# Patient Record
Sex: MALE | Race: WHITE | NOT HISPANIC OR LATINO | Employment: OTHER | URBAN - METROPOLITAN AREA
[De-identification: names, ages, dates, MRNs, and addresses within clinical notes are randomized per-mention and may not be internally consistent; named-entity substitution may affect disease eponyms.]

---

## 2017-02-16 ENCOUNTER — TELEPHONE (OUTPATIENT)
Dept: RHEUMATOLOGY | Facility: CLINIC | Age: 67
End: 2017-02-16

## 2017-02-16 NOTE — TELEPHONE ENCOUNTER
Prior Authorization Approval    Authorization Effective Date: 2/16/2017  Authorization Expiration Date: 2/16/2020  Medication: Humira - Approved  Approved Dose/Quantity: 2 per 28 days  Reference #: [id number]   Insurance Company: [employer] - Phone [phone] Fax [phone]  Expected CoPay: $5.00     CoPay Card Available: Yes   Foundation Assistance Needed:    Which Pharmacy is filling the prescription (Not needed for infusion/clinic administered): [facility] - [place], [place] - [facility] [address]  Pharmacy Notified: Yes  Patient Notified: Yes

## 2017-02-20 DIAGNOSIS — M10.9 GOUT, UNSPECIFIED: ICD-10-CM

## 2017-02-20 DIAGNOSIS — E03.4 HYPOTHYROIDISM DUE TO ACQUIRED ATROPHY OF THYROID: ICD-10-CM

## 2017-02-21 RX ORDER — LEVOTHYROXINE SODIUM 25 UG/1
TABLET ORAL
Qty: 90 TABLET | Refills: 1 | Status: SHIPPED | OUTPATIENT
Start: 2017-02-21 | End: 2017-08-24

## 2017-02-21 RX ORDER — ALLOPURINOL 300 MG/1
TABLET ORAL
Qty: 90 TABLET | Refills: 1 | Status: SHIPPED | OUTPATIENT
Start: 2017-02-21 | End: 2017-08-24

## 2017-02-21 NOTE — TELEPHONE ENCOUNTER
levothyroxine (SYNTHROID, LEVOTHROID) 25 MCG tablet     Last Written Prescription Date: 8/23/16  Last Quantity: 90, # refills: 1  Last Office Visit with [facility] or  Health prescribing provider: 11/15/16        TSH   Date Value Ref Range Status   12/01/2016 3.93 0.40 - 4.00 mU/L Final     allopurinol (ZYLOPRIM) 300 MG tablet       Last Written Prescription Date: 5/24/16  Last Fill Quantity: 90, # refills: 0  Last Office Visit with [facility], [facility] or  Health prescribing provider:  11/15/16        Uric Acid   Date Value Ref Range Status   06/02/2016 3.0 (L) 3.5 - 7.2 mg/dL Final   ]  Creatinine   Date Value Ref Range Status   12/01/2016 0.84 0.66 - 1.25 mg/dL Final   ]  Lab Results   Component Value Date    WBC 4.0 06/02/2016     Lab Results   Component Value Date    RBC 4.38 06/02/2016     Lab Results   Component Value Date    HGB 14.7 06/02/2016     Lab Results   Component Value Date    HCT 43.0 06/02/2016     No components found for: MCT  Lab Results   Component Value Date    MCV 98 06/02/2016     Lab Results   Component Value Date    MCH 33.6 06/02/2016     Lab Results   Component Value Date    MCHC 34.2 06/02/2016     Lab Results   Component Value Date    RDW 12.7 06/02/2016     Lab Results   Component Value Date     06/02/2016     Lab Results   Component Value Date    AST 35 12/01/2016     Lab Results   Component Value Date    ALT 58 12/01/2016

## 2017-02-21 NOTE — TELEPHONE ENCOUNTER
Synthroid  Routing refill request to provider for review/approval because:  Drug interaction warning    Allopurinol  Routing refill request to provider for review/approval because:  Labs out of range:  CBC and ALT    Bettye Hernandez RN  [facility]

## 2017-03-04 DIAGNOSIS — K21.9 GASTROESOPHAGEAL REFLUX DISEASE WITHOUT ESOPHAGITIS: Primary | ICD-10-CM

## 2017-03-04 DIAGNOSIS — E78.5 HYPERLIPIDEMIA LDL GOAL <130: ICD-10-CM

## 2017-03-06 RX ORDER — CHOLESTYRAMINE LIGHT 4 G/5.7G
POWDER, FOR SUSPENSION ORAL
Qty: 60 PACKET | Refills: 5 | Status: SHIPPED | OUTPATIENT
Start: 2017-03-06 | End: 2018-10-23

## 2017-03-06 NOTE — TELEPHONE ENCOUNTER
Questran  Routing refill request to provider for review/approval because:  Labs not current:  FLP    Omeprazole  Prescription approved per [facility] Refill Protocol.    Bettye Hernandez RN  [facility]

## 2017-03-06 NOTE — TELEPHONE ENCOUNTER
Matt     Last Written Prescription Date: 6/6/16  Last Fill Quantity: 60, # refills: 8  Last Office Visit with [facility], [facility] or  Health prescribing provider: 11/15/16       Lab Results   Component Value Date    CHOL 173 12/29/2015     Lab Results   Component Value Date    HDL 56 12/29/2015     Lab Results   Component Value Date    LDL 98 12/01/2016    LDL 75 12/29/2015     Lab Results   Component Value Date    TRIG 211 12/29/2015     Lab Results   Component Value Date    CHOLHDLRATIO 3.0 10/04/2013     Omeprazole      Last Written Prescription Date: 3/11/16  Last Fill Quantity: 90,  # refills: 3   Last Office Visit with [facility], [facility] or [facility] prescribing provider: 11/15/16

## 2017-04-01 DIAGNOSIS — I10 HYPERTENSION GOAL BP (BLOOD PRESSURE) < 140/90: ICD-10-CM

## 2017-04-03 RX ORDER — LISINOPRIL 20 MG/1
TABLET ORAL
Qty: 90 TABLET | Refills: 1 | Status: SHIPPED | OUTPATIENT
Start: 2017-04-03 | End: 2017-09-08 | Stop reason: SINTOL

## 2017-04-03 NOTE — TELEPHONE ENCOUNTER
Lisinopril      Last Written Prescription Date: 1/6/16  Last Fill Quantity: 90, # refills: 0  Last Office Visit with [facility], [facility] or [facility] prescribing provider: 11/15/16  Next 5 appointments (look out 90 days)     Jun 12, 2017 10:15 AM CDT   Return Visit with Sb Hurtado MD   [facility]    [address] [address] [facility] [address] [facility] [address]   [phone]                   Potassium   Date Value Ref Range Status   12/01/2016 4.1 3.4 - 5.3 mmol/L Final     Creatinine   Date Value Ref Range Status   12/01/2016 0.84 0.66 - 1.25 mg/dL Final     BP Readings from Last 3 Encounters:   12/07/16 (!) 155/93   11/15/16 136/82   04/19/16 130/88

## 2017-04-03 NOTE — TELEPHONE ENCOUNTER
Routing refill request to provider for review/approval because:  Labs out of range:  BP  A break in medication, patient should have been out of medication 1 year ago    Bettye Hernandez RN  [facility]

## 2017-04-07 ENCOUNTER — OFFICE VISIT (OUTPATIENT)
Dept: FAMILY MEDICINE | Facility: OTHER | Age: 67
End: 2017-04-07
Payer: COMMERCIAL

## 2017-04-07 VITALS
OXYGEN SATURATION: 95 % | SYSTOLIC BLOOD PRESSURE: 122 MMHG | RESPIRATION RATE: 20 BRPM | DIASTOLIC BLOOD PRESSURE: 70 MMHG | TEMPERATURE: 97.4 F | WEIGHT: 193 LBS | BODY MASS INDEX: 26.23 KG/M2 | HEART RATE: 84 BPM

## 2017-04-07 DIAGNOSIS — R22.0 FACIAL SWELLING: Primary | ICD-10-CM

## 2017-04-07 DIAGNOSIS — I71.40 ABDOMINAL AORTIC ANEURYSM (AAA) WITHOUT RUPTURE (H): ICD-10-CM

## 2017-04-07 DIAGNOSIS — I15.9 SECONDARY HYPERTENSION WITH GOAL BLOOD PRESSURE LESS THAN 140/90: ICD-10-CM

## 2017-04-07 LAB
CREAT UR-MCNC: 87 MG/DL
MICROALBUMIN UR-MCNC: 21 MG/L
MICROALBUMIN/CREAT UR: 23.6 MG/G CR (ref 0–17)

## 2017-04-07 PROCEDURE — 99213 OFFICE O/P EST LOW 20 MIN: CPT | Performed by: NURSE PRACTITIONER

## 2017-04-07 PROCEDURE — 82043 UR ALBUMIN QUANTITATIVE: CPT | Performed by: NURSE PRACTITIONER

## 2017-04-07 RX ORDER — CLINDAMYCIN HCL 300 MG
300 CAPSULE ORAL 3 TIMES DAILY
Qty: 30 CAPSULE | Refills: 0 | Status: SHIPPED | OUTPATIENT
Start: 2017-04-07 | End: 2017-04-17

## 2017-04-07 ASSESSMENT — PAIN SCALES - GENERAL: PAINLEVEL: MILD PAIN (2)

## 2017-04-07 NOTE — PROGRESS NOTES
SUBJECTIVE:                                                    Camilo Baca is a 66 year old male who presents to clinic today for the following health issues:      JAW PAIN      Duration: 1 day    Description (location/character/radiation): left side jaw/neck pain    Intensity:  mild    Accompanying signs and symptoms: considerable swelling    History (similar episodes/previous evaluation): None    Precipitating or alleviating factors: None    Therapies tried and outcome: None     Sudden onset of left side jaw swelling 1 day ago.  It developed within a few hours.  Had some mild sore throat prior to that.     No fevers/chills.  Has been feeling well except for mild cough.  No sinus congestion or ear pain.  No known dental issues.     History of RA and is on Humira for that.    Symptoms are much improved now, at least 50% better in 24 hours.  He has not done anything to try and improve this.       Problem list and histories reviewed & adjusted, as indicated.  Additional history: none    Patient Active Problem List   Diagnosis     Lumbago     NONSPECIFIC MEDICAL HISTORY     HYPERLIPIDEMIA LDL GOAL <130     History of adenomatous polyp of colon     Idiopathic chronic gout without tophus, unspecified site     Advanced directives, counseling/discussion     High risk medication use     Thrombocytopenia (H)     Gastroesophageal reflux disease without esophagitis     Factor 5 Leiden mutation, heterozygous (H)     Personal history of venous thrombosis and embolism     Personal history of DVT (deep vein thrombosis)     Hypothyroidism due to acquired atrophy of thyroid     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)     Secondary hypertension with goal blood pressure less than 140/90     Past Surgical History:   Procedure Laterality Date     C APPENDECTOMY  1996     COLONOSCOPY  11/15/2010    COLONOSCOPY performed by DARIUS MESA at  GI     COLONOSCOPY N/A 11/2/2015    Procedure:  COLONOSCOPY;  Surgeon: Bubba Odom MD;  Location: [facility]     HC COLONOSCOPY W BIOPSY  08/10/05     HC REPAIR ING HERNIA,5+Y/O,REDUCIBL  1960     HERNIORRHAPHY UMBILICAL N/A 4/17/2015    Procedure: HERNIORRHAPHY UMBILICAL;  Surgeon: Rayray Avila MD;  Location: [facility] OR     LAPAROSCOPIC HERNIORRHAPHY INGUINAL BILATERAL Bilateral 4/17/2015    Procedure: LAPAROSCOPIC HERNIORRHAPHY INGUINAL BILATERAL;  Surgeon: Rayray Avila MD;  Location: [facility] OR       Social History   Substance Use Topics     Smoking status: Former Smoker     Packs/day: 0.25     Years: 2.00     Types: Cigarettes, Pipe     Quit date: 1/1/1979     Smokeless tobacco: Never Used     Alcohol use Yes      Comment: 5 drinks per week / wine     Family History   Problem Relation Age of Onset     Allergies Father      Cortisone     Alcohol/Drug Sister      Penicillin     Alzheimer Disease Paternal Grandfather      Arthritis Mother      Blood Disease Paternal Grandmother      Clotting problems     Blood Disease Son      Factor 5     Cardiovascular Father      Cardiovascular Mother      Circulatory Father      Depression Mother      DIABETES Father      GASTROINTESTINAL DISEASE Mother      IBS     Hypertension Mother      Hypertension Maternal Aunt      CEREBROVASCULAR DISEASE Maternal Grandmother      Circulatory Mother      Aortal aneurysm     Hypertension Father      Lipids Father      OSTEOPOROSIS Mother          Current Outpatient Prescriptions   Medication Sig Dispense Refill     lisinopril (PRINIVIL/ZESTRIL) 20 MG tablet TAKE ONE TABLET BY MOUTH EVERY DAY 90 tablet 1     adalimumab (HUMIRA PEN) 40 MG/0.8ML pen kit Inject 0.8 mLs (40 mg) Subcutaneous every 14 days 2 each 3     cholestyramine light (QUESTRAN) 4 GM Packet DISSOLVE ONE PACKET IN LIQUID AS DIRECTED TWO TIMES A DAY AND DRINK 60 packet 5     omeprazole (PRILOSEC) 20 MG CR capsule TAKE ONE CAPSULE BY MOUTH ONCE DAILY -TAKE 30-60 MINUTES BEFORE A MEAL 90 capsule 1      allopurinol (ZYLOPRIM) 300 MG tablet TAKE ONE TABLET BY MOUTH EVERY DAY 90 tablet 1     levothyroxine (SYNTHROID/LEVOTHROID) 25 MCG tablet TAKE ONE TABLET BY MOUTH EVERY DAY 90 tablet 1     cetirizine (ZYRTEC) 10 MG tablet Take 1 tablet (10 mg) by mouth every evening 90 tablet 1     atorvastatin (LIPITOR) 80 MG tablet Take 1 tablet (80 mg) by mouth daily 90 tablet 1     diclofenac (VOLTAREN) 1 % GEL Apply  2 grams to shoulders three times daily using enclosed dosing card. 100 g 1     triamcinolone (KENALOG) 0.1 % cream Locally bid 60 g 0     capsaicin (ZOSTRIX) 0.075 % topical cream Apply  topically 3 times daily. 50 g 3     BABY ASPIRIN OR daily       [DISCONTINUED] levothyroxine (SYNTHROID, LEVOTHROID) 25 MCG tablet TAKE ONE TABLET BY MOUTH EVERY DAY 90 tablet 1     [DISCONTINUED] lisinopril (PRINIVIL,ZESTRIL) 20 MG tablet TAKE ONE TABLET BY MOUTH EVERY DAY 90 tablet 0     Allergies   Allergen Reactions     No Known Drug Allergies      BP Readings from Last 3 Encounters:   04/07/17 122/70   12/07/16 (!) 155/93   11/15/16 136/82    Wt Readings from Last 3 Encounters:   04/07/17 193 lb (87.5 kg)   12/07/16 180 lb (81.6 kg)   11/15/16 190 lb 9.6 oz (86.5 kg)                    Reviewed and updated as needed this visit by clinical staff  Tobacco  Allergies  Meds  Med Hx  Surg Hx  Fam Hx  Soc Hx      Reviewed and updated as needed this visit by Provider         ROS:  C: NEGATIVE for fever, chills, change in weight  ENT/MOUTH: as above   R: NEGATIVE for significant cough or SOB  CV: NEGATIVE for chest pain, palpitations or peripheral edema    OBJECTIVE:                                                    /70  Pulse 84  Temp 97.4  F (36.3  C) (Tympanic)  Resp 20  Wt 193 lb (87.5 kg)  SpO2 95%  BMI 26.23 kg/m2  Body mass index is 26.23 kg/(m^2).  GENERAL: healthy, alert and no distress  HENT: normal cephalic/atraumatic, ear canals and TM's normal, nose and mouth without ulcers or lesions, oropharynx  clear, oral mucous membranes moist, sinuses: not tender and left sided lower jaw swelling and mild tenderness to palpation.  No erythema or skin changes.  No warmth.  No abscess seen in his mouth or palpated.  No gum swelling, erythema or cracked teeth. No temporal area pain or swelling   NECK: no adenopathy, no asymmetry, masses, or scars and thyroid normal to palpation  RESP: lungs clear to auscultation - no rales, rhonchi or wheezes  CV: regular rate and rhythm, normal S1 S2, no S3 or S4, no murmur, click or rub, no peripheral edema and peripheral pulses strong  MS: no gross musculoskeletal defects noted, no edema    Diagnostic Test Results:  none      ASSESSMENT/PLAN:                                                        1. Facial swelling  I suspect this is a blocked salivary gland given how rapidly the onset was and how rapidly it has improved in less than 24 hours.  No definite infection indentified, but he is on chronic immunosuppression so I gave him a paper prescription for an antibiotic to use only if symptoms fail to resolve.  I suspect this will be gone within a few days, however.   - clindamycin (CLEOCIN) 300 MG capsule; Take 1 capsule (300 mg) by mouth 3 times daily for 10 days  Dispense: 30 capsule; Refill: 0    FUTURE APPOINTMENTS:       - Follow up if symptoms fail to resolve as expected.   See Patient Instructions    DIANN Presley [facility]

## 2017-04-07 NOTE — PATIENT INSTRUCTIONS
If this gets worse start the antibiotics.  If this continues to improve you don't even need to take them.       Treatment for Parotid Duct Obstruction  Parotid duct obstruction is when part of your parotid duct becomes blocked. The parotid duct is a small tube that leads from a gland that makes saliva. The duct sends the saliva into your mouth. When it s blocked, saliva can t flow normally.  Types of treatment  You may start with treatments such as:    Drinking more water    Applying moist heat to the area    Massaging the gland and duct    Sucking on candies to create saliva secretion    Using pain medicines    Stopping use of any medicines that lower the amount of saliva you make, if possible  Many symptoms go away quickly with these types of treatments. If your symptoms don t get better, you may need treatments such as:    Lithotripsy, which uses shock waves to break up the stone    Wire basket retrieval, which removes the stone through the duct    Sialoendoscopy, which also removes the stone through the duct    Open surgery, which may include removal of the parotid duct, if these other methods don t work  Your parotid gland should work as normal after the blockage is removed.  Possible complications of parotid duct obstruction  Sometimes obstruction of the duct also leads to infection of the gland and duct. This is more common in older adults. If you have an infection, you may have a fever and pain that gets worse. You may need treatment with antibiotics.  Most of the time, this kind of infection soon goes away with antibiotics. In other cases a more severe infection may happen. You may have an infection of the deep layers of the skin. This can lead to an abscess in your gland or neck. If your symptoms don t get better, you may need to see an ear, nose, and throat doctor.  When to call your healthcare provider  Call your healthcare provider right away if you have any of these:    Fever of 100.4 F (38.0 C) or  higher    Pain that gets worse    Pain in new areas of your head or neck     4594-6335 [employer]. [address] [place] [address], [place] [address]. All rights reserved. This information is not intended as a substitute for professional medical care. Always follow your healthcare professional's instructions.

## 2017-04-07 NOTE — MR AVS SNAPSHOT
After Visit Summary   4/7/2017    Camilo Baca    MRN: [id number]           Patient Information     Date Of Birth          1950        Visit Information        Provider Department      4/7/2017 1:20 PM Lyndsey Aponte APRN [facility]        Today's Diagnoses     Facial swelling    -  1    Abdominal aortic aneurysm (AAA) without rupture (H)          Care Instructions    If this gets worse start the antibiotics.  If this continues to improve you don't even need to take them.       Treatment for Parotid Duct Obstruction  Parotid duct obstruction is when part of your parotid duct becomes blocked. The parotid duct is a small tube that leads from a gland that makes saliva. The duct sends the saliva into your mouth. When it s blocked, saliva can t flow normally.  Types of treatment  You may start with treatments such as:    Drinking more water    Applying moist heat to the area    Massaging the gland and duct    Sucking on candies to create saliva secretion    Using pain medicines    Stopping use of any medicines that lower the amount of saliva you make, if possible  Many symptoms go away quickly with these types of treatments. If your symptoms don t get better, you may need treatments such as:    Lithotripsy, which uses shock waves to break up the stone    Wire basket retrieval, which removes the stone through the duct    Sialoendoscopy, which also removes the stone through the duct    Open surgery, which may include removal of the parotid duct, if these other methods don t work  Your parotid gland should work as normal after the blockage is removed.  Possible complications of parotid duct obstruction  Sometimes obstruction of the duct also leads to infection of the gland and duct. This is more common in older adults. If you have an infection, you may have a fever and pain that gets worse. You may need treatment with antibiotics.  Most of the time, this kind of infection soon goes  away with antibiotics. In other cases a more severe infection may happen. You may have an infection of the deep layers of the skin. This can lead to an abscess in your gland or neck. If your symptoms don t get better, you may need to see an ear, nose, and throat doctor.  When to call your healthcare provider  Call your healthcare provider right away if you have any of these:    Fever of 100.4 F (38.0 C) or higher    Pain that gets worse    Pain in new areas of your head or neck     0250-4488 [employer]. [address] [place] [address]. All rights reserved. This information is not intended as a substitute for professional medical care. Always follow your healthcare professional's instructions.              Follow-ups after your visit        Your next 10 appointments already scheduled     Jun 12, 2017 10:15 AM CDT   [facility]ado [facility]   [facility]    [address] [facility] [address]   [phone]              Future tests that were ordered for you today     Open Future Orders        Priority Expected Expires Ordered    US abdominal aorta limited Routine  4/7/2018 4/7/2017            Who to contact     If you have questions or need follow up information about today's clinic visit or your schedule please contact [facility] directly at [phone].  Normal or non-critical lab and imaging results will be communicated to you by MyChart, letter or phone within 4 business days after the clinic has received the results. If you do not hear from us within 7 days, please contact the clinic through MyChart or phone. If you have a critical or abnormal lab result, we will notify you by phone as soon as possible.  Submit refill requests through [employer] or call your pharmacy and they will forward the refill request to us. Please allow 3 business days for your refill to be completed.          Additional Information  "About Your Visit        [employer] Information     [employer] lets you send messages to your doctor, view your test results, renew your prescriptions, schedule appointments and more. To sign up, go to [email or web address] . Click on \"Log in\" on the left side of the screen, which will take you to the Welcome page. Then click on \"Sign up Now\" on the right side of the page.     You will be asked to enter the access code listed below, as well as some personal information. Please follow the directions to create your username and password.     Your access code is: [email or web address]  Expires: 2017  2:10 PM     Your access code will  in 90 days. If you need help or a new code, please call your [place] clinic or [phone].        Care EveryWhere ID     This is your Care EveryWhere ID. This could be used by other organizations to access your [facility] medical records  [id number]        Your Vitals Were     Pulse Temperature Respirations Pulse Oximetry BMI (Body Mass Index)       84 97.4  F (36.3  C) (Tympanic) 20 95% 26.23 kg/m2        Blood Pressure from Last 3 Encounters:   17 122/70   16 (!) 155/93   11/15/16 136/82    Weight from Last 3 Encounters:   17 193 lb (87.5 kg)   16 180 lb (81.6 kg)   11/15/16 190 lb 9.6 oz (86.5 kg)                 Today's Medication Changes          These changes are accurate as of: 17  2:10 PM.  If you have any questions, ask your nurse or doctor.               Start taking these medicines.        Dose/Directions    clindamycin 300 MG capsule   Commonly known as:  CLEOCIN   Used for:  Facial swelling   Started by:  Lyndsey Aponte APRN CNP        Dose:  300 mg   Take 1 capsule (300 mg) by mouth 3 times daily for 10 days   Quantity:  30 capsule   Refills:  0            Where to get your medicines      Some of these will need a paper prescription and others can be bought over the counter.  Ask your nurse if you have questions.     Bring a paper prescription " for each of these medications     clindamycin 300 MG capsule                Primary Care Provider Office Phone # Fax #    Ru Gamez PA-C [phone]       [facility] [address] [facility] [address]        Thank you!     Thank you for choosing [facility]  for your care. Our goal is always to provide you with excellent care. Hearing back from our patients is one way we can continue to improve our services. Please take a few minutes to complete the written survey that you may receive in the mail after your visit with us. Thank you!             Your Updated Medication List - Protect others around you: Learn how to safely use, store and throw away your medicines at www.disposemymeds.org.          This list is accurate as of: 4/7/17  2:10 PM.  Always use your most recent med list.                   Brand Name Dispense Instructions for use    adalimumab 40 MG/0.8ML pen kit    HUMIRA PEN    2 each    Inject 0.8 mLs (40 mg) Subcutaneous every 14 days       allopurinol 300 MG tablet    ZYLOPRIM    90 tablet    TAKE ONE TABLET BY MOUTH EVERY DAY       atorvastatin 80 MG tablet    LIPITOR    90 tablet    Take 1 tablet (80 mg) by mouth daily       BABY ASPIRIN PO      daily       capsaicin 0.075 % cream    ZOSTRIX    50 g    Apply  topically 3 times daily.       cetirizine 10 MG tablet    zyrTEC    90 tablet    Take 1 tablet (10 mg) by mouth every evening       cholestyramine light 4 GM Packet    QUESTRAN    60 packet    DISSOLVE ONE PACKET IN LIQUID AS DIRECTED TWO TIMES A DAY AND DRINK       clindamycin 300 MG capsule    CLEOCIN    30 capsule    Take 1 capsule (300 mg) by mouth 3 times daily for 10 days       diclofenac 1 % Gel topical gel    VOLTAREN    100 g    Apply  2 grams to shoulders three times daily using enclosed dosing card.       levothyroxine 25 MCG tablet    SYNTHROID/LEVOTHROID    90 tablet    TAKE ONE TABLET BY MOUTH EVERY DAY       lisinopril 20 MG tablet     PRINIVIL/ZESTRIL    90 tablet    TAKE ONE TABLET BY MOUTH EVERY DAY       omeprazole 20 MG CR capsule    priLOSEC    90 capsule    TAKE ONE CAPSULE BY MOUTH ONCE DAILY -TAKE 30-60 MINUTES BEFORE A MEAL       triamcinolone 0.1 % cream    KENALOG    60 g    Locally bid

## 2017-04-14 ENCOUNTER — HOSPITAL ENCOUNTER (OUTPATIENT)
Dept: ULTRASOUND IMAGING | Facility: CLINIC | Age: 67
Discharge: HOME | End: 2017-04-14
Attending: NURSE PRACTITIONER | Admitting: NURSE PRACTITIONER
Payer: COMMERCIAL

## 2017-04-14 DIAGNOSIS — I71.40 ABDOMINAL AORTIC ANEURYSM (AAA) WITHOUT RUPTURE (H): ICD-10-CM

## 2017-04-14 PROCEDURE — 76775 US EXAM ABDO BACK WALL LIM: CPT

## 2017-04-17 ENCOUNTER — TELEPHONE (OUTPATIENT)
Dept: FAMILY MEDICINE | Facility: OTHER | Age: 67
End: 2017-04-17

## 2017-04-17 NOTE — TELEPHONE ENCOUNTER
----- Message from DIANN Presley CNP sent at 4/16/2017 10:27 PM CDT -----  Please notify patient, call or letter of the following:     Your ultrasound showed not significant change compared to the previous exam.  This is good.     Electronically signed by Lyndsey Aponte CNP.

## 2017-04-17 NOTE — TELEPHONE ENCOUNTER
I have attempted to contact this patient by phone with the following results: message left to return my call with wife.  Tara Valentine MA     4/17/2017

## 2017-06-10 DIAGNOSIS — E78.5 HYPERLIPIDEMIA LDL GOAL <130: ICD-10-CM

## 2017-06-11 NOTE — TELEPHONE ENCOUNTER
lipitor     Last Written Prescription Date: 11/15/2016  Last Fill Quantity: 90, # refills: 1  Last Office Visit with [facility] or [facility] prescribing provider: 4/7/2017  Next 5 appointments (look out 90 days)     Jun 12, 2017 10:15 AM CDT   Return Visit with Sb [facility]ado MD   [facility]    [address] [facility] [address]   [phone]                   Lab Results   Component Value Date    CHOL 173 12/29/2015     Lab Results   Component Value Date    HDL 56 12/29/2015     Lab Results   Component Value Date    LDL 98 12/01/2016    LDL 75 12/29/2015     Lab Results   Component Value Date    TRIG 211 12/29/2015     Lab Results   Component Value Date    CHOLHDLRATIO 3.0 10/04/2013

## 2017-06-12 ENCOUNTER — RADIANT APPOINTMENT (OUTPATIENT)
Dept: GENERAL RADIOLOGY | Facility: CLINIC | Age: 67
End: 2017-06-12
Attending: INTERNAL MEDICINE
Payer: COMMERCIAL

## 2017-06-12 ENCOUNTER — OFFICE VISIT (OUTPATIENT)
Dept: RHEUMATOLOGY | Facility: CLINIC | Age: 67
End: 2017-06-12
Payer: COMMERCIAL

## 2017-06-12 VITALS
RESPIRATION RATE: 16 BRPM | SYSTOLIC BLOOD PRESSURE: 149 MMHG | DIASTOLIC BLOOD PRESSURE: 67 MMHG | TEMPERATURE: 97.9 F | HEART RATE: 62 BPM | BODY MASS INDEX: 25.95 KG/M2 | WEIGHT: 191 LBS

## 2017-06-12 DIAGNOSIS — M05.712 RHEUMATOID ARTHRITIS INVOLVING BOTH SHOULDERS WITH POSITIVE RHEUMATOID FACTOR (H): ICD-10-CM

## 2017-06-12 DIAGNOSIS — M05.712 RHEUMATOID ARTHRITIS INVOLVING BOTH SHOULDERS WITH POSITIVE RHEUMATOID FACTOR (H): Primary | ICD-10-CM

## 2017-06-12 DIAGNOSIS — M05.711 RHEUMATOID ARTHRITIS INVOLVING BOTH SHOULDERS WITH POSITIVE RHEUMATOID FACTOR (H): ICD-10-CM

## 2017-06-12 DIAGNOSIS — M05.711 RHEUMATOID ARTHRITIS INVOLVING BOTH SHOULDERS WITH POSITIVE RHEUMATOID FACTOR (H): Primary | ICD-10-CM

## 2017-06-12 PROCEDURE — 99214 OFFICE O/P EST MOD 30 MIN: CPT | Performed by: INTERNAL MEDICINE

## 2017-06-12 PROCEDURE — 71020 XR CHEST 2 VW: CPT

## 2017-06-12 NOTE — NURSING NOTE
"Chief Complaint   Patient presents with     Recheck Medication     RA       Initial /67 (BP Location: Left arm, Patient Position: Chair)  Pulse 62  Temp 97.9  F (36.6  C) (Oral)  Resp 16  Wt 191 lb (86.6 kg)  BMI 25.95 kg/m2 Estimated body mass index is 25.95 kg/(m^2) as calculated from the following:    Height as of 4/19/16: 5' 11.93\" (1.827 m).    Weight as of this encounter: 191 lb (86.6 kg).  Medication Reconciliation: complete   Samantha Garcia LPN    "

## 2017-06-12 NOTE — MR AVS SNAPSHOT
"              After Visit Summary   2017    Camilo Baca    MRN: [id number]           Patient Information     Date Of Birth          1950        Visit Information        Provider Department      2017 10:15 AM Sb Hurtado MD [facility]        Today's Diagnoses     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)    -  1       Follow-ups after your visit        Who to contact     If you have questions or need follow up information about today's clinic visit or your schedule please contact [facility] directly at [phone].  Normal or non-critical lab and imaging results will be communicated to you by Roundscapeshart, letter or phone within 4 business days after the clinic has received the results. If you do not hear from us within 7 days, please contact the clinic through Roundscapeshart or phone. If you have a critical or abnormal lab result, we will notify you by phone as soon as possible.  Submit refill requests through BEKIZ or call your pharmacy and they will forward the refill request to us. Please allow 3 business days for your refill to be completed.          Additional Information About Your Visit        MyChart Information     BEKIZ lets you send messages to your doctor, view your test results, renew your prescriptions, schedule appointments and more. To sign up, go to [email or web address] . Click on \"Log in\" on the left side of the screen, which will take you to the Welcome page. Then click on \"Sign up Now\" on the right side of the page.     You will be asked to enter the access code listed below, as well as some personal information. Please follow the directions to create your username and password.     Your access code is: [email or web address]  Expires: 2017  2:10 PM     Your access code will  in 90 days. If you need help or a new code, please call your [facility] or [phone].        Care EveryWhere ID     This " is your Care EveryWhere ID. This could be used by other organizations to access your [place] medical records  [id number]        Your Vitals Were     Pulse Temperature Respirations BMI (Body Mass Index)          62 97.9  F (36.6  C) (Oral) 16 25.95 kg/m2         Blood Pressure from Last 3 Encounters:   06/12/17 149/67   04/07/17 122/70   12/07/16 (!) 155/93    Weight from Last 3 Encounters:   06/12/17 191 lb (86.6 kg)   04/07/17 193 lb (87.5 kg)   12/07/16 180 lb (81.6 kg)               Primary Care Provider Office Phone # Fax #    Ru Gamez PA-C [phone]       [facility] [address] [facility] [address]        Thank you!     Thank you for choosing [facility]  for your care. Our goal is always to provide you with excellent care. Hearing back from our patients is one way we can continue to improve our services. Please take a few minutes to complete the written survey that you may receive in the mail after your visit with us. Thank you!             Your Updated Medication List - Protect others around you: Learn how to safely use, store and throw away your medicines at www.disposemymeds.org.          This list is accurate as of: 6/12/17  4:37 PM.  Always use your most recent med list.                   Brand Name Dispense Instructions for use    adalimumab 40 MG/0.8ML pen kit    HUMIRA PEN    2 each    Inject 0.8 mLs (40 mg) Subcutaneous every 14 days       allopurinol 300 MG tablet    ZYLOPRIM    90 tablet    TAKE ONE TABLET BY MOUTH EVERY DAY       atorvastatin 80 MG tablet    LIPITOR    90 tablet    Take 1 tablet (80 mg) by mouth daily       BABY ASPIRIN PO      daily       capsaicin 0.075 % cream    ZOSTRIX    50 g    Apply  topically 3 times daily.       cetirizine 10 MG tablet    zyrTEC    90 tablet    Take 1 tablet (10 mg) by mouth every evening       cholestyramine light 4 GM Packet    QUESTRAN    60 packet    DISSOLVE ONE PACKET IN LIQUID AS DIRECTED TWO TIMES  A DAY AND DRINK       diclofenac 1 % Gel topical gel    VOLTAREN    100 g    Apply  2 grams to shoulders three times daily using enclosed dosing card.       levothyroxine 25 MCG tablet    SYNTHROID/LEVOTHROID    90 tablet    TAKE ONE TABLET BY MOUTH EVERY DAY       lisinopril 20 MG tablet    PRINIVIL/ZESTRIL    90 tablet    TAKE ONE TABLET BY MOUTH EVERY DAY       omeprazole 20 MG CR capsule    priLOSEC    90 capsule    TAKE ONE CAPSULE BY MOUTH ONCE DAILY -TAKE 30-60 MINUTES BEFORE A MEAL       triamcinolone 0.1 % cream    KENALOG    60 g    Locally bid       VITAMIN C PO      Take by mouth daily

## 2017-06-12 NOTE — PROGRESS NOTES
SUBJECTIVE:  Mr. Nena Baca was seen back in followup for his rheumatoid arthritis.  He is doing well on his Humira injections every 2 weeks.  Really has no joint pain, swelling, or stiffness and no side effects such as site reaction or infections.  His only concern is he for 8 months, had a bit of a chronic cough that produces some phlegm.  He mentioned this to his primary care physician and nothing specifically was done about it.      He also will be changing over his insurance to Medicare base plan next week, which is why I wanted him to come in.      PHYSICAL EXAMINATION:   VITAL SIGNS:  Blood pressure 149/67, pulse 62, weight 191.   LUNGS:  Clear.   HEART:  Regular.   MUSCULOSKELETAL:  Joint exam there is no synovitis of the wrists, MCPs, or PIPs nor of the elbows, shoulders, knees or ankles.   SKIN:  Without lesions.      IMPRESSION:   1.  Rheumatoid arthritis, doing well.      RECOMMENDATIONS:   1.  Discussed that he may have to switch to Remicade once he goes on to Medicare based plan.  He will let me know next week.   2.  Will do a chest x-ray.     3.  If the cough continues, we may need to have him evaluated by Pulmonology.   4.  Everything us going g well he will continue the Humira every 2 weeks with labs today and a routine follow up in 1 year or sooner if there are problems.         ORION MOODY MD             D: 2017 12:29   T: 2017 12:57   MT: AXEL#150      Name:     NENA BACA   MRN:      [id number]        Account:      [id number]   :      1950           Visit Date:   2017      Document: [id number]

## 2017-06-13 RX ORDER — ATORVASTATIN CALCIUM 80 MG/1
TABLET, FILM COATED ORAL
Qty: 90 TABLET | Refills: 1 | Status: SHIPPED | OUTPATIENT
Start: 2017-06-13 | End: 2017-12-09

## 2017-06-13 NOTE — TELEPHONE ENCOUNTER
Routing refill request to provider for review/approval because:  Labs out of range:  FLP  Labs not current:  ROSAURA Hernandez RN  [facility]

## 2017-06-27 DIAGNOSIS — M06.9 RHEUMATOID ARTHRITIS OF HAND, UNSPECIFIED LATERALITY, UNSPECIFIED RHEUMATOID FACTOR PRESENCE: ICD-10-CM

## 2017-06-27 NOTE — TELEPHONE ENCOUNTER
Humira        Last Written Prescription Date:  03-13-17  Last Fill Quantity: 2each,   # refills: 3  Last Office Visit with [facility] prescribing provider: 06-12-17.  Future Office visit:       Routing refill request to provider for review/approval because:  Drug not on the [facility] refill protocol or controlled substance    [facility] RN

## 2017-07-14 ENCOUNTER — TELEPHONE (OUTPATIENT)
Dept: RHEUMATOLOGY | Facility: CLINIC | Age: 67
End: 2017-07-14

## 2017-07-14 DIAGNOSIS — M05.711 RHEUMATOID ARTHRITIS INVOLVING BOTH SHOULDERS WITH POSITIVE RHEUMATOID FACTOR (H): Primary | ICD-10-CM

## 2017-07-14 DIAGNOSIS — M05.712 RHEUMATOID ARTHRITIS INVOLVING BOTH SHOULDERS WITH POSITIVE RHEUMATOID FACTOR (H): Primary | ICD-10-CM

## 2017-07-14 RX ORDER — DIPHENHYDRAMINE HCL 25 MG
25 CAPSULE ORAL ONCE
Status: CANCELLED
Start: 2017-07-19 | End: 2017-07-19

## 2017-07-14 RX ORDER — ACETAMINOPHEN 325 MG/1
650 TABLET ORAL ONCE
Status: CANCELLED
Start: 2017-07-19 | End: 2017-07-19

## 2017-07-14 NOTE — TELEPHONE ENCOUNTER
Reason for Call: patient is calling in at the request of DR Hurtado states that he is now on Medicare \    Medicare will not cover humira so [facility]    He has enough humira to get through July  Detailed comments: please call and update patient on this process    Phone Number Patient can be reached at: Home number on file [phone] (home)    Best Time: any    Can we leave a detailed message on this number? YES    Call taken on 7/14/2017 at 1:07 PM by Liane Campbell

## 2017-08-19 DIAGNOSIS — E03.9 HYPOTHYROIDISM: ICD-10-CM

## 2017-08-19 DIAGNOSIS — E03.4 HYPOTHYROIDISM DUE TO ACQUIRED ATROPHY OF THYROID: ICD-10-CM

## 2017-08-19 DIAGNOSIS — M10.9 GOUT, UNSPECIFIED: ICD-10-CM

## 2017-08-21 RX ORDER — ALLOPURINOL 300 MG/1
TABLET ORAL
Qty: 90 TABLET | Refills: 0 | Status: SHIPPED | OUTPATIENT
Start: 2017-08-21 | End: 2017-11-12

## 2017-08-21 RX ORDER — LEVOTHYROXINE SODIUM 25 UG/1
TABLET ORAL
Qty: 90 TABLET | Refills: 0 | Status: SHIPPED | OUTPATIENT
Start: 2017-08-21 | End: 2017-11-25

## 2017-08-21 NOTE — TELEPHONE ENCOUNTER
Allopurinol  Routing refill request to provider for review/approval because:  Labs out of range:  CBC, ALT  Labs not current:  Uric acid    Synthroid  Prescription approved per [facility] Refill Protocol.    Bettye Hernandez RN  [facility]

## 2017-08-21 NOTE — TELEPHONE ENCOUNTER
Allopurinol       Last Written Prescription Date: 2/21/17  Last Fill Quantity: 90, # refills: 1  Last Office Visit with [facility], [facility] or [facility] prescribing provider:  4/7/17   Next 5 appointments (look out 90 days)     Aug 24, 2017  8:40 AM CDT   [facility]    [address] [facility] [id number] [phone]                   Uric Acid   Date Value Ref Range Status   06/02/2016 3.0 (L) 3.5 - 7.2 mg/dL Final   ]  Creatinine   Date Value Ref Range Status   12/01/2016 0.84 0.66 - 1.25 mg/dL Final   ]  Lab Results   Component Value Date    WBC 4.0 06/02/2016     Lab Results   Component Value Date    RBC 4.38 06/02/2016     Lab Results   Component Value Date    HGB 14.7 06/02/2016     Lab Results   Component Value Date    HCT 43.0 06/02/2016     No components found for: MCT  Lab Results   Component Value Date    MCV 98 06/02/2016     Lab Results   Component Value Date    MCH 33.6 06/02/2016     Lab Results   Component Value Date    MCHC 34.2 06/02/2016     Lab Results   Component Value Date    RDW 12.7 06/02/2016     Lab Results   Component Value Date     06/02/2016     Lab Results   Component Value Date    AST 35 12/01/2016     Lab Results   Component Value Date    ALT 58 12/01/2016     Synthroid     Last Written Prescription Date: 2/21/17  Last Quantity: 90, # refills: 1  Last Office Visit with [facility] or [facility] prescribing provider: 4/7/17   Next 5 appointments (look out 90 days)     Aug 24, 2017  8:40 AM CDT   [facility] DI[facility]    [address] [facility] [id number] [phone]                   TSH   Date Value Ref Range Status   12/01/2016 3.93 0.40 - 4.00 mU/L Final

## 2017-08-24 ENCOUNTER — OFFICE VISIT (OUTPATIENT)
Dept: FAMILY MEDICINE | Facility: OTHER | Age: 67
End: 2017-08-24
Payer: COMMERCIAL

## 2017-08-24 VITALS
SYSTOLIC BLOOD PRESSURE: 132 MMHG | TEMPERATURE: 97.5 F | BODY MASS INDEX: 24.64 KG/M2 | HEIGHT: 71 IN | RESPIRATION RATE: 20 BRPM | WEIGHT: 176 LBS | OXYGEN SATURATION: 96 % | HEART RATE: 68 BPM | DIASTOLIC BLOOD PRESSURE: 80 MMHG

## 2017-08-24 DIAGNOSIS — M05.711 RHEUMATOID ARTHRITIS INVOLVING BOTH SHOULDERS WITH POSITIVE RHEUMATOID FACTOR (H): ICD-10-CM

## 2017-08-24 DIAGNOSIS — D68.51 FACTOR 5 LEIDEN MUTATION, HETEROZYGOUS (H): ICD-10-CM

## 2017-08-24 DIAGNOSIS — D69.6 THROMBOCYTOPENIA (H): ICD-10-CM

## 2017-08-24 DIAGNOSIS — R53.83 FATIGUE, UNSPECIFIED TYPE: ICD-10-CM

## 2017-08-24 DIAGNOSIS — M1A.00X0 IDIOPATHIC CHRONIC GOUT WITHOUT TOPHUS, UNSPECIFIED SITE: ICD-10-CM

## 2017-08-24 DIAGNOSIS — E78.5 HYPERLIPIDEMIA LDL GOAL <130: ICD-10-CM

## 2017-08-24 DIAGNOSIS — Z86.718 PERSONAL HISTORY OF DVT (DEEP VEIN THROMBOSIS): ICD-10-CM

## 2017-08-24 DIAGNOSIS — R61 NIGHT SWEATS: ICD-10-CM

## 2017-08-24 DIAGNOSIS — K21.9 GASTROESOPHAGEAL REFLUX DISEASE WITHOUT ESOPHAGITIS: ICD-10-CM

## 2017-08-24 DIAGNOSIS — Z76.89 ESTABLISHING CARE WITH NEW DOCTOR, ENCOUNTER FOR: Primary | ICD-10-CM

## 2017-08-24 DIAGNOSIS — R61 NIGHT SWEATS: Primary | ICD-10-CM

## 2017-08-24 DIAGNOSIS — R05.9 COUGH: ICD-10-CM

## 2017-08-24 DIAGNOSIS — I15.9 SECONDARY HYPERTENSION WITH GOAL BLOOD PRESSURE LESS THAN 140/90: ICD-10-CM

## 2017-08-24 DIAGNOSIS — M05.712 RHEUMATOID ARTHRITIS INVOLVING BOTH SHOULDERS WITH POSITIVE RHEUMATOID FACTOR (H): ICD-10-CM

## 2017-08-24 LAB
ALBUMIN SERPL-MCNC: 3.5 G/DL (ref 3.4–5)
ALP SERPL-CCNC: 74 U/L (ref 40–150)
ALT SERPL W P-5'-P-CCNC: 40 U/L (ref 0–70)
ANION GAP SERPL CALCULATED.3IONS-SCNC: 7 MMOL/L (ref 3–14)
AST SERPL W P-5'-P-CCNC: 24 U/L (ref 0–45)
BASOPHILS # BLD AUTO: 0 10E9/L (ref 0–0.2)
BASOPHILS NFR BLD AUTO: 0.5 %
BILIRUB DIRECT SERPL-MCNC: 0.3 MG/DL (ref 0–0.2)
BILIRUB SERPL-MCNC: 1.2 MG/DL (ref 0.2–1.3)
BUN SERPL-MCNC: 15 MG/DL (ref 7–30)
CALCIUM SERPL-MCNC: 9.2 MG/DL (ref 8.5–10.1)
CHLORIDE SERPL-SCNC: 109 MMOL/L (ref 94–109)
CO2 SERPL-SCNC: 27 MMOL/L (ref 20–32)
CREAT SERPL-MCNC: 0.85 MG/DL (ref 0.66–1.25)
DIFFERENTIAL METHOD BLD: ABNORMAL
EOSINOPHIL # BLD AUTO: 0.2 10E9/L (ref 0–0.7)
EOSINOPHIL NFR BLD AUTO: 4.7 %
ERYTHROCYTE [DISTWIDTH] IN BLOOD BY AUTOMATED COUNT: 13 % (ref 10–15)
GFR SERPL CREATININE-BSD FRML MDRD: >90 ML/MIN/1.7M2
GLUCOSE SERPL-MCNC: 95 MG/DL (ref 70–99)
HCT VFR BLD AUTO: 41.6 % (ref 40–53)
HGB BLD-MCNC: 14 G/DL (ref 13.3–17.7)
LYMPHOCYTES # BLD AUTO: 1.1 10E9/L (ref 0.8–5.3)
LYMPHOCYTES NFR BLD AUTO: 30.7 %
MCH RBC QN AUTO: 33.5 PG (ref 26.5–33)
MCHC RBC AUTO-ENTMCNC: 33.7 G/DL (ref 31.5–36.5)
MCV RBC AUTO: 100 FL (ref 78–100)
MONOCYTES # BLD AUTO: 0.6 10E9/L (ref 0–1.3)
MONOCYTES NFR BLD AUTO: 16.2 %
NEUTROPHILS # BLD AUTO: 1.8 10E9/L (ref 1.6–8.3)
NEUTROPHILS NFR BLD AUTO: 47.9 %
PLATELET # BLD AUTO: 111 10E9/L (ref 150–450)
POTASSIUM SERPL-SCNC: 4.4 MMOL/L (ref 3.4–5.3)
PROT SERPL-MCNC: 7.5 G/DL (ref 6.8–8.8)
RBC # BLD AUTO: 4.18 10E12/L (ref 4.4–5.9)
SODIUM SERPL-SCNC: 143 MMOL/L (ref 133–144)
TSH SERPL DL<=0.005 MIU/L-ACNC: 1.94 MU/L (ref 0.4–4)
WBC # BLD AUTO: 3.7 10E9/L (ref 4–11)

## 2017-08-24 PROCEDURE — 80076 HEPATIC FUNCTION PANEL: CPT | Performed by: NURSE PRACTITIONER

## 2017-08-24 PROCEDURE — 84443 ASSAY THYROID STIM HORMONE: CPT | Performed by: NURSE PRACTITIONER

## 2017-08-24 PROCEDURE — 80048 BASIC METABOLIC PNL TOTAL CA: CPT | Performed by: NURSE PRACTITIONER

## 2017-08-24 PROCEDURE — 85025 COMPLETE CBC W/AUTO DIFF WBC: CPT | Performed by: NURSE PRACTITIONER

## 2017-08-24 PROCEDURE — 99214 OFFICE O/P EST MOD 30 MIN: CPT | Performed by: NURSE PRACTITIONER

## 2017-08-24 PROCEDURE — 36415 COLL VENOUS BLD VENIPUNCTURE: CPT | Performed by: NURSE PRACTITIONER

## 2017-08-24 ASSESSMENT — PAIN SCALES - GENERAL: PAINLEVEL: NO PAIN (0)

## 2017-08-24 NOTE — NURSING NOTE
"Chief Complaint   Patient presents with     Establish Care     lipids, thyroid       Initial /80  Pulse 68  Temp 97.5  F (36.4  C) (Tympanic)  Resp 20  Ht 5' 10.7\" (1.796 m)  Wt 176 lb (79.8 kg)  SpO2 96%  BMI 24.76 kg/m2 Estimated body mass index is 24.76 kg/(m^2) as calculated from the following:    Height as of this encounter: 5' 10.7\" (1.796 m).    Weight as of this encounter: 176 lb (79.8 kg).  Medication Reconciliation: complete   ................Mitchel Jean LPN,   August 24, 2017,      8:55 AM,   [facility]    "

## 2017-08-24 NOTE — MR AVS SNAPSHOT
"              After Visit Summary   8/24/2017    Camilo Baca    MRN: [id number]           Patient Information     Date Of Birth          1950        Visit Information        Provider Department      8/24/2017 8:40 AM Lyndsey Aponte APRN CNP [facility]        Today's Diagnoses     Hyperlipidemia LDL goal <130    -  1    Fatigue, unspecified type        Night sweats          Care Instructions    Labs will be done today.  I will call or send a letter with results within the next 1-2 weeks.                Follow-ups after your visit        Your next 10 appointments already scheduled     Sep 08, 2017  8:30 AM CDT   Level 4 with NL INFUSION CHAIR 5   [facility] ([facility])    [address] [place] [address] [phone]              Who to contact     If you have questions or need follow up information about today's clinic visit or your schedule please contact [facility] directly at [phone].  Normal or non-critical lab and imaging results will be communicated to you by MyChart, letter or phone within 4 business days after the clinic has received the results. If you do not hear from us within 7 days, please contact the clinic through ACM Capital Partner[employer] or phone. If you have a critical or abnormal lab result, we will notify you by phone as soon as possible.  Submit refill requests through 3TEN8 or call your pharmacy and they will forward the refill request to us. Please allow 3 business days for your refill to be completed.          Additional Information About Your Visit        HC Rods and Customshart Information     [email or web address] lets you send messages to your doctor, view your test results, renew your prescriptions, schedule appointments and more. To sign up, go to [email or web address] . Click on \"Log in\" on the left side of the screen, which will take you to the Welcome page. Then click on \"Sign up Now\" on the right side of the page.     You will be " "asked to enter the access code listed below, as well as some personal information. Please follow the directions to create your username and password.     Your access code is: [email or web address]  Expires: 2017  9:27 AM     Your access code will  in 90 days. If you need help or a new code, please call your [place] clinic or [phone].        Care EveryWhere ID     This is your Care EveryWhere ID. This could be used by other organizations to access your [place] medical records  [id number]        Your Vitals Were     Pulse Temperature Respirations Height Pulse Oximetry BMI (Body Mass Index)    68 97.5  F (36.4  C) (Tympanic) 20 5' 10.7\" (1.796 m) 96% 24.76 kg/m2       Blood Pressure from Last 3 Encounters:   17 132/80   17 149/67   17 122/70    Weight from Last 3 Encounters:   17 176 lb (79.8 kg)   17 191 lb (86.6 kg)   17 193 lb (87.5 kg)              We Performed the Following     Basic metabolic panel  (Ca, Cl, CO2, Creat, Gluc, K, Na, BUN)     CBC with platelets and differential     Hepatic panel (Albumin, ALT, AST, Bili, Alk Phos, TP)     TSH with free T4 reflex        Primary Care Provider Office Phone # Fax #    Ru Gamez PA-C [phone]       [address] [facility] [address]        Equal Access to Services     [facility] : Hadii aad ku hadasho Soomaali, waaxda luqadaha, qaybta kaalmada adeegyada, matt idiin hayaan adeeg kharash la'aan . So [facility] [phone].    ATENCIÓN: Si habla español, tiene a bernal disposición servicios gratuitos de asistencia lingüística. Llame al [phone].    We comply with applicable federal civil rights laws and [place] laws. We do not discriminate on the basis of race, color, national origin, age, disability sex, sexual orientation or gender identity.            Thank you!     Thank you for choosing [facility]  for your care. Our goal is always to provide you with excellent care. Hearing back from our " patients is one way we can continue to improve our services. Please take a few minutes to complete the written survey that you may receive in the mail after your visit with us. Thank you!             Your Updated Medication List - Protect others around you: Learn how to safely use, store and throw away your medicines at www.disposemymeds.org.          This list is accurate as of: 8/24/17  9:27 AM.  Always use your most recent med list.                   Brand Name Dispense Instructions for use Diagnosis    adalimumab 40 MG/0.8ML pen kit    HUMIRA PEN    2 each    Inject 0.8 mLs (40 mg) Subcutaneous every 14 days    Rheumatoid arthritis of hand, unspecified laterality, unspecified rheumatoid factor presence (H)       allopurinol 300 MG tablet    ZYLOPRIM    90 tablet    TAKE ONE TABLET BY MOUTH EVERY DAY    Gout, unspecified, Hypothyroidism       atorvastatin 80 MG tablet    LIPITOR    90 tablet    TAKE ONE TABLET BY MOUTH EVERY DAY    Hyperlipidemia LDL goal <130       BABY ASPIRIN PO      daily        capsaicin 0.075 % cream    ZOSTRIX    50 g    Apply  topically 3 times daily.    Bursitis of shoulder       cetirizine 10 MG tablet    zyrTEC    90 tablet    Take 1 tablet (10 mg) by mouth every evening    Seasonal allergic rhinitis, unspecified allergic rhinitis trigger       cholestyramine light 4 GM Packet    QUESTRAN    60 packet    DISSOLVE ONE PACKET IN LIQUID AS DIRECTED TWO TIMES A DAY AND DRINK    Hyperlipidemia LDL goal <130       diclofenac 1 % Gel topical gel    VOLTAREN    100 g    Apply  2 grams to shoulders three times daily using enclosed dosing card.    Rotator cuff tendinitis       levothyroxine 25 MCG tablet    SYNTHROID/LEVOTHROID    90 tablet    TAKE ONE TABLET BY MOUTH EVERY DAY    Hypothyroidism due to acquired atrophy of thyroid       lisinopril 20 MG tablet    PRINIVIL/ZESTRIL    90 tablet    TAKE ONE TABLET BY MOUTH EVERY DAY    Hypertension goal BP (blood pressure) < 140/90       omeprazole  20 MG CR capsule    priLOSEC    90 capsule    TAKE ONE CAPSULE BY MOUTH ONCE DAILY -TAKE 30-60 MINUTES BEFORE A MEAL    Gastroesophageal reflux disease without esophagitis       triamcinolone 0.1 % cream    KENALOG    60 g    Locally bid    Rash       VITAMIN C PO      Take by mouth daily

## 2017-08-24 NOTE — PROGRESS NOTES
Called pt - informed of results and recommendations. Phone # given. Patient voiced understanding.   ................Mitchel Jean LPN,   August 24, 2017,      4:19 PM,   [facility]

## 2017-08-24 NOTE — PATIENT INSTRUCTIONS
Labs will be done today.  I will call or send a letter with results within the next 1-2 weeks.

## 2017-08-25 ENCOUNTER — OFFICE VISIT (OUTPATIENT)
Dept: PULMONOLOGY | Facility: CLINIC | Age: 67
End: 2017-08-25
Payer: COMMERCIAL

## 2017-08-25 VITALS
BODY MASS INDEX: 25.4 KG/M2 | HEIGHT: 71 IN | OXYGEN SATURATION: 95 % | WEIGHT: 181.4 LBS | HEART RATE: 70 BPM | RESPIRATION RATE: 16 BRPM | TEMPERATURE: 96.4 F

## 2017-08-25 DIAGNOSIS — R05.9 COUGH: Primary | ICD-10-CM

## 2017-08-25 LAB
FEF 25/75: NORMAL
FEV-1: NORMAL
FEV1/FVC: NORMAL
FVC: NORMAL

## 2017-08-25 PROCEDURE — 99204 OFFICE O/P NEW MOD 45 MIN: CPT | Mod: 25 | Performed by: INTERNAL MEDICINE

## 2017-08-25 PROCEDURE — 94010 BREATHING CAPACITY TEST: CPT | Performed by: INTERNAL MEDICINE

## 2017-08-25 ASSESSMENT — PAIN SCALES - GENERAL: PAINLEVEL: NO PAIN (0)

## 2017-08-25 NOTE — NURSING NOTE
"Chief Complaint   Patient presents with     Consult     Cough       Initial Pulse 70  Temp 96.4  F (35.8  C) (Temporal)  Resp 16  Ht 5' 10.5\" (1.791 m)  Wt 181 lb 6.4 oz (82.3 kg)  SpO2 95%  BMI 25.66 kg/m2 Estimated body mass index is 25.66 kg/(m^2) as calculated from the following:    Height as of this encounter: 5' 10.5\" (1.791 m).    Weight as of this encounter: 181 lb 6.4 oz (82.3 kg).  Medication Reconciliation: complete     Luz Alamo, MONICA        "

## 2017-08-25 NOTE — MR AVS SNAPSHOT
"              After Visit Summary   8/25/2017    Camilo Baca    MRN: [id number]           Patient Information     Date Of Birth          1950        Visit Information        Provider Department      8/25/2017 7:30 AM Jayden Anderson MD [facility]        Today's Diagnoses     Cough    -  1       Follow-ups after your visit        Your next 10 appointments already scheduled     Sep 08, 2017  8:30 AM CDT   Level 4 with NL INFUSION CHAIR 5   [facility] ([facility])    [address] [place] [id number] [phone]            Oct 06, 2017  3:00 PM CDT   Return Visit with Jayden Anderson MD   [facility]    [address] [facility] [id number] [phone]              Who to contact     If you have questions or need follow up information about today's clinic visit or your schedule please contact [facility] directly at [phone].  Normal or non-critical lab and imaging results will be communicated to you by CogniTenshart, letter or phone within 4 business days after the clinic has received the results. If you do not hear from us within 7 days, please contact the clinic through Liftop[employer] or phone. If you have a critical or abnormal lab result, we will notify you by phone as soon as possible.  Submit refill requests through [employer] or call your pharmacy and they will forward the refill request to us. Please allow 3 business days for your refill to be completed.          Additional Information About Your Visit        CogniTensharDevotee Information     [employer] Entertainment lets you send messages to your doctor, view your test results, renew your prescriptions, schedule appointments and more. To sign up, go to [email or web address] . Click on \"Log in\" on the left side of the screen, which will take you to the Welcome page. Then click on \"Sign up Now\" on the right side of the page.     You will " "be asked to enter the access code listed below, as well as some personal information. Please follow the directions to create your username and password.     Your access code is: [email or web address]  Expires: 2017  9:27 AM     Your access code will  in 90 days. If you need help or a new code, please call your [place] clinic or [phone].        Care EveryWhere ID     This is your Care EveryWhere ID. This could be used by other organizations to access your [place] medical records  [id number]        Your Vitals Were     Pulse Temperature Respirations Height Pulse Oximetry BMI (Body Mass Index)    70 96.4  F (35.8  C) (Temporal) 16 5' 10.5\" (1.791 m) 95% 25.66 kg/m2       Blood Pressure from Last 3 Encounters:   17 132/80   17 149/67   17 122/70    Weight from Last 3 Encounters:   17 181 lb 6.4 oz (82.3 kg)   17 176 lb (79.8 kg)   17 191 lb (86.6 kg)              We Performed the Following     Spirometry, Breathing Capacity: Normal Order, Clinic Performed        Primary Care Provider Office Phone # Fax #    Ru Gamez PA-C [phone]       [address] [facility] [address]        Equal Access to Services     BECCA BAEZ : Hadii umair byrne hadasho Somonicaali, waaxda luqadaha, qaybta kaalmada jesus, matt high. So [facility] [phone].    ATENCIÓN: Si habla español, tiene a bernal disposición servicios gratuitos de asistencia lingüística. Hina al [phone].    We comply with applicable federal civil rights laws and [place] laws. We do not discriminate on the basis of race, color, national origin, age, disability sex, sexual orientation or gender identity.            Thank you!     Thank you for choosing [facility]  for your care. Our goal is always to provide you with excellent care. Hearing back from our patients is one way we can continue to improve our services. Please take a few minutes to complete the written " survey that you may receive in the mail after your visit with us. Thank you!             Your Updated Medication List - Protect others around you: Learn how to safely use, store and throw away your medicines at www.disposemymeds.org.          This list is accurate as of: 8/25/17  8:50 AM.  Always use your most recent med list.                   Brand Name Dispense Instructions for use Diagnosis    adalimumab 40 MG/0.8ML pen kit    HUMIRA PEN    2 each    Inject 0.8 mLs (40 mg) Subcutaneous every 14 days    Rheumatoid arthritis of hand, unspecified laterality, unspecified rheumatoid factor presence (H)       allopurinol 300 MG tablet    ZYLOPRIM    90 tablet    TAKE ONE TABLET BY MOUTH EVERY DAY    Gout, unspecified, Hypothyroidism       atorvastatin 80 MG tablet    LIPITOR    90 tablet    TAKE ONE TABLET BY MOUTH EVERY DAY    Hyperlipidemia LDL goal <130       BABY ASPIRIN PO      daily        capsaicin 0.075 % cream    ZOSTRIX    50 g    Apply  topically 3 times daily.    Bursitis of shoulder       cetirizine 10 MG tablet    zyrTEC    90 tablet    Take 1 tablet (10 mg) by mouth every evening    Seasonal allergic rhinitis, unspecified allergic rhinitis trigger       cholestyramine light 4 GM Packet    QUESTRAN    60 packet    DISSOLVE ONE PACKET IN LIQUID AS DIRECTED TWO TIMES A DAY AND DRINK    Hyperlipidemia LDL goal <130       diclofenac 1 % Gel topical gel    VOLTAREN    100 g    Apply  2 grams to shoulders three times daily using enclosed dosing card.    Rotator cuff tendinitis       levothyroxine 25 MCG tablet    SYNTHROID/LEVOTHROID    90 tablet    TAKE ONE TABLET BY MOUTH EVERY DAY    Hypothyroidism due to acquired atrophy of thyroid       lisinopril 20 MG tablet    PRINIVIL/ZESTRIL    90 tablet    TAKE ONE TABLET BY MOUTH EVERY DAY    Hypertension goal BP (blood pressure) < 140/90       omeprazole 20 MG CR capsule    priLOSEC    90 capsule    TAKE ONE CAPSULE BY MOUTH ONCE DAILY -TAKE 30-60 MINUTES BEFORE A  MEAL    Gastroesophageal reflux disease without esophagitis       triamcinolone 0.1 % cream    KENALOG    60 g    Locally bid    Rash       VITAMIN C PO      Take by mouth daily

## 2017-08-25 NOTE — PROGRESS NOTES
Past Medical History:   Diagnosis Date     Alopecia, unspecified      Closed fracture of unspecified part of humerus 1974    Arm fracture / left wrist     Esophageal reflux 2/6/2015     Factor 5 Leiden mutation, heterozygous (H)      Gout 3/28/2011     Herpes zoster without mention of complication 1965    Herpes zoster     Measles without mention of complication     Measles     Personal history of DVT (deep vein thrombosis) 4/9/2015     Personal history of venous thrombosis and embolism 4/9/2015     Pulmonary embolism (H) 12/1996    post appendectomy     RA (rheumatoid arthritis) (H) 3/11/2013     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     Rheumatoid arthritis(714.0)     Beginning symptoms     Secondary hypertension with goal blood pressure less than 140/90 11/15/2016     Thrombocytopenia, unspecified (H) 1/29/2014     Varicella without mention of complication     Chickenpox     Current Outpatient Prescriptions   Medication Sig Dispense Refill     allopurinol (ZYLOPRIM) 300 MG tablet TAKE ONE TABLET BY MOUTH EVERY DAY 90 tablet 0     levothyroxine (SYNTHROID/LEVOTHROID) 25 MCG tablet TAKE ONE TABLET BY MOUTH EVERY DAY 90 tablet 0     adalimumab (HUMIRA PEN) 40 MG/0.8ML pen kit Inject 0.8 mLs (40 mg) Subcutaneous every 14 days 2 each 3     atorvastatin (LIPITOR) 80 MG tablet TAKE ONE TABLET BY MOUTH EVERY DAY 90 tablet 1     Ascorbic Acid (VITAMIN C PO) Take by mouth daily       lisinopril (PRINIVIL/ZESTRIL) 20 MG tablet TAKE ONE TABLET BY MOUTH EVERY DAY 90 tablet 1     cholestyramine light (QUESTRAN) 4 GM Packet DISSOLVE ONE PACKET IN LIQUID AS DIRECTED TWO TIMES A DAY AND DRINK 60 packet 5     omeprazole (PRILOSEC) 20 MG CR capsule TAKE ONE CAPSULE BY MOUTH ONCE DAILY -TAKE 30-60 MINUTES BEFORE A MEAL 90 capsule 1     cetirizine (ZYRTEC) 10 MG tablet Take 1 tablet (10 mg) by mouth every evening 90 tablet 1     diclofenac (VOLTAREN) 1 % GEL Apply  2 grams to  shoulders three times daily using enclosed dosing card. 100 g 1     triamcinolone (KENALOG) 0.1 % cream Locally bid 60 g 0     capsaicin (ZOSTRIX) 0.075 % topical cream Apply  topically 3 times daily. 50 g 3     BABY ASPIRIN OR daily       Family History   Problem Relation Age of Onset     Arthritis Mother      Cardiovascular Mother      Depression Mother      GASTROINTESTINAL DISEASE Mother      IBS     Hypertension Mother      Circulatory Mother      Aortal aneurysm     OSTEOPOROSIS Mother      Allergies Father      Cortisone     Cardiovascular Father      Circulatory Father      DIABETES Father      Hypertension Father      Lipids Father      Alcohol/Drug Sister      Penicillin     Alzheimer Disease Paternal Grandfather      Blood Disease Paternal Grandmother      Clotting problems     Blood Disease Son      Factor 5     Hypertension Maternal Aunt      CEREBROVASCULAR DISEASE Maternal Grandmother      Social History     Social History     Marital status:      Spouse name: Jacki     Number of children: 2     Years of education: 16     Occupational History      [employer]     Social History Main Topics     Smoking status: Former Smoker     Packs/day: 0.25     Years: 2.00     Types: Cigarettes, Pipe     Quit date: 1/1/1979     Smokeless tobacco: Never Used     Alcohol use Yes      Comment: 5 drinks per week / wine     Drug use: No     Sexual activity: Yes     Partners: Female     Other Topics Concern      Service Yes     [employer]     Blood Transfusions No     Caffeine Concern No     Occupational Exposure Yes          Hobby Hazards Yes     hunting     Sleep Concern No     Stress Concern No     Weight Concern No     Special Diet No     Back Care No     Exercise Yes     Walks     Bike Helmet No     Seat Belt Yes     Parent/Sibling W/ Cabg, Mi Or Angioplasty Before 65f 55m? No     Social History Narrative     Constitutional: NEGATIVE, fatigue, fever but intentional 10# weight  "loss  Eyes: NEGATIVE for vision changes or irritation and redness.  ENT/Mouth: NEGATIVE for hoarseness and sore throat  CV: NEGATIVE for chest pain, palpitations or chest pressure, lower extremity edema and syncope or near-syncope  Respiratory:   cough w/o SOB, no large  hemoptysis, excessive sleepiness  GI: NEGATIVE for nausea, abdominal pain, heartburn, or change in bowel habits  Musculoskeletal: improved arthralgias and joint swelling  Integumentary/Skin: NEGATIVE for rash  Neurological:  NEGATIVE for numbness or tingling and focal weakness  Hemotologic/Lymphatic: NEGATIVE for bleeding disorder and swollen nodes  Allergic/Immunologic: chronic clear rhinitis w/o hives  RESPIRATORY EXAM:  Pulse 70  Temp 96.4  F (35.8  C) (Temporal)  Resp 16  Ht 5' 10.5\" (1.791 m)  Wt 181 lb 6.4 oz (82.3 kg)  SpO2 95%  BMI 25.66 kg/m2  Patient is well-nourished and well-appearing , rare brief cough during visit   Moves easily to exam table without dyspnea, voice quality normal  Nares have slight obstruction w/o d/c and normal mucosa.  No cristiano-pharyngeal lesions.    No neck masses or thyromegaly or jugular venous distention   Symmetrical chest, normal configuration, without accessory muscle use or tenderness on palpation. Clear breath sounds. No stridor.   Normal S1 and S2 heart sounds without murmur rub or gallop. No limb edema.  No abdominal distension  No neck or supraclavicular adenopathy.   No muscle atrophy. 5/5 lower limb strength bilaterally.  No tremor.  No digit clubbing.  No skin rash.  alopecia on scalp  Affect is normal; cognition is normal.     Remainder of visit dictated. Transcription immediately below.  Jayden Anedrson MD, MPH  Associate Professor of Medicine      "

## 2017-08-28 ENCOUNTER — TELEPHONE (OUTPATIENT)
Dept: FAMILY MEDICINE | Facility: OTHER | Age: 67
End: 2017-08-28

## 2017-08-28 DIAGNOSIS — K21.9 GASTROESOPHAGEAL REFLUX DISEASE WITHOUT ESOPHAGITIS: ICD-10-CM

## 2017-08-28 NOTE — TELEPHONE ENCOUNTER
Patient notified of Lyndsey's advice, nurse only BP appt set up for 2 weeks out.  Mitchel Jean LPN,  August 28, 2017 10:32 AM,  [facility]

## 2017-08-28 NOTE — TELEPHONE ENCOUNTER
Reason for Call:  Med question     Detailed comments: pt was able to see Pulmonology in [facility] on Friday. Pt is to take Zyrtec 2 times a day. Is that OK to take 2 a day? Also is to discontinue taking Lisinorpil. Pt is wondering if he should be taking a different med for bp.     Phone Number Patient can be reached at:     Best Time:     Can we leave a detailed message on this number? YES    Call taken on 8/28/2017 at 8:23 AM by Lisa Allen

## 2017-08-28 NOTE — TELEPHONE ENCOUNTER
Omeprazole      Last Written Prescription Date: 3-6-2017  Last Fill Quantity: 90,  # refills: 1   Last Office Visit with [facility], [facility] or [facility] prescribing provider: 8-                                         Next 5 appointments (look out 90 days)     Sep 08, 2017  1:30 PM CDT   Nurse Only with NL FLOAT NURSE, [facility] ([facility]    [address] [facility] [id number] [phone]            Oct 06, 2017  3:00 PM CDT   [facility]   [facility]    [address] [address] [phone]

## 2017-08-28 NOTE — TELEPHONE ENCOUNTER
2 Zyrtec a day is fine.  The package will say one per day only, but you can take more than that.   He will need a nurse only BP recheck in 2-3 weeks.  If his blood pressure is okay, he may not need another medication to replaced the Lisinopril.  If it is too high, we will have to try another medication for that.  The nurse will contact me after she checks his blood pressure and we will decide what to do.      Electronically signed by Lyndsey Aponte CNP.

## 2017-08-30 NOTE — TELEPHONE ENCOUNTER
Prescription approved per [facility] Refill Protocol.    Bettye Hernandez RN  [facility]

## 2017-09-01 DIAGNOSIS — E78.5 HYPERLIPIDEMIA LDL GOAL <130: ICD-10-CM

## 2017-09-01 RX ORDER — CHOLESTYRAMINE LIGHT 4 G/5.7G
POWDER, FOR SUSPENSION ORAL
Qty: 60 PACKET | Refills: 5 | Status: SHIPPED | OUTPATIENT
Start: 2017-09-01 | End: 2017-09-08

## 2017-09-01 NOTE — TELEPHONE ENCOUNTER
Matt     Last Written Prescription Date: 3/6/17  Last Fill Quantity: 60, # refills: 5  Last Office Visit with [facility], [facility] or  Health prescribing provider: 8/24/17  Next 5 appointments (look out 90 days)     Sep 08, 2017  1:30 PM CDT   Nurse Only with JACQUELYN PAK NURSE, [facility] ([facility])    [address] [facility] [address]   [phone]            Oct 06, 2017  3:00 PM CDT   [facility] Visit with [facility]den [facility] MD   [facility]    [address] [place] [id number] [phone]                   Lab Results   Component Value Date    CHOL 173 12/29/2015     Lab Results   Component Value Date    HDL 56 12/29/2015     Lab Results   Component Value Date    LDL 98 12/01/2016    LDL 75 12/29/2015     Lab Results   Component Value Date    TRIG 211 12/29/2015     Lab Results   Component Value Date    CHOLHDLRATIO 3.0 10/04/2013

## 2017-09-01 NOTE — TELEPHONE ENCOUNTER
Routing refill request to provider for review/approval because:  Labs out of range:  FLP  Labs not current:  ROSAURA Hernandez RN  [facility]

## 2017-09-08 ENCOUNTER — INFUSION THERAPY VISIT (OUTPATIENT)
Dept: INFUSION THERAPY | Facility: CLINIC | Age: 67
End: 2017-09-08
Attending: INTERNAL MEDICINE
Payer: MEDICARE

## 2017-09-08 ENCOUNTER — OFFICE VISIT (OUTPATIENT)
Dept: FAMILY MEDICINE | Facility: OTHER | Age: 67
End: 2017-09-08
Payer: COMMERCIAL

## 2017-09-08 ENCOUNTER — ALLIED HEALTH/NURSE VISIT (OUTPATIENT)
Dept: FAMILY MEDICINE | Facility: OTHER | Age: 67
End: 2017-09-08
Payer: COMMERCIAL

## 2017-09-08 VITALS
RESPIRATION RATE: 20 BRPM | OXYGEN SATURATION: 95 % | DIASTOLIC BLOOD PRESSURE: 84 MMHG | BODY MASS INDEX: 25.6 KG/M2 | HEART RATE: 75 BPM | TEMPERATURE: 96.6 F | WEIGHT: 181 LBS | SYSTOLIC BLOOD PRESSURE: 140 MMHG

## 2017-09-08 VITALS
RESPIRATION RATE: 16 BRPM | SYSTOLIC BLOOD PRESSURE: 167 MMHG | WEIGHT: 183.3 LBS | HEART RATE: 61 BPM | OXYGEN SATURATION: 97 % | TEMPERATURE: 97.8 F | BODY MASS INDEX: 25.93 KG/M2 | DIASTOLIC BLOOD PRESSURE: 101 MMHG

## 2017-09-08 DIAGNOSIS — M05.711 RHEUMATOID ARTHRITIS INVOLVING BOTH SHOULDERS WITH POSITIVE RHEUMATOID FACTOR (H): Primary | ICD-10-CM

## 2017-09-08 DIAGNOSIS — I10 BENIGN ESSENTIAL HYPERTENSION: Primary | ICD-10-CM

## 2017-09-08 DIAGNOSIS — I15.9 SECONDARY HYPERTENSION WITH GOAL BLOOD PRESSURE LESS THAN 140/90: Primary | ICD-10-CM

## 2017-09-08 DIAGNOSIS — M05.712 RHEUMATOID ARTHRITIS INVOLVING BOTH SHOULDERS WITH POSITIVE RHEUMATOID FACTOR (H): Primary | ICD-10-CM

## 2017-09-08 PROCEDURE — 40000809 ZZH STATISTIC NO DOCUMENTATION TO SUPPORT CHARGE

## 2017-09-08 PROCEDURE — 25000132 ZZH RX MED GY IP 250 OP 250 PS 637: Mod: GY | Performed by: INTERNAL MEDICINE

## 2017-09-08 PROCEDURE — 99211 OFF/OP EST MAY X REQ PHY/QHP: CPT

## 2017-09-08 PROCEDURE — 99207 ZZC NO CHARGE NURSE ONLY: CPT | Performed by: NURSE PRACTITIONER

## 2017-09-08 PROCEDURE — 99213 OFFICE O/P EST LOW 20 MIN: CPT | Performed by: NURSE PRACTITIONER

## 2017-09-08 PROCEDURE — A9270 NON-COVERED ITEM OR SERVICE: HCPCS | Mod: GY | Performed by: INTERNAL MEDICINE

## 2017-09-08 RX ORDER — DIPHENHYDRAMINE HCL 25 MG
25 CAPSULE ORAL ONCE
Status: COMPLETED | OUTPATIENT
Start: 2017-09-08 | End: 2017-09-08

## 2017-09-08 RX ORDER — DIPHENHYDRAMINE HCL 25 MG
25 CAPSULE ORAL ONCE
Status: CANCELLED
Start: 2017-09-08 | End: 2017-09-08

## 2017-09-08 RX ORDER — ACETAMINOPHEN 325 MG/1
650 TABLET ORAL ONCE
Status: CANCELLED
Start: 2017-09-08 | End: 2017-09-08

## 2017-09-08 RX ORDER — LOSARTAN POTASSIUM 25 MG/1
25 TABLET ORAL DAILY
Qty: 30 TABLET | Refills: 1 | Status: SHIPPED | OUTPATIENT
Start: 2017-09-08 | End: 2017-10-30

## 2017-09-08 RX ORDER — ACETAMINOPHEN 325 MG/1
650 TABLET ORAL ONCE
Status: COMPLETED | OUTPATIENT
Start: 2017-09-08 | End: 2017-09-08

## 2017-09-08 RX ADMIN — ACETAMINOPHEN 650 MG: 325 TABLET ORAL at 08:51

## 2017-09-08 RX ADMIN — DIPHENHYDRAMINE HYDROCHLORIDE 25 MG: 25 CAPSULE ORAL at 08:51

## 2017-09-08 ASSESSMENT — PAIN SCALES - GENERAL: PAINLEVEL: NO PAIN (0)

## 2017-09-08 NOTE — MR AVS SNAPSHOT
After Visit Summary   9/8/2017    Camilo Baca    MRN: [id number]           Patient Information     Date Of Birth          1950        Visit Information        Provider Department      9/8/2017 10:18 AM Lyndsey Aponte APRN [facility]        Today's Diagnoses     Secondary hypertension with goal blood pressure less than 140/90    -  1       Follow-ups after your visit        Your next 10 appointments already scheduled     Sep 29, 2017  8:00 AM CDT   Level 4 with NL INFUSION CHAIR 2   [facility] ([facility])    [address] [place] [address]   [phone]            Oct 06, 2017  3:00 PM CDT   [facility] MD   [facility]    [address] [address]   [phone]            Oct 27, 2017  8:00 AM CDT   Level 4 with NL INFUSION CHAIR 2   [facility])    [address] [place] [address]   [phone]              Who to contact     If you have questions or need follow up information about today's clinic visit or your schedule please contact [facility] directly at [phone].  Normal or non-critical lab and imaging results will be communicated to you by [employer], letter or phone within 4 business days after the clinic has received the results. If you do not hear from us within 7 days, please contact the clinic through [employer] or phone. If you have a critical or abnormal lab result, we will notify you by phone as soon as possible.  Submit refill requests through [employer] or call your pharmacy and they will forward the refill request to us. Please allow 3 business days for your refill to be completed.          Additional Information About Your Visit        [employer] Information     [employer] lets you send messages to your doctor, view your test results, renew your  "prescriptions, schedule appointments and more. To sign up, go to [email or web address] . Click on \"Log in\" on the left side of the screen, which will take you to the Welcome page. Then click on \"Sign up Now\" on the right side of the page.     You will be asked to enter the access code listed below, as well as some personal information. Please follow the directions to create your username and password.     Your access code is: [email or web address]  Expires: 2017  9:27 AM     Your access code will  in 90 days. If you need help or a new code, please call your [facility] clinic or [phone].        Care EveryWhere ID     This is your Care EveryWhere ID. This could be used by other organizations to access your [facility] medical records  [id number]         Blood Pressure from Last 3 Encounters:   09/15/17 161/82   17 140/84   17 (!) 167/101    Weight from Last 3 Encounters:   09/15/17 187 lb (84.8 kg)   17 181 lb (82.1 kg)   17 183 lb 4.8 oz (83.1 kg)              Today, you had the following     No orders found for display         Today's Medication Changes          These changes are accurate as of: 17 11:59 PM.  If you have any questions, ask your nurse or doctor.               Start taking these medicines.        Dose/Directions    losartan 25 MG tablet   Commonly known as:  COZAAR   Used for:  Benign essential hypertension   Started by:  Lyndsey Aponte APRN CNP        Dose:  25 mg   Take 1 tablet (25 mg) by mouth daily   Quantity:  30 tablet   Refills:  1         Stop taking these medicines if you haven't already. Please contact your care team if you have questions.     adalimumab 40 MG/0.8ML pen kit   Commonly known as:  HUMIRA PEN           lisinopril 20 MG tablet   Commonly known as:  PRINIVIL/ZESTRIL   Stopped by:  Lyndsey Aponte APRN CNP                Where to get your medicines      These medications were sent to [facility] [place], [place] - [address]  " [address] [facility] [address]     Phone:  [phone]     losartan 25 MG tablet                Primary Care Provider Office Phone # Fax #    DIANN Presley [phone]       [address] [facility] [address]        Equal Access to Services     BECCA BAEZ : Hadii aad ku hadasho Soomaali, waaxda luqadaha, qaybta kaalmada adeegyada, waxay idiin hayaan adeeg khreesesh laangélica high. So [facility] [phone].    ATENCIÓN: Si habla español, tiene a bernal disposición servicios gratuitos de asistencia lingüística. Hina al [phone].    We comply with applicable federal civil rights laws and [place] laws. We do not discriminate on the basis of race, color, national origin, age, disability sex, sexual orientation or gender identity.            Thank you!     Thank you for choosing [facility]  for your care. Our goal is always to provide you with excellent care. Hearing back from our patients is one way we can continue to improve our services. Please take a few minutes to complete the written survey that you may receive in the mail after your visit with us. Thank you!             Your Updated Medication List - Protect others around you: Learn how to safely use, store and throw away your medicines at www.disposemymeds.org.          This list is accurate as of: 9/8/17 11:59 PM.  Always use your most recent med list.                   Brand Name Dispense Instructions for use Diagnosis    allopurinol 300 MG tablet    ZYLOPRIM    90 tablet    TAKE ONE TABLET BY MOUTH EVERY DAY    Gout, unspecified, Hypothyroidism       atorvastatin 80 MG tablet    LIPITOR    90 tablet    TAKE ONE TABLET BY MOUTH EVERY DAY    Hyperlipidemia LDL goal <130       BABY ASPIRIN PO      daily        capsaicin 0.075 % cream    ZOSTRIX    50 g    Apply  topically 3 times daily.    Bursitis of shoulder       cetirizine 10 MG tablet    zyrTEC    90 tablet    Take 1 tablet (10 mg) by mouth every evening    Seasonal allergic  rhinitis, unspecified allergic rhinitis trigger       cholestyramine light 4 GM Packet    QUESTRAN    60 packet    DISSOLVE ONE PACKET IN LIQUID AS DIRECTED TWO TIMES A DAY AND DRINK    Hyperlipidemia LDL goal <130       diclofenac 1 % Gel topical gel    VOLTAREN    100 g    Apply  2 grams to shoulders three times daily using enclosed dosing card.    Rotator cuff tendinitis       levothyroxine 25 MCG tablet    SYNTHROID/LEVOTHROID    90 tablet    TAKE ONE TABLET BY MOUTH EVERY DAY    Hypothyroidism due to acquired atrophy of thyroid       losartan 25 MG tablet    COZAAR    30 tablet    Take 1 tablet (25 mg) by mouth daily    Benign essential hypertension       omeprazole 20 MG CR capsule    priLOSEC    90 capsule    TAKE ONE CAPSULE BY MOUTH ONCE DAILY -TAKE 30-60 MINUTES BEFORE A MEAL    Gastroesophageal reflux disease without esophagitis       triamcinolone 0.1 % cream    KENALOG    60 g    Locally bid    Rash       VITAMIN C PO      Take by mouth daily

## 2017-09-08 NOTE — PATIENT INSTRUCTIONS
Pt to return on 9/15 for Remicade. Copies of medication list and upcoming appointments given prior to discharge.

## 2017-09-08 NOTE — PATIENT INSTRUCTIONS
Start the Losartan once a day.     You should be fine to have your infusion next Friday.  If there is an issue, have them call me.     Have your blood pressure rechecked in clinic by a nurse in 2-3 weeks (or have them contact us from outpatient services if it is fine that day).

## 2017-09-08 NOTE — PROGRESS NOTES
SUBJECTIVE:   Camilo Baca is a 67 year old male who presents to clinic today for the following health issues:      Hypertension Follow-up      Outpatient blood pressures are not being checked.    Lisinopril stopped recently    BP elevated earlier today        Problems taking medications regularly: No    Medication side effects: none    Lisinopril was stopped by pulmonology due to possible side effect of cough.  This was 1.5 weeks ago.  He was in outpatient therapy today to get his Remicade infusion for RA and his blood pressure was 167/101.  They contacted me and held the infusion.    His cough is slightly better     Problem list and histories reviewed & adjusted, as indicated.  Additional history: none    Patient Active Problem List   Diagnosis     Lumbago     NONSPECIFIC MEDICAL HISTORY     HYPERLIPIDEMIA LDL GOAL <130     History of adenomatous polyp of colon     Idiopathic chronic gout without tophus, unspecified site     Advanced directives, counseling/discussion     High risk medication use     Thrombocytopenia (H)     Gastroesophageal reflux disease without esophagitis     Factor 5 Leiden mutation, heterozygous (H)     Personal history of venous thrombosis and embolism     Personal history of DVT (deep vein thrombosis)     Hypothyroidism due to acquired atrophy of thyroid     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)     Secondary hypertension with goal blood pressure less than 140/90     Past Surgical History:   Procedure Laterality Date     C APPENDECTOMY  1996     COLONOSCOPY  11/15/2010    COLONOSCOPY performed by DARIUS MESA at  GI     COLONOSCOPY N/A 11/2/2015    Procedure: COLONOSCOPY;  Surgeon: Bubba Odom MD;  Location:  GI     HC COLONOSCOPY W BIOPSY  08/10/05      REPAIR ING HERNIA,5+Y/O,REDUCIBL  1960     HERNIORRHAPHY UMBILICAL N/A 4/17/2015    Procedure: HERNIORRHAPHY UMBILICAL;  Surgeon: Rayray Avila MD;  Location:  OR      LAPAROSCOPIC HERNIORRHAPHY INGUINAL BILATERAL Bilateral 4/17/2015    Procedure: LAPAROSCOPIC HERNIORRHAPHY INGUINAL BILATERAL;  Surgeon: Rayray Avila MD;  Location:  OR       Social History   Substance Use Topics     Smoking status: Former Smoker     Packs/day: 0.25     Years: 2.00     Types: Cigarettes, Pipe     Quit date: 1/1/1979     Smokeless tobacco: Never Used     Alcohol use Yes      Comment: 5 drinks per week / wine     Family History   Problem Relation Age of Onset     Arthritis Mother      Cardiovascular Mother      Depression Mother      GASTROINTESTINAL DISEASE Mother      IBS     Hypertension Mother      Circulatory Mother      Aortal aneurysm     OSTEOPOROSIS Mother      Allergies Father      Cortisone     Cardiovascular Father      Circulatory Father      DIABETES Father      Hypertension Father      Lipids Father      Alcohol/Drug Sister      Penicillin     Alzheimer Disease Paternal Grandfather      Blood Disease Paternal Grandmother      Clotting problems     Blood Disease Son      Factor 5     Hypertension Maternal Aunt      CEREBROVASCULAR DISEASE Maternal Grandmother          Current Outpatient Prescriptions   Medication Sig Dispense Refill     omeprazole (PRILOSEC) 20 MG CR capsule TAKE ONE CAPSULE BY MOUTH ONCE DAILY -TAKE 30-60 MINUTES BEFORE A MEAL 90 capsule 1     allopurinol (ZYLOPRIM) 300 MG tablet TAKE ONE TABLET BY MOUTH EVERY DAY 90 tablet 0     levothyroxine (SYNTHROID/LEVOTHROID) 25 MCG tablet TAKE ONE TABLET BY MOUTH EVERY DAY 90 tablet 0     atorvastatin (LIPITOR) 80 MG tablet TAKE ONE TABLET BY MOUTH EVERY DAY 90 tablet 1     Ascorbic Acid (VITAMIN C PO) Take by mouth daily       cholestyramine light (QUESTRAN) 4 GM Packet DISSOLVE ONE PACKET IN LIQUID AS DIRECTED TWO TIMES A DAY AND DRINK 60 packet 5     cetirizine (ZYRTEC) 10 MG tablet Take 1 tablet (10 mg) by mouth every evening 90 tablet 1     diclofenac (VOLTAREN) 1 % GEL Apply  2 grams to shoulders three  times daily using enclosed dosing card. 100 g 1     triamcinolone (KENALOG) 0.1 % cream Locally bid 60 g 0     capsaicin (ZOSTRIX) 0.075 % topical cream Apply  topically 3 times daily. 50 g 3     BABY ASPIRIN OR daily       [DISCONTINUED] cholestyramine light (QUESTRAN) 4 GM Packet DISSOLVE ONE PACKET IN LIQUID AS DIRECTED TWO TIMES A DAY AND DRINK 60 packet 5     lisinopril (PRINIVIL/ZESTRIL) 20 MG tablet TAKE ONE TABLET BY MOUTH EVERY DAY 90 tablet 1     Allergies   Allergen Reactions     No Known Drug Allergies      BP Readings from Last 3 Encounters:   09/08/17 140/84   09/08/17 (!) 167/101   08/24/17 132/80    Wt Readings from Last 3 Encounters:   09/08/17 181 lb (82.1 kg)   09/08/17 183 lb 4.8 oz (83.1 kg)   08/25/17 181 lb 6.4 oz (82.3 kg)                        Reviewed and updated as needed this visit by clinical staffTobacco  Allergies  Meds  Med Hx  Surg Hx  Fam Hx  Soc Hx      Reviewed and updated as needed this visit by Provider         ROS:  C: NEGATIVE for fever, chills, change in weight  E/M: NEGATIVE for ear, mouth and throat problems  R: NEGATIVE for significant cough or SOB  CV: NEGATIVE for chest pain, palpitations or peripheral edema    OBJECTIVE:     /84  Pulse 75  Temp 96.6  F (35.9  C) (Tympanic)  Resp 20  Wt 181 lb (82.1 kg)  SpO2 95%  BMI 25.6 kg/m2  Body mass index is 25.6 kg/(m^2).  GENERAL: healthy, alert and no distress  RESP: lungs clear to auscultation - no rales, rhonchi or wheezes  CV: regular rate and rhythm, normal S1 S2, no S3 or S4, no murmur, click or rub, no peripheral edema and peripheral pulses strong  MS: no gross musculoskeletal defects noted, no edema    Diagnostic Test Results:  none     ASSESSMENT/PLAN:     Hypertension; slightly worsened   Associated with the following complications:    None   Plan:  The following changes are made - Start Losartan as prescribed.  Follow up for nurse only BP recheck in 2-3 weeks.  He will resume his Remicade infusions  next week.   - losartan (COZAAR) 25 MG tablet; Take 1 tablet (25 mg) by mouth daily  Dispense: 30 tablet; Refill: 1    FUTURE APPOINTMENTS:       - Make appointment with nurse to check blood pressure in 2-3 weeks  See Patient Instructions    DIANN Presley [facility]

## 2017-09-08 NOTE — MR AVS SNAPSHOT
After Visit Summary   9/8/2017    Camilo Baca    MRN: [id number]           Patient Information     Date Of Birth          1950        Visit Information        Provider Department      9/8/2017 8:30 AM NL INFUSION CHAIR 5 [facility]        Today's Diagnoses     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)    -  1      Care Instructions    Pt to return on 9/15 for Remicade. Copies of medication list and upcoming appointments given prior to discharge.           Follow-ups after your visit        Follow-up notes from your care team     Return in 7 days (on 9/15/2017).      Your next 10 appointments already scheduled     Sep 08, 2017  3:20 PM CDT   Office Visit with DIANN Presley CNP   [facility]    [address] [facility] [address] [phone]           Bring a current list of meds and any records pertaining to this visit. For Physicals, please bring immunization records and any forms needing to be filled out. Please arrive 10 minutes early to complete paperwork.            Sep 15, 2017  9:00 AM CDT   Level 4 with NL INFUSION CHAIR 2   [facility] ([facility] [place])    [address] [place] [address] [phone]            Oct 06, 2017  3:00 PM CDT   Return Visit with Jayden Anderson MD   [facility]    [address] [place] [address] [phone]              Who to contact     If you have questions or need follow up information about today's clinic visit or your schedule please contact [facility] directly at [phone].  Normal or non-critical lab and imaging results will be communicated to you by MyChart, letter or phone within 4 business days after the clinic has received the results. If you do not hear from us within 7 days, please  "contact the clinic through [employer] or phone. If you have a critical or abnormal lab result, we will notify you by phone as soon as possible.  Submit refill requests through [employer] or call your pharmacy and they will forward the refill request to us. Please allow 3 business days for your refill to be completed.          Additional Information About Your Visit        [employer] Information     [employer] lets you send messages to your doctor, view your test results, renew your prescriptions, schedule appointments and more. To sign up, go to [email or web address] . Click on \"Log in\" on the left side of the screen, which will take you to the Welcome page. Then click on \"Sign up Now\" on the right side of the page.     You will be asked to enter the access code listed below, as well as some personal information. Please follow the directions to create your username and password.     Your access code is: [email or web address]  Expires: 2017  9:27 AM     Your access code will  in 90 days. If you need help or a new code, please call your [place] clinic or [phone].        Care EveryWhere ID     This is your Care EveryWhere ID. This could be used by other organizations to access your [place] medical records  [id number]        Your Vitals Were     Pulse Temperature Respirations Pulse Oximetry BMI (Body Mass Index)       61 97.8  F (36.6  C) (Temporal) 16 97% 25.93 kg/m2        Blood Pressure from Last 3 Encounters:   17 (!) 167/101   17 132/80   17 149/67    Weight from Last 3 Encounters:   17 83.1 kg (183 lb 4.8 oz)   17 82.3 kg (181 lb 6.4 oz)   17 79.8 kg (176 lb)              Today, you had the following     No orders found for display         Today's Medication Changes          These changes are accurate as of: 17  3:17 PM.  If you have any questions, ask your nurse or doctor.               Start taking these medicines.        Dose/Directions    losartan 25 MG tablet "   Commonly known as:  COZAAR   Used for:  Benign essential hypertension   Started by:  Lyndsey Aponte APRN CNP        Dose:  25 mg   Take 1 tablet (25 mg) by mouth daily   Quantity:  30 tablet   Refills:  1         Stop taking these medicines if you haven't already. Please contact your care team if you have questions.     adalimumab 40 MG/0.8ML pen kit   Commonly known as:  HUMIRA PEN           lisinopril 20 MG tablet   Commonly known as:  PRINIVIL/ZESTRIL   Stopped by:  Lyndsey Aponte APRN CNP                Where to get your medicines      These medications were sent to [facility] [address]   [address] [facility] [address]     Phone:  [phone]     losartan 25 MG tablet                Primary Care Provider Office Phone # Fax #    DIANN Presley [phone]       [address] [facility] [address]        Equal Access to Services     MOMO BAEZ : Hadii umair ku hadasho Soomaali, waaxda luqadaha, qaybta kaalmada adeegyada, waxay eduardin hayfazal middleton . So [facility] [phone].    ATENCIÓN: Si habla español, tiene a bernal disposición servicios gratuitos de asistencia lingüística. Llame al [phone].    We comply with applicable federal civil rights laws and [place] laws. We do not discriminate on the basis of race, color, national origin, age, disability sex, sexual orientation or gender identity.            Thank you!     Thank you for choosing [facility]  for your care. Our goal is always to provide you with excellent care. Hearing back from our patients is one way we can continue to improve our services. Please take a few minutes to complete the written survey that you may receive in the mail after your visit with us. Thank you!             Your Updated Medication List - Protect others around you: Learn how to safely use, store and throw away your medicines at www.disposemymeds.org.          This list is  accurate as of: 9/8/17  3:17 PM.  Always use your most recent med list.                   Brand Name Dispense Instructions for use Diagnosis    allopurinol 300 MG tablet    ZYLOPRIM    90 tablet    TAKE ONE TABLET BY MOUTH EVERY DAY    Gout, unspecified, Hypothyroidism       atorvastatin 80 MG tablet    LIPITOR    90 tablet    TAKE ONE TABLET BY MOUTH EVERY DAY    Hyperlipidemia LDL goal <130       BABY ASPIRIN PO      daily        capsaicin 0.075 % cream    ZOSTRIX    50 g    Apply  topically 3 times daily.    Bursitis of shoulder       cetirizine 10 MG tablet    zyrTEC    90 tablet    Take 1 tablet (10 mg) by mouth every evening    Seasonal allergic rhinitis, unspecified allergic rhinitis trigger       cholestyramine light 4 GM Packet    QUESTRAN    60 packet    DISSOLVE ONE PACKET IN LIQUID AS DIRECTED TWO TIMES A DAY AND DRINK    Hyperlipidemia LDL goal <130       diclofenac 1 % Gel topical gel    VOLTAREN    100 g    Apply  2 grams to shoulders three times daily using enclosed dosing card.    Rotator cuff tendinitis       levothyroxine 25 MCG tablet    SYNTHROID/LEVOTHROID    90 tablet    TAKE ONE TABLET BY MOUTH EVERY DAY    Hypothyroidism due to acquired atrophy of thyroid       losartan 25 MG tablet    COZAAR    30 tablet    Take 1 tablet (25 mg) by mouth daily    Benign essential hypertension       omeprazole 20 MG CR capsule    priLOSEC    90 capsule    TAKE ONE CAPSULE BY MOUTH ONCE DAILY -TAKE 30-60 MINUTES BEFORE A MEAL    Gastroesophageal reflux disease without esophagitis       triamcinolone 0.1 % cream    KENALOG    60 g    Locally bid    Rash       VITAMIN C PO      Take by mouth daily

## 2017-09-08 NOTE — NURSING NOTE
"Chief Complaint   Patient presents with     RECHECK     BP recheck       Initial /84  Pulse 75  Temp 96.6  F (35.9  C) (Tympanic)  Resp 20  Wt 181 lb (82.1 kg)  SpO2 95%  BMI 25.6 kg/m2 Estimated body mass index is 25.6 kg/(m^2) as calculated from the following:    Height as of 8/25/17: 5' 10.5\" (1.791 m).    Weight as of this encounter: 181 lb (82.1 kg).  Medication Reconciliation: complete   ................Mitchel Jean LPN,   September 8, 2017,      1:19 PM,   [facility]    "

## 2017-09-08 NOTE — PROGRESS NOTES
~~~ NOTE: If the patient answers yes to any of the questions below, hold the infusion and contact ordering provider or on-call provider.    1. Have you recently had an elevated temperature, fever, chills, productive cough, coughing for 3 weeks or longer or hemoptysis,  abnormal vital signs, night sweats,  chest pain or have you noticed a decrease in your appetite, unexplained weight loss or fatigue? No  2. Do you have any open wounds or new incisions? No  3. Do you have any recent or upcoming hospitalizations, surgeries or dental procedures? No  4. Do you currently have or recently have had any signs of illness or infection or are you on any antibiotics? No  5. Have you had any new, sudden or worsening abdominal pain? No  6. Have you or anyone in your household received a live vaccination in the past 4 weeks? Please note:  No live vaccines while on biologic/chemotherapy until 6 months after the last treatment.  Patient can receive the flu vaccine (shot only) and the pneumovax.  It is optimal for the patient to get these vaccines mid cycle, but they can be given at any time as long as it is not on the day of the infusion. No  7. Have you recently been diagnosed with any new nervous system diseases (ie. Multiple sclerosis, Guillain [place], seizures, neurological changes) or cancer diagnosis? Are you on any form of radiation or chemotherapy? No  8. Are you pregnant or breast feeding or do you have plans of pregnancy in the future? No  9. Have you been having any signs of worsening depression or suicidal ideations?  (benlysta only) No  10. Have there been any other new onset medical symptoms? No  Pt here for first dose of Remicade. B/p elevated 167/101.  Dr Hurtado unavailable today.  Lyndsey TIDWELL called, decision was made to hold Remicade today, pt to see her this after noon @ 1330 for b/p management and will reschedule pt for next Friday for Remicade.   Pt discharged in stable condition.   Face to face time 20  mins

## 2017-09-08 NOTE — MR AVS SNAPSHOT
After Visit Summary   9/8/2017    Camilo Baca    MRN: [id number]           Patient Information     Date Of Birth          1950        Visit Information        Provider Department      9/8/2017 3:20 PM Lyndsey Aponte APRN CNP [facility]        Today's Diagnoses     Benign essential hypertension    -  1      Care Instructions    Start the Losartan once a day.     You should be fine to have your infusion next Friday.  If there is an issue, have them call me.     Have your blood pressure rechecked in clinic by a nurse in 2-3 weeks (or have them contact us from outpatient services if it is fine that day).                   Follow-ups after your visit        Your next 10 appointments already scheduled     Sep 08, 2017  3:20 PM CDT   Office Visit with DIANN Presley CNP   [facility]    [address] [facility] [address] [phone]           Bring a current list of meds and any records pertaining to this visit. For Physicals, please bring immunization records and any forms needing to be filled out. Please arrive 10 minutes early to complete paperwork.            Sep 15, 2017  9:00 AM CDT   Level 4 with NL INFUSION CHAIR 2   [facility] ([facility])    [address] [place] [address]  [place] [address] [phone]            Oct 06, 2017  3:00 PM CDT   Return Visit with Jayden Anderson MD   [facility]    [address] [place] [address] [phone]              Who to contact     If you have questions or need follow up information about today's clinic visit or your schedule please contact [facility] directly at [phone].  Normal or non-critical lab and imaging results will be communicated to you by MyChart, letter or phone within 4 business days after the clinic has received the results. If you do not hear from us  "within 7 days, please contact the clinic through [employer] or phone. If you have a critical or abnormal lab result, we will notify you by phone as soon as possible.  Submit refill requests through [employer] or call your pharmacy and they will forward the refill request to us. Please allow 3 business days for your refill to be completed.          Additional Information About Your Visit        [employer] Information     [employer] lets you send messages to your doctor, view your test results, renew your prescriptions, schedule appointments and more. To sign up, go to [email or web address] . Click on \"Log in\" on the left side of the screen, which will take you to the Welcome page. Then click on \"Sign up Now\" on the right side of the page.     You will be asked to enter the access code listed below, as well as some personal information. Please follow the directions to create your username and password.     Your access code is: [email or web address]  Expires: 2017  9:27 AM     Your access code will  in 90 days. If you need help or a new code, please call your [place] clinic or [phone].        Care EveryWhere ID     This is your Care EveryWhere ID. This could be used by other organizations to access your [place] medical records  [id number]        Your Vitals Were     Pulse Temperature Respirations Pulse Oximetry BMI (Body Mass Index)       75 96.6  F (35.9  C) (Tympanic) 20 95% 25.6 kg/m2        Blood Pressure from Last 3 Encounters:   17 140/84   17 (!) 167/101   17 132/80    Weight from Last 3 Encounters:   17 181 lb (82.1 kg)   17 183 lb 4.8 oz (83.1 kg)   17 181 lb 6.4 oz (82.3 kg)              Today, you had the following     No orders found for display         Today's Medication Changes          These changes are accurate as of: 17  1:31 PM.  If you have any questions, ask your nurse or doctor.               Start taking these medicines.        Dose/Directions    losartan " 25 MG tablet   Commonly known as:  COZAAR   Used for:  Benign essential hypertension   Started by:  Lyndsey Aponte APRN CNP        Dose:  25 mg   Take 1 tablet (25 mg) by mouth daily   Quantity:  30 tablet   Refills:  1         Stop taking these medicines if you haven't already. Please contact your care team if you have questions.     adalimumab 40 MG/0.8ML pen kit   Commonly known as:  HUMIRA PEN           lisinopril 20 MG tablet   Commonly known as:  PRINIVIL/ZESTRIL   Stopped by:  Lyndsey Aponte APRN CNP                Where to get your medicines      These medications were sent to [facility] [address]  [address] [facility] [address]     Phone:  [phone]     losartan 25 MG tablet                Primary Care Provider Office Phone # Fax #    DIANN Presley [phone]       [address] [facility] [address]        Equal Access to Services     BECCA BAEZ AH: Hadii aad ku hadasho Soomaali, waaxda luqadaha, qaybta kaalmada adeegyada, waxay idiin hayaan nena kharamarquis middleton . So [facility] [phone].    ATENCIÓN: Si phuong griffin, tiene a bernal disposición servicios gratuitos de asistencia lingüística. Llame al [phone].    We comply with applicable federal civil rights laws and [place] laws. We do not discriminate on the basis of race, color, national origin, age, disability sex, sexual orientation or gender identity.            Thank you!     Thank you for choosing [facility]  for your care. Our goal is always to provide you with excellent care. Hearing back from our patients is one way we can continue to improve our services. Please take a few minutes to complete the written survey that you may receive in the mail after your visit with us. Thank you!             Your Updated Medication List - Protect others around you: Learn how to safely use, store and throw away your medicines at www.disposemymeds.org.          This list is  accurate as of: 9/8/17  1:31 PM.  Always use your most recent med list.                   Brand Name Dispense Instructions for use Diagnosis    allopurinol 300 MG tablet    ZYLOPRIM    90 tablet    TAKE ONE TABLET BY MOUTH EVERY DAY    Gout, unspecified, Hypothyroidism       atorvastatin 80 MG tablet    LIPITOR    90 tablet    TAKE ONE TABLET BY MOUTH EVERY DAY    Hyperlipidemia LDL goal <130       BABY ASPIRIN PO      daily        capsaicin 0.075 % cream    ZOSTRIX    50 g    Apply  topically 3 times daily.    Bursitis of shoulder       cetirizine 10 MG tablet    zyrTEC    90 tablet    Take 1 tablet (10 mg) by mouth every evening    Seasonal allergic rhinitis, unspecified allergic rhinitis trigger       cholestyramine light 4 GM Packet    QUESTRAN    60 packet    DISSOLVE ONE PACKET IN LIQUID AS DIRECTED TWO TIMES A DAY AND DRINK    Hyperlipidemia LDL goal <130       diclofenac 1 % Gel topical gel    VOLTAREN    100 g    Apply  2 grams to shoulders three times daily using enclosed dosing card.    Rotator cuff tendinitis       levothyroxine 25 MCG tablet    SYNTHROID/LEVOTHROID    90 tablet    TAKE ONE TABLET BY MOUTH EVERY DAY    Hypothyroidism due to acquired atrophy of thyroid       losartan 25 MG tablet    COZAAR    30 tablet    Take 1 tablet (25 mg) by mouth daily    Benign essential hypertension       omeprazole 20 MG CR capsule    priLOSEC    90 capsule    TAKE ONE CAPSULE BY MOUTH ONCE DAILY -TAKE 30-60 MINUTES BEFORE A MEAL    Gastroesophageal reflux disease without esophagitis       triamcinolone 0.1 % cream    KENALOG    60 g    Locally bid    Rash       VITAMIN C PO      Take by mouth daily

## 2017-09-15 ENCOUNTER — INFUSION THERAPY VISIT (OUTPATIENT)
Dept: INFUSION THERAPY | Facility: CLINIC | Age: 67
End: 2017-09-15
Attending: INTERNAL MEDICINE
Payer: MEDICARE

## 2017-09-15 VITALS
WEIGHT: 187 LBS | RESPIRATION RATE: 18 BRPM | HEIGHT: 70 IN | HEART RATE: 71 BPM | BODY MASS INDEX: 26.77 KG/M2 | OXYGEN SATURATION: 96 % | DIASTOLIC BLOOD PRESSURE: 82 MMHG | SYSTOLIC BLOOD PRESSURE: 161 MMHG | TEMPERATURE: 97.2 F

## 2017-09-15 DIAGNOSIS — M05.712 RHEUMATOID ARTHRITIS INVOLVING BOTH SHOULDERS WITH POSITIVE RHEUMATOID FACTOR (H): Primary | ICD-10-CM

## 2017-09-15 DIAGNOSIS — M05.711 RHEUMATOID ARTHRITIS INVOLVING BOTH SHOULDERS WITH POSITIVE RHEUMATOID FACTOR (H): Primary | ICD-10-CM

## 2017-09-15 PROCEDURE — 96415 CHEMO IV INFUSION ADDL HR: CPT

## 2017-09-15 PROCEDURE — 25000132 ZZH RX MED GY IP 250 OP 250 PS 637: Mod: GY | Performed by: INTERNAL MEDICINE

## 2017-09-15 PROCEDURE — A9270 NON-COVERED ITEM OR SERVICE: HCPCS | Mod: GY | Performed by: INTERNAL MEDICINE

## 2017-09-15 PROCEDURE — 25000128 H RX IP 250 OP 636: Performed by: INTERNAL MEDICINE

## 2017-09-15 PROCEDURE — 96413 CHEMO IV INFUSION 1 HR: CPT

## 2017-09-15 RX ORDER — DIPHENHYDRAMINE HCL 25 MG
25 CAPSULE ORAL ONCE
Status: CANCELLED
Start: 2017-09-22 | End: 2017-09-22

## 2017-09-15 RX ORDER — ACETAMINOPHEN 325 MG/1
650 TABLET ORAL ONCE
Status: CANCELLED
Start: 2017-09-22 | End: 2017-09-22

## 2017-09-15 RX ORDER — ACETAMINOPHEN 325 MG/1
650 TABLET ORAL ONCE
Status: COMPLETED | OUTPATIENT
Start: 2017-09-15 | End: 2017-09-15

## 2017-09-15 RX ORDER — DIPHENHYDRAMINE HCL 25 MG
25 CAPSULE ORAL ONCE
Status: COMPLETED | OUTPATIENT
Start: 2017-09-15 | End: 2017-09-15

## 2017-09-15 RX ADMIN — INFLIXIMAB 300 MG: 100 INJECTION, POWDER, LYOPHILIZED, FOR SOLUTION INTRAVENOUS at 10:18

## 2017-09-15 RX ADMIN — ACETAMINOPHEN 650 MG: 325 TABLET ORAL at 09:38

## 2017-09-15 RX ADMIN — SODIUM CHLORIDE 250 ML: 9 INJECTION, SOLUTION INTRAVENOUS at 10:14

## 2017-09-15 RX ADMIN — DIPHENHYDRAMINE HYDROCHLORIDE 25 MG: 25 CAPSULE ORAL at 09:38

## 2017-09-15 ASSESSMENT — PAIN SCALES - GENERAL: PAINLEVEL: NO PAIN (0)

## 2017-09-15 NOTE — PROGRESS NOTES
~~~ NOTE: If the patient answers yes to any of the questions below, hold the infusion and contact ordering provider or on-call provider.    1. Have you recently had an elevated temperature, fever, chills, productive cough, coughing for 3 weeks or longer or hemoptysis,  abnormal vital signs, night sweats,  chest pain or have you noticed a decrease in your appetite, unexplained weight loss or fatigue? No  2. Do you have any open wounds or new incisions? No  3. Do you have any recent or upcoming hospitalizations, surgeries or dental procedures? No  4. Do you currently have or recently have had any signs of illness or infection or are you on any antibiotics? No  5. Have you had any new, sudden or worsening abdominal pain? No  6. Have you or anyone in your household received a live vaccination in the past 4 weeks? Please note:  No live vaccines while on biologic/chemotherapy until 6 months after the last treatment.  Patient can receive the flu vaccine (shot only) and the pneumovax.  It is optimal for the patient to get these vaccines mid cycle, but they can be given at any time as long as it is not on the day of the infusion. No  7. Have you recently been diagnosed with any new nervous system diseases (ie. Multiple sclerosis, Guillain [place], seizures, neurological changes) or cancer diagnosis? Are you on any form of radiation or chemotherapy? No  8. Are you pregnant or breast feeding or do you have plans of pregnancy in the future? No  9. Have you been having any signs of worsening depression or suicidal ideations?  (benlysta only) No  10. Have there been any other new onset medical symptoms? No    EDUCATION POST BIOLOGICAL/CHEMOTHERAPY INFUSION  Call the triage nurse at your clinic or seek medical attention if you have chills and/or temperature greater than or equal to 100.5, uncontrolled nausea/vomiting, diarrhea, constipation, dizziness, shortness of breath, chest pain, heart palpitations, weakness or any other new or  concerning symptoms, questions or concerns.  You can not have any live virus vaccines prior to or during treatment or up to 6 months post infusion.  If you have an upcoming surgery, medical procedure or dental procedure during treatment, this should be discussed with your ordering physician and your surgeon/dentist.  If you are having any concerning symptom, if you are unsure if you should get your next infusion or wish to speak to a provider before your next infusion, please call your care coordinator or triage nurse at your clinic to notify them so we can adequately serve you.    Patient here for 1st dose of Remicade. B/P improved after start of Cozaar. Premeds given 30 minutes prior to start of remicade. Tolerated infusion well, observed for 15 minutes after. Patient has no complaints.  Discharged in stable condition.

## 2017-09-15 NOTE — PATIENT INSTRUCTIONS
Pt to return on 9/29/17 for Remicade. Copies of medication list and upcoming appointments given prior to discharge.       POST-INFUSION OF BIOLOGICAL MEDICATION:    Reviewed with patient.  Given biologic medication or medication hand-out. Inform patient if any fever, chills or signs of infection, new symptoms, abdominal pain, heart palpitations, shortness of breath, reaction, weakness, neurological changes, seek medical attention immediately and should not receive infusions. No live virus vaccines prior to or during treatment or up to 6 months post infusion. If the patient has an upcoming procedure or surgery, this should be discussed with the rheumatologist and surgeon or provider.

## 2017-09-15 NOTE — MR AVS SNAPSHOT
After Visit Summary   9/15/2017    Camilo Baca    MRN: [id number]           Patient Information     Date Of Birth          1950        Visit Information        Provider Department      9/15/2017 9:00 AM NL INFUSION CHAIR 2 [facility]        Today's Diagnoses     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)    -  1      Care Instructions    Pt to return on 9/29/17 for Remicade. Copies of medication list and upcoming appointments given prior to discharge.       POST-INFUSION OF BIOLOGICAL MEDICATION:    Reviewed with patient.  Given biologic medication or medication hand-out. Inform patient if any fever, chills or signs of infection, new symptoms, abdominal pain, heart palpitations, shortness of breath, reaction, weakness, neurological changes, seek medical attention immediately and should not receive infusions. No live virus vaccines prior to or during treatment or up to 6 months post infusion. If the patient has an upcoming procedure or surgery, this should be discussed with the rheumatologist and surgeon or provider.            Follow-ups after your visit        Your next 10 appointments already scheduled     Sep 29, 2017  8:00 AM CDT   Level 4 with NL INFUSION CHAIR 2   [facility] ([facility])    [address] [place] [address] Dr [place] BAUTISTA [id number]   [phone]            Oct 06, 2017  3:00 PM CDT   [facility]   [facility]    [address] [place] [id number]   [phone]            Oct 27, 2017  8:00 AM CDT   Level 4 with NL INFUSION CHAIR 2   [facility] ([facility])    [address] [place] [address] Dr [place] BAUTISTA [id number]   [phone]              Who to contact     If you have questions or need follow up information about today's clinic visit or your schedule please contact  "[facility] IN[facility] directly at [phone].  Normal or non-critical lab and imaging results will be communicated to you by MyChart, letter or phone within 4 business days after the clinic has received the results. If you do not hear from us within 7 days, please contact the clinic through [employer] or phone. If you have a critical or abnormal lab result, we will notify you by phone as soon as possible.  Submit refill requests through boarding pass or call your pharmacy and they will forward the refill request to us. Please allow 3 business days for your refill to be completed.          Additional Information About Your Visit        MineralistharPropel Information     boarding pass lets you send messages to your doctor, view your test results, renew your prescriptions, schedule appointments and more. To sign up, go to [email or web address] . Click on \"Log in\" on the left side of the screen, which will take you to the Welcome page. Then click on \"Sign up Now\" on the right side of the page.     You will be asked to enter the access code listed below, as well as some personal information. Please follow the directions to create your username and password.     Your access code is: [email or web address]  Expires: 2017  9:27 AM     Your access code will  in 90 days. If you need help or a new code, please call your [facility] clinic or [phone].        Care EveryWhere ID     This is your Care EveryWhere ID. This could be used by other organizations to access your [facility] medical records  [id number]        Your Vitals Were     Pulse Temperature Respirations Height Pulse Oximetry BMI (Body Mass Index)    71 97.2  F (36.2  C) (Temporal) 18 1.778 m (5' 10\") 96% 26.83 kg/m2       Blood Pressure from Last 3 Encounters:   09/15/17 161/82   17 140/84   17 (!) 167/101    Weight from Last 3 Encounters:   09/15/17 84.8 kg (187 lb)   17 82.1 kg (181 lb)   17 83.1 kg (183 lb 4.8 oz)              Today, you had " the following     No orders found for display       Primary Care Provider Office Phone # Fax #    DIANN Presley [phone]       [address] [facility] [address]        Equal Access to Services     BECCA BAEZ : Hadchavez umair ku hadgennaroo Somonicaali, waaxda luqadaha, qaybta kaalmada adeegyada, waxeriberto idiin asafn miladbonilla ross kane high. So [facility] [phone].    ATENCIÓN: Si habla español, tiene a bernal disposición servicios gratuitos de asistencia lingüística. Llame al [phone].    We comply with applicable federal civil rights laws and [place] laws. We do not discriminate on the basis of race, color, national origin, age, disability sex, sexual orientation or gender identity.            Thank you!     Thank you for choosing [facility]  for your care. Our goal is always to provide you with excellent care. Hearing back from our patients is one way we can continue to improve our services. Please take a few minutes to complete the written survey that you may receive in the mail after your visit with us. Thank you!             Your Updated Medication List - Protect others around you: Learn how to safely use, store and throw away your medicines at www.disposemymeds.org.          This list is accurate as of: 9/15/17 12:32 PM.  Always use your most recent med list.                   Brand Name Dispense Instructions for use Diagnosis    allopurinol 300 MG tablet    ZYLOPRIM    90 tablet    TAKE ONE TABLET BY MOUTH EVERY DAY    Gout, unspecified, Hypothyroidism       atorvastatin 80 MG tablet    LIPITOR    90 tablet    TAKE ONE TABLET BY MOUTH EVERY DAY    Hyperlipidemia LDL goal <130       BABY ASPIRIN PO      daily        capsaicin 0.075 % cream    ZOSTRIX    50 g    Apply  topically 3 times daily.    Bursitis of shoulder       cetirizine 10 MG tablet    zyrTEC    90 tablet    Take 1 tablet (10 mg) by mouth every evening    Seasonal allergic rhinitis, unspecified allergic  rhinitis trigger       cholestyramine light 4 GM Packet    QUESTRAN    60 packet    DISSOLVE ONE PACKET IN LIQUID AS DIRECTED TWO TIMES A DAY AND DRINK    Hyperlipidemia LDL goal <130       diclofenac 1 % Gel topical gel    VOLTAREN    100 g    Apply  2 grams to shoulders three times daily using enclosed dosing card.    Rotator cuff tendinitis       levothyroxine 25 MCG tablet    SYNTHROID/LEVOTHROID    90 tablet    TAKE ONE TABLET BY MOUTH EVERY DAY    Hypothyroidism due to acquired atrophy of thyroid       losartan 25 MG tablet    COZAAR    30 tablet    Take 1 tablet (25 mg) by mouth daily    Benign essential hypertension       omeprazole 20 MG CR capsule    priLOSEC    90 capsule    TAKE ONE CAPSULE BY MOUTH ONCE DAILY -TAKE 30-60 MINUTES BEFORE A MEAL    Gastroesophageal reflux disease without esophagitis       triamcinolone 0.1 % cream    KENALOG    60 g    Locally bid    Rash       VITAMIN C PO      Take by mouth daily

## 2017-09-18 NOTE — PROGRESS NOTES
Camilo Baca was enrolled/participating in the retail pharmacy Blood Pressure Goals Achievement Program (BPGAP) Sept 8, 2017.  Camilo Baca was evaluated in clinic on September 8, 2017 at which time his blood pressure was:    BP Readings from Last 3 Encounters:   09/15/17 161/82   09/08/17 140/84   09/08/17 (!) 167/101     Reviewed lifestyle modifications for blood pressure control and reduction: including making healthy food choices, managing weight, getting regular exercise, smoking cessation, reducing alcohol consumption, monitoring blood pressure regularly.     Camilo Baca is not experiencing symptoms.    Follow-Up: BP was at goal of < 140/90mmHg (patient 18+ years of age with or without diabetes) on Sept 8/17.  Recommended follow-up at pharmacy in 6 months. Follow-up BP on 9/15/17 not at goal. Recheck within 1 month.    Recommendation to Provider: none    Camilo Baca was evaluated for enrollment into the PGEN study today.    Patient eligible for enrollment:  No  Patient interested in enrollment:  No    Completed by: Matteo Jeffrey [facility] [phone]

## 2017-09-29 ENCOUNTER — INFUSION THERAPY VISIT (OUTPATIENT)
Dept: INFUSION THERAPY | Facility: CLINIC | Age: 67
End: 2017-09-29
Attending: INTERNAL MEDICINE
Payer: MEDICARE

## 2017-09-29 VITALS
DIASTOLIC BLOOD PRESSURE: 82 MMHG | OXYGEN SATURATION: 97 % | BODY MASS INDEX: 27.15 KG/M2 | HEART RATE: 59 BPM | TEMPERATURE: 96.8 F | WEIGHT: 189.2 LBS | RESPIRATION RATE: 16 BRPM | SYSTOLIC BLOOD PRESSURE: 134 MMHG

## 2017-09-29 DIAGNOSIS — M05.711 RHEUMATOID ARTHRITIS INVOLVING BOTH SHOULDERS WITH POSITIVE RHEUMATOID FACTOR (H): Primary | ICD-10-CM

## 2017-09-29 DIAGNOSIS — M05.712 RHEUMATOID ARTHRITIS INVOLVING BOTH SHOULDERS WITH POSITIVE RHEUMATOID FACTOR (H): Primary | ICD-10-CM

## 2017-09-29 PROCEDURE — 96413 CHEMO IV INFUSION 1 HR: CPT

## 2017-09-29 PROCEDURE — 25000128 H RX IP 250 OP 636: Performed by: INTERNAL MEDICINE

## 2017-09-29 PROCEDURE — 96415 CHEMO IV INFUSION ADDL HR: CPT

## 2017-09-29 RX ORDER — ACETAMINOPHEN 325 MG/1
650 TABLET ORAL ONCE
Status: DISCONTINUED | OUTPATIENT
Start: 2017-09-29 | End: 2017-09-29 | Stop reason: HOSPADM

## 2017-09-29 RX ORDER — ACETAMINOPHEN 325 MG/1
650 TABLET ORAL ONCE
Status: CANCELLED
Start: 2017-09-29 | End: 2017-09-29

## 2017-09-29 RX ORDER — DIPHENHYDRAMINE HCL 25 MG
25 CAPSULE ORAL ONCE
Status: DISCONTINUED | OUTPATIENT
Start: 2017-09-29 | End: 2017-09-29 | Stop reason: HOSPADM

## 2017-09-29 RX ORDER — DIPHENHYDRAMINE HCL 25 MG
25 CAPSULE ORAL ONCE
Status: CANCELLED
Start: 2017-09-29 | End: 2017-09-29

## 2017-09-29 RX ADMIN — SODIUM CHLORIDE 250 ML: 9 INJECTION, SOLUTION INTRAVENOUS at 08:31

## 2017-09-29 RX ADMIN — INFLIXIMAB 300 MG: 100 INJECTION, POWDER, LYOPHILIZED, FOR SOLUTION INTRAVENOUS at 08:40

## 2017-09-29 ASSESSMENT — PAIN SCALES - GENERAL: PAINLEVEL: NO PAIN (0)

## 2017-09-29 NOTE — MR AVS SNAPSHOT
After Visit Summary   9/29/2017    Camilo Baca    MRN: [id number]           Patient Information     Date Of Birth          1950        Visit Information        Provider Department      9/29/2017 8:00 AM NL INFUSION CHAIR 2 [facility]        Today's Diagnoses     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)    -  1      Care Instructions    Pt to return on 10/27/17 for Remicade. Copies of medication list and upcoming appointments given prior to discharge.             Follow-ups after your visit        Your next 10 appointments already scheduled     Oct 06, 2017  3:00 PM CDT   Return Visit with Jayden Anderson MD   [facility]    [address] [facility] [place] [id number] [phone]            Oct 27, 2017  8:00 AM CDT   Level 4 with NL INFUSION CHAIR 2   [facility] ([facility])    [address] [place] [address]  [place] [id number] [phone]              Future tests that were ordered for you today     Open Standing Orders        Priority Remaining Interval Expires Ordered    CBC with platelets differential Routine 3/3 4 months 9/29/2018 9/29/2017    Comprehensive metabolic panel Routine 3/3 4 months 9/29/2018 9/29/2017    Erythrocyte sedimentation rate auto Routine 3/3 4 months 9/29/2018 9/29/2017            Who to contact     If you have questions or need follow up information about today's clinic visit or your schedule please contact [facility] INFUSION SERVICES directly at [phone].  Normal or non-critical lab and imaging results will be communicated to you by MyChart, letter or phone within 4 business days after the clinic has received the results. If you do not hear from us within 7 days, please contact the clinic through MyChart or phone. If you have a critical or abnormal lab result, we will notify you by phone as soon as  "possible.  Submit refill requests through [employer] or call your pharmacy and they will forward the refill request to us. Please allow 3 business days for your refill to be completed.          Additional Information About Your Visit        GlazeonharRateSetter Information     [employer] lets you send messages to your doctor, view your test results, renew your prescriptions, schedule appointments and more. To sign up, go to [email or web address] . Click on \"Log in\" on the left side of the screen, which will take you to the Welcome page. Then click on \"Sign up Now\" on the right side of the page.     You will be asked to enter the access code listed below, as well as some personal information. Please follow the directions to create your username and password.     Your access code is: [email or web address]  Expires: 2017  9:27 AM     Your access code will  in 90 days. If you need help or a new code, please call your [facility] clinic or [phone].        Care EveryWhere ID     This is your Care EveryWhere ID. This could be used by other organizations to access your [facility] medical records  [id number]        Your Vitals Were     Pulse Temperature Respirations Pulse Oximetry BMI (Body Mass Index)       59 96.8  F (36  C) (Temporal) 16 97% 27.15 kg/m2        Blood Pressure from Last 3 Encounters:   17 134/82   09/15/17 161/82   17 140/84    Weight from Last 3 Encounters:   17 85.8 kg (189 lb 3.2 oz)   09/15/17 84.8 kg (187 lb)   17 82.1 kg (181 lb)              Today, you had the following     No orders found for display       Primary Care Provider Office Phone # Fax #    DIANN Presley [phone]       [address] [facility] [address]        Equal Access to Services     BECCA BAEZ : Dru Brito, barry genao, matt mclaughlin. [facility] [phone].    ATENCIÓN: Si mirza alvarez a bernal disposición " servicios gratuitos de asistencia lingüística. Hina wagoner [phone].    We comply with applicable federal civil rights laws and [place] laws. We do not discriminate on the basis of race, color, national origin, age, disability, sex, sexual orientation, or gender identity.            Thank you!     Thank you for choosing [facility]  for your care. Our goal is always to provide you with excellent care. Hearing back from our patients is one way we can continue to improve our services. Please take a few minutes to complete the written survey that you may receive in the mail after your visit with us. Thank you!             Your Updated Medication List - Protect others around you: Learn how to safely use, store and throw away your medicines at www.disposemymeds.org.          This list is accurate as of: 9/29/17  2:05 PM.  Always use your most recent med list.                   Brand Name Dispense Instructions for use Diagnosis    allopurinol 300 MG tablet    ZYLOPRIM    90 tablet    TAKE ONE TABLET BY MOUTH EVERY DAY    Gout, unspecified, Hypothyroidism       atorvastatin 80 MG tablet    LIPITOR    90 tablet    TAKE ONE TABLET BY MOUTH EVERY DAY    Hyperlipidemia LDL goal <130       BABY ASPIRIN PO      daily        capsaicin 0.075 % cream    ZOSTRIX    50 g    Apply  topically 3 times daily.    Bursitis of shoulder       cetirizine 10 MG tablet    zyrTEC    90 tablet    Take 1 tablet (10 mg) by mouth every evening    Seasonal allergic rhinitis, unspecified allergic rhinitis trigger       cholestyramine light 4 GM Packet    QUESTRAN    60 packet    DISSOLVE ONE PACKET IN LIQUID AS DIRECTED TWO TIMES A DAY AND DRINK    Hyperlipidemia LDL goal <130       diclofenac 1 % Gel topical gel    VOLTAREN    100 g    Apply  2 grams to shoulders three times daily using enclosed dosing card.    Rotator cuff tendinitis       levothyroxine 25 MCG tablet    SYNTHROID/LEVOTHROID    90 tablet    TAKE ONE TABLET  BY MOUTH EVERY DAY    Hypothyroidism due to acquired atrophy of thyroid       losartan 25 MG tablet    COZAAR    30 tablet    Take 1 tablet (25 mg) by mouth daily    Benign essential hypertension       omeprazole 20 MG CR capsule    priLOSEC    90 capsule    TAKE ONE CAPSULE BY MOUTH ONCE DAILY -TAKE 30-60 MINUTES BEFORE A MEAL    Gastroesophageal reflux disease without esophagitis       triamcinolone 0.1 % cream    KENALOG    60 g    Locally bid    Rash       VITAMIN C PO      Take by mouth daily

## 2017-09-29 NOTE — PATIENT INSTRUCTIONS
Pt to return on 10/27/17 for Remicade. Copies of medication list and upcoming appointments given prior to discharge.

## 2017-09-29 NOTE — PROGRESS NOTES
EDUCATION POST BIOLOGICAL/CHEMOTHERAPY INFUSION  Call the triage nurse at your clinic or seek medical attention if you have chills and/or temperature greater than or equal to 100.5, uncontrolled nausea/vomiting, diarrhea, constipation, dizziness, shortness of breath, chest pain, heart palpitations, weakness or any other new or concerning symptoms, questions or concerns.  You can not have any live virus vaccines prior to or during treatment or up to 6 months post infusion.  If you have an upcoming surgery, medical procedure or dental procedure during treatment, this should be discussed with your ordering physician and your surgeon/dentist.  If you are having any concerning symptom, if you are unsure if you should get your next infusion or wish to speak to a provider before your next infusion, please call your care coordinator or triage nurse at your clinic to notify them so we can adequately serve you.    Patient here for 2nd Remicade infusion. Took own benadryl and tylenol at home prior to appointment. Remicade infusion tolerated well. VSS. Discharged in stable condition. Return in 4 weeks.

## 2017-09-29 NOTE — NURSING NOTE

## 2017-10-06 ENCOUNTER — TELEPHONE (OUTPATIENT)
Dept: RHEUMATOLOGY | Facility: CLINIC | Age: 67
End: 2017-10-06

## 2017-10-06 ENCOUNTER — OFFICE VISIT (OUTPATIENT)
Dept: PULMONOLOGY | Facility: CLINIC | Age: 67
End: 2017-10-06
Payer: COMMERCIAL

## 2017-10-06 VITALS
HEART RATE: 72 BPM | HEIGHT: 70 IN | OXYGEN SATURATION: 93 % | BODY MASS INDEX: 27.16 KG/M2 | RESPIRATION RATE: 18 BRPM | WEIGHT: 189.7 LBS | TEMPERATURE: 97.3 F

## 2017-10-06 DIAGNOSIS — R05.9 COUGH: Primary | ICD-10-CM

## 2017-10-06 PROCEDURE — 99213 OFFICE O/P EST LOW 20 MIN: CPT | Performed by: INTERNAL MEDICINE

## 2017-10-06 ASSESSMENT — PAIN SCALES - GENERAL: PAINLEVEL: NO PAIN (0)

## 2017-10-06 NOTE — PROGRESS NOTES
"CC:  F/u chronic cough    Last vist Aug 2017.  Stopped lisinopril and started zyrtecD.      Cough nearly gone.  Thinks it's more related to stopping ACEI but also notes less throat clearing and  Nasal drainage with zyrtec.      BP med changed to losartan and BP is good.      Cough a few times in AM and brings up small amount of brownish/reddish \"gel\" material  Only in AM.  No cough or sputum any other time.     No bright red or frothy blood.      Current Outpatient Prescriptions   Medication Sig Dispense Refill     losartan (COZAAR) 25 MG tablet Take 1 tablet (25 mg) by mouth daily 30 tablet 1     omeprazole (PRILOSEC) 20 MG CR capsule TAKE ONE CAPSULE BY MOUTH ONCE DAILY -TAKE 30-60 MINUTES BEFORE A MEAL 90 capsule 1     allopurinol (ZYLOPRIM) 300 MG tablet TAKE ONE TABLET BY MOUTH EVERY DAY 90 tablet 0     levothyroxine (SYNTHROID/LEVOTHROID) 25 MCG tablet TAKE ONE TABLET BY MOUTH EVERY DAY 90 tablet 0     atorvastatin (LIPITOR) 80 MG tablet TAKE ONE TABLET BY MOUTH EVERY DAY 90 tablet 1     Ascorbic Acid (VITAMIN C PO) Take by mouth daily       cholestyramine light (QUESTRAN) 4 GM Packet DISSOLVE ONE PACKET IN LIQUID AS DIRECTED TWO TIMES A DAY AND DRINK 60 packet 5     cetirizine (ZYRTEC) 10 MG tablet Take 1 tablet (10 mg) by mouth every evening 90 tablet 1     diclofenac (VOLTAREN) 1 % GEL Apply  2 grams to shoulders three times daily using enclosed dosing card. 100 g 1     triamcinolone (KENALOG) 0.1 % cream Locally bid 60 g 0     capsaicin (ZOSTRIX) 0.075 % topical cream Apply  topically 3 times daily. 50 g 3     BABY ASPIRIN OR daily           REVIEW OF SYSTEMS:  No fever, fatigue or dysphagia.   No purulent nasal drainage or sore throat or excessive drying.   RESPIRATORY EXAM:  Pulse 72  Temp 97.3  F (36.3  C) (Temporal)  Resp 18  Ht 5' 10\" (1.778 m)  Wt 189 lb 11.2 oz (86 kg)  SpO2 93%  BMI 27.22 kg/m2  O2 saturation 93% on room air   Moves easily to exam table without dyspnea  No jugular venous " distention  Nares clear and no polyps   No respiratory accessory muscle use. Thorax normal configuration. Clear breath sounds bilaterally.  Normal S1 and S2 heart sounds without murmur rub or gallop. No limb edema.  Abdomen non-distended  No digit cyanosis  No skin rash.   Affect and cognition are normal.    A:  Chronic cough, improved.    P: continue off ACEi, use zyrtec D prn.    RTC if any bright red blood even if just specks.   Jayden Anderson MD, MPH  Associate Professor of Medicine

## 2017-10-06 NOTE — NURSING NOTE
"Chief Complaint   Patient presents with     RECHECK     Cough       Initial Pulse 72  Temp 97.3  F (36.3  C) (Temporal)  Resp 18  Ht 5' 10\" (1.778 m)  Wt 189 lb 11.2 oz (86 kg)  SpO2 93%  BMI 27.22 kg/m2 Estimated body mass index is 27.22 kg/(m^2) as calculated from the following:    Height as of this encounter: 5' 10\" (1.778 m).    Weight as of this encounter: 189 lb 11.2 oz (86 kg).  Medication Reconciliation: complete   Luz Alamo CMA        "

## 2017-10-06 NOTE — MR AVS SNAPSHOT
"              After Visit Summary   10/6/2017    Camilo Baca    MRN: [id number]           Patient Information     Date Of Birth          1950        Visit Information        Provider Department      10/6/2017 3:00 PM Jayden Anderson MD [facility]         Follow-ups after your visit        Your next 10 appointments already scheduled     Oct 27, 2017  8:00 AM CDT   Level 4 with NL INFUSION CHAIR 2   [facility] ([facility])    [address] [place] [address]   [phone]            Nov 15, 2017  9:15 AM CST   Return Visit with Sb Hurtado MD   [facility]    [address]  [place] [address] [phone]              Who to contact     If you have questions or need follow up information about today's clinic visit or your schedule please contact [facility] directly at [phone].  Normal or non-critical lab and imaging results will be communicated to you by [employer], letter or phone within 4 business days after the clinic has received the results. If you do not hear from us within 7 days, please contact the clinic through [employer] or phone. If you have a critical or abnormal lab result, we will notify you by phone as soon as possible.  Submit refill requests through [employer] or call your pharmacy and they will forward the refill request to us. Please allow 3 business days for your refill to be completed.          Additional Information About Your Visit        [employer] Information     [employer] lets you send messages to your doctor, view your test results, renew your prescriptions, schedule appointments and more. To sign up, go to [email or web address] . Click on \"Log in\" on the left side of the screen, which will take you to the Welcome page. Then click on \"Sign up Now\" on the right side of the page.     You will be asked to enter the access code listed " "below, as well as some personal information. Please follow the directions to create your username and password.     Your access code is: [email or web address]  Expires: 2017  9:27 AM     Your access code will  in 90 days. If you need help or a new code, please call your [place] clinic or [phone].        Care EveryWhere ID     This is your Care EveryWhere ID. This could be used by other organizations to access your [place] medical records  [id number]        Your Vitals Were     Pulse Temperature Respirations Height Pulse Oximetry BMI (Body Mass Index)    72 97.3  F (36.3  C) (Temporal) 18 5' 10\" (1.778 m) 93% 27.22 kg/m2       Blood Pressure from Last 3 Encounters:   17 134/82   09/15/17 161/82   17 140/84    Weight from Last 3 Encounters:   10/06/17 189 lb 11.2 oz (86 kg)   17 189 lb 3.2 oz (85.8 kg)   09/15/17 187 lb (84.8 kg)              Today, you had the following     No orders found for display       Primary Care Provider Office Phone # Fax #    DIANN Presley [phone]       [address] [facility] [address]        Equal Access to Services     MOMO BAEZ : Hadii aad ku hadasho Soomaali, waaxda luqadaha, qaybta kaalmada adeegyada, matt middleton . So [facility] [phone].    ATENCIÓN: Si habla español, tiene a bernal disposición servicios gratuitos de asistencia lingüística. Llame al [phone].    We comply with applicable federal civil rights laws and [place] laws. We do not discriminate on the basis of race, color, national origin, age, disability, sex, sexual orientation, or gender identity.            Thank you!     Thank you for choosing [facility]  for your care. Our goal is always to provide you with excellent care. Hearing back from our patients is one way we can continue to improve our services. Please take a few minutes to complete the written survey that you may receive in the mail after your visit with " us. Thank you!             Your Updated Medication List - Protect others around you: Learn how to safely use, store and throw away your medicines at www.disposemymeds.org.          This list is accurate as of: 10/6/17  3:19 PM.  Always use your most recent med list.                   Brand Name Dispense Instructions for use Diagnosis    allopurinol 300 MG tablet    ZYLOPRIM    90 tablet    TAKE ONE TABLET BY MOUTH EVERY DAY    Gout, unspecified, Hypothyroidism       atorvastatin 80 MG tablet    LIPITOR    90 tablet    TAKE ONE TABLET BY MOUTH EVERY DAY    Hyperlipidemia LDL goal <130       BABY ASPIRIN PO      daily        capsaicin 0.075 % cream    ZOSTRIX    50 g    Apply  topically 3 times daily.    Bursitis of shoulder       cetirizine 10 MG tablet    zyrTEC    90 tablet    Take 1 tablet (10 mg) by mouth every evening    Seasonal allergic rhinitis, unspecified allergic rhinitis trigger       cholestyramine light 4 GM Packet    QUESTRAN    60 packet    DISSOLVE ONE PACKET IN LIQUID AS DIRECTED TWO TIMES A DAY AND DRINK    Hyperlipidemia LDL goal <130       diclofenac 1 % Gel topical gel    VOLTAREN    100 g    Apply  2 grams to shoulders three times daily using enclosed dosing card.    Rotator cuff tendinitis       levothyroxine 25 MCG tablet    SYNTHROID/LEVOTHROID    90 tablet    TAKE ONE TABLET BY MOUTH EVERY DAY    Hypothyroidism due to acquired atrophy of thyroid       losartan 25 MG tablet    COZAAR    30 tablet    Take 1 tablet (25 mg) by mouth daily    Benign essential hypertension       omeprazole 20 MG CR capsule    priLOSEC    90 capsule    TAKE ONE CAPSULE BY MOUTH ONCE DAILY -TAKE 30-60 MINUTES BEFORE A MEAL    Gastroesophageal reflux disease without esophagitis       triamcinolone 0.1 % cream    KENALOG    60 g    Locally bid    Rash       VITAMIN C PO      Take by mouth daily

## 2017-10-06 NOTE — TELEPHONE ENCOUNTER
Reason for Call:  Other call back    Detailed comments: pt calling stating he is on remicade had his last infusion on 9/29. He is going into the dentist on Monday. But just cracked his tooth and know he will be needing work done and was told he needs to get approval from Dr. Hurtado before doing so     Phone Number Patient can be reached at: Home number on file [phone] (home)    Best Time: any     Can we leave a detailed message on this number? YES    Call taken on 10/6/2017 at 12:39 PM by Vivienne Rico

## 2017-10-06 NOTE — TELEPHONE ENCOUNTER
Advised wife after finding the signed HIPPA form.  Yasmin Kessler RN  [facility]

## 2017-10-27 ENCOUNTER — INFUSION THERAPY VISIT (OUTPATIENT)
Dept: INFUSION THERAPY | Facility: CLINIC | Age: 67
End: 2017-10-27
Attending: NURSE PRACTITIONER
Payer: MEDICARE

## 2017-10-27 VITALS
HEART RATE: 64 BPM | OXYGEN SATURATION: 96 % | RESPIRATION RATE: 16 BRPM | WEIGHT: 193.3 LBS | SYSTOLIC BLOOD PRESSURE: 132 MMHG | DIASTOLIC BLOOD PRESSURE: 78 MMHG | TEMPERATURE: 97.1 F | BODY MASS INDEX: 27.74 KG/M2

## 2017-10-27 DIAGNOSIS — M05.711 RHEUMATOID ARTHRITIS INVOLVING BOTH SHOULDERS WITH POSITIVE RHEUMATOID FACTOR (H): Primary | ICD-10-CM

## 2017-10-27 DIAGNOSIS — M05.712 RHEUMATOID ARTHRITIS INVOLVING BOTH SHOULDERS WITH POSITIVE RHEUMATOID FACTOR (H): Primary | ICD-10-CM

## 2017-10-27 PROCEDURE — 25000128 H RX IP 250 OP 636: Performed by: INTERNAL MEDICINE

## 2017-10-27 PROCEDURE — 96413 CHEMO IV INFUSION 1 HR: CPT

## 2017-10-27 PROCEDURE — 96415 CHEMO IV INFUSION ADDL HR: CPT

## 2017-10-27 RX ORDER — DIPHENHYDRAMINE HCL 25 MG
25 CAPSULE ORAL ONCE
Status: DISCONTINUED | OUTPATIENT
Start: 2017-10-27 | End: 2017-10-27 | Stop reason: HOSPADM

## 2017-10-27 RX ORDER — DIPHENHYDRAMINE HCL 25 MG
25 CAPSULE ORAL ONCE
Status: CANCELLED
Start: 2017-10-27 | End: 2017-10-27

## 2017-10-27 RX ORDER — ACETAMINOPHEN 325 MG/1
650 TABLET ORAL ONCE
Status: CANCELLED
Start: 2017-10-27 | End: 2017-10-27

## 2017-10-27 RX ORDER — ACETAMINOPHEN 325 MG/1
650 TABLET ORAL ONCE
Status: DISCONTINUED | OUTPATIENT
Start: 2017-10-27 | End: 2017-10-27 | Stop reason: HOSPADM

## 2017-10-27 RX ADMIN — INFLIXIMAB 300 MG: 100 INJECTION, POWDER, LYOPHILIZED, FOR SOLUTION INTRAVENOUS at 08:39

## 2017-10-27 RX ADMIN — SODIUM CHLORIDE 250 ML: 9 INJECTION, SOLUTION INTRAVENOUS at 08:27

## 2017-10-27 ASSESSMENT — PAIN SCALES - GENERAL: PAINLEVEL: NO PAIN (0)

## 2017-10-27 NOTE — MR AVS SNAPSHOT
After Visit Summary   10/27/2017    Camilo Baca    MRN: [id number]           Patient Information     Date Of Birth          1950        Visit Information        Provider Department      10/27/2017 8:00 AM NL INFUSION CHAIR 2 [facility]        Today's Diagnoses     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)    -  1      Care Instructions    Pt to return on 12/22/17 for labs/Remicade. Copies of medication list and upcoming appointments given prior to discharge.             Follow-ups after your visit        Your next 10 appointments already scheduled     Nov 15, 2017  9:15 AM CST   Return Visit with Sb Hurtado MD   [facility]    [address] [address]   [phone]            Dec 22, 2017  8:00 AM CST   Level 4 with NL INFUSION CHAIR 2   [facility] ([facility])    [address] [place] [id number] [phone]              Who to contact     If you have questions or need follow up information about today's clinic visit or your schedule please contact [facility] INF[facility]ION [facility] directly at [phone].  Normal or non-critical lab and imaging results will be communicated to you by MyChart, letter or phone within 4 business days after the clinic has received the results. If you do not hear from us within 7 days, please contact the clinic through MyChart or phone. If you have a critical or abnormal lab result, we will notify you by phone as soon as possible.  Submit refill requests through [employer] or call your pharmacy and they will forward the refill request to us. Please allow 3 business days for your refill to be completed.          Additional Information About Your Visit        [employer] Information     [employer] lets you send messages to your doctor, view your test results, renew your  "prescriptions, schedule appointments and more. To sign up, go to [email or web address] . Click on \"Log in\" on the left side of the screen, which will take you to the Welcome page. Then click on \"Sign up Now\" on the right side of the page.     You will be asked to enter the access code listed below, as well as some personal information. Please follow the directions to create your username and password.     Your access code is: [email or web address]  Expires: 2017  9:27 AM     Your access code will  in 90 days. If you need help or a new code, please call your [place] clinic or [phone].        Care EveryWhere ID     This is your Care EveryWhere ID. This could be used by other organizations to access your [place] medical records  [id number]        Your Vitals Were     Pulse Temperature Respirations Pulse Oximetry BMI (Body Mass Index)       64 97.1  F (36.2  C) (Temporal) 16 96% 27.74 kg/m2        Blood Pressure from Last 3 Encounters:   10/27/17 132/78   17 134/82   09/15/17 161/82    Weight from Last 3 Encounters:   10/27/17 87.7 kg (193 lb 4.8 oz)   10/06/17 86 kg (189 lb 11.2 oz)   17 85.8 kg (189 lb 3.2 oz)              Today, you had the following     No orders found for display       Primary Care Provider Office Phone # Fax #    DIANN Presley [phone]       [address] [facility] [address]        Equal Access to Services     [facility]: Hadii aad ku hadasho Sojackie, waaxda luqadaha, qaybta kaalmada jesus, matt high. So [facility] [phone].    ATENCIÓN: Si habla español, tiene a bernal disposición servicios gratuitos de asistencia lingüística. Llame al [phone].    We comply with applicable federal civil rights laws and [place] laws. We do not discriminate on the basis of race, color, national origin, age, disability, sex, sexual orientation, or gender identity.            Thank you!     Thank you for choosing [facility]  for your care. Our goal is always to provide you with excellent care. Hearing back from our patients is one way we can continue to improve our services. Please take a few minutes to complete the written survey that you may receive in the mail after your visit with us. Thank you!             Your Updated Medication List - Protect others around you: Learn how to safely use, store and throw away your medicines at www.disposemymeds.org.          This list is accurate as of: 10/27/17 11:17 AM.  Always use your most recent med list.                   Brand Name Dispense Instructions for use Diagnosis    allopurinol 300 MG tablet    ZYLOPRIM    90 tablet    TAKE ONE TABLET BY MOUTH EVERY DAY    Gout, unspecified, Hypothyroidism       atorvastatin 80 MG tablet    LIPITOR    90 tablet    TAKE ONE TABLET BY MOUTH EVERY DAY    Hyperlipidemia LDL goal <130       BABY ASPIRIN PO      daily        capsaicin 0.075 % cream    ZOSTRIX    50 g    Apply  topically 3 times daily.    Bursitis of shoulder       cetirizine 10 MG tablet    zyrTEC    90 tablet    Take 1 tablet (10 mg) by mouth every evening    Seasonal allergic rhinitis, unspecified allergic rhinitis trigger       cholestyramine light 4 GM Packet    QUESTRAN    60 packet    DISSOLVE ONE PACKET IN LIQUID AS DIRECTED TWO TIMES A DAY AND DRINK    Hyperlipidemia LDL goal <130       diclofenac 1 % Gel topical gel    VOLTAREN    100 g    Apply  2 grams to shoulders three times daily using enclosed dosing card.    Rotator cuff tendinitis       levothyroxine 25 MCG tablet    SYNTHROID/LEVOTHROID    90 tablet    TAKE ONE TABLET BY MOUTH EVERY DAY    Hypothyroidism due to acquired atrophy of thyroid       losartan 25 MG tablet    COZAAR    30 tablet    Take 1 tablet (25 mg) by mouth daily    Benign essential hypertension       omeprazole 20 MG CR capsule    priLOSEC    90 capsule    TAKE ONE CAPSULE BY MOUTH ONCE DAILY -TAKE 30-60 MINUTES BEFORE A MEAL     Gastroesophageal reflux disease without esophagitis       triamcinolone 0.1 % cream    KENALOG    60 g    Locally bid    Rash       VITAMIN C PO      Take by mouth daily

## 2017-10-27 NOTE — PROGRESS NOTES
EDUCATION POST BIOLOGICAL/CHEMOTHERAPY INFUSION  Call the triage nurse at your clinic or seek medical attention if you have chills and/or temperature greater than or equal to 100.5, uncontrolled nausea/vomiting, diarrhea, constipation, dizziness, shortness of breath, chest pain, heart palpitations, weakness or any other new or concerning symptoms, questions or concerns.  You can not have any live virus vaccines prior to or during treatment or up to 6 months post infusion.  If you have an upcoming surgery, medical procedure or dental procedure during treatment, this should be discussed with your ordering physician and your surgeon/dentist.  If you are having any concerning symptom, if you are unsure if you should get your next infusion or wish to speak to a provider before your next infusion, please call your care coordinator or triage nurse at your clinic to notify them so we can adequately serve you.    Patient took own premeds prior to Appt this am. Tolerated infusion well. VSS. Discharged in stable condition. Return in 8 weeks.

## 2017-10-27 NOTE — NURSING NOTE

## 2017-10-27 NOTE — PATIENT INSTRUCTIONS
Pt to return on 12/22/17 for labs/Remicade. Copies of medication list and upcoming appointments given prior to discharge.

## 2017-10-30 DIAGNOSIS — I10 BENIGN ESSENTIAL HYPERTENSION: ICD-10-CM

## 2017-10-31 RX ORDER — LOSARTAN POTASSIUM 25 MG/1
TABLET ORAL
Qty: 30 TABLET | Refills: 1 | Status: SHIPPED | OUTPATIENT
Start: 2017-10-31 | End: 2017-12-30

## 2017-10-31 NOTE — TELEPHONE ENCOUNTER
Prescription approved per [facility] Refill Protocol.    Bettye Hernandez RN  [facility]

## 2017-10-31 NOTE — TELEPHONE ENCOUNTER
Requested Prescriptions   Pending Prescriptions Disp Refills     losartan (COZAAR) 25 MG tablet [Pharmacy Med Name: LOSARTAN POTASSIUM 25MG TABS] 30 tablet 1     Sig: TAKE ONE TABLET BY MOUTH EVERY DAY    Angiotensin-II Receptors Passed    10/30/2017 10:17 AM       Passed - Blood pressure under 140/90 in past 12 months.    BP Readings from Last 3 Encounters:   10/27/17 132/78   09/29/17 134/82   09/15/17 161/82                Passed - Recent or future visit with authorizing provider's specialty    Patient had office visit in the last year or has a visit in the next 30 days with authorizing provider.  See chart review.              Passed - Patient is age 18 or older       Passed - Normal serum creatinine on file in past 12 months    Recent Labs   Lab Test  08/24/17   0931   CR  0.85            Passed - Normal serum potassium on file in past 12 months    Recent Labs   Lab Test  08/24/17   0931   POTASSIUM  4.4

## 2017-11-15 ENCOUNTER — OFFICE VISIT (OUTPATIENT)
Dept: RHEUMATOLOGY | Facility: CLINIC | Age: 67
End: 2017-11-15
Payer: COMMERCIAL

## 2017-11-15 VITALS
SYSTOLIC BLOOD PRESSURE: 156 MMHG | DIASTOLIC BLOOD PRESSURE: 95 MMHG | HEART RATE: 100 BPM | WEIGHT: 193 LBS | RESPIRATION RATE: 14 BRPM | BODY MASS INDEX: 27.63 KG/M2 | HEIGHT: 70 IN

## 2017-11-15 DIAGNOSIS — M1A.00X0 IDIOPATHIC CHRONIC GOUT WITHOUT TOPHUS, UNSPECIFIED SITE: Primary | ICD-10-CM

## 2017-11-15 PROCEDURE — 99214 OFFICE O/P EST MOD 30 MIN: CPT | Performed by: INTERNAL MEDICINE

## 2017-11-15 NOTE — NURSING NOTE
"Chief Complaint   Patient presents with     Medication Follow-up     Remicade       Initial BP (!) 156/95 (BP Location: Left arm, Patient Position: Chair, Cuff Size: Adult Regular)  Pulse 100  Resp 14  Ht 1.778 m (5' 10\")  Wt 87.5 kg (193 lb)  BMI 27.69 kg/m2 Estimated body mass index is 27.69 kg/(m^2) as calculated from the following:    Height as of this encounter: 1.778 m (5' 10\").    Weight as of this encounter: 87.5 kg (193 lb).  Medication Reconciliation: complete     Amy Felipe MA      "

## 2017-11-16 NOTE — PROGRESS NOTES
SUBJECTIVE:  Mr. Camilo Baca is seen back in follow-up for his rheumatoid arthritis.  He is doing well on the Remicade.  He is just now going into the 8-week interval.  Infusions have been uneventful, no infections or reactions to the infusion.  Today, no joint pain, swelling, or wrist stiffness.  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      PHYSICAL EXAM:   VITAL SIGNS:  Blood pressure 156/95, pulse 100.   NECK:  Supple without lymphadenopathy.   LUNGS:  Clear.   HEART:  Regular.   JOINTS:  There is no synovitis of the wrists, MCPs, or PIPs.  There is no synovitis of elbows, shoulders, knees or ankles.      IMPRESSION:  Rheumatoid arthritis, doing well.      RECOMMENDATIONS:   1.  Continue Remicade infusions every 8 weeks, although call me if it does not appear to last a full 8-week interval.   2.  Will need routine follow-up in 6 months or sooner if there are problems.         ORION MOODY MD             D: 11/15/2017 12:34   T: 2017 04:46   MT: AXEL#[id number]      Name:     CAMILO BACA   MRN:      [id number]        Account:      [id number]   :      1950           Visit Date:   11/15/2017      Document: [id number]       cc: Lyndsey TIDWELL, CNP

## 2017-11-25 DIAGNOSIS — E03.4 HYPOTHYROIDISM DUE TO ACQUIRED ATROPHY OF THYROID: ICD-10-CM

## 2017-11-27 RX ORDER — LEVOTHYROXINE SODIUM 25 UG/1
TABLET ORAL
Qty: 90 TABLET | Refills: 2 | Status: SHIPPED | OUTPATIENT
Start: 2017-11-27 | End: 2018-10-03

## 2017-11-27 NOTE — TELEPHONE ENCOUNTER
Requested Prescriptions   Pending Prescriptions Disp Refills     levothyroxine (SYNTHROID/LEVOTHROID) 25 MCG tablet [Pharmacy Med Name: LEVOTHYROXINE SODIUM 25MCG TABS] 90 tablet 0     Sig: TAKE ONE TABLET BY MOUTH EVERY DAY    Thyroid Protocol Passed    11/25/2017  8:41 AM       Passed - Patient is 12 years or older       Passed - Recent or future visit with authorizing provider's specialty    Patient had office visit in the last year or has a visit in the next 30 days with authorizing provider.  See chart review.     09/08/2017         Passed - Normal TSH on file in past 12 months    Recent Labs   Lab Test  08/24/17   0931   TSH  1.94              Prescription approved per [facility] Refill Protocol.  Jeremie Martinez, RN, BSN

## 2017-12-22 ENCOUNTER — INFUSION THERAPY VISIT (OUTPATIENT)
Dept: INFUSION THERAPY | Facility: CLINIC | Age: 67
End: 2017-12-22
Attending: INTERNAL MEDICINE
Payer: MEDICARE

## 2017-12-22 VITALS
DIASTOLIC BLOOD PRESSURE: 78 MMHG | BODY MASS INDEX: 27.18 KG/M2 | HEART RATE: 65 BPM | OXYGEN SATURATION: 95 % | SYSTOLIC BLOOD PRESSURE: 115 MMHG | WEIGHT: 189.4 LBS | RESPIRATION RATE: 18 BRPM | TEMPERATURE: 96.6 F

## 2017-12-22 DIAGNOSIS — E78.5 HYPERLIPIDEMIA LDL GOAL <130: ICD-10-CM

## 2017-12-22 DIAGNOSIS — M05.712 RHEUMATOID ARTHRITIS INVOLVING BOTH SHOULDERS WITH POSITIVE RHEUMATOID FACTOR (H): Primary | ICD-10-CM

## 2017-12-22 DIAGNOSIS — M05.711 RHEUMATOID ARTHRITIS INVOLVING BOTH SHOULDERS WITH POSITIVE RHEUMATOID FACTOR (H): Primary | ICD-10-CM

## 2017-12-22 LAB
ALBUMIN SERPL-MCNC: 3.7 G/DL (ref 3.4–5)
ALP SERPL-CCNC: 90 U/L (ref 40–150)
ALT SERPL W P-5'-P-CCNC: 49 U/L (ref 0–70)
ANION GAP SERPL CALCULATED.3IONS-SCNC: 7 MMOL/L (ref 3–14)
AST SERPL W P-5'-P-CCNC: 25 U/L (ref 0–45)
BASOPHILS # BLD AUTO: 0 10E9/L (ref 0–0.2)
BASOPHILS NFR BLD AUTO: 0.6 %
BILIRUB SERPL-MCNC: 1.5 MG/DL (ref 0.2–1.3)
BUN SERPL-MCNC: 17 MG/DL (ref 7–30)
CALCIUM SERPL-MCNC: 8.9 MG/DL (ref 8.5–10.1)
CHLORIDE SERPL-SCNC: 107 MMOL/L (ref 94–109)
CHOLEST SERPL-MCNC: 134 MG/DL
CO2 SERPL-SCNC: 27 MMOL/L (ref 20–32)
CREAT SERPL-MCNC: 0.85 MG/DL (ref 0.66–1.25)
DIFFERENTIAL METHOD BLD: ABNORMAL
EOSINOPHIL # BLD AUTO: 0.4 10E9/L (ref 0–0.7)
EOSINOPHIL NFR BLD AUTO: 7.7 %
ERYTHROCYTE [DISTWIDTH] IN BLOOD BY AUTOMATED COUNT: 13.1 % (ref 10–15)
ERYTHROCYTE [SEDIMENTATION RATE] IN BLOOD BY WESTERGREN METHOD: 12 MM/H (ref 0–20)
GFR SERPL CREATININE-BSD FRML MDRD: >90 ML/MIN/1.7M2
GLUCOSE SERPL-MCNC: 121 MG/DL (ref 70–99)
HCT VFR BLD AUTO: 45.7 % (ref 40–53)
HDLC SERPL-MCNC: 46 MG/DL
HGB BLD-MCNC: 15.2 G/DL (ref 13.3–17.7)
IMM GRANULOCYTES # BLD: 0 10E9/L (ref 0–0.4)
IMM GRANULOCYTES NFR BLD: 0 %
LDLC SERPL CALC-MCNC: 58 MG/DL
LYMPHOCYTES # BLD AUTO: 1.4 10E9/L (ref 0.8–5.3)
LYMPHOCYTES NFR BLD AUTO: 28.4 %
MCH RBC QN AUTO: 32.9 PG (ref 26.5–33)
MCHC RBC AUTO-ENTMCNC: 33.3 G/DL (ref 31.5–36.5)
MCV RBC AUTO: 99 FL (ref 78–100)
MONOCYTES # BLD AUTO: 0.6 10E9/L (ref 0–1.3)
MONOCYTES NFR BLD AUTO: 12.6 %
NEUTROPHILS # BLD AUTO: 2.5 10E9/L (ref 1.6–8.3)
NEUTROPHILS NFR BLD AUTO: 50.7 %
NONHDLC SERPL-MCNC: 88 MG/DL
PLATELET # BLD AUTO: 139 10E9/L (ref 150–450)
POTASSIUM SERPL-SCNC: 4.2 MMOL/L (ref 3.4–5.3)
PROT SERPL-MCNC: 8.4 G/DL (ref 6.8–8.8)
RBC # BLD AUTO: 4.62 10E12/L (ref 4.4–5.9)
SODIUM SERPL-SCNC: 141 MMOL/L (ref 133–144)
TRIGL SERPL-MCNC: 149 MG/DL
WBC # BLD AUTO: 4.8 10E9/L (ref 4–11)

## 2017-12-22 PROCEDURE — 85652 RBC SED RATE AUTOMATED: CPT | Performed by: INTERNAL MEDICINE

## 2017-12-22 PROCEDURE — 80053 COMPREHEN METABOLIC PANEL: CPT | Performed by: INTERNAL MEDICINE

## 2017-12-22 PROCEDURE — 25000128 H RX IP 250 OP 636: Performed by: INTERNAL MEDICINE

## 2017-12-22 PROCEDURE — 36415 COLL VENOUS BLD VENIPUNCTURE: CPT | Performed by: INTERNAL MEDICINE

## 2017-12-22 PROCEDURE — 85025 COMPLETE CBC W/AUTO DIFF WBC: CPT | Performed by: INTERNAL MEDICINE

## 2017-12-22 PROCEDURE — 96413 CHEMO IV INFUSION 1 HR: CPT

## 2017-12-22 PROCEDURE — 80061 LIPID PANEL: CPT | Performed by: INTERNAL MEDICINE

## 2017-12-22 PROCEDURE — 96415 CHEMO IV INFUSION ADDL HR: CPT

## 2017-12-22 RX ORDER — DIPHENHYDRAMINE HCL 25 MG
25 CAPSULE ORAL ONCE
Status: CANCELLED
Start: 2017-12-22 | End: 2017-12-22

## 2017-12-22 RX ORDER — ACETAMINOPHEN 325 MG/1
650 TABLET ORAL ONCE
Status: DISCONTINUED | OUTPATIENT
Start: 2017-12-22 | End: 2017-12-22 | Stop reason: HOSPADM

## 2017-12-22 RX ORDER — DIPHENHYDRAMINE HCL 25 MG
25 CAPSULE ORAL ONCE
Status: DISCONTINUED | OUTPATIENT
Start: 2017-12-22 | End: 2017-12-22 | Stop reason: HOSPADM

## 2017-12-22 RX ORDER — ACETAMINOPHEN 325 MG/1
650 TABLET ORAL ONCE
Status: CANCELLED
Start: 2017-12-22 | End: 2017-12-22

## 2017-12-22 RX ADMIN — INFLIXIMAB 300 MG: 100 INJECTION, POWDER, LYOPHILIZED, FOR SOLUTION INTRAVENOUS at 08:21

## 2017-12-22 RX ADMIN — SODIUM CHLORIDE 250 ML: 9 INJECTION, SOLUTION INTRAVENOUS at 08:17

## 2017-12-22 ASSESSMENT — PAIN SCALES - GENERAL: PAINLEVEL: NO PAIN (0)

## 2017-12-22 NOTE — NURSING NOTE

## 2017-12-22 NOTE — PATIENT INSTRUCTIONS
Pt to return on 2/16/18 for Remicade. Copies of medication list and upcoming appointments given prior to discharge.

## 2017-12-22 NOTE — MR AVS SNAPSHOT
After Visit Summary   12/22/2017    Camilo Baca    MRN: [id number]           Patient Information     Date Of Birth          1950        Visit Information        Provider Department      12/22/2017 8:00 AM NL INFUSION CHAIR 2 [facility]        Today's Diagnoses     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)    -  1    Hyperlipidemia LDL goal <130          Care Instructions    Pt to return on 2/16/18 for Remicade. Copies of medication list and upcoming appointments given prior to discharge.             Follow-ups after your visit        Your next 10 appointments already scheduled     Dec 22, 2017 12:00 PM CST   LAB with NL LAB PMC   [facility] ([facility])    [address] [place] [id number] [phone]           Please do not eat 10-12 hours before your appointment if you are coming in fasting for labs on lipids, cholesterol, or glucose (sugar). This does not apply to pregnant women. Water, hot tea and black coffee (with nothing added) are okay. Do not drink other fluids, diet soda or chew gum.            Feb 16, 2018  8:00 AM CST   Level 4 with NL INFUSION CHAIR 3   [facility] ([facility])    [address] [place] [address] Dr Dee [place] [id number] [phone]            Apr 16, 2018 10:40 AM CDT   New Visit with Fabricio Gomes MD   [facility] ([facility])    [address] [place] [phone]              Who to contact     If you have questions or need follow up information about today's clinic visit or your schedule please contact [facility] IN[facility]ION [facility] directly at [phone].  Normal or non-critical lab and imaging results will be communicated to you by MyChart, letter or phone within 4 business days after the clinic has received the results. If you do not hear from us within 7  "days, please contact the clinic through Superfly or phone. If you have a critical or abnormal lab result, we will notify you by phone as soon as possible.  Submit refill requests through Superfly or call your pharmacy and they will forward the refill request to us. Please allow 3 business days for your refill to be completed.          Additional Information About Your Visit        [employer] Information     Superfly lets you send messages to your doctor, view your test results, renew your prescriptions, schedule appointments and more. To sign up, go to [email or web address] . Click on \"Log in\" on the left side of the screen, which will take you to the Welcome page. Then click on \"Sign up Now\" on the right side of the page.     You will be asked to enter the access code listed below, as well as some personal information. Please follow the directions to create your username and password.     Your access code is: [email or web address]  Expires: 3/22/2018 10:39 AM     Your access code will  in 90 days. If you need help or a new code, please call your [place] clinic or [phone].        Care EveryWhere ID     This is your Care EveryWhere ID. This could be used by other organizations to access your [place] medical records  [id number]        Your Vitals Were     Pulse Temperature Respirations Pulse Oximetry BMI (Body Mass Index)       65 96.6  F (35.9  C) (Temporal) 18 95% 27.18 kg/m2        Blood Pressure from Last 3 Encounters:   17 115/78   11/15/17 (!) 156/95   10/27/17 132/78    Weight from Last 3 Encounters:   17 85.9 kg (189 lb 6.4 oz)   11/15/17 87.5 kg (193 lb)   10/27/17 87.7 kg (193 lb 4.8 oz)              We Performed the Following     CBC with platelets differential     Comprehensive metabolic panel     Erythrocyte sedimentation rate auto     Lipid panel reflex to direct LDL Fasting        Primary Care Provider Office Phone # Fax #    DIANN Presley [phone]       [address] [facility] [address]        Equal Access to Services     BECCA BAEZ : Hadii umair byrne jayjanet Somonicaali, waaxda luqadaha, qaybta janeendejakarley lee, matt blumreesemarquis high. So [facility] [phone].    ATENCIÓN: Si habla español, tiene a bernal disposición servicios gratuitos de asistencia lingüística. Llame al [phone].    We comply with applicable federal civil rights laws and [place] laws. We do not discriminate on the basis of race, color, national origin, age, disability, sex, sexual orientation, or gender identity.            Thank you!     Thank you for choosing [facility]  for your care. Our goal is always to provide you with excellent care. Hearing back from our patients is one way we can continue to improve our services. Please take a few minutes to complete the written survey that you may receive in the mail after your visit with us. Thank you!             Your Updated Medication List - Protect others around you: Learn how to safely use, store and throw away your medicines at www.disposemymeds.org.          This list is accurate as of: 12/22/17 10:39 AM.  Always use your most recent med list.                   Brand Name Dispense Instructions for use Diagnosis    allopurinol 300 MG tablet    ZYLOPRIM    90 tablet    TAKE ONE TABLET BY MOUTH EVERY DAY    Acute gouty arthropathy       atorvastatin 80 MG tablet    LIPITOR    90 tablet    TAKE ONE TABLET BY MOUTH EVERY DAY    Hyperlipidemia LDL goal <130       BABY ASPIRIN PO      daily        capsaicin 0.075 % cream    ZOSTRIX    50 g    Apply  topically 3 times daily.    Bursitis of shoulder       cetirizine 10 MG tablet    zyrTEC    90 tablet    Take 1 tablet (10 mg) by mouth every evening    Seasonal allergic rhinitis, unspecified allergic rhinitis trigger       cholestyramine light 4 GM Packet    QUESTRAN    60 packet    DISSOLVE ONE PACKET IN LIQUID AS DIRECTED TWO TIMES A DAY AND DRINK    Hyperlipidemia  LDL goal <130       diclofenac 1 % Gel topical gel    VOLTAREN    100 g    Apply  2 grams to shoulders three times daily using enclosed dosing card.    Rotator cuff tendinitis       levothyroxine 25 MCG tablet    SYNTHROID/LEVOTHROID    90 tablet    TAKE ONE TABLET BY MOUTH EVERY DAY    Hypothyroidism due to acquired atrophy of thyroid       losartan 25 MG tablet    COZAAR    30 tablet    TAKE ONE TABLET BY MOUTH EVERY DAY    Benign essential hypertension       omeprazole 20 MG CR capsule    priLOSEC    90 capsule    TAKE ONE CAPSULE BY MOUTH ONCE DAILY -TAKE 30-60 MINUTES BEFORE A MEAL    Gastroesophageal reflux disease without esophagitis       triamcinolone 0.1 % cream    KENALOG    60 g    Locally bid    Rash       VITAMIN C PO      Take by mouth daily

## 2017-12-22 NOTE — LETTER
[facility] INFUSION SERVICES  [address] [place] [address]  [phone]          January 3, 2018    Camilo Baca                                                                                                                     [address]  [facility] [address]        Dear Dr. Genesis Page has reviewed the results of your labs of 12/22/17, and has made the following observations:      Labs are normal    Please don't hesitate to contact our office with any additional questions or concerns.             Sincerely,     VIRGINIA Greenberg  For Sb Hurtado MD

## 2017-12-22 NOTE — LETTER
December 22, 2017      Camilo Baca  [address]  [place] [address]        Dear ,    We are writing to inform you of your test results.    Your test results fall within the expected range(s) or remain unchanged from previous results.  Please continue with current treatment plan.  Excellent Cholesterol.     Resulted Orders   Lipid panel reflex to direct LDL Fasting   Result Value Ref Range    Cholesterol 134 <200 mg/dL    Triglycerides 149 <150 mg/dL      Comment:      Fasting specimen    HDL Cholesterol 46 >39 mg/dL    LDL Cholesterol Calculated 58 <100 mg/dL      Comment:      Desirable:       <100 mg/dl    Non HDL Cholesterol 88 <130 mg/dL       If you have any questions or concerns, please call the clinic at the number listed above.       Sincerely,        Ru Luna PA-C

## 2017-12-22 NOTE — PROGRESS NOTES
Patient here for Remicade infusion. Premeds taken prior to appt. Tolerated infusion well. VSS. Discharged in stable condition.

## 2017-12-30 DIAGNOSIS — I10 BENIGN ESSENTIAL HYPERTENSION: ICD-10-CM

## 2018-01-02 RX ORDER — LOSARTAN POTASSIUM 25 MG/1
TABLET ORAL
Qty: 30 TABLET | Refills: 1 | Status: SHIPPED | OUTPATIENT
Start: 2018-01-02 | End: 2018-08-03

## 2018-01-02 NOTE — TELEPHONE ENCOUNTER
Requested Prescriptions   Pending Prescriptions Disp Refills     losartan (COZAAR) 25 MG tablet [Pharmacy Med Name: LOSARTAN POTASSIUM 25MG TABS] 30 tablet 1     Sig: TAKE ONE TABLET BY MOUTH EVERY DAY    Angiotensin-II Receptors Passed    12/30/2017  8:28 AM       Passed - Blood pressure under 140/90 in past 12 months.    BP Readings from Last 3 Encounters:   12/22/17 115/78   11/15/17 (!) 156/95   10/27/17 132/78                Passed - Recent or future visit with authorizing provider's specialty    Patient had office visit in the last year or has a visit in the next 30 days with authorizing provider.  See chart review.              Passed - Patient is age 18 or older       Passed - Normal serum creatinine on file in past 12 months    Recent Labs   Lab Test  12/22/17   0735   CR  0.85            Passed - Normal serum potassium on file in past 12 months    Recent Labs   Lab Test  12/22/17   0735   POTASSIUM  4.2

## 2018-01-02 NOTE — TELEPHONE ENCOUNTER
Prescription approved per [facility] Refill Protocol.    Bettye Hernandez RN  [facility]

## 2018-02-02 DIAGNOSIS — I10 BENIGN ESSENTIAL HYPERTENSION: ICD-10-CM

## 2018-02-02 RX ORDER — LOSARTAN POTASSIUM 25 MG/1
TABLET ORAL
Qty: 30 TABLET | Refills: 1 | OUTPATIENT
Start: 2018-02-02

## 2018-02-02 NOTE — TELEPHONE ENCOUNTER
"Last Written Prescription Date:  1/02/2018  Last Fill Quantity: 30,  # refills: 1   Last Office Visit with [facility] provider:  9/08/2017   Future Office Visit:     Requested Prescriptions   Pending Prescriptions Disp Refills     losartan (COZAAR) 25 MG tablet [Pharmacy Med Name: LOSARTAN POTASSIUM 25MG TABS] 30 tablet 1     Sig: TAKE ONE TABLET BY MOUTH EVERY DAY    Angiotensin-II Receptors Passed    2/2/2018 10:53 AM       Passed - Blood pressure under 140/90 in past 12 months.    BP Readings from Last 3 Encounters:   12/22/17 115/78   11/15/17 (!) 156/95   10/27/17 132/78                Passed - Recent or future visit with authorizing provider's specialty    Patient had office visit in the last year or has a visit in the next 30 days with authorizing provider.  See \"Patient Info\" tab in inbasket, or \"Choose Columns\" in Meds & Orders section of the refill encounter.            Passed - Patient is age 18 or older       Passed - Normal serum creatinine on file in past 12 months    Recent Labs   Lab Test  12/22/17   0735   CR  0.85            Passed - Normal serum potassium on file in past 12 months    Recent Labs   Lab Test  12/22/17   0735   POTASSIUM  4.2                      "

## 2018-02-02 NOTE — TELEPHONE ENCOUNTER
Prescription was sent 1/2/18 for #30 with 1 refill.  Pharmacy notified via E-Prescribe refusal.     Bettye Hernandez RN  [facility]

## 2018-02-13 ENCOUNTER — TELEPHONE (OUTPATIENT)
Dept: RHEUMATOLOGY | Facility: CLINIC | Age: 68
End: 2018-02-13

## 2018-02-13 DIAGNOSIS — M05.712 RHEUMATOID ARTHRITIS INVOLVING BOTH SHOULDERS WITH POSITIVE RHEUMATOID FACTOR (H): ICD-10-CM

## 2018-02-13 DIAGNOSIS — M05.711 RHEUMATOID ARTHRITIS INVOLVING BOTH SHOULDERS WITH POSITIVE RHEUMATOID FACTOR (H): ICD-10-CM

## 2018-02-13 NOTE — TELEPHONE ENCOUNTER
Reason for call:  Other   Patient called regarding (reason for call): call back  Additional comments: Regarding infusion, pain and moving it from 8 weeks to sooner. Patient is in pain and would like a call back.    Phone number to reach patient:  Home number on file [phone] (home)    Best Time:  ASAP    Can we leave a detailed message on this number?  YES

## 2018-02-13 NOTE — TELEPHONE ENCOUNTER
Rheumatology team: Please call to notify Mr. Baca that I have signed the infusion orders so that he may continue getting Remicade.  Will need to evaluate in clinic before making any medication adjustments.     Fabricio Gomes MD  2/13/2018 5:05 PM

## 2018-02-13 NOTE — TELEPHONE ENCOUNTER
Mr. Baca was previously followed by Dr. Hurtado.      Last clinic evaluation by Dr. Hurtado was on 11/15/2017 where he documents a diagnosis of Rheumatoid arthritis that is being treated with Remicade.      Last infusion on 12/20/2017.      Next infusion on 02/16/2018.      Next rheumatology clinic visit: 04/16/2018      RF/CCP  Recent Labs   Lab Test  04/24/13   0923   CCPABY  >250 Strongly Positive*   RHF  743*     CBC  Recent Labs   Lab Test  12/22/17   0735  08/24/17   0931  06/02/16   0821   WBC  4.8  3.7*  4.0   RBC  4.62  4.18*  4.38*   HGB  15.2  14.0  14.7   HCT  45.7  41.6  43.0   MCV  99  100  98   RDW  13.1  13.0  12.7   PLT  139*  111*  102*   MCH  32.9  33.5*  33.6*   MCHC  33.3  33.7  34.2   NEUTROPHIL  50.7  47.9  45.6   LYMPH  28.4  30.7  33.8   MONOCYTE  12.6  16.2  13.4   EOSINOPHIL  7.7  4.7  6.7   BASOPHIL  0.6  0.5  0.5   ANEU  2.5  1.8  1.8   ALYM  1.4  1.1  1.4   KIESHA  0.6  0.6  0.5   AEOS  0.4  0.2  0.3   ABAS  0.0  0.0  0.0     CMP  Recent Labs   Lab Test  12/22/17   0735  08/24/17   0931  12/01/16   0822   NA  141  143  144   POTASSIUM  4.2  4.4  4.1   CHLORIDE  107  109  108   CO2  27  27  27   ANIONGAP  7  7  9   GLC  121*  95  105*   BUN  17  15  17   CR  0.85  0.85  0.84   GFRESTIMATED  >90  >90  >90  Non African American GFR Calc     GFRESTBLACK  >90  >90  >90  African American GFR Calc     ANDREZ  8.9  9.2  8.7   BILITOTAL  1.5*  1.2  1.2   ALBUMIN  3.7  3.5  3.6   PROTTOTAL  8.4  7.5  7.3   ALKPHOS  90  74  68   AST  25  24  35   ALT  49  40  58     Hepatitis B  Recent Labs   Lab Test  02/05/14   1104   HEPBANG  Negative     Hepatitis C  Recent Labs   Lab Test  02/05/14   1104  06/03/13   0837   HCVAB  Negative  Negative     Tuberculosis Screening  Recent Labs   Lab Test  02/05/14   1104   TBRSLT  Negative   TBAGN  0.01       Jaquelin Muñiz MA  2/13/2018 2:03 PM

## 2018-02-14 ENCOUNTER — TELEPHONE (OUTPATIENT)
Dept: RHEUMATOLOGY | Facility: CLINIC | Age: 68
End: 2018-02-14

## 2018-02-14 NOTE — TELEPHONE ENCOUNTER
Called and spoke with Pt's wife Jacki, she states about at the 6 week harper Camilo begins to suffer with inflammation.  This week Jacki has had to help Camilo put his shirt on, he is in a lot of pain.  Jacki was stating that Camilo's next visit pushes the infusion out to about 9 weeks and he will need an infusion before that.  Please advise on what should take place.  Otherwise Pt was told that his orders are signed for his Remicade Infusion.      Nusrat Garner, CMA

## 2018-02-16 ENCOUNTER — INFUSION THERAPY VISIT (OUTPATIENT)
Dept: INFUSION THERAPY | Facility: CLINIC | Age: 68
End: 2018-02-16
Attending: INTERNAL MEDICINE
Payer: MEDICARE

## 2018-02-16 VITALS
HEART RATE: 64 BPM | SYSTOLIC BLOOD PRESSURE: 131 MMHG | DIASTOLIC BLOOD PRESSURE: 83 MMHG | BODY MASS INDEX: 27.64 KG/M2 | RESPIRATION RATE: 18 BRPM | TEMPERATURE: 97.8 F | WEIGHT: 192.6 LBS | OXYGEN SATURATION: 94 %

## 2018-02-16 DIAGNOSIS — M05.712 RHEUMATOID ARTHRITIS INVOLVING BOTH SHOULDERS WITH POSITIVE RHEUMATOID FACTOR (H): Primary | ICD-10-CM

## 2018-02-16 DIAGNOSIS — M05.711 RHEUMATOID ARTHRITIS INVOLVING BOTH SHOULDERS WITH POSITIVE RHEUMATOID FACTOR (H): Primary | ICD-10-CM

## 2018-02-16 PROCEDURE — 25000128 H RX IP 250 OP 636: Performed by: INTERNAL MEDICINE

## 2018-02-16 PROCEDURE — 96415 CHEMO IV INFUSION ADDL HR: CPT

## 2018-02-16 PROCEDURE — 96413 CHEMO IV INFUSION 1 HR: CPT

## 2018-02-16 RX ORDER — ACETAMINOPHEN 325 MG/1
650 TABLET ORAL ONCE
Status: CANCELLED
Start: 2018-02-16 | End: 2018-02-16

## 2018-02-16 RX ORDER — DIPHENHYDRAMINE HCL 25 MG
25 CAPSULE ORAL ONCE
Status: DISCONTINUED | OUTPATIENT
Start: 2018-02-16 | End: 2018-02-16 | Stop reason: HOSPADM

## 2018-02-16 RX ORDER — ACETAMINOPHEN 325 MG/1
650 TABLET ORAL ONCE
Status: DISCONTINUED | OUTPATIENT
Start: 2018-02-16 | End: 2018-02-16 | Stop reason: HOSPADM

## 2018-02-16 RX ORDER — DIPHENHYDRAMINE HCL 25 MG
25 CAPSULE ORAL ONCE
Status: CANCELLED
Start: 2018-02-16 | End: 2018-02-16

## 2018-02-16 RX ADMIN — INFLIXIMAB 300 MG: 100 INJECTION, POWDER, LYOPHILIZED, FOR SOLUTION INTRAVENOUS at 08:44

## 2018-02-16 RX ADMIN — SODIUM CHLORIDE 250 ML: 9 INJECTION, SOLUTION INTRAVENOUS at 08:12

## 2018-02-16 ASSESSMENT — PAIN SCALES - GENERAL: PAINLEVEL: SEVERE PAIN (6)

## 2018-02-16 NOTE — TELEPHONE ENCOUNTER
Called patient back, he was in the middle of his infusion so I spoke with his wife. As of now Dr. Gomes has no sooner appointments, patient has an appointment for 4/16/2018. Wife stated he really needs his infusion done every 6 weeks verses every 8 weeks. I explained that Dr. Gomes said he would need to see him in clinic before making any medication adjustments. Will add patient to my wait list. Patient is willing to go to any clinic to see Dr. Gomes. Will follow up with Dr. Gomes on this message.  Jaquelin Muñiz CMA  2/16/2018 9:56 AM

## 2018-02-16 NOTE — NURSING NOTE

## 2018-02-16 NOTE — TELEPHONE ENCOUNTER
RN from infusion center calling, states that Camilo is in a lot of pain, that the Remicade is not lasting until the next infusions. They are wondering if it is possible to get Camilo in to be seen prior to his current appointment on 4/16. Please call patient to set up if this is possible.

## 2018-02-16 NOTE — MR AVS SNAPSHOT
After Visit Summary   2/16/2018    Camilo Baca    MRN: [id number]           Patient Information     Date Of Birth          1950        Visit Information        Provider Department      2/16/2018 8:00 AM NL INFUSION CHAIR 3 [facility]        Today's Diagnoses     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)    -  1      Care Instructions    Pt to return on 4/13/18 for remicade. Copies of medication list and upcoming appointments given prior to discharge.           Follow-ups after your visit        Your next 10 appointments already scheduled     Apr 13, 2018  8:00 AM CDT   Level 4 with NL INFUSION CHAIR 4   [facility] ([facility])    [address] [place] [id number] [phone]            Apr 16, 2018 10:40 AM CDT   New Visit with Fabricio Gomes MD   [facility] ([facility])    [address] [address]  [place] [phone]              Who to contact     If you have questions or need follow up information about today's clinic visit or your schedule please contact [facility] directly at [phone].  Normal or non-critical lab and imaging results will be communicated to you by MyChart, letter or phone within 4 business days after the clinic has received the results. If you do not hear from us within 7 days, please contact the clinic through [employer] or phone. If you have a critical or abnormal lab result, we will notify you by phone as soon as possible.  Submit refill requests through [employer]ort or call your pharmacy and they will forward the refill request to us. Please allow 3 business days for your refill to be completed.          Additional Information About Your Visit        [employer] Information     [employer] lets you send messages to your doctor, view your test results, renew your prescriptions, schedule  "appointments and more. To sign up, go to [email or web address] . Click on \"Log in\" on the left side of the screen, which will take you to the Welcome page. Then click on \"Sign up Now\" on the right side of the page.     You will be asked to enter the access code listed below, as well as some personal information. Please follow the directions to create your username and password.     Your access code is: [email or web address]  Expires: 3/22/2018 10:39 AM     Your access code will  in 90 days. If you need help or a new code, please call your [place] clinic or [phone].        Care EveryWhere ID     This is your Care EveryWhere ID. This could be used by other organizations to access your [place] medical records  [id number]        Your Vitals Were     Pulse Temperature Respirations Pulse Oximetry BMI (Body Mass Index)       64 97.8  F (36.6  C) (Temporal) 18 94% 27.64 kg/m2        Blood Pressure from Last 3 Encounters:   18 131/83   17 115/78   11/15/17 (!) 156/95    Weight from Last 3 Encounters:   18 87.4 kg (192 lb 9.6 oz)   17 85.9 kg (189 lb 6.4 oz)   11/15/17 87.5 kg (193 lb)              Today, you had the following     No orders found for display       Primary Care Provider Office Phone # Fax #    DIANN Presley [phone]       [address] [facility] [address]        Equal Access to Services     [facility] : Hadii umair byrne hadasho Soomaali, waaxda luqadaha, qaybta kaalmada adeegyakarley, matt high. So [facility] [phone].    ATENCIÓN: Si habla español, tiene a bernal disposición servicios gratuitos de asistencia lingüística. Llame al [phone].    We comply with applicable federal civil rights laws and [place] laws. We do not discriminate on the basis of race, color, national origin, age, disability, sex, sexual orientation, or gender identity.            Thank you!     Thank you for choosing [facility] "  for your care. Our goal is always to provide you with excellent care. Hearing back from our patients is one way we can continue to improve our services. Please take a few minutes to complete the written survey that you may receive in the mail after your visit with us. Thank you!             Your Updated Medication List - Protect others around you: Learn how to safely use, store and throw away your medicines at www.disposemymeds.org.          This list is accurate as of 2/16/18 11:21 AM.  Always use your most recent med list.                   Brand Name Dispense Instructions for use Diagnosis    allopurinol 300 MG tablet    ZYLOPRIM    90 tablet    TAKE ONE TABLET BY MOUTH EVERY DAY    Acute gouty arthropathy       atorvastatin 80 MG tablet    LIPITOR    90 tablet    TAKE ONE TABLET BY MOUTH EVERY DAY    Hyperlipidemia LDL goal <130       BABY ASPIRIN PO      daily        capsaicin 0.075 % cream    ZOSTRIX    50 g    Apply  topically 3 times daily.    Bursitis of shoulder       cetirizine 10 MG tablet    zyrTEC    90 tablet    Take 1 tablet (10 mg) by mouth every evening    Seasonal allergic rhinitis, unspecified allergic rhinitis trigger       cholestyramine light 4 GM Packet    QUESTRAN    60 packet    DISSOLVE ONE PACKET IN LIQUID AS DIRECTED TWO TIMES A DAY AND DRINK    Hyperlipidemia LDL goal <130       diclofenac 1 % Gel topical gel    VOLTAREN    100 g    Apply  2 grams to shoulders three times daily using enclosed dosing card.    Rotator cuff tendinitis       levothyroxine 25 MCG tablet    SYNTHROID/LEVOTHROID    90 tablet    TAKE ONE TABLET BY MOUTH EVERY DAY    Hypothyroidism due to acquired atrophy of thyroid       losartan 25 MG tablet    COZAAR    30 tablet    TAKE ONE TABLET BY MOUTH EVERY DAY    Benign essential hypertension       omeprazole 20 MG CR capsule    priLOSEC    90 capsule    TAKE ONE CAPSULE BY MOUTH ONCE DAILY -TAKE 30-60 MINUTES BEFORE A MEAL    Gastroesophageal reflux disease without  esophagitis       triamcinolone 0.1 % cream    KENALOG    60 g    Locally bid    Rash       VITAMIN C PO      Take by mouth daily

## 2018-02-16 NOTE — PATIENT INSTRUCTIONS
Pt to return on 4/13/18 for remicade. Copies of medication list and upcoming appointments given prior to discharge.

## 2018-02-16 NOTE — NURSING NOTE
Patient here for Remicade infusion. States he started having trouble with pain in toes then just kept going up body to knees, hips, back then shoulders. Having difficulty with sleeping and daily activities. Taking tylenol arthritis routinely for pain.Patient will start to use aleve 2 x/day for pain relief instead of tylenol. Call placed to DR. Gomes's office to see if patient can be seen earlier due to pain. Patient updated.

## 2018-02-16 NOTE — PROGRESS NOTES
Patient took own tylenol and benadryl prior to Remicade infusion. Tolerated well. VSS. Observed for 15 minutes after. Discharged to home. Ambulated out.

## 2018-02-21 DIAGNOSIS — M10.9 GOUT: ICD-10-CM

## 2018-02-21 DIAGNOSIS — M05.712 RHEUMATOID ARTHRITIS INVOLVING BOTH SHOULDERS WITH POSITIVE RHEUMATOID FACTOR (H): ICD-10-CM

## 2018-02-21 DIAGNOSIS — M05.711 RHEUMATOID ARTHRITIS INVOLVING BOTH SHOULDERS WITH POSITIVE RHEUMATOID FACTOR (H): ICD-10-CM

## 2018-02-21 LAB — URATE SERPL-MCNC: 3.6 MG/DL (ref 3.5–7.2)

## 2018-02-21 PROCEDURE — 84550 ASSAY OF BLOOD/URIC ACID: CPT | Performed by: INTERNAL MEDICINE

## 2018-02-21 PROCEDURE — 36415 COLL VENOUS BLD VENIPUNCTURE: CPT | Performed by: INTERNAL MEDICINE

## 2018-02-21 NOTE — PROGRESS NOTES
Letter sent to patient informing of NL uric acid.  ................Mitchel Jean LPN,   February 21, 2018,      3:20 PM,   [facility] [place] [facility]

## 2018-02-21 NOTE — LETTER
February 21, 2018      Camilo Baca  [address]  [place] [address]        Dear ,    We are writing to inform you of your test results.    Your test results fall within the expected range(s).    Resulted Orders   Uric acid   Result Value Ref Range    Uric Acid 3.6 3.5 - 7.2 mg/dL       If you have any questions or concerns, please call the clinic at the number listed above.       Sincerely,        [facility]

## 2018-02-27 ENCOUNTER — TELEPHONE (OUTPATIENT)
Dept: RHEUMATOLOGY | Facility: CLINIC | Age: 68
End: 2018-02-27

## 2018-02-27 DIAGNOSIS — M05.712 RHEUMATOID ARTHRITIS INVOLVING BOTH SHOULDERS WITH POSITIVE RHEUMATOID FACTOR (H): Primary | ICD-10-CM

## 2018-02-27 DIAGNOSIS — M05.711 RHEUMATOID ARTHRITIS INVOLVING BOTH SHOULDERS WITH POSITIVE RHEUMATOID FACTOR (H): Primary | ICD-10-CM

## 2018-02-27 NOTE — TELEPHONE ENCOUNTER
Reason for Call:  Other Patient is scheduled with Dr Gomes on 4/16/18.  Had been seeing Dr Hutrado.     Detailed comments: Is having severe flare.  Up at nights and not able to sleep.  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  Pain and swelling in hands and shoulders is making it very difficult to function.  Is asking if Dr Gomes can review his chart and order anything that may help him get through to his next infusion, which is 4/13/18.  He states the Remicaide is not lasting between treatments.     Phone Number Patient can be reached at: Home number on file [phone] (home) ok to talk to wife Jacki also.      Best Time: any     Can we leave a detailed message on this number? YES    Call taken on 2/27/2018 at 8:51 AM by Keke Castañeda

## 2018-02-28 RX ORDER — PREDNISONE 5 MG/1
TABLET ORAL
Qty: 50 TABLET | Refills: 0 | Status: SHIPPED | OUTPATIENT
Start: 2018-02-28 | End: 2018-06-26

## 2018-02-28 NOTE — TELEPHONE ENCOUNTER
Spoke with patient, he is going to cancel his infusion and wait to see what Dr. Gomes wants to do when he see's him in clinic.  Jaquelin Muñiz, MONICA  2/28/2018 2:00 PM

## 2018-02-28 NOTE — TELEPHONE ENCOUNTER
Rheumatology team: Please call to notify Mr. Baca that I reviewed his chart.  With a rheumatoid arthritis history and his current symptoms I also suspect that this is a rheumatoid arthritis flare.  I have sent a prescription for a prednisone taper to the [facility] in [place]    Fabricio Gomes MD  2/28/2018 6:49 AM

## 2018-02-28 NOTE — TELEPHONE ENCOUNTER
Spoke with patient, he was very thankful for the prednisone. Patient [place] and has [place] and has a few that will be giving birth soon and he needs to be able to move around. Patient did say the remicade infusions have only been lasting about 5 1/2 weeks then he flares really bad. He does have an infusion on 4/13/18 and see's Dr. Gomes in clinic on 4/16/18 and is wondering if he should still go to the infusion or cancel it and see Dr. Gomes first.    Will route to Dr. Gomes to see what he would like patient to do. Patient wanted me to thank Dr. Gomes for the prednisone, he really appreciates it.    Jaquelin Muñiz, MONICA  2/28/2018 8:49 AM

## 2018-03-02 DIAGNOSIS — E78.5 HYPERLIPIDEMIA LDL GOAL <130: ICD-10-CM

## 2018-03-02 RX ORDER — CHOLESTYRAMINE 4 G/5.5G
POWDER, FOR SUSPENSION ORAL
Qty: 60 PACKET | Refills: 11 | Status: SHIPPED | OUTPATIENT
Start: 2018-03-02 | End: 2018-08-03

## 2018-03-02 NOTE — TELEPHONE ENCOUNTER
Prevalite 4 G packet       Last Written Prescription Date:  3/6/17  Last Fill Quantity: 60,   # refills: 5  Last Office Visit: 8/24/17  Future Office visit:       Routing refill request to provider for review/approval because:  Drug not on the [facility], [facility] or [facility] refill protocol or controlled substance

## 2018-03-11 DIAGNOSIS — E78.5 HYPERLIPIDEMIA LDL GOAL <130: ICD-10-CM

## 2018-03-12 RX ORDER — ATORVASTATIN CALCIUM 80 MG/1
80 TABLET, FILM COATED ORAL DAILY
Qty: 90 TABLET | Refills: 2 | Status: SHIPPED | OUTPATIENT
Start: 2018-03-12 | End: 2019-10-23

## 2018-03-12 NOTE — TELEPHONE ENCOUNTER
atorvastatin (LIPITOR) 80 MG tablet  Recent Labs   Lab Test  12/22/17   0735  12/01/16   0822   12/29/15   0756   10/04/13   0819  06/03/13   0837   CHOL  134   --    --   173   --   143  208*   HDL  46   --    --   56   --   49  77   LDL  58  98   < >  75   < >  75  117   TRIG  149   --    --   211*   --   92  72   CHOLHDLRATIO   --    --    --    --    --   3.0  3.0    < > = values in this interval not displayed.     Prescription approved per [facility] Refill Protocol.  Kimberly Grimes, RN, BSN

## 2018-03-13 ENCOUNTER — TELEPHONE (OUTPATIENT)
Dept: RHEUMATOLOGY | Facility: CLINIC | Age: 68
End: 2018-03-13

## 2018-03-13 DIAGNOSIS — M05.712 RHEUMATOID ARTHRITIS INVOLVING BOTH SHOULDERS WITH POSITIVE RHEUMATOID FACTOR (H): Primary | ICD-10-CM

## 2018-03-13 DIAGNOSIS — M05.711 RHEUMATOID ARTHRITIS INVOLVING BOTH SHOULDERS WITH POSITIVE RHEUMATOID FACTOR (H): Primary | ICD-10-CM

## 2018-03-13 NOTE — TELEPHONE ENCOUNTER
Reason for Call:  Other prescription    Detailed comments: Patient states medication does not seem to be working and is wondering if there might be another suggestion; please return call.    Phone Number Patient can be reached at: Home number on file [phone] (home)    Best Time: Anytime    Can we leave a detailed message on this number? YES    Call taken on 3/13/2018 at 3:29 PM by Regina Varela

## 2018-03-15 NOTE — TELEPHONE ENCOUNTER
Rheumatology team: Please call Mr. Baca to see if he can come to clinic at 9:40AM on 3/20/2018.  [place] location.    Fabricio Gomes MD  3/15/2018 6:09 AM

## 2018-03-16 RX ORDER — PREDNISONE 20 MG/1
TABLET ORAL
Qty: 20 TABLET | Refills: 0 | Status: SHIPPED | OUTPATIENT
Start: 2018-03-16 | End: 2018-03-20

## 2018-03-16 NOTE — TELEPHONE ENCOUNTER
RN called and spoke to patient. Patient reports that he has been experiencing RA symptoms that are worsening for a few weeks now. Patient reports that he is currently taking 5 MG of Prednisone daily but this is not helping. Patient was prescribed a taper dose in Feb. RA provider is out of clinic today. RN advised patient be in contact with PCP. Patient will call clinic back if needed.    CECILE Reilly

## 2018-03-16 NOTE — TELEPHONE ENCOUNTER
Rx for Prednisone taper sent to pharmacy.  Please advise patient.     Electronically signed by Lyndsey Aponte CNP.

## 2018-03-16 NOTE — TELEPHONE ENCOUNTER
Reason for Call:  Medication or medication refill:    Do you use a [facility]?  Name of the pharmacy and phone number for the current request:  [facility] - [phone]    Name of the medication requested:  Prednisone     Other request: Camilo is requesting a increase in his medication and also enough to get him to his appointment with Dr. Gomes for his arthritis. States he is in a lot of pain and is grateful they can get him in next week 3/20. Please inform Camilo if you would be willing to do this for him.     Can we leave a detailed message on this number? YES    Phone number patient can be reached at: Home number on file [phone] (home)    Best Time:      Call taken on 3/16/2018 at 11:24 AM by Luz Muñiz

## 2018-03-16 NOTE — TELEPHONE ENCOUNTER
Patient notified of message regarding RX.  No further questions.      Thank You,  Anna Gonzalez  Patient Representative, [facility]

## 2018-03-16 NOTE — TELEPHONE ENCOUNTER
Reason for Call:  Other call back    Detailed comments: Spoke with patient this morning he is still have a great deal of pain I have scheduled patient for 03/20/2018 at 9:40 in [place] he would like a call back regarding his medication and what he can do until that appointment for pain. Please call and advise Thank you     Phone Number Patient can be reached at: Home number on file [phone] (home)    Best Time: any    Can we leave a detailed message on this number? YES    Call taken on 3/16/2018 at 8:18 AM by Charo Maxwell

## 2018-03-16 NOTE — TELEPHONE ENCOUNTER
I have attempted to contact this patient by phone with the following results: left message to return my call on answering machine.  Tara Valentine MA     3/16/2018

## 2018-03-17 DIAGNOSIS — M10.9 ACUTE GOUTY ARTHROPATHY: ICD-10-CM

## 2018-03-19 NOTE — TELEPHONE ENCOUNTER
"Requested Prescriptions   Pending Prescriptions Disp Refills     allopurinol (ZYLOPRIM) 300 MG tablet [Pharmacy Med Name: ALLOPURINOL 300MG TABS] 30 tablet 0     Sig: TAKE ONE TABLET BY MOUTH ONCE DAILY    Gout Agents Protocol Failed    3/17/2018  9:43 AM       Failed - Uric acid greater than or equal to 6 on file in past 12 months    Recent Labs   Lab Test  02/21/18   0909   URIC  3.6     If level is 6mg/dL or greater, ok to refill one time and refer to provider.          Passed - CBC on file in past 12 months    Recent Labs   Lab Test  12/22/17   0735   WBC  4.8   RBC  4.62   HGB  15.2   HCT  45.7   PLT  139*            Passed - ALT on file in past 12 months    Recent Labs   Lab Test  12/22/17   0735   ALT  49            Passed - Recent (12 mo) or future (30 days) visit within the authorizing provider's specialty    Patient had office visit in the last 12 months or has a visit in the next 30 days with authorizing provider or within the authorizing provider's specialty.  See \"Patient Info\" tab in inbasket, or \"Choose Columns\" in Meds & Orders section of the refill encounter.           Passed - Patient is age 18 or older       Passed - Normal serum creatinine on file in the past 12 months    Recent Labs   Lab Test  12/22/17   0735   CR  0.85             allopurinol (ZYLOPRIM) 300 MG tablet  Last Written Prescription Date:  2/20/2018  Last Fill Quantity: 30,  # refills: 0   Last office visit: 9/8/2017 with prescribing provider:     Future Office Visit:   Next 5 appointments (look out 90 days)     Mar 20, 2018  9:40 AM CDT   Return Visit with Fabricio Gomes MD   [facility]    [address] [place] [id number] [phone]                 Luz Alamo CMA        "

## 2018-03-20 ENCOUNTER — OFFICE VISIT (OUTPATIENT)
Dept: RHEUMATOLOGY | Facility: CLINIC | Age: 68
End: 2018-03-20
Payer: COMMERCIAL

## 2018-03-20 VITALS
HEIGHT: 70 IN | WEIGHT: 187.6 LBS | SYSTOLIC BLOOD PRESSURE: 161 MMHG | HEART RATE: 63 BPM | DIASTOLIC BLOOD PRESSURE: 95 MMHG | OXYGEN SATURATION: 96 % | BODY MASS INDEX: 26.86 KG/M2

## 2018-03-20 DIAGNOSIS — M05.712 RHEUMATOID ARTHRITIS INVOLVING BOTH SHOULDERS WITH POSITIVE RHEUMATOID FACTOR (H): Primary | ICD-10-CM

## 2018-03-20 DIAGNOSIS — M05.711 RHEUMATOID ARTHRITIS INVOLVING BOTH SHOULDERS WITH POSITIVE RHEUMATOID FACTOR (H): Primary | ICD-10-CM

## 2018-03-20 DIAGNOSIS — Z79.899 HIGH RISK MEDICATIONS (NOT ANTICOAGULANTS) LONG-TERM USE: ICD-10-CM

## 2018-03-20 LAB
ALBUMIN SERPL-MCNC: 3.1 G/DL (ref 3.4–5)
ALP SERPL-CCNC: 68 U/L (ref 40–150)
ALT SERPL W P-5'-P-CCNC: 46 U/L (ref 0–70)
AST SERPL W P-5'-P-CCNC: 22 U/L (ref 0–45)
BASOPHILS # BLD AUTO: 0 10E9/L (ref 0–0.2)
BASOPHILS NFR BLD AUTO: 0.1 %
BILIRUB DIRECT SERPL-MCNC: 0.2 MG/DL (ref 0–0.2)
BILIRUB SERPL-MCNC: 0.9 MG/DL (ref 0.2–1.3)
CREAT SERPL-MCNC: 0.82 MG/DL (ref 0.66–1.25)
DIFFERENTIAL METHOD BLD: ABNORMAL
EOSINOPHIL # BLD AUTO: 0 10E9/L (ref 0–0.7)
EOSINOPHIL NFR BLD AUTO: 0.2 %
ERYTHROCYTE [DISTWIDTH] IN BLOOD BY AUTOMATED COUNT: 13.4 % (ref 10–15)
GFR SERPL CREATININE-BSD FRML MDRD: >90 ML/MIN/1.7M2
HBV CORE AB SERPL QL IA: NONREACTIVE
HBV SURFACE AG SERPL QL IA: NONREACTIVE
HCT VFR BLD AUTO: 43.3 % (ref 40–53)
HGB BLD-MCNC: 14.5 G/DL (ref 13.3–17.7)
LYMPHOCYTES # BLD AUTO: 2.7 10E9/L (ref 0.8–5.3)
LYMPHOCYTES NFR BLD AUTO: 31.6 %
MCH RBC QN AUTO: 33 PG (ref 26.5–33)
MCHC RBC AUTO-ENTMCNC: 33.5 G/DL (ref 31.5–36.5)
MCV RBC AUTO: 98 FL (ref 78–100)
MONOCYTES # BLD AUTO: 0.6 10E9/L (ref 0–1.3)
MONOCYTES NFR BLD AUTO: 7.5 %
NEUTROPHILS # BLD AUTO: 5.1 10E9/L (ref 1.6–8.3)
NEUTROPHILS NFR BLD AUTO: 60.6 %
PLATELET # BLD AUTO: 131 10E9/L (ref 150–450)
PROT SERPL-MCNC: 7.9 G/DL (ref 6.8–8.8)
RBC # BLD AUTO: 4.4 10E12/L (ref 4.4–5.9)
WBC # BLD AUTO: 8.4 10E9/L (ref 4–11)

## 2018-03-20 PROCEDURE — 86704 HEP B CORE ANTIBODY TOTAL: CPT | Performed by: INTERNAL MEDICINE

## 2018-03-20 PROCEDURE — 99214 OFFICE O/P EST MOD 30 MIN: CPT | Performed by: INTERNAL MEDICINE

## 2018-03-20 PROCEDURE — 36415 COLL VENOUS BLD VENIPUNCTURE: CPT | Performed by: INTERNAL MEDICINE

## 2018-03-20 PROCEDURE — 87340 HEPATITIS B SURFACE AG IA: CPT | Performed by: INTERNAL MEDICINE

## 2018-03-20 PROCEDURE — 82565 ASSAY OF CREATININE: CPT | Performed by: INTERNAL MEDICINE

## 2018-03-20 PROCEDURE — 80076 HEPATIC FUNCTION PANEL: CPT | Performed by: INTERNAL MEDICINE

## 2018-03-20 PROCEDURE — 85025 COMPLETE CBC W/AUTO DIFF WBC: CPT | Performed by: INTERNAL MEDICINE

## 2018-03-20 PROCEDURE — 86480 TB TEST CELL IMMUN MEASURE: CPT | Performed by: INTERNAL MEDICINE

## 2018-03-20 RX ORDER — PREDNISONE 5 MG/1
TABLET ORAL
Qty: 60 TABLET | Refills: 1 | Status: SHIPPED | OUTPATIENT
Start: 2018-03-20 | End: 2018-06-26

## 2018-03-20 RX ORDER — ALLOPURINOL 300 MG/1
TABLET ORAL
Qty: 90 TABLET | Refills: 1 | Status: SHIPPED | OUTPATIENT
Start: 2018-03-20 | End: 2018-10-03

## 2018-03-20 NOTE — NURSING NOTE
"Chief Complaint   Patient presents with     Consult     Patient states he feels good today. Was given 20 mg of prednisone which helped.       Initial BP (!) 161/95 (BP Location: Left arm, Patient Position: Chair, Cuff Size: Adult Regular)  Pulse 63  Ht 1.778 m (5' 10\")  Wt 85.1 kg (187 lb 9.6 oz)  SpO2 96%  BMI 26.92 kg/m2 Estimated body mass index is 26.92 kg/(m^2) as calculated from the following:    Height as of this encounter: 1.778 m (5' 10\").    Weight as of this encounter: 85.1 kg (187 lb 9.6 oz).  BP completed using cuff size: regular         RAPID3 (0-30) Cumulative Score  15.0          RAPID3 Weighted Score (divide #4 by 3 and that is the weighted score)  5.0         "

## 2018-03-20 NOTE — PATIENT INSTRUCTIONS
Rheumatology    Dr. Fabricio Gomes         [facility]   (Monday)  [address]  [place] [address]       [facility]   (Tuesday)  [address]  [place] [address]    [facility]   (Wed., Thurs., and Friday)  [address] [place] [address]    Phone number: [phone]  Thank you for choosing [facility].  Jaquelin Muñiz CMA

## 2018-03-20 NOTE — MR AVS SNAPSHOT
After Visit Summary   3/20/2018    Cam[place] Baca    MRN: [id number]           Patient Information     Date Of Birth          1950        Visit Information        Provider Department      3/20/2018 9:40 AM Fabricio Gomes MD [facility]        Today's Diagnoses     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)          Care Instructions    Rheumatology    Dr. Fabricio Ware [facility]   (Monday)  [address]  [place] [address]       [facility]   (Tuesday)  [address] [place] [address]    [facility]   (Wed., Thurs., and Friday)  [address] [facility] [place] [address]    Phone number: [phone]  Thank you for choosing [facility].  Jaquelin Muñiz CMA            Follow-ups after your visit        Your next 10 appointments already scheduled     Apr 16, 2018 10:40 AM CDT   New Visit with Fabricio Gomes MD   [facility]    [address] [facility] [address]   [phone]            Jun 26, 2018  9:20 AM CDT   Return Visit with Fabricio Gomes MD   [facility] ([facility])    [address] [facility] [id number] [phone]              Who to contact     If you have questions or need follow up information about today's clinic visit or your schedule please contact [facility] directly at [phone].  Normal or non-critical lab and imaging results will be communicated to you by MyChart, letter or phone within 4 business days after the clinic has received the results. If you do not hear from us within 7 days, please contact the clinic through [employer] or phone. If you have a critical or abnormal lab result, we will notify you by phone as soon as possible.  Submit refill requests through [employer] or call your pharmacy and they will forward the refill request to us. Please  "allow 3 business days for your refill to be completed.          Additional Information About Your Visit        MyChart Information     "[employer]"hart lets you send messages to your doctor, view your test results, renew your prescriptions, schedule appointments and more. To sign up, go to [email or web address] . Click on \"Log in\" on the left side of the screen, which will take you to the Welcome page. Then click on \"Sign up Now\" on the right side of the page.     You will be asked to enter the access code listed below, as well as some personal information. Please follow the directions to create your username and password.     Your access code is: [email or web address]  Expires: 3/22/2018 11:39 AM     Your access code will  in 90 days. If you need help or a new code, please call your [facility] clinic or [phone].        Care EveryWhere ID     This is your Care EveryWhere ID. This could be used by other organizations to access your [facility] medical records  [id number]        Your Vitals Were     Pulse Height Pulse Oximetry BMI (Body Mass Index)          63 1.778 m (5' 10\") 96% 26.92 kg/m2         Blood Pressure from Last 3 Encounters:   18 (!) 161/95   18 131/83   17 115/78    Weight from Last 3 Encounters:   18 85.1 kg (187 lb 9.6 oz)   18 87.4 kg (192 lb 9.6 oz)   17 85.9 kg (189 lb 6.4 oz)              We Performed the Following     CBC with platelets differential     Creatinine     Hepatic panel     Hepatitis B core antibody     Hepatitis B surface antigen     M Tuberculosis by Quantiferon          Today's Medication Changes          These changes are accurate as of 3/20/18 10:04 AM.  If you have any questions, ask your nurse or doctor.               These medicines have changed or have updated prescriptions.        Dose/Directions    * predniSONE 5 MG tablet   Commonly known as:  DELTASONE   This may have changed:  Another medication with the same name was changed. Make sure you " understand how and when to take each.   Used for:  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)   Changed by:  Fabricio Gomes MD        Prednisone 20mg daily x5days, then 15mg daily x5days, then 10mg daily x5days, then 5mg daily x5days, then stop.   Quantity:  50 tablet   Refills:  0       * predniSONE 5 MG tablet   Commonly known as:  DELTASONE   This may have changed:    - medication strength  - additional instructions   Used for:  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)   Changed by:  Fabricio Gomes MD        Prednisone 5-10mg daily; please use the lowest effective dose and stop if able.   Quantity:  60 tablet   Refills:  1       * Notice:  This list has 2 medication(s) that are the same as other medications prescribed for you. Read the directions carefully, and ask your doctor or other care provider to review them with you.         Where to get your medicines      These medications were sent to [facility] [place] [address]   [address] [facility] [address]     Phone:  [phone]     predniSONE 5 MG tablet                Primary Care Provider Office Phone # Fax #    Lyndsey DIANN Sinha [phone]       [address] [facility] [address]        Equal Access to Services     BECCA BAEZ : Hadii umair ku hadasho Soomaali, waaxda luqadaha, qaybta kaalmada adeegyada, waxay omar hayfazal high. So [facility] [phone].    ATENCIÓN: Si habla español, tiene a bernal disposición servicios gratuitos de asistencia lingüística. Llame al [phone].    We comply with applicable federal civil rights laws and [place] laws. We do not discriminate on the basis of race, color, national origin, age, disability, sex, sexual orientation, or gender identity.            Thank you!     Thank you for choosing [facility]  for your care. Our goal is always to provide you with  excellent care. Hearing back from our patients is one way we can continue to improve our services. Please take a few minutes to complete the written survey that you may receive in the mail after your visit with us. Thank you!             Your Updated Medication List - Protect others around you: Learn how to safely use, store and throw away your medicines at www.disposemymeds.org.          This list is accurate as of 3/20/18 10:04 AM.  Always use your most recent med list.                   Brand Name Dispense Instructions for use Diagnosis    * allopurinol 300 MG tablet    ZYLOPRIM    90 tablet    TAKE ONE TABLET BY MOUTH EVERY DAY    Acute gouty arthropathy       * allopurinol 300 MG tablet    ZYLOPRIM    30 tablet    TAKE ONE TABLET BY MOUTH EVERY DAY    Acute gouty arthropathy       atorvastatin 80 MG tablet    LIPITOR    90 tablet    Take 1 tablet (80 mg) by mouth daily    Hyperlipidemia LDL goal <130       BABY ASPIRIN PO      daily        capsaicin 0.075 % cream    ZOSTRIX    50 g    Apply  topically 3 times daily.    Bursitis of shoulder       cetirizine 10 MG tablet    zyrTEC    90 tablet    Take 1 tablet (10 mg) by mouth every evening    Seasonal allergic rhinitis, unspecified allergic rhinitis trigger       * cholestyramine light 4 GM Packet    QUESTRAN    60 packet    DISSOLVE ONE PACKET IN LIQUID AS DIRECTED TWO TIMES A DAY AND DRINK    Hyperlipidemia LDL goal <130       * PREVALITE 4 GM Packet   Generic drug:  cholestyramine light     60 packet    DISSOLVE ONE PACKET IN LIQUID AS DIRECTED TWO TIMES A DAY AND DRINK    Hyperlipidemia LDL goal <130       diclofenac 1 % Gel topical gel    VOLTAREN    100 g    Apply  2 grams to shoulders three times daily using enclosed dosing card.    Rotator cuff tendinitis       levothyroxine 25 MCG tablet    SYNTHROID/LEVOTHROID    90 tablet    TAKE ONE TABLET BY MOUTH EVERY DAY    Hypothyroidism due to acquired atrophy of thyroid       * losartan 25 MG tablet    COZAAR     30 tablet    TAKE ONE TABLET BY MOUTH EVERY DAY    Benign essential hypertension       * losartan 25 MG tablet    COZAAR    30 tablet    TAKE ONE TABLET BY MOUTH EVERY DAY    Benign essential hypertension       omeprazole 20 MG CR capsule    priLOSEC    90 capsule    TAKE ONE CAPSULE BY MOUTH ONCE DAILY -TAKE 30-60 MINUTES BEFORE A MEAL    Gastroesophageal reflux disease without esophagitis       * predniSONE 5 MG tablet    DELTASONE    50 tablet    Prednisone 20mg daily x5days, then 15mg daily x5days, then 10mg daily x5days, then 5mg daily x5days, then stop.    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)       * predniSONE 5 MG tablet    DELTASONE    60 tablet    Prednisone 5-10mg daily; please use the lowest effective dose and stop if able.    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)       triamcinolone 0.1 % cream    KENALOG    60 g    Locally bid    Rash       VITAMIN C PO      Take by mouth daily        * Notice:  This list has 8 medication(s) that are the same as other medications prescribed for you. Read the directions carefully, and ask your doctor or other care provider to review them with you.

## 2018-03-20 NOTE — PROGRESS NOTES
Rheumatology Clinic Visit      Camilo Baca MRN# [id number]   YOB: 1950 Age: 67 year old      Date of visit: 3/20/18   PCP: Lyndsey Aponte    Chief Complaint   Patient presents with:  Consult: Patient states he feels good today. Was given 20 mg of prednisone which helped.      Assessment and Plan     1.  Seropositive rheumatoid arthritis (, CCP >250): Dx'd 2013. Previously followed by [facility] Wol[facility] and [facility].  Established with me on 3/20/2018.  The diagnosis of rheumatoid arthritis and treatment options were discussed in detail today.  Previously on MTX (ineffective as monotherapy), Humira (effective, stopped because of insurance preference for IV). Currently on Remicade 300mg IV [id number].  Remicade was more effective initially but is not as effective now.  Now requiring prednisone for polyarticular joint pain.  Given that Remicade was effective previously, increase the dose from 300 mg IV every 8 weeks to 500 mg IV every 8 weeks.  Reassess in 3 months.  He is currently on prednisone 40 mg daily with plans to taper and at the end of the taper the lowest dose is 10 mg daily and advised that he continue prednisone 5-10 mg daily with plans to reduce as symptoms tolerate.  If the higher dose of Remicade is not effective, the dose may be increased again or change to Orencia.  Note that he has a history of chronic thrombocytopenia, so preferentially avoiding oral DMARD such as leflunomide and methotrexate.  -Change Remicade infusion to be 500 mg every 8 weeks  -Continue prednisone taper  -Labs today: Hepatitis B, quantiferon TB, CBC, creatinine, hepatic panel    # Infliximab (Remicade) Risks and Benefits: The risks and benefits of infliximab were discussed in detail and the patient verbalized understanding.  The risks discussed include, but are not limited to, the risk for hypersensitivity, anaphylaxis, anaphylactoid reactions, an increased risk for serious infections leading to hospitalization or death,  a possible increased risk for lymphoma and other malignancies, a possible worsening of demyelinating diseases, a possible worsening of heart failure, cytopenias, hepatotoxicity, risk for drug induced lupus, possible reactivation of hepatitis B, and possible reactivation of latent tuberculosis.   The most common adverse reactions are infections, infusion-related reactions, and headache.  It was discussed that the medication would need to be discontinued if a serious infection develops.  It was discussed that live vaccinations should not be received while using infliximab or within 30 days prior to starting infliximab.  I encouraged reviewing the package insert and asking any questions about the medication.     # Prednisone Risks and Benefits: The risks and benefits of prednisone were discussed in detail and the patient verbalized understanding.  The risks discussed include, but are not limited to, weight gain, fluid retention, impaired wound healing, hyperglycemia, adrenal suppression, GI upset, peptic ulcer, hepatotoxicity, aseptic necrosis of the femoral and humeral heads, osteoporosis, myopathy, tendon rupture (particularly Achilles tendon), ocular changes including an increased intraocular pressure.  I encouraged reviewing the package insert and asking any questions about the medication.      2. Hypertersion: Blood pressure checked this clinic visit and was elevated; I advised him to follow-up with his PCP for management.    Mr. Baca verbalized agreement with and understanding of the rational for the diagnosis and treatment plan.  All questions were answered to best of my ability and the patient's satisfaction. Mr. Baca was advised to contact the clinic with any questions that may arise after the clinic visit.      More than 50% of the face-to-face time was spent counseling the patient.  Total face-to-face time was at least 25 minutes.    Thank you for involving me in the care of the patient    Return to clinic:  3 months      HPI   Camilo Baca is a 67 year old male with a past medical history significant for hypertension, hyperlipidemia, GERD, hypothyroidism, history of DVT, factor V Leiden mutation, history of thrombocytopenia, gout, and rheumatoid arthritis who presents for follow-up of rheumatoid arthritis. Note that this is Mr. Baca's first clinic visit with me.      11/15/2017 rheumatology clinic note by Dr. Sb Hurtado documents rheumatoid arthritis treated with Remicade every 8 weeks.  Plan to follow-up in 6 months or sooner.    Today, Mr. Baca reports that RA was diagnosed in 2013.  He was initially on methotrexate.  He was found arthritis cytopenia that was evaluated by the hematologist but determined to be chronic and not needing further workup.  He was later changed to Humira because methotrexate was ineffective.  Humira was very effective for quite a while but his insurance changed and preferred infusions rather than subcutaneous injections.  Therefore he was started on Remicade recently.  He did well for the first several months on Remicade but is finding that the every 8 weeks dose does not last.  He has used prednisone taper starting with a dose of 20 mg daily that was partially effective, then started with a dose of 60 mg daily that was more effective and is now on 40 mg daily without any joint pain or morning stiffness.  Prior to using prednisone he said that he had pain in his hands, wrists, elbows, shoulders, knees, ankles, and feet that was preventing him from doing his daily activities; his wife had to help him get dressed.  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    Denies fevers, chills, nausea, vomiting, constipation, diarrhea. No abdominal pain. No chest pain/pressure, palpitations, or shortness of breath. No LE swelling. No neck pain. No oral or nasal sores.  No rash. No sicca symptoms.      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  CV: No chest pain, pressure, palpitations, or dyspnea on exertion.  PULM: No SOB, wheeze, cough.  GI: No nausea, vomiting, constipation, diarrhea. No blood in stool. No abdominal pain.  : No blood in urine.  MSK: See HPI.  NEURO: No numbness, tingling, or weakness.  EXT: No LE swelling  PSYCH: Negative    Active Problem List     Patient Active Problem List   Diagnosis     Lumbago     NONSPECIFIC MEDICAL HISTORY     HYPERLIPIDEMIA LDL GOAL <130     History of adenomatous polyp of colon     Idiopathic chronic gout without tophus, unspecified site     Advanced directives, counseling/discussion     High risk medication use     Thrombocytopenia (H)     Gastroesophageal reflux disease without esophagitis     Factor 5 Leiden mutation, heterozygous (H)     Personal history of venous thrombosis and embolism     Personal history of DVT (deep vein thrombosis)     Hypothyroidism due to acquired atrophy of thyroid     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)     Secondary hypertension with goal blood pressure less than 140/90     Past Medical History     Past Medical History:   Diagnosis Date     Alopecia, unspecified      Closed fracture of unspecified part of humerus 1974    Arm fracture / left wrist     Esophageal reflux 2/6/2015     Factor 5 Leiden mutation, heterozygous (H)      Gout 3/28/2011     Herpes zoster without mention of complication 1965    Herpes zoster     Measles without mention of complication     Measles     Personal history of DVT (deep vein thrombosis) 4/9/2015     Personal history of venous thrombosis and embolism 4/9/2015     Pulmonary embolism (H) 12/1996    post appendectomy     RA (rheumatoid arthritis) (H) 3/11/2013     Rheumatoid arthritis involving both shoulders with positive rheumatoid  factor (H) 2/2/2016     Rheumatoid arthritis(714.0)     Beginning symptoms     Secondary hypertension with goal blood pressure less than 140/90 11/15/2016     Thrombocytopenia, unspecified 1/29/2014     Varicella without mention of complication     Chickenpox     Past Surgical History     Past Surgical History:   Procedure Laterality Date     C APPENDECTOMY  1996     COLONOSCOPY  11/15/2010    COLONOSCOPY performed by DARIUS MESA at  [facility]     COLONOSCOPY N/A 11/2/2015    Procedure: COLONOSCOPY;  Surgeon: Bubab Odom MD;  Location:  [facility]     HC COLONOSCOPY W BIOPSY  08/10/05     HC REPAIR ING HERNIA,5+Y/O,REDUCIBL  1960     HERNIORRHAPHY UMBILICAL N/A 4/17/2015    Procedure: HERNIORRHAPHY UMBILICAL;  Surgeon: Rayray Avila MD;  Location:  OR     LAPAROSCOPIC HERNIORRHAPHY INGUINAL BILATERAL Bilateral 4/17/2015    Procedure: LAPAROSCOPIC HERNIORRHAPHY INGUINAL BILATERAL;  Surgeon: Rayray Avila MD;  Location: [facility]     Allergy     Allergies   Allergen Reactions     Lisinopril Cough     No Known Drug Allergies      Current Medication List     Current Outpatient Prescriptions   Medication Sig     predniSONE (DELTASONE) 5 MG tablet Prednisone 5-10mg daily; please use the lowest effective dose and stop if able.     atorvastatin (LIPITOR) 80 MG tablet Take 1 tablet (80 mg) by mouth daily     PREVALITE 4 G Packet DISSOLVE ONE PACKET IN LIQUID AS DIRECTED TWO TIMES A DAY AND DRINK     omeprazole (PRILOSEC) 20 MG CR capsule TAKE ONE CAPSULE BY MOUTH ONCE DAILY -TAKE 30-60 MINUTES BEFORE A MEAL     losartan (COZAAR) 25 MG tablet TAKE ONE TABLET BY MOUTH EVERY DAY     levothyroxine (SYNTHROID/LEVOTHROID) 25 MCG tablet TAKE ONE TABLET BY MOUTH EVERY DAY     allopurinol (ZYLOPRIM) 300 MG tablet TAKE ONE TABLET BY MOUTH EVERY DAY     Ascorbic Acid (VITAMIN C PO) Take by mouth daily     cetirizine (ZYRTEC) 10 MG tablet Take 1 tablet (10 mg) by mouth every evening     diclofenac (VOLTAREN) 1 % GEL  Apply  2 grams to shoulders three times daily using enclosed dosing card.     triamcinolone (KENALOG) 0.1 % cream Locally bid     capsaicin (ZOSTRIX) 0.075 % topical cream Apply  topically 3 times daily.     BABY ASPIRIN OR daily     allopurinol (ZYLOPRIM) 300 MG tablet TAKE ONE TABLET BY MOUTH ONCE DAILY     predniSONE (DELTASONE) 5 MG tablet Prednisone 20mg daily x5days, then 15mg daily x5days, then 10mg daily x5days, then 5mg daily x5days, then stop. (Patient not taking: Reported on 3/20/2018)     losartan (COZAAR) 25 MG tablet TAKE ONE TABLET BY MOUTH EVERY DAY (Patient not taking: Reported on 3/20/2018)     cholestyramine light (QUESTRAN) 4 GM Packet DISSOLVE ONE PACKET IN LIQUID AS DIRECTED TWO TIMES A DAY AND DRINK (Patient not taking: Reported on 3/20/2018)     No current facility-administered medications for this visit.          Social History   See HPI    Family History     Family History   Problem Relation Age of Onset     Arthritis Mother      Cardiovascular Mother      Depression Mother      GASTROINTESTINAL DISEASE Mother      IBS     Hypertension Mother      Circulatory Mother      Aortal aneurysm     OSTEOPOROSIS Mother      Allergies Father      Cortisone     Cardiovascular Father      Circulatory Father      DIABETES Father      Hypertension Father      Lipids Father      Alcohol/Drug Sister      Penicillin     Alzheimer Disease Paternal Grandfather      Blood Disease Paternal Grandmother      Clotting problems     Blood Disease Son      Factor 5     Hypertension Maternal Aunt      CEREBROVASCULAR DISEASE Maternal Grandmother        Physical Exam     Temp Readings from Last 3 Encounters:   02/16/18 97.8  F (36.6  C) (Temporal)   12/22/17 96.6  F (35.9  C) (Temporal)   10/27/17 97.1  F (36.2  C) (Temporal)     BP Readings from Last 5 Encounters:   03/20/18 (!) 161/95   02/16/18 131/83   12/22/17 115/78   11/15/17 (!) 156/95   10/27/17 132/78     Pulse Readings from Last 1 Encounters:   03/20/18 63  "    Resp Readings from Last 1 Encounters:   02/16/18 18     Estimated body mass index is 26.92 kg/(m^2) as calculated from the following:    Height as of this encounter: 1.778 m (5' 10\").    Weight as of this encounter: 85.1 kg (187 lb 9.6 oz).    GEN: NAD  HEENT: MMM. No oral lesions.  Anicteric, noninjected sclera  CV: S1, S2. RRR. No m/r/g.  PULM: CTA bilaterally. No w/c.  ABD: +BS.  MSK:   MCPs, PIPs, wrists, elbows, shoulders, knees, ankles, and MTPs without swelling or tenderness position.  Hips nontender to palpation.    NEURO: UE and LE strengths 5/5 and equal bilaterally.   SKIN: No rash  EXT: No LE edema  PSYCH: Alert. Appropriate.    Labs / Imaging (select studies)     RF/CCP  Recent Labs   Lab Test  04/24/13   0923   CCPABY  >250 Strongly Positive*   RHF  743*     Send-out Labs  Recent Labs   Lab Test  10/17/12   0932  10/16/12   0929   SRESLT  SEE NOTE 10/18/2012 09:26 PM (Note)                                                HI         Expected Test                              Result       LO  Units  Values ------------------------------------------------------------------ Parasitic Examination             SEE NOTE    SOURCE: STOOL, FEC    PARASITIC EXAMINATION                                  FINAL    No parasites seen.    Cryptosporidium, Cyclospora, and microsporidia are not    readily detected by this method.    Single negative specimen does not rule out    parasitic infection.     Test Performed by:    [address] [address]    : El Hubbard III, M.D.  SEE NOTE 10/18/2012 08:01 PM (Note)                                                HI         Expected Test                              Result       LO  Units  Values ------------------------------------------------------------------ Parasitic Examination             SEE NOTE    SOURCE: STOOL, FEC    PARASITIC EXAMINATION                              "     FINAL    No parasites seen.    Cryptosporidium, Cyclospora, and microsporidia are not    readily detected by this method.    Single negative specimen does not rule out    parasitic infection.     Test Performed by:    [facility] - [address] [address]    : El Hubbard III, M.D.   [facility]  PARASITIC EXAM  PARASITIC EXAM   STSTCD  OAP  OAP   SSPTYP   O & P CONTAINER  O & P CONTAINER     CBC  Recent Labs   Lab Test  12/22/17   0735  08/24/17   0931  06/02/16   0821   WBC  4.8  3.7*  4.0   RBC  4.62  4.18*  4.38*   HGB  15.2  14.0  14.7   HCT  45.7  41.6  43.0   MCV  99  100  98   RDW  13.1  13.0  12.7   PLT  139*  111*  102*   MCH  32.9  33.5*  33.6*   MCHC  33.3  33.7  34.2   NEUTROPHIL  50.7  47.9  45.6   LYMPH  28.4  30.7  33.8   MONOCYTE  12.6  16.2  13.4   EOSINOPHIL  7.7  4.7  6.7   BASOPHIL  0.6  0.5  0.5   ANEU  2.5  1.8  1.8   ALYM  1.4  1.1  1.4   KIESHA  0.6  0.6  0.5   AEOS  0.4  0.2  0.3   ABAS  0.0  0.0  0.0     CMP  Recent Labs   Lab Test  12/22/17   0735  08/24/17   0931  12/01/16   0822  06/02/16   0821  03/28/16   0824  12/29/15   0756   07/02/15   0842   NA  141  143  144   --   144  144   --   140   POTASSIUM  4.2  4.4  4.1   --   4.2  4.6   --   4.4   CHLORIDE  107  109  108   --   109  108   --   106   CO2  27  27  27   --   26  29   --   29   ANIONGAP  7  7  9   --   9  7   --   5   GLC  121*  95  105*   --   107*  106*   --   97   BUN  17  15  17   --   18  17   --   13   CR  0.85  0.85  0.84   --   0.85  0.94   --   0.77   GFRESTIMATED  >90  >90  >90  Non African American GFR Calc     --   >90  Non  GFR Calc    81   --   >90  Non  GFR Calc     GFRESTBLACK  >90  >90  >90  African American GFR Calc     --   >90   GFR Calc    >90   GFR Calc     --   >90   GFR Calc     ANDREZ  8.9  9.2  8.7   --   8.7  8.9   --   8.5   BILITOTAL  1.5*   1.2  1.2  1.2  1.5*  1.2   < >  1.5*   ALBUMIN  3.7  3.5  3.6  3.9  3.9  3.9   < >  3.7   PROTTOTAL  8.4  7.5  7.3  7.5  7.4  7.5   < >  6.7*   ALKPHOS  90  74  68  79  75  73   < >  79   AST  25  24  35  29  31  29   < >  33   ALT  49  40  58  57  65  49   < >  63    < > = values in this interval not displayed.     Uric Acid  Recent Labs   Lab Test  02/21/18   0909  06/02/16   0821  06/03/13   0837  04/24/13   0923  02/10/11   0830  06/10/10   1446   URIC  3.6  3.0*  3.6  4.6  4.9  8.7*     Calcium/VitaminD  Recent Labs   Lab Test  12/22/17   0735  08/24/17   0931  12/01/16   0822   ANDREZ  8.9  9.2  8.7     ESR/CRP  Recent Labs   Lab Test  12/22/17   0735  06/03/13   0837  04/24/13   0923  07/16/12   1403   SED  12  16   --   21*   CRP   --    --   7.1  6.6     CK/Aldolase  Recent Labs   Lab Test  07/16/12   1403   CKT  216     TSH/T4  Recent Labs   Lab Test  08/24/17   0931  12/01/16   0822  03/28/16   0824   03/12/14   0801  01/29/14   0833   TSH  1.94  3.93  4.25*   < >  6.20*  6.38*   T4   --    --   0.82   --   1.04  1.12    < > = values in this interval not displayed.     Hepatitis B  Recent Labs   Lab Test  02/05/14   1104   HEPBANG  Negative     Hepatitis C  Recent Labs   Lab Test  02/05/14   1104  06/03/13   0837   HCVAB  Negative  Negative     Tuberculosis Screening  Recent Labs   Lab Test  02/05/14   1104   TBRSLT  Negative   TBAGN  0.01       Immunization History     Immunization History   Administered Date(s) Administered     Influenza (High Dose) 3 valent vaccine 10/06/2017     Influenza (IIV3) PF 10/01/2009, 10/27/2010, 10/11/2011     Influenza Vaccine IM 3yrs+ 4 Valent IIV4 10/09/2013, 10/23/2014, 09/18/2015, 11/15/2016     Pneumo Conj 13-V (2010&after) 10/06/2017     Pneumococcal 23 valent 10/23/2014     TD (ADULT, 7+) 09/05/2001     TDAP Vaccine (Boostrix) 10/18/2012     Zoster vaccine, live 10/23/2014          Chart documentation done in part with Dragon Voice recognition Software. Although  reviewed after completion, some word and grammatical error may remain.    Fabricio Gomes MD

## 2018-03-21 LAB
M TB TUBERC IFN-G BLD QL: NEGATIVE
M TB TUBERC IFN-G/MITOGEN IGNF BLD: 0.14 IU/ML

## 2018-03-23 ENCOUNTER — TELEPHONE (OUTPATIENT)
Dept: RHEUMATOLOGY | Facility: CLINIC | Age: 68
End: 2018-03-23

## 2018-03-23 NOTE — TELEPHONE ENCOUNTER
----- Message from Fabricio Gomes MD sent at 3/22/2018  8:01 PM CDT -----  Rheumatology team: Please call to notify Mr. Baca that his labs show a persistent low platelet count.  Other labs are okay.  Tuberculosis test was negative.    Fabricio Gomes MD  3/22/2018 8:00 PM

## 2018-03-23 NOTE — TELEPHONE ENCOUNTER
Reason for Call:  Other call back    Detailed comments: Patient is returning call regarding previous message. Please call again. Thank you.    Phone Number Patient can be reached at: Home number on file [phone] (home)    Best Time: any    Can we leave a detailed message on this number? YES    Call taken on 3/23/2018 at 11:23 AM by Larissa Pineda

## 2018-03-23 NOTE — TELEPHONE ENCOUNTER
Returned the patient's call, informed him of MD message below regarding labs.     Ro Hernández CMA on 3/23/2018 at 2:04 PM

## 2018-03-23 NOTE — PROGRESS NOTES
Rheumatology team: Please call to notify Mr. Baca that his labs show a persistent low platelet count.  Other labs are okay.  Tuberculosis test was negative.    Fabricio Gomes MD  3/22/2018 8:00 PM

## 2018-04-05 ENCOUNTER — HOSPITAL ENCOUNTER (EMERGENCY)
Facility: CLINIC | Age: 68
Discharge: HOME | End: 2018-04-06
Attending: FAMILY MEDICINE | Admitting: FAMILY MEDICINE
Payer: MEDICARE

## 2018-04-05 DIAGNOSIS — R55 VASO VAGAL EPISODE: ICD-10-CM

## 2018-04-05 PROCEDURE — 96360 HYDRATION IV INFUSION INIT: CPT | Performed by: FAMILY MEDICINE

## 2018-04-05 PROCEDURE — 99284 EMERGENCY DEPT VISIT MOD MDM: CPT | Mod: 25 | Performed by: FAMILY MEDICINE

## 2018-04-05 PROCEDURE — 93005 ELECTROCARDIOGRAM TRACING: CPT | Performed by: FAMILY MEDICINE

## 2018-04-05 PROCEDURE — 84484 ASSAY OF TROPONIN QUANT: CPT | Performed by: FAMILY MEDICINE

## 2018-04-05 PROCEDURE — 93010 ELECTROCARDIOGRAM REPORT: CPT | Mod: Z6 | Performed by: FAMILY MEDICINE

## 2018-04-05 PROCEDURE — 80048 BASIC METABOLIC PNL TOTAL CA: CPT | Performed by: FAMILY MEDICINE

## 2018-04-05 PROCEDURE — 25000128 H RX IP 250 OP 636: Performed by: FAMILY MEDICINE

## 2018-04-05 PROCEDURE — 85025 COMPLETE CBC W/AUTO DIFF WBC: CPT | Performed by: FAMILY MEDICINE

## 2018-04-05 RX ADMIN — SODIUM CHLORIDE 1000 ML: 9 INJECTION, SOLUTION INTRAVENOUS at 23:35

## 2018-04-05 NOTE — ED AVS SNAPSHOT
[facility] Emergency Department    [address] [place] [address]    Phone:  [phone]    Fax:  [phone]                                       Camilo Baca   MRN: [id number]    Department:  [facility] Emergency Department   Date of Visit:  4/5/2018           Patient Information     Date Of Birth          1950        Your diagnoses for this visit were:     Vaso vagal episode        You were seen by Nitish Cuevas MD.      Follow-up Information     Schedule an appointment as soon as possible for a visit with Lyndsey Aponte APRN CNP.    Specialty:  Nurse Practitioner - Family    Why:  For any further concerns, For follow up on your ED stay    Contact information:    [address] [facility] [address]  [phone]        Discharge References/Attachments     VASOVAGAL SYNCOPE, UNDERSTANDING (ENGLISH)    SYNCOPE, VASOVAGAL (ENGLISH)      Your next 10 appointments already scheduled     Apr 13, 2018  9:00 AM CDT   Level 4 with NL INFUSION CHAIR 4   [facility] ([facility])    [address] [place] [address] [address] [place] [id number] [phone]            Apr 16, 2018 10:40 AM CDT   New Visit with Fabricio Gomes MD   [facility] ([facility])    [address] [place] [phone]            Jun 26, 2018  9:20 AM CDT   Return Visit with Fabricio Gomes MD   [facility] ([facility])    [address] [address] [id number] [phone]              24 Hour Appointment Hotline       To make an appointment at any [facility], call [phone] ([phone]). If you don't have a family doctor or clinic, we will help you find one. [facility] are conveniently located to serve the needs of you and your family.             Review of your medicines      Our records show that you are taking the medicines listed below. If these are  incorrect, please call your family doctor or clinic.        Dose / Directions Last dose taken    * allopurinol 300 MG tablet   Commonly known as:  ZYLOPRIM   Quantity:  90 tablet        TAKE ONE TABLET BY MOUTH EVERY DAY   Refills:  0        * allopurinol 300 MG tablet   Commonly known as:  ZYLOPRIM   Quantity:  90 tablet        TAKE ONE TABLET BY MOUTH ONCE DAILY   Refills:  1        atorvastatin 80 MG tablet   Commonly known as:  LIPITOR   Dose:  80 mg   Quantity:  90 tablet        Take 1 tablet (80 mg) by mouth daily   Refills:  2        BABY ASPIRIN PO        daily   Refills:  0        capsaicin 0.075 % cream   Commonly known as:  ZOSTRIX   Quantity:  50 g        Apply  topically 3 times daily.   Refills:  3        cetirizine 10 MG tablet   Commonly known as:  zyrTEC   Dose:  10 mg   Quantity:  90 tablet        Take 1 tablet (10 mg) by mouth every evening   Refills:  1        * cholestyramine light 4 GM Packet   Commonly known as:  QUESTRAN   Quantity:  60 packet        DISSOLVE ONE PACKET IN LIQUID AS DIRECTED TWO TIMES A DAY AND DRINK   Refills:  5        * PREVALITE 4 GM Packet   Quantity:  60 packet   Generic drug:  cholestyramine light        DISSOLVE ONE PACKET IN LIQUID AS DIRECTED TWO TIMES A DAY AND DRINK   Refills:  11        diclofenac 1 % Gel topical gel   Commonly known as:  VOLTAREN   Quantity:  100 g        Apply  2 grams to shoulders three times daily using enclosed dosing card.   Refills:  1        levothyroxine 25 MCG tablet   Commonly known as:  SYNTHROID/LEVOTHROID   Quantity:  90 tablet        TAKE ONE TABLET BY MOUTH EVERY DAY   Refills:  2        * losartan 25 MG tablet   Commonly known as:  COZAAR   Quantity:  30 tablet        TAKE ONE TABLET BY MOUTH EVERY DAY   Refills:  1        * losartan 25 MG tablet   Commonly known as:  COZAAR   Quantity:  30 tablet        TAKE ONE TABLET BY MOUTH EVERY DAY   Refills:  6        omeprazole 20 MG CR capsule   Commonly known as:  priLOSEC   Quantity:   90 capsule        TAKE ONE CAPSULE BY MOUTH ONCE DAILY -TAKE 30-60 MINUTES BEFORE A MEAL   Refills:  1        * predniSONE 5 MG tablet   Commonly known as:  DELTASONE   Quantity:  50 tablet        Prednisone 20mg daily x5days, then 15mg daily x5days, then 10mg daily x5days, then 5mg daily x5days, then stop.   Refills:  0        * predniSONE 5 MG tablet   Commonly known as:  DELTASONE   Quantity:  60 tablet        Prednisone 5-10mg daily; please use the lowest effective dose and stop if able.   Refills:  1        triamcinolone 0.1 % cream   Commonly known as:  KENALOG   Quantity:  60 g        Locally bid   Refills:  0        VITAMIN C PO        Take by mouth daily   Refills:  0        * Notice:  This list has 8 medication(s) that are the same as other medications prescribed for you. Read the directions carefully, and ask your doctor or other care provider to review them with you.            Procedures and tests performed during your visit     Basic metabolic panel    CBC with platelets differential    Cardiac Continuous Monitoring    EKG 12 lead    Orthostatic blood pressure and pulse    Peripheral IV catheter    Troponin I      Orders Needing Specimen Collection     None      Pending Results     No orders found for last 3 day(s).            Pending Culture Results     No orders found for last 3 day(s).            Pending Results Instructions     If you had any lab results that were not finalized at the time of your Discharge, you can call the ED Lab Result RN at [phone]. You will be contacted by this team for any positive Lab results or changes in treatment. The nurses are available 7 days a week from 10A to 6:30P.  You can leave a message 24 hours per day and they will return your call.        Thank you for choosing [facility]       Thank you for choosing [facility] for your care. Our goal is always to provide you with excellent care. Hearing back from our patients is one way we can continue to improve our  "services. Please take a few minutes to complete the written survey that you may receive in the mail after you visit with us. Thank you!        [employer] Information     [employer] lets you send messages to your doctor, view your test results, renew your prescriptions, schedule appointments and more. To sign up, go to [email or web address] . Click on \"Log in\" on the left side of the screen, which will take you to the Welcome page. Then click on \"Sign up Now\" on the right side of the page.     You will be asked to enter the access code listed below, as well as some personal information. Please follow the directions to create your username and password.     Your access code is: [email or web address]  Expires: 2018 12:30 AM     Your access code will  in 90 days. If you need help or a new code, please call your [facility] clinic or [phone].        Care EveryWhere ID     This is your Care EveryWhere ID. This could be used by other organizations to access your [facility] medical records  [id number]        Equal Access to Services     [facility]: Hadii umair Brito, waaxda chadwick, qaybnancy kaalantonette lee, matt middleton . So [facility] [phone].    ATENCIÓN: Si habla español, tiene a bernal disposición servicios gratuitos de asistencia lingüística. Hina al [phone].    We comply with applicable federal civil rights laws and [place] laws. We do not discriminate on the basis of race, color, national origin, age, disability, sex, sexual orientation, or gender identity.            After Visit Summary       This is your record. Keep this with you and show to your community pharmacist(s) and doctor(s) at your next visit.                  "

## 2018-04-05 NOTE — ED AVS SNAPSHOT
[facility] Emergency Department    [address] [place] [address]    Phone:  [phone]    Fax:  [phone]                                       Camilo Baca   MRN: [id number]    Department:  [facility] Emergency Department   Date of Visit:  4/5/2018           After Visit Summary Signature Page     I have received my discharge instructions, and my questions have been answered. I have discussed any challenges I see with this plan with the nurse or doctor.    ..........................................................................................................................................  Patient/Patient Representative Signature      ..........................................................................................................................................  Patient Representative Print Name and Relationship to Patient    ..................................................               ................................................  Date                                            Time    ..........................................................................................................................................  Reviewed by Signature/Title    ...................................................              ..............................................  Date                                                            Time

## 2018-04-06 VITALS
DIASTOLIC BLOOD PRESSURE: 78 MMHG | BODY MASS INDEX: 26.54 KG/M2 | OXYGEN SATURATION: 99 % | WEIGHT: 185 LBS | HEART RATE: 68 BPM | RESPIRATION RATE: 16 BRPM | TEMPERATURE: 98.1 F | SYSTOLIC BLOOD PRESSURE: 126 MMHG

## 2018-04-06 LAB
ANION GAP SERPL CALCULATED.3IONS-SCNC: 10 MMOL/L (ref 3–14)
BASOPHILS # BLD AUTO: 0 10E9/L (ref 0–0.2)
BASOPHILS NFR BLD AUTO: 0.3 %
BUN SERPL-MCNC: 32 MG/DL (ref 7–30)
CALCIUM SERPL-MCNC: 7.9 MG/DL (ref 8.5–10.1)
CHLORIDE SERPL-SCNC: 104 MMOL/L (ref 94–109)
CO2 SERPL-SCNC: 26 MMOL/L (ref 20–32)
CREAT SERPL-MCNC: 0.71 MG/DL (ref 0.66–1.25)
DIFFERENTIAL METHOD BLD: ABNORMAL
EOSINOPHIL # BLD AUTO: 0.1 10E9/L (ref 0–0.7)
EOSINOPHIL NFR BLD AUTO: 0.8 %
ERYTHROCYTE [DISTWIDTH] IN BLOOD BY AUTOMATED COUNT: 13.5 % (ref 10–15)
GFR SERPL CREATININE-BSD FRML MDRD: >90 ML/MIN/1.7M2
GLUCOSE SERPL-MCNC: 124 MG/DL (ref 70–99)
HCT VFR BLD AUTO: 40.6 % (ref 40–53)
HGB BLD-MCNC: 13.3 G/DL (ref 13.3–17.7)
IMM GRANULOCYTES # BLD: 0 10E9/L (ref 0–0.4)
IMM GRANULOCYTES NFR BLD: 0.3 %
LYMPHOCYTES # BLD AUTO: 1.3 10E9/L (ref 0.8–5.3)
LYMPHOCYTES NFR BLD AUTO: 17.1 %
MCH RBC QN AUTO: 32.5 PG (ref 26.5–33)
MCHC RBC AUTO-ENTMCNC: 32.8 G/DL (ref 31.5–36.5)
MCV RBC AUTO: 99 FL (ref 78–100)
MONOCYTES # BLD AUTO: 0.5 10E9/L (ref 0–1.3)
MONOCYTES NFR BLD AUTO: 7 %
NEUTROPHILS # BLD AUTO: 5.7 10E9/L (ref 1.6–8.3)
NEUTROPHILS NFR BLD AUTO: 74.5 %
PLATELET # BLD AUTO: 122 10E9/L (ref 150–450)
POTASSIUM SERPL-SCNC: 3.4 MMOL/L (ref 3.4–5.3)
RBC # BLD AUTO: 4.09 10E12/L (ref 4.4–5.9)
SODIUM SERPL-SCNC: 140 MMOL/L (ref 133–144)
TROPONIN I SERPL-MCNC: <0.015 UG/L (ref 0–0.04)
WBC # BLD AUTO: 7.7 10E9/L (ref 4–11)

## 2018-04-06 NOTE — ED NOTES
Pt walked to car without difficulties. Denies nausea. Drinking fluids. Pt denies chest pain or shortness of breath. Denies headache. Neuro intact.

## 2018-04-06 NOTE — ED PROVIDER NOTES
History     Chief Complaint   Patient presents with     Loss of Consciousness     HPI  Camilo Baca is a 67 year old male who presents after a syncopal episode at home.  Patient had a tooth extraction done earlier today and this evening it started oozing.  Patient had been trying to apply gauze and apply pressure to it to get it to stop bleeding.  He then started to get lightheaded and then passed out.  EMS was called and they brought the patient here for further evaluation.  In route patient did feel nauseous but currently feels back to normal.  Patient has absolutely no symptoms at the moment.  Patient denies headache, chest pain or shortness of breath.  Patient denies any back pain.    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 Provider to review       Advanced directives, counseling/discussion 07/16/2012     Priority: Medium     Idiopathic chronic gout without tophus, unspecified site 03/28/2011     Priority: Medium     History of adenomatous polyp of colon 11/15/2010     Priority: Medium     HYPERLIPIDEMIA LDL GOAL <130 10/31/2010     Priority: Medium     Lumbago 11/19/2002     Priority:  Medium     NONSPECIFIC MEDICAL HISTORY 11/19/2002     Priority: Medium     Factor 5 clotting problem          Past Medical History:    Past Medical History:   Diagnosis Date     Alopecia, unspecified      Closed fracture of unspecified part of humerus 1974     Esophageal reflux 2/6/2015     Factor 5 Leiden mutation, heterozygous (H)      Gout 3/28/2011     Herpes zoster without mention of complication 1965     Measles without mention of complication      Personal history of DVT (deep vein thrombosis) 4/9/2015     Personal history of venous thrombosis and embolism 4/9/2015     Pulmonary embolism (H) 12/1996     RA (rheumatoid arthritis) (H) 3/11/2013     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     Rheumatoid arthritis(714.0)      Secondary hypertension with goal blood pressure less than 140/90 11/15/2016     Thrombocytopenia, unspecified 1/29/2014     Varicella without mention of complication        Past Surgical History:    Past Surgical History:   Procedure Laterality Date     C APPENDECTOMY  1996     COLONOSCOPY  11/15/2010    COLONOSCOPY performed by DARIUS MESA at  GI     COLONOSCOPY N/A 11/2/2015    Procedure: COLONOSCOPY;  Surgeon: Bubba Odom MD;  Location:  GI     HC COLONOSCOPY W BIOPSY  08/10/05     HC REPAIR ING HERNIA,5+Y/O,REDUCIBL  1960     HERNIORRHAPHY UMBILICAL N/A 4/17/2015    Procedure: HERNIORRHAPHY UMBILICAL;  Surgeon: Rayray Avila MD;  Location:  OR     LAPAROSCOPIC HERNIORRHAPHY INGUINAL BILATERAL Bilateral 4/17/2015    Procedure: LAPAROSCOPIC HERNIORRHAPHY INGUINAL BILATERAL;  Surgeon: Rayray Avila MD;  Location:  OR       Family History:    Family History   Problem Relation Age of Onset     Arthritis Mother      Cardiovascular Mother      Depression Mother      GASTROINTESTINAL DISEASE Mother      IBS     Hypertension Mother      Circulatory Mother      Aortal aneurysm     OSTEOPOROSIS Mother       Allergies Father      Cortisone     Cardiovascular Father      Circulatory Father      DIABETES Father      Hypertension Father      Lipids Father      Alcohol/Drug Sister      Penicillin     Alzheimer Disease Paternal Grandfather      Blood Disease Paternal Grandmother      Clotting problems     Blood Disease Son      Factor 5     Hypertension Maternal Aunt      CEREBROVASCULAR DISEASE Maternal Grandmother        Social History:  Marital Status:   [2]  Social History   Substance Use Topics     Smoking status: Former Smoker     Packs/day: 0.25     Years: 2.00     Types: Cigarettes, Pipe     Quit date: 1/1/1979     Smokeless tobacco: Never Used     Alcohol use Yes      Comment: 5 drinks per week / wine        Medications:      atorvastatin (LIPITOR) 80 MG tablet   predniSONE (DELTASONE) 5 MG tablet   omeprazole (PRILOSEC) 20 MG CR capsule   losartan (COZAAR) 25 MG tablet   levothyroxine (SYNTHROID/LEVOTHROID) 25 MCG tablet   allopurinol (ZYLOPRIM) 300 MG tablet   Ascorbic Acid (VITAMIN C PO)   cholestyramine light (QUESTRAN) 4 GM Packet   cetirizine (ZYRTEC) 10 MG tablet   diclofenac (VOLTAREN) 1 % GEL   triamcinolone (KENALOG) 0.1 % cream   capsaicin (ZOSTRIX) 0.075 % topical cream   BABY ASPIRIN OR   allopurinol (ZYLOPRIM) 300 MG tablet   predniSONE (DELTASONE) 5 MG tablet   PREVALITE 4 G Packet   losartan (COZAAR) 25 MG tablet         Review of Systems   All other systems reviewed and are negative.      Physical Exam   BP: (!) 134/91  Pulse: 62  Heart Rate: 58  Temp: 98.1  F (36.7  C)  Resp: 18  Weight: 83.9 kg (185 lb)  SpO2: 93 %      Physical Exam   Constitutional: He is oriented to person, place, and time. He appears well-developed and well-nourished. No distress.   HENT:   Head: Normocephalic and atraumatic.   Nose: Nose normal.   Mouth/Throat: Oropharynx is clear and moist. No oropharyngeal exudate.   Eyes: Conjunctivae are normal. Pupils are equal, round, and reactive to light. No scleral icterus.    Neck: Normal range of motion.   Cardiovascular: Normal rate, regular rhythm, normal heart sounds and intact distal pulses.  Exam reveals no friction rub.    No murmur heard.  Pulmonary/Chest: Effort normal and breath sounds normal. No respiratory distress. He has no wheezes. He has no rales.   Abdominal: Soft. Bowel sounds are normal. He exhibits no distension and no mass. There is no tenderness. There is no rebound and no guarding.   Musculoskeletal: Normal range of motion. He exhibits no edema or tenderness.   Neurological: He is alert and oriented to person, place, and time.   Skin: Skin is warm. No rash noted. He is not diaphoretic.   Psychiatric: Judgment normal.   Nursing note and vitals reviewed.      ED Course     ED Course     Procedures                EKG Interpretation:      Interpreted by Nitish Cuevas  Time reviewed: now   Symptoms at time of EKG: now   Rhythm: normal sinus   Rate: normal  Axis: NORMAL  Ectopy: none  Conduction: normal  ST Segments/ T Waves: No ST-T wave changes  Q Waves: none  Comparison to prior: No old EKG available    Clinical Impression: normal EKG           Results for orders placed or performed during the hospital encounter of 04/05/18 (from the past 24 hour(s))   CBC with platelets differential   Result Value Ref Range    WBC 7.7 4.0 - 11.0 10e9/L    RBC Count 4.09 (L) 4.4 - 5.9 10e12/L    Hemoglobin 13.3 13.3 - 17.7 g/dL    Hematocrit 40.6 40.0 - 53.0 %    MCV 99 78 - 100 fl    MCH 32.5 26.5 - 33.0 pg    MCHC 32.8 31.5 - 36.5 g/dL    RDW 13.5 10.0 - 15.0 %    Platelet Count 122 (L) 150 - 450 10e9/L    Diff Method Automated Method     % Neutrophils 74.5 %    % Lymphocytes 17.1 %    % Monocytes 7.0 %    % Eosinophils 0.8 %    % Basophils 0.3 %    % Immature Granulocytes 0.3 %    Absolute Neutrophil 5.7 1.6 - 8.3 10e9/L    Absolute Lymphocytes 1.3 0.8 - 5.3 10e9/L    Absolute Monocytes 0.5 0.0 - 1.3 10e9/L    Absolute Eosinophils 0.1 0.0 - 0.7 10e9/L    Absolute  Basophils 0.0 0.0 - 0.2 10e9/L    Abs Immature Granulocytes 0.0 0 - 0.4 10e9/L   Basic metabolic panel   Result Value Ref Range    Sodium 140 133 - 144 mmol/L    Potassium 3.4 3.4 - 5.3 mmol/L    Chloride 104 94 - 109 mmol/L    Carbon Dioxide 26 20 - 32 mmol/L    Anion Gap 10 3 - 14 mmol/L    Glucose 124 (H) 70 - 99 mg/dL    Urea Nitrogen 32 (H) 7 - 30 mg/dL    Creatinine 0.71 0.66 - 1.25 mg/dL    GFR Estimate >90 >60 mL/min/1.7m2    GFR Estimate If Black >90 >60 mL/min/1.7m2    Calcium 7.9 (L) 8.5 - 10.1 mg/dL   Troponin I   Result Value Ref Range    Troponin I ES <0.015 0.000 - 0.045 ug/L       Medications   0.9% sodium chloride BOLUS (not administered)     Labs are reviewed and were mostly unremarkable.  Patient's BUN was slightly elevated, I think this could be related to the patient's swallowing actually some blood from the bleeding tooth earlier.  I think the patient's syncopal episode though was more of a vasovagal reaction.  Patient is been completely asymptomatic here.  At this point I think it is safe to discharge the patient home.  Tooth was monitored and there is no further bleeding noted while here.    Assessments & Plan (with Medical Decision Making)  Vasovagal syncope     I have reviewed the nursing notes.    I have reviewed the findings, diagnosis, plan and need for follow up with the patient.              4/5/2018   [facility] EMERGENCY DEPARTMENT     Nitish Cuevas MD  04/06/18 0024

## 2018-04-06 NOTE — ED NOTES
PT brought in by medics from home where he lives with his wife.  He had a dental extraction 1515 today and has been having trouble with some oozing with blood clots.  He does have a factor 5 bleeding disorder.  He was in the kitchen with his wife when he felt very warm, sweaty, and lightheaded.  His wife did lower him to the ground when he had an episode.  PT has not been able to eat r/t cont bleeding.  He has been having some nausea enroute and was given zofran 4mg IV.  IV RAC per medics.  BP (!) 134/91  Pulse 62  Temp 98.1  F (36.7  C) (Oral)  Resp 18  Wt 83.9 kg (185 lb)  SpO2 93%  BMI 26.54 kg/m2

## 2018-04-13 ENCOUNTER — INFUSION THERAPY VISIT (OUTPATIENT)
Dept: INFUSION THERAPY | Facility: CLINIC | Age: 68
End: 2018-04-13
Attending: INTERNAL MEDICINE
Payer: MEDICARE

## 2018-04-13 VITALS — DIASTOLIC BLOOD PRESSURE: 92 MMHG | SYSTOLIC BLOOD PRESSURE: 129 MMHG | HEART RATE: 65 BPM | TEMPERATURE: 98.6 F

## 2018-04-13 DIAGNOSIS — M05.712 RHEUMATOID ARTHRITIS INVOLVING BOTH SHOULDERS WITH POSITIVE RHEUMATOID FACTOR (H): Primary | ICD-10-CM

## 2018-04-13 DIAGNOSIS — M05.711 RHEUMATOID ARTHRITIS INVOLVING BOTH SHOULDERS WITH POSITIVE RHEUMATOID FACTOR (H): Primary | ICD-10-CM

## 2018-04-13 PROCEDURE — 40000268 ZZH STATISTIC NO CHARGES

## 2018-04-13 NOTE — PATIENT INSTRUCTIONS
Pt to return on 4/17/18 for remicade. Copies of medication list and upcoming appointments given prior to discharge.

## 2018-04-13 NOTE — NURSING NOTE

## 2018-04-13 NOTE — PROGRESS NOTES
Patient here today for Remicade infusion. Dose increased after seen by Rheumatologist last month. Completed tapering prednisone dose on 4/10. Patient states he had tooth abcess on 4/5 which was extracted then started antibiotics. States he will be taking last antibiotic tomorrow am. No fevers, pain and mouth feels good. DR. Gomes paged and discussed above. Plan to hold dose today and reschedule 3 days after last antibiotic. Patient updated and agrees to plan. Discharged to home. Return on 4/17 for remicade infusion.

## 2018-04-13 NOTE — MR AVS SNAPSHOT
After Visit Summary   4/13/2018    Camilo Baca    MRN: [id number]           Patient Information     Date Of Birth          1950        Visit Information        Provider Department      4/13/2018 9:00 AM NL INFUSION CHAIR 4 [facility]        Today's Diagnoses     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)    -  1      Care Instructions    Pt to return on 4/17/18 for remicade. Copies of medication list and upcoming appointments given prior to discharge.           Follow-ups after your visit        Your next 10 appointments already scheduled     Apr 17, 2018  1:00 PM CDT   Level 4 with NL INFUSION CHAIR 4   [facility] ([facility])    [address] [place] [address] [address] [place] [address]   [phone]            Jun 26, 2018  9:20 AM CDT   Return Visit with Fabricio Gomes MD   [facility] ([facility])    [address] [facility] [id number] [phone]              Who to contact     If you have questions or need follow up information about today's clinic visit or your schedule please contact [facility] directly at [phone].  Normal or non-critical lab and imaging results will be communicated to you by MyChart, letter or phone within 4 business days after the clinic has received the results. If you do not hear from us within 7 days, please contact the clinic through MyChart or phone. If you have a critical or abnormal lab result, we will notify you by phone as soon as possible.  Submit refill requests through Artaic or call your pharmacy and they will forward the refill request to us. Please allow 3 business days for your refill to be completed.          Additional Information About Your Visit        GigaMediahart Information     Artaic lets you send messages to your doctor, view your test results, renew your  "prescriptions, schedule appointments and more. To sign up, go to [email or web address] . Click on \"Log in\" on the left side of the screen, which will take you to the Welcome page. Then click on \"Sign up Now\" on the right side of the page.     You will be asked to enter the access code listed below, as well as some personal information. Please follow the directions to create your username and password.     Your access code is: [email or web address]  Expires: 2018 12:30 AM     Your access code will  in 90 days. If you need help or a new code, please call your [facility] clinic or [phone].        Care EveryWhere ID     This is your Care EveryWhere ID. This could be used by other organizations to access your [facility] medical records  [id number]        Your Vitals Were     Pulse Temperature                65 98.6  F (37  C) (Oral)           Blood Pressure from Last 3 Encounters:   18 (!) 129/92   18 126/78   18 (!) 161/95    Weight from Last 3 Encounters:   18 83.9 kg (185 lb)   18 85.1 kg (187 lb 9.6 oz)   18 87.4 kg (192 lb 9.6 oz)              Today, you had the following     No orders found for display       Primary Care Provider Office Phone # Fax #    DIANN Presley [phone]       [address] [facility] [address]        Equal Access to Services     [facility] SASHA : Hadii umair ku hadasho Somonicaali, waaxda luqadaha, qaybta kaalmada adeegyada, matt middleton . So [facility] [phone].    ATENCIÓN: Si habla español, tiene a bernal disposición servicios gratuitos de asistencia lingüística. Llame al [phone].    We comply with applicable federal civil rights laws and [place] laws. We do not discriminate on the basis of race, color, national origin, age, disability, sex, sexual orientation, or gender identity.            Thank you!     Thank you for choosing [facility]  for your care. Our goal is always " to provide you with excellent care. Hearing back from our patients is one way we can continue to improve our services. Please take a few minutes to complete the written survey that you may receive in the mail after your visit with us. Thank you!             Your Updated Medication List - Protect others around you: Learn how to safely use, store and throw away your medicines at www.disposemymeds.org.          This list is accurate as of 4/13/18 11:26 AM.  Always use your most recent med list.                   Brand Name Dispense Instructions for use Diagnosis    * allopurinol 300 MG tablet    ZYLOPRIM    90 tablet    TAKE ONE TABLET BY MOUTH EVERY DAY    Acute gouty arthropathy       * allopurinol 300 MG tablet    ZYLOPRIM    90 tablet    TAKE ONE TABLET BY MOUTH ONCE DAILY    Acute gouty arthropathy       amoxicillin-clavulanate 875-125 MG per tablet    AUGMENTIN     Take 1 tablet by mouth 2 times daily        atorvastatin 80 MG tablet    LIPITOR    90 tablet    Take 1 tablet (80 mg) by mouth daily    Hyperlipidemia LDL goal <130       BABY ASPIRIN PO      daily        capsaicin 0.075 % cream    ZOSTRIX    50 g    Apply  topically 3 times daily.    Bursitis of shoulder       cetirizine 10 MG tablet    zyrTEC    90 tablet    Take 1 tablet (10 mg) by mouth every evening    Seasonal allergic rhinitis, unspecified allergic rhinitis trigger       * cholestyramine light 4 GM Packet    QUESTRAN    60 packet    DISSOLVE ONE PACKET IN LIQUID AS DIRECTED TWO TIMES A DAY AND DRINK    Hyperlipidemia LDL goal <130       * PREVALITE 4 GM Packet   Generic drug:  cholestyramine light     60 packet    DISSOLVE ONE PACKET IN LIQUID AS DIRECTED TWO TIMES A DAY AND DRINK    Hyperlipidemia LDL goal <130       diclofenac 1 % Gel topical gel    VOLTAREN    100 g    Apply  2 grams to shoulders three times daily using enclosed dosing card.    Rotator cuff tendinitis       levothyroxine 25 MCG tablet    SYNTHROID/LEVOTHROID    90 tablet     TAKE ONE TABLET BY MOUTH EVERY DAY    Hypothyroidism due to acquired atrophy of thyroid       * losartan 25 MG tablet    COZAAR    30 tablet    TAKE ONE TABLET BY MOUTH EVERY DAY    Benign essential hypertension       * losartan 25 MG tablet    COZAAR    30 tablet    TAKE ONE TABLET BY MOUTH EVERY DAY    Benign essential hypertension       omeprazole 20 MG CR capsule    priLOSEC    90 capsule    TAKE ONE CAPSULE BY MOUTH ONCE DAILY -TAKE 30-60 MINUTES BEFORE A MEAL    Gastroesophageal reflux disease without esophagitis       * predniSONE 5 MG tablet    DELTASONE    50 tablet    Prednisone 20mg daily x5days, then 15mg daily x5days, then 10mg daily x5days, then 5mg daily x5days, then stop.    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)       * predniSONE 5 MG tablet    DELTASONE    60 tablet    Prednisone 5-10mg daily; please use the lowest effective dose and stop if able.    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)       triamcinolone 0.1 % cream    KENALOG    60 g    Locally bid    Rash       VITAMIN C PO      Take by mouth daily        * Notice:  This list has 8 medication(s) that are the same as other medications prescribed for you. Read the directions carefully, and ask your doctor or other care provider to review them with you.

## 2018-04-17 ENCOUNTER — INFUSION THERAPY VISIT (OUTPATIENT)
Dept: INFUSION THERAPY | Facility: CLINIC | Age: 68
End: 2018-04-17
Attending: INTERNAL MEDICINE
Payer: MEDICARE

## 2018-04-17 VITALS
OXYGEN SATURATION: 97 % | WEIGHT: 191.7 LBS | BODY MASS INDEX: 27.51 KG/M2 | TEMPERATURE: 97.3 F | RESPIRATION RATE: 18 BRPM | SYSTOLIC BLOOD PRESSURE: 133 MMHG | DIASTOLIC BLOOD PRESSURE: 74 MMHG | HEART RATE: 66 BPM

## 2018-04-17 DIAGNOSIS — M05.712 RHEUMATOID ARTHRITIS INVOLVING BOTH SHOULDERS WITH POSITIVE RHEUMATOID FACTOR (H): Primary | ICD-10-CM

## 2018-04-17 DIAGNOSIS — M05.711 RHEUMATOID ARTHRITIS INVOLVING BOTH SHOULDERS WITH POSITIVE RHEUMATOID FACTOR (H): Primary | ICD-10-CM

## 2018-04-17 PROCEDURE — 96415 CHEMO IV INFUSION ADDL HR: CPT

## 2018-04-17 PROCEDURE — 25000128 H RX IP 250 OP 636: Performed by: INTERNAL MEDICINE

## 2018-04-17 PROCEDURE — 96413 CHEMO IV INFUSION 1 HR: CPT

## 2018-04-17 RX ADMIN — SODIUM CHLORIDE 250 ML: 9 INJECTION, SOLUTION INTRAVENOUS at 13:20

## 2018-04-17 RX ADMIN — INFLIXIMAB 500 MG: 100 INJECTION, POWDER, LYOPHILIZED, FOR SOLUTION INTRAVENOUS at 14:33

## 2018-04-17 ASSESSMENT — PAIN SCALES - GENERAL: PAINLEVEL: NO PAIN (0)

## 2018-04-17 NOTE — MR AVS SNAPSHOT
After Visit Summary   4/17/2018    Camilo Baca    MRN: [id number]           Patient Information     Date Of Birth          1950        Visit Information        Provider Department      4/17/2018 1:00 PM NL INFUSION CHAIR 4 [facility]        Today's Diagnoses     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)    -  1      Care Instructions    Pt to return on 6/12/18 for Remicade. Copies of medication list and upcoming appointments given prior to discharge.     EDUCATION POST BIOLOGICAL/CHEMOTHERAPY INFUSION  Call the triage nurse at your clinic or seek medical attention if you have chills and/or temperature greater than or equal to 100.5, uncontrolled nausea/vomiting, diarrhea, constipation, dizziness, shortness of breath, chest pain, heart palpitations, weakness or any other new or concerning symptoms, questions or concerns.  You can not have any live virus vaccines prior to or during treatment or up to 6 months post infusion.  If you have an upcoming surgery, medical procedure or dental procedure during treatment, this should be discussed with your ordering physician and your surgeon/dentist.  If you are having any concerning symptom, if you are unsure if you should get your next infusion or wish to speak to a provider before your next infusion, please call your care coordinator or triage nurse at your clinic to notify them so we can adequately serve you.            Follow-ups after your visit        Follow-up notes from your care team     Return in about 8 weeks (around 6/12/2018).      Your next 10 appointments already scheduled     Jun 12, 2018  8:00 AM CDT   Level 4 with NL INFUSION CHAIR 3   [facility] ([facility])    [address] [place] [address] Dr Dee BAUTISTA [address]   [phone]            Jun 26, 2018  9:20 AM CDT   Return Visit with Fabricio Gomes MD   [facility]  "([facility])    [address] [facility] [address]   [phone]            Aug 07, 2018  8:00 AM CDT   Level 4 with NL INFUSION CHAIR 5   [facility] ([facility])    [address] [place] [address] Dr Dee BAUTISTA [id number] [phone]              Who to contact     If you have questions or need follow up information about today's clinic visit or your schedule please contact [facility] INF[facility] directly at [phone].  Normal or non-critical lab and imaging results will be communicated to you by RevolutionCredithart, letter or phone within 4 business days after the clinic has received the results. If you do not hear from us within 7 days, please contact the clinic through RevolutionCredithart or phone. If you have a critical or abnormal lab result, we will notify you by phone as soon as possible.  Submit refill requests through Information Gateway or call your pharmacy and they will forward the refill request to us. Please allow 3 business days for your refill to be completed.          Additional Information About Your Visit        MyChart Information     Information Gateway lets you send messages to your doctor, view your test results, renew your prescriptions, schedule appointments and more. To sign up, go to [email or web address] . Click on \"Log in\" on the left side of the screen, which will take you to the Welcome page. Then click on \"Sign up Now\" on the right side of the page.     You will be asked to enter the access code listed below, as well as some personal information. Please follow the directions to create your username and password.     Your access code is: [email or web address]  Expires: 2018 12:30 AM     Your access code will  in 90 days. If you need help or a new code, please call your [facility] clinic or [phone].        Care EveryWhere ID     This is your Care EveryWhere ID. This could be used by other organizations to access your [facility] medical " records  [id number]        Your Vitals Were     Pulse Temperature Respirations Pulse Oximetry BMI (Body Mass Index)       66 97.3  F (36.3  C) (Temporal) 18 97% 27.51 kg/m2        Blood Pressure from Last 3 Encounters:   04/17/18 133/74   04/13/18 (!) 129/92   04/06/18 126/78    Weight from Last 3 Encounters:   04/17/18 87 kg (191 lb 11.2 oz)   04/05/18 83.9 kg (185 lb)   03/20/18 85.1 kg (187 lb 9.6 oz)              Today, you had the following     No orders found for display       Primary Care Provider Office Phone # Fax #    Lyndsey KernDIANN bliss [phone]       [address] [facility] [address]        Equal Access to Services     BECCA BAEZ : Hadii aad ku hadasho Sojackie, waaxda luqadaha, qaybta kaalmada adebonillayakarley, matt middleton . So [facility] [phone].    ATENCIÓN: Si habla español, tiene a bernal disposición servicios gratuitos de asistencia lingüística. Hina al [phone].    We comply with applicable federal civil rights laws and [place] laws. We do not discriminate on the basis of race, color, national origin, age, disability, sex, sexual orientation, or gender identity.            Thank you!     Thank you for choosing [facility]  for your care. Our goal is always to provide you with excellent care. Hearing back from our patients is one way we can continue to improve our services. Please take a few minutes to complete the written survey that you may receive in the mail after your visit with us. Thank you!             Your Updated Medication List - Protect others around you: Learn how to safely use, store and throw away your medicines at www.disposemymeds.org.          This list is accurate as of 4/17/18  5:02 PM.  Always use your most recent med list.                   Brand Name Dispense Instructions for use Diagnosis    * allopurinol 300 MG tablet    ZYLOPRIM    90 tablet    TAKE ONE TABLET BY MOUTH EVERY DAY    Acute gouty  arthropathy       * allopurinol 300 MG tablet    ZYLOPRIM    90 tablet    TAKE ONE TABLET BY MOUTH ONCE DAILY    Acute gouty arthropathy       amoxicillin-clavulanate 875-125 MG per tablet    AUGMENTIN     Take 1 tablet by mouth 2 times daily        atorvastatin 80 MG tablet    LIPITOR    90 tablet    Take 1 tablet (80 mg) by mouth daily    Hyperlipidemia LDL goal <130       BABY ASPIRIN PO      daily        capsaicin 0.075 % cream    ZOSTRIX    50 g    Apply  topically 3 times daily.    Bursitis of shoulder       cetirizine 10 MG tablet    zyrTEC    90 tablet    Take 1 tablet (10 mg) by mouth every evening    Seasonal allergic rhinitis, unspecified allergic rhinitis trigger       * cholestyramine light 4 GM Packet    QUESTRAN    60 packet    DISSOLVE ONE PACKET IN LIQUID AS DIRECTED TWO TIMES A DAY AND DRINK    Hyperlipidemia LDL goal <130       * PREVALITE 4 GM Packet   Generic drug:  cholestyramine light     60 packet    DISSOLVE ONE PACKET IN LIQUID AS DIRECTED TWO TIMES A DAY AND DRINK    Hyperlipidemia LDL goal <130       diclofenac 1 % Gel topical gel    VOLTAREN    100 g    Apply  2 grams to shoulders three times daily using enclosed dosing card.    Rotator cuff tendinitis       levothyroxine 25 MCG tablet    SYNTHROID/LEVOTHROID    90 tablet    TAKE ONE TABLET BY MOUTH EVERY DAY    Hypothyroidism due to acquired atrophy of thyroid       * losartan 25 MG tablet    COZAAR    30 tablet    TAKE ONE TABLET BY MOUTH EVERY DAY    Benign essential hypertension       * losartan 25 MG tablet    COZAAR    30 tablet    TAKE ONE TABLET BY MOUTH EVERY DAY    Benign essential hypertension       omeprazole 20 MG CR capsule    priLOSEC    90 capsule    TAKE ONE CAPSULE BY MOUTH ONCE DAILY -TAKE 30-60 MINUTES BEFORE A MEAL    Gastroesophageal reflux disease without esophagitis       * predniSONE 5 MG tablet    DELTASONE    50 tablet    Prednisone 20mg daily x5days, then 15mg daily x5days, then 10mg daily x5days, then 5mg  daily x5days, then stop.    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)       * predniSONE 5 MG tablet    DELTASONE    60 tablet    Prednisone 5-10mg daily; please use the lowest effective dose and stop if able.    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)       triamcinolone 0.1 % cream    KENALOG    60 g    Locally bid    Rash       VITAMIN C PO      Take by mouth daily        * Notice:  This list has 8 medication(s) that are the same as other medications prescribed for you. Read the directions carefully, and ask your doctor or other care provider to review them with you.

## 2018-04-17 NOTE — PROGRESS NOTES
Patient here for Remicade, dose increased after seen by DR. Gomes.  Tolerated infusion well with ramp rate. VSS. Discharged in stable condition. Return in 8 weeks.

## 2018-04-17 NOTE — PATIENT INSTRUCTIONS
Pt to return on 6/12/18 for Remicade. Copies of medication list and upcoming appointments given prior to discharge.     EDUCATION POST BIOLOGICAL/CHEMOTHERAPY INFUSION  Call the triage nurse at your clinic or seek medical attention if you have chills and/or temperature greater than or equal to 100.5, uncontrolled nausea/vomiting, diarrhea, constipation, dizziness, shortness of breath, chest pain, heart palpitations, weakness or any other new or concerning symptoms, questions or concerns.  You can not have any live virus vaccines prior to or during treatment or up to 6 months post infusion.  If you have an upcoming surgery, medical procedure or dental procedure during treatment, this should be discussed with your ordering physician and your surgeon/dentist.  If you are having any concerning symptom, if you are unsure if you should get your next infusion or wish to speak to a provider before your next infusion, please call your care coordinator or triage nurse at your clinic to notify them so we can adequately serve you.

## 2018-04-17 NOTE — NURSING NOTE

## 2018-05-08 ENCOUNTER — TELEPHONE (OUTPATIENT)
Dept: RHEUMATOLOGY | Facility: CLINIC | Age: 68
End: 2018-05-08

## 2018-05-08 DIAGNOSIS — M05.711 RHEUMATOID ARTHRITIS INVOLVING BOTH SHOULDERS WITH POSITIVE RHEUMATOID FACTOR (H): Primary | ICD-10-CM

## 2018-05-08 DIAGNOSIS — M05.712 RHEUMATOID ARTHRITIS INVOLVING BOTH SHOULDERS WITH POSITIVE RHEUMATOID FACTOR (H): Primary | ICD-10-CM

## 2018-05-08 RX ORDER — PREDNISONE 5 MG/1
5-10 TABLET ORAL DAILY PRN
Qty: 180 TABLET | Refills: 1 | Status: SHIPPED | OUTPATIENT
Start: 2018-05-08 | End: 2018-08-10

## 2018-05-08 NOTE — TELEPHONE ENCOUNTER
Rheumatology Telephone Note:    I called and spoke with Mr. Baca.  Worsening arthritis symptoms despite having received the higher dose of Remicade once 3 weeks ago; it worked well until just recently.  Remicade is currently at 5 mg/kg every 8 weeks.  He responded well to prednisone but is out of it.  He reports that prednisone 10 mg daily was helpful but 5 mg daily was not.  Therefore, he may continue prednisone at 5-10 mg daily as needed for rheumatoid arthritis symptoms and advised that he use the lowest effective dose.  Increase Remicade to 7.5 mg/kg every 6 weeks.  We also discussed changing to Simponi or Orencia; but will stay on Remicade for now since it is effective but wears off too soon.    # Prednisone Risks and Benefits: The risks and benefits of prednisone were discussed in detail and the patient verbalized understanding.  The risks discussed include, but are not limited to, weight gain, fluid retention, impaired wound healing, hyperglycemia, adrenal suppression, GI upset, peptic ulcer, hepatotoxicity, aseptic necrosis of the femoral and humeral heads, osteoporosis, myopathy, tendon rupture (particularly Achilles tendon), ocular changes including an increased intraocular pressure.  I encouraged reviewing the package insert and asking any questions about the medication.      All questions were answered and he thanked me for the call.     Fabricio Gomes MD  5/8/2018 2:41 PM

## 2018-05-08 NOTE — TELEPHONE ENCOUNTER
Reason for Call:  Other call back    Detailed comments: Patient just had a dose increase in remicade  infusion  X 3 weeks now feet ankles wrists and  hands swelling. Wonders about change in dosage. Infusion scheduled 06/12/18 and sees dr wood 06/26/18. Please advise     Phone Number Patient can be reached at: Home number on file [phone] (home)    Best Time: ASAP    Can we leave a detailed message on this number? YES    Call taken on 5/8/2018 at 10:35 AM by Nereida Pradhan

## 2018-05-08 NOTE — TELEPHONE ENCOUNTER
"Spoke to patient's wife Jacki  She states pt has swelling of ankles, wrists, and hands x 3 wks  Denies any redness, warmth, pain.   Not worse in morning or w/activity  Finished prednisone last month  Last infusion was 4/17/18, 500 mg Remicaide  Wife wondering if dosing needs adjustment  Last chart note stated\" If the higher dose of Remicade is not effective, the dose may be increased again or change to Orencia\"  Pt has next infusion 6/12/18, next office visit 6/26/18  Routed message to provider.    Avery Cuellar RN....5/8/2018 12:24 PM     "

## 2018-06-01 ENCOUNTER — INFUSION THERAPY VISIT (OUTPATIENT)
Dept: INFUSION THERAPY | Facility: CLINIC | Age: 68
End: 2018-06-01
Attending: INTERNAL MEDICINE
Payer: MEDICARE

## 2018-06-01 VITALS
SYSTOLIC BLOOD PRESSURE: 138 MMHG | BODY MASS INDEX: 28.34 KG/M2 | WEIGHT: 197.5 LBS | OXYGEN SATURATION: 96 % | RESPIRATION RATE: 18 BRPM | DIASTOLIC BLOOD PRESSURE: 79 MMHG | TEMPERATURE: 96.8 F | HEART RATE: 63 BPM

## 2018-06-01 DIAGNOSIS — M05.711 RHEUMATOID ARTHRITIS INVOLVING BOTH SHOULDERS WITH POSITIVE RHEUMATOID FACTOR (H): Primary | ICD-10-CM

## 2018-06-01 DIAGNOSIS — M05.712 RHEUMATOID ARTHRITIS INVOLVING BOTH SHOULDERS WITH POSITIVE RHEUMATOID FACTOR (H): Primary | ICD-10-CM

## 2018-06-01 PROCEDURE — 25000128 H RX IP 250 OP 636: Performed by: INTERNAL MEDICINE

## 2018-06-01 PROCEDURE — 96413 CHEMO IV INFUSION 1 HR: CPT

## 2018-06-01 PROCEDURE — 96415 CHEMO IV INFUSION ADDL HR: CPT

## 2018-06-01 RX ADMIN — INFLIXIMAB 700 MG: 100 INJECTION, POWDER, LYOPHILIZED, FOR SOLUTION INTRAVENOUS at 10:26

## 2018-06-01 RX ADMIN — SODIUM CHLORIDE 250 ML: 9 INJECTION, SOLUTION INTRAVENOUS at 10:18

## 2018-06-01 ASSESSMENT — PAIN SCALES - GENERAL: PAINLEVEL: NO PAIN (0)

## 2018-06-01 NOTE — MR AVS SNAPSHOT
After Visit Summary   6/1/2018    Camilo Baca    MRN: [id number]           Patient Information     Date Of Birth          1950        Visit Information        Provider Department      6/1/2018 9:30 AM NL INFUSION CHAIR 5 [facility]        Today's Diagnoses     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)    -  1       Follow-ups after your visit        Your next 10 appointments already scheduled     Jun 26, 2018  9:20 AM CDT   Return Visit with Fabricio Gomes MD   [facility] ([facility])    [address] [place] [id number]   [phone]            Jul 13, 2018  8:00 AM CDT   Level 4 with NL INFUSION CHAIR 2   [facility] ([facility])    [address] [place] [address] [address] [place] [id number] [phone]            Aug 24, 2018  8:00 AM CDT   Level 4 with NL INFUSION CHAIR 5   [facility] ([facility])    [address] [place] [address] [address] [place] [id number] [phone]              Who to contact     If you have questions or need follow up information about today's clinic visit or your schedule please contact [facility] directly at [phone].  Normal or non-critical lab and imaging results will be communicated to you by [employer], letter or phone within 4 business days after the clinic has received the results. If you do not hear from us within 7 days, please contact the clinic through [employer] or phone. If you have a critical or abnormal lab result, we will notify you by phone as soon as possible.  Submit refill requests through [employer] or call your pharmacy and they will forward the refill request to us. Please allow 3 business days for your refill to be completed.          Additional Information About Your Visit        [employer] Information     [employer] lets you send messages to your  "doctor, view your test results, renew your prescriptions, schedule appointments and more. To sign up, go to [email or web address] . Click on \"Log in\" on the left side of the screen, which will take you to the Welcome page. Then click on \"Sign up Now\" on the right side of the page.     You will be asked to enter the access code listed below, as well as some personal information. Please follow the directions to create your username and password.     Your access code is: [email or web address]  Expires: 2018 12:30 AM     Your access code will  in 90 days. If you need help or a new code, please call your [place] clinic or [phone].        Care EveryWhere ID     This is your Care EveryWhere ID. This could be used by other organizations to access your [place] medical records  [id number]        Your Vitals Were     Pulse Temperature Respirations Pulse Oximetry BMI (Body Mass Index)       63 96.8  F (36  C) (Temporal) 18 96% 28.34 kg/m2        Blood Pressure from Last 3 Encounters:   18 138/79   18 133/74   18 (!) 129/92    Weight from Last 3 Encounters:   18 89.6 kg (197 lb 8 oz)   18 87 kg (191 lb 11.2 oz)   18 83.9 kg (185 lb)              Today, you had the following     No orders found for display       Primary Care Provider Office Phone # Fax #    DIANN Presley [phone]       [address] [facility] [address]        Equal Access to Services     [facility] SASHA : Hadii umair Brito, washebada chadwick, qaybta kaalmatt goodman. So [facility] [phone].    ATENCIÓN: Si habla español, tiene a bernal disposición servicios gratuitos de asistencia lingüística. Llame al [phone].    We comply with applicable federal civil rights laws and [place] laws. We do not discriminate on the basis of race, color, national origin, age, disability, sex, sexual orientation, or gender identity.            Thank you!  "    Thank you for choosing [facility]  for your care. Our goal is always to provide you with excellent care. Hearing back from our patients is one way we can continue to improve our services. Please take a few minutes to complete the written survey that you may receive in the mail after your visit with us. Thank you!             Your Updated Medication List - Protect others around you: Learn how to safely use, store and throw away your medicines at www.disposemymeds.org.          This list is accurate as of 6/1/18 12:51 PM.  Always use your most recent med list.                   Brand Name Dispense Instructions for use Diagnosis    * allopurinol 300 MG tablet    ZYLOPRIM    90 tablet    TAKE ONE TABLET BY MOUTH EVERY DAY    Acute gouty arthropathy       * allopurinol 300 MG tablet    ZYLOPRIM    90 tablet    TAKE ONE TABLET BY MOUTH ONCE DAILY    Acute gouty arthropathy       amoxicillin-clavulanate 875-125 MG per tablet    AUGMENTIN     Take 1 tablet by mouth 2 times daily        atorvastatin 80 MG tablet    LIPITOR    90 tablet    Take 1 tablet (80 mg) by mouth daily    Hyperlipidemia LDL goal <130       BABY ASPIRIN PO      daily        capsaicin 0.075 % cream    ZOSTRIX    50 g    Apply  topically 3 times daily.    Bursitis of shoulder       cetirizine 10 MG tablet    zyrTEC    90 tablet    Take 1 tablet (10 mg) by mouth every evening    Seasonal allergic rhinitis, unspecified allergic rhinitis trigger       * cholestyramine light 4 GM Packet    QUESTRAN    60 packet    DISSOLVE ONE PACKET IN LIQUID AS DIRECTED TWO TIMES A DAY AND DRINK    Hyperlipidemia LDL goal <130       * PREVALITE 4 GM Packet   Generic drug:  cholestyramine light     60 packet    DISSOLVE ONE PACKET IN LIQUID AS DIRECTED TWO TIMES A DAY AND DRINK    Hyperlipidemia LDL goal <130       diclofenac 1 % Gel topical gel    VOLTAREN    100 g    Apply  2 grams to shoulders three times daily using enclosed dosing card.     Rotator cuff tendinitis       levothyroxine 25 MCG tablet    SYNTHROID/LEVOTHROID    90 tablet    TAKE ONE TABLET BY MOUTH EVERY DAY    Hypothyroidism due to acquired atrophy of thyroid       * losartan 25 MG tablet    COZAAR    30 tablet    TAKE ONE TABLET BY MOUTH EVERY DAY    Benign essential hypertension       * losartan 25 MG tablet    COZAAR    30 tablet    TAKE ONE TABLET BY MOUTH EVERY DAY    Benign essential hypertension       omeprazole 20 MG CR capsule    priLOSEC    90 capsule    TAKE ONE CAPSULE BY MOUTH ONCE DAILY -TAKE 30-60 MINUTES BEFORE A MEAL    Gastroesophageal reflux disease without esophagitis       * predniSONE 5 MG tablet    DELTASONE    50 tablet    Prednisone 20mg daily x5days, then 15mg daily x5days, then 10mg daily x5days, then 5mg daily x5days, then stop.    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)       * predniSONE 5 MG tablet    DELTASONE    60 tablet    Prednisone 5-10mg daily; please use the lowest effective dose and stop if able.    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)       * predniSONE 5 MG tablet    DELTASONE    180 tablet    Take 1-2 tablets (5-10 mg) by mouth daily as needed for rheumatoid arthritis symptoms    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)       triamcinolone 0.1 % cream    KENALOG    60 g    Locally bid    Rash       VITAMIN C PO      Take by mouth daily        * Notice:  This list has 9 medication(s) that are the same as other medications prescribed for you. Read the directions carefully, and ask your doctor or other care provider to review them with you.

## 2018-06-01 NOTE — PROGRESS NOTES
"Infusion Nursing Note:  Camilo Baca presents today for Remicade.    Patient seen by provider today: No   present during visit today: Not Applicable.    Note: No premeds. Dose and frequency increase noted.    Intravenous Access:  Peripheral IV placed.    Treatment Conditions:  Results reviewed, labs MET treatment parameters, ok to proceed with treatment.  Rheumatology Infusion Checklist: PRIOR TO INFUSION OF BIOLOGICAL MEDICATIONS OR ANY OF THESE AS LISTED: Remicaide (infliximab) \".rheumbiologicalchecklist\"    Prior to Infusion of biological medications or any of these as listed:    1. Elevated temperature, fever, chills, productive cough or abnormal vital signs, night sweats, coughing up blood or sputum, no appetite or abnormal vital signs : NO    2. Open wounds or new incisions: NO    3. Recent hospitalization: NO    4.  Recent surgeries:  NO    5. Any upcoming surgeries or dental procedures?:NO    6. Any current or recent bouts of illness or infection? On any antibiotics? : NO    7. Any new, sudden or worsening abdominal pain :NO    8. Vaccination within 4 weeks? Patient or someone in the household is scheduled to receive vaccination? No live virus vaccines prior to or during treatment :NO    9. Any nervous system diseases [i.e. multiple sclerosis, Guillain-Mather, seizures, neurological  changes]: NO    10. Pregnant or breast feeding; or plans on pregnancy in the future: NO    11. Signs of worsening depression or suicidal ideations while taking benlysta:NO    12. New-onset medical symptoms: NO    13.  New cancer diagnosis or on chemotherapy or radiation NO    14.  Evaluate for any sign of active TB [Unexplained weight loss, Loss of appetite, Night sweats, Fever, Fatigue, Chills, Coughing for 3 weeks or longer, Hemoptysis (coughing up blood), Chest pain]: NO    **Note: If answered yes to any of the above, hold the infusion and contact ordering rheumatologist or on-call rheumatologist.   .      Post " Infusion Assessment:  Patient tolerated infusion without incident.  Patient observed for 15 minutes post remicade per protocol.  Blood return noted pre and post infusion.  Site patent and intact, free from redness, edema or discomfort.  Access discontinued per protocol.  Rheumatology Post Infusion: POST-INFUSION OF BIOLOGICAL MEDICATION:    Reviewed with patient.  Given biologic medication or medication hand-out. Inform patient if any fever, chills or signs of infection, new symptoms, abdominal pain, heart palpitations, shortness of breath, reaction, weakness, neurological changes, seek medical attention immediately and should not receive infusions. No live virus vaccines prior to or during treatment or up to 6 months post infusion. If the patient has an upcoming procedure or surgery, this should be discussed with the rheumatologist and surgeon or provider..    Discharge Plan:   Discharge instructions reviewed with: Patient.  Patient and/or family verbalized understanding of discharge instructions and all questions answered.  Copy of AVS reviewed with patient and/or family.  Patient will return 7/13/18 for next appointment.  Patient discharged in stable condition accompanied by: self.  Departure Mode: Ambulatory.    Charo Muñiz RN

## 2018-06-26 ENCOUNTER — OFFICE VISIT (OUTPATIENT)
Dept: RHEUMATOLOGY | Facility: CLINIC | Age: 68
End: 2018-06-26
Payer: COMMERCIAL

## 2018-06-26 VITALS
HEIGHT: 70 IN | WEIGHT: 196.2 LBS | BODY MASS INDEX: 28.09 KG/M2 | DIASTOLIC BLOOD PRESSURE: 92 MMHG | TEMPERATURE: 97.7 F | HEART RATE: 68 BPM | OXYGEN SATURATION: 98 % | RESPIRATION RATE: 16 BRPM | SYSTOLIC BLOOD PRESSURE: 149 MMHG

## 2018-06-26 DIAGNOSIS — M05.79 RHEUMATOID ARTHRITIS INVOLVING MULTIPLE SITES WITH POSITIVE RHEUMATOID FACTOR (H): Primary | ICD-10-CM

## 2018-06-26 LAB
ALBUMIN SERPL-MCNC: 3.4 G/DL (ref 3.4–5)
ALP SERPL-CCNC: 68 U/L (ref 40–150)
ALT SERPL W P-5'-P-CCNC: 50 U/L (ref 0–70)
AST SERPL W P-5'-P-CCNC: 40 U/L (ref 0–45)
BASOPHILS # BLD AUTO: 0 10E9/L (ref 0–0.2)
BASOPHILS NFR BLD AUTO: 0.2 %
BILIRUB DIRECT SERPL-MCNC: 0.3 MG/DL (ref 0–0.2)
BILIRUB SERPL-MCNC: 1.2 MG/DL (ref 0.2–1.3)
CREAT SERPL-MCNC: 0.85 MG/DL (ref 0.66–1.25)
CRP SERPL-MCNC: <2.9 MG/L (ref 0–8)
DEPRECATED CALCIDIOL+CALCIFEROL SERPL-MC: 25 UG/L (ref 20–75)
DIFFERENTIAL METHOD BLD: ABNORMAL
EOSINOPHIL # BLD AUTO: 0.1 10E9/L (ref 0–0.7)
EOSINOPHIL NFR BLD AUTO: 3 %
ERYTHROCYTE [DISTWIDTH] IN BLOOD BY AUTOMATED COUNT: 12.8 % (ref 10–15)
ERYTHROCYTE [SEDIMENTATION RATE] IN BLOOD BY WESTERGREN METHOD: 23 MM/H (ref 0–20)
GFR SERPL CREATININE-BSD FRML MDRD: >90 ML/MIN/1.7M2
HCT VFR BLD AUTO: 39.9 % (ref 40–53)
HGB BLD-MCNC: 13.6 G/DL (ref 13.3–17.7)
LYMPHOCYTES # BLD AUTO: 1.7 10E9/L (ref 0.8–5.3)
LYMPHOCYTES NFR BLD AUTO: 39.7 %
MCH RBC QN AUTO: 33.8 PG (ref 26.5–33)
MCHC RBC AUTO-ENTMCNC: 34.1 G/DL (ref 31.5–36.5)
MCV RBC AUTO: 99 FL (ref 78–100)
MONOCYTES # BLD AUTO: 0.6 10E9/L (ref 0–1.3)
MONOCYTES NFR BLD AUTO: 12.9 %
NEUTROPHILS # BLD AUTO: 1.9 10E9/L (ref 1.6–8.3)
NEUTROPHILS NFR BLD AUTO: 44.2 %
PLATELET # BLD AUTO: 121 10E9/L (ref 150–450)
PROT SERPL-MCNC: 8.1 G/DL (ref 6.8–8.8)
RBC # BLD AUTO: 4.02 10E12/L (ref 4.4–5.9)
WBC # BLD AUTO: 4.3 10E9/L (ref 4–11)

## 2018-06-26 PROCEDURE — 82306 VITAMIN D 25 HYDROXY: CPT | Performed by: INTERNAL MEDICINE

## 2018-06-26 PROCEDURE — 36415 COLL VENOUS BLD VENIPUNCTURE: CPT | Performed by: INTERNAL MEDICINE

## 2018-06-26 PROCEDURE — 85025 COMPLETE CBC W/AUTO DIFF WBC: CPT | Performed by: INTERNAL MEDICINE

## 2018-06-26 PROCEDURE — 82565 ASSAY OF CREATININE: CPT | Performed by: INTERNAL MEDICINE

## 2018-06-26 PROCEDURE — 86140 C-REACTIVE PROTEIN: CPT | Performed by: INTERNAL MEDICINE

## 2018-06-26 PROCEDURE — 99213 OFFICE O/P EST LOW 20 MIN: CPT | Performed by: INTERNAL MEDICINE

## 2018-06-26 PROCEDURE — 80076 HEPATIC FUNCTION PANEL: CPT | Performed by: INTERNAL MEDICINE

## 2018-06-26 PROCEDURE — 85652 RBC SED RATE AUTOMATED: CPT | Performed by: INTERNAL MEDICINE

## 2018-06-26 NOTE — PROGRESS NOTES
Rheumatology Clinic Visit      Camilo Baca MRN# [id number]   YOB: 1950 Age: 67 year old      Date of visit: 6/26/18   PCP: Lyndsey Aponte    Chief Complaint   Patient presents with:  Arthritis: RA, Patiewnt states he has some pain in his shoulders and hands are a little sore in the morning. About 2 days after infusion he is a little tight chested and has a headache (this was after increase in remicade)      Assessment and Plan     1.  Seropositive rheumatoid arthritis (, CCP >250): Dx'd 2013. Previously followed by [facility] and [facility].  Established with me on 3/20/2018. Previously on MTX (ineffective as monotherapy), Humira (effective, stopped because of insurance preference for IV).  When he is demonstrated in March 2018 was on Remicade 300 mg IV every 8 weeks.  Remicade was losing efficacy so the dose was increased, and then in May after speaking with him on the phone it was again increased.  He is currently on Remicade 7.5 mg/kg IV every 6 weeks.  Note that he has a history of chronic thrombocytopenia, so preferentially avoiding oral DMARD such as leflunomide and methotrexate.  He notes having some side effects from Remicade infusions and if he has this again and may change to Simponi or Orencia; both were discussed today.  - Continue Remicade 7.5mg/kg IV every 6 weeks  - Labs today: CBC, Creatinine, Hepatic Panel, ESR, CRP, vitamin D    # Golimumab (Simponi) Risks and Benefits: The risks and benefits of golimumab were discussed in detail and the patient verbalized understanding.  The risks discussed include, but are not limited to, the risk for hypersensitivity, anaphylaxis, anaphylactoid reactions, an increased risk for serious infections leading to hospitalization or death, a possible increased risk for lymphoma and other malignancies, a possible worsening of demyelinating diseases, a possible worsening of heart failure, risk for cytopenias, risk for drug induced lupus, possible  reactivation of hepatitis B, and possible reactivation of latent tuberculosis.  Subcutaneous injections may result in injection site reactions and/or pain at the site of injection.  The most common adverse reactions are infections and injection site reactions.  It was discussed that the medication would need to be discontinued if a serious infection develops.  It was discussed that live vaccinations should not be received while using golimumab or within 30 days prior to starting golimumab.  I encouraged reviewing the package insert and asking any questions about the medication.      # Abatacept (Orencia) Risks and Benefits: The risks and benefits of abatacept were discussed in detail and the patient verbalized understanding.  The risks discussed include, but are not limited to, the risk for hypersensitivity, anaphylaxis, anaphylactoid reactions, an increased risk for serious infections leading to hospitalization or death, and an increased risk for more frequent respiratory adverse events in patients with COPD.  If subcutaneous injections are used, then injection site reactions and/or pain may occur at the site of injection.  The most common side effects were discussed that include headache, upper respiratory tract infections, nasopharyngitis, and nausea.  It was discussed that the medication would need to be discontinued if a serious infection develops.  It was discussed that live vaccinations should not be received while using abatacept or within 30 days prior to starting abatacept.  I encouraged reviewing the package insert and asking any questions about the medication.      2. Hypertersion: Patient to follow up with Primary Care provider regarding elevated blood pressure.     Mr. Baca verbalized agreement with and understanding of the rational for the diagnosis and treatment plan.  All questions were answered to best of my ability and the patient's satisfaction. Mr. Baca was advised to contact the clinic with any  questions that may arise after the clinic visit.      Thank you for involving me in the care of the patient    Return to clinic: 3 months      HPI   Camilo Baca is a 67 year old male with a past medical history significant for hypertension, hyperlipidemia, GERD, hypothyroidism, history of DVT, factor V Leiden mutation, history of thrombocytopenia, gout, and rheumatoid arthritis who presents for follow-up of rheumatoid arthritis. Note that this is Mr. Baca's first clinic visit with me.      11/15/2017 rheumatology clinic note by Dr. Sb Hurtado documents rheumatoid arthritis treated with Remicade every 8 weeks.  Plan to follow-up in 6 months or sooner.    Today, Mr. Baca reports that the increased dose of Remicade is helping but he had some worsening arthritis immediately after the infusion.  He would like to stay on it for now because it is helping and he has only had one infusion at the higher dose.  Plans to  a cow and finished picking up hay today.    Denies fevers, chills, nausea, vomiting, constipation, diarrhea. No abdominal pain. No chest pain/pressure, palpitations, or shortness of breath. No LE swelling. No neck pain. No oral or nasal sores.  No rash. No sicca symptoms.      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  CV: No chest pain, pressure, palpitations, or dyspnea on exertion.  PULM: No SOB, wheeze, cough.  GI: No nausea, vomiting, constipation, diarrhea. No blood in stool. No abdominal pain.  : No blood in urine.  MSK: See HPI.  NEURO: No numbness, tingling, or weakness.  EXT: No LE swelling  PSYCH: Negative    Active Problem List     Patient Active Problem List   Diagnosis     Lumbago     NONSPECIFIC MEDICAL HISTORY     HYPERLIPIDEMIA LDL GOAL <130     History of adenomatous  polyp of colon     Idiopathic chronic gout without tophus, unspecified site     Advanced directives, counseling/discussion     High risk medication use     Thrombocytopenia (H)     Gastroesophageal reflux disease without esophagitis     Factor 5 Leiden mutation, heterozygous (H)     Personal history of venous thrombosis and embolism     Personal history of DVT (deep vein thrombosis)     Hypothyroidism due to acquired atrophy of thyroid     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)     Secondary hypertension with goal blood pressure less than 140/90     Past Medical History     Past Medical History:   Diagnosis Date     Alopecia, unspecified      Closed fracture of unspecified part of humerus 1974    Arm fracture / left wrist     Esophageal reflux 2/6/2015     Factor 5 Leiden mutation, heterozygous (H)      Gout 3/28/2011     Herpes zoster without mention of complication 1965    Herpes zoster     Measles without mention of complication     Measles     Personal history of DVT (deep vein thrombosis) 4/9/2015     Personal history of venous thrombosis and embolism 4/9/2015     Pulmonary embolism (H) 12/1996    post appendectomy     RA (rheumatoid arthritis) (H) 3/11/2013     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     Rheumatoid arthritis(714.0)     Beginning symptoms     Secondary hypertension with goal blood pressure less than 140/90 11/15/2016     Thrombocytopenia, unspecified 1/29/2014     Varicella without mention of complication     Chickenpox     Past Surgical History     Past Surgical History:   Procedure Laterality Date     C APPENDECTOMY  1996     COLONOSCOPY  11/15/2010    COLONOSCOPY performed by DARIUS MESA at  [facility]     COLONOSCOPY N/A 11/2/2015    Procedure: COLONOSCOPY;  Surgeon: Bubba Odom MD;  Location:  [facility]      COLONOSCOPY W BIOPSY  08/10/05      REPAIR ING HERNIA,5+Y/O,REDUCIBL  1960      HERNIORRHAPHY UMBILICAL N/A 4/17/2015    Procedure: HERNIORRHAPHY UMBILICAL;  Surgeon: Rayray Avila MD;  Location: [facility] OR     LAPAROSCOPIC HERNIORRHAPHY INGUINAL BILATERAL Bilateral 4/17/2015    Procedure: LAPAROSCOPIC HERNIORRHAPHY INGUINAL BILATERAL;  Surgeon: Rayray Avila MD;  Location: [facility] OR     Allergy     Allergies   Allergen Reactions     Lisinopril Cough     No Known Drug Allergies      Current Medication List     Current Outpatient Prescriptions   Medication Sig     allopurinol (ZYLOPRIM) 300 MG tablet TAKE ONE TABLET BY MOUTH EVERY DAY     Ascorbic Acid (VITAMIN C PO) Take by mouth daily     atorvastatin (LIPITOR) 80 MG tablet Take 1 tablet (80 mg) by mouth daily     BABY ASPIRIN OR daily     cetirizine (ZYRTEC) 10 MG tablet Take 1 tablet (10 mg) by mouth every evening     cholestyramine light (QUESTRAN) 4 GM Packet DISSOLVE ONE PACKET IN LIQUID AS DIRECTED TWO TIMES A DAY AND DRINK     levothyroxine (SYNTHROID/LEVOTHROID) 25 MCG tablet TAKE ONE TABLET BY MOUTH EVERY DAY     losartan (COZAAR) 25 MG tablet TAKE ONE TABLET BY MOUTH EVERY DAY     losartan (COZAAR) 25 MG tablet TAKE ONE TABLET BY MOUTH EVERY DAY     omeprazole (PRILOSEC) 20 MG CR capsule TAKE ONE CAPSULE BY MOUTH ONCE DAILY -TAKE 30-60 MINUTES BEFORE A MEAL     allopurinol (ZYLOPRIM) 300 MG tablet TAKE ONE TABLET BY MOUTH ONCE DAILY (Patient not taking: Reported on 6/26/2018)     amoxicillin-clavulanate (AUGMENTIN) 875-125 MG per tablet Take 1 tablet by mouth 2 times daily     capsaicin (ZOSTRIX) 0.075 % topical cream Apply  topically 3 times daily. (Patient not taking: Reported on 4/13/2018)     diclofenac (VOLTAREN) 1 % GEL Apply  2 grams to shoulders three times daily using enclosed dosing card. (Patient not taking: Reported on 4/13/2018)     predniSONE (DELTASONE) 5 MG tablet Take 1-2 tablets (5-10 mg) by mouth daily as needed for rheumatoid arthritis symptoms (Patient not taking: Reported on 6/26/2018)     PREVALITE  "4 G Packet DISSOLVE ONE PACKET IN LIQUID AS DIRECTED TWO TIMES A DAY AND DRINK     triamcinolone (KENALOG) 0.1 % cream Locally bid (Patient not taking: Reported on 4/13/2018)     No current facility-administered medications for this visit.          Social History   See HPI    Family History     Family History   Problem Relation Age of Onset     Arthritis Mother      Cardiovascular Mother      Depression Mother      GASTROINTESTINAL DISEASE Mother      IBS     Hypertension Mother      Circulatory Mother      Aortal aneurysm     Osteoperosis Mother      Allergies Father      Cortisone     Cardiovascular Father      Circulatory Father      Diabetes Father      Hypertension Father      Lipids Father      Alcohol/Drug Sister      Penicillin     Alzheimer Disease Paternal Grandfather      Blood Disease Paternal Grandmother      Clotting problems     Blood Disease Son      Factor 5     Hypertension Maternal Aunt      Cerebrovascular Disease Maternal Grandmother        Physical Exam     Temp Readings from Last 3 Encounters:   06/26/18 97.7  F (36.5  C) (Oral)   06/01/18 96.8  F (36  C) (Temporal)   04/17/18 97.3  F (36.3  C) (Temporal)     BP Readings from Last 5 Encounters:   06/26/18 (!) 149/92   06/01/18 138/79   04/17/18 133/74   04/13/18 (!) 129/92   04/06/18 126/78     Pulse Readings from Last 1 Encounters:   06/26/18 68     Resp Readings from Last 1 Encounters:   06/26/18 16     Estimated body mass index is 28.15 kg/(m^2) as calculated from the following:    Height as of this encounter: 1.778 m (5' 10\").    Weight as of this encounter: 89 kg (196 lb 3.2 oz).    GEN: NAD  HEENT: MMM. No oral lesions.  Anicteric, noninjected sclera  CV: S1, S2. RRR. No m/r/g.  PULM: CTA bilaterally. No w/c.  MSK:   MCPs, PIPs, wrists, elbows, shoulders, knees, ankles, and MTPs without swelling or tenderness position.  Hips nontender to palpation.    NEURO: UE and LE strengths 5/5 and equal bilaterally.   SKIN: No rash  EXT: No LE " edema  PSYCH: Alert. Appropriate.    Labs / Imaging (select studies)   RF/CCP  Recent Labs   Lab Test  04/24/13   0923   CCPABY  >250 Strongly Positive*   RHF  743*     CBC  Recent Labs   Lab Test  04/05/18 2355 03/20/18   1008  12/22/17   0735   WBC  7.7  8.4  4.8   RBC  4.09*  4.40  4.62   HGB  13.3  14.5  15.2   HCT  40.6  43.3  45.7   MCV  99  98  99   RDW  13.5  13.4  13.1   PLT  122*  131*  139*   MCH  32.5  33.0  32.9   MCHC  32.8  33.5  33.3   NEUTROPHIL  74.5  60.6  50.7   LYMPH  17.1  31.6  28.4   MONOCYTE  7.0  7.5  12.6   EOSINOPHIL  0.8  0.2  7.7   BASOPHIL  0.3  0.1  0.6   ANEU  5.7  5.1  2.5   ALYM  1.3  2.7  1.4   KIESHA  0.5  0.6  0.6   AEOS  0.1  0.0  0.4   ABAS  0.0  0.0  0.0     CMP  Recent Labs   Lab Test  04/05/18 2355 03/20/18   1008  12/22/17   0735  08/24/17   0931   NA  140   --   141  143   POTASSIUM  3.4   --   4.2  4.4   CHLORIDE  104   --   107  109   CO2  26   --   27  27   ANIONGAP  10   --   7  7   GLC  124*   --   121*  95   BUN  32*   --   17  15   CR  0.71  0.82  0.85  0.85   GFRESTIMATED  >90  >90  >90  >90   GFRESTBLACK  >90  >90  >90  >90   ANDREZ  7.9*   --   8.9  9.2   BILITOTAL   --   0.9  1.5*  1.2   ALBUMIN   --   3.1*  3.7  3.5   PROTTOTAL   --   7.9  8.4  7.5   ALKPHOS   --   68  90  74   AST   --   22  25  24   ALT   --   46  49  40     Calcium/VitaminD  Recent Labs   Lab Test  04/05/18 2355 12/22/17   0735  08/24/17   0931   ANDREZ  7.9*  8.9  9.2     ESR/CRP  Recent Labs   Lab Test  12/22/17   0735  06/03/13   0837  04/24/13   0923  07/16/12   1403   SED  12  16   --   21*   CRP   --    --   7.1  6.6     Hepatitis B  Recent Labs   Lab Test  03/20/18   1008  02/05/14   1104   HBCAB  Nonreactive   --    HEPBANG  Nonreactive  Negative     Hepatitis C  Recent Labs   Lab Test  02/05/14   1104  06/03/13   0837   HCVAB  Negative  Negative     Tuberculosis Screening  Recent Labs   Lab Test  03/20/18   1008  02/05/14   1104   TBRSLT  Negative  Negative   TBAGN  0.14  0.01      Immunization History     Immunization History   Administered Date(s) Administered     Influenza (High Dose) 3 valent vaccine 10/06/2017     Influenza (IIV3) PF 10/01/2009, 10/27/2010, 10/11/2011     Influenza Vaccine IM 3yrs+ 4 Valent IIV4 10/09/2013, 10/23/2014, 09/18/2015, 11/15/2016     Pneumo Conj 13-V (2010&after) 10/06/2017     Pneumococcal 23 valent 10/23/2014     TD (ADULT, 7+) 09/05/2001     TDAP Vaccine (Boostrix) 10/18/2012     Zoster vaccine, live 10/23/2014          Chart documentation done in part with Dragon Voice recognition Software. Although reviewed after completion, some word and grammatical error may remain.    Fabricio Gomes MD

## 2018-06-26 NOTE — MR AVS SNAPSHOT
After Visit Summary   6/26/2018    Camilo Baca    MRN: [id number]           Patient Information     Date Of Birth          1950        Visit Information        Provider Department      6/26/2018 9:20 AM Fabricio Gomes MD [facility]        Today's Diagnoses     Rheumatoid arthritis involving multiple sites with positive rheumatoid factor (H)    -  1      Care Instructions    Rheumatology    Dr. Fabricio Gomes         [facility]   (Monday)  [address]  [place] [address]       [facility]   (Tuesday)  [address] [place] [address]    [facility]   (Wed., Thurs., and Friday)  [address] [place] [address]    Phone number: [phone]  Thank you for choosing [facility].  Jaquelin Muñiz CMA            Follow-ups after your visit        Your next 10 appointments already scheduled     Jul 13, 2018  8:00 AM CDT   Level 4 with NL INFUSION CHAIR 2   [facility] ([facility])    [address] [place] [address] Dr Dee BAUTISTA [address]   [phone]            Aug 24, 2018  8:00 AM CDT   Level 4 with NL INFUSION CHAIR 5   [facility] ([facility])    [address] [place] [address] Dr Dee BAUTISTA [address]   [phone]            Nov 06, 2018  8:40 AM CST   Return Visit with Fabricio Gomes MD   [facility]    [address] [facility] [address] [facility] [address]   [phone]              Who to contact     If you have questions or need follow up information about today's clinic visit or your schedule please contact [facility] directly at [phone].  Normal or non-critical lab and imaging results will be communicated to you by MyChart, letter or phone within 4 business days after the clinic has received the results. If you do not hear from us within 7 days, please contact the clinic through MyChart or phone.  "If you have a critical or abnormal lab result, we will notify you by phone as soon as possible.  Submit refill requests through [employer] or call your pharmacy and they will forward the refill request to us. Please allow 3 business days for your refill to be completed.          Additional Information About Your Visit        [employer] Information     [employer] lets you send messages to your doctor, view your test results, renew your prescriptions, schedule appointments and more. To sign up, go to [email or web address] . Click on \"Log in\" on the left side of the screen, which will take you to the Welcome page. Then click on \"Sign up Now\" on the right side of the page.     You will be asked to enter the access code listed below, as well as some personal information. Please follow the directions to create your username and password.     Your access code is: [email or web address]  Expires: 2018 12:30 AM     Your access code will  in 90 days. If you need help or a new code, please call your [facility] clinic or [phone].        Care EveryWhere ID     This is your Care EveryWhere ID. This could be used by other organizations to access your [facility] medical records  [id number]        Your Vitals Were     Pulse Temperature Respirations Height Pulse Oximetry BMI (Body Mass Index)    68 97.7  F (36.5  C) (Oral) 16 1.778 m (5' 10\") 98% 28.15 kg/m2       Blood Pressure from Last 3 Encounters:   18 (!) 149/92   18 138/79   18 133/74    Weight from Last 3 Encounters:   18 89 kg (196 lb 3.2 oz)   18 89.6 kg (197 lb 8 oz)   18 87 kg (191 lb 11.2 oz)              We Performed the Following     CBC with platelets differential     Creatinine     CRP inflammation     Erythrocyte sedimentation rate auto     Hepatic panel     Vitamin D Deficiency          Today's Medication Changes          These changes are accurate as of 18  9:46 AM.  If you have any questions, ask your nurse or doctor.          "      These medicines have changed or have updated prescriptions.        Dose/Directions    predniSONE 5 MG tablet   Commonly known as:  DELTASONE   This may have changed:  Another medication with the same name was removed. Continue taking this medication, and follow the directions you see here.   Used for:  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)   Changed by:  Fabricio Gomes MD        Dose:  5-10 mg   Take 1-2 tablets (5-10 mg) by mouth daily as needed for rheumatoid arthritis symptoms   Quantity:  180 tablet   Refills:  1                Primary Care Provider Office Phone # Fax #    DIANN Presley [phone]       [address] [facility] [address]        Equal Access to Services     MOMO BAEZ : Hadii umair byrne hadasho Soomaali, waaxda luqadaha, qaybta kaalmada adeegyada, matt middleton . So [facility] [phone].    ATENCIÓN: Si habla español, tiene a bernal disposición servicios gratuitos de asistencia lingüística. Llame al [phone].    We comply with applicable federal civil rights laws and [place] laws. We do not discriminate on the basis of race, color, national origin, age, disability, sex, sexual orientation, or gender identity.            Thank you!     Thank you for choosing [facility]  for your care. Our goal is always to provide you with excellent care. Hearing back from our patients is one way we can continue to improve our services. Please take a few minutes to complete the written survey that you may receive in the mail after your visit with us. Thank you!             Your Updated Medication List - Protect others around you: Learn how to safely use, store and throw away your medicines at www.disposemymeds.org.          This list is accurate as of 6/26/18  9:46 AM.  Always use your most recent med list.                   Brand Name Dispense Instructions for use Diagnosis    * allopurinol 300  MG tablet    ZYLOPRIM    90 tablet    TAKE ONE TABLET BY MOUTH EVERY DAY    Acute gouty arthropathy       * allopurinol 300 MG tablet    ZYLOPRIM    90 tablet    TAKE ONE TABLET BY MOUTH ONCE DAILY    Acute gouty arthropathy       amoxicillin-clavulanate 875-125 MG per tablet    AUGMENTIN     Take 1 tablet by mouth 2 times daily        atorvastatin 80 MG tablet    LIPITOR    90 tablet    Take 1 tablet (80 mg) by mouth daily    Hyperlipidemia LDL goal <130       BABY ASPIRIN PO      daily        capsaicin 0.075 % cream    ZOSTRIX    50 g    Apply  topically 3 times daily.    Bursitis of shoulder       cetirizine 10 MG tablet    zyrTEC    90 tablet    Take 1 tablet (10 mg) by mouth every evening    Seasonal allergic rhinitis, unspecified allergic rhinitis trigger       * cholestyramine light 4 GM Packet    QUESTRAN    60 packet    DISSOLVE ONE PACKET IN LIQUID AS DIRECTED TWO TIMES A DAY AND DRINK    Hyperlipidemia LDL goal <130       * PREVALITE 4 GM Packet   Generic drug:  cholestyramine light     60 packet    DISSOLVE ONE PACKET IN LIQUID AS DIRECTED TWO TIMES A DAY AND DRINK    Hyperlipidemia LDL goal <130       diclofenac 1 % Gel topical gel    VOLTAREN    100 g    Apply  2 grams to shoulders three times daily using enclosed dosing card.    Rotator cuff tendinitis       levothyroxine 25 MCG tablet    SYNTHROID/LEVOTHROID    90 tablet    TAKE ONE TABLET BY MOUTH EVERY DAY    Hypothyroidism due to acquired atrophy of thyroid       * losartan 25 MG tablet    COZAAR    30 tablet    TAKE ONE TABLET BY MOUTH EVERY DAY    Benign essential hypertension       * losartan 25 MG tablet    COZAAR    30 tablet    TAKE ONE TABLET BY MOUTH EVERY DAY    Benign essential hypertension       omeprazole 20 MG CR capsule    priLOSEC    90 capsule    TAKE ONE CAPSULE BY MOUTH ONCE DAILY -TAKE 30-60 MINUTES BEFORE A MEAL    Gastroesophageal reflux disease without esophagitis       predniSONE 5 MG tablet    DELTASONE    180 tablet     Take 1-2 tablets (5-10 mg) by mouth daily as needed for rheumatoid arthritis symptoms    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)       triamcinolone 0.1 % cream    KENALOG    60 g    Locally bid    Rash       VITAMIN C PO      Take by mouth daily        * Notice:  This list has 6 medication(s) that are the same as other medications prescribed for you. Read the directions carefully, and ask your doctor or other care provider to review them with you.

## 2018-06-26 NOTE — PATIENT INSTRUCTIONS
Rheumatology    Dr. Fabricio Gomes         [facility]   (Monday)  [address]  [place] [address]       [facility]   (Tuesday)  [address]  [place] [address]    [facility]   (Wed., Thurs., and Friday)  [address] [place] [address]    Phone number: [phone]  Thank you for choosing [facility].  Jaquelin Muñiz CMA

## 2018-07-13 ENCOUNTER — INFUSION THERAPY VISIT (OUTPATIENT)
Dept: INFUSION THERAPY | Facility: CLINIC | Age: 68
End: 2018-07-13
Attending: INTERNAL MEDICINE
Payer: MEDICARE

## 2018-07-13 VITALS
TEMPERATURE: 97.1 F | SYSTOLIC BLOOD PRESSURE: 148 MMHG | BODY MASS INDEX: 27.89 KG/M2 | DIASTOLIC BLOOD PRESSURE: 83 MMHG | RESPIRATION RATE: 16 BRPM | WEIGHT: 194.4 LBS | OXYGEN SATURATION: 95 % | HEART RATE: 52 BPM

## 2018-07-13 DIAGNOSIS — M05.711 RHEUMATOID ARTHRITIS INVOLVING BOTH SHOULDERS WITH POSITIVE RHEUMATOID FACTOR (H): Primary | ICD-10-CM

## 2018-07-13 DIAGNOSIS — M05.712 RHEUMATOID ARTHRITIS INVOLVING BOTH SHOULDERS WITH POSITIVE RHEUMATOID FACTOR (H): Primary | ICD-10-CM

## 2018-07-13 PROCEDURE — 25000128 H RX IP 250 OP 636: Performed by: INTERNAL MEDICINE

## 2018-07-13 PROCEDURE — 96413 CHEMO IV INFUSION 1 HR: CPT

## 2018-07-13 PROCEDURE — 96415 CHEMO IV INFUSION ADDL HR: CPT

## 2018-07-13 RX ADMIN — SODIUM CHLORIDE 250 ML: 9 INJECTION, SOLUTION INTRAVENOUS at 08:49

## 2018-07-13 RX ADMIN — INFLIXIMAB 700 MG: 100 INJECTION, POWDER, LYOPHILIZED, FOR SOLUTION INTRAVENOUS at 08:49

## 2018-07-13 ASSESSMENT — PAIN SCALES - GENERAL: PAINLEVEL: MODERATE PAIN (4)

## 2018-07-13 NOTE — MR AVS SNAPSHOT
After Visit Summary   7/13/2018    Camilo Baca    MRN: [id number]           Patient Information     Date Of Birth          1950        Visit Information        Provider Department      7/13/2018 8:00 AM NL INFUSION CHAIR 4 [facility]        Today's Diagnoses     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)    -  1       Follow-ups after your visit        Follow-up notes from your care team     Return in 6 weeks (on 8/24/2018).      Your next 10 appointments already scheduled     Aug 24, 2018  8:00 AM CDT   Level 4 with NL INFUSION CHAIR 5   [facility] ([facility])    [address] [place] [address]   [phone]            Oct 05, 2018  8:00 AM CDT   Level 4 with NL INFUSION CHAIR 2   [facility] ([facility])    [address] [place] [address] [place] [address]   [phone]            Nov 06, 2018  8:40 AM CST   Return Visit with Fabricio Gomes MD   [facility]    [address] [place] [id number] [phone]              Who to contact     If you have questions or need follow up information about today's clinic visit or your schedule please contact [facility] directly at [phone].  Normal or non-critical lab and imaging results will be communicated to you by MyChart, letter or phone within 4 business days after the clinic has received the results. If you do not hear from us within 7 days, please contact the clinic through MyChart or phone. If you have a critical or abnormal lab result, we will notify you by phone as soon as possible.  Submit refill requests through Bionym or call your pharmacy and they will forward the refill request to us. Please allow 3 business days for your refill to be completed.          Additional Information About  Your Visit        Care EveryWhere ID     This is your Care EveryWhere ID. This could be used by other organizations to access your [place] medical records  [id number]        Your Vitals Were     Pulse Temperature Respirations Pulse Oximetry BMI (Body Mass Index)       52 97.1  F (36.2  C) (Temporal) 16 95% 27.89 kg/m2        Blood Pressure from Last 3 Encounters:   07/13/18 148/83   06/26/18 (!) 149/92   06/01/18 138/79    Weight from Last 3 Encounters:   07/13/18 88.2 kg (194 lb 6.4 oz)   06/26/18 89 kg (196 lb 3.2 oz)   06/01/18 89.6 kg (197 lb 8 oz)              Today, you had the following     No orders found for display       Primary Care Provider Office Phone # Fax #    Lyndsey AponteDIANN [phone]       [address] [facility] [address]        Equal Access to Services     BECCA BAEZ : Hadii aad ku hadasho Soomaali, waaxda luqadaha, qaybta kaalmada adeegyada, waxay idiin haymumtazn nena middleton . So [facility] [phone].    ATENCIÓN: Si habla español, tiene a bernal disposición servicios gratuitos de asistencia lingüística. Llame al [phone].    We comply with applicable federal civil rights laws and [place] laws. We do not discriminate on the basis of race, color, national origin, age, disability, sex, sexual orientation, or gender identity.            Thank you!     Thank you for choosing [facility]  for your care. Our goal is always to provide you with excellent care. Hearing back from our patients is one way we can continue to improve our services. Please take a few minutes to complete the written survey that you may receive in the mail after your visit with us. Thank you!             Your Updated Medication List - Protect others around you: Learn how to safely use, store and throw away your medicines at www.disposemymeds.org.          This list is accurate as of 7/13/18  1:33 PM.  Always use your most recent med list.                   Brand Name  Dispense Instructions for use Diagnosis    * allopurinol 300 MG tablet    ZYLOPRIM    90 tablet    TAKE ONE TABLET BY MOUTH EVERY DAY    Acute gouty arthropathy       * allopurinol 300 MG tablet    ZYLOPRIM    90 tablet    TAKE ONE TABLET BY MOUTH ONCE DAILY    Acute gouty arthropathy       atorvastatin 80 MG tablet    LIPITOR    90 tablet    Take 1 tablet (80 mg) by mouth daily    Hyperlipidemia LDL goal <130       BABY ASPIRIN PO      daily        capsaicin 0.075 % cream    ZOSTRIX    50 g    Apply  topically 3 times daily.    Bursitis of shoulder       cetirizine 10 MG tablet    zyrTEC    90 tablet    Take 1 tablet (10 mg) by mouth every evening    Seasonal allergic rhinitis, unspecified allergic rhinitis trigger       * cholestyramine light 4 GM Packet    QUESTRAN    60 packet    DISSOLVE ONE PACKET IN LIQUID AS DIRECTED TWO TIMES A DAY AND DRINK    Hyperlipidemia LDL goal <130       * PREVALITE 4 GM Packet   Generic drug:  cholestyramine light     60 packet    DISSOLVE ONE PACKET IN LIQUID AS DIRECTED TWO TIMES A DAY AND DRINK    Hyperlipidemia LDL goal <130       diclofenac 1 % Gel topical gel    VOLTAREN    100 g    Apply  2 grams to shoulders three times daily using enclosed dosing card.    Rotator cuff tendinitis       levothyroxine 25 MCG tablet    SYNTHROID/LEVOTHROID    90 tablet    TAKE ONE TABLET BY MOUTH EVERY DAY    Hypothyroidism due to acquired atrophy of thyroid       * losartan 25 MG tablet    COZAAR    30 tablet    TAKE ONE TABLET BY MOUTH EVERY DAY    Benign essential hypertension       * losartan 25 MG tablet    COZAAR    30 tablet    TAKE ONE TABLET BY MOUTH EVERY DAY    Benign essential hypertension       omeprazole 20 MG CR capsule    priLOSEC    90 capsule    TAKE ONE CAPSULE BY MOUTH ONCE DAILY -TAKE 30-60 MINUTES BEFORE A MEAL    Gastroesophageal reflux disease without esophagitis       predniSONE 5 MG tablet    DELTASONE    180 tablet    Take 1-2 tablets (5-10 mg) by mouth daily as  needed for rheumatoid arthritis symptoms    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)       triamcinolone 0.1 % cream    KENALOG    60 g    Locally bid    Rash       VITAMIN C PO      Take by mouth daily        * Notice:  This list has 6 medication(s) that are the same as other medications prescribed for you. Read the directions carefully, and ask your doctor or other care provider to review them with you.

## 2018-07-13 NOTE — PROGRESS NOTES
"Infusion Nursing Note:  Camilo ZIMMERMAN Phunog presents today for Remicade.    Patient seen by provider today: No   present during visit today: Not Applicable.    Note: N/A.    Intravenous Access:  Peripheral IV placed.    Treatment Conditions:  Rheumatology Infusion Checklist: PRIOR TO INFUSION OF BIOLOGICAL MEDICATIONS OR ANY OF THESE AS LISTED: Remicaide (infliximab) \".rheumbiologicalchecklist\"    Prior to Infusion of biological medications or any of these as listed:    1. Elevated temperature, fever, chills, productive cough or abnormal vital signs, night sweats, coughing up blood or sputum, no appetite or abnormal vital signs : NO    2. Open wounds or new incisions: NO    3. Recent hospitalization: NO    4.  Recent surgeries:  NO    5. Any upcoming surgeries or dental procedures?:NO    6. Any current or recent bouts of illness or infection? On any antibiotics? : NO    7. Any new, sudden or worsening abdominal pain :NO    8. Vaccination within 4 weeks? Patient or someone in the household is scheduled to receive vaccination? No live virus vaccines prior to or during treatment :NO    9. Any nervous system diseases [i.e. multiple sclerosis, Guillain-Toledo, seizures, neurological  changes]: NO    10. Pregnant or breast feeding; or plans on pregnancy in the future: NO    11. Signs of worsening depression or suicidal ideations while taking benlysta:NO    12. New-onset medical symptoms: NO    13.  New cancer diagnosis or on chemotherapy or radiation NO    14.  Evaluate for any sign of active TB [Unexplained weight loss, Loss of appetite, Night sweats, Fever, Fatigue, Chills, Coughing for 3 weeks or longer, Hemoptysis (coughing up blood), Chest pain]: NO    **Note: If answered yes to any of the above, hold the infusion and contact ordering rheumatologist or on-call rheumatologist.   .      Post Infusion Assessment:  Patient tolerated infusion without incident.  Patient observed for 15 minutes post Remicade per " protocol.  Site patent and intact, free from redness, edema or discomfort.  No evidence of extravasations.  Access discontinued per protocol.    Discharge Plan:   Discharge instructions reviewed with: Patient.  Patient discharged in stable condition accompanied by: self.  Departure Mode: Ambulatory.    Emy Figueredo RN                      [phone]

## 2018-08-03 ENCOUNTER — OFFICE VISIT (OUTPATIENT)
Dept: FAMILY MEDICINE | Facility: OTHER | Age: 68
End: 2018-08-03
Payer: COMMERCIAL

## 2018-08-03 ENCOUNTER — TELEPHONE (OUTPATIENT)
Dept: FAMILY MEDICINE | Facility: OTHER | Age: 68
End: 2018-08-03

## 2018-08-03 VITALS
OXYGEN SATURATION: 96 % | HEART RATE: 60 BPM | RESPIRATION RATE: 16 BRPM | BODY MASS INDEX: 27.84 KG/M2 | WEIGHT: 194 LBS | SYSTOLIC BLOOD PRESSURE: 136 MMHG | DIASTOLIC BLOOD PRESSURE: 82 MMHG | TEMPERATURE: 97.6 F

## 2018-08-03 DIAGNOSIS — M05.79 RHEUMATOID ARTHRITIS INVOLVING MULTIPLE SITES WITH POSITIVE RHEUMATOID FACTOR (H): ICD-10-CM

## 2018-08-03 DIAGNOSIS — M05.711 RHEUMATOID ARTHRITIS INVOLVING BOTH SHOULDERS WITH POSITIVE RHEUMATOID FACTOR (H): ICD-10-CM

## 2018-08-03 DIAGNOSIS — M05.712 RHEUMATOID ARTHRITIS INVOLVING BOTH SHOULDERS WITH POSITIVE RHEUMATOID FACTOR (H): ICD-10-CM

## 2018-08-03 DIAGNOSIS — J01.00 ACUTE NON-RECURRENT MAXILLARY SINUSITIS: Primary | ICD-10-CM

## 2018-08-03 PROCEDURE — 99213 OFFICE O/P EST LOW 20 MIN: CPT | Performed by: INTERNAL MEDICINE

## 2018-08-03 RX ORDER — PYRIDOXINE HCL (VITAMIN B6) 50 MG
TABLET ORAL
COMMUNITY

## 2018-08-03 RX ORDER — AZITHROMYCIN 250 MG/1
TABLET, FILM COATED ORAL
Qty: 6 TABLET | Refills: 0 | Status: SHIPPED | OUTPATIENT
Start: 2018-08-03 | End: 2018-08-10

## 2018-08-03 ASSESSMENT — PAIN SCALES - GENERAL: PAINLEVEL: MILD PAIN (2)

## 2018-08-03 NOTE — MR AVS SNAPSHOT
After Visit Summary   8/3/2018    Camilo Baca    MRN: [id number]           Patient Information     Date Of Birth          1950        Visit Information        Provider Department      8/3/2018 9:00 AM Ace Bobo DO [facility]        Today's Diagnoses     Acute non-recurrent maxillary sinusitis    -  1    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)           Follow-ups after your visit        Your next 10 appointments already scheduled     Aug 24, 2018  8:00 AM CDT   Level 4 with NL INFUSION CHAIR 5   [facility] ([facility] [place])    [address] [place] [address] [address] [place] [address]   [phone]            Oct 05, 2018  8:00 AM CDT   Level 4 with NL INFUSION CHAIR 2   [facility] ([facility])    [address] [place] [address]   [phone]            Nov 06, 2018  8:40 AM CST   Return Visit with Fabricio Gomes MD   [facility]    [address] [place] [id number] [phone]              Who to contact     If you have questions or need follow up information about today's clinic visit or your schedule please contact [facility] directly at [phone].  Normal or non-critical lab and imaging results will be communicated to you by MyChart, letter or phone within 4 business days after the clinic has received the results. If you do not hear from us within 7 days, please contact the clinic through MyChart or phone. If you have a critical or abnormal lab result, we will notify you by phone as soon as possible.  Submit refill requests through Swyft or call your pharmacy and they will forward the refill request to us. Please allow 3 business days for your refill to be completed.          Additional Information About Your Visit        Care EveryWhere ID     This is  your Care EveryWhere ID. This could be used by other organizations to access your [place] medical records  [id number]        Your Vitals Were     Pulse Temperature Respirations Pulse Oximetry BMI (Body Mass Index)       60 97.6  F (36.4  C) (Temporal) 16 96% 27.84 kg/m2        Blood Pressure from Last 3 Encounters:   08/03/18 136/82   07/13/18 148/83   06/26/18 (!) 149/92    Weight from Last 3 Encounters:   08/03/18 194 lb (88 kg)   07/13/18 194 lb 6.4 oz (88.2 kg)   06/26/18 196 lb 3.2 oz (89 kg)              Today, you had the following     No orders found for display         Today's Medication Changes          These changes are accurate as of 8/3/18 11:59 PM.  If you have any questions, ask your nurse or doctor.               Start taking these medicines.        Dose/Directions    azithromycin 250 MG tablet   Commonly known as:  ZITHROMAX   Used for:  Acute non-recurrent maxillary sinusitis   Started by:  Ace Bobo, DO        Two tablets first day, then one tablet daily for four days.   Quantity:  6 tablet   Refills:  0            Where to get your medicines      These medications were sent to [facility] [place] [address]   [address] [place] [id number]     Phone:  [phone]     azithromycin 250 MG tablet                Primary Care Provider Office Phone # Fax #    Lyndsey DIANN Sinha [phone]       [address] [facility] [address]        Equal Access to Services     BECCA BAEZ AH: Hadii umair ku hadasho Somonicaali, waaxda luqadaha, qaybta kaalmada adeegyada, matt high. So [facility] [phone].    ATENCIÓN: Si habla español, tiene a bernal disposición servicios gratuitos de asistencia lingüística. Llame al [phone].    We comply with applicable federal civil rights laws and [place] laws. We do not discriminate on the basis of race, color, national origin, age, disability, sex, sexual orientation, or gender  identity.            Thank you!     Thank you for choosing [facility]  for your care. Our goal is always to provide you with excellent care. Hearing back from our patients is one way we can continue to improve our services. Please take a few minutes to complete the written survey that you may receive in the mail after your visit with us. Thank you!             Your Updated Medication List - Protect others around you: Learn how to safely use, store and throw away your medicines at www.disposemymeds.org.          This list is accurate as of 8/3/18 11:59 PM.  Always use your most recent med list.                   Brand Name Dispense Instructions for use Diagnosis    allopurinol 300 MG tablet    ZYLOPRIM    90 tablet    TAKE ONE TABLET BY MOUTH ONCE DAILY    Acute gouty arthropathy       atorvastatin 80 MG tablet    LIPITOR    90 tablet    Take 1 tablet (80 mg) by mouth daily    Hyperlipidemia LDL goal <130       azithromycin 250 MG tablet    ZITHROMAX    6 tablet    Two tablets first day, then one tablet daily for four days.    Acute non-recurrent maxillary sinusitis       B-12 100 MCG Tabs           BABY ASPIRIN PO      daily        capsaicin 0.075 % cream    ZOSTRIX    50 g    Apply  topically 3 times daily.    Bursitis of shoulder       cetirizine 10 MG tablet    zyrTEC    90 tablet    Take 1 tablet (10 mg) by mouth every evening    Seasonal allergic rhinitis, unspecified allergic rhinitis trigger       cholestyramine light 4 GM Packet    QUESTRAN    60 packet    DISSOLVE ONE PACKET IN LIQUID AS DIRECTED TWO TIMES A DAY AND DRINK    Hyperlipidemia LDL goal <130       diclofenac 1 % Gel topical gel    VOLTAREN    100 g    Apply  2 grams to shoulders three times daily using enclosed dosing card.    Rotator cuff tendinitis       levothyroxine 25 MCG tablet    SYNTHROID/LEVOTHROID    90 tablet    TAKE ONE TABLET BY MOUTH EVERY DAY    Hypothyroidism due to acquired atrophy of thyroid       losartan 25 MG  tablet    COZAAR    30 tablet    TAKE ONE TABLET BY MOUTH EVERY DAY    Benign essential hypertension       omeprazole 20 MG CR capsule    priLOSEC    90 capsule    TAKE ONE CAPSULE BY MOUTH ONCE DAILY -TAKE 30-60 MINUTES BEFORE A MEAL    Gastroesophageal reflux disease without esophagitis       predniSONE 5 MG tablet    DELTASONE    180 tablet    Take 1-2 tablets (5-10 mg) by mouth daily as needed for rheumatoid arthritis symptoms    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)       REMICADE IV           triamcinolone 0.1 % cream    KENALOG    60 g    Locally bid    Rash       VITAMIN C PO      Take by mouth daily

## 2018-08-03 NOTE — PROGRESS NOTES
Chief Complaint   Patient presents with     Pharyngitis     1 week- hard to swallow     Ear Problem                    Chief Complaint    The patient is a pleasant 67-year-old gentleman who presents today with a sore throat that he has had for the last week.  He notes that he has had difficulty swallowing but has been able to take food and fluid.  He has had a low-grade fever but has not measured it.  Denies any ear pain but does have shortness frontal and mastoid sinuses.  He has had increased posterior nasal drainage.  He does have a history of rheumatoid arthritis involving the hands and shoulders.  Some modest deformity is noted.  No acute synovial thickening is noted.  Hands are fully functional.  He also has a concurrent history of gout which is well controlled.  He continues the medications for his hypertension.                       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     Surgical History:   has a past surgical history that includes APPENDECTOMY (1996); REPAIR ING HERNIA,5+Y/O,REDUCIBL (1960); COLONOSCOPY W BIOPSY (08/10/05); Colonoscopy (11/15/2010); Laparoscopic herniorrhaphy inguinal bilateral (Bilateral, 4/17/2015);  Herniorrhaphy umbilical (N/A, 4/17/2015); and Colonoscopy (N/A, 11/2/2015).     Social History:   reports that he quit smoking about 39 years ago. His smoking use included Cigarettes and Pipe. He has a 0.50 pack-year smoking history. He has never used smokeless tobacco. He reports that he drinks alcohol. He reports that he does not use illicit drugs.     Family History:  family history includes Alcohol/Drug in his sister; Allergies in his father; Alzheimer Disease in his paternal grandfather; Arthritis in his mother; Blood Disease in his paternal grandmother and son; Cardiovascular in his father and mother; Cerebrovascular Disease in his maternal grandmother; Circulatory in his father and mother; Depression in his mother; Diabetes in his father; GASTROINTESTINAL DISEASE in his mother; Hypertension in his father, maternal aunt, and mother; Lipids in his father; Osteoperosis in his mother.            MEDICATIONS  Current Outpatient Prescriptions   Medication Sig Dispense Refill     allopurinol (ZYLOPRIM) 300 MG tablet TAKE ONE TABLET BY MOUTH ONCE DAILY 90 tablet 1     Ascorbic Acid (VITAMIN C PO) Take by mouth daily       atorvastatin (LIPITOR) 80 MG tablet Take 1 tablet (80 mg) by mouth daily 90 tablet 2     azithromycin (ZITHROMAX) 250 MG tablet Two tablets first day, then one tablet daily for four days. 6 tablet 0     BABY ASPIRIN OR daily       capsaicin (ZOSTRIX) 0.075 % topical cream Apply  topically 3 times daily. 50 g 3     cetirizine (ZYRTEC) 10 MG tablet Take 1 tablet (10 mg) by mouth every evening 90 tablet 1     cholestyramine light (QUESTRAN) 4 GM Packet DISSOLVE ONE PACKET IN LIQUID AS DIRECTED TWO TIMES A DAY AND DRINK 60 packet 5     Cyanocobalamin (B-12) 100 MCG TABS        diclofenac (VOLTAREN) 1 % GEL Apply  2 grams to shoulders three times daily using enclosed dosing card. 100 g 1     InFLIXimab (REMICADE IV)        levothyroxine (SYNTHROID/LEVOTHROID) 25 MCG tablet TAKE ONE TABLET BY MOUTH EVERY  DAY 90 tablet 2     losartan (COZAAR) 25 MG tablet TAKE ONE TABLET BY MOUTH EVERY DAY 30 tablet 6     omeprazole (PRILOSEC) 20 MG CR capsule TAKE ONE CAPSULE BY MOUTH ONCE DAILY -TAKE 30-60 MINUTES BEFORE A MEAL 90 capsule 1     predniSONE (DELTASONE) 5 MG tablet Take 1-2 tablets (5-10 mg) by mouth daily as needed for rheumatoid arthritis symptoms 180 tablet 1     triamcinolone (KENALOG) 0.1 % cream Locally bid 60 g 0         --------------------------------------------------------------------------------------------------------------------                              Review of Systems     LUNGS: Pt denies: cough,excess sputum, hemoptysis, or shortness of breath.   HEART: Pt denies: chest pain, arrythmia, syncope, tachy or bradyarrhythmia.   GI: Pt denies: nausea, vomitting, diarrhea, constipation, melena, or hematochezia.   NEURO: Pt denies: seizures, strokes, diplopia, weakness, paraesthesias, or paralysis.                                     Examination      /82 (BP Location: Left arm, Patient Position: Chair, Cuff Size: Adult Regular)  Pulse 60  Temp 97.6  F (36.4  C) (Temporal)  Resp 16  Wt 194 lb (88 kg)  SpO2 96%  BMI 27.84 kg/m2   Constitutional: The patient appears to be in no acute distress. The patient appears to be adequately hydrated. No acute respiratory or hemodynamic distress is noted at this time.   LUNGS: clear bilaterally, airflow is brisk, no intercostal retraction or stridor is noted. No coughing is noted during visit.   HEART:  regular without rubs, clicks, gallops, or murmurs. PMI is nondisplaced. Upstrokes are brisk. S1,S2 are heard.   GI: Abdomen is soft, without rebound, guarding or tenderness. Bowel sounds are appropriate. No renal bruits are heard.   ENT: Pharynx is mildly-erythemous, moderate yellow PND, no significant nasal obstruction, TM's are slightly red and retracted, hearing intact bilaterally. No carotid bruits are heard. No JVD seen. Thyroid is not nodular or  enlarged.                                          Decision Making    1. Acute non-recurrent maxillary sinusitis  Tylenol, fluids, rest.  Continue Zyrtec  - azithromycin (ZITHROMAX) 250 MG tablet; Two tablets first day, then one tablet daily for four days.  Dispense: 6 tablet; Refill: 0    2. 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)  Patient on immunosuppressive medication, watch for progression of infection.  [place] follow-up with rheumatologist.                          FOLLOW UP   I have asked the patient to make an appointment for followup with me as needed        I have carefully explained the diagnosis and treatment options to the patient.  The patient has displayed an understanding of the above, and all subsequent questions were answered.      DO BOWEN Leiva    Portions of this note were produced using [employer]  Although every attempt at real-time proof reading has been made, occasional grammar/syntax errors may have been missed.

## 2018-08-09 NOTE — TELEPHONE ENCOUNTER
"Called yesterday and today; no answer.     I then called the mobile number and his wife Jacki answered.  She said that he is at home and calling there again won't likely be successful but she will let him know that I called.     Rheumatology team: please see if you can get ahold of Mr. Baca to discuss \"trouble with Remicade\"    Fabricio Gomes MD  8/9/2018 12:08 PM    "

## 2018-08-10 ENCOUNTER — OFFICE VISIT (OUTPATIENT)
Dept: FAMILY MEDICINE | Facility: OTHER | Age: 68
End: 2018-08-10
Payer: COMMERCIAL

## 2018-08-10 DIAGNOSIS — M05.712 RHEUMATOID ARTHRITIS INVOLVING BOTH SHOULDERS WITH POSITIVE RHEUMATOID FACTOR (H): Primary | ICD-10-CM

## 2018-08-10 DIAGNOSIS — D68.51 FACTOR 5 LEIDEN MUTATION, HETEROZYGOUS (H): ICD-10-CM

## 2018-08-10 DIAGNOSIS — K21.9 GASTROESOPHAGEAL REFLUX DISEASE WITHOUT ESOPHAGITIS: ICD-10-CM

## 2018-08-10 DIAGNOSIS — M05.711 RHEUMATOID ARTHRITIS INVOLVING BOTH SHOULDERS WITH POSITIVE RHEUMATOID FACTOR (H): Primary | ICD-10-CM

## 2018-08-10 DIAGNOSIS — I15.9 SECONDARY HYPERTENSION WITH GOAL BLOOD PRESSURE LESS THAN 140/90: ICD-10-CM

## 2018-08-10 PROBLEM — M05.79 RHEUMATOID ARTHRITIS INVOLVING MULTIPLE SITES WITH POSITIVE RHEUMATOID FACTOR (H): Status: ACTIVE | Noted: 2018-08-10

## 2018-08-10 PROCEDURE — 99214 OFFICE O/P EST MOD 30 MIN: CPT | Performed by: INTERNAL MEDICINE

## 2018-08-10 RX ORDER — SUCRALFATE 1 G/1
1 TABLET ORAL 4 TIMES DAILY
Qty: 40 TABLET | Refills: 1 | Status: SHIPPED | OUTPATIENT
Start: 2018-08-10 | End: 2019-01-11

## 2018-08-10 RX ORDER — OMEPRAZOLE 40 MG/1
40 CAPSULE, DELAYED RELEASE ORAL DAILY
Qty: 90 CAPSULE | Refills: 0 | Status: SHIPPED | OUTPATIENT
Start: 2018-08-10 | End: 2018-11-10

## 2018-08-10 NOTE — TELEPHONE ENCOUNTER
Daxa Kc notified for the Simponi PA to be done; she says that she will send it in for review.    Fabricio Gomes MD  8/10/2018 2:14 PM

## 2018-08-10 NOTE — MR AVS SNAPSHOT
After Visit Summary   8/10/2018    Camilo Baca    MRN: [id number]           Patient Information     Date Of Birth          1950        Visit Information        Provider Department      8/10/2018 11:00 AM Ace Bobo DO 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)    -  1    Gastroesophageal reflux disease without esophagitis        Secondary hypertension with goal blood pressure less than 140/90        Factor 5 Leiden mutation, heterozygous (H)           Follow-ups after your visit        Your next 10 appointments already scheduled     Sep 21, 2018  8:00 AM CDT   Level 2 with NL INFUSION CHAIR 3   [facility] ([facility])    [address] [place] [address] [address] [place] [id number] [phone]            Nov 06, 2018  8:40 AM CST   Return Visit with Fabricio Gomes MD   [facility]    [address] [facility] [address] [phone]              Who to contact     If you have questions or need follow up information about today's clinic visit or your schedule please contact [facility] directly at [phone].  Normal or non-critical lab and imaging results will be communicated to you by MyChart, letter or phone within 4 business days after the clinic has received the results. If you do not hear from us within 7 days, please contact the clinic through MyChart or phone. If you have a critical or abnormal lab result, we will notify you by phone as soon as possible.  Submit refill requests through [employer] or call your pharmacy and they will forward the refill request to us. Please allow 3 business days for your refill to be completed.          Additional Information About Your Visit        Care EveryWhere ID     This is your Care EveryWhere ID. This could be used by other  "organizations to access your [facility] medical records  [id number]        Your Vitals Were     Pulse Temperature Height Pulse Oximetry BMI (Body Mass Index)       86 97.4  F (36.3  C) (Temporal) 5' 11.5\" (1.816 m) 99% 26.67 kg/m2        Blood Pressure from Last 3 Encounters:   08/24/18 (!) 148/93   08/10/18 135/85   08/03/18 136/82    Weight from Last 3 Encounters:   08/24/18 195 lb (88.5 kg)   08/10/18 193 lb 14.4 oz (88 kg)   08/03/18 194 lb (88 kg)              Today, you had the following     No orders found for display         Today's Medication Changes          These changes are accurate as of 8/10/18 11:59 PM.  If you have any questions, ask your nurse or doctor.               Start taking these medicines.        Dose/Directions    sucralfate 1 GM tablet   Commonly known as:  CARAFATE   Used for:  Gastroesophageal reflux disease without esophagitis   Started by:  Ace Bobo DO        Dose:  1 g   Take 1 tablet (1 g) by mouth 4 times daily   Quantity:  40 tablet   Refills:  1         These medicines have changed or have updated prescriptions.        Dose/Directions    omeprazole 40 MG capsule   Commonly known as:  priLOSEC   This may have changed:    - medication strength  - See the new instructions.   Used for:  Gastroesophageal reflux disease without esophagitis   Changed by:  Ace Bobo DO        Dose:  40 mg   Take 1 capsule (40 mg) by mouth daily   Quantity:  90 capsule   Refills:  0         Stop taking these medicines if you haven't already. Please contact your care team if you have questions.     predniSONE 5 MG tablet   Commonly known as:  DELTASONE   Stopped by:  Ace Bobo DO                Where to get your medicines      These medications were sent to [facility] [place], [place] - [address]   [address] [facility] [address]     Phone:  [phone]     sucralfate 1 GM tablet         Some of these will need a paper prescription and " others can be bought over the counter.  Ask your nurse if you have questions.     Bring a paper prescription for each of these medications     omeprazole 40 MG capsule                Primary Care Provider Office Phone # Fax #    DIANN Presley [phone]       [address] [facility] [address]        Equal Access to Services     BECCA BAEZ : Hadii aad ku hadasho Soomaali, waaxda luqadaha, qaybta kaalmada adeegyada, waxay idiin hayaan adeeg kharash la'fazal . So [facility] [phone].    ATENCIÓN: Si habla español, tiene a bernal disposición servicios gratuitos de asistencia lingüística. Llame al [phone].    We comply with applicable federal civil rights laws and [place] laws. We do not discriminate on the basis of race, color, national origin, age, disability, sex, sexual orientation, or gender identity.            Thank you!     Thank you for choosing [facility]  for your care. Our goal is always to provide you with excellent care. Hearing back from our patients is one way we can continue to improve our services. Please take a few minutes to complete the written survey that you may receive in the mail after your visit with us. Thank you!             Your Updated Medication List - Protect others around you: Learn how to safely use, store and throw away your medicines at www.disposemymeds.org.          This list is accurate as of 8/10/18 11:59 PM.  Always use your most recent med list.                   Brand Name Dispense Instructions for use Diagnosis    allopurinol 300 MG tablet    ZYLOPRIM    90 tablet    TAKE ONE TABLET BY MOUTH ONCE DAILY    Acute gouty arthropathy       atorvastatin 80 MG tablet    LIPITOR    90 tablet    Take 1 tablet (80 mg) by mouth daily    Hyperlipidemia LDL goal <130       B-12 100 MCG Tabs           BABY ASPIRIN PO      daily        capsaicin 0.075 % cream    ZOSTRIX    50 g    Apply  topically 3 times daily.    Bursitis of shoulder        cetirizine 10 MG tablet    zyrTEC    90 tablet    Take 1 tablet (10 mg) by mouth every evening    Seasonal allergic rhinitis, unspecified allergic rhinitis trigger       cholestyramine light 4 GM Packet    QUESTRAN    60 packet    DISSOLVE ONE PACKET IN LIQUID AS DIRECTED TWO TIMES A DAY AND DRINK    Hyperlipidemia LDL goal <130       diclofenac 1 % Gel topical gel    VOLTAREN    100 g    Apply  2 grams to shoulders three times daily using enclosed dosing card.    Rotator cuff tendinitis       levothyroxine 25 MCG tablet    SYNTHROID/LEVOTHROID    90 tablet    TAKE ONE TABLET BY MOUTH EVERY DAY    Hypothyroidism due to acquired atrophy of thyroid       losartan 25 MG tablet    COZAAR    30 tablet    TAKE ONE TABLET BY MOUTH EVERY DAY    Benign essential hypertension       omeprazole 40 MG capsule    priLOSEC    90 capsule    Take 1 capsule (40 mg) by mouth daily    Gastroesophageal reflux disease without esophagitis       REMICADE IV           sucralfate 1 GM tablet    CARAFATE    40 tablet    Take 1 tablet (1 g) by mouth 4 times daily    Gastroesophageal reflux disease without esophagitis       triamcinolone 0.1 % cream    KENALOG    60 g    Locally bid    Rash       VITAMIN C PO      Take by mouth daily

## 2018-08-10 NOTE — PROGRESS NOTES
Chief Complaint   Patient presents with     Gastric Problem   CHIEF COMPLAINT:    The patient is a pleasant 67-year-old gentleman who presents today with gastroesophageal reflux symptoms. He notes that they are worse after he's had his Remicade IV. He notes that his rheumatologist is anticipating changing him to another medication in the near future. He did for a short time take the omeprazole 20 mg twice daily and this brought about significant improvement in his reflux symptoms. He then returned to once daily. Symptoms shortly thereafter returned. He is compliant with all of his other medications. He denies any melena or hematochezia.                       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     Surgical History:   has a past surgical history that includes APPENDECTOMY (1996); REPAIR ING HERNIA,5+Y/O,REDUCIBL (1960); COLONOSCOPY W BIOPSY (08/10/05); Colonoscopy (11/15/2010); Laparoscopic herniorrhaphy inguinal bilateral (Bilateral, 4/17/2015); Herniorrhaphy umbilical (N/A, 4/17/2015); and Colonoscopy (N/A, 11/2/2015).     Social History:   reports that he quit smoking about 39 years ago. His smoking use included Cigarettes and  Pipe. He has a 0.50 pack-year smoking history. He has never used smokeless tobacco. He reports that he drinks alcohol. He reports that he does not use illicit drugs.     Family History:  family history includes Alcohol/Drug in his sister; Allergies in his father; Alzheimer Disease in his paternal grandfather; Arthritis in his mother; Blood Disease in his paternal grandmother and son; Cardiovascular in his father and mother; Cerebrovascular Disease in his maternal grandmother; Circulatory in his father and mother; Depression in his mother; Diabetes in his father; GASTROINTESTINAL DISEASE in his mother; Hypertension in his father, maternal aunt, and mother; Lipids in his father; Osteoperosis in his mother.            MEDICATIONS  Current Outpatient Prescriptions   Medication Sig Dispense Refill     allopurinol (ZYLOPRIM) 300 MG tablet TAKE ONE TABLET BY MOUTH ONCE DAILY 90 tablet 1     Ascorbic Acid (VITAMIN C PO) Take by mouth daily       atorvastatin (LIPITOR) 80 MG tablet Take 1 tablet (80 mg) by mouth daily 90 tablet 2     BABY ASPIRIN OR daily       capsaicin (ZOSTRIX) 0.075 % topical cream Apply  topically 3 times daily. 50 g 3     cetirizine (ZYRTEC) 10 MG tablet Take 1 tablet (10 mg) by mouth every evening 90 tablet 1     cholestyramine light (QUESTRAN) 4 GM Packet DISSOLVE ONE PACKET IN LIQUID AS DIRECTED TWO TIMES A DAY AND DRINK 60 packet 5     Cyanocobalamin (B-12) 100 MCG TABS        diclofenac (VOLTAREN) 1 % GEL Apply  2 grams to shoulders three times daily using enclosed dosing card. 100 g 1     InFLIXimab (REMICADE IV)        levothyroxine (SYNTHROID/LEVOTHROID) 25 MCG tablet TAKE ONE TABLET BY MOUTH EVERY DAY 90 tablet 2     losartan (COZAAR) 25 MG tablet TAKE ONE TABLET BY MOUTH EVERY DAY 30 tablet 6     omeprazole (PRILOSEC) 40 MG capsule Take 1 capsule (40 mg) by mouth daily 90 capsule 0     sucralfate (CARAFATE) 1 GM tablet Take 1 tablet (1 g) by mouth 4 times daily 40 tablet 1     triamcinolone  "(KENALOG) 0.1 % cream Locally bid 60 g 0         --------------------------------------------------------------------------------------------------------------------                          REVIEW OF SYSTEMS:         LUNGS: Pt denies: cough,excess sputum, hemoptysis, or shortness of breath.   HEART: Pt denies: chest pain, arrythmia, syncope, tachy or bradyarrhythmia or excess edema.   GI: Pt denies: nausea, vomitting, diarrhea, constipation, melena, or hematochezia.   NEURO: Pt denies: seizures, strokes, diplopia, weakness, paraesthesias, or paralysis.   SKIN: Pt denies: itching, rashes, discoloration, or specific lesions of concern. Denies recent hair loss.                          EXAMINATION:         /85 (BP Location: Right arm, Patient Position: Chair, Cuff Size: Adult Regular)  Pulse 86  Temp 97.4  F (36.3  C) (Temporal)  Ht 5' 11.5\" (1.816 m)  Wt 193 lb 14.4 oz (88 kg)  SpO2 99%  BMI 26.67 kg/m2   Constitutional: The patient appears to be in no acute distress. The patient appears to be adequately hydrated. No acute respiratory or hemodynamic distress is noted at this time.   LUNGS: clear bilaterally, airflow is brisk, no intercostal retraction or stridor is noted. No coughing is noted during visit.   HEART:  regular without rubs, clicks, gallops, or murmurs. PMI is nondisplaced. Upstrokes are brisk. S1,S2 are heard.   GI: Abdomen is soft, without rebound, guarding a small amount of epigastric tenderness is noted with palpation.. Bowel sounds are appropriate. No renal bruits are heard.    NEURO: Pt is alert and appropriate. No neurologic lateralization is noted. Cranial nerves 2-12 are intact. Peripheral sensory and motor function are grossly normal   SKIN:  warm and dry. No erythema, or rashes are noted. No specific lesions of concern are noted.                           DECISION MAKIN. Gastroesophageal reflux disease without esophagitis  Add Carafate on an as-needed basis  - sucralfate " (CARAFATE) 1 GM tablet; Take 1 tablet (1 g) by mouth 4 times daily  Dispense: 40 tablet; Refill: 1  - omeprazole (PRILOSEC) 40 MG capsule; Take 1 capsule (40 mg) by mouth daily  Dispense: 90 capsule; Refill: 0    2. 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 Secondary hypertension with goal blood pressure less than 140/90  Currently stable. Continue current medication    4. Factor 5 Leiden mutation, heterozygous (H)  Not on anticoagulation at this time. Continue aspirin without fail. (Take with food).                                FOLLOW UP    I have asked the patient to make an appointment for follow up with me in 2 weeks unless markedly improved            I have carefully explained the diagnosis and treatment options with the patient. The patient has displayed an understanding of the above, and all subsequent questions were answered.             DO BOWEN Leiva    Portions of this note were produced using "[employer]  Although every attempt at real-time proof reading has been made, occasional grammar/syntax errors may have been missed.             .

## 2018-08-10 NOTE — TELEPHONE ENCOUNTER
Pt reports he has been experiencing a pressure in his chest after the last few Remicaide infusions. Denies SOB. After the most recent infusion he also developed aching/swelling in his hands wrists, and shoulders and took 5 mg of prednisone for 5 days which improved the swelling and pain temporarily. He also visited his PCP for a sore throat, chest pressure, dysphagia. He was treated for allergies with prednisone and Zyrtec. Also mentions he has reflux which he believes could be contributing to the chest pain/pressure. Huddled with Dr. Gomes and advised patient to stop Remicaide  Discussed that Dr. Gomes would like to start him on Simponi. Patient agrees with this plan.    Avery Cuellar RN....8/10/2018 8:40 AM

## 2018-08-10 NOTE — TELEPHONE ENCOUNTER
Stop Remicade.   Start Simponi.      See messages below (in this encounter) and my last clinic note for more details.    Fabricio Gomes MD  8/10/2018 2:09 PM

## 2018-08-20 ENCOUNTER — TELEPHONE (OUTPATIENT)
Dept: RHEUMATOLOGY | Facility: CLINIC | Age: 68
End: 2018-08-20

## 2018-08-20 NOTE — TELEPHONE ENCOUNTER
Reason for Call:  Other clarification of medication    Detailed comments: Patient is scheduled for an infusion of remicaid 8-24-18 but patient stated the remicaid is not working & Dr Gomes was looking into changing to another medication. Please call patient to advise of new medication and how it will be administered.    Phone Number Patient can be reached at: Home number on file [phone] (home)    Best Time: any    Can we leave a detailed message on this number? YES    Call taken on 8/20/2018 at 8:53 AM by Cynthia Draper

## 2018-08-22 DIAGNOSIS — E03.4 HYPOTHYROIDISM DUE TO ACQUIRED ATROPHY OF THYROID: ICD-10-CM

## 2018-08-22 RX ORDER — LEVOTHYROXINE SODIUM 25 UG/1
TABLET ORAL
Qty: 90 TABLET | Refills: 0 | Status: SHIPPED | OUTPATIENT
Start: 2018-08-22 | End: 2018-11-17

## 2018-08-22 NOTE — TELEPHONE ENCOUNTER
Rheumatology team: Please call to notify Mr. Baca that Remicade has been stopped and Simponi has been prescribed.  His next infusion will be Simponi instead of Remicade.  Simponi is the medication that we discussed during his last clinic visit.    Fabricio Gomes MD  8/22/2018 5:53 AM

## 2018-08-22 NOTE — TELEPHONE ENCOUNTER
Called the patient, informed him of Dr. Gomes's message below. Patient expressed understanding to me. Closing encounter.     Ro Hernández CMA on 8/22/2018 at 8:59 AM

## 2018-08-22 NOTE — TELEPHONE ENCOUNTER
Routing refill request to provider for review/approval because:  Drug interaction warning    WILFRED BurrellN, RN  [facility]

## 2018-08-22 NOTE — TELEPHONE ENCOUNTER
"Requested Prescriptions   Pending Prescriptions Disp Refills     levothyroxine (SYNTHROID/LEVOTHROID) 25 MCG tablet [Pharmacy Med Name: LEVOTHYROXINE SODIUM 25MCG TABS] 90 tablet 2    Last Written Prescription Date:  11/27/17  Last Fill Quantity: 90,  # refills: 2   Last office visit: No previous visit found with prescribing provider: 9-8-17    Future Office Visit:   Next 5 appointments (look out 90 days)     Nov 06, 2018  8:40 AM CST   Return Visit with Fabricio Gomes MD   [facility]    [address] [place] [id number] [phone]                  Sig: TAKE ONE TABLET BY MOUTH EVERY DAY    Thyroid Protocol Passed    8/22/2018  9:28 AM       Passed - Patient is 12 years or older       Passed - Recent (12 mo) or future (30 days) visit within the authorizing provider's specialty    Patient had office visit in the last 12 months or has a visit in the next 30 days with authorizing provider or within the authorizing provider's specialty.  See \"Patient Info\" tab in inbasket, or \"Choose Columns\" in Meds & Orders section of the refill encounter.           Passed - Normal TSH on file in past 12 months    Recent Labs   Lab Test  08/24/17   0931   TSH  1.94                "

## 2018-08-24 ENCOUNTER — INFUSION THERAPY VISIT (OUTPATIENT)
Dept: INFUSION THERAPY | Facility: CLINIC | Age: 68
End: 2018-08-24
Attending: INTERNAL MEDICINE
Payer: MEDICARE

## 2018-08-24 VITALS
TEMPERATURE: 97.4 F | DIASTOLIC BLOOD PRESSURE: 93 MMHG | RESPIRATION RATE: 16 BRPM | HEART RATE: 60 BPM | BODY MASS INDEX: 26.82 KG/M2 | SYSTOLIC BLOOD PRESSURE: 148 MMHG | WEIGHT: 195 LBS | OXYGEN SATURATION: 95 %

## 2018-08-24 DIAGNOSIS — M05.79 RHEUMATOID ARTHRITIS INVOLVING MULTIPLE SITES WITH POSITIVE RHEUMATOID FACTOR (H): Primary | ICD-10-CM

## 2018-08-24 PROCEDURE — 96413 CHEMO IV INFUSION 1 HR: CPT

## 2018-08-24 PROCEDURE — 25000128 H RX IP 250 OP 636: Performed by: INTERNAL MEDICINE

## 2018-08-24 RX ADMIN — GOLIMUMAB 200 MG: 50 SOLUTION INTRAVENOUS at 08:50

## 2018-08-24 ASSESSMENT — PAIN SCALES - GENERAL: PAINLEVEL: NO PAIN (0)

## 2018-08-24 NOTE — MR AVS SNAPSHOT
After Visit Summary   8/24/2018    Camilo Baca    MRN: [id number]           Patient Information     Date Of Birth          1950        Visit Information        Provider Department      8/24/2018 8:00 AM NL INFUSION CHAIR 5 [facility]        Today's Diagnoses     Rheumatoid arthritis involving multiple sites with positive rheumatoid factor (H)    -  1       Follow-ups after your visit        Follow-up notes from your care team     Return in 4 weeks (on 9/21/2018).      Your next 10 appointments already scheduled     Sep 21, 2018  8:00 AM CDT   Level 2 with NL INFUSION CHAIR 3   [facility] ([facility])    [address] [place] [address]   [phone]            Nov 06, 2018  8:40 AM CST   Return Visit with Fabricio Gomes MD   [facility] ([facility])    [address] [place] [id number] [phone]              Who to contact     If you have questions or need follow up information about today's clinic visit or your schedule please contact [facility] directly at [phone].  Normal or non-critical lab and imaging results will be communicated to you by MyChart, letter or phone within 4 business days after the clinic has received the results. If you do not hear from us within 7 days, please contact the clinic through MyChart or phone. If you have a critical or abnormal lab result, we will notify you by phone as soon as possible.  Submit refill requests through [employer] or call your pharmacy and they will forward the refill request to us. Please allow 3 business days for your refill to be completed.          Additional Information About Your Visit        Care EveryWhere ID     This is your Care EveryWhere ID. This could be used by other organizations to access your [facility] medical records  [id number]        Your Vitals Were      Pulse Temperature Respirations Pulse Oximetry BMI (Body Mass Index)       60 97.4  F (36.3  C) (Temporal) 16 95% 26.82 kg/m2        Blood Pressure from Last 3 Encounters:   08/24/18 (!) 148/93   08/10/18 152/88   08/03/18 136/82    Weight from Last 3 Encounters:   08/24/18 88.5 kg (195 lb)   08/10/18 88 kg (193 lb 14.4 oz)   08/03/18 88 kg (194 lb)              Today, you had the following     No orders found for display       Primary Care Provider Office Phone # Fax #    Lyndsey Aponte, DIANN [phone]       [address] [facility] [address]        Equal Access to Services     BECCA BAEZ : Hadii aad ku hadasho Soomaali, waaxda luqadaha, qaybta kaalmada adeegyada, waxeriberto middleton . So [facility] [phone].    ATENCIÓN: Si habla español, tiene a bernal disposición servicios gratuitos de asistencia lingüística. LlReg[facility] [phone].    We comply with applicable federal civil rights laws and [place] laws. We do not discriminate on the basis of race, color, national origin, age, disability, sex, sexual orientation, or gender identity.            Thank you!     Thank you for choosing [facility]  for your care. Our goal is always to provide you with excellent care. Hearing back from our patients is one way we can continue to improve our services. Please take a few minutes to complete the written survey that you may receive in the mail after your visit with us. Thank you!             Your Updated Medication List - Protect others around you: Learn how to safely use, store and throw away your medicines at www.disposemymeds.org.          This list is accurate as of 8/24/18 11:58 AM.  Always use your most recent med list.                   Brand Name Dispense Instructions for use Diagnosis    allopurinol 300 MG tablet    ZYLOPRIM    90 tablet    TAKE ONE TABLET BY MOUTH ONCE DAILY    Acute gouty arthropathy       atorvastatin 80 MG tablet    LIPITOR    90  tablet    Take 1 tablet (80 mg) by mouth daily    Hyperlipidemia LDL goal <130       B-12 100 MCG Tabs           BABY ASPIRIN PO      daily        capsaicin 0.075 % cream    ZOSTRIX    50 g    Apply  topically 3 times daily.    Bursitis of shoulder       cetirizine 10 MG tablet    zyrTEC    90 tablet    Take 1 tablet (10 mg) by mouth every evening    Seasonal allergic rhinitis, unspecified allergic rhinitis trigger       cholestyramine light 4 GM Packet    QUESTRAN    60 packet    DISSOLVE ONE PACKET IN LIQUID AS DIRECTED TWO TIMES A DAY AND DRINK    Hyperlipidemia LDL goal <130       diclofenac 1 % Gel topical gel    VOLTAREN    100 g    Apply  2 grams to shoulders three times daily using enclosed dosing card.    Rotator cuff tendinitis       * levothyroxine 25 MCG tablet    SYNTHROID/LEVOTHROID    90 tablet    TAKE ONE TABLET BY MOUTH EVERY DAY    Hypothyroidism due to acquired atrophy of thyroid       * levothyroxine 25 MCG tablet    SYNTHROID/LEVOTHROID    90 tablet    TAKE ONE TABLET BY MOUTH EVERY DAY    Hypothyroidism due to acquired atrophy of thyroid       losartan 25 MG tablet    COZAAR    30 tablet    TAKE ONE TABLET BY MOUTH EVERY DAY    Benign essential hypertension       omeprazole 40 MG capsule    priLOSEC    90 capsule    Take 1 capsule (40 mg) by mouth daily    Gastroesophageal reflux disease without esophagitis       REMICADE IV           sucralfate 1 GM tablet    CARAFATE    40 tablet    Take 1 tablet (1 g) by mouth 4 times daily    Gastroesophageal reflux disease without esophagitis       triamcinolone 0.1 % cream    KENALOG    60 g    Locally bid    Rash       VITAMIN C PO      Take by mouth daily        * Notice:  This list has 2 medication(s) that are the same as other medications prescribed for you. Read the directions carefully, and ask your doctor or other care provider to review them with you.

## 2018-08-24 NOTE — PROGRESS NOTES
Infusion Nursing Note:  Camilo Baca presents today for Simponi Aria.    Patient seen by provider today: No   present during visit today: Not Applicable.    Note: NA.    Intravenous Access:  Peripheral IV placed.    Treatment Conditions:Rheumbiologic checklist negative      Post Infusion Assessment:  Patient tolerated infusion without incident.  Patient observed for 15 minutes post Simponi Aria per protocol.  Site patent and intact, free from redness, edema or discomfort.  No evidence of extravasations.  Access discontinued per protocol.    Discharge Plan:   Discharge instructions reviewed with: Patient.  Patient discharged in stable condition accompanied by: self.  Departure Mode: Ambulatory EDUCATION POST BIOLOGICAL/CHEMOTHERAPY INFUSION  Call the triage nurse at your clinic or seek medical attention if you have chills and/or temperature greater than or equal to 100.5, uncontrolled nausea/vomiting, diarrhea, constipation, dizziness, shortness of breath, chest pain, heart palpitations, weakness or any other new or concerning symptoms, questions or concerns.  You can not have any live virus vaccines prior to or during treatment or up to 6 months post infusion.  If you have an upcoming surgery, medical procedure or dental procedure during treatment, this should be discussed with your ordering physician and your surgeon/dentist.  If you are having any concerning symptom, if you are unsure if you should get your next infusion or wish to speak to a provider before your next infusion, please call your care coordinator or triage nurse at your clinic to notify them so we can adequately serve you.  .    Emy Figueredo RN

## 2018-08-26 VITALS
SYSTOLIC BLOOD PRESSURE: 135 MMHG | OXYGEN SATURATION: 99 % | HEIGHT: 72 IN | DIASTOLIC BLOOD PRESSURE: 85 MMHG | BODY MASS INDEX: 26.26 KG/M2 | WEIGHT: 193.9 LBS | TEMPERATURE: 97.4 F | HEART RATE: 86 BPM

## 2018-09-15 DIAGNOSIS — M10.9 ACUTE GOUTY ARTHROPATHY: ICD-10-CM

## 2018-09-17 RX ORDER — ALLOPURINOL 300 MG/1
TABLET ORAL
Qty: 90 TABLET | Refills: 1 | Status: SHIPPED | OUTPATIENT
Start: 2018-09-17 | End: 2019-03-11

## 2018-09-17 NOTE — TELEPHONE ENCOUNTER
"Requested Prescriptions   Pending Prescriptions Disp Refills     allopurinol (ZYLOPRIM) 300 MG tablet [Pharmacy Med Name: ALLOPURINOL 300MG TABS] 90 tablet 1    Last Written Prescription Date:  3-20-18  Last Fill Quantity: 90,  # refills: 1   Last office visit: 8/10/2018 with prescribing provider:  8-   Future Office Visit:   Next 5 appointments (look out 90 days)     Nov 06, 2018  8:40 AM CST   Return Visit with Fabricio Gomes MD   [facility] ([facility]    [address] [place] [id number] [phone]                  Sig: TAKE ONE TABLET BY MOUTH EVERY DAY    Gout Agents Protocol Passed    9/15/2018  9:30 AM       Passed - CBC on file in past 12 months    Recent Labs   Lab Test  06/26/18   0955   WBC  4.3   RBC  4.02*   HGB  13.6   HCT  39.9*   PLT  121*       For [facility] ONLY: KGBN316 = WBC, ZWLJ225 = RBC         Passed - ALT on file in past 12 months    Recent Labs   Lab Test  06/26/18   0955   ALT  50            Passed - Has Uric Acid on file in past 12 months and value is less than 6    Recent Labs   Lab Test  02/21/18   0909   URIC  3.6     If level is 6mg/dL or greater, ok to refill one time and refer to provider.          Passed - Recent (12 mo) or future (30 days) visit within the authorizing provider's specialty    Patient had office visit in the last 12 months or has a visit in the next 30 days with authorizing provider or within the authorizing provider's specialty.  See \"Patient Info\" tab in inbasket, or \"Choose Columns\" in Meds & Orders section of the refill encounter.           Passed - Patient is age 18 or older       Passed - Normal serum creatinine on file in the past 12 months    Recent Labs   Lab Test  06/26/18   0955   CR  0.85               "

## 2018-09-17 NOTE — TELEPHONE ENCOUNTER
Routing refill request to provider for review/approval because:  Labs out of range:  CBC    WILFRED BurrellN, RN  [facility]

## 2018-09-21 ENCOUNTER — INFUSION THERAPY VISIT (OUTPATIENT)
Dept: INFUSION THERAPY | Facility: CLINIC | Age: 68
End: 2018-09-21
Attending: INTERNAL MEDICINE
Payer: MEDICARE

## 2018-09-21 VITALS
RESPIRATION RATE: 16 BRPM | DIASTOLIC BLOOD PRESSURE: 96 MMHG | TEMPERATURE: 97.8 F | BODY MASS INDEX: 26.71 KG/M2 | OXYGEN SATURATION: 94 % | SYSTOLIC BLOOD PRESSURE: 151 MMHG | HEART RATE: 61 BPM | WEIGHT: 194.2 LBS

## 2018-09-21 DIAGNOSIS — M05.79 RHEUMATOID ARTHRITIS INVOLVING MULTIPLE SITES WITH POSITIVE RHEUMATOID FACTOR (H): Primary | ICD-10-CM

## 2018-09-21 PROCEDURE — 96413 CHEMO IV INFUSION 1 HR: CPT

## 2018-09-21 PROCEDURE — 25000128 H RX IP 250 OP 636: Performed by: INTERNAL MEDICINE

## 2018-09-21 RX ADMIN — GOLIMUMAB 200 MG: 50 SOLUTION INTRAVENOUS at 08:51

## 2018-09-21 ASSESSMENT — PAIN SCALES - GENERAL: PAINLEVEL: MILD PAIN (2)

## 2018-09-21 NOTE — MR AVS SNAPSHOT
After Visit Summary   9/21/2018    Camilo Baca    MRN: [id number]           Patient Information     Date Of Birth          1950        Visit Information        Provider Department      9/21/2018 8:00 AM NL INFUSION CHAIR 3 [facility]        Today's Diagnoses     Rheumatoid arthritis involving multiple sites with positive rheumatoid factor (H)    -  1       Follow-ups after your visit        Follow-up notes from your care team     Return in 8 weeks (on 11/16/2018).      Your next 10 appointments already scheduled     Nov 06, 2018  8:40 AM CST   Return Visit with Fabricio Gomes MD   [facility]    [address] [facility] [id number] [phone]            Nov 16, 2018  8:00 AM CST   Level 2 with NL INFUSION CHAIR 2   [facility] ([facility])    [address] [place] [address] Dr Dee BAUTISTA [id number] [phone]              Who to contact     If you have questions or need follow up information about today's clinic visit or your schedule please contact [facility] directly at [phone].  Normal or non-critical lab and imaging results will be communicated to you by MyChart, letter or phone within 4 business days after the clinic has received the results. If you do not hear from us within 7 days, please contact the clinic through MyChart or phone. If you have a critical or abnormal lab result, we will notify you by phone as soon as possible.  Submit refill requests through [employer] or call your pharmacy and they will forward the refill request to us. Please allow 3 business days for your refill to be completed.          Additional Information About Your Visit        Care EveryWhere ID     This is your Care EveryWhere ID. This could be used by other organizations to access your [facility] medical records  [id number]        Your Vitals Were      Pulse Temperature Respirations Pulse Oximetry BMI (Body Mass Index)       61 97.8  F (36.6  C) (Temporal) 16 94% 26.71 kg/m2        Blood Pressure from Last 3 Encounters:   09/21/18 (!) 151/96   08/24/18 (!) 148/93   08/10/18 135/85    Weight from Last 3 Encounters:   09/21/18 88.1 kg (194 lb 3.2 oz)   08/24/18 88.5 kg (195 lb)   08/10/18 88 kg (193 lb 14.4 oz)              Today, you had the following     No orders found for display       Primary Care Provider Office Phone # Fax #    Lyndsey Aponte, DIANN [phone]       [address] [facility] [address]        Equal Access to Services     BECCA BAEZ : Hadii aad ku hadasho Soomaali, waaxda luqadaha, qaybta kaalmada adeegyada, matt middleton . So [facility] [phone].    ATENCIÓN: Si habla español, tiene a bernal disposición servicios gratuitos de asistencia lingüística. Llame al [phone].    We comply with applicable federal civil rights laws and [place] laws. We do not discriminate on the basis of race, color, national origin, age, disability, sex, sexual orientation, or gender identity.            Thank you!     Thank you for choosing [facility]  for your care. Our goal is always to provide you with excellent care. Hearing back from our patients is one way we can continue to improve our services. Please take a few minutes to complete the written survey that you may receive in the mail after your visit with us. Thank you!             Your Updated Medication List - Protect others around you: Learn how to safely use, store and throw away your medicines at www.disposemymeds.org.          This list is accurate as of 9/21/18 12:10 PM.  Always use your most recent med list.                   Brand Name Dispense Instructions for use Diagnosis    * allopurinol 300 MG tablet    ZYLOPRIM    90 tablet    TAKE ONE TABLET BY MOUTH ONCE DAILY    Acute gouty arthropathy       * allopurinol 300 MG tablet     ZYLOPRIM    90 tablet    TAKE ONE TABLET BY MOUTH EVERY DAY    Acute gouty arthropathy       atorvastatin 80 MG tablet    LIPITOR    90 tablet    Take 1 tablet (80 mg) by mouth daily    Hyperlipidemia LDL goal <130       B-12 100 MCG Tabs           BABY ASPIRIN PO      daily        capsaicin 0.075 % cream    ZOSTRIX    50 g    Apply  topically 3 times daily.    Bursitis of shoulder       cetirizine 10 MG tablet    zyrTEC    90 tablet    Take 1 tablet (10 mg) by mouth every evening    Seasonal allergic rhinitis, unspecified allergic rhinitis trigger       cholestyramine light 4 GM Packet    QUESTRAN    60 packet    DISSOLVE ONE PACKET IN LIQUID AS DIRECTED TWO TIMES A DAY AND DRINK    Hyperlipidemia LDL goal <130       diclofenac 1 % Gel topical gel    VOLTAREN    100 g    Apply  2 grams to shoulders three times daily using enclosed dosing card.    Rotator cuff tendinitis       * levothyroxine 25 MCG tablet    SYNTHROID/LEVOTHROID    90 tablet    TAKE ONE TABLET BY MOUTH EVERY DAY    Hypothyroidism due to acquired atrophy of thyroid       * levothyroxine 25 MCG tablet    SYNTHROID/LEVOTHROID    90 tablet    TAKE ONE TABLET BY MOUTH EVERY DAY    Hypothyroidism due to acquired atrophy of thyroid       losartan 25 MG tablet    COZAAR    30 tablet    TAKE ONE TABLET BY MOUTH EVERY DAY    Benign essential hypertension       omeprazole 40 MG capsule    priLOSEC    90 capsule    Take 1 capsule (40 mg) by mouth daily    Gastroesophageal reflux disease without esophagitis       REMICADE IV           sucralfate 1 GM tablet    CARAFATE    40 tablet    Take 1 tablet (1 g) by mouth 4 times daily    Gastroesophageal reflux disease without esophagitis       triamcinolone 0.1 % cream    KENALOG    60 g    Locally bid    Rash       VITAMIN C PO      Take by mouth daily        * Notice:  This list has 4 medication(s) that are the same as other medications prescribed for you. Read the directions carefully, and ask your doctor or other  care provider to review them with you.

## 2018-09-21 NOTE — PROGRESS NOTES
Infusion Nursing Note:  Camilo Baca presents today for Evelyn Myers.    Patient seen by provider today: No   present during visit today: Not Applicable.    Note: b/p elevated post infusion.  Pt observed x 30 mins and opted to leave as he stated that he had just taken his Rx prior to coming and it had probably not had time to work yet.    Intravenous Access:  Peripheral IV placed.     Rheumbiologic checklist reviewed - negative    Post Infusion Assessment:  Patient tolerated infusion with exception of elevated b/p at conclusion on infusion, see above note.  Patient observed for 30 minutes.  Site patent and intact, free from redness, edema or discomfort.  No evidence of extravasations.  Access discontinued per protocol.  Rheumatology Post Infusion: POST-INFUSION OF BIOLOGICAL MEDICATION:    Reviewed with patient.  Given biologic medication or medication hand-out. Inform patient if any fever, chills or signs of infection, new symptoms, abdominal pain, heart palpitations, shortness of breath, reaction, weakness, neurological changes, seek medical attention immediately and should not receive infusions. No live virus vaccines prior to or during treatment or up to 6 months post infusion. If the patient has an upcoming procedure or surgery, this should be discussed with the rheumatologist and surgeon or provider..    Discharge Plan:   Discharge instructions reviewed with: Patient.  Patient discharged in stable condition accompanied by: self.  Departure Mode: Ambulatory.    Emy Figueredo RN

## 2018-09-26 DIAGNOSIS — I10 BENIGN ESSENTIAL HYPERTENSION: ICD-10-CM

## 2018-09-27 ENCOUNTER — TELEPHONE (OUTPATIENT)
Dept: FAMILY MEDICINE | Facility: OTHER | Age: 68
End: 2018-09-27

## 2018-09-27 RX ORDER — LOSARTAN POTASSIUM 25 MG/1
TABLET ORAL
Qty: 30 TABLET | Refills: 0 | Status: SHIPPED | OUTPATIENT
Start: 2018-09-27 | End: 2018-10-12

## 2018-09-27 NOTE — TELEPHONE ENCOUNTER
"LOSARTAN  Requested Prescriptions   Pending Prescriptions Disp Refills     losartan (COZAAR) 25 MG tablet [Pharmacy Med Name: LOSARTAN POTASSIUM 25MG TABS] 30 tablet 6     Sig: TAKE ONE TABLET BY MOUTH EVERY DAY    Angiotensin-II Receptors Failed    9/26/2018  9:49 PM       Failed - Blood pressure under 140/90 in past 12 months    BP Readings from Last 3 Encounters:   09/21/18 (!) 151/96   08/24/18 (!) 148/93   08/10/18 135/85                Passed - Recent (12 mo) or future (30 days) visit within the authorizing provider's specialty    Patient had office visit in the last 12 months or has a visit in the next 30 days with authorizing provider or within the authorizing provider's specialty.  See \"Patient Info\" tab in inbasket, or \"Choose Columns\" in Meds & Orders section of the refill encounter.           Passed - Patient is age 18 or older       Passed - Normal serum creatinine on file in past 12 months    Recent Labs   Lab Test  06/26/18   0955   CR  0.85            Passed - Normal serum potassium on file in past 12 months    Recent Labs   Lab Test  04/05/18   2355   POTASSIUM  3.4                      "

## 2018-09-27 NOTE — TELEPHONE ENCOUNTER
Routing refill request to provider for review/approval because:  Labs out of range:  BP    WILFRED BurrellN, RN  [facility]

## 2018-09-27 NOTE — TELEPHONE ENCOUNTER
Patient's BP was high at last OV. Needs BP check with float. Please help schedule.     WILFRED BurrellN, RN  [facility]

## 2018-09-28 ENCOUNTER — ALLIED HEALTH/NURSE VISIT (OUTPATIENT)
Dept: FAMILY MEDICINE | Facility: OTHER | Age: 68
End: 2018-09-28
Payer: COMMERCIAL

## 2018-09-28 VITALS — DIASTOLIC BLOOD PRESSURE: 82 MMHG | SYSTOLIC BLOOD PRESSURE: 150 MMHG | HEART RATE: 64 BPM

## 2018-09-28 DIAGNOSIS — Z01.30 BP CHECK: Primary | ICD-10-CM

## 2018-09-28 PROCEDURE — 99207 ZZC NO CHARGE NURSE ONLY: CPT

## 2018-09-28 NOTE — MR AVS SNAPSHOT
After Visit Summary   9/28/2018    Camilo Baca    MRN: [id number]           Patient Information     Date Of Birth          1950        Visit Information        Provider Department      9/28/2018 10:00 AM LARRY PAK MA [facility]        Today's Diagnoses     BP check    -  1       Follow-ups after your visit        Your next 10 appointments already scheduled     Oct 12, 2018 10:00 AM CDT   Nurse Only with LARRY PAK MA   [facility] ([facility]    [address] [facility] [id number] [phone]            Nov 06, 2018  8:40 AM CST   [facility] Visit with [facility]    [address] [facility] [id number] [phone]            Nov 16, 2018  8:00 AM CST   Level 2 with NL INFUSION CHAIR 2   [facility] ([facility])    [address] [facility] [address] [place] [id number] [phone]              Who to contact     If you have questions or need follow up information about today's clinic visit or your schedule please contact [facility] directly at [phone].  Normal or non-critical lab and imaging results will be communicated to you by MyChart, letter or phone within 4 business days after the clinic has received the results. If you do not hear from us within 7 days, please contact the clinic through MyChart or phone. If you have a critical or abnormal lab result, we will notify you by phone as soon as possible.  Submit refill requests through [employer] or call your pharmacy and they will forward the refill request to us. Please allow 3 business days for your refill to be completed.          Additional Information About Your Visit        Care EveryWhere ID     This is your Care EveryWhere ID. This could be used by other organizations to access your [place] medical records  [id number]        Your Vitals Were     Pulse                    64            Blood Pressure from Last 3 Encounters:   09/28/18 150/82   09/21/18 (!) 151/96   08/24/18 (!) 148/93    Weight from Last 3 Encounters:   09/21/18 194 lb 3.2 oz (88.1 kg)   08/24/18 195 lb (88.5 kg)   08/10/18 193 lb 14.4 oz (88 kg)              Today, you had the following     No orders found for display       Primary Care Provider Office Phone # Fax #    Lyndsey AponteDIANN [phone]       [address] [facility] [address]        Equal Access to Services     [facility]: Hadii aad ku hadasho Soomaali, waaxda luqadaha, qaybta kaalmada adeegyada, matt middleton . So [facility] [phone].    ATENCIÓN: Si habla español, tiene a bernal disposición servicios gratuitos de asistencia lingüística. Llame al [phone].    We comply with applicable federal civil rights laws and [place] laws. We do not discriminate on the basis of race, color, national origin, age, disability, sex, sexual orientation, or gender identity.            Thank you!     Thank you for choosing [facility]  for your care. Our goal is always to provide you with excellent care. Hearing back from our patients is one way we can continue to improve our services. Please take a few minutes to complete the written survey that you may receive in the mail after your visit with us. Thank you!             Your Updated Medication List - Protect others around you: Learn how to safely use, store and throw away your medicines at www.disposemymeds.org.          This list is accurate as of 9/28/18  2:49 PM.  Always use your most recent med list.                   Brand Name Dispense Instructions for use Diagnosis    * allopurinol 300 MG tablet    ZYLOPRIM    90 tablet    TAKE ONE TABLET BY MOUTH ONCE DAILY    Acute gouty arthropathy       * allopurinol 300 MG tablet    ZYLOPRIM    90 tablet    TAKE ONE TABLET BY MOUTH EVERY DAY    Acute gouty arthropathy       atorvastatin 80 MG tablet     LIPITOR    90 tablet    Take 1 tablet (80 mg) by mouth daily    Hyperlipidemia LDL goal <130       B-12 100 MCG Tabs           BABY ASPIRIN PO      daily        capsaicin 0.075 % cream    ZOSTRIX    50 g    Apply  topically 3 times daily.    Bursitis of shoulder       cetirizine 10 MG tablet    zyrTEC    90 tablet    Take 1 tablet (10 mg) by mouth every evening    Seasonal allergic rhinitis, unspecified allergic rhinitis trigger       cholestyramine light 4 GM Packet    QUESTRAN    60 packet    DISSOLVE ONE PACKET IN LIQUID AS DIRECTED TWO TIMES A DAY AND DRINK    Hyperlipidemia LDL goal <130       diclofenac 1 % Gel topical gel    VOLTAREN    100 g    Apply  2 grams to shoulders three times daily using enclosed dosing card.    Rotator cuff tendinitis       * levothyroxine 25 MCG tablet    SYNTHROID/LEVOTHROID    90 tablet    TAKE ONE TABLET BY MOUTH EVERY DAY    Hypothyroidism due to acquired atrophy of thyroid       * levothyroxine 25 MCG tablet    SYNTHROID/LEVOTHROID    90 tablet    TAKE ONE TABLET BY MOUTH EVERY DAY    Hypothyroidism due to acquired atrophy of thyroid       losartan 25 MG tablet    COZAAR    30 tablet    TAKE ONE TABLET BY MOUTH EVERY DAY    Benign essential hypertension       omeprazole 40 MG capsule    priLOSEC    90 capsule    Take 1 capsule (40 mg) by mouth daily    Gastroesophageal reflux disease without esophagitis       REMICADE IV           sucralfate 1 GM tablet    CARAFATE    40 tablet    Take 1 tablet (1 g) by mouth 4 times daily    Gastroesophageal reflux disease without esophagitis       triamcinolone 0.1 % cream    KENALOG    60 g    Locally bid    Rash       VITAMIN C PO      Take by mouth daily        * Notice:  This list has 4 medication(s) that are the same as other medications prescribed for you. Read the directions carefully, and ask your doctor or other care provider to review them with you.

## 2018-09-28 NOTE — NURSING NOTE
Camilo Baca is a 68 year old patient who comes in today for a Blood Pressure check.  Initial BP:  /82 (BP Location: Left arm, Patient Position: Sitting, Cuff Size: Adult Large)  Pulse 64     64  Disposition: provider notified while patient in the clinic  Per Lyndsey Aponte CNP,patient instructed to increase losartan to 50mg and return in 2 weeks for a recheck blood pressure check.     Tara Bautista MA     9/28/2018

## 2018-10-03 ENCOUNTER — OFFICE VISIT (OUTPATIENT)
Dept: FAMILY MEDICINE | Facility: OTHER | Age: 68
End: 2018-10-03
Payer: COMMERCIAL

## 2018-10-03 ENCOUNTER — TELEPHONE (OUTPATIENT)
Dept: FAMILY MEDICINE | Facility: OTHER | Age: 68
End: 2018-10-03

## 2018-10-03 VITALS
BODY MASS INDEX: 26.41 KG/M2 | SYSTOLIC BLOOD PRESSURE: 126 MMHG | RESPIRATION RATE: 20 BRPM | OXYGEN SATURATION: 95 % | WEIGHT: 192 LBS | TEMPERATURE: 97.4 F | DIASTOLIC BLOOD PRESSURE: 82 MMHG | HEART RATE: 78 BPM

## 2018-10-03 DIAGNOSIS — R13.10 DYSPHAGIA, UNSPECIFIED TYPE: ICD-10-CM

## 2018-10-03 DIAGNOSIS — Z23 NEED FOR PROPHYLACTIC VACCINATION AND INOCULATION AGAINST INFLUENZA: ICD-10-CM

## 2018-10-03 DIAGNOSIS — R10.12 LUQ ABDOMINAL PAIN: ICD-10-CM

## 2018-10-03 DIAGNOSIS — R10.13 ABDOMINAL PAIN, EPIGASTRIC: Primary | ICD-10-CM

## 2018-10-03 PROCEDURE — 90662 IIV NO PRSV INCREASED AG IM: CPT | Performed by: NURSE PRACTITIONER

## 2018-10-03 PROCEDURE — G0008 ADMIN INFLUENZA VIRUS VAC: HCPCS | Performed by: NURSE PRACTITIONER

## 2018-10-03 PROCEDURE — 99214 OFFICE O/P EST MOD 30 MIN: CPT | Mod: 25 | Performed by: NURSE PRACTITIONER

## 2018-10-03 ASSESSMENT — PAIN SCALES - GENERAL: PAINLEVEL: NO PAIN (0)

## 2018-10-03 NOTE — PATIENT INSTRUCTIONS
Schedule the endoscopy in [place].     I will let you know the results when we get them.     Stay on the same medications for now.

## 2018-10-03 NOTE — MR AVS SNAPSHOT
After Visit Summary   10/3/2018    Camilo Baca    MRN: [id number]           Patient Information     Date Of Birth          1950        Visit Information        Provider Department      10/3/2018 1:40 PM Lyndsey Aponte APRN [facility]        Today's Diagnoses     Abdominal pain, epigastric    -  1    LUQ abdominal pain        Dysphagia, unspecified type        Need for prophylactic vaccination and inoculation against influenza          Care Instructions    Schedule the endoscopy in [place].     I will let you know the results when we get them.     Stay on the same medications for now.                         Follow-ups after your visit        Additional Services     GASTROENTEROLOGY ADULT REF PROCEDURE ONLY [facility] [phone]; [place] Provider       Last Lab Result: Creatinine (mg/dL)       Date                     Value                 06/26/2018               0.85             ----------  Body mass index is 26.41 kg/(m^2).     Needed:  No  Language:  English    Patient will be contacted to schedule procedure.     Please be aware that coverage of these services is subject to the terms and limitations of your health insurance plan.  Call member services at your health plan with any benefit or coverage questions.  Any procedures must be performed at a [facility] facility OR coordinated by your clinic's referral office.    Please bring the following with you to your appointment:    (1) Any X-Rays, CTs or MRIs which have been performed.  Contact the facility where they were done to arrange for  prior to your scheduled appointment.    (2) List of current medications   (3) This referral request   (4) Any documents/labs given to you for this referral                  Follow-up notes from your care team     Return in about 4 weeks (around 10/31/2018) for Recheck only if not improving.      Your next 10 appointments already scheduled     Oct 12, 2018  10:00 AM CDT   [facility]    [address]  [facility] [id number] [phone]            Nov 06, 2018  8:40 AM CST   Return Visit with Fabricio Gomes MD   [facility] ([facility])    [address] [facility] [id number] [phone]            Nov 16, 2018  8:00 AM CST   Level 2 with NL INFUSION CHAIR 2   [facility] ([facility])    [address] [place] [id number] [phone]              Who to contact     If you have questions or need follow up information about today's clinic visit or your schedule please contact [facility] directly at [phone].  Normal or non-critical lab and imaging results will be communicated to you by MyChart, letter or phone within 4 business days after the clinic has received the results. If you do not hear from us within 7 days, please contact the clinic through MyChart or phone. If you have a critical or abnormal lab result, we will notify you by phone as soon as possible.  Submit refill requests through tidy or call your pharmacy and they will forward the refill request to us. Please allow 3 business days for your refill to be completed.          Additional Information About Your Visit        Care EveryWhere ID     This is your Care EveryWhere ID. This could be used by other organizations to access your [place] medical records  [id number]        Your Vitals Were     Pulse Temperature Respirations Pulse Oximetry BMI (Body Mass Index)       78 97.4  F (36.3  C) (Tympanic) 20 95% 26.41 kg/m2        Blood Pressure from Last 3 Encounters:   10/03/18 126/82   09/28/18 150/82   09/21/18 (!) 151/96    Weight from Last 3 Encounters:   10/03/18 192 lb (87.1 kg)   09/21/18 194 lb 3.2 oz (88.1 kg)   08/24/18 195 lb (88.5 kg)              We Performed the Following     FLU VACCINE, INCREASED  ANTIGEN, PRESV FREE, AGE 65+ [45374]     GASTROENTEROLOGY ADULT REF PROCEDURE ONLY [facility] [phone]; [place] Provider     Vaccine Administration, Initial [[id number]]        Primary Care Provider Office Phone # Fax #    DIANN Presley [phone]       [address] [facility] [address]        Equal Access to Services     BECCA BAEZ : Hadii aad ku hadasho Soomaali, waaxda luqadaha, qaybta kaalmada adeegyada, waxay idiin hayaan adeeg kharash la'aan . So [facility] [phone].    ATENCIÓN: Si habla español, tiene a bernal disposición servicios gratuitos de asistencia lingüística. Hina al [phone].    We comply with applicable federal civil rights laws and [place] laws. We do not discriminate on the basis of race, color, national origin, age, disability, sex, sexual orientation, or gender identity.            Thank you!     Thank you for choosing [facility]  for your care. Our goal is always to provide you with excellent care. Hearing back from our patients is one way we can continue to improve our services. Please take a few minutes to complete the written survey that you may receive in the mail after your visit with us. Thank you!             Your Updated Medication List - Protect others around you: Learn how to safely use, store and throw away your medicines at www.disposemymeds.org.          This list is accurate as of 10/3/18  2:12 PM.  Always use your most recent med list.                   Brand Name Dispense Instructions for use Diagnosis    allopurinol 300 MG tablet    ZYLOPRIM    90 tablet    TAKE ONE TABLET BY MOUTH EVERY DAY    Acute gouty arthropathy       atorvastatin 80 MG tablet    LIPITOR    90 tablet    Take 1 tablet (80 mg) by mouth daily    Hyperlipidemia LDL goal <130       B-12 100 MCG Tabs           BABY ASPIRIN PO      daily        capsaicin 0.075 % cream    ZOSTRIX    50 g    Apply  topically 3 times daily.    Bursitis of shoulder        cetirizine 10 MG tablet    zyrTEC    90 tablet    Take 1 tablet (10 mg) by mouth every evening    Seasonal allergic rhinitis, unspecified allergic rhinitis trigger       cholestyramine light 4 GM Packet    QUESTRAN    60 packet    DISSOLVE ONE PACKET IN LIQUID AS DIRECTED TWO TIMES A DAY AND DRINK    Hyperlipidemia LDL goal <130       diclofenac 1 % Gel topical gel    VOLTAREN    100 g    Apply  2 grams to shoulders three times daily using enclosed dosing card.    Rotator cuff tendinitis       levothyroxine 25 MCG tablet    SYNTHROID/LEVOTHROID    90 tablet    TAKE ONE TABLET BY MOUTH EVERY DAY    Hypothyroidism due to acquired atrophy of thyroid       losartan 25 MG tablet    COZAAR    30 tablet    TAKE ONE TABLET BY MOUTH EVERY DAY    Benign essential hypertension       omeprazole 40 MG capsule    priLOSEC    90 capsule    Take 1 capsule (40 mg) by mouth daily    Gastroesophageal reflux disease without esophagitis       sucralfate 1 GM tablet    CARAFATE    40 tablet    Take 1 tablet (1 g) by mouth 4 times daily    Gastroesophageal reflux disease without esophagitis       triamcinolone 0.1 % cream    KENALOG    60 g    Locally bid    Rash       VITAMIN C PO      Take by mouth daily

## 2018-10-03 NOTE — TELEPHONE ENCOUNTER
Date of colonoscopy/EGD: 10/24/18  Surgeon: Dr. Arreaga  Prep:EGD  Packet:Colonoscopy/EGD instructions was given to patient in clinic.   Date: 10/3/2018      Surgery Scheduler

## 2018-10-03 NOTE — PROGRESS NOTES
SUBJECTIVE:   Camilo Baca is a 68 year old male who presents to clinic today for the following health issues:      Gastrointestinal symptoms worse      Duration: 1 month    Description:    REFLUX SYMPTOMS - heartburn, belching, nausea, problems swallowing solids and liquids, cough and chest pain      Intensity:  severe    Accompanying signs and symptoms:  loose stools and bloating    History  Previous {similar problem: YES  Previous evaluation:  none    Aggravating factors: meals and lying down    Alleviating factors: antacids-omeprazole not working well anymore    Other Therapies tried: None    Several intermittent episodes of severe LUQ and epigastric pain.  It radiates up his chest into this throat. He sometimes has trouble swallowing.   These episodes started about a month ago and are getting worse.  Last episode was 3 days ago, severe.  It kept him up most of the night and then stopped.  It did start after eating, but he states other episodes have not been associated with eating.  It does seem worse with lying flat.  He has been diagnosed with GERD and is currently on Omeprazole and Carafate.  This has not been helpful.     No fevers/chills, nausea, vomiting or diarrhea.       Problem list and histories reviewed & adjusted, as indicated.  Additional history: as documented    Patient Active Problem List   Diagnosis     Lumbago     NONSPECIFIC MEDICAL HISTORY     HYPERLIPIDEMIA LDL GOAL <130     History of adenomatous polyp of colon     Idiopathic chronic gout without tophus, unspecified site     Advanced directives, counseling/discussion     High risk medication use     Thrombocytopenia (H)     Gastroesophageal reflux disease without esophagitis     Factor 5 Leiden mutation, heterozygous (H)     Personal history of venous thrombosis and embolism     Personal history of DVT (deep vein thrombosis)     Hypothyroidism due to acquired atrophy of thyroid     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)     Secondary hypertension with goal blood pressure less than 140/90     Rheumatoid arthritis involving multiple sites with positive rheumatoid factor (H)     Past Surgical History:   Procedure Laterality Date     C APPENDECTOMY  1996     COLONOSCOPY  11/15/2010    COLONOSCOPY performed by DARIUS MESA at  GI     COLONOSCOPY N/A 11/2/2015    Procedure: COLONOSCOPY;  Surgeon: Bubba Odom MD;  Location:  GI     HC COLONOSCOPY W BIOPSY  08/10/05     HC REPAIR ING HERNIA,5+Y/O,REDUCIBL  1960     HERNIORRHAPHY UMBILICAL N/A 4/17/2015    Procedure: HERNIORRHAPHY UMBILICAL;  Surgeon: Rayray Avila MD;  Location:  OR     LAPAROSCOPIC HERNIORRHAPHY INGUINAL BILATERAL Bilateral 4/17/2015    Procedure: LAPAROSCOPIC HERNIORRHAPHY INGUINAL BILATERAL;  Surgeon: Rayray Avila MD;  Location:  OR       Social History   Substance Use Topics     Smoking status: Former Smoker     Packs/day: 0.25     Years: 2.00     Types: Cigarettes, Pipe     Quit date: 1/1/1979     Smokeless tobacco: Never Used     Alcohol use Yes      Comment: 5 drinks per week / wine     Family History   Problem Relation Age of Onset     Arthritis Mother      Cardiovascular Mother      Depression Mother      GASTROINTESTINAL DISEASE Mother      IBS     Hypertension Mother      Circulatory Mother      Aortal aneurysm     Osteoporosis Mother      Allergies Father      Cortisone     Cardiovascular Father      Circulatory Father      Diabetes Father      Hypertension Father      Lipids Father      Alcohol/Drug Sister      Penicillin     Alzheimer Disease Paternal Grandfather      Blood Disease Paternal Grandmother      Clotting problems     Blood Disease Son      Factor 5     Hypertension Maternal Aunt      Cerebrovascular Disease Maternal Grandmother          Current Outpatient Prescriptions   Medication Sig Dispense Refill     allopurinol (ZYLOPRIM) 300 MG tablet TAKE ONE TABLET BY MOUTH EVERY DAY  90 tablet 1     Ascorbic Acid (VITAMIN C PO) Take by mouth daily       atorvastatin (LIPITOR) 80 MG tablet Take 1 tablet (80 mg) by mouth daily 90 tablet 2     capsaicin (ZOSTRIX) 0.075 % topical cream Apply  topically 3 times daily. 50 g 3     cetirizine (ZYRTEC) 10 MG tablet Take 1 tablet (10 mg) by mouth every evening 90 tablet 1     cholestyramine light (QUESTRAN) 4 GM Packet DISSOLVE ONE PACKET IN LIQUID AS DIRECTED TWO TIMES A DAY AND DRINK 60 packet 5     Cyanocobalamin (B-12) 100 MCG TABS        diclofenac (VOLTAREN) 1 % GEL Apply  2 grams to shoulders three times daily using enclosed dosing card. 100 g 1     levothyroxine (SYNTHROID/LEVOTHROID) 25 MCG tablet TAKE ONE TABLET BY MOUTH EVERY DAY 90 tablet 0     losartan (COZAAR) 25 MG tablet TAKE ONE TABLET BY MOUTH EVERY DAY 30 tablet 0     omeprazole (PRILOSEC) 40 MG capsule Take 1 capsule (40 mg) by mouth daily 90 capsule 0     sucralfate (CARAFATE) 1 GM tablet Take 1 tablet (1 g) by mouth 4 times daily 40 tablet 1     triamcinolone (KENALOG) 0.1 % cream Locally bid 60 g 0     BABY ASPIRIN OR daily       [DISCONTINUED] levothyroxine (SYNTHROID/LEVOTHROID) 25 MCG tablet TAKE ONE TABLET BY MOUTH EVERY DAY 90 tablet 2     Allergies   Allergen Reactions     Lisinopril Cough     BP Readings from Last 3 Encounters:   10/03/18 126/82   09/28/18 150/82   09/21/18 (!) 151/96    Wt Readings from Last 3 Encounters:   10/03/18 192 lb (87.1 kg)   09/21/18 194 lb 3.2 oz (88.1 kg)   08/24/18 195 lb (88.5 kg)                    Reviewed and updated as needed this visit by clinical staff  Tobacco  Allergies  Meds  Med Hx  Surg Hx  Fam Hx  Soc Hx      Reviewed and updated as needed this visit by Provider         ROS:  Constitutional, HEENT, cardiovascular, pulmonary, gi and gu systems are negative, except as otherwise noted.    OBJECTIVE:     /82  Pulse 78  Temp 97.4  F (36.3  C) (Tympanic)  Resp 20  Wt 192 lb (87.1 kg)  SpO2 95%  BMI 26.41 kg/m2  Body mass  index is 26.41 kg/(m^2).  GENERAL: healthy, alert and no distress  HENT: ear canals and TM's normal, nose and mouth without ulcers or lesions  NECK: no adenopathy, no asymmetry, masses, or scars and thyroid normal to palpation  RESP: lungs clear to auscultation - no rales, rhonchi or wheezes  CV: regular rate and rhythm, normal S1 S2, no S3 or S4, no murmur, click or rub, no peripheral edema and peripheral pulses strong  ABDOMEN: soft, nontender, without hepatosplenomegaly or masses and bowel sounds normal  MS: no gross musculoskeletal defects noted, no edema    Diagnostic Test Results:  Pending     ASSESSMENT/PLAN:         1. Abdominal pain, epigastric  Will refer for upper endoscopy.  Continue on current medications in the meantime.   - GASTROENTEROLOGY ADULT REF PROCEDURE ONLY [facility] [phone]; [place] Provider    2. LUQ abdominal pain  - GASTROENTEROLOGY ADULT REF PROCEDURE ONLY [facility] [phone]; [place] Provider    3. Dysphagia, unspecified type  - GASTROENTEROLOGY ADULT REF PROCEDURE ONLY [facility] [phone]; [place] Provider    4. Need for prophylactic vaccination and inoculation against influenza  - Vaccine Administration, Initial [39626]  - FLU VACCINE, INCREASED ANTIGEN, PRESV FREE, AGE 65+ [50598]    See Patient Instructions    DIANN Presley [facility]

## 2018-10-03 NOTE — LETTER
Upper Endoscopy    Date of procedure:___________      Location:________________  Please arrive at:_____________    Procedure time:________________    Review the preparation schedule below for the five days preceding your procedure.     If you have any questions, please call [phone].          5 Days Prior  *CHECK WITH YOUR INSURANCE REGARDING COVERAGE PRIOR TO PROCEDURE.  If you take Coumadin (Warfarin), Plavix, Ticlid, Aggrenox:, insulin, diabetic pills and/or iron products contact your doctor for specific instructions.  Have INR checked the day before the procedure.  Please remember to arrange a responsible adult to accompany you home.   must be     present upon discharge.    1 Days Prior  Eat normally up until midnight.  You may drink small amounts of clear liquids up until 4 hours prior to your arrival.  **Please see Clear Liquid Diet Sheet for suggested liquids.    Procedure Day    From midnight until 3 hours prior to your procedure, you may drink small amounts of clear liquids.  Do not take your morning medications until after you have completed your procedure.  Bring a list of your medications with you on the day of your procedure.     *Reminder:  Have transportation arranged to take you home.   must accompany you to the procedure.  Do not plan to drive or operate vehicles or machinery the day of your exam.      Clear Liquid Diet  Beverages  Coffee, Decaffeinated coffee, Tea (without milk or non-dairy creamer)  Carbonated beverages (pop)  Water  Sports Drinks    Desserts  Jello (except red or purple)  Fruit ice  Popsicle    Fruit and Fruit Juices  Citrus juices, strained  Fruit nectars, strained  Apple juice  Fruit drinks    Soups  Broth or Bouillon  Consomme  Meat stock, strained  Vegetable broth, strained    Sweets  Hard candy, plain  Sugar    Miscellaneous  Flavorings  Salt    No red or purple colored juices or Jello  No dairy products  No foods containing seeds 2 days prior to  procedure  No alcoholic beverages               Directions to [facility]    From the North:   Take the [address] exit off of [address] (on the south side of [place]).  Turn left on [address]  Turn left at the first stoplight (at [place]).  The hospital and clinic is the third building on the left.     From the South:  Follow [address] north to the first [place] exit.  Exit on [address] following it to the right.  Turn left at the first stoplight.  The hospital and clinic is the third building on the left.    From the East:     Following [address] west, turn left at the stoplight onto [address] Follow [address] through [place].  Take a right at the light on [address] (at [facility]).  The hospital and clinic is the third building on the left.    From the West:    Following [address] going east, turn south on [address].  Take the [address] exit off of [address] (on the south side of [place]).  Follow [address] through [place] to the stoplight on [address] (at [facility] Take a left.  The hospital and clinic is the third building on the left.        UPPER ENDOSCOPY INFORMATION  Upper endoscopy (also known as an upper GI endoscopy, esophagogastro-duodenoscopy [EGD], or panendoscopy) is a procedure that enables your physician to examine the lining of the upper part of your gastrointestinal tract, ie. The esophagus (swallowing tube), stomach, and duodenum (first portion of the small intestine) using a thin flexible tube with its own lens and light source.    Upper endoscopy is usually performed to evaluate symptoms of persistent upper abdominal pain, nausea, vomiting or difficulty swallowing.  It is also the best test for finding the cause of bleeding from the upper gastrointestinal tract.    Upper endoscopy is more accurate than x-ray films for detecting inflammation, ulcers or tumors of the esophagus, stomach and duodenum.  Upper endoscopy can  detect early cancer and can distinguish between benign (non-cancerous) and malignant (cancerous) conditions when biopsies (small tissue samples) of suspicious areas are obtained.  Biopsies are taken for many reasons and do not necessarily mean that cancer is suspected.  A cytology test (introduction of a small brush to collect cells) may also be performed.    Upper endoscopy is also used to treat conditions present in the upper gastrointestinal tract.  A variety of instruments can be passed through the endoscope that allow many abnormalities to be treated directly with little or no discomfort.  This may include stretching narrowed areas, removing polyps (usually benign growths) or swallowed objects, or treating upper gastrointestinal bleeding.  Safe and effective endoscopic control of bleeding has reduced the need for transfusions and surgery in many patients.    For the best and safest examination, the stomach must be completely empty.  You should have nothing to eat or drink, including water, for approximately 4 hours prior to your examination.    WHAT CAN BE EXPECTED DURING THE UPPER ENDOSCOPY?  Your doctor will review with you why upper endoscopy is being performed, whether any alternative tests are available, and possible complications from the procedure.  Your throat will be sprayed with a local anesthetic before the procedure begins and you may be given medication through a vein to help you relax during the procedure.  While you are in a comfortable position on your side, the endoscope is passed through the mouth and then is passed in turn through the esophagus, stomach, and duodenum.  The endoscope does not interfere with your breathing during the test.  Most patients consider the procedure to be only slightly uncomfortable and many patients fall asleep during the procedure.    WHAT HAPPENS AFTER THE UPPER ENDOSCOPY?  After the procedure, you will be monitored in the endoscopy area until most of the effects  of the medication have work off.  Your throat may be a little sore for a while, and you may feel bloated right after the procedure because of the air introduced into your stomach during the test.  You will be able to resume your diet after you leave unless you are instructed otherwise.    If you receive sedation, you will need to arrange to have a responsible adult drive and accompany you home from the examination because the sedative may affect your judgment and reflexes for the rest of the day.  You will not be allowed to take a taxi or bus as transportation home.  If you receive sedation, you will not be allowed to drive after the procedure even though you may not feel tired.    WHAT ARE THE POSSIBLE COMPLICATIONS OF UPPER ENDOSCOPY?  Endoscopy is generally safe.  Complications can occur but are rare when the test is performed by physicians with specialized training and experience in this procedure.  Bleeding may occur from a biopsy site or where a polyp was removed.  It is usually minimal and rarely requires blood transfusions or surgery.  Localized irritation of the vein where the medication was injected may rarely cause a tender lump lasting for several weeks, but this will go away.  Applying heat packs or hot moist towels may help relieve discomfort.  Other potential risks include a reaction to the sedatives used and complications from underlying medical conditions.  Major complication, eg, perforation (a tear that might require surgery for repair) are very uncommon.      It is important for you to recognize early signs of any possible complication.  If you begin to run a fever after the test, begin to have trouble swallowing, or have increasing throat, chest or abdominal pain, let your doctor know about it promptly.

## 2018-10-12 ENCOUNTER — ALLIED HEALTH/NURSE VISIT (OUTPATIENT)
Dept: FAMILY MEDICINE | Facility: OTHER | Age: 68
End: 2018-10-12
Payer: COMMERCIAL

## 2018-10-12 VITALS — HEART RATE: 68 BPM | SYSTOLIC BLOOD PRESSURE: 138 MMHG | DIASTOLIC BLOOD PRESSURE: 78 MMHG

## 2018-10-12 DIAGNOSIS — I10 BENIGN ESSENTIAL HYPERTENSION: ICD-10-CM

## 2018-10-12 DIAGNOSIS — Z01.30 BP CHECK: Primary | ICD-10-CM

## 2018-10-12 PROCEDURE — 99207 ZZC NO CHARGE NURSE ONLY: CPT

## 2018-10-12 RX ORDER — LOSARTAN POTASSIUM 50 MG/1
25 TABLET ORAL DAILY
Qty: 30 TABLET | Refills: 5 | OUTPATIENT
Start: 2018-10-12

## 2018-10-12 RX ORDER — LOSARTAN POTASSIUM 50 MG/1
50 TABLET ORAL DAILY
Qty: 90 TABLET | Refills: 1 | Status: SHIPPED | OUTPATIENT
Start: 2018-10-12 | End: 2019-04-02

## 2018-10-12 NOTE — PROGRESS NOTES
Pt in need of refill. He was in today for a blood pressure check. He does not have enough medication to last the weekend. Note dose change, he had been taking 2- 25 mg daily.     Tara Bautista MA     10/12/2018

## 2018-10-12 NOTE — MR AVS SNAPSHOT
After Visit Summary   10/12/2018    Camilo Baca    MRN: [id number]           Patient Information     Date Of Birth          1950        Visit Information        Provider Department      10/12/2018 10:00 AM LARRY PAK MA [facility]        Today's Diagnoses     BP check    -  1    Benign essential hypertension           Follow-ups after your visit        Your next 10 appointments already scheduled     Oct 24, 2018   Procedure with Matteo Johnson DO   [facility] ([facility])    [address] [place] [id number] [phone]            Nov 06, 2018  8:40 AM CST   Return Visit with Fabricio Gomes MD   [facility] ([facility])    [address] [facility] [id number] [phone]            Nov 16, 2018  8:00 AM CST   Level 2 with NL INFUSION CHAIR 2   [facility] ([facility])    [address] [place] [id number] [phone]              Who to contact     If you have questions or need follow up information about today's clinic visit or your schedule please contact [facility] directly at [phone].  Normal or non-critical lab and imaging results will be communicated to you by MyChart, letter or phone within 4 business days after the clinic has received the results. If you do not hear from us within 7 days, please contact the clinic through MyChart or phone. If you have a critical or abnormal lab result, we will notify you by phone as soon as possible.  Submit refill requests through Myer or call your pharmacy and they will forward the refill request to us. Please allow 3 business days for your refill to be completed.          Additional Information About Your Visit        Care EveryWhere ID     This is your Care EveryWhere ID. This could be used by other organizations to access your [facility]  records  [id number]        Your Vitals Were     Pulse                   68            Blood Pressure from Last 3 Encounters:   10/12/18 138/78   10/03/18 126/82   09/28/18 150/82    Weight from Last 3 Encounters:   10/03/18 192 lb (87.1 kg)   09/21/18 194 lb 3.2 oz (88.1 kg)   08/24/18 195 lb (88.5 kg)              Today, you had the following     No orders found for display         Today's Medication Changes          These changes are accurate as of 10/12/18  1:37 PM.  If you have any questions, ask your nurse or doctor.               These medicines have changed or have updated prescriptions.        Dose/Directions    losartan 50 MG tablet   Commonly known as:  COZAAR   This may have changed:    - medication strength  - See the new instructions.   Used for:  Benign essential hypertension   Changed by:  Lyndsey Aponte APRN CNP        Dose:  50 mg   Take 1 tablet (50 mg) by mouth daily   Quantity:  90 tablet   Refills:  1            Where to get your medicines      These medications were sent to [facility] [place] [address]   [address] [facility] [address]     Phone:  [phone]     losartan 50 MG tablet                Primary Care Provider Office Phone # Fax #    DIANN Presley [phone]       [address] [facility] [address]        Equal Access to Services     BECCA BAEZ : Hadii aad ku hadasho Soomaali, waaxda luqadaha, qaybta kaalmada adeegyada, matt high. So [facility] [phone].    ATENCIÓN: Si habla español, tiene a bernal disposición servicios gratuitos de asistencia lingüística. Hina al [phone].    We comply with applicable federal civil rights laws and [place] laws. We do not discriminate on the basis of race, color, national origin, age, disability, sex, sexual orientation, or gender identity.            Thank you!     Thank you for choosing [facility]  for your care. Our goal is always to provide  you with excellent care. Hearing back from our patients is one way we can continue to improve our services. Please take a few minutes to complete the written survey that you may receive in the mail after your visit with us. Thank you!             Your Updated Medication List - Protect others around you: Learn how to safely use, store and throw away your medicines at www.disposemymeds.org.          This list is accurate as of 10/12/18  1:37 PM.  Always use your most recent med list.                   Brand Name Dispense Instructions for use Diagnosis    allopurinol 300 MG tablet    ZYLOPRIM    90 tablet    TAKE ONE TABLET BY MOUTH EVERY DAY    Acute gouty arthropathy       atorvastatin 80 MG tablet    LIPITOR    90 tablet    Take 1 tablet (80 mg) by mouth daily    Hyperlipidemia LDL goal <130       B-12 100 MCG Tabs           BABY ASPIRIN PO      daily        capsaicin 0.075 % cream    ZOSTRIX    50 g    Apply  topically 3 times daily.    Bursitis of shoulder       cetirizine 10 MG tablet    zyrTEC    90 tablet    Take 1 tablet (10 mg) by mouth every evening    Seasonal allergic rhinitis, unspecified allergic rhinitis trigger       cholestyramine light 4 GM Packet    QUESTRAN    60 packet    DISSOLVE ONE PACKET IN LIQUID AS DIRECTED TWO TIMES A DAY AND DRINK    Hyperlipidemia LDL goal <130       diclofenac 1 % Gel topical gel    VOLTAREN    100 g    Apply  2 grams to shoulders three times daily using enclosed dosing card.    Rotator cuff tendinitis       levothyroxine 25 MCG tablet    SYNTHROID/LEVOTHROID    90 tablet    TAKE ONE TABLET BY MOUTH EVERY DAY    Hypothyroidism due to acquired atrophy of thyroid       losartan 50 MG tablet    COZAAR    90 tablet    Take 1 tablet (50 mg) by mouth daily    Benign essential hypertension       omeprazole 40 MG capsule    priLOSEC    90 capsule    Take 1 capsule (40 mg) by mouth daily    Gastroesophageal reflux disease without esophagitis       sucralfate 1 GM tablet     CARAFATE    40 tablet    Take 1 tablet (1 g) by mouth 4 times daily    Gastroesophageal reflux disease without esophagitis       triamcinolone 0.1 % cream    KENALOG    60 g    Locally bid    Rash       VITAMIN C PO      Take by mouth daily

## 2018-10-12 NOTE — NURSING NOTE
Camilo Baca is a 68 year old patient who comes in today for a Blood Pressure check.  Initial BP:  /78  Pulse 68     68  Disposition: results routed to provider.  Tara Bautista MA     10/12/2018

## 2018-10-23 DIAGNOSIS — E78.5 HYPERLIPIDEMIA LDL GOAL <130: ICD-10-CM

## 2018-10-23 RX ORDER — CHOLESTYRAMINE LIGHT 4 G/5.7G
POWDER, FOR SUSPENSION ORAL
Qty: 180 PACKET | Refills: 0 | Status: SHIPPED | OUTPATIENT
Start: 2018-10-23 | End: 2019-01-28

## 2018-10-23 NOTE — TELEPHONE ENCOUNTER
Cholestyramine 4 GM      Last Written Prescription Date:  3/6/17  Last Fill Quantity: 60,   # refills: 5  Last Office Visit: 10/3/18  Future Office visit:    Next 5 appointments (look out 90 days)     Nov 06, 2018  8:40 AM CST   Return Visit with Fabricio Gomes MD   [facility] ([facility])    [address] [place] [address]   [phone]                   Routing refill request to provider for review/approval because:  Drug not on the [facility], [facility] or  Health refill protocol or controlled substance

## 2018-10-24 ENCOUNTER — TELEPHONE (OUTPATIENT)
Dept: FAMILY MEDICINE | Facility: OTHER | Age: 68
End: 2018-10-24

## 2018-10-24 ENCOUNTER — HOSPITAL ENCOUNTER (OUTPATIENT)
Facility: CLINIC | Age: 68
Discharge: HOME | End: 2018-10-24
Attending: SURGERY | Admitting: SURGERY
Payer: MEDICARE

## 2018-10-24 ENCOUNTER — SURGERY (OUTPATIENT)
Age: 68
End: 2018-10-24

## 2018-10-24 VITALS
TEMPERATURE: 98 F | DIASTOLIC BLOOD PRESSURE: 93 MMHG | SYSTOLIC BLOOD PRESSURE: 126 MMHG | OXYGEN SATURATION: 96 % | RESPIRATION RATE: 16 BRPM

## 2018-10-24 DIAGNOSIS — R10.13 ABDOMINAL PAIN, EPIGASTRIC: ICD-10-CM

## 2018-10-24 DIAGNOSIS — K44.9 HIATAL HERNIA: Primary | ICD-10-CM

## 2018-10-24 LAB — UPPER GI ENDOSCOPY: NORMAL

## 2018-10-24 PROCEDURE — 88342 IMHCHEM/IMCYTCHM 1ST ANTB: CPT | Performed by: SURGERY

## 2018-10-24 PROCEDURE — 25000128 H RX IP 250 OP 636: Performed by: SURGERY

## 2018-10-24 PROCEDURE — 88305 TISSUE EXAM BY PATHOLOGIST: CPT | Mod: 26 | Performed by: SURGERY

## 2018-10-24 PROCEDURE — 88342 IMHCHEM/IMCYTCHM 1ST ANTB: CPT | Mod: 26 | Performed by: SURGERY

## 2018-10-24 PROCEDURE — 43239 EGD BIOPSY SINGLE/MULTIPLE: CPT | Performed by: SURGERY

## 2018-10-24 PROCEDURE — 88305 TISSUE EXAM BY PATHOLOGIST: CPT | Performed by: SURGERY

## 2018-10-24 PROCEDURE — G0500 MOD SEDAT ENDO SERVICE >5YRS: HCPCS

## 2018-10-24 PROCEDURE — 40000296 ZZH STATISTIC ENDO RECOVERY CLASS 1:2 FIRST HOUR: Performed by: SURGERY

## 2018-10-24 PROCEDURE — 40000297 ZZH STATISTIC ENDO RECOVERY CLASS 1:2 EACH ADDL HOUR: Performed by: SURGERY

## 2018-10-24 PROCEDURE — 25000125 ZZHC RX 250: Performed by: SURGERY

## 2018-10-24 RX ORDER — LIDOCAINE 40 MG/G
CREAM TOPICAL
Status: DISCONTINUED | OUTPATIENT
Start: 2018-10-24 | End: 2018-10-24 | Stop reason: HOSPADM

## 2018-10-24 RX ORDER — FENTANYL CITRATE 50 UG/ML
INJECTION, SOLUTION INTRAMUSCULAR; INTRAVENOUS PRN
Status: DISCONTINUED | OUTPATIENT
Start: 2018-10-24 | End: 2018-10-24 | Stop reason: HOSPADM

## 2018-10-24 RX ORDER — ONDANSETRON 2 MG/ML
4 INJECTION INTRAMUSCULAR; INTRAVENOUS
Status: DISCONTINUED | OUTPATIENT
Start: 2018-10-24 | End: 2018-10-24 | Stop reason: HOSPADM

## 2018-10-24 RX ADMIN — MIDAZOLAM 1 MG: 1 INJECTION INTRAMUSCULAR; INTRAVENOUS at 09:37

## 2018-10-24 RX ADMIN — BENZOCAINE 2 SPRAY: 200 SPRAY DENTAL; ORAL; PERIODONTAL at 09:29

## 2018-10-24 RX ADMIN — MIDAZOLAM 2 MG: 1 INJECTION INTRAMUSCULAR; INTRAVENOUS at 09:31

## 2018-10-24 RX ADMIN — LIDOCAINE HYDROCHLORIDE 1 ML: 10 INJECTION, SOLUTION EPIDURAL; INFILTRATION; INTRACAUDAL; PERINEURAL at 09:00

## 2018-10-24 RX ADMIN — FENTANYL CITRATE 100 MCG: 50 INJECTION, SOLUTION INTRAMUSCULAR; INTRAVENOUS at 09:31

## 2018-10-24 RX ADMIN — MIDAZOLAM 1 MG: 1 INJECTION INTRAMUSCULAR; INTRAVENOUS at 09:34

## 2018-10-24 RX ADMIN — FENTANYL CITRATE 25 MCG: 50 INJECTION, SOLUTION INTRAMUSCULAR; INTRAVENOUS at 09:34

## 2018-10-24 RX ADMIN — FENTANYL CITRATE 25 MCG: 50 INJECTION, SOLUTION INTRAMUSCULAR; INTRAVENOUS at 09:37

## 2018-10-24 NOTE — IP AVS SNAPSHOT
MRN:[id number]                      After Visit Summary   10/24/2018    Camilo Baca    MRN: [id number]           Thank you!     Thank you for choosing [facility] for your care. Our goal is always to provide you with excellent care. Hearing back from our patients is one way we can continue to improve our services. Please take a few minutes to complete the written survey that you may receive in the mail after you visit with us. Thank you!        Patient Information     Date Of Birth          1950        About your hospital stay     You were admitted on:  October 24, 2018 You last received care in the:  [facility]    You were discharged on:  October 24, 2018       Who to Call     For medical emergencies, please call 911.  For non-urgent questions about your medical care, please call your primary care provider or clinic, [phone]  For questions related to your surgery, please call your surgery clinic        Attending Provider     Provider Specialty    Matteo Johnson, DO Surgery       Primary Care Provider Office Phone # Fax #    DIANN Presley [phone]      Your next 10 appointments already scheduled     Oct 26, 2018  8:00 AM CDT   (Arrive by 7:45 AM)   XR ESOPHAGRAM with PHXR1, PH RAD   [facility] ([facility])    [address] [place] [address] [phone]           How do I prepare for my exam? (Food and drink instructions) Do not eat for 4 hours before the exam. Keep drinking clear liquids until 2 hours before the exam.  How do I prepare for my exam? (Other instructions) You may take pain medicine (with a sip of water) up to 4 hours before the exam. Do not swallow any other medicines unless your doctor tells you to. Talk to your doctor to be sure it s safe to stop your medicines.  What should I wear: Wear comfortable clothes.  How long does the exam take: The exam usually takes  about 30-45 minutes.  What should I bring: Bring a list of your current medicines to your exam. (Include vitamins, minerals and over-the-counter medicines.) Leave your valuables at home. Do I need a :  No  is needed.  What do I need to tell my doctor:  If you think you might be pregnant, tell your doctor.  What should I do after the exam: Drink lots of fluids in the next one to two days. This will help you pass the rest of the barium. Your stool may be white. Take walks if possible. You may take a mild laxative (medicine to loosten your stools), but check with your doctor first. If you don t have a bowel movement within two days, call your doctor.  What is this test: This X-ray exam look at your esophagus. This exam uses barium (a white liquid) to help the X-rays show up more clearly.  Who should I call with questions: If you have any questions, please call the Imaging Department where you will have your exam. Directions, parking instructions, and other information is available on our website, [email or web address].            Nov 06, 2018  8:40 AM CST   Return Visit with Fabricio Gomes MD   [facility] ([facility])    [address] [facility] [id number] [phone]            Nov 16, 2018  8:00 AM CST   Level 2 with NL INFUSION CHAIR 2   [facility] ([facility])    [address] [place] [id number] [phone]              Further instructions from your care team         For your Esophagram appointment this Friday, 10/26/18, arrive at 7:45 am and register at main registration out front. Then go back to Radiology department to check in.   No solid food for 4 hours before; stop eating at 4:00 am  May drink clear liquid until 2 hours before; stop drinking clear liquid at 6:00 am  You may take your medication that morning before or after your test.      [facility]    Home Care  Following Endoscopy    Dr. Arreaga      Activity:    You have just undergone an endoscopic procedure usually performed with conscious sedation.  Do not work or operate machinery (including a car) or drink alcohol (including beer) for at least 12 hours.      I encourage you to walk and attempt to pass this air as soon as possible.    Diet:    Return to the diet you were on before your procedure but eat lightly for the first 12-24 hours.    Drink plenty of water.    Resume any regular medications unless otherwise advised by your physician.     You had biopsies removed so please refrain from aspirin or aspirin products for 2 days.      Pain:    You may take Tylenol as needed for pain.  Expected Recovery:    You can expect some mild abdominal fullness and/or discomfort due to the air used to inflate your stomach. It is also normal to have a mild sore throat after upper endoscopy.    Call Your Physician if You Have:    After Upper Endoscopy:  o Shoulder, back or chest pain.  o Difficulty breathing or swallowing.  o Vomiting blood.     Any questions or concerns about your recovery, please call [phone] or after hours [phone] [facility] Nurse Advice Line.    Follow-up Care:    You should receive a call or letter with your results within 1 week. Please call if you have not received a notification of your results.        Pending Results     Date and Time Order Name Status Description    10/24/2018 0941 Surgical pathology exam In process             Admission Information     Date & Time Provider Department Dept. Phone    10/24/2018 Matteo Johnson DO [facility] [phone]      Your Vitals Were     Blood Pressure Temperature Respirations Pulse Oximetry          126/93 98  F (36.7  C) (Oral) 16 96%        Care EveryWhere ID     This is your Care EveryWhere ID. This could be used by other organizations to access your [facility] medical records  [id number]        Equal Access to  Services     [facility]: Hadii aad ku hadgennarojanet Sojackie, waaxda luqadaha, qaybta kaalmada adeeggiles, matt middleton . So [facility] [phone].    ATENCIÓN: Si habla gaby, tiene a bernal disposición servicios gratuitos de asistencia lingüística. Llame al [phone].    We comply with applicable federal civil rights laws and [place] laws. We do not discriminate on the basis of race, color, national origin, age, disability, sex, sexual orientation, or gender identity.               Review of your medicines      UNREVIEWED medicines. Ask your doctor about these medicines        Dose / Directions    allopurinol 300 MG tablet   Commonly known as:  ZYLOPRIM   Used for:  Acute gouty arthropathy        TAKE ONE TABLET BY MOUTH EVERY DAY   Quantity:  90 tablet   Refills:  1       atorvastatin 80 MG tablet   Commonly known as:  LIPITOR   Used for:  Hyperlipidemia LDL goal <130        Dose:  80 mg   Take 1 tablet (80 mg) by mouth daily   Quantity:  90 tablet   Refills:  2       B-12 100 MCG Tabs        Refills:  0       BABY ASPIRIN PO        daily   Refills:  0       capsaicin 0.075 % cream   Commonly known as:  ZOSTRIX   Used for:  Bursitis of shoulder        Apply  topically 3 times daily.   Quantity:  50 g   Refills:  3       cetirizine 10 MG tablet   Commonly known as:  zyrTEC   Used for:  Seasonal allergic rhinitis, unspecified allergic rhinitis trigger        Dose:  10 mg   Take 1 tablet (10 mg) by mouth every evening   Quantity:  90 tablet   Refills:  1       cholestyramine light 4 GM Packet   Commonly known as:  QUESTRAN   Used for:  Hyperlipidemia LDL goal <130        DISSOLVE ONE PACKET IN LIQUID AS DIRECTED TWO TIMES A DAY AND DRINK   Quantity:  180 packet   Refills:  0       diclofenac 1 % Gel topical gel   Commonly known as:  VOLTAREN   Used for:  Rotator cuff tendinitis        Apply  2 grams to shoulders three times daily using enclosed dosing card.   Quantity:  100 g   Refills:  1        levothyroxine 25 MCG tablet   Commonly known as:  SYNTHROID/LEVOTHROID   Used for:  Hypothyroidism due to acquired atrophy of thyroid        TAKE ONE TABLET BY MOUTH EVERY DAY   Quantity:  90 tablet   Refills:  0       losartan 50 MG tablet   Commonly known as:  COZAAR   Used for:  Benign essential hypertension        Dose:  50 mg   Take 1 tablet (50 mg) by mouth daily   Quantity:  90 tablet   Refills:  1       omeprazole 40 MG capsule   Commonly known as:  priLOSEC   Used for:  Gastroesophageal reflux disease without esophagitis        Dose:  40 mg   Take 1 capsule (40 mg) by mouth daily   Quantity:  90 capsule   Refills:  0       sucralfate 1 GM tablet   Commonly known as:  CARAFATE   Used for:  Gastroesophageal reflux disease without esophagitis        Dose:  1 g   Take 1 tablet (1 g) by mouth 4 times daily   Quantity:  40 tablet   Refills:  1       triamcinolone 0.1 % cream   Commonly known as:  KENALOG   Used for:  Rash        Locally bid   Quantity:  60 g   Refills:  0       VITAMIN C PO        Take by mouth daily   Refills:  0                Protect others around you: Learn how to safely use, store and throw away your medicines at www.disposemymeds.org.             Medication List: This is a list of all your medications and when to take them. Check marks below indicate your daily home schedule. Keep this list as a reference.      Medications           Morning Afternoon Evening Bedtime As Needed    allopurinol 300 MG tablet   Commonly known as:  ZYLOPRIM   TAKE ONE TABLET BY MOUTH EVERY DAY                                atorvastatin 80 MG tablet   Commonly known as:  LIPITOR   Take 1 tablet (80 mg) by mouth daily                                B-12 100 MCG Tabs                                BABY ASPIRIN PO   daily                                capsaicin 0.075 % cream   Commonly known as:  ZOSTRIX   Apply  topically 3 times daily.                                cetirizine 10 MG tablet   Commonly known as:   zyrTEC   Take 1 tablet (10 mg) by mouth every evening                                cholestyramine light 4 GM Packet   Commonly known as:  QUESTRAN   DISSOLVE ONE PACKET IN LIQUID AS DIRECTED TWO TIMES A DAY AND DRINK                                diclofenac 1 % Gel topical gel   Commonly known as:  VOLTAREN   Apply  2 grams to shoulders three times daily using enclosed dosing card.                                levothyroxine 25 MCG tablet   Commonly known as:  SYNTHROID/LEVOTHROID   TAKE ONE TABLET BY MOUTH EVERY DAY                                losartan 50 MG tablet   Commonly known as:  COZAAR   Take 1 tablet (50 mg) by mouth daily                                omeprazole 40 MG capsule   Commonly known as:  priLOSEC   Take 1 capsule (40 mg) by mouth daily                                sucralfate 1 GM tablet   Commonly known as:  CARAFATE   Take 1 tablet (1 g) by mouth 4 times daily                                triamcinolone 0.1 % cream   Commonly known as:  KENALOG   Locally bid                                VITAMIN C PO   Take by mouth daily

## 2018-10-24 NOTE — TELEPHONE ENCOUNTER
Same day surgery called. Patient had an EGD preformed today with Dr. Arreaga. Dr. Arreaga would like patient to have an esophagram done. Wondering if Lyndsey can sign orders?    Patient had multiple biopsies done. Findings were: Patchy mildly erythematous mucosa and hiatal hernia.    Lilia said the order needs a reason and one or the other (or both) can be used. Lilia can be reached at [phone] with any questions.     Order pended.     Bettye Hernandez, WILFREDN, RN  [facility] [place] [facility]

## 2018-10-24 NOTE — IP AVS SNAPSHOT
[facility]    [address] [facility] [address]    Phone:  [phone]                                       After Visit Summary   10/24/2018    Camilo Baca    MRN: [id number]           After Visit Summary Signature Page     I have received my discharge instructions, and my questions have been answered. I have discussed any challenges I see with this plan with the nurse or doctor.    ..........................................................................................................................................  Patient/Patient Representative Signature      ..........................................................................................................................................  Patient Representative Print Name and Relationship to Patient    ..................................................               ................................................  Date                                   Time    ..........................................................................................................................................  Reviewed by Signature/Title    ...................................................              ..............................................  Date                                               Time          22EPIC Rev 08/18

## 2018-10-24 NOTE — DISCHARGE INSTRUCTIONS
For your Esophagram appointment this Friday, 10/26/18, arrive at 7:45 am and register at main registration out front. Then go back to Radiology department to check in.   No solid food for 4 hours before; stop eating at 4:00 am  May drink clear liquid until 2 hours before; stop drinking clear liquid at 6:00 am  You may take your medication that morning before or after your test.      [facility]    Home Care Following Endoscopy    Dr. Arreaga      Activity:    You have just undergone an endoscopic procedure usually performed with conscious sedation.  Do not work or operate machinery (including a car) or drink alcohol (including beer) for at least 12 hours.      I encourage you to walk and attempt to pass this air as soon as possible.    Diet:    Return to the diet you were on before your procedure but eat lightly for the first 12-24 hours.    Drink plenty of water.    Resume any regular medications unless otherwise advised by your physician.     You had biopsies removed so please refrain from aspirin or aspirin products for 2 days.      Pain:    You may take Tylenol as needed for pain.  Expected Recovery:    You can expect some mild abdominal fullness and/or discomfort due to the air used to inflate your stomach. It is also normal to have a mild sore throat after upper endoscopy.    Call Your Physician if You Have:    After Upper Endoscopy:  o Shoulder, back or chest pain.  o Difficulty breathing or swallowing.  o Vomiting blood.     Any questions or concerns about your recovery, please call [phone] or after hours [phone] [facility] Nurse Advice Line.    Follow-up Care:    You should receive a call or letter with your results within 1 week. Please call if you have not received a notification of your results.

## 2018-10-26 ENCOUNTER — HOSPITAL ENCOUNTER (OUTPATIENT)
Dept: GENERAL RADIOLOGY | Facility: CLINIC | Age: 68
Discharge: HOME | End: 2018-10-26
Attending: NURSE PRACTITIONER | Admitting: NURSE PRACTITIONER
Payer: MEDICARE

## 2018-10-26 DIAGNOSIS — R10.13 ABDOMINAL PAIN, EPIGASTRIC: ICD-10-CM

## 2018-10-26 DIAGNOSIS — K44.9 HIATAL HERNIA: ICD-10-CM

## 2018-10-26 PROCEDURE — 74220 X-RAY XM ESOPHAGUS 1CNTRST: CPT | Mod: TC

## 2018-10-26 NOTE — LETTER
November 1, 2018      Camilo Baca  [address]  [place] [address]        Dear ,    We are writing to inform you of your test results.    Your esophogram showed a hiatal hernia.  This can cause reflux symptoms.  Your motility (how well your esophagus functions) was normal.     If your symptoms fail to improve on medications, follow up in clinic.       Resulted Orders   XR Esophagram    Narrative    ESOPHAGRAM   10/26/2018 8:31 AM     HISTORY:  Hiatal hernia. Abdominal pain, epigastric.    COMPARISON: None.    TECHNIQUE: Single contrast esophagram was performed in the usual  fashion. Air-contrast portion of the esophagram was not performed due  to patient's recent history of biopsy.    FINDINGS:  The esophagus is of normal caliber without evidence for  intrinsic or extensive filling defect. There is essentially a normal  primary stripping wave which clears the esophagus. There is a  sliding-type hiatal hernia. Reflux was elicited with provocative  maneuvers to the level of the clavicles.    Duodenal sweep is grossly normal.    FLUOROSCOPY TIME: 1.0 minutes.  SPOT IMAGES: 0.  CINE RUNS: 0.  LAST IMAGE FLUORO HOLD IMAGES: 10.      Impression    IMPRESSION:    1. Small to moderate-sized sliding-type hiatal hernia.  2. Gastroesophageal reflux to the level of the clavicles was elicited  with provocative maneuvers.  3. Essentially normal motility for age.    TRINITY BARAJAS MD       If you have any questions or concerns, please call the clinic at the number listed above.       Sincerely,    Lyndsey Aponte APRN CNP

## 2018-10-30 LAB — COPATH REPORT: NORMAL

## 2018-11-01 NOTE — PROGRESS NOTES
Letter sent to patient informing of result/recommendations.  ................Mitchel Jean LPN,   November 1, 2018,      10:31 AM,   [facility]

## 2018-11-06 ENCOUNTER — OFFICE VISIT (OUTPATIENT)
Dept: RHEUMATOLOGY | Facility: CLINIC | Age: 68
End: 2018-11-06
Payer: COMMERCIAL

## 2018-11-06 VITALS
WEIGHT: 193 LBS | DIASTOLIC BLOOD PRESSURE: 93 MMHG | BODY MASS INDEX: 26.54 KG/M2 | SYSTOLIC BLOOD PRESSURE: 151 MMHG | OXYGEN SATURATION: 98 % | HEART RATE: 59 BPM

## 2018-11-06 DIAGNOSIS — Z79.899 HIGH RISK MEDICATIONS (NOT ANTICOAGULANTS) LONG-TERM USE: ICD-10-CM

## 2018-11-06 DIAGNOSIS — M05.712 RHEUMATOID ARTHRITIS INVOLVING BOTH SHOULDERS WITH POSITIVE RHEUMATOID FACTOR (H): Primary | ICD-10-CM

## 2018-11-06 DIAGNOSIS — M05.711 RHEUMATOID ARTHRITIS INVOLVING BOTH SHOULDERS WITH POSITIVE RHEUMATOID FACTOR (H): Primary | ICD-10-CM

## 2018-11-06 LAB
ALBUMIN SERPL-MCNC: 3.1 G/DL (ref 3.4–5)
ALP SERPL-CCNC: 60 U/L (ref 40–150)
ALT SERPL W P-5'-P-CCNC: 82 U/L (ref 0–70)
ANION GAP SERPL CALCULATED.3IONS-SCNC: 3 MMOL/L (ref 3–14)
AST SERPL W P-5'-P-CCNC: 41 U/L (ref 0–45)
BASOPHILS # BLD AUTO: 0 10E9/L (ref 0–0.2)
BASOPHILS NFR BLD AUTO: 0.1 %
BILIRUB SERPL-MCNC: 0.6 MG/DL (ref 0.2–1.3)
BUN SERPL-MCNC: 15 MG/DL (ref 7–30)
CALCIUM SERPL-MCNC: 8.8 MG/DL (ref 8.5–10.1)
CHLORIDE SERPL-SCNC: 105 MMOL/L (ref 94–109)
CO2 SERPL-SCNC: 33 MMOL/L (ref 20–32)
CREAT SERPL-MCNC: 0.83 MG/DL (ref 0.66–1.25)
CRP SERPL-MCNC: 4.4 MG/L (ref 0–8)
DIFFERENTIAL METHOD BLD: ABNORMAL
EOSINOPHIL # BLD AUTO: 0 10E9/L (ref 0–0.7)
EOSINOPHIL NFR BLD AUTO: 0.3 %
ERYTHROCYTE [DISTWIDTH] IN BLOOD BY AUTOMATED COUNT: 12.4 % (ref 10–15)
ERYTHROCYTE [SEDIMENTATION RATE] IN BLOOD BY WESTERGREN METHOD: 18 MM/H (ref 0–20)
GFR SERPL CREATININE-BSD FRML MDRD: >90 ML/MIN/1.7M2
GLUCOSE SERPL-MCNC: 107 MG/DL (ref 70–99)
HCT VFR BLD AUTO: 39.1 % (ref 40–53)
HGB BLD-MCNC: 13.2 G/DL (ref 13.3–17.7)
LYMPHOCYTES # BLD AUTO: 0.8 10E9/L (ref 0.8–5.3)
LYMPHOCYTES NFR BLD AUTO: 10.9 %
MCH RBC QN AUTO: 33.8 PG (ref 26.5–33)
MCHC RBC AUTO-ENTMCNC: 33.8 G/DL (ref 31.5–36.5)
MCV RBC AUTO: 100 FL (ref 78–100)
MONOCYTES # BLD AUTO: 0.3 10E9/L (ref 0–1.3)
MONOCYTES NFR BLD AUTO: 4.2 %
NEUTROPHILS # BLD AUTO: 6 10E9/L (ref 1.6–8.3)
NEUTROPHILS NFR BLD AUTO: 84.5 %
PLATELET # BLD AUTO: 136 10E9/L (ref 150–450)
POTASSIUM SERPL-SCNC: 4.2 MMOL/L (ref 3.4–5.3)
PROT SERPL-MCNC: 7.4 G/DL (ref 6.8–8.8)
RBC # BLD AUTO: 3.9 10E12/L (ref 4.4–5.9)
SODIUM SERPL-SCNC: 141 MMOL/L (ref 133–144)
WBC # BLD AUTO: 7.1 10E9/L (ref 4–11)

## 2018-11-06 PROCEDURE — 36415 COLL VENOUS BLD VENIPUNCTURE: CPT | Performed by: INTERNAL MEDICINE

## 2018-11-06 PROCEDURE — 80053 COMPREHEN METABOLIC PANEL: CPT | Performed by: INTERNAL MEDICINE

## 2018-11-06 PROCEDURE — 86140 C-REACTIVE PROTEIN: CPT | Performed by: INTERNAL MEDICINE

## 2018-11-06 PROCEDURE — 85652 RBC SED RATE AUTOMATED: CPT | Performed by: INTERNAL MEDICINE

## 2018-11-06 PROCEDURE — 85025 COMPLETE CBC W/AUTO DIFF WBC: CPT | Performed by: INTERNAL MEDICINE

## 2018-11-06 PROCEDURE — 99214 OFFICE O/P EST MOD 30 MIN: CPT | Performed by: INTERNAL MEDICINE

## 2018-11-06 RX ORDER — HYDROXYCHLOROQUINE SULFATE 200 MG/1
400 TABLET, FILM COATED ORAL DAILY
Qty: 60 TABLET | Refills: 3 | Status: SHIPPED | OUTPATIENT
Start: 2018-11-06 | End: 2018-11-27

## 2018-11-06 RX ORDER — PREDNISONE 5 MG/1
TABLET ORAL
Qty: 201 TABLET | Refills: 0 | Status: SHIPPED | OUTPATIENT
Start: 2018-11-06 | End: 2019-01-08

## 2018-11-06 NOTE — MR AVS SNAPSHOT
After Visit Summary   11/6/2018    Camilo Baca    MRN: [id number]           Patient Information     Date Of Birth          1950        Visit Information        Provider Department      11/6/2018 8:40 AM Fabricio Gomes MD [facility]        Today's Diagnoses     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)    -  1    High risk medications (not anticoagulants) long-term use           Follow-ups after your visit        Additional Services     OPHTHALMOLOGY ADULT REFERRAL       Your provider has referred you to:  Ophthalmologist    Reason for referral: Hydroxychloroquine (plaquenil) toxicity monitoring.     Please be aware that coverage of these services is subject to the terms and limitations of your health insurance plan.  Call member services at your health plan with any benefit or coverage questions.      Please bring the following to your appointment:  >>   Any x-rays, CTs or MRIs which have been performed.  Contact the facility where they were done to arrange for  prior to your scheduled appointment.  Any new CT, MRI or other procedures ordered by your specialist must be performed at a [facility] facility or coordinated by your clinic's referral office.    >>   List of current medications   >>   This referral request   >>   Any documents/labs given to you for this referral                  Follow-up notes from your care team     Return in about 2 months (around 1/6/2019).      Your next 10 appointments already scheduled     Nov 16, 2018  8:00 AM CST   Level 2 with NL INFUSION CHAIR 2   [facility] ([facility])    [address] [place] [address] Dr Dee BAUTISTA [address]   [phone]            Jan 08, 2019  3:20 PM CST   Return Visit with Fabricio Gomes MD   [facility] ([facility]    [address] [place] [id number]   [phone]               Who to contact     If you have questions or need follow up information about today's clinic visit or your schedule please contact [facility] JAYSHREE SANTOS directly at [phone].  Normal or non-critical lab and imaging results will be communicated to you by MyChart, letter or phone within 4 business days after the clinic has received the results. If you do not hear from us within 7 days, please contact the clinic through MyChart or phone. If you have a critical or abnormal lab result, we will notify you by phone as soon as possible.  Submit refill requests through [employer] or call your pharmacy and they will forward the refill request to us. Please allow 3 business days for your refill to be completed.          Additional Information About Your Visit        Care EveryWhere ID     This is your Care EveryWhere ID. This could be used by other organizations to access your [facility] medical records  [id number]        Your Vitals Were     Pulse Pulse Oximetry BMI (Body Mass Index)             59 98% 26.54 kg/m2          Blood Pressure from Last 3 Encounters:   11/06/18 (!) 151/93   10/24/18 (!) 126/93   10/12/18 138/78    Weight from Last 3 Encounters:   11/06/18 87.5 kg (193 lb)   10/03/18 87.1 kg (192 lb)   09/21/18 88.1 kg (194 lb 3.2 oz)              We Performed the Following     CBC with platelets differential     Comprehensive metabolic panel     CRP inflammation     Erythrocyte sedimentation rate auto     OPHTHALMOLOGY ADULT REFERRAL          Today's Medication Changes          These changes are accurate as of 11/6/18 12:43 PM.  If you have any questions, ask your nurse or doctor.               Start taking these medicines.        Dose/Directions    hydroxychloroquine 200 MG tablet   Commonly known as:  PLAQUENIL   Used for:  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)   Started by:  Fabricio Gmoes MD        Dose:  400 mg   Take 2 tablets (400 mg) by mouth daily    Quantity:  60 tablet   Refills:  3       predniSONE 5 MG tablet   Commonly known as:  DELTASONE   Used for:  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)   Started by:  Fabricio Gomes MD        Prednisone 20mg daily x5days, then 15mg daily x5days, then 10mg daily thereafter.   Quantity:  201 tablet   Refills:  0            Where to get your medicines      These medications were sent to [facility] [place], [place] - [address]  [address] [facility] [address]     Phone:  [phone]     hydroxychloroquine 200 MG tablet    predniSONE 5 MG tablet                Primary Care Provider Office Phone # Fax #    Lyndsey Aponte, APRN [phone]       [address] [facility] [address]        Equal Access to Services     BECCA BAEZ : Hadii umair byrne hadasho Soomaali, waaxda luqadaha, qaybta kaalmada adeegyada, waxeriberto idiin hayfazal [place]leton . So [facility] [phone].    ATENCIÓN: Si habla español, tiene a bernal disposición servicios gratuitos de asistencia lingüística. Llame al [phone].    We comply with applicable federal civil rights laws and [place] laws. We do not discriminate on the basis of race, color, national origin, age, disability, sex, sexual orientation, or gender identity.            Thank you!     Thank you for choosing [facility]  for your care. Our goal is always to provide you with excellent care. Hearing back from our patients is one way we can continue to improve our services. Please take a few minutes to complete the written survey that you may receive in the mail after your visit with us. Thank you!             Your Updated Medication List - Protect others around you: Learn how to safely use, store and throw away your medicines at www.disposemymeds.org.          This list is accurate as of 11/6/18 12:43 PM.  Always use your most recent med list.                   Brand Name Dispense Instructions for use  Diagnosis    allopurinol 300 MG tablet    ZYLOPRIM    90 tablet    TAKE ONE TABLET BY MOUTH EVERY DAY    Acute gouty arthropathy       atorvastatin 80 MG tablet    LIPITOR    90 tablet    Take 1 tablet (80 mg) by mouth daily    Hyperlipidemia LDL goal <130       B-12 100 MCG Tabs           BABY ASPIRIN PO      daily        capsaicin 0.075 % cream    ZOSTRIX    50 g    Apply  topically 3 times daily.    Bursitis of shoulder       cetirizine 10 MG tablet    zyrTEC    90 tablet    Take 1 tablet (10 mg) by mouth every evening    Seasonal allergic rhinitis, unspecified allergic rhinitis trigger       cholestyramine light 4 GM Packet    QUESTRAN    180 packet    DISSOLVE ONE PACKET IN LIQUID AS DIRECTED TWO TIMES A DAY AND DRINK    Hyperlipidemia LDL goal <130       diclofenac 1 % Gel topical gel    VOLTAREN    100 g    Apply  2 grams to shoulders three times daily using enclosed dosing card.    Rotator cuff tendinitis       hydroxychloroquine 200 MG tablet    PLAQUENIL    60 tablet    Take 2 tablets (400 mg) by mouth daily    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)       levothyroxine 25 MCG tablet    SYNTHROID/LEVOTHROID    90 tablet    TAKE ONE TABLET BY MOUTH EVERY DAY    Hypothyroidism due to acquired atrophy of thyroid       losartan 50 MG tablet    COZAAR    90 tablet    Take 1 tablet (50 mg) by mouth daily    Benign essential hypertension       omeprazole 40 MG capsule    priLOSEC    90 capsule    Take 1 capsule (40 mg) by mouth daily    Gastroesophageal reflux disease without esophagitis       predniSONE 5 MG tablet    DELTASONE    201 tablet    Prednisone 20mg daily x5days, then 15mg daily x5days, then 10mg daily thereafter.    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)       sucralfate 1 GM tablet    CARAFATE    40 tablet    Take 1 tablet (1 g) by mouth 4 times daily    Gastroesophageal reflux disease without esophagitis        triamcinolone 0.1 % cream    KENALOG    60 g    Locally bid    Rash       VITAMIN C PO      Take by mouth daily

## 2018-11-06 NOTE — PROGRESS NOTES
Rheumatology Clinic Visit      Camilo Baca MRN# [id number]   YOB: 1950 Age: 68 year old      Date of visit: 11/06/18   PCP: Lyndsey Aponte    Chief Complaint   Patient presents with:  Arthritis: RA follow up      Assessment and Plan     1.  Seropositive rheumatoid arthritis (, CCP >250): Dx'd 2013. Previously followed by [facility] Wol[facility] and [facility].  Established with me on 3/20/2018. Previously on MTX (ineffective as monotherapy), Humira (effective, stopped because of insurance preference for IV).  When he first presented to me in March 2018 he was on Remicade 300 mg IV every 8 weeks.  Remicade was losing efficacy so the dose was increased, and then in May after speaking with him on the phone it was again increased.  When on Remicade 7.5 mg/kg IV every 6 weeks it was more helpful for his joints but he began having associated chest pressure.  Therefore, the biologic DMARD was changed to Simponi on 8/10/2018.  Note that he has a history of chronic thrombocytopenia, so preferentially avoiding oral DMARD such as leflunomide and methotrexate.  At this time he is improving somewhat symphony but he has not seen the full effect of this medication.  We discussed the rationale for combination therapy with conventional DMARD and will start hydroxychloroquine today.  If he is not improved in a couple months then will consider changing to Orencia.  - Continue Simponi infusions  - Start hydroxychloroquine 400mg daily  - Ophthalmology referral for hydroxychloroquine toxicity monitoring  - Start prednisone 20mg daily x5days, then 15mg daily x5days, then 10mg daily thereafter  - Labs today: CBC, CMP, ESR, CRP              Rapid 3, cumulative scores                      11/6/2018: 9.3 (Simponi IV x2mo)    # Hydroxychloroquine (Plaquenil) Risks and Benefits:  The risks and benefits of hydroxychloroquine were discussed in detail and the patient verbalized understanding; the patient also verbalized agreement to get the  required ophthalmologic toxicity monitoring.  The risks discussed include, but are not limited to, the risk for hypersensitivity, anaphylaxis, anaphylactoid reactions, irreversible retinal damage, rare hematologic reactions, and rare cardiomyopathy.  Patients with G6PD deficiency or hepatic impairment may be at an increased risk for adverse effects.  I encouraged reviewing the package insert and asking any questions about the medication.      # Golimumab (Simponi) Risks and Benefits: The risks and benefits of golimumab were discussed in detail and the patient verbalized understanding.  The risks discussed include, but are not limited to, the risk for hypersensitivity, anaphylaxis, anaphylactoid reactions, an increased risk for serious infections leading to hospitalization or death, a possible increased risk for lymphoma and other malignancies, a possible worsening of demyelinating diseases, a possible worsening of heart failure, risk for cytopenias, risk for drug induced lupus, possible reactivation of hepatitis B, and possible reactivation of latent tuberculosis.  Subcutaneous injections may result in injection site reactions and/or pain at the site of injection.  The most common adverse reactions are infections and injection site reactions.  It was discussed that the medication would need to be discontinued if a serious infection develops.  It was discussed that live vaccinations should not be received while using golimumab or within 30 days prior to starting golimumab.  I encouraged reviewing the package insert and asking any questions about the medication.      # Prednisone Risks and Benefits: The risks and benefits of prednisone were discussed in detail and the patient verbalized understanding.  The risks discussed include, but are not limited to, weight gain, fluid retention, impaired wound healing, hyperglycemia, adrenal suppression, GI upset, peptic ulcer, hepatotoxicity, aseptic necrosis of the femoral and  humeral heads, osteoporosis, myopathy, tendon rupture (particularly Achilles tendon), ocular changes including an increased intraocular pressure.  I encouraged reviewing the package insert and asking any questions about the medication.      2. Elevated blood pressure:  Patient to follow up with Primary Care provider regarding elevated blood pressure.     Mr. Baca verbalized agreement with and understanding of the rational for the diagnosis and treatment plan.  All questions were answered to best of my ability and the patient's satisfaction. Mr. Baca was advised to contact the clinic with any questions that may arise after the clinic visit.      Thank you for involving me in the care of the patient    Return to clinic: 2 months    HPI   Camilo Baca is a 68 year old male with a past medical history significant for hypertension, hyperlipidemia, GERD, hypothyroidism, history of DVT, factor V Leiden mutation, history of thrombocytopenia, gout, and rheumatoid arthritis who presents for follow-up of rheumatoid arthritis.     11/15/2017 rheumatology clinic note by Dr. Sb Hurtado documents rheumatoid arthritis treated with Remicade every 8 weeks.  Plan to follow-up in 6 months or sooner.    When on Remicade 7.5 mg/kg IV every 6 weeks he began having associated chest pressure with each infusion.  Premedications were not helpful.  Infusions were changed to Simponi on 8/10/2018.    Today, he reports that his joint pain and swelling worsened with discontinuation of Remicade and start Simponi, but having been on it now for about 2 months he has noticed some improvement.  He is also using prednisone 10-15 mg daily and would like a refill of this, as he finds the prednisone is very effective.  Joint pains are worse in the morning.  He has pain and swelling across his MCPs, PIPs, and wrists.  Also has some pain in his elbows and occasionally his feet.  Morning stiffness lasts for most of the day but is worse for the first  couple hours.    Denies fevers, chills, nausea, vomiting, constipation, diarrhea. No abdominal pain. No chest pain/pressure, palpitations, or shortness of breath. No LE swelling. No neck pain. No oral or nasal sores.  No rash. No sicca symptoms.      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  CV: No chest pain, pressure, palpitations, or dyspnea on exertion.  PULM: No SOB, wheeze, cough.  GI: No nausea, vomiting, constipation, diarrhea. No blood in stool. No abdominal pain.  : No blood in urine.  MSK: See HPI.  NEURO: No numbness, tingling, or weakness.  EXT: No LE swelling  PSYCH: Negative    Active Problem List     Patient Active Problem List   Diagnosis     Lumbago     NONSPECIFIC MEDICAL HISTORY     HYPERLIPIDEMIA LDL GOAL <130     History of adenomatous polyp of colon     Idiopathic chronic gout without tophus, unspecified site     Advanced directives, counseling/discussion     High risk medication use     Thrombocytopenia (H)     Gastroesophageal reflux disease without esophagitis     Factor 5 Leiden mutation, heterozygous (H)     Personal history of venous thrombosis and embolism     Personal history of DVT (deep vein thrombosis)     Hypothyroidism due to acquired atrophy of thyroid     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)     Secondary hypertension with goal blood pressure less than 140/90     Rheumatoid arthritis involving multiple sites with positive rheumatoid factor (H)     Past Medical History     Past Medical History:   Diagnosis Date     Alopecia, unspecified      Closed fracture of unspecified part of humerus 1974    Arm fracture / left wrist     Esophageal reflux 2/6/2015     Factor 5 Leiden mutation, heterozygous (H)      Gout 3/28/2011     Herpes  zoster without mention of complication 1965    Herpes zoster     Measles without mention of complication     Measles     Personal history of DVT (deep vein thrombosis) 4/9/2015     Personal history of venous thrombosis and embolism 4/9/2015     Pulmonary embolism (H) 12/1996    post appendectomy     RA (rheumatoid arthritis) (H) 3/11/2013     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     Rheumatoid arthritis(714.0)     Beginning symptoms     Secondary hypertension with goal blood pressure less than 140/90 11/15/2016     Thrombocytopenia, unspecified (H) 1/29/2014     Varicella without mention of complication     Chickenpox     Past Surgical History     Past Surgical History:   Procedure Laterality Date     C APPENDECTOMY  1996     COLONOSCOPY  11/15/2010    COLONOSCOPY performed by DARIUS MESA at  GI     COLONOSCOPY N/A 11/2/2015    Procedure: COLONOSCOPY;  Surgeon: Bubba Odom MD;  Location:  GI     ESOPHAGOSCOPY, GASTROSCOPY, DUODENOSCOPY (EGD), COMBINED N/A 10/24/2018    Procedure: Esophagogastroduodenoscopy Multiple Biopsies by Biopsy;  Surgeon: Matteo Johnson DO;  Location:  GI     HC COLONOSCOPY W BIOPSY  08/10/05     HC REPAIR ING HERNIA,5+Y/O,REDUCIBL  1960     HERNIORRHAPHY UMBILICAL N/A 4/17/2015    Procedure: HERNIORRHAPHY UMBILICAL;  Surgeon: Rayray Avila MD;  Location: [facility] OR     LAPAROSCOPIC HERNIORRHAPHY INGUINAL BILATERAL Bilateral 4/17/2015    Procedure: LAPAROSCOPIC HERNIORRHAPHY INGUINAL BILATERAL;  Surgeon: Rayray Avila MD;  Location: [facility] OR     Allergy     Allergies   Allergen Reactions     Lisinopril Cough     Current Medication List     Current Outpatient Prescriptions   Medication Sig     allopurinol (ZYLOPRIM) 300 MG tablet TAKE ONE TABLET BY MOUTH EVERY DAY     Ascorbic Acid (VITAMIN C PO) Take by mouth daily     atorvastatin (LIPITOR) 80 MG tablet Take 1 tablet (80 mg) by mouth daily      BABY ASPIRIN OR daily     capsaicin (ZOSTRIX) 0.075 % topical cream Apply  topically 3 times daily.     cetirizine (ZYRTEC) 10 MG tablet Take 1 tablet (10 mg) by mouth every evening     cholestyramine light (QUESTRAN) 4 GM Packet DISSOLVE ONE PACKET IN LIQUID AS DIRECTED TWO TIMES A DAY AND DRINK     Cyanocobalamin (B-12) 100 MCG TABS      diclofenac (VOLTAREN) 1 % GEL Apply  2 grams to shoulders three times daily using enclosed dosing card.     hydroxychloroquine (PLAQUENIL) 200 MG tablet Take 2 tablets (400 mg) by mouth daily     levothyroxine (SYNTHROID/LEVOTHROID) 25 MCG tablet TAKE ONE TABLET BY MOUTH EVERY DAY     losartan (COZAAR) 50 MG tablet Take 1 tablet (50 mg) by mouth daily     omeprazole (PRILOSEC) 40 MG capsule Take 1 capsule (40 mg) by mouth daily     predniSONE (DELTASONE) 5 MG tablet Prednisone 20mg daily x5days, then 15mg daily x5days, then 10mg daily thereafter.     sucralfate (CARAFATE) 1 GM tablet Take 1 tablet (1 g) by mouth 4 times daily     triamcinolone (KENALOG) 0.1 % cream Locally bid     No current facility-administered medications for this visit.          Social History   See HPI    Family History     Family History   Problem Relation Age of Onset     Arthritis Mother      Cardiovascular Mother      Depression Mother      GASTROINTESTINAL DISEASE Mother      IBS     Hypertension Mother      Circulatory Mother      Aortal aneurysm     Osteoporosis Mother      Allergies Father      Cortisone     Cardiovascular Father      Circulatory Father      Diabetes Father      Hypertension Father      Lipids Father      Alcohol/Drug Sister      Penicillin     Alzheimer Disease Paternal Grandfather      Blood Disease Paternal Grandmother      Clotting problems     Blood Disease Son      Factor 5     Hypertension Maternal Aunt      Cerebrovascular Disease Maternal Grandmother        Physical Exam     Temp Readings from Last 3 Encounters:   10/24/18 98  F (36.7  C) (Oral)   10/03/18 97.4  F (36.3  C)  "(Tympanic)   09/21/18 97.8  F (36.6  C) (Temporal)     BP Readings from Last 5 Encounters:   11/06/18 (!) 151/93   10/24/18 (!) 126/93   10/12/18 138/78   10/03/18 126/82   09/28/18 150/82     Pulse Readings from Last 1 Encounters:   11/06/18 59     Resp Readings from Last 1 Encounters:   10/24/18 16     Estimated body mass index is 26.54 kg/(m^2) as calculated from the following:    Height as of 8/10/18: 1.816 m (5' 11.5\").    Weight as of this encounter: 87.5 kg (193 lb).    GEN: NAD  HEENT: MMM. No oral lesions.  Anicteric, noninjected sclera  CV: S1, S2. RRR. No m/r/g.  PULM: CTA bilaterally. No w/c.  MSK: Swelling and tenderness palpation is bilateral second-fourth MCPs; tenderness palpation without swelling of the bilateral second-third PIPs and right fourth PIP.  DIPs without swelling or tenderness palpation.  Right wrist with swelling and tenderness to palpation but no increased warmth or effusion.  Left wrist without swelling or tenderness palpation.  Elbows tender to palpation but without swelling.  Shoulders, knees, ankles, and MTPs without swelling or tenderness to palpation.  Hips nontender to palpation.   NEURO: UE and LE strengths 5/5 and equal bilaterally.   SKIN: No rash  EXT: No LE edema  PSYCH: Alert. Appropriate.    Labs / Imaging (select studies)   RF/CCP  Recent Labs   Lab Test  04/24/13   0923   CCPABY  >250 Strongly Positive*   RHF  743*     CBC  Recent Labs   Lab Test  06/26/18   0955  04/05/18   2355  03/20/18   1008   WBC  4.3  7.7  8.4   RBC  4.02*  4.09*  4.40   HGB  13.6  13.3  14.5   HCT  39.9*  40.6  43.3   MCV  99  99  98   RDW  12.8  13.5  13.4   PLT  121*  122*  131*   MCH  33.8*  32.5  33.0   MCHC  34.1  32.8  33.5   NEUTROPHIL  44.2  74.5  60.6   LYMPH  39.7  17.1  31.6   MONOCYTE  12.9  7.0  7.5   EOSINOPHIL  3.0  0.8  0.2   BASOPHIL  0.2  0.3  0.1   ANEU  1.9  5.7  5.1   ALYM  1.7  1.3  2.7   KIESHA  0.6  0.5  0.6   AEOS  0.1  0.1  0.0   ABAS  0.0  0.0  0.0     CMP  Recent Labs "   Lab Test  06/26/18   0955  04/05/18   2355  03/20/18   1008  12/22/17   0735  08/24/17   0931   NA   --   140   --   141  143   POTASSIUM   --   3.4   --   4.2  4.4   CHLORIDE   --   104   --   107  109   CO2   --   26   --   27  27   ANIONGAP   --   10   --   7  7   GLC   --   124*   --   121*  95   BUN   --   32*   --   17  15   CR  0.85  0.71  0.82  0.85  0.85   GFRESTIMATED  >90  >90  >90  >90  >90   GFRESTBLACK  >90  >90  >90  >90  >90   ANDREZ   --   7.9*   --   8.9  9.2   BILITOTAL  1.2   --   0.9  1.5*  1.2   ALBUMIN  3.4   --   3.1*  3.7  3.5   PROTTOTAL  8.1   --   7.9  8.4  7.5   ALKPHOS  68   --   68  90  74   AST  40   --   22  25  24   ALT  50   --   46  49  40     Calcium/VitaminD  Recent Labs   Lab Test  06/26/18   0955  04/05/18   2355  12/22/17   0735  08/24/17   0931   ANDREZ   --   7.9*  8.9  9.2   VITDT  25   --    --    --      ESR/CRP  Recent Labs   Lab Test  06/26/18   0955  12/22/17   0735  06/03/13   0837  04/24/13   0923  07/16/12   1403   SED  23*  12  16   --   21*   CRP  <2.9   --    --   7.1  6.6     Hepatitis B  Recent Labs   Lab Test  03/20/18   1008  02/05/14   1104   HBCAB  Nonreactive   --    HEPBANG  Nonreactive  Negative     Hepatitis C  Recent Labs   Lab Test  02/05/14   1104  06/03/13   0837   HCVAB  Negative  Negative     Tuberculosis Screening  Recent Labs   Lab Test  03/20/18   1008  02/05/14   1104   TBRSLT  Negative  Negative   TBAGN  0.14  0.01     Immunization History     Immunization History   Administered Date(s) Administered     Influenza (High Dose) 3 valent vaccine 10/06/2017, 10/03/2018     Influenza (IIV3) PF 10/01/2009, 10/27/2010, 10/11/2011     Influenza Vaccine IM 3yrs+ 4 Valent IIV4 10/09/2013, 10/23/2014, 09/18/2015, 11/15/2016     Pneumo Conj 13-V (2010&after) 10/06/2017     Pneumococcal 23 valent 10/23/2014     TD (ADULT, 7+) 09/05/2001     TDAP Vaccine (Boostrix) 10/18/2012     Zoster vaccine, live 10/23/2014          Chart documentation done in part with  [employer] Voice recognition Software. Although reviewed after completion, some word and grammatical error may remain.    Fabricio Gomes MD

## 2018-11-06 NOTE — PROGRESS NOTES
"Chief Complaint   Patient presents with     Arthritis     RA follow up       Initial BP (!) 153/91  Pulse 59  Wt 87.5 kg (193 lb)  SpO2 98%  BMI 26.54 kg/m2 Estimated body mass index is 26.54 kg/(m^2) as calculated from the following:    Height as of 8/10/18: 1.816 m (5' 11.5\").    Weight as of this encounter: 87.5 kg (193 lb).  BP completed using cuff size: regular         RAPID3 (0-30) Cumulative Score  9.3          RAPID3 Weighted Score (divide #4 by 3 and that is the weighted score)  3.1         "

## 2018-11-10 DIAGNOSIS — K21.9 GASTROESOPHAGEAL REFLUX DISEASE WITHOUT ESOPHAGITIS: ICD-10-CM

## 2018-11-12 RX ORDER — OMEPRAZOLE 40 MG/1
CAPSULE, DELAYED RELEASE ORAL
Qty: 90 CAPSULE | Refills: 3 | Status: SHIPPED | OUTPATIENT
Start: 2018-11-12 | End: 2019-10-23 | Stop reason: ALTCHOICE

## 2018-11-12 NOTE — TELEPHONE ENCOUNTER
"Last Written Prescription Date:  8/10/18  Last Fill Quantity: 90,  # refills: 0   Last office visit: 8/10/2018 with prescribing provider:  Ace Bobo   Future Office Visit:   Next 5 appointments (look out 90 days)     Jan 08, 2019  3:20 PM CST   [facility] Visit with Fabricio Gomes MD   [facility]    [address] [place] [id number] [phone]                   Requested Prescriptions   Pending Prescriptions Disp Refills     omeprazole (PRILOSEC) 40 MG capsule [Pharmacy Med Name: OMEPRAZOLE 40MG CPDR] 90 capsule 0     Sig: TAKE ONE CAPSULE BY MOUTH EVERY DAY    PPI Protocol Passed    11/10/2018  8:34 AM       Passed - Not on Clopidogrel (unless Pantoprazole ordered)       Passed - No diagnosis of osteoporosis on record       Passed - Recent (12 mo) or future (30 days) visit within the authorizing provider's specialty    Patient had office visit in the last 12 months or has a visit in the next 30 days with authorizing provider or within the authorizing provider's specialty.  See \"Patient Info\" tab in inbasket, or \"Choose Columns\" in Meds & Orders section of the refill encounter.             Passed - Patient is age 18 or older        Prescription approved per [facility] Refill Protocol.    Marianne Campbell RN      "

## 2018-11-13 ENCOUNTER — TRANSFERRED RECORDS (OUTPATIENT)
Dept: HEALTH INFORMATION MANAGEMENT | Facility: CLINIC | Age: 68
End: 2018-11-13

## 2018-11-16 ENCOUNTER — DOCUMENTATION ONLY (OUTPATIENT)
Dept: SPIRITUAL SERVICES | Facility: CLINIC | Age: 68
End: 2018-11-16

## 2018-11-16 ENCOUNTER — INFUSION THERAPY VISIT (OUTPATIENT)
Dept: INFUSION THERAPY | Facility: CLINIC | Age: 68
End: 2018-11-16
Attending: INTERNAL MEDICINE
Payer: MEDICARE

## 2018-11-16 VITALS
TEMPERATURE: 97.8 F | BODY MASS INDEX: 26.28 KG/M2 | HEART RATE: 59 BPM | RESPIRATION RATE: 16 BRPM | OXYGEN SATURATION: 98 % | DIASTOLIC BLOOD PRESSURE: 93 MMHG | SYSTOLIC BLOOD PRESSURE: 147 MMHG | WEIGHT: 191.1 LBS

## 2018-11-16 DIAGNOSIS — Z71.81 SPIRITUAL OR RELIGIOUS COUNSELING: Primary | ICD-10-CM

## 2018-11-16 DIAGNOSIS — M05.79 RHEUMATOID ARTHRITIS INVOLVING MULTIPLE SITES WITH POSITIVE RHEUMATOID FACTOR (H): Primary | ICD-10-CM

## 2018-11-16 PROCEDURE — 96365 THER/PROPH/DIAG IV INF INIT: CPT

## 2018-11-16 PROCEDURE — 96413 CHEMO IV INFUSION 1 HR: CPT

## 2018-11-16 PROCEDURE — 25000128 H RX IP 250 OP 636: Performed by: INTERNAL MEDICINE

## 2018-11-16 RX ADMIN — GOLIMUMAB 173 MG: 50 SOLUTION INTRAVENOUS at 08:45

## 2018-11-16 ASSESSMENT — PAIN SCALES - GENERAL: PAINLEVEL: NO PAIN (0)

## 2018-11-16 NOTE — MR AVS SNAPSHOT
After Visit Summary   11/16/2018    Camilo Baca    MRN: [id number]           Patient Information     Date Of Birth          1950        Visit Information        Provider Department      11/16/2018 8:00 AM NL INFUSION CHAIR 2 [facility]        Today's Diagnoses     Rheumatoid arthritis involving multiple sites with positive rheumatoid factor (H)    -  1       Follow-ups after your visit        Follow-up notes from your care team     Return in 8 weeks (on 1/11/2019).      Your next 10 appointments already scheduled     Jan 08, 2019  3:20 PM CST   Return Visit with Fabricio Gomes MD   [facility] ([facility])    [address] [id number] [phone]            Jan 11, 2019  8:30 AM CST   Level 2 with NL INFUSION CHAIR 2   [facility] ([facility])    [address] [place] [address] Dr Dee BAUTISTA [id number] [phone]              Who to contact     If you have questions or need follow up information about today's clinic visit or your schedule please contact [facility] INF[facility] directly at [phone].  Normal or non-critical lab and imaging results will be communicated to you by MyChart, letter or phone within 4 business days after the clinic has received the results. If you do not hear from us within 7 days, please contact the clinic through MyChart or phone. If you have a critical or abnormal lab result, we will notify you by phone as soon as possible.  Submit refill requests through [employer] or call your pharmacy and they will forward the refill request to us. Please allow 3 business days for your refill to be completed.          Additional Information About Your Visit        Care EveryWhere ID     This is your Care EveryWhere ID. This could be used by other organizations to access your [place] medical records  [id number]        Your Vitals Were      Pulse Temperature Respirations Pulse Oximetry BMI (Body Mass Index)       59 97.8  F (36.6  C) (Oral) 16 98% 26.28 kg/m2        Blood Pressure from Last 3 Encounters:   11/16/18 (!) 147/93   11/06/18 (!) 151/93   10/24/18 (!) 126/93    Weight from Last 3 Encounters:   11/16/18 86.7 kg (191 lb 1.6 oz)   11/06/18 87.5 kg (193 lb)   10/03/18 87.1 kg (192 lb)              Today, you had the following     No orders found for display       Primary Care Provider Office Phone # Fax #    Lyndsey Aponte, DIANN [phone]       [address] [facility] [address]        Equal Access to Services     BECCA BAEZ : Hadii aad ku hadasho Soomaali, waaxda luqadaha, qaybta kaalmada adeegyada, matt middleton . So [facility] [phone].    ATENCIÓN: Si habla español, tiene a bernal disposición servicios gratuitos de asistencia lingüística. Llame al [phone].    We comply with applicable federal civil rights laws and [place] laws. We do not discriminate on the basis of race, color, national origin, age, disability, sex, sexual orientation, or gender identity.            Thank you!     Thank you for choosing [facility]  for your care. Our goal is always to provide you with excellent care. Hearing back from our patients is one way we can continue to improve our services. Please take a few minutes to complete the written survey that you may receive in the mail after your visit with us. Thank you!             Your Updated Medication List - Protect others around you: Learn how to safely use, store and throw away your medicines at www.disposemymeds.org.          This list is accurate as of 11/16/18 11:56 AM.  Always use your most recent med list.                   Brand Name Dispense Instructions for use Diagnosis    allopurinol 300 MG tablet    ZYLOPRIM    90 tablet    TAKE ONE TABLET BY MOUTH EVERY DAY    Acute gouty arthropathy       atorvastatin 80 MG tablet    LIPITOR     90 tablet    Take 1 tablet (80 mg) by mouth daily    Hyperlipidemia LDL goal <130       B-12 100 MCG Tabs           BABY ASPIRIN PO      daily        capsaicin 0.075 % cream    ZOSTRIX    50 g    Apply  topically 3 times daily.    Bursitis of shoulder       cetirizine 10 MG tablet    zyrTEC    90 tablet    Take 1 tablet (10 mg) by mouth every evening    Seasonal allergic rhinitis, unspecified allergic rhinitis trigger       cholestyramine light 4 GM Packet    QUESTRAN    180 packet    DISSOLVE ONE PACKET IN LIQUID AS DIRECTED TWO TIMES A DAY AND DRINK    Hyperlipidemia LDL goal <130       diclofenac 1 % Gel topical gel    VOLTAREN    100 g    Apply  2 grams to shoulders three times daily using enclosed dosing card.    Rotator cuff tendinitis       hydroxychloroquine 200 MG tablet    PLAQUENIL    60 tablet    Take 2 tablets (400 mg) by mouth daily    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)       levothyroxine 25 MCG tablet    SYNTHROID/LEVOTHROID    90 tablet    TAKE ONE TABLET BY MOUTH EVERY DAY    Hypothyroidism due to acquired atrophy of thyroid       losartan 50 MG tablet    COZAAR    90 tablet    Take 1 tablet (50 mg) by mouth daily    Benign essential hypertension       omeprazole 40 MG capsule    priLOSEC    90 capsule    TAKE ONE CAPSULE BY MOUTH EVERY DAY    Gastroesophageal reflux disease without esophagitis       predniSONE 5 MG tablet    DELTASONE    201 tablet    Prednisone 20mg daily x5days, then 15mg daily x5days, then 10mg daily thereafter.    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)       sucralfate 1 GM tablet    CARAFATE    40 tablet    Take 1 tablet (1 g) by mouth 4 times daily    Gastroesophageal reflux disease without esophagitis       triamcinolone 0.1 % cream    KENALOG    60 g    Locally bid    Rash       VITAMIN C PO      Take by mouth daily

## 2018-11-16 NOTE — PROGRESS NOTES
Infusion Nursing Note:  Camilo Baca presents today for Tiosuzanne Myers.    Patient seen by provider today: No   present during visit today: Not Applicable.    Note: N/A.    Intravenous Access:  Peripheral IV placed.    Treatment Conditions:  Lab Results   Component Value Date    HGB 13.2 11/06/2018     Lab Results   Component Value Date    WBC 7.1 11/06/2018      Lab Results   Component Value Date    ANEU 6.0 11/06/2018     Lab Results   Component Value Date     11/06/2018      Lab Results   Component Value Date     11/06/2018                   Lab Results   Component Value Date    POTASSIUM 4.2 11/06/2018           No results found for: MAG         Lab Results   Component Value Date    CR 0.83 11/06/2018                   Lab Results   Component Value Date    ANDREZ 8.8 11/06/2018                Lab Results   Component Value Date    BILITOTAL 0.6 11/06/2018           Lab Results   Component Value Date    ALBUMIN 3.1 11/06/2018                    Lab Results   Component Value Date    ALT 82 11/06/2018           Lab Results   Component Value Date    AST 41 11/06/2018       Biological Infusion Checklist:    ~~~ NOTE: If the patient answers yes to any of the questions below, hold the infusion and contact ordering provider or on-call provider.    1. Have you recently had an elevated temperature, fever, chills, productive cough, coughing for 3 weeks or longer or hemoptysis,  abnormal vital signs, night sweats,  chest pain or have you noticed a decrease in your appetite, unexplained weight loss or fatigue? No  2. Do you have any open wounds or new incisions? No  3. Do you have any recent or upcoming hospitalizations, surgeries or dental procedures? No  4. Do you currently have or recently have had any signs of illness or infection or are you on any antibiotics? No  5. Have you had any new, sudden or worsening abdominal pain? No  6. Have you or anyone in your household received a live vaccination in  the past 4 weeks? Please note:  No live vaccines while on biologic/chemotherapy until 6 months after the last treatment.  Patient can receive the flu vaccine (shot only) and the pneumovax.  It is optimal for the patient to get these vaccines mid cycle, but they can be given at any time as long as it is not on the day of the infusion. No  7. Have you recently been diagnosed with any new nervous system diseases (ie. Multiple sclerosis, Guillain [place], seizures, neurological changes) or cancer diagnosis? Are you on any form of radiation or chemotherapy? No  8. Are you pregnant or breast feeding or do you have plans of pregnancy in the future? No  9. Have you been having any signs of worsening depression or suicidal ideations?  (benlysta only) No  10. Have there been any other new onset medical symptoms? No        Post Infusion Assessment:  Patient tolerated infusion without incident.  Patient observed for 15 minutes post Simponi per protocol.  Site patent and intact, free from redness, edema or discomfort.  No evidence of extravasations.  Access discontinued per protocol.  EDUCATION POST BIOLOGICAL/CHEMOTHERAPY INFUSION  Call the triage nurse at your clinic or seek medical attention if you have chills and/or temperature greater than or equal to 100.5, uncontrolled nausea/vomiting, diarrhea, constipation, dizziness, shortness of breath, chest pain, heart palpitations, weakness or any other new or concerning symptoms, questions or concerns.  You can not have any live virus vaccines prior to or during treatment or up to 6 months post infusion.  If you have an upcoming surgery, medical procedure or dental procedure during treatment, this should be discussed with your ordering physician and your surgeon/dentist.  If you are having any concerning symptom, if you are unsure if you should get your next infusion or wish to speak to a provider before your next infusion, please call your care coordinator or triage nurse at your  clinic to notify them so we can adequately serve you.    Discharge Plan:   Discharge instructions reviewed with: Patient.  Patient discharged in stable condition accompanied by: self.  Departure Mode: Ambulatory.    Emy Figueredo RN                      \[id number]\

## 2018-11-16 NOTE — PROGRESS NOTES
"SPIRITUAL HEALTH SERVICES  SPIRITUAL ASSESSMENT Progress Note  [facility]       introduced himself to Camilo Baca and informed him of his availability.  Rounding - Pt referred to his care as \"wonderful.\"    Chan Mckinnon M.Div., [facility]  Staff   Office tel: [phone]    "

## 2018-11-17 DIAGNOSIS — E03.4 HYPOTHYROIDISM DUE TO ACQUIRED ATROPHY OF THYROID: ICD-10-CM

## 2018-11-19 RX ORDER — LEVOTHYROXINE SODIUM 25 UG/1
TABLET ORAL
Qty: 90 TABLET | Refills: 0 | Status: SHIPPED | OUTPATIENT
Start: 2018-11-19 | End: 2019-02-16

## 2018-11-19 NOTE — TELEPHONE ENCOUNTER
"Requested Prescriptions   Pending Prescriptions Disp Refills     levothyroxine (SYNTHROID/LEVOTHROID) 25 MCG tablet [Pharmacy Med Name: LEVOTHYROXINE SODIUM 25MCG TABS] 90 tablet 0    Last Written Prescription Date:  8/22/18  Last Fill Quantity: 90,  # refills: 0   Last office visit: 10/12/2018 with prescribing provider:  10/3/18   Future Office Visit:   Next 5 appointments (look out 90 days)     Jan 08, 2019  3:20 PM CST   Return Visit with Fabricio Gomes MD   [facility] ([facility])    [address] [place] [address]   [phone]                Sig: TAKE ONE TABLET BY MOUTH EVERY DAY    Thyroid Protocol Failed    11/17/2018  6:21 AM       Failed - Normal TSH on file in past 12 months    Recent Labs   Lab Test  08/24/17   0931   TSH  1.94             Passed - Patient is 12 years or older       Passed - Recent (12 mo) or future (30 days) visit within the authorizing provider's specialty    Patient had office visit in the last 12 months or has a visit in the next 30 days with authorizing provider or within the authorizing provider's specialty.  See \"Patient Info\" tab in inbasket, or \"Choose Columns\" in Meds & Orders section of the refill encounter.              "

## 2018-11-19 NOTE — TELEPHONE ENCOUNTER
Routing refill request to provider for review/approval because:  Labs not current:  TSH    WILFRED BurrellN, RN  [facility]

## 2018-12-04 ENCOUNTER — OFFICE VISIT (OUTPATIENT)
Dept: FAMILY MEDICINE | Facility: OTHER | Age: 68
End: 2018-12-04
Payer: COMMERCIAL

## 2018-12-04 VITALS
BODY MASS INDEX: 27.57 KG/M2 | DIASTOLIC BLOOD PRESSURE: 76 MMHG | SYSTOLIC BLOOD PRESSURE: 134 MMHG | OXYGEN SATURATION: 98 % | HEART RATE: 72 BPM | TEMPERATURE: 97.7 F | WEIGHT: 200.5 LBS | RESPIRATION RATE: 16 BRPM

## 2018-12-04 DIAGNOSIS — R21 RASH OF FACE: Primary | ICD-10-CM

## 2018-12-04 PROCEDURE — 99213 OFFICE O/P EST LOW 20 MIN: CPT | Performed by: FAMILY MEDICINE

## 2018-12-04 RX ORDER — TRIAMCINOLONE ACETONIDE 5 MG/G
CREAM TOPICAL
Qty: 30 G | Refills: 0 | Status: SHIPPED | OUTPATIENT
Start: 2018-12-04 | End: 2021-01-19

## 2018-12-04 ASSESSMENT — PAIN SCALES - GENERAL: PAINLEVEL: NO PAIN (0)

## 2018-12-04 NOTE — MR AVS SNAPSHOT
After Visit Summary   12/4/2018    Camilo Baca    MRN: [id number]           Patient Information     Date Of Birth          1950        Visit Information        Provider Department      12/4/2018 10:00 AM Marito Mix MD [facility]        Today's Diagnoses     Rash of face    -  1       Follow-ups after your visit        Follow-up notes from your care team     Return if symptoms worsen or fail to improve.      Your next 10 appointments already scheduled     Jan 08, 2019  3:20 PM CST   Return Visit with Fabricio Gomes MD   [facility] ([facility])    [address] [facility] [id number] [phone]            Jan 11, 2019  8:30 AM CST   Level 2 with NL INFUSION CHAIR 2   [facility] ([facility])    [address] [place] [address] Dr Dee BAUTISTA [id number] [phone]              Who to contact     If you have questions or need follow up information about today's clinic visit or your schedule please contact [facility] directly at [phone].  Normal or non-critical lab and imaging results will be communicated to you by MyChart, letter or phone within 4 business days after the clinic has received the results. If you do not hear from us within 7 days, please contact the clinic through MyChart or phone. If you have a critical or abnormal lab result, we will notify you by phone as soon as possible.  Submit refill requests through [employer] or call your pharmacy and they will forward the refill request to us. Please allow 3 business days for your refill to be completed.          Additional Information About Your Visit        Care EveryWhere ID     This is your Care EveryWhere ID. This could be used by other organizations to access your [place] medical records  [id number]        Your Vitals Were     Pulse Temperature Respirations Pulse Oximetry BMI (Body Mass Index)       72 97.7  F  (36.5  C) (Temporal) 16 98% 27.57 kg/m2        Blood Pressure from Last 3 Encounters:   12/04/18 134/76   11/16/18 (!) 147/93   11/06/18 (!) 151/93    Weight from Last 3 Encounters:   12/04/18 200 lb 8 oz (90.9 kg)   11/16/18 191 lb 1.6 oz (86.7 kg)   11/06/18 193 lb (87.5 kg)              Today, you had the following     No orders found for display         Today's Medication Changes          These changes are accurate as of 12/4/18  5:04 PM.  If you have any questions, ask your nurse or doctor.               These medicines have changed or have updated prescriptions.        Dose/Directions    * triamcinolone 0.1 % external cream   Commonly known as:  KENALOG   This may have changed:  Another medication with the same name was added. Make sure you understand how and when to take each.   Used for:  Rash   Changed by:  Marito Mix MD        Locally bid   Quantity:  60 g   Refills:  0       * triamcinolone 0.5 % external cream   Commonly known as:  KENALOG   This may have changed:  You were already taking a medication with the same name, and this prescription was added. Make sure you understand how and when to take each.   Used for:  Rash of face   Changed by:  Marito Mix MD        Apply sparingly to affected area three times daily as needed, no more than 7 days in a row.   Quantity:  30 g   Refills:  0       * Notice:  This list has 2 medication(s) that are the same as other medications prescribed for you. Read the directions carefully, and ask your doctor or other care provider to review them with you.         Where to get your medicines      These medications were sent to [facility] [place], [place] - [address]  [address] [facility] [address]     Phone:  [phone]     triamcinolone 0.5 % external cream                Primary Care Provider Office Phone # Fax #    DIANN Presley [phone]       [address] [facility] [address]        Equal Access to Services     [facility]  GAAR : Hadii aad ku hadgennaroo Courtneyali, waaxda luqadaha, qaybta kaalmada adeyeimy, matt omar asaflizandro blumreesemarquis middleton . So [facility] [phone].    ATENCIÓN: Si habla gaby, tiene a bernal disposición servicios gratuitos de asistencia lingüística. Llame al [phone].    We comply with applicable federal civil rights laws and [place] laws. We do not discriminate on the basis of race, color, national origin, age, disability, sex, sexual orientation, or gender identity.            Thank you!     Thank you for choosing [facility]  for your care. Our goal is always to provide you with excellent care. Hearing back from our patients is one way we can continue to improve our services. Please take a few minutes to complete the written survey that you may receive in the mail after your visit with us. Thank you!             Your Updated Medication List - Protect others around you: Learn how to safely use, store and throw away your medicines at www.disposemymeds.org.          This list is accurate as of 12/4/18  5:04 PM.  Always use your most recent med list.                   Brand Name Dispense Instructions for use Diagnosis    allopurinol 300 MG tablet    ZYLOPRIM    90 tablet    TAKE ONE TABLET BY MOUTH EVERY DAY    Acute gouty arthropathy       atorvastatin 80 MG tablet    LIPITOR    90 tablet    Take 1 tablet (80 mg) by mouth daily    Hyperlipidemia LDL goal <130       B-12 100 MCG Tabs           BABY ASPIRIN PO      daily        capsaicin 0.075 % cream    ZOSTRIX    50 g    Apply  topically 3 times daily.    Bursitis of shoulder       cetirizine 10 MG tablet    zyrTEC    90 tablet    Take 1 tablet (10 mg) by mouth every evening    Seasonal allergic rhinitis, unspecified allergic rhinitis trigger       cholestyramine light 4 GM packet    QUESTRAN    180 packet    DISSOLVE ONE PACKET IN LIQUID AS DIRECTED TWO TIMES A DAY AND DRINK    Hyperlipidemia LDL goal <130       diclofenac 1 % topical gel     VOLTAREN    100 g    Apply  2 grams to shoulders three times daily using enclosed dosing card.    Rotator cuff tendinitis       levothyroxine 25 MCG tablet    SYNTHROID/LEVOTHROID    90 tablet    TAKE ONE TABLET BY MOUTH EVERY DAY    Hypothyroidism due to acquired atrophy of thyroid       losartan 50 MG tablet    COZAAR    90 tablet    Take 1 tablet (50 mg) by mouth daily    Benign essential hypertension       omeprazole 40 MG DR capsule    priLOSEC    90 capsule    TAKE ONE CAPSULE BY MOUTH EVERY DAY    Gastroesophageal reflux disease without esophagitis       predniSONE 5 MG tablet    DELTASONE    201 tablet    Prednisone 20mg daily x5days, then 15mg daily x5days, then 10mg daily thereafter.    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)       sucralfate 1 GM tablet    CARAFATE    40 tablet    Take 1 tablet (1 g) by mouth 4 times daily    Gastroesophageal reflux disease without esophagitis       * triamcinolone 0.1 % external cream    KENALOG    60 g    Locally bid    Rash       * triamcinolone 0.5 % external cream    KENALOG    30 g    Apply sparingly to affected area three times daily as needed, no more than 7 days in a row.    Rash of face       VITAMIN C PO      Take by mouth daily        * Notice:  This list has 2 medication(s) that are the same as other medications prescribed for you. Read the directions carefully, and ask your doctor or other care provider to review them with you.

## 2018-12-04 NOTE — NURSING NOTE
Chief Complaint   Patient presents with     Hives     X2 weeks     Health Maintenance Due   Topic Date Due     MICROALBUMIN Q1 YEAR  04/07/2018     LIPID MONITORING Q1 YEAR  12/22/2018     Arslan Jones, CMA

## 2018-12-07 DIAGNOSIS — E78.5 HYPERLIPIDEMIA LDL GOAL <130: ICD-10-CM

## 2018-12-10 RX ORDER — ATORVASTATIN CALCIUM 80 MG/1
TABLET, FILM COATED ORAL
Qty: 90 TABLET | Refills: 3 | Status: SHIPPED | OUTPATIENT
Start: 2018-12-10 | End: 2019-01-11

## 2018-12-10 NOTE — TELEPHONE ENCOUNTER
"Atorvastatin Last Written Prescription Date:  3/12/18  Last Fill Quantity: 90,  # refills: 2   Last office visit: 12/4/2018 with prescribing provider:  Lyndsey Aponte   Future Office Visit:   Next 5 appointments (look out 90 days)    Jan 08, 2019  3:20 PM CST  Return Visit with Fabricio Gomes MD  [facility] ([facility]) [address] [place] [id number] [phone]         Requested Prescriptions   Pending Prescriptions Disp Refills     atorvastatin (LIPITOR) 80 MG tablet [Pharmacy Med Name: ATORVASTATIN CALCIUM 80MG TABS] 90 tablet 2     Sig: TAKE ONE TABLET BY MOUTH EVERY DAY    Statins Protocol Passed - 12/7/2018  9:32 AM       Passed - LDL on file in past 12 months    Recent Labs   Lab Test 12/22/17  0735   LDL 58            Passed - No abnormal creatine kinase in past 12 months    Recent Labs   Lab Test 07/16/12  1403                  Passed - Recent (12 mo) or future (30 days) visit within the authorizing provider's specialty    Patient had office visit in the last 12 months or has a visit in the next 30 days with authorizing provider or within the authorizing provider's specialty.  See \"Patient Info\" tab in inbasket, or \"Choose Columns\" in Meds & Orders section of the refill encounter.             Passed - Patient is age 18 or older        Prescription approved per [facility] Refill Protocol.    Marianne Campbell RN    "

## 2019-01-08 ENCOUNTER — TELEPHONE (OUTPATIENT)
Dept: RHEUMATOLOGY | Facility: CLINIC | Age: 69
End: 2019-01-08

## 2019-01-08 ENCOUNTER — OFFICE VISIT (OUTPATIENT)
Dept: RHEUMATOLOGY | Facility: CLINIC | Age: 69
End: 2019-01-08
Payer: COMMERCIAL

## 2019-01-08 VITALS
HEART RATE: 81 BPM | OXYGEN SATURATION: 93 % | WEIGHT: 196 LBS | SYSTOLIC BLOOD PRESSURE: 138 MMHG | DIASTOLIC BLOOD PRESSURE: 84 MMHG | BODY MASS INDEX: 26.96 KG/M2

## 2019-01-08 DIAGNOSIS — M05.711 RHEUMATOID ARTHRITIS INVOLVING BOTH SHOULDERS WITH POSITIVE RHEUMATOID FACTOR (H): Primary | ICD-10-CM

## 2019-01-08 DIAGNOSIS — M05.712 RHEUMATOID ARTHRITIS INVOLVING BOTH SHOULDERS WITH POSITIVE RHEUMATOID FACTOR (H): Primary | ICD-10-CM

## 2019-01-08 DIAGNOSIS — Z79.899 HIGH RISK MEDICATIONS (NOT ANTICOAGULANTS) LONG-TERM USE: ICD-10-CM

## 2019-01-08 PROCEDURE — 99213 OFFICE O/P EST LOW 20 MIN: CPT | Performed by: INTERNAL MEDICINE

## 2019-01-08 RX ORDER — PREDNISONE 5 MG/1
TABLET ORAL
Qty: 50 TABLET | Refills: 1 | Status: SHIPPED | OUTPATIENT
Start: 2019-01-08 | End: 2019-04-23

## 2019-01-08 NOTE — TELEPHONE ENCOUNTER
Called in verbal order for Shingrix to [facility] per Dr. Gomes.    Avery Cuellar RN....1/8/2019 3:45 PM

## 2019-01-08 NOTE — NURSING NOTE
Patient to follow up with Primary Care provider regarding elevated blood pressure.  Blood pressure rechecked after visit 138/84  Jaquelin Muñiz CMA Rheumatology  1/8/2019 4:00 PM

## 2019-01-08 NOTE — NURSING NOTE
RAPID3 (0-30) Cumulative Score  0.5          RAPID3 Weighted Score (divide #4 by 3 and that is the weighted score)  0.167     Jaquelin Muñiz [facility]  1/8/2019 3:08 PM

## 2019-01-08 NOTE — PROGRESS NOTES
Rheumatology Clinic Visit      Camilo Baca MRN# [id number]   YOB: 1950 Age: 68 year old      Date of visit: 1/08/19   PCP: Lyndsey Aponte    Chief Complaint   Patient presents with:  Arthritis: RA, patient states around [place] he had a flare in his shoulders so he did a prednisone taper and has been feeling much better. Otherwise no swelling and doing good.      Assessment and Plan     1.  Seropositive rheumatoid arthritis (, CCP >250): Dx'd 2013. Previously followed by [facility] and [facility].  Established with me on 3/20/2018. Previously on MTX (ineffective as monotherapy), Humira (effective, stopped because of insurance preference for IV).  When he first presented to me in March 2018 he was on Remicade 300 mg IV every 8 weeks.  Remicade was losing efficacy so the dose was increased, and then in May after speaking with him on the phone it was again increased.  When on Remicade 7.5 mg/kg IV every 6 weeks it was more helpful for his joints but he began having associated chest pressure.  Therefore, the biologic DMARD was changed to Simponi on 8/10/2018.  Note that he has a history of chronic thrombocytopenia, so preferentially avoiding oral DMARD such as leflunomide and methotrexate.  He improved with Simponi and was tolerating it as of 11/6/2018.  Note that HCQ was started after determined to be okay to do so by Dr. Carlos Eduardo Stein at [facility], but he had an associated facial rash and it was therefore stopped.  Doing well today with one flare of arthritis in his shoulders that resolved with a prednisone taper.  If needed in the future would consider changing to Orencia.    - Continue Simponi infusions  - PRN RA flare: Prednisone 20mg daily x5days, then 15mg daily x5days, then 10mg daily x5days, then 5mg daily x5days, then stop. - Labs in 3 months: CBC, Creatinine, Hepatic Panel, ESR, CRP               Rapid 3, cumulative scores                      01/08/2019: 0.5  (Simponi IV)                      11/06/2018: 9.3 (Simponi IV x2mo)    2. Elevated blood pressure:  Patient to follow up with Primary Care provider regarding elevated blood pressure.     3.  Vaccinations: Vaccinations reviewed with Mr. Baca.  Risks and benefits of vaccinations were discussed.   CDC stance on shingrix when on moderate to high immunosuppression reviewed.     - Influenza: encouraged yearly vaccination  - [id number]: up to date  - [id number]: up to date  - Shingrix: to receive at his pharmacy; Avery Cuellar to call the Shingrix Rx to the [facility]    Mr. Baca verbalized agreement with and understanding of the rational for the diagnosis and treatment plan.  All questions were answered to best of my ability and the patient's satisfaction. Mr. Baca was advised to contact the clinic with any questions that may arise after the clinic visit.      Thank you for involving me in the care of the patient    Return to clinic: 3 months    HPI   Camilo Baca is a 68 year old male with a past medical history significant for hypertension, hyperlipidemia, GERD, hypothyroidism, history of DVT, factor V Leiden mutation, history of thrombocytopenia, gout, and rheumatoid arthritis who presents for follow-up of rheumatoid arthritis.     Since last seen: HCQ started but he had a facial rash associated with it and therefore it was stopped.  Simponi continued.  Bilateral shoulder pain around [place] so he took prednisone 20mg daily x5days, then 15mg daily x5days, then 10mg daily x5days, then 5mg daily x5days, then stopped with resolution of the shoulder pain.      Today, he reports doing great.  No joint pain.  No morning stiffness.  No gelling phenomenon.  Able to do all of his daily activities.  Has additional prednisone at home in case it is needed.  Tolerating Simponi infusions well.    Denies fevers, chills, nausea, vomiting, constipation, diarrhea. No abdominal pain. No chest pain/pressure, palpitations,  or shortness of breath. No LE swelling. No neck pain. No oral or nasal sores.  No rash. No sicca symptoms.      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  CV: No chest pain, pressure, palpitations, or dyspnea on exertion.  PULM: No SOB, wheeze, cough.  GI: No nausea, vomiting, constipation, diarrhea. No blood in stool. No abdominal pain.  : No blood in urine.  MSK: See HPI.  NEURO: No numbness, tingling, or weakness.  EXT: No LE swelling  PSYCH: Negative    Active Problem List     Patient Active Problem List   Diagnosis     Lumbago     NONSPECIFIC MEDICAL HISTORY     HYPERLIPIDEMIA LDL GOAL <130     History of adenomatous polyp of colon     Idiopathic chronic gout without tophus, unspecified site     Advanced directives, counseling/discussion     High risk medication use     Thrombocytopenia (H)     Gastroesophageal reflux disease without esophagitis     Factor 5 Leiden mutation, heterozygous (H)     Personal history of venous thrombosis and embolism     Personal history of DVT (deep vein thrombosis)     Hypothyroidism due to acquired atrophy of thyroid     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)     Secondary hypertension with goal blood pressure less than 140/90     Rheumatoid arthritis involving multiple sites with positive rheumatoid factor (H)     Past Medical History     Past Medical History:   Diagnosis Date     Alopecia, unspecified      Closed fracture of unspecified part of humerus 1974    Arm fracture / left wrist     Esophageal reflux 2/6/2015     Factor 5 Leiden mutation, heterozygous (H)      Gout 3/28/2011     Herpes zoster without mention of complication 1965    Herpes zoster     Measles without mention of complication     Measles     Personal history  of DVT (deep vein thrombosis) 4/9/2015     Personal history of venous thrombosis and embolism 4/9/2015     Pulmonary embolism (H) 12/1996    post appendectomy     RA (rheumatoid arthritis) (H) 3/11/2013     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     Rheumatoid arthritis(714.0)     Beginning symptoms     Secondary hypertension with goal blood pressure less than 140/90 11/15/2016     Thrombocytopenia, unspecified (H) 1/29/2014     Varicella without mention of complication     Chickenpox     Past Surgical History     Past Surgical History:   Procedure Laterality Date     C APPENDECTOMY  1996     COLONOSCOPY  11/15/2010    COLONOSCOPY performed by DARIUS MESA at  GI     COLONOSCOPY N/A 11/2/2015    Procedure: COLONOSCOPY;  Surgeon: Bubba Odom MD;  Location:  GI     ESOPHAGOSCOPY, GASTROSCOPY, DUODENOSCOPY (EGD), COMBINED N/A 10/24/2018    Procedure: Esophagogastroduodenoscopy Multiple Biopsies by Biopsy;  Surgeon: Matteo Johnson DO;  Location:  GI     HC COLONOSCOPY W BIOPSY  08/10/05     HC REPAIR ING HERNIA,5+Y/O,REDUCIBL  1960     HERNIORRHAPHY UMBILICAL N/A 4/17/2015    Procedure: HERNIORRHAPHY UMBILICAL;  Surgeon: Rayray Avila MD;  Location: [facility] OR     LAPAROSCOPIC HERNIORRHAPHY INGUINAL BILATERAL Bilateral 4/17/2015    Procedure: LAPAROSCOPIC HERNIORRHAPHY INGUINAL BILATERAL;  Surgeon: Rayray Avila MD;  Location: [facility] OR     Allergy     Allergies   Allergen Reactions     Lisinopril Cough     Current Medication List     Current Outpatient Medications   Medication Sig     allopurinol (ZYLOPRIM) 300 MG tablet TAKE ONE TABLET BY MOUTH EVERY DAY     atorvastatin (LIPITOR) 80 MG tablet Take 1 tablet (80 mg) by mouth daily     capsaicin (ZOSTRIX) 0.075 % topical cream Apply  topically 3 times daily.     cetirizine (ZYRTEC) 10 MG tablet Take 1 tablet (10 mg) by mouth every evening     cholestyramine light (QUESTRAN)  4 GM Packet DISSOLVE ONE PACKET IN LIQUID AS DIRECTED TWO TIMES A DAY AND DRINK     Cyanocobalamin (B-12) 100 MCG TABS      diclofenac (VOLTAREN) 1 % GEL Apply  2 grams to shoulders three times daily using enclosed dosing card.     levothyroxine (SYNTHROID/LEVOTHROID) 25 MCG tablet TAKE ONE TABLET BY MOUTH EVERY DAY     losartan (COZAAR) 50 MG tablet Take 1 tablet (50 mg) by mouth daily     omeprazole (PRILOSEC) 40 MG capsule TAKE ONE CAPSULE BY MOUTH EVERY DAY     predniSONE (DELTASONE) 5 MG tablet PRN RA flare: Prednisone 20mg daily x5days, then 15mg daily x5days, then 10mg daily x5days, then 5mg daily x5days, then stop..     Ascorbic Acid (VITAMIN C PO) Take by mouth daily     atorvastatin (LIPITOR) 80 MG tablet TAKE ONE TABLET BY MOUTH EVERY DAY (Patient not taking: Reported on 1/8/2019)     BABY ASPIRIN OR daily     sucralfate (CARAFATE) 1 GM tablet Take 1 tablet (1 g) by mouth 4 times daily (Patient not taking: Reported on 1/8/2019)     triamcinolone (KENALOG) 0.1 % cream Locally bid (Patient not taking: Reported on 1/8/2019)     triamcinolone (KENALOG) 0.5 % external cream Apply sparingly to affected area three times daily as needed, no more than 7 days in a row. (Patient not taking: Reported on 1/8/2019)     No current facility-administered medications for this visit.          Social History   See HPI    Family History     Family History   Problem Relation Age of Onset     Arthritis Mother      Cardiovascular Mother      Depression Mother      Gastrointestinal Disease Mother         IBS     Hypertension Mother      Circulatory Mother         Aortal aneurysm     Osteoporosis Mother      Allergies Father         Cortisone     Cardiovascular Father      Circulatory Father      Diabetes Father      Hypertension Father      Lipids Father      Alcohol/Drug Sister         Penicillin     Alzheimer Disease Paternal Grandfather      Blood Disease Paternal Grandmother         Clotting problems     Blood Disease Son      "    Factor 5     Hypertension Maternal Aunt      Cerebrovascular Disease Maternal Grandmother        Physical Exam     Temp Readings from Last 3 Encounters:   12/04/18 97.7  F (36.5  C) (Temporal)   11/16/18 97.8  F (36.6  C) (Oral)   10/24/18 98  F (36.7  C) (Oral)     BP Readings from Last 5 Encounters:   01/08/19 (!) 155/93   12/04/18 134/76   11/16/18 (!) 147/93   11/06/18 (!) 151/93   10/24/18 (!) 126/93     Pulse Readings from Last 1 Encounters:   01/08/19 81     Resp Readings from Last 1 Encounters:   12/04/18 16     Estimated body mass index is 26.96 kg/m  as calculated from the following:    Height as of 8/10/18: 1.816 m (5' 11.5\").    Weight as of this encounter: 88.9 kg (196 lb).    GEN: NAD  HEENT: MMM. No oral lesions.  Anicteric, noninjected sclera  CV: S1, S2. RRR. No m/r/g.  PULM: CTA bilaterally. No w/c.  MSK: MCPs, PIPs, wrists, elbows, shoulders, knees, and ankles without swelling or tenderness to palpation.  Negative MCP and MTP squeeze.   Hips nontender to palpation.   NEURO: UE and LE strengths 5/5 and equal bilaterally.   SKIN: No rash  EXT: No LE edema  PSYCH: Alert. Appropriate.    Labs / Imaging (select studies)   RF/CCP  Recent Labs   Lab Test 04/24/13  0923   CCPABY >250 Strongly Positive*   *     CBC  Recent Labs   Lab Test 11/06/18  0913 06/26/18  0955 04/05/18  2355   WBC 7.1 4.3 7.7   RBC 3.90* 4.02* 4.09*   HGB 13.2* 13.6 13.3   HCT 39.1* 39.9* 40.6    99 99   RDW 12.4 12.8 13.5   * 121* 122*   MCH 33.8* 33.8* 32.5   MCHC 33.8 34.1 32.8   NEUTROPHIL 84.5 44.2 74.5   LYMPH 10.9 39.7 17.1   MONOCYTE 4.2 12.9 7.0   EOSINOPHIL 0.3 3.0 0.8   BASOPHIL 0.1 0.2 0.3   ANEU 6.0 1.9 5.7   ALYM 0.8 1.7 1.3   KIESHA 0.3 0.6 0.5   AEOS 0.0 0.1 0.1   ABAS 0.0 0.0 0.0     CMP  Recent Labs   Lab Test 11/06/18  0913 06/26/18  0955 04/05/18  2305 03/20/18  1008 12/22/17  0735     --  140  --  141   POTASSIUM 4.2  --  3.4  --  4.2   CHLORIDE 105  --  104  --  107   CO2 33*  --  " 26  --  27   ANIONGAP 3  --  10  --  7   *  --  124*  --  121*   BUN 15  --  32*  --  17   CR 0.83 0.85 0.71 0.82 0.85   GFRESTIMATED >90 >90 >90 >90 >90   GFRESTBLACK >90 >90 >90 >90 >90   ANDREZ 8.8  --  7.9*  --  8.9   BILITOTAL 0.6 1.2  --  0.9 1.5*   ALBUMIN 3.1* 3.4  --  3.1* 3.7   PROTTOTAL 7.4 8.1  --  7.9 8.4   ALKPHOS 60 68  --  68 90   AST 41 40  --  22 25   ALT 82* 50  --  46 49     Calcium/VitaminD  Recent Labs   Lab Test 11/06/18  0913 06/26/18  0955 04/05/18  2355 12/22/17  0735   ANDREZ 8.8  --  7.9* 8.9   VITDT  --  25  --   --      ESR/CRP  Recent Labs   Lab Test 11/06/18  0913 06/26/18  0955 12/22/17  0735  04/24/13  0923   SED 18 23* 12   < >  --    CRP 4.4 <2.9  --   --  7.1    < > = values in this interval not displayed.     Lipid Panel  Recent Labs   Lab Test 12/22/17  0735 12/01/16  0822 03/28/16  0824 12/29/15  0756  10/04/13  0819 06/03/13  0837  02/10/11  0830   CHOL 134  --   --  173  --  143 208*   < > 175   TRIG 149  --   --  211*  --  92 72   < > 71   HDL 46  --   --  56  --  49 77   < > 46   LDL 58 98 84 75   < > 75 117   < > 114   VLDL  --   --   --   --   --  18 14  --  14   CHOLHDLRATIO  --   --   --   --   --  3.0 3.0  --  4.0   NHDL 88  --   --  117  --   --   --   --   --     < > = values in this interval not displayed.     Hepatitis B  Recent Labs   Lab Test 03/20/18  1008 02/05/14  1104   HBCAB Nonreactive  --    HEPBANG Nonreactive Negative     Hepatitis C  Recent Labs   Lab Test 02/05/14  1104 06/03/13  0837   HCVAB Negative Negative     Tuberculosis Screening  Recent Labs   Lab Test 03/20/18  1008 02/05/14  1104   TBRSLT Negative Negative   TBAGN 0.14 0.01     Immunization History     Immunization History   Administered Date(s) Administered     Influenza (High Dose) 3 valent vaccine 10/06/2017, 10/03/2018     Influenza (IIV3) PF 10/01/2009, 10/27/2010, 10/11/2011     Influenza Vaccine IM 3yrs+ 4 Valent IIV4 10/09/2013, 10/23/2014, 09/18/2015, 11/15/2016     Pneumo Conj  13-V (2010&after) 10/06/2017     Pneumococcal 23 valent 10/23/2014     TD (ADULT, 7+) 09/05/2001     TDAP Vaccine (Boostrix) 10/18/2012     Zoster vaccine, live 10/23/2014          Chart documentation done in part with Dragon Voice recognition Software. Although reviewed after completion, some word and grammatical error may remain.    Fabricio Gomes MD

## 2019-01-11 ENCOUNTER — INFUSION THERAPY VISIT (OUTPATIENT)
Dept: INFUSION THERAPY | Facility: CLINIC | Age: 69
End: 2019-01-11
Attending: INTERNAL MEDICINE
Payer: MEDICARE

## 2019-01-11 VITALS
OXYGEN SATURATION: 97 % | RESPIRATION RATE: 16 BRPM | TEMPERATURE: 97.4 F | HEART RATE: 56 BPM | HEIGHT: 72 IN | SYSTOLIC BLOOD PRESSURE: 164 MMHG | WEIGHT: 197.7 LBS | DIASTOLIC BLOOD PRESSURE: 95 MMHG | BODY MASS INDEX: 26.78 KG/M2

## 2019-01-11 DIAGNOSIS — M05.79 RHEUMATOID ARTHRITIS INVOLVING MULTIPLE SITES WITH POSITIVE RHEUMATOID FACTOR (H): Primary | ICD-10-CM

## 2019-01-11 PROCEDURE — 96413 CHEMO IV INFUSION 1 HR: CPT

## 2019-01-11 PROCEDURE — 96365 THER/PROPH/DIAG IV INF INIT: CPT

## 2019-01-11 PROCEDURE — 25000128 H RX IP 250 OP 636: Performed by: INTERNAL MEDICINE

## 2019-01-11 RX ADMIN — GOLIMUMAB 179 MG: 50 SOLUTION INTRAVENOUS at 09:17

## 2019-01-11 ASSESSMENT — MIFFLIN-ST. JEOR: SCORE: 1706.82

## 2019-01-11 ASSESSMENT — PAIN SCALES - GENERAL: PAINLEVEL: NO PAIN (0)

## 2019-01-11 NOTE — PROGRESS NOTES
Infusion Nursing Note:  Camilo Baca presents today for Evelyn Myers.    Patient seen by provider today: No   present during visit today: Not Applicable.    Note: N/A.    Intravenous Access:  Peripheral IV placed.    Treatment Conditions:  Results reviewed, labs MET treatment parameters, ok to proceed with treatment.  Biological Infusion Checklist:    ~~~ NOTE: If the patient answers yes to any of the questions below, hold the infusion and contact ordering provider or on-call provider.    1. Have you recently had an elevated temperature, fever, chills, productive cough, coughing for 3 weeks or longer or hemoptysis,  abnormal vital signs, night sweats,  chest pain or have you noticed a decrease in your appetite, unexplained weight loss or fatigue? No  2. Do you have any open wounds or new incisions? No  3. Do you have any recent or upcoming hospitalizations, surgeries or dental procedures? No  4. Do you currently have or recently have had any signs of illness or infection or are you on any antibiotics? No  5. Have you had any new, sudden or worsening abdominal pain? No  6. Have you or anyone in your household received a live vaccination in the past 4 weeks? Please note:  No live vaccines while on biologic/chemotherapy until 6 months after the last treatment.  Patient can receive the flu vaccine (shot only) and the pneumovax.  It is optimal for the patient to get these vaccines mid cycle, but they can be given at any time as long as it is not on the day of the infusion. No  7. Have you recently been diagnosed with any new nervous system diseases (ie. Multiple sclerosis, Guillain Zenda, seizures, neurological changes) or cancer diagnosis? Are you on any form of radiation or chemotherapy? No  8. Are you pregnant or breast feeding or do you have plans of pregnancy in the future? No  9. Have you been having any signs of worsening depression or suicidal ideations?  (benlysta only) No  10. Have there been any  other new onset medical symptoms? No        Post Infusion Assessment:  Patient tolerated infusion without incident.  Patient observed for 15 minutes post infusion per protocol.  Blood return noted pre and post infusion.  Site patent and intact, free from redness, edema or discomfort.  Access discontinued per protocol.  Biologic Infusion Post Education: Call the triage nurse at your clinic or seek medical attention if you have chills and/or temperature greater than or equal to 100.5, uncontrolled nausea/vomiting, diarrhea, constipation, dizziness, shortness of breath, chest pain, heart palpitations, weakness or any other new or concerning symptoms, questions or concerns.  You cannot have any live virus vaccines prior to or during treatment or up to 6 months post infusion.  If you have an upcoming surgery, medical procedure or dental procedure during treatment, this should be discussed with your ordering physician and your surgeon/dentist.  If you are having any concerning symptom, if you are unsure if you should get your next infusion or wish to speak to a provider before your next infusion, please call your care coordinator or triage nurse at your clinic to notify them so we can adequately serve you.    Discharge Plan:   Discharge instructions reviewed with: Patient.  Patient and/or family verbalized understanding of discharge instructions and all questions answered.  Copy of AVS reviewed with patient and/or family.  Patient will return 8 weeks for next appointment.  Patient discharged in stable condition accompanied by: self.  Departure Mode: Ambulatory.    Charo Muñiz RN

## 2019-01-28 ENCOUNTER — TELEPHONE (OUTPATIENT)
Dept: RHEUMATOLOGY | Facility: CLINIC | Age: 69
End: 2019-01-28

## 2019-01-28 DIAGNOSIS — E78.5 HYPERLIPIDEMIA LDL GOAL <130: ICD-10-CM

## 2019-01-28 RX ORDER — CHOLESTYRAMINE LIGHT 4 G/5.7G
POWDER, FOR SUSPENSION ORAL
Qty: 60 PACKET | Refills: 3 | Status: SHIPPED | OUTPATIENT
Start: 2019-01-28 | End: 2019-05-31

## 2019-01-28 NOTE — TELEPHONE ENCOUNTER
Reason for Call:  Other call back    Detailed comments: Patient is currently getting an infusion to treat his rheumatoid arthritis. This is the 2nd time that after the about 2 weeks after the infusion, patient breaks out on cheeks and nose. Patient doesn't have another infusion scheduled until February or March. Patient is wondering if another treatment should be tried.     Phone Number Patient can be reached at: Home number on file [phone] (home)    Best Time: Anytime    Can we leave a detailed message on this number? YES    Call taken on 1/28/2019 at 11:39 AM by Charo Munson

## 2019-01-28 NOTE — TELEPHONE ENCOUNTER
Prevalite 4 G packet       Last Written Prescription Date:  10/23/18  Last Fill Quantity: 180,   # refills: 0  Last Office Visit: 10/3/18  Future Office visit:    Next 5 appointments (look out 90 days)    Apr 23, 2019  1:20 PM CDT  Return Visit with Fabricio Gomes MD  [facility] [address] [place] [id number]  [phone]           Routing refill request to provider for review/approval because:  Drug not on the [facility], [facility] or  Health refill protocol or controlled substance

## 2019-01-30 NOTE — TELEPHONE ENCOUNTER
Rheumatology Telephone Note:    I called and spoke with Mr. Baca.    Due to the Simponi related infusion reaction of breaking out with a rash on his cheeks and nose, will discontinue Simponi.  Change treatment to Orencia.  We discussed Orencia in detail.  He will call the infusion center to schedule the Orencia infusions.  He verbalized understanding that the first dose of Orencia will be on the day that he would have had the next Simponi infusion, which is in early March.    - Stop Simponi IV  - Start Orencia IV    # Abatacept (Orencia) Risks and Benefits: The risks and benefits of abatacept were discussed in detail and the patient verbalized understanding.  The risks discussed include, but are not limited to, the risk for hypersensitivity, anaphylaxis, anaphylactoid reactions, an increased risk for serious infections leading to hospitalization or death, and an increased risk for more frequent respiratory adverse events in patients with COPD.  If subcutaneous injections are used, then injection site reactions and/or pain may occur at the site of injection.  The most common side effects were discussed that include headache, upper respiratory tract infections, nasopharyngitis, and nausea.  It was discussed that the medication would need to be discontinued if a serious infection develops.  It was discussed that live vaccinations should not be received while using abatacept or within 30 days prior to starting abatacept.  I encouraged reviewing the package insert and asking any questions about the medication.      All questions were answered and he thanked me for the call.     Fabricio Gomes MD  1/30/2019 5:06 PM

## 2019-02-21 ENCOUNTER — ALLIED HEALTH/NURSE VISIT (OUTPATIENT)
Dept: FAMILY MEDICINE | Facility: OTHER | Age: 69
End: 2019-02-21
Payer: COMMERCIAL

## 2019-02-21 VITALS — SYSTOLIC BLOOD PRESSURE: 154 MMHG | DIASTOLIC BLOOD PRESSURE: 90 MMHG

## 2019-02-21 DIAGNOSIS — I15.9 SECONDARY HYPERTENSION WITH GOAL BLOOD PRESSURE LESS THAN 140/90: Primary | ICD-10-CM

## 2019-02-21 PROCEDURE — 99207 ZZC NO CHARGE NURSE ONLY: CPT | Performed by: NURSE PRACTITIONER

## 2019-02-21 NOTE — Clinical Note
Routing message to PCP for review -BP checked at pharmacy and noted to be above goal. Recommended patient follow-up within 1 month at pharmacy.

## 2019-02-21 NOTE — PROGRESS NOTES
Camilo Baca is enrolled/participating in the retail pharmacy Blood Pressure Goals Achievement Program (BPGAP).  Camilo Baca was evaluated at [facility] on February 21, 2019 at which time his blood pressure was:    BP Readings from Last 3 Encounters:   02/21/19 154/90   01/11/19 (!) 164/95   01/08/19 138/84     Reviewed lifestyle modifications for blood pressure control and reduction: including making healthy food choices, managing weight, getting regular exercise, smoking cessation, reducing alcohol consumption, monitoring blood pressure regularly.     Camilo Baca is not experiencing symptoms.    Follow-Up: BP is not at goal of < 140/90mmHg (patient 18+ years of age with or without diabetes), Recommended follow-up in 1 month at the pharmacy.     Routing to PCP as an FYI.    Recommendation to Provider: none    Camilo Baca was evaluated for enrollment into the PGEN study today.    PLEASE INITIATE ENROLLMENT DISCUSSION WITH HTN PTS  1) Between 30-80 years old                                                                                                               2) BMI between 19-50                                                                                                        3) BP ?140/90 AND ?170/110 patients aged 30-59         BP ?150/90 AND ?170/110 non-diabetic patients aged 60-80       BP ?140/90 AND ?170/110 diabetic patients aged 60-80  4) Additional requirements for uncontrolled HTN patients:        Pt on only 1 class of medication  5) EXCLUDE patient if confirmation of:                  ? Cardiac disease                  ? Chronic Kidney Disease                  ? Pregnancy/Breastfeeding                  ? Secondary Hypertension/Pre-eclampsia                                 ? Vascular disease    Patient eligible for enrollment:  No  Patient interested in enrollment:  Unknown    This note completed by: Matteo Jeffrey [facility], [facility] [phone]

## 2019-03-08 ENCOUNTER — INFUSION THERAPY VISIT (OUTPATIENT)
Dept: INFUSION THERAPY | Facility: CLINIC | Age: 69
End: 2019-03-08
Attending: INTERNAL MEDICINE
Payer: MEDICARE

## 2019-03-08 VITALS
HEART RATE: 62 BPM | WEIGHT: 199.4 LBS | OXYGEN SATURATION: 97 % | DIASTOLIC BLOOD PRESSURE: 88 MMHG | BODY MASS INDEX: 27.42 KG/M2 | RESPIRATION RATE: 18 BRPM | TEMPERATURE: 97.4 F | SYSTOLIC BLOOD PRESSURE: 150 MMHG

## 2019-03-08 DIAGNOSIS — M05.79 RHEUMATOID ARTHRITIS INVOLVING MULTIPLE SITES WITH POSITIVE RHEUMATOID FACTOR (H): Primary | ICD-10-CM

## 2019-03-08 PROCEDURE — 25800030 ZZH RX IP 258 OP 636: Performed by: INTERNAL MEDICINE

## 2019-03-08 PROCEDURE — 96365 THER/PROPH/DIAG IV INF INIT: CPT

## 2019-03-08 PROCEDURE — 25000128 H RX IP 250 OP 636: Performed by: INTERNAL MEDICINE

## 2019-03-08 RX ADMIN — SODIUM CHLORIDE 750 MG: 9 INJECTION, SOLUTION INTRAVENOUS at 08:29

## 2019-03-08 RX ADMIN — SODIUM CHLORIDE 250 ML: 9 INJECTION, SOLUTION INTRAVENOUS at 08:21

## 2019-03-08 ASSESSMENT — PAIN SCALES - GENERAL: PAINLEVEL: NO PAIN (0)

## 2019-03-08 NOTE — PROGRESS NOTES
Infusion Nursing Note:  Camilo ZIMMERMAN Phuong presents today for 1st dose of Orencia.    Patient seen by provider today: No   present during visit today: Not Applicable.    Note: N/A.    Intravenous Access:  Peripheral IV placed.    Treatment Conditions:  Results reviewed, labs MET treatment parameters, ok to proceed with treatment.  Biological Infusion Checklist:    ~~~ NOTE: If the patient answers yes to any of the questions below, hold the infusion and contact ordering provider or on-call provider.    1. Have you recently had an elevated temperature, fever, chills, productive cough, coughing for 3 weeks or longer or hemoptysis,  abnormal vital signs, night sweats,  chest pain or have you noticed a decrease in your appetite, unexplained weight loss or fatigue? No  2. Do you have any open wounds or new incisions? No  3. Do you have any recent or upcoming hospitalizations, surgeries or dental procedures? No  4. Do you currently have or recently have had any signs of illness or infection or are you on any antibiotics? No  5. Have you had any new, sudden or worsening abdominal pain? No  6. Have you or anyone in your household received a live vaccination in the past 4 weeks? Please note:  No live vaccines while on biologic/chemotherapy until 6 months after the last treatment.  Patient can receive the flu vaccine (shot only) and the pneumovax.  It is optimal for the patient to get these vaccines mid cycle, but they can be given at any time as long as it is not on the day of the infusion. No  7. Have you recently been diagnosed with any new nervous system diseases (ie. Multiple sclerosis, Guillain [place], seizures, neurological changes) or cancer diagnosis? Are you on any form of radiation or chemotherapy? No  8. Are you pregnant or breast feeding or do you have plans of pregnancy in the future? No  9. Have you been having any signs of worsening depression or suicidal ideations?  (benlysta only) No  10. Have there  been any other new onset medical symptoms? No        Post Infusion Assessment:  Patient tolerated infusion without incident.  Patient observed for 15 minutes post orencia per protocol.  Blood return noted pre and post infusion.  Site patent and intact, free from redness, edema or discomfort.  No evidence of extravasations.  Access discontinued per protocol.  Biologic Infusion Post Education: Call the triage nurse at your clinic or seek medical attention if you have chills and/or temperature greater than or equal to 100.5, uncontrolled nausea/vomiting, diarrhea, constipation, dizziness, shortness of breath, chest pain, heart palpitations, weakness or any other new or concerning symptoms, questions or concerns.  You cannot have any live virus vaccines prior to or during treatment or up to 6 months post infusion.  If you have an upcoming surgery, medical procedure or dental procedure during treatment, this should be discussed with your ordering physician and your surgeon/dentist.  If you are having any concerning symptom, if you are unsure if you should get your next infusion or wish to speak to a provider before your next infusion, please call your care coordinator or triage nurse at your clinic to notify them so we can adequately serve you.    Discharge Plan:   Discharge instructions reviewed with: Patient.  Patient and/or family verbalized understanding of discharge instructions and all questions answered.  Copy of AVS reviewed with patient and/or family.  Patient will return in 2 weeks for next appointment.  Patient discharged in stable condition accompanied by: self.  Departure Mode: Ambulatory.    Charo Muñiz RN

## 2019-03-22 ENCOUNTER — INFUSION THERAPY VISIT (OUTPATIENT)
Dept: INFUSION THERAPY | Facility: CLINIC | Age: 69
End: 2019-03-22
Attending: INTERNAL MEDICINE
Payer: MEDICARE

## 2019-03-22 VITALS
OXYGEN SATURATION: 97 % | SYSTOLIC BLOOD PRESSURE: 154 MMHG | TEMPERATURE: 96.5 F | BODY MASS INDEX: 27.48 KG/M2 | WEIGHT: 199.8 LBS | HEART RATE: 65 BPM | DIASTOLIC BLOOD PRESSURE: 94 MMHG

## 2019-03-22 DIAGNOSIS — M05.79 RHEUMATOID ARTHRITIS INVOLVING MULTIPLE SITES WITH POSITIVE RHEUMATOID FACTOR (H): Primary | ICD-10-CM

## 2019-03-22 PROCEDURE — 96365 THER/PROPH/DIAG IV INF INIT: CPT

## 2019-03-22 PROCEDURE — 25800030 ZZH RX IP 258 OP 636: Performed by: INTERNAL MEDICINE

## 2019-03-22 PROCEDURE — 25000128 H RX IP 250 OP 636: Performed by: INTERNAL MEDICINE

## 2019-03-22 RX ADMIN — SODIUM CHLORIDE 750 MG: 9 INJECTION, SOLUTION INTRAVENOUS at 09:12

## 2019-03-22 RX ADMIN — SODIUM CHLORIDE 250 ML: 9 INJECTION, SOLUTION INTRAVENOUS at 08:51

## 2019-03-22 ASSESSMENT — PAIN SCALES - GENERAL: PAINLEVEL: MILD PAIN (3)

## 2019-03-22 NOTE — PROGRESS NOTES
Infusion Nursing Note:  Camilo Baca presents today for Orencia.    Patient seen by provider today: No   present during visit today: Not Applicable.    Note: N/A.    Intravenous Access:  Peripheral IV placed.    Treatment Conditions:  Biological Infusion Checklist:    ~~~ NOTE: If the patient answers yes to any of the questions below, hold the infusion and contact ordering provider or on-call provider.    1. Have you recently had an elevated temperature, fever, chills, productive cough, coughing for 3 weeks or longer or hemoptysis,  abnormal vital signs, night sweats,  chest pain or have you noticed a decrease in your appetite, unexplained weight loss or fatigue? No  2. Do you have any open wounds or new incisions? No  3. Do you have any recent or upcoming hospitalizations, surgeries or dental procedures? No  4. Do you currently have or recently have had any signs of illness or infection or are you on any antibiotics? No  5. Have you had any new, sudden or worsening abdominal pain? No  6. Have you or anyone in your household received a live vaccination in the past 4 weeks? Please note:  No live vaccines while on biologic/chemotherapy until 6 months after the last treatment.  Patient can receive the flu vaccine (shot only) and the pneumovax.  It is optimal for the patient to get these vaccines mid cycle, but they can be given at any time as long as it is not on the day of the infusion. No  7. Have you recently been diagnosed with any new nervous system diseases (ie. Multiple sclerosis, Guillain [place], seizures, neurological changes) or cancer diagnosis? Are you on any form of radiation or chemotherapy? No  8. Are you pregnant or breast feeding or do you have plans of pregnancy in the future? No  9. Have you been having any signs of worsening depression or suicidal ideations?  (benlysta only) No  10. Have there been any other new onset medical symptoms? No        Post Infusion Assessment:  Patient  tolerated infusion without incident.  Patient observed for 15 minutes post Orencia per protocol.  Site patent and intact, free from redness, edema or discomfort.  No evidence of extravasations.  Access discontinued per protocol.  Biologic Infusion Post Education: Call the triage nurse at your clinic or seek medical attention if you have chills and/or temperature greater than or equal to 100.5, uncontrolled nausea/vomiting, diarrhea, constipation, dizziness, shortness of breath, chest pain, heart palpitations, weakness or any other new or concerning symptoms, questions or concerns.  You cannot have any live virus vaccines prior to or during treatment or up to 6 months post infusion.  If you have an upcoming surgery, medical procedure or dental procedure during treatment, this should be discussed with your ordering physician and your surgeon/dentist.  If you are having any concerning symptom, if you are unsure if you should get your next infusion or wish to speak to a provider before your next infusion, please call your care coordinator or triage nurse at your clinic to notify them so we can adequately serve you.       Discharge Plan:   Discharge instructions reviewed with: Patient.  Patient discharged in stable condition accompanied by: self.  Departure Mode: Ambulatory.    Emy Figueredo RN

## 2019-04-02 DIAGNOSIS — I10 BENIGN ESSENTIAL HYPERTENSION: ICD-10-CM

## 2019-04-02 NOTE — TELEPHONE ENCOUNTER
"Requested Prescriptions   Pending Prescriptions Disp Refills     losartan (COZAAR) 50 MG tablet [Pharmacy Med Name: LOSARTAN POTASSIUM 50MG TABS] 90 tablet 1    Last Written Prescription Date:  10/12/18  Last Fill Quantity: 90,  # refills: 1   Last office visit: 12/4/2018 with prescribing provider:  10/3/18   Future Office Visit:   Next 5 appointments (look out 90 days)    Apr 23, 2019  1:20 PM CDT  Return Visit with Fabricio Gomes MD  [facility] [address] [place] [id number]  [phone]        Sig: TAKE ONE TABLET BY MOUTH EVERY DAY    Angiotensin-II Receptors Failed - 4/2/2019  9:14 AM       Failed - Blood pressure under 140/90 in past 12 months    BP Readings from Last 3 Encounters:   03/22/19 (!) 154/94   03/08/19 150/88   02/21/19 154/90                Passed - Recent (12 mo) or future (30 days) visit within the authorizing provider's specialty    Patient had office visit in the last 12 months or has a visit in the next 30 days with authorizing provider or within the authorizing provider's specialty.  See \"Patient Info\" tab in inbasket, or \"Choose Columns\" in Meds & Orders section of the refill encounter.             Passed - Medication is active on med list       Passed - Patient is age 18 or older       Passed - Normal serum creatinine on file in past 12 months    Recent Labs   Lab Test 11/06/18  0913   CR 0.83            Passed - Normal serum potassium on file in past 12 months    Recent Labs   Lab Test 11/06/18  0913   POTASSIUM 4.2                    "

## 2019-04-02 NOTE — TELEPHONE ENCOUNTER
Routing refill request to provider for review/approval because:  Labs out of range:  BP    WILFRED BurrellN, RN  [facility]

## 2019-04-03 RX ORDER — LOSARTAN POTASSIUM 50 MG/1
TABLET ORAL
Qty: 90 TABLET | Refills: 0 | Status: SHIPPED | OUTPATIENT
Start: 2019-04-03 | End: 2019-07-01

## 2019-04-05 ENCOUNTER — INFUSION THERAPY VISIT (OUTPATIENT)
Dept: INFUSION THERAPY | Facility: CLINIC | Age: 69
End: 2019-04-05
Attending: INTERNAL MEDICINE
Payer: MEDICARE

## 2019-04-05 VITALS
DIASTOLIC BLOOD PRESSURE: 80 MMHG | TEMPERATURE: 97.4 F | HEART RATE: 63 BPM | WEIGHT: 200.8 LBS | RESPIRATION RATE: 16 BRPM | OXYGEN SATURATION: 98 % | BODY MASS INDEX: 27.62 KG/M2 | SYSTOLIC BLOOD PRESSURE: 141 MMHG

## 2019-04-05 DIAGNOSIS — M05.79 RHEUMATOID ARTHRITIS INVOLVING MULTIPLE SITES WITH POSITIVE RHEUMATOID FACTOR (H): Primary | ICD-10-CM

## 2019-04-05 PROCEDURE — 25800030 ZZH RX IP 258 OP 636: Performed by: INTERNAL MEDICINE

## 2019-04-05 PROCEDURE — 96365 THER/PROPH/DIAG IV INF INIT: CPT

## 2019-04-05 PROCEDURE — 25000128 H RX IP 250 OP 636: Performed by: INTERNAL MEDICINE

## 2019-04-05 RX ADMIN — SODIUM CHLORIDE 750 MG: 9 INJECTION, SOLUTION INTRAVENOUS at 08:32

## 2019-04-05 RX ADMIN — SODIUM CHLORIDE 250 ML: 9 INJECTION, SOLUTION INTRAVENOUS at 08:23

## 2019-04-05 ASSESSMENT — PAIN SCALES - GENERAL: PAINLEVEL: NO PAIN (0)

## 2019-04-05 NOTE — PROGRESS NOTES
Infusion Nursing Note:  Camilo Baca presents today for orencia.    Patient seen by provider today: No   present during visit today: Not Applicable.    Note: N/A.    Intravenous Access:  Peripheral IV placed.    Treatment Conditions:  Biological Infusion Checklist:    ~~~ NOTE: If the patient answers yes to any of the questions below, hold the infusion and contact ordering provider or on-call provider.    1. Have you recently had an elevated temperature, fever, chills, productive cough, coughing for 3 weeks or longer or hemoptysis,  abnormal vital signs, night sweats,  chest pain or have you noticed a decrease in your appetite, unexplained weight loss or fatigue? No  2. Do you have any open wounds or new incisions? No  3. Do you have any recent or upcoming hospitalizations, surgeries or dental procedures? No  4. Do you currently have or recently have had any signs of illness or infection or are you on any antibiotics? No  5. Have you had any new, sudden or worsening abdominal pain? No  6. Have you or anyone in your household received a live vaccination in the past 4 weeks? Please note:  No live vaccines while on biologic/chemotherapy until 6 months after the last treatment.  Patient can receive the flu vaccine (shot only) and the pneumovax.  It is optimal for the patient to get these vaccines mid cycle, but they can be given at any time as long as it is not on the day of the infusion. No  7. Have you recently been diagnosed with any new nervous system diseases (ie. Multiple sclerosis, Guillain [place], seizures, neurological changes) or cancer diagnosis? Are you on any form of radiation or chemotherapy? No  8. Are you pregnant or breast feeding or do you have plans of pregnancy in the future? No  9. Have you been having any signs of worsening depression or suicidal ideations?  (benlysta only) No  10. Have there been any other new onset medical symptoms? No        Post Infusion Assessment:  Patient  tolerated infusion without incident.  Patient observed for 15 minutes post Orencia per protocol.  Site patent and intact, free from redness, edema or discomfort.  No evidence of extravasations.  Access discontinued per protocol.  Biologic Infusion Post Education: Call the triage nurse at your clinic or seek medical attention if you have chills and/or temperature greater than or equal to 100.5, uncontrolled nausea/vomiting, diarrhea, constipation, dizziness, shortness of breath, chest pain, heart palpitations, weakness or any other new or concerning symptoms, questions or concerns.  You cannot have any live virus vaccines prior to or during treatment or up to 6 months post infusion.  If you have an upcoming surgery, medical procedure or dental procedure during treatment, this should be discussed with your ordering physician and your surgeon/dentist.  If you are having any concerning symptom, if you are unsure if you should get your next infusion or wish to speak to a provider before your next infusion, please call your care coordinator or triage nurse at your clinic to notify them so we can adequately serve you.       Discharge Plan:   Discharge instructions reviewed with: Patient.  Patient discharged in stable condition accompanied by: self.  Departure Mode: Ambulatory.    Emy Figueredo RN

## 2019-04-18 DIAGNOSIS — M05.712 RHEUMATOID ARTHRITIS INVOLVING BOTH SHOULDERS WITH POSITIVE RHEUMATOID FACTOR (H): ICD-10-CM

## 2019-04-18 DIAGNOSIS — M05.711 RHEUMATOID ARTHRITIS INVOLVING BOTH SHOULDERS WITH POSITIVE RHEUMATOID FACTOR (H): ICD-10-CM

## 2019-04-18 LAB
ALBUMIN SERPL-MCNC: 3.6 G/DL (ref 3.4–5)
ALP SERPL-CCNC: 63 U/L (ref 40–150)
ALT SERPL W P-5'-P-CCNC: 56 U/L (ref 0–70)
AST SERPL W P-5'-P-CCNC: 23 U/L (ref 0–45)
BASOPHILS # BLD AUTO: 0 10E9/L (ref 0–0.2)
BASOPHILS NFR BLD AUTO: 0.2 %
BILIRUB DIRECT SERPL-MCNC: 0.3 MG/DL (ref 0–0.2)
BILIRUB SERPL-MCNC: 1.5 MG/DL (ref 0.2–1.3)
CREAT SERPL-MCNC: 0.95 MG/DL (ref 0.66–1.25)
CRP SERPL-MCNC: <2.9 MG/L (ref 0–8)
DIFFERENTIAL METHOD BLD: ABNORMAL
EOSINOPHIL # BLD AUTO: 0.1 10E9/L (ref 0–0.7)
EOSINOPHIL NFR BLD AUTO: 1 %
ERYTHROCYTE [DISTWIDTH] IN BLOOD BY AUTOMATED COUNT: 12.9 % (ref 10–15)
ERYTHROCYTE [SEDIMENTATION RATE] IN BLOOD BY WESTERGREN METHOD: 10 MM/H (ref 0–20)
GFR SERPL CREATININE-BSD FRML MDRD: 83 ML/MIN/{1.73_M2}
HCT VFR BLD AUTO: 43.1 % (ref 40–53)
HGB BLD-MCNC: 14.4 G/DL (ref 13.3–17.7)
LYMPHOCYTES # BLD AUTO: 1.7 10E9/L (ref 0.8–5.3)
LYMPHOCYTES NFR BLD AUTO: 31.9 %
MCH RBC QN AUTO: 32.9 PG (ref 26.5–33)
MCHC RBC AUTO-ENTMCNC: 33.4 G/DL (ref 31.5–36.5)
MCV RBC AUTO: 98 FL (ref 78–100)
MONOCYTES # BLD AUTO: 0.5 10E9/L (ref 0–1.3)
MONOCYTES NFR BLD AUTO: 10 %
NEUTROPHILS # BLD AUTO: 3 10E9/L (ref 1.6–8.3)
NEUTROPHILS NFR BLD AUTO: 56.9 %
PLATELET # BLD AUTO: 103 10E9/L (ref 150–450)
PROT SERPL-MCNC: 7.3 G/DL (ref 6.8–8.8)
RBC # BLD AUTO: 4.38 10E12/L (ref 4.4–5.9)
WBC # BLD AUTO: 5.2 10E9/L (ref 4–11)

## 2019-04-18 PROCEDURE — 36415 COLL VENOUS BLD VENIPUNCTURE: CPT | Performed by: INTERNAL MEDICINE

## 2019-04-18 PROCEDURE — 85025 COMPLETE CBC W/AUTO DIFF WBC: CPT | Performed by: INTERNAL MEDICINE

## 2019-04-18 PROCEDURE — 82565 ASSAY OF CREATININE: CPT | Performed by: INTERNAL MEDICINE

## 2019-04-18 PROCEDURE — 80076 HEPATIC FUNCTION PANEL: CPT | Performed by: INTERNAL MEDICINE

## 2019-04-18 PROCEDURE — 85652 RBC SED RATE AUTOMATED: CPT | Performed by: INTERNAL MEDICINE

## 2019-04-18 PROCEDURE — 86140 C-REACTIVE PROTEIN: CPT | Performed by: INTERNAL MEDICINE

## 2019-04-23 ENCOUNTER — OFFICE VISIT (OUTPATIENT)
Dept: RHEUMATOLOGY | Facility: CLINIC | Age: 69
End: 2019-04-23
Payer: COMMERCIAL

## 2019-04-23 VITALS
HEART RATE: 78 BPM | WEIGHT: 202 LBS | SYSTOLIC BLOOD PRESSURE: 132 MMHG | HEIGHT: 72 IN | BODY MASS INDEX: 27.36 KG/M2 | OXYGEN SATURATION: 98 % | DIASTOLIC BLOOD PRESSURE: 78 MMHG

## 2019-04-23 DIAGNOSIS — M05.79 RHEUMATOID ARTHRITIS INVOLVING MULTIPLE SITES WITH POSITIVE RHEUMATOID FACTOR (H): Primary | ICD-10-CM

## 2019-04-23 PROCEDURE — 99214 OFFICE O/P EST MOD 30 MIN: CPT | Performed by: INTERNAL MEDICINE

## 2019-04-23 RX ORDER — PREDNISONE 5 MG/1
TABLET ORAL
Qty: 50 TABLET | Refills: 1 | Status: SHIPPED | OUTPATIENT
Start: 2019-04-23 | End: 2019-08-20

## 2019-04-23 ASSESSMENT — MIFFLIN-ST. JEOR: SCORE: 1726.33

## 2019-04-23 NOTE — PATIENT INSTRUCTIONS
Rheumatology    Dr. Fabricio Gomes         [facility]   (Monday)  [address]  [place] [address]       [facility]   (Tuesday)  [address]  [place] [address]    [facility]   (Wed., Thurs., and Friday)  [address] [place] [address]    Phone number: [phone]  Thank you for choosing [facility].  Jaquelin Muñiz CMA

## 2019-04-23 NOTE — NURSING NOTE
Camilo to follow up with Primary Care provider regarding elevated blood pressure.  Blood pressure rechecked after visit  132/78  Jaquelin Muñiz CMA Rheumatology  4/23/2019 2:03 PM                                   RAPID3 (0-30) Cumulative Score  0          RAPID3 Weighted Score (divide #4 by 3 and that is the weighted score)  0     Jaquelin Muñiz CMA Rheumatology  4/23/2019 1:27 PM

## 2019-04-23 NOTE — PROGRESS NOTES
Rheumatology Clinic Visit      Camilo Baca MRN# [id number]   YOB: 1950 Age: 68 year old      Date of visit: 4/23/19   PCP: Lyndsey Aponte    Chief Complaint   Patient presents with:  Arthritis: RA, patient is feeling pretty good. Had gotten shoulder pain before infusions, 2 days before next infusion he got shoulder pain back. Is done with prednisone.      Assessment and Plan     1.  Seropositive rheumatoid arthritis (, CCP >250): Dx'd 2013. Previously followed by [facility] and [facility].  Established with me on 3/20/2018. Previously on MTX (ineffective as monotherapy), Humira (effective, stopped because of insurance preference for IV), Simponi (rash on cheeks/nose after infusion), Remicade (lost efficacy), HCQ (facial rash). History of chronic thrombocytopenia, so preferentially avoiding oral DMARD such as leflunomide and methotrexate.  Currently on Orencia IV (first infusion 3/8/2019) and doing fairly well (occasional left shoulder pain that resolves after infusion and if he uses prednisone); anticipate more improvement with a longer duration of therapy.   - Continue Orencia 750mg IV every 4 weeks  - PRN RA flare: Prednisone 20mg daily x5days, then 15mg daily x5days, then 10mg daily x5days, then 5mg daily x5days, then stop. - Labs in 3 months: CBC, Creatinine, Hepatic Panel, ESR, CRP               Rapid 3, cumulative scores                      04/23/2019: 0    (Orencia 750mg IV)                       01/08/2019: 0.5 (Simponi IV)                      11/06/2018: 9.3 (Simponi IV x2mo)    2. Elevated blood pressure:  Camilo to follow up with Primary Care provider regarding elevated blood pressure.     3.  Vaccinations: Vaccinations reviewed with Mr. Baca.    - Influenza: encouraged yearly vaccination  - [id number]: up to date  - [id number]: up to date  - Shingrix: up to date    Mr. Baca verbalized agreement with and understanding of the rational for the diagnosis and treatment plan.  All questions  were answered to best of my ability and the patient's satisfaction. Mr. Baca was advised to contact the clinic with any questions that may arise after the clinic visit.      Thank you for involving me in the care of the patient    Return to clinic: 4 months    HPI   Camilo Baca is a 68 year old male with a past medical history significant for hypertension, hyperlipidemia, GERD, hypothyroidism, history of DVT, factor V Leiden mutation, history of thrombocytopenia, gout, and rheumatoid arthritis who presents for follow-up of rheumatoid arthritis.     Since last seen in clinic, I discussed with him over the phone on 1/28/2019 the facial rash is that he was having after Simponi infusions.  Infusions were changed to Orencia     Today, he reports having received 3 infusions.  He had left shoulder pain prior to the infusion and after the infusion in his shoulder pain was gone. He again had shoulder pain prior to his next infusion and use prednisone because it was severe; prednisone resolved shoulder pain at that time.  Right now he feels well.  No joint pain/stiffness/swelling.  Orencia infusions well tolerated; no adverse side effects.    Denies fevers, chills, nausea, vomiting, constipation, diarrhea. No abdominal pain. No chest pain/pressure, palpitations, or shortness of breath. No LE swelling. No neck pain. No oral or nasal sores.  No rash. No sicca symptoms.      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  CV: No chest pain, pressure, palpitations, or dyspnea on exertion.  PULM: No SOB, wheeze, cough.  GI: No nausea, vomiting, constipation, diarrhea. No blood in stool. No abdominal pain.  : No blood in urine.  MSK: See HPI.  NEURO: No numbness, tingling, or weakness.  EXT: No LE swelling  PSYCH:  Negative    Active Problem List     Patient Active Problem List   Diagnosis     Lumbago     NONSPECIFIC MEDICAL HISTORY     HYPERLIPIDEMIA LDL GOAL <130     History of adenomatous polyp of colon     Idiopathic chronic gout without tophus, unspecified site     Advanced directives, counseling/discussion     High risk medication use     Thrombocytopenia (H)     Gastroesophageal reflux disease without esophagitis     Factor 5 Leiden mutation, heterozygous (H)     Personal history of venous thrombosis and embolism     Personal history of DVT (deep vein thrombosis)     Hypothyroidism due to acquired atrophy of thyroid     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)     Secondary hypertension with goal blood pressure less than 140/90     Rheumatoid arthritis involving multiple sites with positive rheumatoid factor (H)     Past Medical History     Past Medical History:   Diagnosis Date     Alopecia, unspecified      Closed fracture of unspecified part of humerus 1974    Arm fracture / left wrist     Esophageal reflux 2/6/2015     Factor 5 Leiden mutation, heterozygous (H)      Gout 3/28/2011     Herpes zoster without mention of complication 1965    Herpes zoster     Measles without mention of complication     Measles     Personal history of DVT (deep vein thrombosis) 4/9/2015     Personal history of venous thrombosis and embolism 4/9/2015     Pulmonary embolism (H) 12/1996    post appendectomy     RA (rheumatoid arthritis) (H) 3/11/2013     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     Rheumatoid arthritis(714.0)     Beginning symptoms     Secondary hypertension with goal blood pressure less than 140/90 11/15/2016     Thrombocytopenia, unspecified (H) 1/29/2014     Varicella without mention of complication     Chickenpox     Past Surgical History     Past Surgical History:   Procedure Laterality Date     C APPENDECTOMY  1996      COLONOSCOPY  11/15/2010    COLONOSCOPY performed by DARIUS MESA at  GI     COLONOSCOPY N/A 11/2/2015    Procedure: COLONOSCOPY;  Surgeon: Bubba Odom MD;  Location:  [facility]     ESOPHAGOSCOPY, GASTROSCOPY, DUODENOSCOPY (EGD), COMBINED N/A 10/24/2018    Procedure: Esophagogastroduodenoscopy Multiple Biopsies by Biopsy;  Surgeon: Matteo Johnson DO;  Location:  GI     HC COLONOSCOPY W BIOPSY  08/10/05     HC REPAIR ING HERNIA,5+Y/O,REDUCIBL  1960     HERNIORRHAPHY UMBILICAL N/A 4/17/2015    Procedure: HERNIORRHAPHY UMBILICAL;  Surgeon: Rayray Avila MD;  Location: [facility]     LAPAROSCOPIC HERNIORRHAPHY INGUINAL BILATERAL Bilateral 4/17/2015    Procedure: LAPAROSCOPIC HERNIORRHAPHY INGUINAL BILATERAL;  Surgeon: Rayray Avila MD;  Location: [facility]     Allergy     Allergies   Allergen Reactions     Lisinopril Cough     Current Medication List     Current Outpatient Medications   Medication Sig     allopurinol (ZYLOPRIM) 300 MG tablet TAKE ONE TABLET BY MOUTH EVERY DAY     atorvastatin (LIPITOR) 80 MG tablet Take 1 tablet (80 mg) by mouth daily     cholestyramine light (PREVALITE) 4 GM packet DISSOLVE 1 PACKET IN LIQUID AS DIRECTED TWO TIMES A DAY AND DRINK     Cyanocobalamin (B-12) 100 MCG TABS      levothyroxine (SYNTHROID/LEVOTHROID) 25 MCG tablet TAKE ONE TABLET BY MOUTH ONCE DAILY     losartan (COZAAR) 50 MG tablet TAKE ONE TABLET BY MOUTH EVERY DAY     omeprazole (PRILOSEC) 40 MG capsule TAKE ONE CAPSULE BY MOUTH EVERY DAY     capsaicin (ZOSTRIX) 0.075 % topical cream Apply  topically 3 times daily. (Patient not taking: Reported on 3/8/2019)     cetirizine (ZYRTEC) 10 MG tablet Take 1 tablet (10 mg) by mouth every evening (Patient not taking: Reported on 3/8/2019)     diclofenac (VOLTAREN) 1 % GEL Apply  2 grams to shoulders three times daily using enclosed dosing card. (Patient not taking: Reported on 3/8/2019)     predniSONE (DELTASONE) 5 MG tablet PRN RA flare:  "Prednisone 20mg daily x5days, then 15mg daily x5days, then 10mg daily x5days, then 5mg daily x5days, then stop.. (Patient not taking: Reported on 3/8/2019.)     triamcinolone (KENALOG) 0.5 % external cream Apply sparingly to affected area three times daily as needed, no more than 7 days in a row. (Patient not taking: Reported on 3/8/2019)     No current facility-administered medications for this visit.          Social History   See HPI    Family History     Family History   Problem Relation Age of Onset     Arthritis Mother      Cardiovascular Mother      Depression Mother      Gastrointestinal Disease Mother         IBS     Hypertension Mother      Circulatory Mother         Aortal aneurysm     Osteoporosis Mother      Allergies Father         Cortisone     Cardiovascular Father      Circulatory Father      Diabetes Father      Hypertension Father      Lipids Father      Alcohol/Drug Sister         Penicillin     Alzheimer Disease Paternal Grandfather      Blood Disease Paternal Grandmother         Clotting problems     Blood Disease Son         Factor 5     Hypertension Maternal Aunt      Cerebrovascular Disease Maternal Grandmother        Physical Exam     Temp Readings from Last 3 Encounters:   04/05/19 97.4  F (36.3  C) (Oral)   03/22/19 96.5  F (35.8  C) (Temporal)   03/08/19 97.4  F (36.3  C) (Temporal)     BP Readings from Last 5 Encounters:   04/23/19 170/83   04/05/19 141/80   03/22/19 (!) 154/94   03/08/19 150/88   02/21/19 154/90     Pulse Readings from Last 1 Encounters:   04/23/19 78     Resp Readings from Last 1 Encounters:   04/05/19 16     Estimated body mass index is 27.78 kg/m  as calculated from the following:    Height as of this encounter: 1.816 m (5' 11.5\").    Weight as of this encounter: 91.6 kg (202 lb).    GEN: NAD  HEENT: MMM. No oral lesions.  Anicteric, noninjected sclera  CV: S1, S2. RRR. No m/r/g.  PULM: CTA bilaterally. No w/c.  MSK: MCPs, PIPs, wrists, elbows, shoulders, knees, and " ankles without swelling or tenderness to palpation.  Negative MCP and MTP squeeze.  Shoulders with normal ROM.  Hips nontender to palpation.   NEURO: UE and LE strengths 5/5 and equal bilaterally.   SKIN: No rash  EXT: No LE edema  PSYCH: Alert. Appropriate.    Labs / Imaging (select studies)   RF/CCP  Recent Labs   Lab Test 04/24/13  0923   CCPABY >250 Strongly Positive*   *     CBC  Recent Labs   Lab Test 04/18/19 0826 11/06/18 0913 06/26/18  0955   WBC 5.2 7.1 4.3   RBC 4.38* 3.90* 4.02*   HGB 14.4 13.2* 13.6   HCT 43.1 39.1* 39.9*   MCV 98 100 99   RDW 12.9 12.4 12.8   * 136* 121*   MCH 32.9 33.8* 33.8*   MCHC 33.4 33.8 34.1   NEUTROPHIL 56.9 84.5 44.2   LYMPH 31.9 10.9 39.7   MONOCYTE 10.0 4.2 12.9   EOSINOPHIL 1.0 0.3 3.0   BASOPHIL 0.2 0.1 0.2   ANEU 3.0 6.0 1.9   ALYM 1.7 0.8 1.7   KIESHA 0.5 0.3 0.6   AEOS 0.1 0.0 0.1   ABAS 0.0 0.0 0.0     CMP  Recent Labs   Lab Test 04/18/19 0826 11/06/18 0913 06/26/18  0955 04/05/18  2355  12/22/17  0735   NA  --  141  --  140  --  141   POTASSIUM  --  4.2  --  3.4  --  4.2   CHLORIDE  --  105  --  104  --  107   CO2  --  33*  --  26  --  27   ANIONGAP  --  3  --  10  --  7   GLC  --  107*  --  124*  --  121*   BUN  --  15  --  32*  --  17   CR 0.95 0.83 0.85 0.71   < > 0.85   GFRESTIMATED 83 >90 >90 >90   < > >90   GFRESTBLACK >90 >90 >90 >90   < > >90   ANDREZ  --  8.8  --  7.9*  --  8.9   BILITOTAL 1.5* 0.6 1.2  --    < > 1.5*   ALBUMIN 3.6 3.1* 3.4  --    < > 3.7   PROTTOTAL 7.3 7.4 8.1  --    < > 8.4   ALKPHOS 63 60 68  --    < > 90   AST 23 41 40  --    < > 25   ALT 56 82* 50  --    < > 49    < > = values in this interval not displayed.     Calcium/VitaminD  Recent Labs   Lab Test 11/06/18  0913 06/26/18  0955 04/05/18  2355 12/22/17  0735   ANDREZ 8.8  --  7.9* 8.9   VITDT  --  25  --   --      ESR/CRP  Recent Labs   Lab Test 04/18/19  0826 11/06/18  0913 06/26/18  0955   SED 10 18 23*   CRP <2.9 4.4 <2.9     Lipid Panel  Recent Labs   Lab Test  12/22/17  0735 12/01/16  0822 03/28/16  0824 12/29/15  0756  10/04/13  0819 06/03/13  0837  02/10/11  0830   CHOL 134  --   --  173  --  143 208*   < > 175   TRIG 149  --   --  211*  --  92 72   < > 71   HDL 46  --   --  56  --  49 77   < > 46   LDL 58 98 84 75   < > 75 117   < > 114   VLDL  --   --   --   --   --  18 14  -- 14   CHOLHDLRATIO  --   --   --   --   --  3.0 3.0  --  4.0   NHDL 88  --   --  117  --   --   --   --   --     < > = values in this interval not displayed.     Hepatitis B  Recent Labs   Lab Test 03/20/18  1008 02/05/14  1104   HBCAB Nonreactive  --    HEPBANG Nonreactive Negative     Hepatitis C  Recent Labs   Lab Test 02/05/14  1104 06/03/13  0837   HCVAB Negative Negative     Tuberculosis Screening  Recent Labs   Lab Test 03/20/18  1008 02/05/14  1104   TBRSLT Negative Negative   TBAGN 0.14 0.01       Immunization History     Immunization History   Administered Date(s) Administered     Influenza (High Dose) 3 valent vaccine 10/06/2017, 10/03/2018     Influenza (IIV3) PF 10/01/2009, 10/27/2010, 10/11/2011     Influenza Vaccine IM 3yrs+ 4 Valent IIV4 10/09/2013, 10/23/2014, 09/18/2015, 11/15/2016     Pneumo Conj 13-V (2010&after) 10/06/2017     Pneumococcal 23 valent 10/23/2014     TD (ADULT, 7+) 09/05/2001     TDAP Vaccine (Boostrix) 10/18/2012     Zoster vaccine recombinant adjuvanted (SHINGRIX) 01/31/2019, 04/18/2019     Zoster vaccine, live 10/23/2014          Chart documentation done in part with Dragon Voice recognition Software. Although reviewed after completion, some word and grammatical error may remain.    Fabricio Gomes MD

## 2019-05-03 ENCOUNTER — INFUSION THERAPY VISIT (OUTPATIENT)
Dept: INFUSION THERAPY | Facility: CLINIC | Age: 69
End: 2019-05-03
Attending: INTERNAL MEDICINE
Payer: MEDICARE

## 2019-05-03 VITALS
TEMPERATURE: 97.2 F | BODY MASS INDEX: 27.68 KG/M2 | SYSTOLIC BLOOD PRESSURE: 163 MMHG | OXYGEN SATURATION: 98 % | WEIGHT: 201.3 LBS | HEART RATE: 58 BPM | DIASTOLIC BLOOD PRESSURE: 94 MMHG | RESPIRATION RATE: 18 BRPM

## 2019-05-03 DIAGNOSIS — M05.79 RHEUMATOID ARTHRITIS INVOLVING MULTIPLE SITES WITH POSITIVE RHEUMATOID FACTOR (H): Primary | ICD-10-CM

## 2019-05-03 PROCEDURE — 25800030 ZZH RX IP 258 OP 636: Performed by: INTERNAL MEDICINE

## 2019-05-03 PROCEDURE — 25000128 H RX IP 250 OP 636: Performed by: INTERNAL MEDICINE

## 2019-05-03 PROCEDURE — 96365 THER/PROPH/DIAG IV INF INIT: CPT

## 2019-05-03 RX ADMIN — SODIUM CHLORIDE 250 ML: 9 INJECTION, SOLUTION INTRAVENOUS at 08:49

## 2019-05-03 RX ADMIN — SODIUM CHLORIDE 750 MG: 9 INJECTION, SOLUTION INTRAVENOUS at 08:50

## 2019-05-03 ASSESSMENT — PAIN SCALES - GENERAL: PAINLEVEL: NO PAIN (0)

## 2019-05-03 NOTE — PROGRESS NOTES
Infusion Nursing Note:  Camilo Baca presents today for Orencia.    Patient seen by provider today: No   present during visit today: Not Applicable.    Note: B/P 174/100 initially today, after patient was seated for a short time B/P was 158/92. Does have a history of HTN and was elevated prior to previous treatments. Denies any headache, pain and/or respiratory symptoms. No swelling.    Intravenous Access:  Peripheral IV placed.    Treatment Conditions:  Biological Infusion Checklist:  ~~~ NOTE: If the patient answers yes to any of the questions below, hold the infusion and contact ordering provider or on-call provider.    1. Have you recently had an elevated temperature, fever, chills, productive cough, coughing for 3 weeks or longer or hemoptysis, abnormal vital signs, night sweats,  chest pain or have you noticed a decrease in your appetite, unexplained weight loss or fatigue? No  2. Do you have any open wounds or new incisions? No  3. Do you have any recent or upcoming hospitalizations, surgeries or dental procedures? No  4. Do you currently have or recently have had any signs of illness or infection or are you on any antibiotics? No  5. Have you had any new, sudden or worsening abdominal pain? No  6. Have you or anyone in your household received a live vaccination in the past 4 weeks? Please note:  No live vaccines while on biologic/chemotherapy until 6 months after the last treatment.  Patient can receive the flu vaccine (shot only) and the pneumovax.  It is optimal for the patient to get these vaccines mid cycle, but they can be given at any time as long as it is not on the day of the infusion. No  7. Have you recently been diagnosed with any new nervous system diseases (ie. Multiple sclerosis, Guillain [place], seizures, neurological changes) or cancer diagnosis? No  8. Are you on any form of radiation or chemotherapy? No  9. Are you pregnant or breast feeding or do you have plans of pregnancy in  the future? No  10. Have you been having any signs of worsening depression or suicidal ideations?  (benlysta only) No  11. Have there been any other new onset medical symptoms? No        Post Infusion Assessment:  Patient tolerated infusion without incident. B/P was elevated 173/98 post treatment and waited additional 10 minutes and was 163/94, HR 55. Patient was elevated after last treatment as well. Asymptomatic. Denies pain.  Patient observed for 25 minutes post Orencia infusion.  Site patent and intact, free from redness, edema or discomfort.  No evidence of extravasations.  Access discontinued per protocol.     POST-INFUSION OF BIOLOGICAL MEDICATION:    Reviewed with patient.  Given biologic medication or medication hand-out. Inform patient if any fever, chills or signs of infection, new symptoms, abdominal pain, heart palpitations, shortness of breath, reaction, weakness, neurological changes, seek medical attention immediately and should not receive infusions. No live virus vaccines prior to or during treatment or up to 6 months post infusion. If the patient has an upcoming procedure or surgery, this should be discussed with the rheumatologist and surgeon or provider.      Discharge Plan:   Copy of AVS reviewed with patient and/or family.  Patient will return 5/31 for next appointment.  Patient discharged in stable condition accompanied by: self.  Departure Mode: Ambulatory.    Yana Burnham RN

## 2019-05-18 DIAGNOSIS — E03.4 HYPOTHYROIDISM DUE TO ACQUIRED ATROPHY OF THYROID: ICD-10-CM

## 2019-05-20 RX ORDER — LEVOTHYROXINE SODIUM 25 UG/1
TABLET ORAL
Qty: 90 TABLET | Refills: 0 | Status: SHIPPED | OUTPATIENT
Start: 2019-05-20 | End: 2019-08-17

## 2019-05-20 NOTE — TELEPHONE ENCOUNTER
"Requested Prescriptions   Pending Prescriptions Disp Refills     levothyroxine (SYNTHROID/LEVOTHROID) 25 MCG tablet [Pharmacy Med Name: LEVOTHYROXINE SODIUM 25MCG TABS] 90 tablet 0     Sig: TAKE ONE TABLET BY MOUTH ONCE DAILY --DUE FOR LABS   Last Written Prescription Date:  2/18/19  Last Fill Quantity: 90,  # refills: 0   Last office visit: 12/4/2018 with prescribing provider:  10/3/18   Future Office Visit:        Thyroid Protocol Failed - 5/18/2019  8:37 AM        Failed - Normal TSH on file in past 12 months     Recent Labs   Lab Test 08/24/17  0931   TSH 1.94              Passed - Patient is 12 years or older        Passed - Recent (12 mo) or future (30 days) visit within the authorizing provider's specialty     Patient had office visit in the last 12 months or has a visit in the next 30 days with authorizing provider or within the authorizing provider's specialty.  See \"Patient Info\" tab in inbasket, or \"Choose Columns\" in Meds & Orders section of the refill encounter.              Passed - Medication is active on med list        "

## 2019-05-20 NOTE — TELEPHONE ENCOUNTER
Routing refill request to provider for review/approval because:  Labs not current:  TSH    WILFRED BurrellN, RN  [facility]

## 2019-05-31 ENCOUNTER — INFUSION THERAPY VISIT (OUTPATIENT)
Dept: INFUSION THERAPY | Facility: CLINIC | Age: 69
End: 2019-05-31
Attending: INTERNAL MEDICINE
Payer: MEDICARE

## 2019-05-31 VITALS
WEIGHT: 200.4 LBS | SYSTOLIC BLOOD PRESSURE: 148 MMHG | HEIGHT: 71 IN | TEMPERATURE: 98.2 F | DIASTOLIC BLOOD PRESSURE: 89 MMHG | RESPIRATION RATE: 20 BRPM | BODY MASS INDEX: 28.06 KG/M2 | HEART RATE: 61 BPM | OXYGEN SATURATION: 97 %

## 2019-05-31 DIAGNOSIS — M05.79 RHEUMATOID ARTHRITIS INVOLVING MULTIPLE SITES WITH POSITIVE RHEUMATOID FACTOR (H): Primary | ICD-10-CM

## 2019-05-31 DIAGNOSIS — E78.5 HYPERLIPIDEMIA LDL GOAL <130: ICD-10-CM

## 2019-05-31 PROCEDURE — 25000128 H RX IP 250 OP 636: Performed by: INTERNAL MEDICINE

## 2019-05-31 PROCEDURE — 25800030 ZZH RX IP 258 OP 636: Performed by: INTERNAL MEDICINE

## 2019-05-31 PROCEDURE — 96365 THER/PROPH/DIAG IV INF INIT: CPT

## 2019-05-31 RX ADMIN — SODIUM CHLORIDE 750 MG: 9 INJECTION, SOLUTION INTRAVENOUS at 09:13

## 2019-05-31 RX ADMIN — SODIUM CHLORIDE 250 ML: 9 INJECTION, SOLUTION INTRAVENOUS at 08:32

## 2019-05-31 ASSESSMENT — MIFFLIN-ST. JEOR: SCORE: 1719.01

## 2019-05-31 NOTE — PROGRESS NOTES
Infusion Nursing Note:  Camilo Baca presents today for Orencia.    Patient seen by provider today: No   present during visit today: Not Applicable.    Note: N/A.    Intravenous Access:  Peripheral IV placed.    Treatment Conditions:  Biological Infusion Checklist:  ~~~ NOTE: If the patient answers yes to any of the questions below, hold the infusion and contact ordering provider or on-call provider.    1. Have you recently had an elevated temperature, fever, chills, productive cough, coughing for 3 weeks or longer or hemoptysis, abnormal vital signs, night sweats,  chest pain or have you noticed a decrease in your appetite, unexplained weight loss or fatigue? No  2. Do you have any open wounds or new incisions? No  3. Do you have any recent or upcoming hospitalizations, surgeries or dental procedures? No  4. Do you currently have or recently have had any signs of illness or infection or are you on any antibiotics? No  5. Have you had any new, sudden or worsening abdominal pain? No  6. Have you or anyone in your household received a live vaccination in the past 4 weeks? Please note:  No live vaccines while on biologic/chemotherapy until 6 months after the last treatment.  Patient can receive the flu vaccine (shot only) and the pneumovax.  It is optimal for the patient to get these vaccines mid cycle, but they can be given at any time as long as it is not on the day of the infusion. No  7. Have you recently been diagnosed with any new nervous system diseases (ie. Multiple sclerosis, Guillain [place], seizures, neurological changes) or cancer diagnosis? No  8. Are you on any form of radiation or chemotherapy? No  9. Are you pregnant or breast feeding or do you have plans of pregnancy in the future? No  10. Have you been having any signs of worsening depression or suicidal ideations?  (benlysta only) No  11. Have there been any other new onset medical symptoms? No        Post Infusion Assessment:  Patient  tolerated infusion without incident.  Patient observed for 15 minutes post Orencia per protocol.  Blood return noted pre and post infusion.  Site patent and intact, free from redness, edema or discomfort.  No evidence of extravasations.  Access discontinued per protocol.  Biologic Infusion Post Education: Call the triage nurse at your clinic or seek medical attention if you have chills and/or temperature greater than or equal to 100.5, uncontrolled nausea/vomiting, diarrhea, constipation, dizziness, shortness of breath, chest pain, heart palpitations, weakness or any other new or concerning symptoms, questions or concerns.  You cannot have any live virus vaccines prior to or during treatment or up to 6 months post infusion.  If you have an upcoming surgery, medical procedure or dental procedure during treatment, this should be discussed with your ordering physician and your surgeon/dentist.  If you are having any concerning symptom, if you are unsure if you should get your next infusion or wish to speak to a provider before your next infusion, please call your care coordinator or triage nurse at your clinic to notify them so we can adequately serve you.       Discharge Plan:   Discharge instructions reviewed with: Patient.  Patient and/or family verbalized understanding of discharge instructions and all questions answered.  Patient discharged in stable condition accompanied by: self.  Departure Mode: Ambulatory.    Skylar Vega RN

## 2019-06-03 RX ORDER — CHOLESTYRAMINE LIGHT 4 G/5.7G
POWDER, FOR SUSPENSION ORAL
Qty: 60 PACKET | Refills: 3 | Status: SHIPPED | OUTPATIENT
Start: 2019-06-03 | End: 2019-10-01

## 2019-06-03 NOTE — TELEPHONE ENCOUNTER
cholestyramine light (PREVALITE) 4 GM packet      Last Written Prescription Date:  1/28/19  Last Fill Quantity: 60,   # refills: 3  Last Office Visit: 10/3/18  Future Office visit:    Next 5 appointments (look out 90 days)    Aug 20, 2019 10:40 AM CDT  Return Visit with Fabricio Gomes MD  [facility] ([facility] [address] [place] [id number] [phone]           Routing refill request to provider for review/approval because:  Drug not on the [facility], [facility] or [facility] refill protocol or controlled substance

## 2019-06-03 NOTE — TELEPHONE ENCOUNTER
Requested Prescriptions   Pending Prescriptions Disp Refills     cholestyramine light (PREVALITE) 4 GM packet [Pharmacy Med Name: PREVALITE 4GM PACK]  3     Sig: DISSOLVE IN LIQUID AND DRINK ONE PACKET BY MOUTH TWICE A DAY AS DIRECTED       There is no refill protocol information for this order

## 2019-06-11 DIAGNOSIS — M10.9 ACUTE GOUTY ARTHROPATHY: ICD-10-CM

## 2019-06-11 NOTE — TELEPHONE ENCOUNTER
Routing refill request to provider for review/approval because:  Labs out of range:  CBC  Labs not current:  Uric Acid    JOEY Burrell, RN  [facility]

## 2019-06-11 NOTE — TELEPHONE ENCOUNTER
"Requested Prescriptions   Pending Prescriptions Disp Refills     allopurinol (ZYLOPRIM) 300 MG tablet [Pharmacy Med Name: ALLOPURINOL 300MG TABS]  Last Written Prescription Date:  3/13/19  Last Fill Quantity: 90,  # refills: 0   Last office visit: 12/4/2018 with prescribing provider:  Maria Del Rosario   Future Office Visit:   Next 5 appointments (look out 90 days)    Aug 20, 2019 10:40 AM CDT  Return Visit with Fabricio Gomes MD  [facility] ([facility] [address] [place] [id number]  [phone]          90 tablet 0     Sig: TAKE ONE TABLET BY MOUTH ONCE DAILY. DUE FOR LABS       Gout Agents Protocol Failed - 6/11/2019  9:05 AM        Failed - Has Uric Acid on file in past 12 months and value is less than 6     Recent Labs   Lab Test 02/21/18  0909   URIC 3.6     If level is 6mg/dL or greater, ok to refill one time and refer to provider.           Passed - CBC on file in past 12 months     Recent Labs   Lab Test 04/18/19  0826   WBC 5.2   RBC 4.38*   HGB 14.4   HCT 43.1   *                 Passed - ALT on file in past 12 months     Recent Labs   Lab Test 04/18/19  0826   ALT 56             Passed - Recent (12 mo) or future (30 days) visit within the authorizing provider's specialty     Patient had office visit in the last 12 months or has a visit in the next 30 days with authorizing provider or within the authorizing provider's specialty.  See \"Patient Info\" tab in inbasket, or \"Choose Columns\" in Meds & Orders section of the refill encounter.              Passed - Medication is active on med list        Passed - Patient is age 18 or older        Passed - Normal serum creatinine on file in the past 12 months     Recent Labs   Lab Test 04/18/19  0826   CR 0.95               "

## 2019-06-12 RX ORDER — ALLOPURINOL 300 MG/1
TABLET ORAL
Qty: 90 TABLET | Refills: 0 | Status: SHIPPED | OUTPATIENT
Start: 2019-06-12 | End: 2019-09-07

## 2019-06-28 ENCOUNTER — INFUSION THERAPY VISIT (OUTPATIENT)
Dept: INFUSION THERAPY | Facility: CLINIC | Age: 69
End: 2019-06-28
Attending: INTERNAL MEDICINE
Payer: MEDICARE

## 2019-06-28 VITALS
TEMPERATURE: 96.8 F | WEIGHT: 194.3 LBS | HEART RATE: 59 BPM | RESPIRATION RATE: 16 BRPM | BODY MASS INDEX: 26.72 KG/M2 | DIASTOLIC BLOOD PRESSURE: 95 MMHG | OXYGEN SATURATION: 97 % | SYSTOLIC BLOOD PRESSURE: 153 MMHG

## 2019-06-28 DIAGNOSIS — M05.79 RHEUMATOID ARTHRITIS INVOLVING MULTIPLE SITES WITH POSITIVE RHEUMATOID FACTOR (H): Primary | ICD-10-CM

## 2019-06-28 PROCEDURE — 25800030 ZZH RX IP 258 OP 636: Performed by: INTERNAL MEDICINE

## 2019-06-28 PROCEDURE — 25000128 H RX IP 250 OP 636: Performed by: INTERNAL MEDICINE

## 2019-06-28 PROCEDURE — 96365 THER/PROPH/DIAG IV INF INIT: CPT

## 2019-06-28 RX ADMIN — SODIUM CHLORIDE 750 MG: 9 INJECTION, SOLUTION INTRAVENOUS at 08:51

## 2019-06-28 RX ADMIN — SODIUM CHLORIDE 250 ML: 9 INJECTION, SOLUTION INTRAVENOUS at 08:23

## 2019-06-28 ASSESSMENT — PAIN SCALES - GENERAL: PAINLEVEL: NO PAIN (0)

## 2019-06-28 NOTE — PROGRESS NOTES
Infusion Nursing Note:  Camilo Baca presents today for Orencia.    Patient seen by provider today: No   present during visit today: Not Applicable.    Note: N/A.    Intravenous Access:  Peripheral IV placed.    Treatment Conditions:  Biological Infusion Checklist:  ~~~ NOTE: If the patient answers yes to any of the questions below, hold the infusion and contact ordering provider or on-call provider.    1. Have you recently had an elevated temperature, fever, chills, productive cough, coughing for 3 weeks or longer or hemoptysis, abnormal vital signs, night sweats,  chest pain or have you noticed a decrease in your appetite, unexplained weight loss or fatigue? No  2. Do you have any open wounds or new incisions? No  3. Do you have any recent or upcoming hospitalizations, surgeries or dental procedures? No  4. Do you currently have or recently have had any signs of illness or infection or are you on any antibiotics? No  5. Have you had any new, sudden or worsening abdominal pain? No  6. Have you or anyone in your household received a live vaccination in the past 4 weeks? Please note:  No live vaccines while on biologic/chemotherapy until 6 months after the last treatment.  Patient can receive the flu vaccine (shot only) and the pneumovax.  It is optimal for the patient to get these vaccines mid cycle, but they can be given at any time as long as it is not on the day of the infusion. No  7. Have you recently been diagnosed with any new nervous system diseases (ie. Multiple sclerosis, Guillain [place], seizures, neurological changes) or cancer diagnosis? No  8. Are you on any form of radiation or chemotherapy? No  9. Are you pregnant or breast feeding or do you have plans of pregnancy in the future? No  10. Have you been having any signs of worsening depression or suicidal ideations?  (benlysta only) No  11. Have there been any other new onset medical symptoms? No        Post Infusion Assessment:  Patient  tolerated infusion without incident.  Patient observed for 15 minutes post orencia per protocol.  Site patent and intact, free from redness, edema or discomfort.  No evidence of extravasations.  Access discontinued per protocol.  Biologic Infusion Post Education: Call the triage nurse at your clinic or seek medical attention if you have chills and/or temperature greater than or equal to 100.5, uncontrolled nausea/vomiting, diarrhea, constipation, dizziness, shortness of breath, chest pain, heart palpitations, weakness or any other new or concerning symptoms, questions or concerns.  You cannot have any live virus vaccines prior to or during treatment or up to 6 months post infusion.  If you have an upcoming surgery, medical procedure or dental procedure during treatment, this should be discussed with your ordering physician and your surgeon/dentist.  If you are having any concerning symptom, if you are unsure if you should get your next infusion or wish to speak to a provider before your next infusion, please call your care coordinator or triage nurse at your clinic to notify them so we can adequately serve you.       Discharge Plan:   Discharge instructions reviewed with: Patient.  Patient discharged in stable condition accompanied by: self.  Departure Mode: Ambulatory.    Emy Figueredo RN

## 2019-07-26 ENCOUNTER — INFUSION THERAPY VISIT (OUTPATIENT)
Dept: INFUSION THERAPY | Facility: CLINIC | Age: 69
End: 2019-07-26
Attending: INTERNAL MEDICINE
Payer: MEDICARE

## 2019-07-26 VITALS
RESPIRATION RATE: 18 BRPM | SYSTOLIC BLOOD PRESSURE: 142 MMHG | HEART RATE: 59 BPM | WEIGHT: 194.9 LBS | OXYGEN SATURATION: 98 % | HEIGHT: 72 IN | DIASTOLIC BLOOD PRESSURE: 87 MMHG | TEMPERATURE: 97.3 F | BODY MASS INDEX: 26.4 KG/M2

## 2019-07-26 DIAGNOSIS — M05.79 RHEUMATOID ARTHRITIS INVOLVING MULTIPLE SITES WITH POSITIVE RHEUMATOID FACTOR (H): Primary | ICD-10-CM

## 2019-07-26 PROCEDURE — 25800030 ZZH RX IP 258 OP 636: Performed by: INTERNAL MEDICINE

## 2019-07-26 PROCEDURE — 25000128 H RX IP 250 OP 636: Performed by: INTERNAL MEDICINE

## 2019-07-26 PROCEDURE — 96365 THER/PROPH/DIAG IV INF INIT: CPT

## 2019-07-26 RX ADMIN — SODIUM CHLORIDE 750 MG: 9 INJECTION, SOLUTION INTRAVENOUS at 08:59

## 2019-07-26 RX ADMIN — SODIUM CHLORIDE 250 ML: 9 INJECTION, SOLUTION INTRAVENOUS at 08:24

## 2019-07-26 ASSESSMENT — MIFFLIN-ST. JEOR: SCORE: 1694.12

## 2019-07-26 NOTE — PROGRESS NOTES
Infusion Nursing Note:  Camilo Baca presents today for Orencia.    Patient seen by provider today: No   present during visit today: Not Applicable.    Note: Patient complains of bilateral shoulder pain with RA.    Intravenous Access:  Peripheral IV placed.    Treatment Conditions:  Biological Infusion Checklist:  ~~~ NOTE: If the patient answers yes to any of the questions below, hold the infusion and contact ordering provider or on-call provider.    1. Have you recently had an elevated temperature, fever, chills, productive cough, coughing for 3 weeks or longer or hemoptysis, abnormal vital signs, night sweats,  chest pain or have you noticed a decrease in your appetite, unexplained weight loss or fatigue? No  2. Do you have any open wounds or new incisions? No  3. Do you have any recent or upcoming hospitalizations, surgeries or dental procedures? No  4. Do you currently have or recently have had any signs of illness or infection or are you on any antibiotics? No  5. Have you had any new, sudden or worsening abdominal pain? No  6. Have you or anyone in your household received a live vaccination in the past 4 weeks? Please note:  No live vaccines while on biologic/chemotherapy until 6 months after the last treatment.  Patient can receive the flu vaccine (shot only) and the pneumovax.  It is optimal for the patient to get these vaccines mid cycle, but they can be given at any time as long as it is not on the day of the infusion. No  7. Have you recently been diagnosed with any new nervous system diseases (ie. Multiple sclerosis, Guillain [place], seizures, neurological changes) or cancer diagnosis? No  8. Are you on any form of radiation or chemotherapy? No  9. Are you pregnant or breast feeding or do you have plans of pregnancy in the future? No  10. Have you been having any signs of worsening depression or suicidal ideations?  (benlysta only) No  11. Have there been any other new onset medical  symptoms? No        Post Infusion Assessment:  Patient tolerated infusion without incident.  Patient observed for 15 minutes post infusion per protocol.  Blood return noted pre and post infusion.  Site patent and intact, free from redness, edema or discomfort.  No evidence of extravasations.  Access discontinued per protocol.       Discharge Plan:   Discharge instructions reviewed with: Patient.  Patient and/or family verbalized understanding of discharge instructions and all questions answered.  Patient discharged in stable condition accompanied by: self.  Departure Mode: Ambulatory.    Juliette Macias RN

## 2019-07-26 NOTE — PATIENT INSTRUCTIONS
Pt to return on 08/23/19 for Orencia. Copies of medication list and upcoming appointments given prior to discharge.

## 2019-08-15 DIAGNOSIS — M05.79 RHEUMATOID ARTHRITIS INVOLVING MULTIPLE SITES WITH POSITIVE RHEUMATOID FACTOR (H): ICD-10-CM

## 2019-08-15 LAB
ALBUMIN SERPL-MCNC: 3.6 G/DL (ref 3.4–5)
ALP SERPL-CCNC: 72 U/L (ref 40–150)
ALT SERPL W P-5'-P-CCNC: 95 U/L (ref 0–70)
AST SERPL W P-5'-P-CCNC: 48 U/L (ref 0–45)
BASOPHILS # BLD AUTO: 0 10E9/L (ref 0–0.2)
BASOPHILS NFR BLD AUTO: 0.2 %
BILIRUB DIRECT SERPL-MCNC: 0.2 MG/DL (ref 0–0.2)
BILIRUB SERPL-MCNC: 1.3 MG/DL (ref 0.2–1.3)
CREAT SERPL-MCNC: 0.94 MG/DL (ref 0.66–1.25)
CRP SERPL-MCNC: <2.9 MG/L (ref 0–8)
DIFFERENTIAL METHOD BLD: ABNORMAL
EOSINOPHIL # BLD AUTO: 0.1 10E9/L (ref 0–0.7)
EOSINOPHIL NFR BLD AUTO: 1.2 %
ERYTHROCYTE [DISTWIDTH] IN BLOOD BY AUTOMATED COUNT: 13.6 % (ref 10–15)
ERYTHROCYTE [SEDIMENTATION RATE] IN BLOOD BY WESTERGREN METHOD: 9 MM/H (ref 0–20)
GFR SERPL CREATININE-BSD FRML MDRD: 84 ML/MIN/{1.73_M2}
HCT VFR BLD AUTO: 44.4 % (ref 40–53)
HGB BLD-MCNC: 14.8 G/DL (ref 13.3–17.7)
LYMPHOCYTES # BLD AUTO: 1.4 10E9/L (ref 0.8–5.3)
LYMPHOCYTES NFR BLD AUTO: 27.1 %
MCH RBC QN AUTO: 32.8 PG (ref 26.5–33)
MCHC RBC AUTO-ENTMCNC: 33.3 G/DL (ref 31.5–36.5)
MCV RBC AUTO: 98 FL (ref 78–100)
MONOCYTES # BLD AUTO: 0.5 10E9/L (ref 0–1.3)
MONOCYTES NFR BLD AUTO: 9.9 %
NEUTROPHILS # BLD AUTO: 3.2 10E9/L (ref 1.6–8.3)
NEUTROPHILS NFR BLD AUTO: 61.6 %
PLATELET # BLD AUTO: 104 10E9/L (ref 150–450)
PROT SERPL-MCNC: 7.1 G/DL (ref 6.8–8.8)
RBC # BLD AUTO: 4.51 10E12/L (ref 4.4–5.9)
WBC # BLD AUTO: 5.1 10E9/L (ref 4–11)

## 2019-08-15 PROCEDURE — 36415 COLL VENOUS BLD VENIPUNCTURE: CPT | Performed by: INTERNAL MEDICINE

## 2019-08-15 PROCEDURE — 85025 COMPLETE CBC W/AUTO DIFF WBC: CPT | Performed by: INTERNAL MEDICINE

## 2019-08-15 PROCEDURE — 80076 HEPATIC FUNCTION PANEL: CPT | Performed by: INTERNAL MEDICINE

## 2019-08-15 PROCEDURE — 86140 C-REACTIVE PROTEIN: CPT | Performed by: INTERNAL MEDICINE

## 2019-08-15 PROCEDURE — 82565 ASSAY OF CREATININE: CPT | Performed by: INTERNAL MEDICINE

## 2019-08-15 PROCEDURE — 85652 RBC SED RATE AUTOMATED: CPT | Performed by: INTERNAL MEDICINE

## 2019-08-20 ENCOUNTER — OFFICE VISIT (OUTPATIENT)
Dept: RHEUMATOLOGY | Facility: CLINIC | Age: 69
End: 2019-08-20
Payer: COMMERCIAL

## 2019-08-20 VITALS
SYSTOLIC BLOOD PRESSURE: 132 MMHG | OXYGEN SATURATION: 97 % | WEIGHT: 194.4 LBS | HEART RATE: 59 BPM | HEIGHT: 72 IN | BODY MASS INDEX: 26.33 KG/M2 | DIASTOLIC BLOOD PRESSURE: 86 MMHG

## 2019-08-20 DIAGNOSIS — M05.9 SEROPOSITIVE RHEUMATOID ARTHRITIS (H): Primary | ICD-10-CM

## 2019-08-20 DIAGNOSIS — Z79.899 HIGH RISK MEDICATIONS (NOT ANTICOAGULANTS) LONG-TERM USE: ICD-10-CM

## 2019-08-20 PROCEDURE — 99214 OFFICE O/P EST MOD 30 MIN: CPT | Performed by: INTERNAL MEDICINE

## 2019-08-20 RX ORDER — PREDNISONE 5 MG/1
TABLET ORAL
Qty: 20 TABLET | Refills: 2 | Status: SHIPPED | OUTPATIENT
Start: 2019-08-20 | End: 2019-12-17

## 2019-08-20 RX ORDER — SULFASALAZINE 500 MG/1
500 TABLET, DELAYED RELEASE ORAL 2 TIMES DAILY
Qty: 60 TABLET | Refills: 3 | Status: SHIPPED | OUTPATIENT
Start: 2019-08-20 | End: 2019-11-19

## 2019-08-20 ASSESSMENT — MIFFLIN-ST. JEOR: SCORE: 1686.85

## 2019-08-20 NOTE — NURSING NOTE
Camilo to follow up with Primary Care provider regarding elevated blood pressure.  Blood pressure rechecked after visit    132/86  Jaquelin Muñiz CMA Rheumatology  8/20/2019 11:47 AM                                RAPID3 (0-30) Cumulative Score  4.0          RAPID3 Weighted Score (divide #4 by 3 and that is the weighted score)  1.3       Jaquelin Muñiz CMA Rheumatology  8/20/2019 10:49 AM

## 2019-08-20 NOTE — PATIENT INSTRUCTIONS
Jacki's appointment is 11/19/2019 at 1:40PM    Rheumatology    Dr. Fabricio Gomes         [facility]   (Monday)  [address]  [place] [address]       [facility]   (Tuesday)  [address] [place] [address]    [facility]   (Wed., Thurs., and Friday)  [address]  [place] [address]    Phone number: [phone]  Thank you for choosing [facility].  Jaquelin Muñiz CMA

## 2019-08-20 NOTE — PROGRESS NOTES
Rheumatology Clinic Visit      Camilo Baca MRN# [id number]   YOB: 1950 Age: 69 year old      Date of visit: 8/20/19   PCP: Lyndsey Aponte    Chief Complaint   Patient presents with:  Arthritis: RA. orencia keeps swelling down but not the pain, shoulders and wrists.    Assessment and Plan     1.  Seropositive rheumatoid arthritis (, CCP >250): Dx'd 2013. Previously followed by [facility] Wolfe and [facility].  Established with me on 3/20/2018. Previously on MTX (ineffective as monotherapy), Humira (effective, stopped because of insurance preference for IV), Simponi (rash on cheeks/nose after infusion), Remicade (lost efficacy), HCQ (facial rash).  Currently on Orencia IV (first infusion 3/8/2019) and doing fairly well but still has steroid responsive pain (using prednisone on occasion). Will increase orencia to 1000mg per dose; reviewed that if his insurance doesn't approve this then will use 750mg as he has been getting.  Start SSZ, cautiously watching platelets.   - Increase Orencia to 1000mg IV every 4 weeks  - Start sulfasalazine 500mg BID   - PRN RA flare: Prednisone 20mg daily x2days, then 15mg daily x2days, then 10mg daily x2days, then 5mg daily x2days, then stop.   - Labs monthly [id number]: CBC, Cr, Hepatic Panel  - Labs in 3 months: CBC, Creatinine, Hepatic Panel, ESR, CRP              Rapid 3, cumulative scores                       08/20/2019: 4     (Orencia 750mg IV)                      04/23/2019: 0     (Orencia 750mg IV)                       01/08/2019: 0.5  (Simponi IV)                      11/06/2018: 9.3  (Simponi IV x2mo)    # Sulfasalazine Risks and Benefits: The risks and benefits of sulfasalazine were discussed in detail and the patient verbalized understanding.  The risks discussed include, but are not limited to, the risk for hypersensitivity, anaphylaxis, anaphylactoid reactions, infections, bone marrow suppression,  hepatotoxicity, nausea, vomiting, and GI upset.  Oligospermia may  occur in males.  I encouraged reviewing the package insert and asking any questions about the medication.      2.  Vaccinations: Vaccinations reviewed with Mr. Baca.    - Influenza: encouraged yearly vaccination  - [id number]: up to date  - [id number]: up to date  - Shingrix: up to date    Mr. Baca verbalized agreement with and understanding of the rational for the diagnosis and treatment plan.  All questions were answered to best of my ability and the patient's satisfaction. Mr. Baca was advised to contact the clinic with any questions that may arise after the clinic visit.      Thank you for involving me in the care of the patient    Return to clinic: 3 months    HPI   Camilo Baca is a 69 year old male with a past medical history significant for hypertension, hyperlipidemia, GERD, hypothyroidism, history of DVT, factor V Leiden mutation, history of thrombocytopenia, gout, and rheumatoid arthritis who presents for follow-up of rheumatoid arthritis.     Today, he reports doing well on orencia but still requiring intermittent prednisone use, each month, because of shoulder and wrist pain.  Shoulder pain is worse with over the head activity, worsens prior to each orencia infusion.  Morning stiffness for about 45 min. Wrists are worse in the AM and improve with time and activity.     Denies fevers, chills, nausea, vomiting, constipation, diarrhea. No abdominal pain. No chest pain/pressure, palpitations, or shortness of breath. No LE swelling. No neck pain. No oral or nasal sores.  No rash. No sicca symptoms.      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  CV: No chest pain, pressure, palpitations, or dyspnea on exertion.  PULM: No SOB, wheeze, cough.  GI: No nausea, vomiting, constipation, diarrhea. No blood in  stool. No abdominal pain.  : No blood in urine.  MSK: See HPI.  NEURO: No numbness, tingling, or weakness.  EXT: No LE swelling  PSYCH: Negative    Active Problem List     Patient Active Problem List   Diagnosis     Lumbago     NONSPECIFIC MEDICAL HISTORY     HYPERLIPIDEMIA LDL GOAL <130     History of adenomatous polyp of colon     Idiopathic chronic gout without tophus, unspecified site     Advanced directives, counseling/discussion     High risk medication use     Thrombocytopenia (H)     Gastroesophageal reflux disease without esophagitis     Factor 5 Leiden mutation, heterozygous (H)     Personal history of venous thrombosis and embolism     Personal history of DVT (deep vein thrombosis)     Hypothyroidism due to acquired atrophy of thyroid     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)     Secondary hypertension with goal blood pressure less than 140/90     Rheumatoid arthritis involving multiple sites with positive rheumatoid factor (H)     Past Medical History     Past Medical History:   Diagnosis Date     Alopecia, unspecified      Closed fracture of unspecified part of humerus 1974    Arm fracture / left wrist     Esophageal reflux 2/6/2015     Factor 5 Leiden mutation, heterozygous (H)      Gout 3/28/2011     Herpes zoster without mention of complication 1965    Herpes zoster     Measles without mention of complication     Measles     Personal history of DVT (deep vein thrombosis) 4/9/2015     Personal history of venous thrombosis and embolism 4/9/2015     Pulmonary embolism (H) 12/1996    post appendectomy     RA (rheumatoid arthritis) (H) 3/11/2013     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     Rheumatoid arthritis(714.0)     Beginning symptoms     Secondary hypertension with goal blood pressure less than 140/90 11/15/2016     Thrombocytopenia, unspecified (H) 1/29/2014     Varicella without mention of  complication     Chickenpox     Past Surgical History     Past Surgical History:   Procedure Laterality Date     C APPENDECTOMY  1996     COLONOSCOPY  11/15/2010    COLONOSCOPY performed by DARIUS MESA at  GI     COLONOSCOPY N/A 11/2/2015    Procedure: COLONOSCOPY;  Surgeon: Bubba Odom MD;  Location:  GI     ESOPHAGOSCOPY, GASTROSCOPY, DUODENOSCOPY (EGD), COMBINED N/A 10/24/2018    Procedure: Esophagogastroduodenoscopy Multiple Biopsies by Biopsy;  Surgeon: Matteo Johnson DO;  Location:  GI     HC COLONOSCOPY W BIOPSY  08/10/05     HC REPAIR ING HERNIA,5+Y/O,REDUCIBL  1960     HERNIORRHAPHY UMBILICAL N/A 4/17/2015    Procedure: HERNIORRHAPHY UMBILICAL;  Surgeon: Rayray Avila MD;  Location: [facility] OR     LAPAROSCOPIC HERNIORRHAPHY INGUINAL BILATERAL Bilateral 4/17/2015    Procedure: LAPAROSCOPIC HERNIORRHAPHY INGUINAL BILATERAL;  Surgeon: Rayray Avila MD;  Location: [facility] OR     Allergy     Allergies   Allergen Reactions     Lisinopril Cough     Current Medication List     Current Outpatient Medications   Medication Sig     allopurinol (ZYLOPRIM) 300 MG tablet TAKE ONE TABLET BY MOUTH ONCE DAILY. DUE FOR LABS     atorvastatin (LIPITOR) 80 MG tablet Take 1 tablet (80 mg) by mouth daily     cholestyramine light (PREVALITE) 4 GM packet DISSOLVE IN LIQUID AND DRINK ONE PACKET BY MOUTH TWICE A DAY AS DIRECTED     Cyanocobalamin (B-12) 100 MCG TABS      levothyroxine (SYNTHROID/LEVOTHROID) 25 MCG tablet TAKE ONE TABLET BY MOUTH ONCE DAILY  --DUE FOR LABS--     losartan (COZAAR) 50 MG tablet TAKE ONE TABLET BY MOUTH ONCE DAILY     omeprazole (PRILOSEC) 40 MG capsule TAKE ONE CAPSULE BY MOUTH EVERY DAY     capsaicin (ZOSTRIX) 0.075 % topical cream Apply  topically 3 times daily. (Patient not taking: Reported on 3/8/2019)     cetirizine (ZYRTEC) 10 MG tablet Take 1 tablet (10 mg) by mouth every evening (Patient not taking: Reported on 3/8/2019)     diclofenac (VOLTAREN) 1 %  "GEL Apply  2 grams to shoulders three times daily using enclosed dosing card. (Patient not taking: Reported on 3/8/2019)     predniSONE (DELTASONE) 5 MG tablet PRN RA flare: Prednisone 20mg daily x5days, then 15mg daily x5days, then 10mg daily x5days, then 5mg daily x5days, then stop.. (Patient not taking: Reported on 5/3/2019.)     triamcinolone (KENALOG) 0.5 % external cream Apply sparingly to affected area three times daily as needed, no more than 7 days in a row. (Patient not taking: Reported on 3/8/2019)     No current facility-administered medications for this visit.          Social History   See HPI    Family History     Family History   Problem Relation Age of Onset     Arthritis Mother      Cardiovascular Mother      Depression Mother      Gastrointestinal Disease Mother         IBS     Hypertension Mother      Circulatory Mother         Aortal aneurysm     Osteoporosis Mother      Allergies Father         Cortisone     Cardiovascular Father      Circulatory Father      Diabetes Father      Hypertension Father      Lipids Father      Alcohol/Drug Sister         Penicillin     Alzheimer Disease Paternal Grandfather      Blood Disease Paternal Grandmother         Clotting problems     Blood Disease Son         Factor 5     Hypertension Maternal Aunt      Cerebrovascular Disease Maternal Grandmother        Physical Exam     Temp Readings from Last 3 Encounters:   07/26/19 97.3  F (36.3  C) (Temporal)   06/28/19 96.8  F (36  C) (Temporal)   05/31/19 98.2  F (36.8  C) (Oral)     BP Readings from Last 5 Encounters:   08/20/19 (!) 164/98   07/26/19 142/87   06/28/19 (!) 153/95   05/31/19 148/89   05/03/19 (!) 163/94     Pulse Readings from Last 1 Encounters:   08/20/19 59     Resp Readings from Last 1 Encounters:   07/26/19 18     Estimated body mass index is 26.74 kg/m  as calculated from the following:    Height as of this encounter: 1.816 m (5' 11.5\").    Weight as of this encounter: 88.2 kg (194 lb 6.4 " oz).    GEN: NAD  HEENT: MMM. No oral lesions.  Anicteric, noninjected sclera  CV: S1, S2. RRR. No m/r/g.  PULM: CTA bilaterally. No w/c.  MSK: bilateral 2nd MCPs were tender to palpation and with mild synovial swelling. Other MCPs, PIPs, elbows, shoulders, knees, and ankles without swelling or tenderness to palpation.  Negative MCP and MTP squeeze.  Shoulders with normal ROM.  Hips nontender to palpation. Wrists were tenderness to palpation and with mild synovial swelling.   NEURO: UE and LE strengths 5/5 and equal bilaterally.   SKIN: No rash  EXT: No LE edema  PSYCH: Alert. Appropriate.    Labs / Imaging (select studies)   RF/CCP  Recent Labs   Lab Test 04/24/13 0923   CCPABY >250 Strongly Positive*   *     CBC  Recent Labs   Lab Test 08/15/19  0823 04/18/19  0826 11/06/18  0913   WBC 5.1 5.2 7.1   RBC 4.51 4.38* 3.90*   HGB 14.8 14.4 13.2*   HCT 44.4 43.1 39.1*   MCV 98 98 100   RDW 13.6 12.9 12.4   * 103* 136*   MCH 32.8 32.9 33.8*   MCHC 33.3 33.4 33.8   NEUTROPHIL 61.6 56.9 84.5   LYMPH 27.1 31.9 10.9   MONOCYTE 9.9 10.0 4.2   EOSINOPHIL 1.2 1.0 0.3   BASOPHIL 0.2 0.2 0.1   ANEU 3.2 3.0 6.0   ALYM 1.4 1.7 0.8   IKESHA 0.5 0.5 0.3   AEOS 0.1 0.1 0.0   ABAS 0.0 0.0 0.0     CMP  Recent Labs   Lab Test 08/15/19  0823 04/18/19  0826 11/06/18  0913  04/05/18  2355  12/22/17  0735   NA  --   --  141  --  140  --  141   POTASSIUM  --   --  4.2  --  3.4  --  4.2   CHLORIDE  --   --  105  --  104  --  107   CO2  --   --  33*  --  26  --  27   ANIONGAP  --   --  3  --  10  --  7   GLC  --   --  107*  --  124*  --  121*   BUN  --   --  15  --  32*  --  17   CR 0.94 0.95 0.83   < > 0.71   < > 0.85   GFRESTIMATED 84 83 >90   < > >90   < > >90   GFRESTBLACK >90 >90 >90   < > >90   < > >90   ANDREZ  --   --  8.8  --  7.9*  --  8.9   BILITOTAL 1.3 1.5* 0.6   < >  --    < > 1.5*   ALBUMIN 3.6 3.6 3.1*   < >  --    < > 3.7   PROTTOTAL 7.1 7.3 7.4   < >  --    < > 8.4   ALKPHOS 72 63 60   < >  --    < > 90   AST 48* 23  41   < >  --    < > 25   ALT 95* 56 82*   < >  --    < > 49    < > = values in this interval not displayed.     Calcium/VitaminD  Recent Labs   Lab Test 11/06/18  0913 06/26/18  0955 04/05/18  2355 12/22/17  0735   ANDREZ 8.8  --  7.9* 8.9   VITDT  --  25  --   --      ESR/CRP  Recent Labs   Lab Test 08/15/19  0823 04/18/19  0826 11/06/18  0913   SED 9 10 18   CRP <2.9 <2.9 4.4     Lipid Panel  Recent Labs   Lab Test 12/22/17  0735 12/01/16  0822 03/28/16  0824 12/29/15  0756  10/04/13  0819 06/03/13  0837   CHOL 134  --   --  173  --  143 208*   TRIG 149  --   --  211*  --  92 72   HDL 46  --   --  56  --  49 77   LDL 58 98 84 75   < > 75 117   VLDL  --   --   --   --   --  18 14   CHOLHDLRATIO  --   --   --   --   --  3.0 3.0   NHDL 88  --   --  117  --   --   --     < > = values in this interval not displayed.     Hepatitis B  Recent Labs   Lab Test 03/20/18  1008 02/05/14  1104   HBCAB Nonreactive  --    HEPBANG Nonreactive Negative     Hepatitis C  Recent Labs   Lab Test 02/05/14  1104 06/03/13  0837   HCVAB Negative Negative     Tuberculosis Screening  Recent Labs   Lab Test 03/20/18  1008 02/05/14  1104   TBRSLT Negative Negative   TBAGN 0.14 0.01     Immunization History     Immunization History   Administered Date(s) Administered     Influenza (High Dose) 3 valent vaccine 10/06/2017, 10/03/2018     Influenza (IIV3) PF 10/01/2009, 10/27/2010, 10/11/2011     Influenza Vaccine IM 3yrs+ 4 Valent IIV4 10/09/2013, 10/23/2014, 09/18/2015, 11/15/2016     Pneumo Conj 13-V (2010&after) 10/06/2017     Pneumococcal 23 valent 10/23/2014     TD (ADULT, 7+) 09/05/2001     TDAP Vaccine (Boostrix) 10/18/2012     Zoster vaccine recombinant adjuvanted (SHINGRIX) 01/31/2019, 04/18/2019     Zoster vaccine, live 10/23/2014          Chart documentation done in part with Dragon Voice recognition Software. Although reviewed after completion, some word and grammatical error may remain.    Fabricio Gomes MD

## 2019-08-23 ENCOUNTER — INFUSION THERAPY VISIT (OUTPATIENT)
Dept: INFUSION THERAPY | Facility: CLINIC | Age: 69
End: 2019-08-23
Attending: INTERNAL MEDICINE
Payer: MEDICARE

## 2019-08-23 VITALS
DIASTOLIC BLOOD PRESSURE: 84 MMHG | OXYGEN SATURATION: 98 % | WEIGHT: 194 LBS | BODY MASS INDEX: 26.68 KG/M2 | SYSTOLIC BLOOD PRESSURE: 151 MMHG | TEMPERATURE: 97.4 F | RESPIRATION RATE: 69 BRPM | HEART RATE: 59 BPM

## 2019-08-23 DIAGNOSIS — M05.79 RHEUMATOID ARTHRITIS INVOLVING MULTIPLE SITES WITH POSITIVE RHEUMATOID FACTOR (H): Primary | ICD-10-CM

## 2019-08-23 PROCEDURE — 25800030 ZZH RX IP 258 OP 636: Performed by: INTERNAL MEDICINE

## 2019-08-23 PROCEDURE — 96365 THER/PROPH/DIAG IV INF INIT: CPT

## 2019-08-23 PROCEDURE — 25000128 H RX IP 250 OP 636: Performed by: INTERNAL MEDICINE

## 2019-08-23 RX ADMIN — SODIUM CHLORIDE 250 ML: 9 INJECTION, SOLUTION INTRAVENOUS at 09:12

## 2019-08-23 RX ADMIN — SODIUM CHLORIDE 1000 MG: 9 INJECTION, SOLUTION INTRAVENOUS at 09:38

## 2019-08-23 ASSESSMENT — PAIN SCALES - GENERAL: PAINLEVEL: NO PAIN (0)

## 2019-08-23 NOTE — PROGRESS NOTES
Infusion Nursing Note:  Camilo Baca presents today for Orencia.    Patient seen by provider today: No   present during visit today: Not Applicable.    Note: Patient c/o rash on face after shaving last night. Face appears light red on cheeks, no rash noted but feels tingly. Afebrile. Started sulfasalazine Tuesday eve(8/20) and not sure if SE of med. Talked with DR. Gomes and OK to proceed with treatment today.     Intravenous Access:  Peripheral IV placed.    Treatment Conditions:  Biological Infusion Checklist:  ~~~ NOTE: If the patient answers yes to any of the questions below, hold the infusion and contact ordering provider or on-call provider.    1. Have you recently had an elevated temperature, fever, chills, productive cough, coughing for 3 weeks or longer or hemoptysis, abnormal vital signs, night sweats,  chest pain or have you noticed a decrease in your appetite, unexplained weight loss or fatigue? No  2. Do you have any open wounds or new incisions? No  3. Do you have any recent or upcoming hospitalizations, surgeries or dental procedures? No  4. Do you currently have or recently have had any signs of illness or infection or are you on any antibiotics? No  5. Have you had any new, sudden or worsening abdominal pain? No  6. Have you or anyone in your household received a live vaccination in the past 4 weeks? Please note:  No live vaccines while on biologic/chemotherapy until 6 months after the last treatment.  Patient can receive the flu vaccine (shot only) and the pneumovax.  It is optimal for the patient to get these vaccines mid cycle, but they can be given at any time as long as it is not on the day of the infusion. No  7. Have you recently been diagnosed with any new nervous system diseases (ie. Multiple sclerosis, Guillain [place], seizures, neurological changes) or cancer diagnosis? No  8. Are you on any form of radiation or chemotherapy? No  9. Are you pregnant or breast feeding or do  you have plans of pregnancy in the future? No  10. Have you been having any signs of worsening depression or suicidal ideations?  (benlysta only) No  11. Have there been any other new onset medical symptoms? Yes, Rash noted on face after shaving, started sulfasalazine tuesday eveing.         Post Infusion Assessment:  Patient tolerated infusion without incident.  Blood return noted pre and post infusion.  Site patent and intact, free from redness, edema or discomfort.  No evidence of extravasations.  Access discontinued per protocol.   EDUCATION POST BIOLOGICAL/CHEMOTHERAPY INFUSION  Call the triage nurse at your clinic or seek medical attention if you have chills and/or temperature greater than or equal to 100.5, uncontrolled nausea/vomiting, diarrhea, constipation, dizziness, shortness of breath, chest pain, heart palpitations, weakness or any other new or concerning symptoms, questions or concerns.  You can not have any live virus vaccines prior to or during treatment or up to 6 months post infusion.  If you have an upcoming surgery, medical procedure or dental procedure during treatment, this should be discussed with your ordering physician and your surgeon/dentist.  If you are having any concerning symptom, if you are unsure if you should get your next infusion or wish to speak to a provider before your next infusion, please call your care coordinator or triage nurse at your clinic to notify them so we can adequately serve you.    Discharge Plan:   Discharge instructions reviewed with: Patient.  Patient and/or family verbalized understanding of discharge instructions and all questions answered.  Copy of AVS reviewed with patient and/or family.  Patient will return 9/20/19 for next appointment.  Patient discharged in stable condition accompanied by: self.  Departure Mode: Ambulatory.    Charo Muñiz, RN, RN

## 2019-09-04 ENCOUNTER — ALLIED HEALTH/NURSE VISIT (OUTPATIENT)
Dept: FAMILY MEDICINE | Facility: OTHER | Age: 69
End: 2019-09-04
Payer: COMMERCIAL

## 2019-09-04 VITALS — SYSTOLIC BLOOD PRESSURE: 150 MMHG | HEART RATE: 62 BPM | DIASTOLIC BLOOD PRESSURE: 88 MMHG

## 2019-09-04 DIAGNOSIS — I15.9 SECONDARY HYPERTENSION WITH GOAL BLOOD PRESSURE LESS THAN 140/90: Primary | ICD-10-CM

## 2019-09-04 PROCEDURE — 99207 ZZC NO CHARGE NURSE ONLY: CPT | Performed by: NURSE PRACTITIONER

## 2019-09-04 NOTE — PROGRESS NOTES
Camilo Baca was evaluated at [facility] on September 4, 2019 at which time his blood pressure was:    BP Readings from Last 3 Encounters:   09/04/19 (!) 150/88   08/23/19 (!) 151/84   08/20/19 132/86     Pulse Readings from Last 3 Encounters:   09/04/19 62   08/23/19 59   08/20/19 59       Patient declined second reading as he needed to meet his wife.    Symptoms: None    BP Goal:< 140/90 mmHg    BP Assessment:  BP too high    Potential Reasons for BP too high: Unknown/Other: pt states this is his normal     BP Follow-Up Plan: Recheck BP in 30 days at pharmacy    Recommendation to Provider:  None     Note completed by:     Heriberto Saleh PharmD  [facility]  [phone]

## 2019-09-07 DIAGNOSIS — M10.9 ACUTE GOUTY ARTHROPATHY: ICD-10-CM

## 2019-09-09 RX ORDER — ALLOPURINOL 300 MG/1
TABLET ORAL
Qty: 30 TABLET | Refills: 0 | Status: SHIPPED | OUTPATIENT
Start: 2019-09-09 | End: 2019-10-05

## 2019-09-09 NOTE — TELEPHONE ENCOUNTER
"Routing refill request to provider for review/approval because:  Madhuri given x1 and patient did not follow up, please advise  Labs not current:  Uric Acid        Requested Prescriptions   Pending Prescriptions Disp Refills     allopurinol (ZYLOPRIM) 300 MG tablet [Pharmacy Med Name: ALLOPURINOL 300MG TABS] 90 tablet 0     Sig: TAKE ONE TABLET BY MOUTH ONCE DAILY **DUE FOR LABS**   Last Written Prescription Date:  6/12/2019  Last Fill Quantity: 90,  # refills: 0   Last office visit: 12/4/2018 with prescribing provider:     Future Office Visit:   Next 5 appointments (look out 90 days)    Nov 19, 2019  1:20 PM CST  Return Visit with Fabricio Gomes MD  [facility] ([facility]) [address] [place] [id number] [phone]             Gout Agents Protocol Failed - 9/7/2019  9:01 AM        Failed - Has Uric Acid on file in past 12 months and value is less than 6     Recent Labs   Lab Test 02/21/18  0909   URIC 3.6     If level is 6mg/dL or greater, ok to refill one time and refer to provider.           Passed - CBC on file in past 12 months     Recent Labs   Lab Test 08/15/19  0823   WBC 5.1   RBC 4.51   HGB 14.8   HCT 44.4   *           Passed - ALT on file in past 12 months     Recent Labs   Lab Test 08/15/19  0823   ALT 95*           Passed - Recent (12 mo) or future (30 days) visit within the authorizing provider's specialty     Patient had office visit in the last 12 months or has a visit in the next 30 days with authorizing provider or within the authorizing provider's specialty.  See \"Patient Info\" tab in inbasket, or \"Choose Columns\" in Meds & Orders section of the refill encounter.            Passed - Medication is active on med list        Passed - Patient is age 18 or older        Passed - Normal serum creatinine on file in the past 12 months     Recent Labs   Lab Test 08/15/19  0823   CR 0.94             "

## 2019-09-10 ENCOUNTER — TELEPHONE (OUTPATIENT)
Dept: FAMILY MEDICINE | Facility: OTHER | Age: 69
End: 2019-09-10

## 2019-09-10 NOTE — TELEPHONE ENCOUNTER
Reason for Call: Request for an order or referral:    Order or referral being requested: labs     Date needed: as soon as possible    Has the patient been seen by the PCP for this problem? YES    Additional comments: Patient calling stating prescription of  oallopurinol (ZYLOPRIM) f  had stated he is due for labs. Requesting the orders to be placed. Please advise     Phone number Patient can be reached at:  Home number on file [phone] (home)    Best Time:       Can we leave a detailed message on this number?  YES    Call taken on 9/10/2019 at 4:05 PM by Ericka Muñiz

## 2019-09-11 DIAGNOSIS — E78.5 HYPERLIPIDEMIA LDL GOAL <130: Primary | ICD-10-CM

## 2019-09-11 DIAGNOSIS — I15.9 SECONDARY HYPERTENSION WITH GOAL BLOOD PRESSURE LESS THAN 140/90: ICD-10-CM

## 2019-09-11 DIAGNOSIS — E03.4 HYPOTHYROIDISM DUE TO ACQUIRED ATROPHY OF THYROID: ICD-10-CM

## 2019-09-11 DIAGNOSIS — M1A.00X0 IDIOPATHIC CHRONIC GOUT WITHOUT TOPHUS, UNSPECIFIED SITE: ICD-10-CM

## 2019-09-11 NOTE — TELEPHONE ENCOUNTER
Spoke with patient and informed of message below. Patient understood and already had an appointment for 9/17/2019,     Becky Roberto MA

## 2019-09-17 ENCOUNTER — ALLIED HEALTH/NURSE VISIT (OUTPATIENT)
Dept: FAMILY MEDICINE | Facility: OTHER | Age: 69
End: 2019-09-17
Payer: COMMERCIAL

## 2019-09-17 VITALS — DIASTOLIC BLOOD PRESSURE: 78 MMHG | SYSTOLIC BLOOD PRESSURE: 126 MMHG

## 2019-09-17 DIAGNOSIS — Z79.899 HIGH RISK MEDICATIONS (NOT ANTICOAGULANTS) LONG-TERM USE: ICD-10-CM

## 2019-09-17 DIAGNOSIS — M05.9 SEROPOSITIVE RHEUMATOID ARTHRITIS (H): ICD-10-CM

## 2019-09-17 DIAGNOSIS — Z01.30 BP CHECK: Primary | ICD-10-CM

## 2019-09-17 DIAGNOSIS — E78.5 HYPERLIPIDEMIA LDL GOAL <130: ICD-10-CM

## 2019-09-17 DIAGNOSIS — I15.9 SECONDARY HYPERTENSION WITH GOAL BLOOD PRESSURE LESS THAN 140/90: ICD-10-CM

## 2019-09-17 DIAGNOSIS — E03.4 HYPOTHYROIDISM DUE TO ACQUIRED ATROPHY OF THYROID: ICD-10-CM

## 2019-09-17 DIAGNOSIS — M1A.00X0 IDIOPATHIC CHRONIC GOUT WITHOUT TOPHUS, UNSPECIFIED SITE: ICD-10-CM

## 2019-09-17 LAB
ALBUMIN SERPL-MCNC: 3.5 G/DL (ref 3.4–5)
ALP SERPL-CCNC: 77 U/L (ref 40–150)
ALT SERPL W P-5'-P-CCNC: 73 U/L (ref 0–70)
AST SERPL W P-5'-P-CCNC: 28 U/L (ref 0–45)
BASOPHILS # BLD AUTO: 0.1 10E9/L (ref 0–0.2)
BASOPHILS NFR BLD AUTO: 1 %
BILIRUB DIRECT SERPL-MCNC: 0.2 MG/DL (ref 0–0.2)
BILIRUB SERPL-MCNC: 0.9 MG/DL (ref 0.2–1.3)
CHOLEST SERPL-MCNC: 138 MG/DL
CREAT SERPL-MCNC: 0.89 MG/DL (ref 0.66–1.25)
CREAT UR-MCNC: 125 MG/DL
DIFFERENTIAL METHOD BLD: ABNORMAL
EOSINOPHIL # BLD AUTO: 0.1 10E9/L (ref 0–0.7)
EOSINOPHIL NFR BLD AUTO: 1 %
ERYTHROCYTE [DISTWIDTH] IN BLOOD BY AUTOMATED COUNT: 13.1 % (ref 10–15)
GFR SERPL CREATININE-BSD FRML MDRD: 89 ML/MIN/{1.73_M2}
HCT VFR BLD AUTO: 42.7 % (ref 40–53)
HDLC SERPL-MCNC: 38 MG/DL
HGB BLD-MCNC: 14.2 G/DL (ref 13.3–17.7)
LDLC SERPL CALC-MCNC: 83 MG/DL
LYMPHOCYTES # BLD AUTO: 1.1 10E9/L (ref 0.8–5.3)
LYMPHOCYTES NFR BLD AUTO: 21.9 %
MCH RBC QN AUTO: 32.1 PG (ref 26.5–33)
MCHC RBC AUTO-ENTMCNC: 33.3 G/DL (ref 31.5–36.5)
MCV RBC AUTO: 97 FL (ref 78–100)
MICROALBUMIN UR-MCNC: <5 MG/L
MICROALBUMIN/CREAT UR: NORMAL MG/G CR (ref 0–17)
MONOCYTES # BLD AUTO: 0.6 10E9/L (ref 0–1.3)
MONOCYTES NFR BLD AUTO: 10.7 %
NEUTROPHILS # BLD AUTO: 3.4 10E9/L (ref 1.6–8.3)
NEUTROPHILS NFR BLD AUTO: 65.4 %
NONHDLC SERPL-MCNC: 100 MG/DL
PLATELET # BLD AUTO: 133 10E9/L (ref 150–450)
PROT SERPL-MCNC: 7.5 G/DL (ref 6.8–8.8)
RBC # BLD AUTO: 4.42 10E12/L (ref 4.4–5.9)
TRIGL SERPL-MCNC: 85 MG/DL
TSH SERPL DL<=0.005 MIU/L-ACNC: 3.39 MU/L (ref 0.4–4)
URATE SERPL-MCNC: 3.1 MG/DL (ref 3.5–7.2)
WBC # BLD AUTO: 5.2 10E9/L (ref 4–11)

## 2019-09-17 PROCEDURE — 84443 ASSAY THYROID STIM HORMONE: CPT | Performed by: NURSE PRACTITIONER

## 2019-09-17 PROCEDURE — 99207 ZZC NO CHARGE NURSE ONLY: CPT

## 2019-09-17 PROCEDURE — 82043 UR ALBUMIN QUANTITATIVE: CPT | Performed by: NURSE PRACTITIONER

## 2019-09-17 PROCEDURE — 82565 ASSAY OF CREATININE: CPT | Performed by: INTERNAL MEDICINE

## 2019-09-17 PROCEDURE — 36415 COLL VENOUS BLD VENIPUNCTURE: CPT | Performed by: NURSE PRACTITIONER

## 2019-09-17 PROCEDURE — 84550 ASSAY OF BLOOD/URIC ACID: CPT | Performed by: NURSE PRACTITIONER

## 2019-09-17 PROCEDURE — 85025 COMPLETE CBC W/AUTO DIFF WBC: CPT | Performed by: INTERNAL MEDICINE

## 2019-09-17 PROCEDURE — 80076 HEPATIC FUNCTION PANEL: CPT | Performed by: INTERNAL MEDICINE

## 2019-09-17 PROCEDURE — 80061 LIPID PANEL: CPT | Performed by: NURSE PRACTITIONER

## 2019-09-17 NOTE — PROGRESS NOTES
Camilo is a 69 year old male who comes in today for a blood pressure check because of ongoing blood pressure monitoring.  Patient is taking medication as prescribed  Patient is tolerating medications well.  Current complaints: none  Patient is monitoring Blood Pressure elsewhere.  /78     Vitals as recorded, a large cuff was used.  left arm  BP Readings from Last 4 Encounters:   09/17/19 126/78   09/04/19 (!) 150/88   08/23/19 (!) 151/84   08/20/19 132/86       Health Maintenance Due   Topic Date Due     MEDICARE ANNUAL WELLNESS VISIT  08/15/2017     MICROALBUMIN  04/07/2018     TSH W/FREE T4 REFLEX  08/24/2018     LIPID  12/22/2018     PHQ-2  01/01/2019     FALL RISK ASSESSMENT  08/03/2019     INFLUENZA VACCINE (1) 09/01/2019       Patient will continue to monitor blood pressure.  Patria Heurta MA

## 2019-09-20 ENCOUNTER — INFUSION THERAPY VISIT (OUTPATIENT)
Dept: INFUSION THERAPY | Facility: CLINIC | Age: 69
End: 2019-09-20
Attending: INTERNAL MEDICINE
Payer: MEDICARE

## 2019-09-20 VITALS
SYSTOLIC BLOOD PRESSURE: 162 MMHG | BODY MASS INDEX: 26.31 KG/M2 | TEMPERATURE: 97.6 F | DIASTOLIC BLOOD PRESSURE: 93 MMHG | RESPIRATION RATE: 16 BRPM | WEIGHT: 191.3 LBS | HEART RATE: 59 BPM | OXYGEN SATURATION: 98 %

## 2019-09-20 DIAGNOSIS — M05.79 RHEUMATOID ARTHRITIS INVOLVING MULTIPLE SITES WITH POSITIVE RHEUMATOID FACTOR (H): Primary | ICD-10-CM

## 2019-09-20 PROCEDURE — 25800030 ZZH RX IP 258 OP 636: Performed by: INTERNAL MEDICINE

## 2019-09-20 PROCEDURE — 96365 THER/PROPH/DIAG IV INF INIT: CPT

## 2019-09-20 PROCEDURE — 25000128 H RX IP 250 OP 636: Performed by: INTERNAL MEDICINE

## 2019-09-20 RX ADMIN — SODIUM CHLORIDE 250 ML: 9 INJECTION, SOLUTION INTRAVENOUS at 08:20

## 2019-09-20 RX ADMIN — SODIUM CHLORIDE 1000 MG: 9 INJECTION, SOLUTION INTRAVENOUS at 08:54

## 2019-09-20 ASSESSMENT — PAIN SCALES - GENERAL: PAINLEVEL: MILD PAIN (3)

## 2019-09-20 NOTE — PROGRESS NOTES
Infusion Nursing Note:  Camilo Baca presents today for Orencia.    Patient seen by provider today: No   present during visit today: Not Applicable.    Note: N/A.    Intravenous Access:  Peripheral IV placed.    Treatment Conditions:  Biological Infusion Checklist:  ~~~ NOTE: If the patient answers yes to any of the questions below, hold the infusion and contact ordering provider or on-call provider.    1. Have you recently had an elevated temperature, fever, chills, productive cough, coughing for 3 weeks or longer or hemoptysis, abnormal vital signs, night sweats,  chest pain or have you noticed a decrease in your appetite, unexplained weight loss or fatigue? No  2. Do you have any open wounds or new incisions? No  3. Do you have any recent or upcoming hospitalizations, surgeries or dental procedures? No  4. Do you currently have or recently have had any signs of illness or infection or are you on any antibiotics? No  5. Have you had any new, sudden or worsening abdominal pain? No  6. Have you or anyone in your household received a live vaccination in the past 4 weeks? Please note:  No live vaccines while on biologic/chemotherapy until 6 months after the last treatment.  Patient can receive the flu vaccine (shot only) and the pneumovax.  It is optimal for the patient to get these vaccines mid cycle, but they can be given at any time as long as it is not on the day of the infusion. No  7. Have you recently been diagnosed with any new nervous system diseases (ie. Multiple sclerosis, Guillain [place], seizures, neurological changes) or cancer diagnosis? No  8. Are you on any form of radiation or chemotherapy? No  9. Are you pregnant or breast feeding or do you have plans of pregnancy in the future? No  10. Have you been having any signs of worsening depression or suicidal ideations?  (benlysta only) No  11. Have there been any other new onset medical symptoms? No        Post Infusion Assessment:  Patient  tolerated infusion without incident.  Patient observed for 15 minutes post orencia per protocol.  Site patent and intact, free from redness, edema or discomfort.  No evidence of extravasations.  Access discontinued per protocol.   EDUCATION POST BIOLOGICAL/CHEMOTHERAPY INFUSION  Call the triage nurse at your clinic or seek medical attention if you have chills and/or temperature greater than or equal to 100.5, uncontrolled nausea/vomiting, diarrhea, constipation, dizziness, shortness of breath, chest pain, heart palpitations, weakness or any other new or concerning symptoms, questions or concerns.  You can not have any live virus vaccines prior to or during treatment or up to 6 months post infusion.  If you have an upcoming surgery, medical procedure or dental procedure during treatment, this should be discussed with your ordering physician and your surgeon/dentist.  If you are having any concerning symptom, if you are unsure if you should get your next infusion or wish to speak to a provider before your next infusion, please call your care coordinator or triage nurse at your clinic to notify them so we can adequately serve you.      Discharge Plan:   Discharge instructions reviewed with: Patient.  Patient discharged in stable condition accompanied by: self.  Departure Mode: Ambulatory.    Emy Figueredo, RN, RN

## 2019-09-24 ENCOUNTER — MYC MEDICAL ADVICE (OUTPATIENT)
Dept: RHEUMATOLOGY | Facility: CLINIC | Age: 69
End: 2019-09-24

## 2019-09-24 DIAGNOSIS — G89.29 CHRONIC PAIN OF BOTH SHOULDERS: Primary | ICD-10-CM

## 2019-09-24 DIAGNOSIS — M25.512 CHRONIC PAIN OF BOTH SHOULDERS: Primary | ICD-10-CM

## 2019-09-24 DIAGNOSIS — M25.511 CHRONIC PAIN OF BOTH SHOULDERS: Primary | ICD-10-CM

## 2019-09-27 ENCOUNTER — ANCILLARY PROCEDURE (OUTPATIENT)
Dept: GENERAL RADIOLOGY | Facility: OTHER | Age: 69
End: 2019-09-27
Attending: INTERNAL MEDICINE
Payer: COMMERCIAL

## 2019-09-27 DIAGNOSIS — M25.512 CHRONIC PAIN OF BOTH SHOULDERS: ICD-10-CM

## 2019-09-27 DIAGNOSIS — M25.511 CHRONIC PAIN OF BOTH SHOULDERS: ICD-10-CM

## 2019-09-27 DIAGNOSIS — G89.29 CHRONIC PAIN OF BOTH SHOULDERS: ICD-10-CM

## 2019-09-27 PROCEDURE — 73030 X-RAY EXAM OF SHOULDER: CPT | Mod: 50

## 2019-09-28 ENCOUNTER — HEALTH MAINTENANCE LETTER (OUTPATIENT)
Age: 69
End: 2019-09-28

## 2019-10-01 DIAGNOSIS — E78.5 HYPERLIPIDEMIA LDL GOAL <130: ICD-10-CM

## 2019-10-01 NOTE — TELEPHONE ENCOUNTER
Prevalite 4 G packet       Last Written Prescription Date:    Last Fill Quantity: ,   # refills:   Last Office Visit: 10/3/18  Future Office visit:    Next 5 appointments (look out 90 days)    Nov 19, 2019  1:20 PM CST  Return Visit with Fabricio Gomes MD  [facility] ([facility]) [address] [place] [id number]  [phone]           Routing refill request to provider for review/approval because:  Drug not on the [facility], [facility] or  Health refill protocol or controlled substance

## 2019-10-02 RX ORDER — CHOLESTYRAMINE LIGHT 4 G/5.7G
POWDER, FOR SUSPENSION ORAL
Qty: 60 PACKET | Refills: 3 | Status: SHIPPED | OUTPATIENT
Start: 2019-10-02 | End: 2020-02-03

## 2019-10-05 DIAGNOSIS — I10 BENIGN ESSENTIAL HYPERTENSION: ICD-10-CM

## 2019-10-05 DIAGNOSIS — M10.9 ACUTE GOUTY ARTHROPATHY: ICD-10-CM

## 2019-10-07 RX ORDER — LOSARTAN POTASSIUM 50 MG/1
TABLET ORAL
Qty: 30 TABLET | Refills: 0 | Status: SHIPPED | OUTPATIENT
Start: 2019-10-07 | End: 2019-10-23

## 2019-10-07 RX ORDER — ALLOPURINOL 300 MG/1
TABLET ORAL
Qty: 30 TABLET | Refills: 0 | Status: SHIPPED | OUTPATIENT
Start: 2019-10-07 | End: 2019-11-02

## 2019-10-07 NOTE — TELEPHONE ENCOUNTER
"Requested Prescriptions   Pending Prescriptions Disp Refills     losartan (COZAAR) 50 MG tablet [Pharmacy Med Name: LOSARTAN POTASSIUM 50MG TABS] 90 tablet 0     Sig: TAKE ONE TABLET BY MOUTH ONCE DAILY   Last Written Prescription Date:  7/30/19  Last Fill Quantity: 90,  # refills: 0   Last office visit: 9/17/2019 with prescribing provider:  10/3/18   Future Office Visit:   Next 5 appointments (look out 90 days)    Nov 19, 2019  1:20 PM CST  Return Visit with Fabricio Gomes MD  [facility] [address] [place] [id number]  [phone]           Angiotensin-II Receptors Failed - 10/5/2019  9:16 AM        Failed - Last blood pressure under 140/90 in past 12 months     BP Readings from Last 3 Encounters:   09/20/19 (!) 162/93   09/17/19 126/78   09/04/19 (!) 150/88                 Passed - Recent (12 mo) or future (30 days) visit within the authorizing provider's specialty     Patient has had an office visit with the authorizing provider or a provider within the authorizing providers department within the previous 12 mos or has a future within next 30 days. See \"Patient Info\" tab in inbasket, or \"Choose Columns\" in Meds & Orders section of the refill encounter.              Passed - Medication is active on med list        Passed - Patient is age 18 or older        Passed - Normal serum creatinine on file in past 12 months     Recent Labs   Lab Test 09/17/19  0829   CR 0.89             Passed - Normal serum potassium on file in past 12 months     Recent Labs   Lab Test 11/06/18  0913   POTASSIUM 4.2                    allopurinol (ZYLOPRIM) 300 MG tablet [Pharmacy Med Name: ALLOPURINOL 300MG TABS] 30 tablet 0     Sig: TAKE ONE TABLET BY MOUTH ONCE DAILY **DUE FOR LABS**   Last Written Prescription Date:  9/9/19  Last Fill Quantity: 30,  # refills: 0   Last office visit: 9/17/2019 with prescribing provider:  10/3/18   Future Office Visit:   Next 5 " "appointments (look out 90 days)    Nov 19, 2019  1:20 PM CST  Return Visit with Fabricio Gomes MD  [facility] ([facility]) [address] [id number] [phone]             Gout Agents Protocol Passed - 10/5/2019  9:16 AM        Passed - CBC on file in past 12 months     Recent Labs   Lab Test 09/17/19  0829   WBC 5.2   RBC 4.42   HGB 14.2   HCT 42.7   *                 Passed - ALT on file in past 12 months     Recent Labs   Lab Test 09/17/19  0829   ALT 73*             Passed - Has Uric Acid on file in past 12 months and value is less than 6     Recent Labs   Lab Test 09/17/19  0827   URIC 3.1*     If level is 6mg/dL or greater, ok to refill one time and refer to provider.           Passed - Recent (12 mo) or future (30 days) visit within the authorizing provider's specialty     Patient has had an office visit with the authorizing provider or a provider within the authorizing providers department within the previous 12 mos or has a future within next 30 days. See \"Patient Info\" tab in inbasket, or \"Choose Columns\" in Meds & Orders section of the refill encounter.              Passed - Medication is active on med list        Passed - Patient is age 18 or older        Passed - Normal serum creatinine on file in the past 12 months     Recent Labs   Lab Test 09/17/19  0829   CR 0.89             "

## 2019-10-08 ENCOUNTER — HOSPITAL ENCOUNTER (OUTPATIENT)
Dept: MRI IMAGING | Facility: CLINIC | Age: 69
End: 2019-10-08
Attending: INTERNAL MEDICINE
Payer: MEDICARE

## 2019-10-08 ENCOUNTER — HOSPITAL ENCOUNTER (OUTPATIENT)
Dept: MRI IMAGING | Facility: CLINIC | Age: 69
Discharge: HOME | End: 2019-10-08
Attending: INTERNAL MEDICINE | Admitting: INTERNAL MEDICINE
Payer: MEDICARE

## 2019-10-08 DIAGNOSIS — M25.512 CHRONIC PAIN OF BOTH SHOULDERS: ICD-10-CM

## 2019-10-08 DIAGNOSIS — M25.511 CHRONIC PAIN OF BOTH SHOULDERS: ICD-10-CM

## 2019-10-08 DIAGNOSIS — G89.29 CHRONIC PAIN OF BOTH SHOULDERS: ICD-10-CM

## 2019-10-08 PROCEDURE — 73221 MRI JOINT UPR EXTREM W/O DYE: CPT | Mod: LT

## 2019-10-10 NOTE — TELEPHONE ENCOUNTER
"Rheumatology team: Please call Mr. Baca to see if he can come in at one of the times noted in the [facility] message below - please call him to confirm an appointment time.    \"Mr. Baca,  The MRIs did not show any significant rotator cuff pathology, but did show findings of impingement syndrome.  You will likely benefit from joint injections and physical therapy.  Would you have time to come in on Thursday 10/10/2019 at 2:20PM, or Friday 10/11/2019 at 9:40AM for joint injections at the [facility] in [place]?  Fabricio Gomes MD  10/10/2019 1:38 AM\"  "

## 2019-10-10 NOTE — TELEPHONE ENCOUNTER
Called patient's phone however there was no answer. Left vm to call clinic. Then called wife's phone and she answered and handed phone to patient. Relayed Dr. Gomes's message below. Patient agreed with the plan and will come in tomorrow at 9:40 for an injection.    Avery Cuellar RN....10/10/2019 8:42 AM

## 2019-10-11 ENCOUNTER — OFFICE VISIT (OUTPATIENT)
Dept: RHEUMATOLOGY | Facility: CLINIC | Age: 69
End: 2019-10-11
Payer: COMMERCIAL

## 2019-10-11 VITALS
HEIGHT: 70 IN | WEIGHT: 194.4 LBS | SYSTOLIC BLOOD PRESSURE: 146 MMHG | DIASTOLIC BLOOD PRESSURE: 90 MMHG | BODY MASS INDEX: 27.83 KG/M2 | HEART RATE: 62 BPM | OXYGEN SATURATION: 97 %

## 2019-10-11 DIAGNOSIS — M75.41 IMPINGEMENT SYNDROME OF BOTH SHOULDERS: Primary | ICD-10-CM

## 2019-10-11 DIAGNOSIS — M75.42 IMPINGEMENT SYNDROME OF BOTH SHOULDERS: Primary | ICD-10-CM

## 2019-10-11 PROCEDURE — 99213 OFFICE O/P EST LOW 20 MIN: CPT | Mod: 25 | Performed by: INTERNAL MEDICINE

## 2019-10-11 PROCEDURE — 20610 DRAIN/INJ JOINT/BURSA W/O US: CPT | Mod: 50 | Performed by: INTERNAL MEDICINE

## 2019-10-11 RX ORDER — METHYLPREDNISOLONE ACETATE 40 MG/ML
40 INJECTION, SUSPENSION INTRA-ARTICULAR; INTRALESIONAL; INTRAMUSCULAR; SOFT TISSUE ONCE
Status: COMPLETED | OUTPATIENT
Start: 2019-10-11 | End: 2019-10-11

## 2019-10-11 RX ADMIN — METHYLPREDNISOLONE ACETATE 40 MG: 40 INJECTION, SUSPENSION INTRA-ARTICULAR; INTRALESIONAL; INTRAMUSCULAR; SOFT TISSUE at 10:10

## 2019-10-11 ASSESSMENT — MIFFLIN-ST. JEOR: SCORE: 1663.04

## 2019-10-11 NOTE — PROGRESS NOTES
Rheumatology Clinic Visit      Camilo Baca MRN# [id number]   YOB: 1950 Age: 69 year old      Date of visit: 10/11/19   PCP: Lyndsey Aponte    Chief Complaint   Patient presents with:  RECHECK: RA    Assessment and Plan     1.  Impingement syndrome of both shoulders: The diagnosis and treatment options were discussed in detail today.  Steroid injections of both shoulders as documented in the procedure section.  Refer to physical therapy.      Mr. Baca verbalized agreement with and understanding of the rational for the diagnosis and treatment plan.  All questions were answered to best of my ability and the patient's satisfaction. Mr. Baca was advised to contact the clinic with any questions that may arise after the clinic visit.      Thank you for involving me in the care of the patient    Return to clinic: Nov    HPI   Camilo Baca is a 69 year old male with a past medical history significant for hypertension, hyperlipidemia, GERD, hypothyroidism, history of DVT, factor V Leiden mutation, history of thrombocytopenia, gout, and rheumatoid arthritis who presents earlier than previously scheduled because of bilateral shoulder pain.    Since he was last seen he called to complain about bilateral shoulder pain and wanted oral steroids.  Prednisone does help his bilateral shoulders.  He had been doing well so it did not make sense that he was having more pain and therefore imaging was done that was consistent with impingement syndrome.  Today, he reports having bilateral shoulder pain that is worse with overhead activities and sometimes when he is sleeping at night if he lays on the affected side.    Denies fevers, chills, nausea, vomiting, constipation, diarrhea. No abdominal pain. No chest pain/pressure, palpitations, or shortness of breath. No LE swelling. No neck pain. No oral or nasal sores.  No rash. No sicca symptoms.      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  CV: No chest pain, pressure, palpitations, or dyspnea on exertion.  PULM: No SOB, wheeze, cough.  GI: No nausea, vomiting, constipation, diarrhea. No blood in stool. No abdominal pain.  : No blood in urine.  MSK: See HPI.  NEURO: No numbness, tingling, or weakness.  EXT: No LE swelling  PSYCH: Negative    Active Problem List     Patient Active Problem List   Diagnosis     Lumbago     NONSPECIFIC MEDICAL HISTORY     HYPERLIPIDEMIA LDL GOAL <130     History of adenomatous polyp of colon     Idiopathic chronic gout without tophus, unspecified site     Advanced directives, counseling/discussion     Seropositive rheumatoid arthritis (H)     High risk medication use     Thrombocytopenia (H)     Gastroesophageal reflux disease without esophagitis     Factor 5 Leiden mutation, heterozygous (H)     Personal history of venous thrombosis and embolism     Personal history of DVT (deep vein thrombosis)     Hypothyroidism due to acquired atrophy of thyroid     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)     Secondary hypertension with goal blood pressure less than 140/90     Rheumatoid arthritis involving multiple sites with positive rheumatoid factor (H)     Past Medical History     Past Medical History:   Diagnosis Date     Alopecia, unspecified      Closed fracture of unspecified part of humerus 1974    Arm fracture / left wrist     Esophageal reflux 2/6/2015     Factor 5 Leiden mutation, heterozygous (H)      Gout 3/28/2011     Herpes zoster without mention of complication 1965    Herpes zoster     Measles without mention of complication     Measles     Personal history of DVT (deep vein thrombosis) 4/9/2015     Personal history of venous thrombosis and embolism 4/9/2015     Pulmonary embolism (H) 12/1996     post appendectomy     RA (rheumatoid arthritis) (H) 3/11/2013     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     Rheumatoid arthritis(714.0)     Beginning symptoms     Secondary hypertension with goal blood pressure less than 140/90 11/15/2016     Thrombocytopenia, unspecified (H) 1/29/2014     Varicella without mention of complication     Chickenpox     Past Surgical History     Past Surgical History:   Procedure Laterality Date     C APPENDECTOMY  1996     COLONOSCOPY  11/15/2010    COLONOSCOPY performed by DARIUS MESA at  GI     COLONOSCOPY N/A 11/2/2015    Procedure: COLONOSCOPY;  Surgeon: Bubba Odom MD;  Location:  GI     ESOPHAGOSCOPY, GASTROSCOPY, DUODENOSCOPY (EGD), COMBINED N/A 10/24/2018    Procedure: Esophagogastroduodenoscopy Multiple Biopsies by Biopsy;  Surgeon: Matteo Johnson DO;  Location:  GI     HC COLONOSCOPY W BIOPSY  08/10/05     HC REPAIR ING HERNIA,5+Y/O,REDUCIBL  1960     HERNIORRHAPHY UMBILICAL N/A 4/17/2015    Procedure: HERNIORRHAPHY UMBILICAL;  Surgeon: Rayray Avila MD;  Location: [facility]     LAPAROSCOPIC HERNIORRHAPHY INGUINAL BILATERAL Bilateral 4/17/2015    Procedure: LAPAROSCOPIC HERNIORRHAPHY INGUINAL BILATERAL;  Surgeon: Rayray Avila MD;  Location: [facility] OR     Allergy     Allergies   Allergen Reactions     Lisinopril Cough     Current Medication List     Current Outpatient Medications   Medication Sig     allopurinol (ZYLOPRIM) 300 MG tablet TAKE ONE TABLET BY MOUTH ONCE DAILY **DUE FOR LABS**     atorvastatin (LIPITOR) 80 MG tablet Take 1 tablet (80 mg) by mouth daily     capsaicin (ZOSTRIX) 0.075 % topical cream Apply  topically 3 times daily.     cholestyramine light (PREVALITE) 4 GM packet DISSOLVE ONE PACKET IN LIQUID AND DRINK BY MOUTH TWO TIMES A DAY AS DIRECTED     Cyanocobalamin (B-12) 100 MCG TABS      levothyroxine (SYNTHROID/LEVOTHROID) 25 MCG tablet TAKE ONE TABLET BY  MOUTH ONCE DAILY  --DUE FOR LABS--     losartan (COZAAR) 50 MG tablet TAKE ONE TABLET BY MOUTH ONCE DAILY     omeprazole (PRILOSEC) 40 MG capsule TAKE ONE CAPSULE BY MOUTH EVERY DAY     sulfaSALAzine ER (AZULFIDINE EN) 500 MG EC tablet Take 1 tablet (500 mg) by mouth 2 times daily     cetirizine (ZYRTEC) 10 MG tablet Take 1 tablet (10 mg) by mouth every evening (Patient not taking: Reported on 10/11/2019)     diclofenac (VOLTAREN) 1 % GEL Apply  2 grams to shoulders three times daily using enclosed dosing card. (Patient not taking: Reported on 10/11/2019)     predniSONE (DELTASONE) 5 MG tablet PRN RA flare: Prednisone 20mg daily x2days, then 15mg daily x2days, then 10mg daily x2days, then 5mg daily x2days, then stop. (Patient not taking: Reported on 10/11/2019)     triamcinolone (KENALOG) 0.5 % external cream Apply sparingly to affected area three times daily as needed, no more than 7 days in a row. (Patient not taking: Reported on 10/11/2019)     Current Facility-Administered Medications   Medication     methylPREDNISolone (DEPO-MEDROL) injection 40 mg     methylPREDNISolone (DEPO-MEDROL) injection 40 mg         Social History   See HPI    Family History     Family History   Problem Relation Age of Onset     Arthritis Mother      Cardiovascular Mother      Depression Mother      Gastrointestinal Disease Mother         IBS     Hypertension Mother      Circulatory Mother         Aortal aneurysm     Osteoporosis Mother      Allergies Father         Cortisone     Cardiovascular Father      Circulatory Father      Diabetes Father      Hypertension Father      Lipids Father      Alcohol/Drug Sister         Penicillin     Alzheimer Disease Paternal Grandfather      Blood Disease Paternal Grandmother         Clotting problems     Blood Disease Son         Factor 5     Hypertension Maternal Aunt      Cerebrovascular Disease Maternal Grandmother        Physical Exam     Temp Readings from Last 3 Encounters:   09/20/19 97.6  " F (36.4  C) (Temporal)   08/23/19 97.4  F (36.3  C) (Temporal)   07/26/19 97.3  F (36.3  C) (Temporal)     BP Readings from Last 5 Encounters:   10/11/19 (!) 146/90   09/20/19 (!) 162/93   09/17/19 126/78   09/04/19 (!) 150/88   08/23/19 (!) 151/84     Pulse Readings from Last 1 Encounters:   10/11/19 62     Resp Readings from Last 1 Encounters:   09/20/19 16     Estimated body mass index is 27.89 kg/m  as calculated from the following:    Height as of this encounter: 1.778 m (5' 10\").    Weight as of this encounter: 88.2 kg (194 lb 6.4 oz).    GEN: NAD  HEENT: MMM. Anicteric, noninjected sclera  CV: S1, S2. RRR. No m/r/g.  PULM: CTA bilaterally. No w/c.  MSK:  Shoulders with normal ROM; tender on the most lateral aspect with abduction above 90 degrees; no swelling or increased warmth  SKIN: No rash  PSYCH: Alert. Appropriate.    Labs / Imaging (select studies)     Recent Results (from the past 744 hour(s))   X-ray bl Shoulder 3 Vws    Narrative    SHOULDER THREE VIEWS BILATERAL   9/27/2019 10:49 AM     HISTORY:  Bilateral shoulder pain, worse with over the head activity;  has inflammatory arthritis. Chronic pain of both shoulders.      Impression    IMPRESSION: Unremarkable exam.    JEAN PIERRE SAMANIEGO MD   MR Shoulder Left w/o Contrast    Narrative    MR SHOULDER LEFT WITHOUT CONTRAST 10/8/2019 12:50 PM     HISTORY: Chronic pain of both shoulders in setting of inflammatory  arthritis.    TECHNIQUE: Multiplanar, multisequence without contrast.    FINDINGS:  Osseous Acromion Outlet: There is AC arthrosis, including mild  inferior hypertrophic change. Mild lateral acromial downsloping. Mild  subacromial spurring. Thickening of the coracoacromial ligament. Type  2 acromion. No os acromiale.    Rotator Cuff: Supraspinatus tendon - mild-moderate tendinosis, with no  tear identified. Infraspinatus tendon - no tear or significant  tendinosis. Subscapularis tendon - no tear or significant tendinosis.  Intact teres minor tendon. " No asymmetric muscle atrophy.     Labral Structures: No superior labral tear (SLAP lesion) identified.  No labral cyst identified. No anterior or posterior labral tear  identified.    Biceps Tendon: No tear, dislocation, or significant tendinosis.    Osseous and Cartilaginous Structures: No bone contusion or fracture.  No glenohumeral osteoarthritis or apparent chondromalacia identified.    Joint Space: No significant overall joint effusion.    Additional Findings: No deltoid muscle edema. Minimal fluid within the  subacromial-subdeltoid bursa. There is very mild teres minor muscle  edema, which is nonspecific but could relate to recent strenuous  exercise or delayed onset muscle soreness.      Impression    IMPRESSION:  1. Mild-moderate supraspinatus tendinosis.  2. Some findings which can be seen in association with impingement  syndrome, in the appropriate clinical setting.   3. Very mild nonspecific teres minor muscle edema.    JEAN PIERRE SAMANIEGO MD   MR Shoulder Right w/o Contrast    Narrative    MR SHOULDER RIGHT WITHOUT CONTRAST 10/8/2019 1:21 PM     HISTORY: Chronic pain of both shoulders; in setting of inflammatory  arthritis.    TECHNIQUE: Multiplanar, multisequence without contrast.    FINDINGS:  Osseous Acromion Outlet: There is mild AC arthrosis, including mild  inferior capsular thickening. Mild subacromial spurring. Slight  lateral acromial downsloping. Type 2 acromion. No os acromiale.    Rotator Cuff: Supraspinatus tendon - mild tendinosis, with no tear  identified. Infraspinatus tendon - no tear or significant tendinosis.  Subscapularis tendon - no tear or significant tendinosis. Intact teres  minor tendon. No asymmetric muscle atrophy.     Labral Structures: No definite superior labral tear (SLAP lesion)  identified. No labral cyst identified. No anterior or posterior labral  tear identified.    Biceps Tendon: No tear, dislocation, or significant tendinosis.    Osseous and Cartilaginous Structures: Focal  impression at the  posterolateral aspect of the humeral head; this could simply represent  resorptive change, but a Hill-Sachs lesion cannot be excluded (if  there has been prior dislocation). No bone contusion or fracture. No  glenohumeral osteoarthritis or apparent chondromalacia identified.    Joint Space: No significant overall joint effusion.    Additional Findings: No deltoid muscle edema. Mild fluid within the  subacromial-subdeltoid bursa.      Impression    IMPRESSION:  1. Mild supraspinatus tendinosis.  2. Some findings which can be seen in association with impingement  syndrome, in the appropriate clinical setting.     JEAN PIERRE SAMANIEGO MD       Immunization History     Immunization History   Administered Date(s) Administered     Influenza (High Dose) 3 valent vaccine 10/06/2017, 10/03/2018     Influenza (IIV3) PF 10/01/2009, 10/27/2010, 10/11/2011     Influenza Vaccine IM > 6 months Valent IIV4 10/09/2013, 10/23/2014, 09/18/2015, 11/15/2016     Pneumo Conj 13-V (2010&after) 10/06/2017     Pneumococcal 23 valent 10/23/2014     TD (ADULT, 7+) 09/05/2001     TDAP Vaccine (Boostrix) 10/18/2012     Zoster vaccine recombinant adjuvanted (SHINGRIX) 01/31/2019, 04/18/2019     Zoster vaccine, live 10/23/2014       Procedure     Procedure: Steroid injections of each shoulder.   Indication: Pain, impingement syndrome of both shoulders    The procedure was explained in detail. Risks including infection, pain, structural damage such as cartilage damage and tendon rupture, fat atrophy, skin hyper-/hypo-pigmentation, and medication reaction was explained. The need for rest of the affected joint for one week after the procedure was explained.  The option of not doing the procedure was also provided. All questions were answered and the patient consented to the procedure.     A time-out was performed and the correct patient, procedure, and laterality were verified.    The right shoulder was examined and location for injection  was identified - posterior approach. The area was cleaned with chlorhexidine, twice.  Ethyl chloride was then used for topical anaesthetic.  Then a mixture of lidocaine 1% 2 mL and Depo-Medrol 40mg was injected into the intra-articular space.     The left shoulder was examined and location for injection was identified - posterior approach. The area was cleaned with chlorhexidine, twice.  Ethyl chloride was then used for topical anaesthetic.  Then a mixture of lidocaine 1% 2 mL and Depo-Medrol 40mg was injected into the intra-articular space.     The patient tolerated the procedure well. No complications.    MEDICATION: Methylprednisolone  LOT #: [id number]  :  [employer]  EXPIRATION DATE:  04/2020  NDC#: 6640-9969-07  Consent signed at:  10:05AM     MEDICATION: Methylprednisolone  LOT #: [id number]  :  [employer]  EXPIRATION DATE:  04/2020  NDC#: 9598-3969-55     1% Lidocaine  :  [employer]  Lot #:  [id number]  Expiration date: 08/2020  NDC: 2798-0527-39         Chart documentation done in part with Dragon Voice recognition Software. Although reviewed after completion, some word and grammatical error may remain.    Fabricio Gomes MD

## 2019-10-11 NOTE — NURSING NOTE
The following medication was given:     MEDICATION: Methylprednisolone  SITE: Left shoulder  DOSE: 1 ml  LOT #: [id number]  :  [employer]  EXPIRATION DATE:  04/2020  NDC#: 4849-4784-45  Consent signed at:  10:05AM      MEDICATION: Methylprednisolone  SITE: Right shoulder  DOSE: 1 ml  LOT #: [id number]  :  [employer]  EXPIRATION DATE:  04/2020  NDC#: 3236-7689-57    1% Lidocaine  :  [employer]  Lot #:  [id number]  Expiration date: 08/2020  NDC: 6338-4511-98

## 2019-10-11 NOTE — NURSING NOTE
Camilo to follow up with Primary Care provider regarding elevated blood pressure.           RAPID3 (0-30) Cumulative Score  9.7          RAPID3 Weighted Score (divide #4 by 3 and that is the weighted score)  3.2

## 2019-10-15 ENCOUNTER — HOSPITAL ENCOUNTER (OUTPATIENT)
Dept: PHYSICAL THERAPY | Facility: CLINIC | Age: 69
Setting detail: THERAPIES SERIES
End: 2019-10-15
Attending: INTERNAL MEDICINE
Payer: MEDICARE

## 2019-10-15 DIAGNOSIS — Z79.899 HIGH RISK MEDICATIONS (NOT ANTICOAGULANTS) LONG-TERM USE: ICD-10-CM

## 2019-10-15 DIAGNOSIS — M05.9 SEROPOSITIVE RHEUMATOID ARTHRITIS (H): ICD-10-CM

## 2019-10-15 LAB
ALBUMIN SERPL-MCNC: 3.7 G/DL (ref 3.4–5)
ALP SERPL-CCNC: 71 U/L (ref 40–150)
ALT SERPL W P-5'-P-CCNC: 50 U/L (ref 0–70)
AST SERPL W P-5'-P-CCNC: 27 U/L (ref 0–45)
BASOPHILS # BLD AUTO: 0 10E9/L (ref 0–0.2)
BASOPHILS NFR BLD AUTO: 0.4 %
BILIRUB DIRECT SERPL-MCNC: 0.2 MG/DL (ref 0–0.2)
BILIRUB SERPL-MCNC: 0.7 MG/DL (ref 0.2–1.3)
CREAT SERPL-MCNC: 0.89 MG/DL (ref 0.66–1.25)
DIFFERENTIAL METHOD BLD: ABNORMAL
EOSINOPHIL # BLD AUTO: 0 10E9/L (ref 0–0.7)
EOSINOPHIL NFR BLD AUTO: 0.8 %
ERYTHROCYTE [DISTWIDTH] IN BLOOD BY AUTOMATED COUNT: 13.5 % (ref 10–15)
GFR SERPL CREATININE-BSD FRML MDRD: 88 ML/MIN/{1.73_M2}
HCT VFR BLD AUTO: 41.1 % (ref 40–53)
HGB BLD-MCNC: 13.7 G/DL (ref 13.3–17.7)
LYMPHOCYTES # BLD AUTO: 1.2 10E9/L (ref 0.8–5.3)
LYMPHOCYTES NFR BLD AUTO: 24.4 %
MCH RBC QN AUTO: 32.2 PG (ref 26.5–33)
MCHC RBC AUTO-ENTMCNC: 33.3 G/DL (ref 31.5–36.5)
MCV RBC AUTO: 97 FL (ref 78–100)
MONOCYTES # BLD AUTO: 0.7 10E9/L (ref 0–1.3)
MONOCYTES NFR BLD AUTO: 14 %
NEUTROPHILS # BLD AUTO: 2.9 10E9/L (ref 1.6–8.3)
NEUTROPHILS NFR BLD AUTO: 60.4 %
PLATELET # BLD AUTO: 144 10E9/L (ref 150–450)
PROT SERPL-MCNC: 7.5 G/DL (ref 6.8–8.8)
RBC # BLD AUTO: 4.26 10E12/L (ref 4.4–5.9)
WBC # BLD AUTO: 4.8 10E9/L (ref 4–11)

## 2019-10-15 PROCEDURE — 36415 COLL VENOUS BLD VENIPUNCTURE: CPT | Performed by: INTERNAL MEDICINE

## 2019-10-15 PROCEDURE — 80076 HEPATIC FUNCTION PANEL: CPT | Performed by: INTERNAL MEDICINE

## 2019-10-15 PROCEDURE — 82565 ASSAY OF CREATININE: CPT | Performed by: INTERNAL MEDICINE

## 2019-10-15 PROCEDURE — 97110 THERAPEUTIC EXERCISES: CPT | Mod: GP

## 2019-10-15 PROCEDURE — 97161 PT EVAL LOW COMPLEX 20 MIN: CPT | Mod: GP

## 2019-10-15 PROCEDURE — 85025 COMPLETE CBC W/AUTO DIFF WBC: CPT | Performed by: INTERNAL MEDICINE

## 2019-10-15 NOTE — PLAN OF CARE
[facility]          OUTPATIENT PHYSICAL THERAPY ORTHOPEDIC EVALUATION  PLAN OF TREATMENT FOR OUTPATIENT REHABILITATION  (COMPLETE FOR INITIAL CLAIMS ONLY)  Patient's Last Name, First Name, M.I.  YOB: 1950  Camilo Baca    Provider s Name:  [facility]   Medical Record No.  [id number]   Start of Care Date:  10/15/19   Onset Date:  08/01/19   Type:     _X__PT   ___OT   ___SLP Medical Diagnosis:  B shoulder impingement     PT Diagnosis:  B shoulder impingement, poor postural control   Visits from SOC:  1      _________________________________________________________________________________  Plan of Treatment/Functional Goals:  joint mobilization, manual therapy, ROM, strengthening, stretching     Cryotherapy     Goals  Goal Identifier: ROM  Goal Description: Pt will demonstrate full, B ROM in shoulders in order to demonstrate improved functional use of UE's.   Target Date: 01/12/20    Goal Identifier: Postural control  Goal Description: Pt will be able to lift 10-20 lbs with proper form and postural control in order to demonstrate safety with fam activities.   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   Target Date: 01/12/20          Therapy Frequency:  1 time/week  Predicted Duration of Therapy Intervention:  12 weeks    Melba Alvarez PT                 I CERTIFY THE NEED FOR THESE SERVICES FURNISHED UNDER        THIS PLAN OF TREATMENT AND WHILE UNDER MY CARE     (Physician co-signature of this document indicates review and certification of the therapy plan).                     Certification Date From:  10/15/19   Certification Date To:  01/12/20    Referring Provider:  Fabricio Gomes MD    Initial Assessment        See Epic Evaluation Start of Care Date: 10/15/19

## 2019-10-15 NOTE — PROGRESS NOTES
10/15/19 0900   General Information   Type of Visit Initial OP Ortho PT Evaluation   Start of Care Date 10/15/19   Referring Physician Fabricio Gomes MD   Patient/Family Goals Statement Pt wants to have less shoulder pain.   Orders Evaluate and Treat   Date of Order 10/11/19   Certification Required? Yes   Medical Diagnosis B shoulder impingement   Surgical/Medical history reviewed Yes   Precautions/Limitations no known precautions/limitations   Weight-Bearing Status - LUE full weight-bearing   Weight-Bearing Status - RUE full weight-bearing   Weight-Bearing Status - LLE full weight-bearing   Weight-Bearing Status - RLE full weight-bearing   Body Part(s)   Body Part(s) Shoulder   Presentation and Etiology   Pertinent history of current problem (include personal factors and/or comorbidities that impact the POC) Per pt got injection in B shoulders (posterior) last week, pain has been better, some episodes of throbbing.  Pt has had this B shoulder pain for years, has done PT before which helps then it flared up about 2 years ago, possibly frmo medication change.  First set of injections he has had.    Impairments A. Pain;D. Decreased ROM;E. Decreased flexibility;F. Decreased strength and endurance   Functional Limitations perform desired leisure / sports activities   Symptom Location B shoulders (R shoulder worse)   How/Where did it occur From insidious onset   Onset date of current episode/exacerbation 08/01/19   Chronicity Chronic   Pain rating (0-10 point scale) Best (/10);Worst (/10)   Best (/10) 0/10   Worst (/10) 9/10   Pain quality A. Sharp;C. Aching;F. Stabbing   Frequency of pain/symptoms C. With activity   Pain/symptoms are: Worse during the night   Pain/symptoms exacerbated by G. Certain positions;H. Overhead reach;D. Carrying   Pain/symptoms eased by C. Rest   Progression of symptoms since onset: Worsened   Current / Previous Interventions   Diagnostic Tests: X-ray;MRI   X-ray Results unremarkable   MRI  Results Results   MRI results B impingement in subscap   Prior Level of Function   Prior Level of Function-Mobility IND   Prior Level of Function-ADLs IND   Functional Level Prior Comment Some times needs help reaching to get socks on.  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     Current Level of Function   Current Community Support Family/friend caregiver   Patient role/employment history F. Retired   Living environment House/townhome   Home/community accessibility No concerns, wife at home for support   Current equipment-Gait/Locomotion None   Current equipment-ADL None   Fall Risk Screen   Fall screen completed by PT   Have you fallen 2 or more times in the past year? No   Have you fallen and had an injury in the past year? No   Is patient a fall risk? No   Shoulder Objective Findings   Side (if bilateral, select both right and left) Right;Left   Integumentary  No concerns   Posture B rounded shoulders   Cervical Screen (ROM, quadrant) ROM WFL   Thoracic Mobility Screen No concerns   Thoracic Outlet Syndrom (Angela, Amadeoon, Mildred, Martins) Negative   Shoulder ROM Comment Pec tightness on R side   Scapulothoracic Rhythm Good elevation but minimal rotation   Neer's Test Positive   Jeffrey-Porter Test Positive   Palpation Tenderness at B subscap tendons (worse on R side), tenderness at R pec insertion   Accessory Motion/Joint Mobility Good mobility, some tightness with posteriro/inferior mob, sits far forawrd and IR   Right Shoulder Flexion AROM 170 degrees   Right Shoulder Abduction AROM 160 degrees   Right Shoulder ER AROM 45 degrees   Right Shoulder IR AROM 70 degrees   Right Shoulder Flexion Strength 5/5   Right Shoulder Abduction Strength 5/5   Right Shoulder ER Strength 4/5 pain   Right Shoulder IR Strength 5/5   Right Shoulder Extension Strength 5/5   Right Mid Trapezius Strength 4/5   Right Lower Trapezius Strength 4/5   Left Shoulder Flexion AROM 180 degrees   Left Shoulder Abduction AROM 165 degrees   Left  Shoulder ER AROM 50 degrees   Left Shoulder IR AROM 70 degrees   Left Shoulder Flexion Strength 5/5   Left Shoulder Abduction Strength 5/5   Left Shoulder ER Strength 4/5   Left Shoulder IR Strength 5/5   Left Shoulder Extension Strength 5/5   Left Mid Trapezius Strength 4/5   Left Lower Trapezius Strength 4/5   Planned Therapy Interventions   Planned Therapy Interventions joint mobilization;manual therapy;ROM;strengthening;stretching   Planned Modality Interventions   Planned Modality Interventions Cryotherapy   Clinical Impression   Criteria for Skilled Therapeutic Interventions Met yes, treatment indicated   PT Diagnosis B shoulder impingement, poor postural control   Influenced by the following impairments Decreased B trap strength (control), anterior sitting shoulder, pec tightness   Functional limitations due to impairments Inability to complete functional mobility without pain, inability to properly control posture with functional tasks   Clinical Presentation Stable/Uncomplicated   Clinical Presentation Rationale Medically stable, otherwise healthy and active   Clinical Decision Making (Complexity) Low complexity   Therapy Frequency 1 time/week   Predicted Duration of Therapy Intervention (days/wks) 12 weeks   Risk & Benefits of therapy have been explained Yes   Patient, Family & other staff in agreement with plan of care Yes   Clinical Impression Comments Pt would benefit from OP PT to improve ROM and postural control as well as positioning to decrease pain with mobility/farm tasks.    Education Assessment   Barriers to Learning No barriers   ORTHO GOALS   PT Ortho Eval Goals 1;2;3   Ortho Goal 1   Goal Identifier ROM   Goal Description Pt will demonstrate full, B ROM in shoulders in order to demonstrate improved functional use of UE's.    Target Date 01/12/20   Ortho Goal 2   Goal Identifier Postural control   Goal Description Pt will be able to lift 10-20 lbs with proper form and postural control in order  to demonstrate safety with fam activities.    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    Target Date 01/12/20   Total Evaluation Time   PT Eval, Low Complexity Minutes (44513) 20   Therapy Certification   Certification date from 10/15/19   Certification date to 01/12/20   Medical Diagnosis B shoulder impingement

## 2019-10-18 ENCOUNTER — INFUSION THERAPY VISIT (OUTPATIENT)
Dept: INFUSION THERAPY | Facility: CLINIC | Age: 69
End: 2019-10-18
Attending: INTERNAL MEDICINE
Payer: MEDICARE

## 2019-10-18 VITALS
BODY MASS INDEX: 27.33 KG/M2 | SYSTOLIC BLOOD PRESSURE: 150 MMHG | TEMPERATURE: 97.9 F | OXYGEN SATURATION: 97 % | RESPIRATION RATE: 16 BRPM | DIASTOLIC BLOOD PRESSURE: 91 MMHG | WEIGHT: 190.5 LBS | HEART RATE: 58 BPM

## 2019-10-18 DIAGNOSIS — M05.79 RHEUMATOID ARTHRITIS INVOLVING MULTIPLE SITES WITH POSITIVE RHEUMATOID FACTOR (H): Primary | ICD-10-CM

## 2019-10-18 PROCEDURE — 96365 THER/PROPH/DIAG IV INF INIT: CPT

## 2019-10-18 PROCEDURE — 25000128 H RX IP 250 OP 636: Performed by: INTERNAL MEDICINE

## 2019-10-18 PROCEDURE — 25800030 ZZH RX IP 258 OP 636: Performed by: INTERNAL MEDICINE

## 2019-10-18 RX ADMIN — SODIUM CHLORIDE 250 ML: 9 INJECTION, SOLUTION INTRAVENOUS at 09:54

## 2019-10-18 RX ADMIN — SODIUM CHLORIDE 1000 MG: 9 INJECTION, SOLUTION INTRAVENOUS at 09:57

## 2019-10-18 ASSESSMENT — PAIN SCALES - GENERAL: PAINLEVEL: NO PAIN (0)

## 2019-10-18 NOTE — PROGRESS NOTES
Infusion Nursing Note:  Camilo Baca presents today for Orencia.    Patient seen by provider today: No   present during visit today: Not Applicable.    Note: N/A.    Intravenous Access:  Peripheral IV placed.    Treatment Conditions:  Biological Infusion Checklist:  ~~~ NOTE: If the patient answers yes to any of the questions below, hold the infusion and contact ordering provider or on-call provider.    1. Have you recently had an elevated temperature, fever, chills, productive cough, coughing for 3 weeks or longer or hemoptysis, abnormal vital signs, night sweats,  chest pain or have you noticed a decrease in your appetite, unexplained weight loss or fatigue? No  2. Do you have any open wounds or new incisions? No  3. Do you have any recent or upcoming hospitalizations, surgeries or dental procedures? No  4. Do you currently have or recently have had any signs of illness or infection or are you on any antibiotics? No  5. Have you had any new, sudden or worsening abdominal pain? No  6. Have you or anyone in your household received a live vaccination in the past 4 weeks? Please note:  No live vaccines while on biologic/chemotherapy until 6 months after the last treatment.  Patient can receive the flu vaccine (shot only) and the pneumovax.  It is optimal for the patient to get these vaccines mid cycle, but they can be given at any time as long as it is not on the day of the infusion. No  7. Have you recently been diagnosed with any new nervous system diseases (ie. Multiple sclerosis, Guillain [place], seizures, neurological changes) or cancer diagnosis? No  8. Are you on any form of radiation or chemotherapy? No  9. Are you pregnant or breast feeding or do you have plans of pregnancy in the future? No  10. Have you been having any signs of worsening depression or suicidal ideations?  (benlysta only) No  11. Have there been any other new onset medical symptoms? No        Post Infusion Assessment:  Patient  tolerated infusion without incident.  Patient observed for 15 minutes post orencia per protocol.  Site patent and intact, free from redness, edema or discomfort.  No evidence of extravasations.  Access discontinued per protocol.       Discharge Plan:   Discharge instructions reviewed with: Patient.  Patient discharged in stable condition accompanied by: self.  Departure Mode: Ambulatory.    Emy Figueredo RN

## 2019-10-22 ENCOUNTER — HOSPITAL ENCOUNTER (OUTPATIENT)
Dept: PHYSICAL THERAPY | Facility: CLINIC | Age: 69
Setting detail: THERAPIES SERIES
End: 2019-10-22
Attending: INTERNAL MEDICINE
Payer: MEDICARE

## 2019-10-22 PROCEDURE — 97110 THERAPEUTIC EXERCISES: CPT | Mod: GP

## 2019-10-22 PROCEDURE — 97140 MANUAL THERAPY 1/> REGIONS: CPT | Mod: GP

## 2019-10-23 ENCOUNTER — OFFICE VISIT (OUTPATIENT)
Dept: FAMILY MEDICINE | Facility: OTHER | Age: 69
End: 2019-10-23
Payer: COMMERCIAL

## 2019-10-23 VITALS
RESPIRATION RATE: 16 BRPM | WEIGHT: 190 LBS | SYSTOLIC BLOOD PRESSURE: 130 MMHG | DIASTOLIC BLOOD PRESSURE: 68 MMHG | TEMPERATURE: 96.9 F | BODY MASS INDEX: 27.26 KG/M2 | OXYGEN SATURATION: 96 % | HEART RATE: 76 BPM

## 2019-10-23 DIAGNOSIS — I10 BENIGN ESSENTIAL HYPERTENSION: Primary | ICD-10-CM

## 2019-10-23 DIAGNOSIS — E78.5 HYPERLIPIDEMIA LDL GOAL <130: ICD-10-CM

## 2019-10-23 DIAGNOSIS — K21.9 GASTROESOPHAGEAL REFLUX DISEASE, ESOPHAGITIS PRESENCE NOT SPECIFIED: ICD-10-CM

## 2019-10-23 DIAGNOSIS — Z23 NEED FOR PROPHYLACTIC VACCINATION AND INOCULATION AGAINST INFLUENZA: ICD-10-CM

## 2019-10-23 DIAGNOSIS — Z23 NEED FOR VACCINATION: ICD-10-CM

## 2019-10-23 DIAGNOSIS — E03.4 HYPOTHYROIDISM DUE TO ACQUIRED ATROPHY OF THYROID: ICD-10-CM

## 2019-10-23 PROCEDURE — G0008 ADMIN INFLUENZA VIRUS VAC: HCPCS | Performed by: NURSE PRACTITIONER

## 2019-10-23 PROCEDURE — 90732 PPSV23 VACC 2 YRS+ SUBQ/IM: CPT | Performed by: NURSE PRACTITIONER

## 2019-10-23 PROCEDURE — 99214 OFFICE O/P EST MOD 30 MIN: CPT | Mod: 25 | Performed by: NURSE PRACTITIONER

## 2019-10-23 PROCEDURE — G0009 ADMIN PNEUMOCOCCAL VACCINE: HCPCS | Performed by: NURSE PRACTITIONER

## 2019-10-23 PROCEDURE — 90662 IIV NO PRSV INCREASED AG IM: CPT | Performed by: NURSE PRACTITIONER

## 2019-10-23 RX ORDER — OMEPRAZOLE 40 MG/1
40 CAPSULE, DELAYED RELEASE ORAL DAILY
Qty: 90 CAPSULE | Refills: 3 | Status: SHIPPED | OUTPATIENT
Start: 2019-10-23 | End: 2020-03-05

## 2019-10-23 RX ORDER — LOSARTAN POTASSIUM 50 MG/1
50 TABLET ORAL DAILY
Qty: 90 TABLET | Refills: 3 | Status: SHIPPED | OUTPATIENT
Start: 2019-10-23 | End: 2020-03-12

## 2019-10-23 RX ORDER — ATORVASTATIN CALCIUM 80 MG/1
80 TABLET, FILM COATED ORAL DAILY
Qty: 90 TABLET | Refills: 3 | Status: SHIPPED | OUTPATIENT
Start: 2019-10-23 | End: 2020-11-02

## 2019-10-23 RX ORDER — LEVOTHYROXINE SODIUM 25 UG/1
TABLET ORAL
Qty: 90 TABLET | Refills: 1 | Status: SHIPPED | OUTPATIENT
Start: 2019-10-23 | End: 2020-03-12

## 2019-10-23 ASSESSMENT — PAIN SCALES - GENERAL: PAINLEVEL: NO PAIN (0)

## 2019-10-23 NOTE — PROGRESS NOTES
Subjective     Camilo Baca is a 69 year old male who presents to clinic today for the following health issues:    HPI   Hyperlipidemia Follow-Up      Are you having any of the following symptoms? (Select all that apply)  No complaints of shortness of breath, chest pain or pressure.  No increased sweating or nausea with activity.  No left-sided neck or arm pain.  No complaints of pain in calves when walking 1-2 blocks.    Are you regularly taking any medication or supplement to lower your cholesterol?   Yes- atorvastatin    Are you having muscle aches or other side effects that you think could be caused by your cholesterol lowering medication?  No      Hypertension Follow-up      Do you check your blood pressure regularly outside of the clinic? Yes in pharmacy    Are you following a low salt diet? Yes    Are your blood pressures ever more than 140 on the top number (systolic) OR more   than 90 on the bottom number (diastolic), for example 140/90? Yes       Hypothyroidism Follow-up      Since last visit, patient describes the following symptoms: Weight stable, no hair loss, no skin changes, no constipation, no loose stools        How many servings of fruits and vegetables do you eat daily?  2-3    On average, how many sweetened beverages do you drink each day (soda, juice, sweet tea, etc)?   1    How many days per week do you miss taking your medication? 0      Review of Systems   ROS COMP: Constitutional, HEENT, cardiovascular, pulmonary, gi and gu systems are negative, except as otherwise noted.      Objective    /68   Pulse 76   Temp 96.9  F (36.1  C) (Temporal)   Resp 16   Wt 86.2 kg (190 lb)   SpO2 96%   BMI 27.26 kg/m    Body mass index is 27.26 kg/m .  Physical Exam   GENERAL: healthy, alert and no distress  NECK: no adenopathy  RESP: lungs clear to auscultation - no rales, rhonchi or wheezes  CV: regular rate and rhythm, normal S1 S2, no S3 or S4, no murmur, click or rub  ABDOMEN: soft,  "nontender, no hepatosplenomegaly, no masses and bowel sounds normal  MS: no gross musculoskeletal defects noted, no edema    Diagnostic Test Results:  none         Assessment & Plan     1. Benign essential hypertension  Chronic, controlled.  No change in treatment plan.   - losartan (COZAAR) 50 MG tablet; Take 1 tablet (50 mg) by mouth daily  Dispense: 90 tablet; Refill: 3    2. Hypothyroidism due to acquired atrophy of thyroid  Chronic, controlled.  No change in treatment plan.   - levothyroxine (SYNTHROID/LEVOTHROID) 25 MCG tablet; TAKE ONE TABLET BY MOUTH ONCE DAILY  Dispense: 90 tablet; Refill: 1    3. Hyperlipidemia LDL goal <130  Chronic, controlled.  No change in treatment plan.   - atorvastatin (LIPITOR) 80 MG tablet; Take 1 tablet (80 mg) by mouth daily  Dispense: 90 tablet; Refill: 3    4. Gastroesophageal reflux disease, esophagitis presence not specified  Chronic, controlled.  No change in treatment plan.   - omeprazole (PRILOSEC) 20 MG DR capsule; Take 1 capsule (20 mg) by mouth daily  Dispense: 30 capsule; Refill: 0    5. Need for vaccination    6. Need for prophylactic vaccination and inoculation against influenza  - INFLUENZA (HIGH DOSE) 3 VALENT VACCINE [91882]  - ADMIN INFLUENZA (For MEDICARE Patients ONLY) []     BMI:   Estimated body mass index is 27.26 kg/m  as calculated from the following:    Height as of 10/11/19: 1.778 m (5' 10\").    Weight as of this encounter: 86.2 kg (190 lb).   Weight management plan: Discussed healthy diet and exercise guidelines        See Patient Instructions    Return in about 1 year (around 10/23/2020) for Physical.    DIANN [facility]"

## 2019-10-23 NOTE — PATIENT INSTRUCTIONS
Wean off your Omeprazole.  Wean down to 20 mg daily first, then slowly off.      Medications refilled.

## 2019-10-23 NOTE — NURSING NOTE
Clinic Administered Medication Documentation    MEDICATION LIST:   Injectable Medication Documentation    Patient was given Pneumo-23. Prior to medication administration, verified patients identity using patient s name and date of birth. Please see MAR and medication order for additional information. Patient instructed to remain in clinic for 15 minutes and report any adverse reaction to staff immediately .      Was entire vial of medication used? Yes  Vial/Syringe: Syringe  Expiration Date:  3/28/21  Was this medication supplied by the patient? No     ................Mitchel Jean LPN,   October 23, 2019,      11:38 AM,   [facility]

## 2019-10-29 ENCOUNTER — HOSPITAL ENCOUNTER (OUTPATIENT)
Dept: PHYSICAL THERAPY | Facility: CLINIC | Age: 69
Setting detail: THERAPIES SERIES
End: 2019-10-29
Attending: INTERNAL MEDICINE
Payer: MEDICARE

## 2019-10-29 PROCEDURE — 97110 THERAPEUTIC EXERCISES: CPT | Mod: GP

## 2019-11-02 DIAGNOSIS — M10.9 ACUTE GOUTY ARTHROPATHY: ICD-10-CM

## 2019-11-04 RX ORDER — ALLOPURINOL 300 MG/1
TABLET ORAL
Qty: 90 TABLET | Refills: 1 | Status: SHIPPED | OUTPATIENT
Start: 2019-11-04 | End: 2020-03-12

## 2019-11-04 NOTE — TELEPHONE ENCOUNTER
Prescription approved per [facility] Refill Protocol.    Lucy Astorga RN on 11/4/2019 at 7:43 AM

## 2019-11-04 NOTE — TELEPHONE ENCOUNTER
"Requested Prescriptions   Pending Prescriptions Disp Refills     allopurinol (ZYLOPRIM) 300 MG tablet [Pharmacy Med Name: ALLOPURINOL 300MG TABS] 30 tablet 0     Sig: TAKE ONE TABLET BY MOUTH ONCE DAILY (DUE FOR LABS )   Last Written Prescription Date:  10/7/19  Last Fill Quantity: 30,  # refills: 0   Last office visit: 10/23/2019 with prescribing provider:  Fay   Future Office Visit:   Next 5 appointments (look out 90 days)    Nov 19, 2019  1:20 PM CST  Return Visit with Fabricio Gomes MD  [facility] ([facility]) [address] [place] [id number]  [phone]             Gout Agents Protocol Passed - 11/2/2019  8:27 AM        Passed - CBC on file in past 12 months     Recent Labs   Lab Test 10/15/19  0827   WBC 4.8   RBC 4.26*   HGB 13.7   HCT 41.1   *                 Passed - ALT on file in past 12 months     Recent Labs   Lab Test 10/15/19  0827   ALT 50             Passed - Has Uric Acid on file in past 12 months and value is less than 6     Recent Labs   Lab Test 09/17/19 0827   URIC 3.1*     If level is 6mg/dL or greater, ok to refill one time and refer to provider.           Passed - Recent (12 mo) or future (30 days) visit within the authorizing provider's specialty     Patient has had an office visit with the authorizing provider or a provider within the authorizing providers department within the previous 12 mos or has a future within next 30 days. See \"Patient Info\" tab in inbasket, or \"Choose Columns\" in Meds & Orders section of the refill encounter.              Passed - Medication is active on med list        Passed - Patient is age 18 or older        Passed - Normal serum creatinine on file in the past 12 months     Recent Labs   Lab Test 10/15/19  0827   CR 0.89               "

## 2019-11-12 ENCOUNTER — HOSPITAL ENCOUNTER (OUTPATIENT)
Dept: PHYSICAL THERAPY | Facility: CLINIC | Age: 69
Setting detail: THERAPIES SERIES
End: 2019-11-12
Attending: INTERNAL MEDICINE
Payer: MEDICARE

## 2019-11-12 DIAGNOSIS — Z79.899 HIGH RISK MEDICATIONS (NOT ANTICOAGULANTS) LONG-TERM USE: ICD-10-CM

## 2019-11-12 DIAGNOSIS — M05.9 SEROPOSITIVE RHEUMATOID ARTHRITIS (H): ICD-10-CM

## 2019-11-12 LAB
ALBUMIN SERPL-MCNC: 3.5 G/DL (ref 3.4–5)
ALP SERPL-CCNC: 71 U/L (ref 40–150)
ALT SERPL W P-5'-P-CCNC: 46 U/L (ref 0–70)
AST SERPL W P-5'-P-CCNC: 26 U/L (ref 0–45)
BASOPHILS # BLD AUTO: 0 10E9/L (ref 0–0.2)
BASOPHILS NFR BLD AUTO: 0.5 %
BILIRUB DIRECT SERPL-MCNC: 0.2 MG/DL (ref 0–0.2)
BILIRUB SERPL-MCNC: 0.9 MG/DL (ref 0.2–1.3)
CREAT SERPL-MCNC: 0.91 MG/DL (ref 0.66–1.25)
CRP SERPL-MCNC: <2.9 MG/L (ref 0–8)
DIFFERENTIAL METHOD BLD: ABNORMAL
EOSINOPHIL NFR BLD AUTO: 2.2 %
ERYTHROCYTE [DISTWIDTH] IN BLOOD BY AUTOMATED COUNT: 13.7 % (ref 10–15)
ERYTHROCYTE [SEDIMENTATION RATE] IN BLOOD BY WESTERGREN METHOD: 20 MM/H (ref 0–20)
GFR SERPL CREATININE-BSD FRML MDRD: 86 ML/MIN/{1.73_M2}
HCT VFR BLD AUTO: 40.9 % (ref 40–53)
HGB BLD-MCNC: 13.4 G/DL (ref 13.3–17.7)
IMM GRANULOCYTES # BLD: 0 10E9/L (ref 0–0.4)
IMM GRANULOCYTES NFR BLD: 0 %
LYMPHOCYTES # BLD AUTO: 0.9 10E9/L (ref 0.8–5.3)
LYMPHOCYTES NFR BLD AUTO: 23.7 %
MCH RBC QN AUTO: 32.8 PG (ref 26.5–33)
MCHC RBC AUTO-ENTMCNC: 32.8 G/DL (ref 31.5–36.5)
MCV RBC AUTO: 100 FL (ref 78–100)
MONOCYTES # BLD AUTO: 0.5 10E9/L (ref 0–1.3)
MONOCYTES NFR BLD AUTO: 12.1 %
NEUTROPHILS # BLD AUTO: 2.3 10E9/L (ref 1.6–8.3)
NEUTROPHILS NFR BLD AUTO: 61.5 %
NRBC # BLD AUTO: 0 10*3/UL
NRBC BLD AUTO-RTO: 0 /100
PLATELET # BLD AUTO: 122 10E9/L (ref 150–450)
PROT SERPL-MCNC: 7.6 G/DL (ref 6.8–8.8)
RBC # BLD AUTO: 4.09 10E12/L (ref 4.4–5.9)
WBC # BLD AUTO: 3.7 10E9/L (ref 4–11)

## 2019-11-12 PROCEDURE — 85652 RBC SED RATE AUTOMATED: CPT | Performed by: INTERNAL MEDICINE

## 2019-11-12 PROCEDURE — 80076 HEPATIC FUNCTION PANEL: CPT | Performed by: INTERNAL MEDICINE

## 2019-11-12 PROCEDURE — 97110 THERAPEUTIC EXERCISES: CPT | Mod: GP

## 2019-11-12 PROCEDURE — 82565 ASSAY OF CREATININE: CPT | Performed by: INTERNAL MEDICINE

## 2019-11-12 PROCEDURE — 36415 COLL VENOUS BLD VENIPUNCTURE: CPT | Performed by: INTERNAL MEDICINE

## 2019-11-12 PROCEDURE — 86140 C-REACTIVE PROTEIN: CPT | Performed by: INTERNAL MEDICINE

## 2019-11-12 PROCEDURE — 85025 COMPLETE CBC W/AUTO DIFF WBC: CPT | Performed by: INTERNAL MEDICINE

## 2019-11-12 NOTE — PROGRESS NOTES
Outpatient Physical Therapy Discharge Note     Patient: Camilo Baca  : 1950    Beginning/End Dates of Reporting Period:  10/15/2019 to 2019    Referring Provider: Fabricio Gomes MD    Therapy Diagnosis: B shoulder impingement, poor postural control     Client Self Report: Patient reports overall, his pain has been very minimum. He believes the change in weather has increased soreness in R shoulder, however only up to 2/10 & is able to decrease pain with his exercises. He believes he is ready to discharge with HEP.     Objective Measurements:  Objective Measure: AROM  Details: R: flexion 175, abduction 170, ER 70, IR T10; L: flexion 180, abduction 165, ER 70, IR T7    Objective Measure: SPADI  Details: 3.85        Outcome Measures (most recent score):    SPADI:   3.85 ()  49 (eval, 10/15)    Goals:  Goal Identifier ROM   Goal Description Pt will demonstrate full, B ROM in shoulders in order to demonstrate improved functional use of UE's.    Target Date 20   Date Met  10/29/20   Progress:     Goal Identifier Postural control   Goal Description Pt will be able to lift 10-20 lbs with proper form and postural control in order to demonstrate safety with fam activities.    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    Target Date 20   Date Met  10/29/20   Progress:           Progress Toward Goals:   Progress this reporting period: Camilo has completed 4 physical therapy visits for B shoulder pain at this time. He demonstrates nearly equal B shoulder ROM, only limited in IR with R shoulder, however no pain with this motion. Highest pain is 2/10, however he reports he is able to decrease this with his home exercises. He has met all physical therapy goals and feels he is ready for discharge  with home program at this time.           Plan:  Discharge from therapy.    Discharge:    Reason for Discharge: Patient has met all goals.    Equipment Issued: theraband    Discharge Plan: Patient to continue home program.      Thank you for your referral.     Trinidad Duong PT, DPT    [facility]    O: [phone]  E: [email or web address]

## 2019-11-15 ENCOUNTER — INFUSION THERAPY VISIT (OUTPATIENT)
Dept: INFUSION THERAPY | Facility: CLINIC | Age: 69
End: 2019-11-15
Attending: INTERNAL MEDICINE
Payer: MEDICARE

## 2019-11-15 VITALS
HEART RATE: 93 BPM | RESPIRATION RATE: 16 BRPM | SYSTOLIC BLOOD PRESSURE: 149 MMHG | OXYGEN SATURATION: 97 % | DIASTOLIC BLOOD PRESSURE: 94 MMHG | BODY MASS INDEX: 27.36 KG/M2 | TEMPERATURE: 97.2 F | WEIGHT: 190.7 LBS

## 2019-11-15 DIAGNOSIS — M05.79 RHEUMATOID ARTHRITIS INVOLVING MULTIPLE SITES WITH POSITIVE RHEUMATOID FACTOR (H): Primary | ICD-10-CM

## 2019-11-15 PROCEDURE — 96365 THER/PROPH/DIAG IV INF INIT: CPT

## 2019-11-15 PROCEDURE — 25000128 H RX IP 250 OP 636: Performed by: INTERNAL MEDICINE

## 2019-11-15 PROCEDURE — 25800030 ZZH RX IP 258 OP 636: Performed by: INTERNAL MEDICINE

## 2019-11-15 RX ADMIN — SODIUM CHLORIDE 250 ML: 9 INJECTION, SOLUTION INTRAVENOUS at 08:25

## 2019-11-15 RX ADMIN — SODIUM CHLORIDE 1000 MG: 9 INJECTION, SOLUTION INTRAVENOUS at 08:45

## 2019-11-15 ASSESSMENT — PAIN SCALES - GENERAL: PAINLEVEL: MILD PAIN (2)

## 2019-11-15 NOTE — PROGRESS NOTES
Infusion Nursing Note:  Camilo Baca presents today for Orencia.    Patient seen by provider today: No   present during visit today: Not Applicable.    Note: N/A.    Intravenous Access:  Peripheral IV placed.    Treatment Conditions:  Not Applicable.  ~~~ NOTE: If the patient answers yes to any of the questions below, hold the infusion and contact ordering provider or on-call provider.    1. Have you recently had an elevated temperature, fever, chills, productive cough, coughing for 3 weeks or longer or hemoptysis,  abnormal vital signs, night sweats,  chest pain or have you noticed a decrease in your appetite, unexplained weight loss or fatigue? No  2. Do you have any open wounds or new incisions? No  3. Do you have any recent or upcoming hospitalizations, surgeries or dental procedures? No  4. Do you currently have or recently have had any signs of illness or infection or are you on any antibiotics? No  5. Have you had any new, sudden or worsening abdominal pain? No  6. Have you or anyone in your household received a live vaccination in the past 4 weeks? Please note:  No live vaccines while on biologic/chemotherapy until 6 months after the last treatment.  Patient can receive the flu vaccine (shot only) and the pneumovax.  It is optimal for the patient to get these vaccines mid cycle, but they can be given at any time as long as it is not on the day of the infusion. No  7. Have you recently been diagnosed with any new nervous system diseases (ie. Multiple sclerosis, Guillain [place], seizures, neurological changes) or cancer diagnosis? Are you on any form of radiation or chemotherapy? No  8. Are you pregnant or breast feeding or do you have plans of pregnancy in the future? No  9. Have you been having any signs of worsening depression or suicidal ideations?  (benlysta only) No  10. Have there been any other new onset medical symptoms? No    Post Infusion Assessment:  Patient tolerated infusion without  incident.  Patient observed for 10 minutes post orencia per protocol.  Blood return noted pre and post infusion.  Site patent and intact, free from redness, edema or discomfort.  No evidence of extravasations.  Access discontinued per protocol.  Biologic Infusion Post Education: Call the triage nurse at your clinic or seek medical attention if you have chills and/or temperature greater than or equal to 100.5, uncontrolled nausea/vomiting, diarrhea, constipation, dizziness, shortness of breath, chest pain, heart palpitations, weakness or any other new or concerning symptoms, questions or concerns.  You cannot have any live virus vaccines prior to or during treatment or up to 6 months post infusion.  If you have an upcoming surgery, medical procedure or dental procedure during treatment, this should be discussed with your ordering physician and your surgeon/dentist.  If you are having any concerning symptom, if you are unsure if you should get your next infusion or wish to speak to a provider before your next infusion, please call your care coordinator or triage nurse at your clinic to notify them so we can adequately serve you.       Discharge Plan:   Discharge instructions reviewed with: Patient.  Patient and/or family verbalized understanding of discharge instructions and all questions answered.  Copy of AVS reviewed with patient and/or family.  Patient will return 12/13/19 for next appointment.  Patient discharged in stable condition accompanied by: self.  Departure Mode: Ambulatory.    Charo Muñiz RN

## 2019-11-19 ENCOUNTER — OFFICE VISIT (OUTPATIENT)
Dept: RHEUMATOLOGY | Facility: CLINIC | Age: 69
End: 2019-11-19
Payer: COMMERCIAL

## 2019-11-19 VITALS
HEART RATE: 68 BPM | DIASTOLIC BLOOD PRESSURE: 86 MMHG | BODY MASS INDEX: 27.8 KG/M2 | SYSTOLIC BLOOD PRESSURE: 138 MMHG | OXYGEN SATURATION: 97 % | HEIGHT: 70 IN | WEIGHT: 194.2 LBS

## 2019-11-19 DIAGNOSIS — Z79.899 HIGH RISK MEDICATIONS (NOT ANTICOAGULANTS) LONG-TERM USE: ICD-10-CM

## 2019-11-19 DIAGNOSIS — M75.41 IMPINGEMENT SYNDROME OF BOTH SHOULDERS: ICD-10-CM

## 2019-11-19 DIAGNOSIS — M05.9 SEROPOSITIVE RHEUMATOID ARTHRITIS (H): Primary | ICD-10-CM

## 2019-11-19 DIAGNOSIS — M75.42 IMPINGEMENT SYNDROME OF BOTH SHOULDERS: ICD-10-CM

## 2019-11-19 DIAGNOSIS — M72.2 BILATERAL PLANTAR FASCIITIS: ICD-10-CM

## 2019-11-19 PROCEDURE — 99214 OFFICE O/P EST MOD 30 MIN: CPT | Performed by: INTERNAL MEDICINE

## 2019-11-19 RX ORDER — SULFASALAZINE 500 MG/1
500 TABLET, DELAYED RELEASE ORAL 2 TIMES DAILY
Qty: 180 TABLET | Refills: 1 | Status: SHIPPED | OUTPATIENT
Start: 2019-11-19 | End: 2020-08-05

## 2019-11-19 ASSESSMENT — MIFFLIN-ST. JEOR: SCORE: 1662.14

## 2019-11-19 NOTE — NURSING NOTE
Camilo to follow up with Primary Care provider regarding elevated blood pressure.  Blood pressure rechecked after visit    138/86  Jaquelin Muñiz CMA Rheumatology  11/19/2019 2:25 PM                                RAPID3 (0-30) Cumulative Score  2.8          RAPID3 Weighted Score (divide #4 by 3 and that is the weighted score)  0.93     Jaquelin Muñiz CMA Rheumatology  11/19/2019 1:38 PM

## 2019-11-19 NOTE — PROGRESS NOTES
Rheumatology Clinic Visit      Camilo Baca MRN# [id number]   YOB: 1950 Age: 69 year old      Date of visit: 11/19/19   PCP: Lyndsey Aponte    Chief Complaint   Patient presents with:  Arthritis: RA. Feet, heels and joints in hands. Shoulders he can still feel pain but has range of motion.    Assessment and Plan     1.  Seropositive rheumatoid arthritis (, CCP >250): Dx'd 2013. Previously followed by [facility] and [facility].  Established with me on 3/20/2018. Previously on MTX (ineffective as monotherapy), Humira (effective, stopped because of insurance preference for IV), Simponi (rash on cheeks/nose after infusion), Remicade (lost efficacy), HCQ (facial rash).  Currently on Orencia IV (first infusion 3/8/2019) and SSZ 500mg BID.  Doing well today without synovitis and only symptoms of mild hand OA at this time.   - Continue Orencia 1000mg IV every 4 weeks  - Continue sulfasalazine 500mg BID   - PRN RA flare: Prednisone 20mg daily x2days, then 15mg daily x2days, then 10mg daily x2days, then 5mg daily x2days, then stop.    - Currently using ibuprofen PRN OA symptoms; advised trying tylenol PRN instead  - Labs in 3 months: CBC, Creatinine, Hepatic Panel, ESR, CRP              Rapid 3, cumulative scores                       11/19/2019: 2.8  (Orencia 750mg IV, SSZ 500mg BID)                      08/20/2019: 4     (Orencia 750mg IV)                      04/23/2019: 0     (Orencia 750mg IV)                       01/08/2019: 0.5  (Simponi IV)                      11/06/2018: 9.3  (Simponi IV x2mo)    2.  Impingement syndrome of both shoulders: injected 10/11/2019; and referred to PT.  Significant improvement; now with full ROM but mild ache still.  Continues to do his home PT exercises.     3. Mild plantar fasciitis, bilaterally: Reviewed the diagnosis treatment options.  He is going to get new shoes and start wearing indoor shoes with good arch support.      4.  Vaccinations: Vaccinations reviewed with  Mr. Baca.    - Influenza: encouraged yearly vaccination  - [id number]: up to date  - [id number]: up to date  - Shingrix: up to date    Mr. Baca verbalized agreement with and understanding of the rational for the diagnosis and treatment plan.  All questions were answered to best of my ability and the patient's satisfaction. Mr. Baca was advised to contact the clinic with any questions that may arise after the clinic visit.      Thank you for involving me in the care of the patient    Return to clinic: 3 months    HPI   Camilo Baca is a 69 year old male with a past medical history significant for hypertension, hyperlipidemia, GERD, hypothyroidism, history of DVT, factor V Leiden mutation, history of thrombocytopenia, gout, and rheumatoid arthritis who presents for follow-up of rheumatoid arthritis.     Today, he reports doing well on Orencia and sulfasalazine.  Still with some joint aches at his DIPs and his bilateral second PIPs.  DIP and PIP pain is worse with activity and improves with rest.  Morning stiffness for no more than 30 minutes. shoulder pain is significantly improved; it is now only a mild ache; and range of motion is fully intact.  He is doing physical therapy exercises at home.  Plantar foot pain with the first few steps of the day and when he starts walking after any period of inactivity.    Denies fevers, chills, nausea, vomiting, constipation, diarrhea. No abdominal pain. No chest pain/pressure, palpitations, or shortness of breath. No LE swelling. No neck pain. No oral or nasal sores.  No rash. No sicca symptoms.      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  CV: No chest pain, pressure, palpitations, or dyspnea on exertion.  PULM: No SOB, wheeze, cough.  GI: No nausea, vomiting,  constipation, diarrhea. No blood in stool. No abdominal pain.  : No blood in urine.  MSK: See HPI.  NEURO: No numbness, tingling, or weakness.  EXT: No LE swelling  PSYCH: Negative    Active Problem List     Patient Active Problem List   Diagnosis     Lumbago     NONSPECIFIC MEDICAL HISTORY     HYPERLIPIDEMIA LDL GOAL <130     History of adenomatous polyp of colon     Idiopathic chronic gout without tophus, unspecified site     Advanced directives, counseling/discussion     Seropositive rheumatoid arthritis (H)     High risk medication use     Thrombocytopenia (H)     Gastroesophageal reflux disease without esophagitis     Factor 5 Leiden mutation, heterozygous (H)     Personal history of venous thrombosis and embolism     Personal history of DVT (deep vein thrombosis)     Hypothyroidism due to acquired atrophy of thyroid     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)     Secondary hypertension with goal blood pressure less than 140/90     Rheumatoid arthritis involving multiple sites with positive rheumatoid factor (H)     Past Medical History     Past Medical History:   Diagnosis Date     Alopecia, unspecified      Closed fracture of unspecified part of humerus 1974    Arm fracture / left wrist     Esophageal reflux 2/6/2015     Factor 5 Leiden mutation, heterozygous (H)      Gout 3/28/2011     Herpes zoster without mention of complication 1965    Herpes zoster     Measles without mention of complication     Measles     Personal history of DVT (deep vein thrombosis) 4/9/2015     Personal history of venous thrombosis and embolism 4/9/2015     Pulmonary embolism (H) 12/1996    post appendectomy     RA (rheumatoid arthritis) (H) 3/11/2013     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     Rheumatoid arthritis(714.0)     Beginning symptoms     Secondary hypertension with goal blood pressure less than 140/90 11/15/2016      Thrombocytopenia, unspecified (H) 1/29/2014     Varicella without mention of complication     Chickenpox     Past Surgical History     Past Surgical History:   Procedure Laterality Date     C APPENDECTOMY  1996     COLONOSCOPY  11/15/2010    COLONOSCOPY performed by DARIUS MESA at  GI     COLONOSCOPY N/A 11/2/2015    Procedure: COLONOSCOPY;  Surgeon: Bubba Odom MD;  Location:  GI     ESOPHAGOSCOPY, GASTROSCOPY, DUODENOSCOPY (EGD), COMBINED N/A 10/24/2018    Procedure: Esophagogastroduodenoscopy Multiple Biopsies by Biopsy;  Surgeon: Matteo Johnson DO;  Location:  GI     HC COLONOSCOPY W BIOPSY  08/10/05     HC REPAIR ING HERNIA,5+Y/O,REDUCIBL  1960     HERNIORRHAPHY UMBILICAL N/A 4/17/2015    Procedure: HERNIORRHAPHY UMBILICAL;  Surgeon: Rayray Avila MD;  Location: [facility]     LAPAROSCOPIC HERNIORRHAPHY INGUINAL BILATERAL Bilateral 4/17/2015    Procedure: LAPAROSCOPIC HERNIORRHAPHY INGUINAL BILATERAL;  Surgeon: Rayray Avila MD;  Location: [facility]     Allergy     Allergies   Allergen Reactions     Lisinopril Cough     Plaquenil [Hydroxychloroquine] Hives     Current Medication List     Current Outpatient Medications   Medication Sig     allopurinol (ZYLOPRIM) 300 MG tablet TAKE ONE TABLET BY MOUTH ONCE DAILY (DUE FOR LABS )     atorvastatin (LIPITOR) 80 MG tablet Take 1 tablet (80 mg) by mouth daily     capsaicin (ZOSTRIX) 0.075 % topical cream Apply  topically 3 times daily.     Cyanocobalamin (B-12) 100 MCG TABS      diclofenac (VOLTAREN) 1 % GEL Apply  2 grams to shoulders three times daily using enclosed dosing card.     levothyroxine (SYNTHROID/LEVOTHROID) 25 MCG tablet TAKE ONE TABLET BY MOUTH ONCE DAILY     losartan (COZAAR) 50 MG tablet Take 1 tablet (50 mg) by mouth daily     omeprazole (PRILOSEC) 20 MG DR capsule Take 1 capsule (20 mg) by mouth daily     sulfaSALAzine ER (AZULFIDINE EN) 500 MG EC tablet Take 1 tablet (500 mg) by mouth 2 times daily      triamcinolone (KENALOG) 0.5 % external cream Apply sparingly to affected area three times daily as needed, no more than 7 days in a row.     cetirizine (ZYRTEC) 10 MG tablet Take 1 tablet (10 mg) by mouth every evening (Patient not taking: Reported on 11/19/2019)     cholestyramine light (PREVALITE) 4 GM packet DISSOLVE ONE PACKET IN LIQUID AND DRINK BY MOUTH TWO TIMES A DAY AS DIRECTED     omeprazole (PRILOSEC) 40 MG DR capsule Take 1 capsule (40 mg) by mouth daily (Patient not taking: Reported on 11/19/2019)     predniSONE (DELTASONE) 5 MG tablet PRN RA flare: Prednisone 20mg daily x2days, then 15mg daily x2days, then 10mg daily x2days, then 5mg daily x2days, then stop.     No current facility-administered medications for this visit.          Social History   See HPI    Family History     Family History   Problem Relation Age of Onset     Arthritis Mother      Cardiovascular Mother      Depression Mother      Gastrointestinal Disease Mother         IBS     Hypertension Mother      Circulatory Mother         Aortal aneurysm     Osteoporosis Mother      Allergies Father         Cortisone     Cardiovascular Father      Circulatory Father      Diabetes Father      Hypertension Father      Lipids Father      Alcohol/Drug Sister         Penicillin     Alzheimer Disease Paternal Grandfather      Blood Disease Paternal Grandmother         Clotting problems     Blood Disease Son         Factor 5     Hypertension Maternal Aunt      Cerebrovascular Disease Maternal Grandmother        Physical Exam     Temp Readings from Last 3 Encounters:   11/15/19 97.2  F (36.2  C) (Temporal)   10/23/19 96.9  F (36.1  C) (Temporal)   10/18/19 97.9  F (36.6  C) (Temporal)     BP Readings from Last 5 Encounters:   11/19/19 138/86   11/15/19 (!) 149/94   10/23/19 130/68   10/18/19 (!) 150/91   10/11/19 (!) 146/90     Pulse Readings from Last 1 Encounters:   11/19/19 68     Resp Readings from Last 1 Encounters:   11/15/19 16     Estimated body  "mass index is 27.86 kg/m  as calculated from the following:    Height as of this encounter: 1.778 m (5' 10\").    Weight as of this encounter: 88.1 kg (194 lb 3.2 oz).    GEN: NAD  HEENT: MMM. No oral lesions. Anicteric, noninjected sclera  CV: S1, S2. RRR. No m/r/g.  PULM: CTA bilaterally. No w/c.  MSK: MCPs without swelling or tenderness palpation.  Heberden's and Jagdish's nodes present.  Wrists, elbows, shoulders, knees, ankles, and MTPs without swelling or tenderness palpation.  Shoulders with full range of motion.  Plantar aspect of the heels were not tender to palpation but he says that that is the location that it usually bothers him.  Achilles tendons nontender to palpation.  No dactylitis.     NEURO: UE and LE strengths 5/5 and equal bilaterally.   SKIN: No rash.  No nail pitting.  EXT: No LE edema  PSYCH: Alert. Appropriate.    Labs / Imaging (select studies)   RF/CCP  Recent Labs   Lab Test 04/24/13  0923   CCPABY >250 Strongly Positive*   *     CBC  Recent Labs   Lab Test 11/12/19  0748 10/15/19  0827 09/17/19  0829 08/15/19  0823   WBC 3.7* 4.8 5.2 5.1   RBC 4.09* 4.26* 4.42 4.51   HGB 13.4 13.7 14.2 14.8   HCT 40.9 41.1 42.7 44.4    97 97 98   RDW 13.7 13.5 13.1 13.6   * 144* 133* 104*   MCH 32.8 32.2 32.1 32.8   MCHC 32.8 33.3 33.3 33.3   NEUTROPHIL 61.5 60.4 65.4 61.6   LYMPH 23.7 24.4 21.9 27.1   MONOCYTE 12.1 14.0 10.7 9.9   EOSINOPHIL 2.2 0.8 1.0 1.2   BASOPHIL 0.5 0.4 1.0 0.2   ANEU 2.3 2.9 3.4 3.2   ALYM 0.9 1.2 1.1 1.4   KIESHA 0.5 0.7 0.6 0.5   AEOS  --  0.0 0.1 0.1   ABAS 0.0 0.0 0.1 0.0     CMP  Recent Labs   Lab Test 11/12/19  0748 10/15/19  0827 09/17/19  0829  11/06/18  0913  04/05/18  2354  12/22/17  0735   NA  --   --   --   --  141  --  140  --  141   POTASSIUM  --   --   --   --  4.2  --  3.4  --  4.2   CHLORIDE  --   --   --   --  105  --  104  --  107   CO2  --   --   --   --  33*  --  26  --  27   ANIONGAP  --   --   --   --  3  --  10  --  7   GLC  --   --   -- "   --  107*  --  124*  --  121*   BUN  --   --   --   --  15  --  32*  --  17   CR 0.91 0.89 0.89   < > 0.83   < > 0.71   < > 0.85   GFRESTIMATED 86 88 89   < > >90   < > >90   < > >90   GFRESTBLACK >90 >90 >90   < > >90   < > >90   < > >90   ANDREZ  --   --   --   --  8.8  --  7.9*  --  8.9   BILITOTAL 0.9 0.7 0.9   < > 0.6   < >  --    < > 1.5*   ALBUMIN 3.5 3.7 3.5   < > 3.1*   < >  --    < > 3.7   PROTTOTAL 7.6 7.5 7.5   < > 7.4   < >  --    < > 8.4   ALKPHOS 71 71 77   < > 60   < >  --    < > 90   AST 26 27 28   < > 41   < >  --    < > 25   ALT 46 50 73*   < > 82*   < >  --    < > 49    < > = values in this interval not displayed.     Calcium/VitaminD  Recent Labs   Lab Test 11/06/18  0913 06/26/18  0955 04/05/18  2355 12/22/17  0735   ANDREZ 8.8  --  7.9* 8.9   VITDT  --  25  --   --      ESR/CRP  Recent Labs   Lab Test 11/12/19  0748 08/15/19  0823 04/18/19  0826   SED 20 9 10   CRP <2.9 <2.9 <2.9     Hepatitis B  Recent Labs   Lab Test 03/20/18  1008 02/05/14  1104   HBCAB Nonreactive  --    HEPBANG Nonreactive Negative     Hepatitis C  Recent Labs   Lab Test 02/05/14  1104 06/03/13  0837   HCVAB Negative Negative       Tuberculosis Screening  Recent Labs   Lab Test 03/20/18  1008 02/05/14  1104   TBRSLT Negative Negative   TBAGN 0.14 0.01       Immunization History     Immunization History   Administered Date(s) Administered     HepB, Unspecified 08/19/1991, 09/23/1991, 02/24/1992     Influenza (High Dose) 3 valent vaccine 10/06/2017, 10/03/2018, 10/23/2019     Influenza (IIV3) PF 10/01/2009, 10/27/2010, 10/11/2011     Influenza Vaccine IM > 6 months Valent IIV4 10/09/2013, 10/23/2014, 09/18/2015, 11/15/2016     Pneumo Conj 13-V (2010&after) 10/06/2017     Pneumococcal 23 valent 10/23/2014, 10/23/2019     TD (ADULT, 7+) 09/05/2001     TDAP Vaccine (Boostrix) 10/18/2012     Zoster vaccine recombinant adjuvanted (SHINGRIX) 01/31/2019, 04/18/2019     Zoster vaccine, live 10/23/2014          Chart documentation done  in part with Dragon Voice recognition Software. Although reviewed after completion, some word and grammatical error may remain.    Fabricio Gomes MD

## 2019-11-19 NOTE — PATIENT INSTRUCTIONS
Rheumatology    Dr. Fabricio Gomes       [facility] [place]   (Monday)  [address]  [place] [address]       [facility] [place]   (Tuesday)  [address] [place] [address]    [facility] in [place]   (Wed., Thurs., and Friday)  [address] [place] [address]    Phone number: [phone]  Thank you for choosing [facility].  Jaquelin Muñiz CMA

## 2019-12-11 DIAGNOSIS — M05.9 SEROPOSITIVE RHEUMATOID ARTHRITIS (H): ICD-10-CM

## 2019-12-11 NOTE — TELEPHONE ENCOUNTER
predniSONE (DELTASONE) 5 MG tablet      Last Written Prescription Date:  8/20/19  Last Fill Quantity: 20,   # refills: 2  Last Office Visit: Eliseo  Future Office visit:    Next 5 appointments (look out 90 days)    Mar 03, 2020  1:20 PM CST  Return Visit with Fabricio Gomes MD  [facility] [address] [place] [id number] [phone]           Routing refill request to provider for review/approval because:  Drug not on the [facility], [facility] or [facility] refill protocol or controlled substance

## 2019-12-13 ENCOUNTER — INFUSION THERAPY VISIT (OUTPATIENT)
Dept: INFUSION THERAPY | Facility: CLINIC | Age: 69
End: 2019-12-13
Attending: INTERNAL MEDICINE
Payer: MEDICARE

## 2019-12-13 VITALS
HEART RATE: 59 BPM | DIASTOLIC BLOOD PRESSURE: 82 MMHG | WEIGHT: 193 LBS | RESPIRATION RATE: 18 BRPM | TEMPERATURE: 97.4 F | BODY MASS INDEX: 27.63 KG/M2 | OXYGEN SATURATION: 96 % | SYSTOLIC BLOOD PRESSURE: 153 MMHG | HEIGHT: 70 IN

## 2019-12-13 DIAGNOSIS — M05.79 RHEUMATOID ARTHRITIS INVOLVING MULTIPLE SITES WITH POSITIVE RHEUMATOID FACTOR (H): Primary | ICD-10-CM

## 2019-12-13 PROCEDURE — 25800030 ZZH RX IP 258 OP 636: Performed by: INTERNAL MEDICINE

## 2019-12-13 PROCEDURE — 25000128 H RX IP 250 OP 636: Performed by: INTERNAL MEDICINE

## 2019-12-13 PROCEDURE — 96365 THER/PROPH/DIAG IV INF INIT: CPT

## 2019-12-13 RX ADMIN — SODIUM CHLORIDE 1000 MG: 9 INJECTION, SOLUTION INTRAVENOUS at 09:12

## 2019-12-13 RX ADMIN — SODIUM CHLORIDE 250 ML: 9 INJECTION, SOLUTION INTRAVENOUS at 08:52

## 2019-12-13 ASSESSMENT — PAIN SCALES - GENERAL: PAINLEVEL: NO PAIN (0)

## 2019-12-13 ASSESSMENT — MIFFLIN-ST. JEOR: SCORE: 1656.69

## 2019-12-13 NOTE — PROGRESS NOTES
Infusion Nursing Note:  Camilo Baca presents today for Orencia.    Patient seen by provider today: No   present during visit today: Not Applicable.    Note: Patient stated had a flare up recently and started prednisone for this on monday.    Intravenous Access:  Peripheral IV placed.    Treatment Conditions:  Biological Infusion Checklist:  ~~~ NOTE: If the patient answers yes to any of the questions below, hold the infusion and contact ordering provider or on-call provider.    1. Have you recently had an elevated temperature, fever, chills, productive cough, coughing for 3 weeks or longer or hemoptysis, abnormal vital signs, night sweats,  chest pain or have you noticed a decrease in your appetite, unexplained weight loss or fatigue? No  2. Do you have any open wounds or new incisions? No  3. Do you have any recent or upcoming hospitalizations, surgeries or dental procedures? No  4. Do you currently have or recently have had any signs of illness or infection or are you on any antibiotics? No  5. Have you had any new, sudden or worsening abdominal pain? No  6. Have you or anyone in your household received a live vaccination in the past 4 weeks? Please note:  No live vaccines while on biologic/chemotherapy until 6 months after the last treatment.  Patient can receive the flu vaccine (shot only) and the pneumovax.  It is optimal for the patient to get these vaccines mid cycle, but they can be given at any time as long as it is not on the day of the infusion. No  7. Have you recently been diagnosed with any new nervous system diseases (ie. Multiple sclerosis, Guillain [place], seizures, neurological changes) or cancer diagnosis? No  8. Are you on any form of radiation or chemotherapy? No  9. Are you pregnant or breast feeding or do you have plans of pregnancy in the future? No  10. Have you been having any signs of worsening depression or suicidal ideations?  (benlysta only) No  11. Have there been any  other new onset medical symptoms? No        Post Infusion Assessment:  Patient tolerated infusion without incident.  Patient observed for 15 minutes post infusion per protocol.  Blood return noted pre and post infusion.  Site patent and intact, free from redness, edema or discomfort.  No evidence of extravasations.  Access discontinued per protocol.  Biologic Infusion Post Education: Call the triage nurse at your clinic or seek medical attention if you have chills and/or temperature greater than or equal to 100.5, uncontrolled nausea/vomiting, diarrhea, constipation, dizziness, shortness of breath, chest pain, heart palpitations, weakness or any other new or concerning symptoms, questions or concerns.  You cannot have any live virus vaccines prior to or during treatment or up to 6 months post infusion.  If you have an upcoming surgery, medical procedure or dental procedure during treatment, this should be discussed with your ordering physician and your surgeon/dentist.  If you are having any concerning symptom, if you are unsure if you should get your next infusion or wish to speak to a provider before your next infusion, please call your care coordinator or triage nurse at your clinic to notify them so we can adequately serve you.       Discharge Plan:   Discharge instructions reviewed with: Patient.  Patient and/or family verbalized understanding of discharge instructions and all questions answered.  Patient discharged in stable condition accompanied by: self.  Departure Mode: Ambulatory.    Juliette Macias RN

## 2019-12-13 NOTE — PATIENT INSTRUCTIONS
Pt to return on 01/10/20 for Orencia . Copies of medication list and upcoming appointments given prior to discharge.

## 2019-12-16 RX ORDER — PREDNISONE 5 MG/1
TABLET ORAL
Qty: 20 TABLET | Refills: 2 | Status: CANCELLED | OUTPATIENT
Start: 2019-12-16

## 2019-12-16 NOTE — TELEPHONE ENCOUNTER
Called pt and spoke to him about s/s starting dec 4th/5th. Swelling in R hand more than L hand, joint pain in bi lat ankles, bi lat feet, and bi lat shoulders. Denies redness or warmth, but does have stiffness in the above joints. Started prednisone from last fill in 10/19. Took whole round starting on dec 9th. Will forward to MD for appropriate fill.      Reina Sawant RN Specialty Triage 12/16/2019 2:02 PM

## 2019-12-17 RX ORDER — PREDNISONE 5 MG/1
TABLET ORAL
Qty: 50 TABLET | Refills: 2 | Status: SHIPPED | OUTPATIENT
Start: 2019-12-17 | End: 2020-03-12

## 2019-12-17 NOTE — TELEPHONE ENCOUNTER
Rheumatology team: Please call to notify Mr. Baca that prednisone taper was refilled, for a longer taper.     Fabricio Gomes MD  12/17/2019 12:29 PM

## 2019-12-17 NOTE — TELEPHONE ENCOUNTER
Contacted Pt, notified Mr. Baca that prednisone taper was refilled, for a longer taper.  Pt had no questions or concerns, agrees and understands.      Nusrat Garner CMA  12/17/2019  2:49 PM

## 2020-01-10 ENCOUNTER — INFUSION THERAPY VISIT (OUTPATIENT)
Dept: INFUSION THERAPY | Facility: CLINIC | Age: 70
End: 2020-01-10
Attending: INTERNAL MEDICINE
Payer: MEDICARE

## 2020-01-10 VITALS
HEART RATE: 65 BPM | TEMPERATURE: 97.3 F | DIASTOLIC BLOOD PRESSURE: 78 MMHG | OXYGEN SATURATION: 97 % | RESPIRATION RATE: 18 BRPM | HEIGHT: 70 IN | SYSTOLIC BLOOD PRESSURE: 127 MMHG | BODY MASS INDEX: 27.24 KG/M2 | WEIGHT: 190.3 LBS

## 2020-01-10 DIAGNOSIS — M05.79 RHEUMATOID ARTHRITIS INVOLVING MULTIPLE SITES WITH POSITIVE RHEUMATOID FACTOR (H): Primary | ICD-10-CM

## 2020-01-10 PROCEDURE — 96365 THER/PROPH/DIAG IV INF INIT: CPT

## 2020-01-10 PROCEDURE — 25800030 ZZH RX IP 258 OP 636: Performed by: INTERNAL MEDICINE

## 2020-01-10 PROCEDURE — 25000128 H RX IP 250 OP 636: Performed by: INTERNAL MEDICINE

## 2020-01-10 RX ADMIN — SODIUM CHLORIDE 250 ML: 9 INJECTION, SOLUTION INTRAVENOUS at 09:01

## 2020-01-10 RX ADMIN — SODIUM CHLORIDE 1000 MG: 9 INJECTION, SOLUTION INTRAVENOUS at 09:01

## 2020-01-10 ASSESSMENT — MIFFLIN-ST. JEOR: SCORE: 1644.45

## 2020-01-10 ASSESSMENT — PAIN SCALES - GENERAL: PAINLEVEL: NO PAIN (0)

## 2020-01-10 NOTE — PROGRESS NOTES
Infusion Nursing Note:  Camilo Baca presents today for Orencia.    Patient seen by provider today: No   present during visit today: Not Applicable.    Note: Patient only complains of slight soreness right shoulder which is not new.    Intravenous Access:  Peripheral IV placed.    Treatment Conditions:  Biological Infusion Checklist:  ~~~ NOTE: If the patient answers yes to any of the questions below, hold the infusion and contact ordering provider or on-call provider.    1. Have you recently had an elevated temperature, fever, chills, productive cough, coughing for 3 weeks or longer or hemoptysis, abnormal vital signs, night sweats,  chest pain or have you noticed a decrease in your appetite, unexplained weight loss or fatigue? No  2. Do you have any open wounds or new incisions? No  3. Do you have any recent or upcoming hospitalizations, surgeries or dental procedures? No  4. Do you currently have or recently have had any signs of illness or infection or are you on any antibiotics? No  5. Have you had any new, sudden or worsening abdominal pain? No  6. Have you or anyone in your household received a live vaccination in the past 4 weeks? Please note:  No live vaccines while on biologic/chemotherapy until 6 months after the last treatment.  Patient can receive the flu vaccine (shot only) and the pneumovax.  It is optimal for the patient to get these vaccines mid cycle, but they can be given at any time as long as it is not on the day of the infusion. No  7. Have you recently been diagnosed with any new nervous system diseases (ie. Multiple sclerosis, Guillain [place], seizures, neurological changes) or cancer diagnosis? No  8. Are you on any form of radiation or chemotherapy? No  9. Are you pregnant or breast feeding or do you have plans of pregnancy in the future? No  10. Have you been having any signs of worsening depression or suicidal ideations?  (benlysta only) No  11. Have there been any other new  onset medical symptoms? No        Post Infusion Assessment:  Patient tolerated infusion without incident.  Patient observed for 15 minutes post infusion per protocol.  Blood return noted pre and post infusion.  Site patent and intact, free from redness, edema or discomfort.  No evidence of extravasations.  Access discontinued per protocol.  Biologic Infusion Post Education: Call the triage nurse at your clinic or seek medical attention if you have chills and/or temperature greater than or equal to 100.5, uncontrolled nausea/vomiting, diarrhea, constipation, dizziness, shortness of breath, chest pain, heart palpitations, weakness or any other new or concerning symptoms, questions or concerns.  You cannot have any live virus vaccines prior to or during treatment or up to 6 months post infusion.  If you have an upcoming surgery, medical procedure or dental procedure during treatment, this should be discussed with your ordering physician and your surgeon/dentist.  If you are having any concerning symptom, if you are unsure if you should get your next infusion or wish to speak to a provider before your next infusion, please call your care coordinator or triage nurse at your clinic to notify them so we can adequately serve you.       Discharge Plan:   Discharge instructions reviewed with: Patient.  Patient and/or family verbalized understanding of discharge instructions and all questions answered.  Patient discharged in stable condition accompanied by: self.  Departure Mode: Ambulatory.    Juliette Macias RN

## 2020-01-10 NOTE — PATIENT INSTRUCTIONS
Pt to return on 02/07/20 for Orencia. Copies of medication list and upcoming appointments given prior to discharge.

## 2020-02-03 DIAGNOSIS — E78.5 HYPERLIPIDEMIA LDL GOAL <130: ICD-10-CM

## 2020-02-03 RX ORDER — CHOLESTYRAMINE LIGHT 4 G/5.7G
POWDER, FOR SUSPENSION ORAL
Qty: 60 PACKET | Refills: 3 | Status: SHIPPED | OUTPATIENT
Start: 2020-02-03 | End: 2020-08-27

## 2020-02-03 NOTE — TELEPHONE ENCOUNTER
Requested Prescriptions   Pending Prescriptions Disp Refills     cholestyramine light (PREVALITE) 4 GM packet 60 packet 3     Sig: DISSOLVE ONE PACKET IN LIQUID AND DRINK BY MOUTH TWO TIMES A DAY AS DIRECTED     Last Written Prescription Date:  10/2/2019  Last Fill Quantity: 60,   # refills: 3  Last Office Visit: 10/23/2019  Future Office visit:    Next 5 appointments (look out 90 days)    Mar 03, 2020  1:20 PM CST  [facility] [address] [place] [id number] [phone]           Routing refill request to provider for review/approval because:  Drug not on the [facility], [facility] or  [facility] refill protocol or controlled substance.     Routed to pcp to sign.     Becky Roberto MA

## 2020-02-06 ENCOUNTER — INFUSION THERAPY VISIT (OUTPATIENT)
Dept: INFUSION THERAPY | Facility: CLINIC | Age: 70
End: 2020-02-06
Attending: INTERNAL MEDICINE
Payer: MEDICARE

## 2020-02-06 VITALS
SYSTOLIC BLOOD PRESSURE: 162 MMHG | WEIGHT: 193.3 LBS | RESPIRATION RATE: 16 BRPM | OXYGEN SATURATION: 98 % | BODY MASS INDEX: 27.74 KG/M2 | HEART RATE: 58 BPM | DIASTOLIC BLOOD PRESSURE: 89 MMHG | TEMPERATURE: 97.1 F

## 2020-02-06 DIAGNOSIS — M05.79 RHEUMATOID ARTHRITIS INVOLVING MULTIPLE SITES WITH POSITIVE RHEUMATOID FACTOR (H): Primary | ICD-10-CM

## 2020-02-06 PROCEDURE — 96365 THER/PROPH/DIAG IV INF INIT: CPT

## 2020-02-06 PROCEDURE — 25000128 H RX IP 250 OP 636: Performed by: INTERNAL MEDICINE

## 2020-02-06 PROCEDURE — 25800030 ZZH RX IP 258 OP 636: Performed by: INTERNAL MEDICINE

## 2020-02-06 RX ADMIN — SODIUM CHLORIDE 250 ML: 9 INJECTION, SOLUTION INTRAVENOUS at 08:59

## 2020-02-06 RX ADMIN — SODIUM CHLORIDE 1000 MG: 9 INJECTION, SOLUTION INTRAVENOUS at 09:01

## 2020-02-06 ASSESSMENT — PAIN SCALES - GENERAL: PAINLEVEL: MILD PAIN (2)

## 2020-02-06 NOTE — PROGRESS NOTES
Infusion Nursing Note:  Camilo Baca presents today for Orencia.    Patient seen by provider today: No   present during visit today: Not Applicable.    Note: N/A.    Intravenous Access:  Peripheral IV placed.    Treatment Conditions:  Biological Infusion Checklist:  ~~~ NOTE: If the patient answers yes to any of the questions below, hold the infusion and contact ordering provider or on-call provider.    1. Have you recently had an elevated temperature, fever, chills, productive cough, coughing for 3 weeks or longer or hemoptysis, abnormal vital signs, night sweats,  chest pain or have you noticed a decrease in your appetite, unexplained weight loss or fatigue? No  2. Do you have any open wounds or new incisions? No  3. Do you have any recent or upcoming hospitalizations, surgeries or dental procedures? No  4. Do you currently have or recently have had any signs of illness or infection or are you on any antibiotics? No  5. Have you had any new, sudden or worsening abdominal pain? No  6. Have you or anyone in your household received a live vaccination in the past 4 weeks? Please note:  No live vaccines while on biologic/chemotherapy until 6 months after the last treatment.  Patient can receive the flu vaccine (shot only) and the pneumovax.  It is optimal for the patient to get these vaccines mid cycle, but they can be given at any time as long as it is not on the day of the infusion. No  7. Have you recently been diagnosed with any new nervous system diseases (ie. Multiple sclerosis, Guillain [place], seizures, neurological changes) or cancer diagnosis? No  8. Are you on any form of radiation or chemotherapy? No  9. Are you pregnant or breast feeding or do you have plans of pregnancy in the future? No  10. Have you been having any signs of worsening depression or suicidal ideations?  (benlysta only) No  11. Have there been any other new onset medical symptoms? No        Post Infusion Assessment:  Patient  tolerated infusion without incident.  Patient observed for 15 minutes post orencia per protocol.  Blood return noted pre and post infusion.  Site patent and intact, free from redness, edema or discomfort.  No evidence of extravasations.  Access discontinued per protocol.       Discharge Plan:   Discharge instructions reviewed with: Patient.  Patient and/or family verbalized understanding of discharge instructions and all questions answered.  Copy of AVS reviewed with patient and/or family.  Patient will return 3/6/2020 for next appointment.  Patient discharged in stable condition accompanied by: self.  Departure Mode: Ambulatory.    Charo Muñiz RN

## 2020-02-10 DIAGNOSIS — M05.9 SEROPOSITIVE RHEUMATOID ARTHRITIS (H): ICD-10-CM

## 2020-02-10 LAB
ALBUMIN SERPL-MCNC: 3.5 G/DL (ref 3.4–5)
ALP SERPL-CCNC: 67 U/L (ref 40–150)
ALT SERPL W P-5'-P-CCNC: 38 U/L (ref 0–70)
AST SERPL W P-5'-P-CCNC: 23 U/L (ref 0–45)
BASOPHILS # BLD AUTO: 0 10E9/L (ref 0–0.2)
BASOPHILS NFR BLD AUTO: 0.2 %
BILIRUB DIRECT SERPL-MCNC: 0.2 MG/DL (ref 0–0.2)
BILIRUB SERPL-MCNC: 1.2 MG/DL (ref 0.2–1.3)
CREAT SERPL-MCNC: 0.85 MG/DL (ref 0.66–1.25)
CRP SERPL-MCNC: <2.9 MG/L (ref 0–8)
DIFFERENTIAL METHOD BLD: ABNORMAL
EOSINOPHIL # BLD AUTO: 0.1 10E9/L (ref 0–0.7)
EOSINOPHIL NFR BLD AUTO: 2.4 %
ERYTHROCYTE [DISTWIDTH] IN BLOOD BY AUTOMATED COUNT: 12.6 % (ref 10–15)
ERYTHROCYTE [SEDIMENTATION RATE] IN BLOOD BY WESTERGREN METHOD: 18 MM/H (ref 0–20)
GFR SERPL CREATININE-BSD FRML MDRD: 90 ML/MIN/{1.73_M2}
HCT VFR BLD AUTO: 40.6 % (ref 40–53)
HGB BLD-MCNC: 13.7 G/DL (ref 13.3–17.7)
LYMPHOCYTES # BLD AUTO: 1.2 10E9/L (ref 0.8–5.3)
LYMPHOCYTES NFR BLD AUTO: 29 %
MCH RBC QN AUTO: 32.5 PG (ref 26.5–33)
MCHC RBC AUTO-ENTMCNC: 33.7 G/DL (ref 31.5–36.5)
MCV RBC AUTO: 96 FL (ref 78–100)
MONOCYTES # BLD AUTO: 0.6 10E9/L (ref 0–1.3)
MONOCYTES NFR BLD AUTO: 13.5 %
NEUTROPHILS # BLD AUTO: 2.3 10E9/L (ref 1.6–8.3)
NEUTROPHILS NFR BLD AUTO: 54.9 %
PLATELET # BLD AUTO: 105 10E9/L (ref 150–450)
PROT SERPL-MCNC: 7.1 G/DL (ref 6.8–8.8)
RBC # BLD AUTO: 4.21 10E12/L (ref 4.4–5.9)
WBC # BLD AUTO: 4.1 10E9/L (ref 4–11)

## 2020-02-10 PROCEDURE — 85025 COMPLETE CBC W/AUTO DIFF WBC: CPT | Performed by: INTERNAL MEDICINE

## 2020-02-10 PROCEDURE — 85652 RBC SED RATE AUTOMATED: CPT | Performed by: INTERNAL MEDICINE

## 2020-02-10 PROCEDURE — 80076 HEPATIC FUNCTION PANEL: CPT | Performed by: INTERNAL MEDICINE

## 2020-02-10 PROCEDURE — 82565 ASSAY OF CREATININE: CPT | Performed by: INTERNAL MEDICINE

## 2020-02-10 PROCEDURE — 86140 C-REACTIVE PROTEIN: CPT | Performed by: INTERNAL MEDICINE

## 2020-02-10 PROCEDURE — 36415 COLL VENOUS BLD VENIPUNCTURE: CPT | Performed by: INTERNAL MEDICINE

## 2020-02-27 ENCOUNTER — TELEPHONE (OUTPATIENT)
Dept: FAMILY MEDICINE | Facility: OTHER | Age: 70
End: 2020-02-27

## 2020-02-27 NOTE — TELEPHONE ENCOUNTER
Panel Management Review      Patient has the following on his problem list: None      Composite cancer screening  Chart review shows that this patient is due/due soon for the following None  Summary:    Patient is due/failing the following:   BP CHECK and PHYSICAL    Action needed:   Patient needs office visit for physical. and Patient needs nurse only appointment.    Type of outreach:    Phone, left message for patient to call back.     Questions for provider review:    None                                                                                                                                    Tabby Sundet, LPN........2/27/2020 1:31 PM

## 2020-03-03 ENCOUNTER — OFFICE VISIT (OUTPATIENT)
Dept: RHEUMATOLOGY | Facility: CLINIC | Age: 70
End: 2020-03-03
Payer: COMMERCIAL

## 2020-03-03 ENCOUNTER — TELEPHONE (OUTPATIENT)
Dept: DERMATOLOGY | Facility: CLINIC | Age: 70
End: 2020-03-03

## 2020-03-03 VITALS
DIASTOLIC BLOOD PRESSURE: 88 MMHG | WEIGHT: 195.6 LBS | HEIGHT: 70 IN | HEART RATE: 68 BPM | BODY MASS INDEX: 28 KG/M2 | OXYGEN SATURATION: 97 % | SYSTOLIC BLOOD PRESSURE: 152 MMHG

## 2020-03-03 DIAGNOSIS — Z79.899 HIGH RISK MEDICATIONS (NOT ANTICOAGULANTS) LONG-TERM USE: ICD-10-CM

## 2020-03-03 DIAGNOSIS — Z11.59 ENCOUNTER FOR SCREENING FOR OTHER VIRAL DISEASES: ICD-10-CM

## 2020-03-03 DIAGNOSIS — R21 RASH AND NONSPECIFIC SKIN ERUPTION: ICD-10-CM

## 2020-03-03 DIAGNOSIS — M05.9 SEROPOSITIVE RHEUMATOID ARTHRITIS (H): Primary | ICD-10-CM

## 2020-03-03 PROCEDURE — 99215 OFFICE O/P EST HI 40 MIN: CPT | Performed by: INTERNAL MEDICINE

## 2020-03-03 PROCEDURE — 82784 ASSAY IGA/IGD/IGG/IGM EACH: CPT | Performed by: INTERNAL MEDICINE

## 2020-03-03 PROCEDURE — 87340 HEPATITIS B SURFACE AG IA: CPT | Performed by: INTERNAL MEDICINE

## 2020-03-03 PROCEDURE — 86704 HEP B CORE ANTIBODY TOTAL: CPT | Performed by: INTERNAL MEDICINE

## 2020-03-03 PROCEDURE — 86481 TB AG RESPONSE T-CELL SUSP: CPT | Performed by: INTERNAL MEDICINE

## 2020-03-03 PROCEDURE — 36415 COLL VENOUS BLD VENIPUNCTURE: CPT | Performed by: INTERNAL MEDICINE

## 2020-03-03 RX ORDER — ACETAMINOPHEN 325 MG/1
650 TABLET ORAL ONCE
Status: CANCELLED
Start: 2020-03-06

## 2020-03-03 RX ORDER — DIPHENHYDRAMINE HCL 25 MG
50 CAPSULE ORAL ONCE
Status: CANCELLED
Start: 2020-03-06

## 2020-03-03 RX ORDER — HEPARIN SODIUM (PORCINE) LOCK FLUSH IV SOLN 100 UNIT/ML 100 UNIT/ML
5 SOLUTION INTRAVENOUS
Status: CANCELLED | OUTPATIENT
Start: 2020-03-06

## 2020-03-03 RX ORDER — HEPARIN SODIUM,PORCINE 10 UNIT/ML
5 VIAL (ML) INTRAVENOUS
Status: CANCELLED | OUTPATIENT
Start: 2020-03-06

## 2020-03-03 RX ORDER — METHYLPREDNISOLONE SODIUM SUCCINATE 125 MG/2ML
125 INJECTION, POWDER, LYOPHILIZED, FOR SOLUTION INTRAMUSCULAR; INTRAVENOUS ONCE
Status: CANCELLED | OUTPATIENT
Start: 2020-03-06

## 2020-03-03 ASSESSMENT — MIFFLIN-ST. JEOR: SCORE: 1668.49

## 2020-03-03 NOTE — PROGRESS NOTES
Rheumatology Clinic Visit      Camilo Baca MRN# [id number]   YOB: 1950 Age: 69 year old      Date of visit: 3/03/20   PCP: Lyndsey Aponte    Chief Complaint   Patient presents with:  Arthritis: RA. Patient has sudha having a bad month, swelling and pain. Takes much longer to get going in the AM    Assessment and Plan     1.  Seropositive rheumatoid arthritis (, CCP >250): Dx'd 2013. Previously followed by [facility] and [facility].  Established with me on 3/20/2018. Previously on MTX (ineffective as monotherapy), Humira (effective, stopped because of insurance preference for IV), Simponi (rash on cheeks/nose after infusion), Remicade (lost efficacy), HCQ (facial rash).  Currently on Orencia IV (first infusion 3/8/2019) and SSZ 500mg BID.  Doing well at this time; symmetric synovitis involving his MCPs, PIPs, wrists, knees, ankles, and MTPs.  Discussed other treatment options including FLORENCIA, TNFi, RTX, IL-6; given seropositivity and severe disease that time time recommended that rituximab be used.   - Discontinue Orencia 1000mg IV every 4 weeks  - Start Rituximab 1gram IV every 2 weeks for a total of 2 doses; he will call to schedule and he will check on finances for this  - Continue sulfasalazine 500mg BID   - PRN RA flare: Prednisone 20mg daily x2days, then 15mg daily x2days, then 10mg daily x2days, then 5mg daily x2days, then stop.    - Currently using ibuprofen PRN OA symptoms; advised trying tylenol PRN instead  - Labs today: CD19 B cell count, quant immunoglobulins, Hepatitis B core ab and surface ag, quantiferon TB gold plus  - Labs in 3 months: CBC, Creatinine, Hepatic Panel, ESR, CRP              Rapid 3, cumulative scores                       03/03/2020: 9.8  (Orencia IV, SSZ 500mg BID)                      11/19/2019: 2.8  (Orencia IV, SSZ 500mg BID)                      08/20/2019: 4     (Orencia 750mg IV)                      04/23/2019: 0     (Orencia 750mg IV)                        01/08/2019: 0.5  (Simponi IV)                      11/06/2018: 9.3  (Simponi IV x2mo)    # Rituximab (Rituxan) Risks and Benefits: The risks and benefits of rituximab were discussed in detail and the patient verbalized understanding.  The risks discussed include, but are not limited to, the risk for hypersensitivity, anaphylaxis, anaphylactoid reactions, fatal infusion reactions, an increased risk for serious infections leading to hospitalization or death, severe mucocutaneous reactions, reactivation of hepatitis B, and development of progressive multifocal leukoencephalopathy (PML) resulting in death.  The most common adverse reactions are infections, nasopharyngitis, urinary tract infections, nausea, diarrhea, headache, muscle spasms, and anemia.  It was discussed that the medication would need to be discontinued if a serious infection develops.  It was discussed that live vaccinations should not be received while using rituximab or within 30 days prior to starting rituximab.  I encouraged reviewing the package insert and asking any questions about the medication.      2.  Impingement syndrome of both shoulders: injected 10/11/2019; and referred to PT.  Significant improvement; now with full ROM but mild ache still.  Continues to do his home PT exercises.     3. Mild plantar fasciitis, bilaterally: Reviewed the diagnosis treatment options.  He is going to get new shoes and start wearing indoor shoes with good arch support.      4.  Vaccinations: Vaccinations reviewed with Mr. Baca.    - Influenza: encouraged yearly vaccination  - [id number]: up to date  - Rcnangkef43: up to date  - Shingrix: up to date    5.  Rash: Mildly erythematous and thickened skin on areas on his arm, leg, and trunk; not clear etiology but reportedly had this many years ago that resolved with topical steroids.  Note that he did associate the rash with sulfasalazine recently but holding sulfasalazine did not affect this.  I advised that he see  dermatology.    6. Elevated blood pressure:  Camilo to follow up with Primary Care provider regarding elevated blood pressure.     Mr. Baca verbalized agreement with and understanding of the rational for the diagnosis and treatment plan.  All questions were answered to best of my ability and the patient's satisfaction. Mr. Baca was advised to contact the clinic with any questions that may arise after the clinic visit.      Thank you for involving me in the care of the patient    Return to clinic: 3 months    HPI   Camilo Baca is a 69 year old male with a past medical history significant for hypertension, hyperlipidemia, GERD, hypothyroidism, history of DVT, factor V Leiden mutation, history of thrombocytopenia, gout, and rheumatoid arthritis who presents for follow-up of rheumatoid arthritis.     Today, he reports doing terrible.  Despite taking Orencia and sulfasalazine he says that his joints are swollen, painful; pain, swelling, and stiffness is worse in the morning and improves with time and activity.  Positive gelling phenomenon.  Morning stiffness lasting all day, worse with the first couple hours.  Joints affected currently include his bilateral MCPs, PIPs, wrists, knees, ankles, and MTPs.      Denies fevers, chills, nausea, vomiting, constipation, diarrhea. No abdominal pain. No chest pain/pressure, palpitations, or shortness of breath. No LE swelling. No neck pain. No oral or nasal sores.  No rash. No sicca symptoms.      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  CV: No chest pain, pressure, palpitations, or dyspnea on exertion.  PULM: No SOB, wheeze, cough.  GI: No nausea, vomiting, constipation, diarrhea. No blood in stool. No abdominal pain.  : No blood in urine.  MSK: See HPI.  NEURO: No  numbness, tingling, or weakness.  EXT: No LE swelling  PSYCH: Negative    Active Problem List     Patient Active Problem List   Diagnosis     Lumbago     NONSPECIFIC MEDICAL HISTORY     HYPERLIPIDEMIA LDL GOAL <130     History of adenomatous polyp of colon     Idiopathic chronic gout without tophus, unspecified site     Advanced directives, counseling/discussion     Seropositive rheumatoid arthritis (H)     High risk medication use     Thrombocytopenia (H)     Gastroesophageal reflux disease without esophagitis     Factor 5 Leiden mutation, heterozygous (H)     Personal history of venous thrombosis and embolism     Personal history of DVT (deep vein thrombosis)     Hypothyroidism due to acquired atrophy of thyroid     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)     Secondary hypertension with goal blood pressure less than 140/90     Rheumatoid arthritis involving multiple sites with positive rheumatoid factor (H)     Past Medical History     Past Medical History:   Diagnosis Date     Alopecia, unspecified      Closed fracture of unspecified part of humerus 1974    Arm fracture / left wrist     Esophageal reflux 2/6/2015     Factor 5 Leiden mutation, heterozygous (H)      Gout 3/28/2011     Herpes zoster without mention of complication 1965    Herpes zoster     Measles without mention of complication     Measles     Personal history of DVT (deep vein thrombosis) 4/9/2015     Personal history of venous thrombosis and embolism 4/9/2015     Pulmonary embolism (H) 12/1996    post appendectomy     RA (rheumatoid arthritis) (H) 3/11/2013     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     Rheumatoid arthritis(714.0)     Beginning symptoms     Secondary hypertension with goal blood pressure less than 140/90 11/15/2016     Thrombocytopenia, unspecified (H) 1/29/2014     Varicella without mention of complication     Chickenpox     Past  Surgical History     Past Surgical History:   Procedure Laterality Date     C APPENDECTOMY  1996     COLONOSCOPY  11/15/2010    COLONOSCOPY performed by DARIUS MESA at  GI     COLONOSCOPY N/A 11/2/2015    Procedure: COLONOSCOPY;  Surgeon: Bubba Odom MD;  Location:  GI     ESOPHAGOSCOPY, GASTROSCOPY, DUODENOSCOPY (EGD), COMBINED N/A 10/24/2018    Procedure: Esophagogastroduodenoscopy Multiple Biopsies by Biopsy;  Surgeon: Matteo Johnson DO;  Location:  GI     HC COLONOSCOPY W BIOPSY  08/10/05     HC REPAIR ING HERNIA,5+Y/O,REDUCIBL  1960     HERNIORRHAPHY UMBILICAL N/A 4/17/2015    Procedure: HERNIORRHAPHY UMBILICAL;  Surgeon: Rayray Avila MD;  Location: [facility]     LAPAROSCOPIC HERNIORRHAPHY INGUINAL BILATERAL Bilateral 4/17/2015    Procedure: LAPAROSCOPIC HERNIORRHAPHY INGUINAL BILATERAL;  Surgeon: Rayray Avila MD;  Location: [facility] OR     Allergy     Allergies   Allergen Reactions     Lisinopril Cough     Plaquenil [Hydroxychloroquine] Hives     Current Medication List     Current Outpatient Medications   Medication Sig     allopurinol (ZYLOPRIM) 300 MG tablet TAKE ONE TABLET BY MOUTH ONCE DAILY (DUE FOR LABS )     atorvastatin (LIPITOR) 80 MG tablet Take 1 tablet (80 mg) by mouth daily     cholestyramine light (PREVALITE) 4 GM packet DISSOLVE ONE PACKET IN LIQUID AND DRINK BY MOUTH TWO TIMES A DAY AS DIRECTED     Cyanocobalamin (B-12) 100 MCG TABS      diclofenac (VOLTAREN) 1 % GEL Apply  2 grams to shoulders three times daily using enclosed dosing card.     levothyroxine (SYNTHROID/LEVOTHROID) 25 MCG tablet TAKE ONE TABLET BY MOUTH ONCE DAILY     losartan (COZAAR) 50 MG tablet Take 1 tablet (50 mg) by mouth daily     capsaicin (ZOSTRIX) 0.075 % topical cream Apply  topically 3 times daily.     cetirizine (ZYRTEC) 10 MG tablet Take 1 tablet (10 mg) by mouth every evening (Patient not taking: Reported on 11/19/2019)     omeprazole (PRILOSEC) 20 MG DR capsule Take  1 capsule (20 mg) by mouth daily (Patient not taking: Reported on 2/6/2020)     omeprazole (PRILOSEC) 40 MG DR capsule Take 1 capsule (40 mg) by mouth daily (Patient not taking: Reported on 11/19/2019)     predniSONE (DELTASONE) 5 MG tablet PRN RA flare: Prednisone 20mg daily x5days, then 15mg daily x5days, then 10mg daily x5days, then 5mg daily x5days, then stop. (Patient not taking: Reported on 2/6/2020)     sulfaSALAzine ER (AZULFIDINE EN) 500 MG EC tablet Take 1 tablet (500 mg) by mouth 2 times daily (Patient not taking: Reported on 2/6/2020)     triamcinolone (KENALOG) 0.5 % external cream Apply sparingly to affected area three times daily as needed, no more than 7 days in a row.     No current facility-administered medications for this visit.          Social History   See HPI    Family History     Family History   Problem Relation Age of Onset     Arthritis Mother      Cardiovascular Mother      Depression Mother      Gastrointestinal Disease Mother         IBS     Hypertension Mother      Circulatory Mother         Aortal aneurysm     Osteoporosis Mother      Allergies Father         Cortisone     Cardiovascular Father      Circulatory Father      Diabetes Father      Hypertension Father      Lipids Father      Alcohol/Drug Sister         Penicillin     Alzheimer Disease Paternal Grandfather      Blood Disease Paternal Grandmother         Clotting problems     Blood Disease Son         Factor 5     Hypertension Maternal Aunt      Cerebrovascular Disease Maternal Grandmother        Physical Exam     Temp Readings from Last 3 Encounters:   02/06/20 97.1  F (36.2  C) (Temporal)   01/10/20 97.3  F (36.3  C) (Temporal)   12/13/19 97.4  F (36.3  C) (Temporal)     BP Readings from Last 5 Encounters:   03/03/20 (!) 152/88   02/06/20 (!) 162/89   01/10/20 127/78   12/13/19 (!) 153/82   11/19/19 138/86     Pulse Readings from Last 1 Encounters:   03/03/20 68     Resp Readings from Last 1 Encounters:   02/06/20 16  "    Estimated body mass index is 28.07 kg/m  as calculated from the following:    Height as of this encounter: 1.778 m (5' 10\").    Weight as of this encounter: 88.7 kg (195 lb 9.6 oz).    GEN: NAD  HEENT: MMM. No oral lesions. Anicteric, noninjected sclera  CV: S1, S2. RRR. No m/r/g.  PULM: CTA bilaterally. No w/c.  MSK: Synovial swelling and tenderness palpation of the bilateral second and fifth MCPs and PIPs, the bilateral wrists, ankles, and MTPs.  Knees mildly tender to palpation but without swelling, effusion, increased warmth, or overlying erythema.  Shoulders without swelling or tenderness to palpation.  Achilles tendons nontender to palpation.  Heberden's and Jagdish's nodes present.    NEURO: UE and LE strengths 5/5 and equal bilaterally.   SKIN: No rash.  No nail pitting.  EXT: No LE edema  PSYCH: Alert. Appropriate.    Labs / Imaging (select studies)   RF/CCP  Recent Labs   Lab Test 04/24/13  0923   CCPABY >250 Strongly Positive*   *     CBC  Recent Labs   Lab Test 02/10/20  0922 11/12/19  0748 10/15/19  0827 09/17/19  0829   WBC 4.1 3.7* 4.8 5.2   RBC 4.21* 4.09* 4.26* 4.42   HGB 13.7 13.4 13.7 14.2   HCT 40.6 40.9 41.1 42.7   MCV 96 100 97 97   RDW 12.6 13.7 13.5 13.1   * 122* 144* 133*   MCH 32.5 32.8 32.2 32.1   MCHC 33.7 32.8 33.3 33.3   NEUTROPHIL 54.9 61.5 60.4 65.4   LYMPH 29.0 23.7 24.4 21.9   MONOCYTE 13.5 12.1 14.0 10.7   EOSINOPHIL 2.4 2.2 0.8 1.0   BASOPHIL 0.2 0.5 0.4 1.0   ANEU 2.3 2.3 2.9 3.4   ALYM 1.2 0.9 1.2 1.1   KIESHA 0.6 0.5 0.7 0.6   AEOS 0.1  --  0.0 0.1   ABAS 0.0 0.0 0.0 0.1     CMP  Recent Labs   Lab Test 02/10/20  0922 11/12/19  0748 10/15/19  0827  11/06/18 0913 04/05/18  2355  12/22/17  0735   NA  --   --   --   --  141  --  140  --  141   POTASSIUM  --   --   --   --  4.2  --  3.4  --  4.2   CHLORIDE  --   --   --   --  105  --  104  --  107   CO2  --   --   --   --  33*  --  26  --  27   ANIONGAP  --   --   --   --  3  --  10  --  7   GLC  --   --   --   -- "  107*  --  124*  --  121*   BUN  --   --   --   --  15  --  32*  --  17   CR 0.85 0.91 0.89   < > 0.83   < > 0.71   < > 0.85   GFRESTIMATED 90 86 88   < > >90   < > >90   < > >90   GFRESTBLACK >90 >90 >90   < > >90   < > >90   < > >90   ANDREZ  --   --   --   --  8.8  --  7.9*  --  8.9   BILITOTAL 1.2 0.9 0.7   < > 0.6   < >  --    < > 1.5*   ALBUMIN 3.5 3.5 3.7   < > 3.1*   < >  --    < > 3.7   PROTTOTAL 7.1 7.6 7.5   < > 7.4   < >  --    < > 8.4   ALKPHOS 67 71 71   < > 60   < >  --    < > 90   AST 23 26 27   < > 41   < >  --    < > 25   ALT 38 46 50   < > 82*   < >  --    < > 49    < > = values in this interval not displayed.     Calcium/VitaminD  Recent Labs   Lab Test 11/06/18  0913 06/26/18  0955 04/05/18  2355 12/22/17  0735   ANDREZ 8.8  --  7.9* 8.9   VITDT  --  25  --   --      ESR/CRP  Recent Labs   Lab Test 02/10/20  0922 11/12/19  0748 08/15/19  0823   SED 18 20 9   CRP <2.9 <2.9 <2.9     Lipid Panel  Recent Labs   Lab Test 09/17/19  0827 12/22/17  0735 12/01/16  0822  12/29/15  0756  10/04/13  0819 06/03/13  0837   CHOL 138 134  --   --  173  --  143 208*   TRIG 85 149  --   --  211*  --  92 72   HDL 38* 46  --   --  56  --  49 77   LDL 83 58 98   < > 75   < > 75 117   VLDL  --   --   --   --   --   --  18 14   CHOLHDLRATIO  --   --   --   --   --   --  3.0 3.0   NHDL 100 88  --   --  117  --   --   --     < > = values in this interval not displayed.     Hepatitis B  Recent Labs   Lab Test 03/20/18  1008 02/05/14  1104   HBCAB Nonreactive  --    HEPBANG Nonreactive Negative     Hepatitis C  Recent Labs   Lab Test 02/05/14  1104 06/03/13  0837   HCVAB Negative Negative     Tuberculosis Screening  Recent Labs   Lab Test 03/20/18  1008 02/05/14  1104   TBRSLT Negative Negative   TBAGN 0.14 0.01       Immunization History     Immunization History   Administered Date(s) Administered     HepB, Unspecified 08/19/1991, 09/23/1991, 02/24/1992     Influenza (High Dose) 3 valent vaccine 10/06/2017, 10/03/2018,  10/23/2019     Influenza (IIV3) PF 10/01/2009, 10/27/2010, 10/11/2011     Influenza Vaccine IM > 6 months Valent IIV4 10/09/2013, 10/23/2014, 09/18/2015, 11/15/2016     Pneumo Conj 13-V (2010&after) 10/06/2017     Pneumococcal 23 valent 10/23/2014, 10/23/2019     TD (ADULT, 7+) 09/05/2001     TDAP Vaccine (Boostrix) 10/18/2012     Zoster vaccine recombinant adjuvanted (SHINGRIX) 01/31/2019, 04/18/2019     Zoster vaccine, live 10/23/2014          Chart documentation done in part with Dragon Voice recognition Software. Although reviewed after completion, some word and grammatical error may remain.    Fabricio Gomes MD

## 2020-03-03 NOTE — TELEPHONE ENCOUNTER
Attempted to make phone call. Patient phone number disconnected. Will try again later.  Shira Mae LPN

## 2020-03-03 NOTE — PATIENT INSTRUCTIONS
Check [employer] for rituximab finance benefits    Rheumatology    Dr. Fabricio BROWN [facility] in [place]   (Monday)  [address]  [place] [address]       [facility] in [place]   (Tuesday)  [address] [place] [address]    [facility] in [place]   (Wed., Thurs., and Friday)  [address] [place] [address]    Phone number: [phone]  Thank you for choosing [facility].  Jaquelin Muñiz CMA

## 2020-03-03 NOTE — TELEPHONE ENCOUNTER
[facility] Call Center    Phone Message    May a detailed message be left on voicemail: yes     Reason for Call: The patient called to be scheduled for a visit.  Being referred for Rash and nonspecific skin eruption [R21].  Advised that the Care Team would reach out to schedule.     Action Taken: Message routed to:  Adult Clinics: Dermatology p [phone]    Travel Screening: Not Applicable

## 2020-03-03 NOTE — NURSING NOTE
Camilo to follow up with Primary Care provider regarding elevated blood pressure.           RAPID3 (0-30) Cumulative Score  9.85          RAPID3 Weighted Score (divide #4 by 3 and that is the weighted score)  3.2       Jaquelin Muñiz CMA Rheumatology  3/3/2020 1:01 PM

## 2020-03-04 LAB
HBV CORE AB SERPL QL IA: NONREACTIVE
HBV SURFACE AG SERPL QL IA: NONREACTIVE
IGA SERPL-MCNC: 334 MG/DL (ref 84–499)
IGG SERPL-MCNC: 1008 MG/DL (ref 610–1616)
IGM SERPL-MCNC: 162 MG/DL (ref 35–242)

## 2020-03-05 ENCOUNTER — OFFICE VISIT (OUTPATIENT)
Dept: DERMATOLOGY | Facility: CLINIC | Age: 70
End: 2020-03-05
Payer: COMMERCIAL

## 2020-03-05 DIAGNOSIS — L30.8 ECZEMA CRAQUELE: Primary | ICD-10-CM

## 2020-03-05 DIAGNOSIS — L20.84 INTRINSIC ECZEMA: ICD-10-CM

## 2020-03-05 LAB
GAMMA INTERFERON BACKGROUND BLD IA-ACNC: 0.08 IU/ML
M TB IFN-G BLD-IMP: NEGATIVE
M TB IFN-G CD4+ BCKGRND COR BLD-ACNC: 8.47 IU/ML
MITOGEN IGNF BCKGRD COR BLD-ACNC: 0 IU/ML
MITOGEN IGNF BCKGRD COR BLD-ACNC: 0 IU/ML

## 2020-03-05 PROCEDURE — 99203 OFFICE O/P NEW LOW 30 MIN: CPT | Performed by: DERMATOLOGY

## 2020-03-05 RX ORDER — TRIAMCINOLONE ACETONIDE 1 MG/G
OINTMENT TOPICAL 2 TIMES DAILY
Status: CANCELLED | OUTPATIENT
Start: 2020-03-05

## 2020-03-05 RX ORDER — TRIAMCINOLONE ACETONIDE 1 MG/G
OINTMENT TOPICAL
Qty: 453.6 G | Refills: 3 | Status: SHIPPED | OUTPATIENT
Start: 2020-03-05 | End: 2022-05-04

## 2020-03-05 NOTE — PROGRESS NOTES
[facility] Dermatology Note    Dermatology Problem List:  1. Relevant medical history   -RA, currently immunosuppressed with sulfasalazine, prednisone and rituximab   2. Eczema craquele  -Current t/x: gentle skin care, TAC 0.1% ointment BID PRN  -s/p TAC 0.5% cream prescribed by outside provider    Encounter Date: Mar 5, 2020    CC:  Chief Complaint   Patient presents with     Rash     lower legs and up - has tried TAC in the past with success  but not today; thao lotion for extra dry skin     History of Present Illness:  Mr. Camilo Baca is a 69 year old male who presents as a referral from Dr. Gomes for a rash on 3/3/20. Presents with his wife. Today he reports that the rash in on his lower legs and up for 6-8 weeks. He notes that his skin is very dry and somewhat itchy. Has previously had this on his hands years ago and treated it with TAC 0.5% cream with success. He tried applying TAC 0.5% cream on his legs but it hasn't helped. He was previously thought this rash was due to taking sulfasalazine but when he held this medication this had no impact on the rash. In the shower he uses Dove bar soap. Uses [employer] and/or Aveeno moisturizer. No dryer sheet,s, unsure what type of laundry detergent he uses. No personal or family history of skin cancer. No other concerns addressed today.    Past Medical History:   Patient Active Problem List   Diagnosis     Lumbago     NONSPECIFIC MEDICAL HISTORY     HYPERLIPIDEMIA LDL GOAL <130     History of adenomatous polyp of colon     Idiopathic chronic gout without tophus, unspecified site     Advanced directives, counseling/discussion     Seropositive rheumatoid arthritis (H)     High risk medication use     Thrombocytopenia (H)     Gastroesophageal reflux disease without esophagitis     Factor 5 Leiden mutation, heterozygous (H)     Personal history of venous thrombosis and embolism     Personal history of DVT (deep vein thrombosis)     Hypothyroidism due to  acquired atrophy of thyroid     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)     Secondary hypertension with goal blood pressure less than 140/90     Rheumatoid arthritis involving multiple sites with positive rheumatoid factor (H)     Past Medical History:   Diagnosis Date     Alopecia, unspecified      Closed fracture of unspecified part of humerus 1974    Arm fracture / left wrist     Esophageal reflux 2/6/2015     Factor 5 Leiden mutation, heterozygous (H)      Gout 3/28/2011     Herpes zoster without mention of complication 1965    Herpes zoster     Measles without mention of complication     Measles     Personal history of DVT (deep vein thrombosis) 4/9/2015     Personal history of venous thrombosis and embolism 4/9/2015     Pulmonary embolism (H) 12/1996    post appendectomy     RA (rheumatoid arthritis) (H) 3/11/2013     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     Rheumatoid arthritis(714.0)     Beginning symptoms     Secondary hypertension with goal blood pressure less than 140/90 11/15/2016     Thrombocytopenia, unspecified (H) 1/29/2014     Varicella without mention of complication     Chickenpox     Past Surgical History:   Procedure Laterality Date     C APPENDECTOMY  1996     COLONOSCOPY  11/15/2010    COLONOSCOPY performed by DARIUS MESA at  GI     COLONOSCOPY N/A 11/2/2015    Procedure: COLONOSCOPY;  Surgeon: Bubba Odom MD;  Location:  GI     ESOPHAGOSCOPY, GASTROSCOPY, DUODENOSCOPY (EGD), COMBINED N/A 10/24/2018    Procedure: Esophagogastroduodenoscopy Multiple Biopsies by Biopsy;  Surgeon: Matteo Johnson DO;  Location:  GI      COLONOSCOPY W BIOPSY  08/10/05      REPAIR ING HERNIA,5+Y/O,REDUCIBL  1960     HERNIORRHAPHY UMBILICAL N/A 4/17/2015    Procedure: HERNIORRHAPHY UMBILICAL;  Surgeon: Rayray Avila MD;  Location:  OR     LAPAROSCOPIC HERNIORRHAPHY INGUINAL  BILATERAL Bilateral 4/17/2015    Procedure: LAPAROSCOPIC HERNIORRHAPHY INGUINAL BILATERAL;  Surgeon: Rayray Avila MD;  Location: [facility]       Social History:  Patient quit smoking cigarettes. Reitred.  to [place]i.     Family History:  No family history of skin cancer    Medications:  Current Outpatient Medications   Medication Sig Dispense Refill     allopurinol (ZYLOPRIM) 300 MG tablet TAKE ONE TABLET BY MOUTH ONCE DAILY (DUE FOR LABS ) 90 tablet 1     atorvastatin (LIPITOR) 80 MG tablet Take 1 tablet (80 mg) by mouth daily 90 tablet 3     capsaicin (ZOSTRIX) 0.075 % topical cream Apply  topically 3 times daily. 50 g 3     cetirizine (ZYRTEC) 10 MG tablet Take 1 tablet (10 mg) by mouth every evening 90 tablet 1     cholestyramine light (PREVALITE) 4 GM packet DISSOLVE ONE PACKET IN LIQUID AND DRINK BY MOUTH TWO TIMES A DAY AS DIRECTED 60 packet 3     Cyanocobalamin (B-12) 100 MCG TABS        diclofenac (VOLTAREN) 1 % GEL Apply  2 grams to shoulders three times daily using enclosed dosing card. 100 g 1     levothyroxine (SYNTHROID/LEVOTHROID) 25 MCG tablet TAKE ONE TABLET BY MOUTH ONCE DAILY 90 tablet 1     losartan (COZAAR) 50 MG tablet Take 1 tablet (50 mg) by mouth daily 90 tablet 3     omeprazole (PRILOSEC) 20 MG DR capsule Take 1 capsule (20 mg) by mouth daily 30 capsule 0     predniSONE (DELTASONE) 5 MG tablet PRN RA flare: Prednisone 20mg daily x5days, then 15mg daily x5days, then 10mg daily x5days, then 5mg daily x5days, then stop. 50 tablet 2     sulfaSALAzine ER (AZULFIDINE EN) 500 MG EC tablet Take 1 tablet (500 mg) by mouth 2 times daily 180 tablet 1     triamcinolone (KENALOG) 0.5 % external cream Apply sparingly to affected area three times daily as needed, no more than 7 days in a row. 30 g 0       Allergies   Allergen Reactions     Lisinopril Cough     Plaquenil [Hydroxychloroquine] Hives       Review of Systems:  -Constitutional: Patient is otherwise feeling well, in usual state of  health.   -Skin: As above in HPI. No additional skin concerns.    Physical exam:  Vitals: There were no vitals taken for this visit.  GEN: This is a well developed, well-nourished male in no acute distress, in a pleasant mood.    SKIN: Total skin excluding the undergarment areas was performed. The exam included the head/face, neck, both arms, chest, back, abdomen, both legs, digits and/or nails.   - Herrera Type II  - Xerosis of the skin diffusely  - Eczematous dermatitis from mid upper arms to forearms bilaterally.   - Trunk entirely clear  - Eczema craquele on the bilateral anterior shins  - No other lesions of concern on areas examined.     Impression/Plan:    1. Eczema craquele and intrinsic eczema. Discussed the pathogenesis of this condition and empathized the importance of gentle skin care and cream based moisturizer for long term treatment. Discussed use of topical steroid only when skin is itchy.   - For maintenance: In the shower, only use soap in areas with an increased number of sweat glands, such as the underarms. For a body soap in the shower, I recommend Dove bar soap, unscented for sensitive skin. For a liquid soap, I recommend Cetaphil gentle facial cleanser or Vanicream gentle facial cleanser. Once you are our of the shower (even if you are not itchy), apply a moisturizer from the neck down. I recommend a cream based moisturizer, like Vanicream.   - If your skin is itchy, apply the topical steroid triamcinolone 0.1% ointment twice a day only on the skin that is itchy and then apply the moisturizer.   - Gentle skin care handout provided    Follow-up PRN.    Staff Involved:  Scribe/Staff    Scribe Disclosure  I, Marylou Muñiz, am serving as a scribe to document services personally performed by Dr. Delilah Zhao MD, based on data collection and the provider's statements to me.     Provider Disclosure:   The documentation recorded by the scribe accurately reflects the services I personally  performed and the decisions made by me.    Delilah Zhao MD    Department of Dermatology  [facility]: Phone: [phone], Fax:[phone]  [facility]: Phone: [phone], Fax: [phone]

## 2020-03-05 NOTE — NURSING NOTE
@Camilo Baca's goals for this visit include:   Chief Complaint   Patient presents with     Rash     lower legs and up - has tried TAC in the past with success  but not today; thao lotion for extra dry skin       He requests these members of his care team be copied on today's visit information: NO    PCP: Lyndsey Aponte    Referring Provider:  No referring provider defined for this encounter.    There were no vitals taken for this visit.    Do you need any medication refills at today's visit? NO    [facility]

## 2020-03-05 NOTE — LETTER
3/5/2020         RE: Camilo Bcaa  [address]  [place] [address]        Dear Colleague,    Thank you for referring your patient, Camilo Baca, to the [facility]. Please see a copy of my visit note below.    [facility] Dermatology Note    Dermatology Problem List:  1. Relevant medical history   -RA, currently immunosuppressed with sulfasalazine, prednisone and rituximab   2. Eczema craquele  -Current t/x: gentle skin care, TAC 0.1% ointment BID PRN  -s/p TAC 0.5% cream prescribed by outside provider    Encounter Date: Mar 5, 2020    CC:  Chief Complaint   Patient presents with     Rash     lower legs and up - has tried TAC in the past with success  but not today; anayeli lotion for extra dry skin     History of Present Illness:  Mr. Camilo Baca is a 69 year old male who presents as a referral from Dr. Gomes for a rash on 3/3/20. Presents with his wife. Today he reports that the rash in on his lower legs and up for 6-8 weeks. He notes that his skin is very dry and somewhat itchy. Has previously had this on his hands years ago and treated it with TAC 0.5% cream with success. He tried applying TAC 0.5% cream on his legs but it hasn't helped. He was previously thought this rash was due to taking sulfasalazine but when he held this medication this had no impact on the rash. In the shower he uses Dove bar soap. Uses Anayeli and/or Aveeno moisturizer. No dryer sheet,s, unsure what type of laundry detergent he uses. No personal or family history of skin cancer. No other concerns addressed today.    Past Medical History:   Patient Active Problem List   Diagnosis     Lumbago     NONSPECIFIC MEDICAL HISTORY     HYPERLIPIDEMIA LDL GOAL <130     History of adenomatous polyp of colon     Idiopathic chronic gout without tophus, unspecified site     Advanced directives, counseling/discussion     Seropositive rheumatoid arthritis (H)     High risk medication use      Thrombocytopenia (H)     Gastroesophageal reflux disease without esophagitis     Factor 5 Leiden mutation, heterozygous (H)     Personal history of venous thrombosis and embolism     Personal history of DVT (deep vein thrombosis)     Hypothyroidism due to acquired atrophy of thyroid     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)     Secondary hypertension with goal blood pressure less than 140/90     Rheumatoid arthritis involving multiple sites with positive rheumatoid factor (H)     Past Medical History:   Diagnosis Date     Alopecia, unspecified      Closed fracture of unspecified part of humerus 1974    Arm fracture / left wrist     Esophageal reflux 2/6/2015     Factor 5 Leiden mutation, heterozygous (H)      Gout 3/28/2011     Herpes zoster without mention of complication 1965    Herpes zoster     Measles without mention of complication     Measles     Personal history of DVT (deep vein thrombosis) 4/9/2015     Personal history of venous thrombosis and embolism 4/9/2015     Pulmonary embolism (H) 12/1996    post appendectomy     RA (rheumatoid arthritis) (H) 3/11/2013     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     Rheumatoid arthritis(714.0)     Beginning symptoms     Secondary hypertension with goal blood pressure less than 140/90 11/15/2016     Thrombocytopenia, unspecified (H) 1/29/2014     Varicella without mention of complication     Chickenpox     Past Surgical History:   Procedure Laterality Date     C APPENDECTOMY  1996     COLONOSCOPY  11/15/2010    COLONOSCOPY performed by DARIUS MESA at  GI     COLONOSCOPY N/A 11/2/2015    Procedure: COLONOSCOPY;  Surgeon: Bubba Odom MD;  Location: [facility]     ESOPHAGOSCOPY, GASTROSCOPY, DUODENOSCOPY (EGD), COMBINED N/A 10/24/2018    Procedure: Esophagogastroduodenoscopy Multiple Biopsies by Biopsy;  Surgeon: Matteo Johnson DO;  Location:  GI      HC COLONOSCOPY W BIOPSY  08/10/05     HC REPAIR ING HERNIA,5+Y/O,REDUCIBL  1960     HERNIORRHAPHY UMBILICAL N/A 4/17/2015    Procedure: HERNIORRHAPHY UMBILICAL;  Surgeon: Rayray Avila MD;  Location: [facility]     LAPAROSCOPIC HERNIORRHAPHY INGUINAL BILATERAL Bilateral 4/17/2015    Procedure: LAPAROSCOPIC HERNIORRHAPHY INGUINAL BILATERAL;  Surgeon: Rayray Avila MD;  Location: [facility] OR       Social History:  Patient quit smoking cigarettes. Reitred.  to Jacki.     Family History:  No family history of skin cancer    Medications:  Current Outpatient Medications   Medication Sig Dispense Refill     allopurinol (ZYLOPRIM) 300 MG tablet TAKE ONE TABLET BY MOUTH ONCE DAILY (DUE FOR LABS ) 90 tablet 1     atorvastatin (LIPITOR) 80 MG tablet Take 1 tablet (80 mg) by mouth daily 90 tablet 3     capsaicin (ZOSTRIX) 0.075 % topical cream Apply  topically 3 times daily. 50 g 3     cetirizine (ZYRTEC) 10 MG tablet Take 1 tablet (10 mg) by mouth every evening 90 tablet 1     cholestyramine light (PREVALITE) 4 GM packet DISSOLVE ONE PACKET IN LIQUID AND DRINK BY MOUTH TWO TIMES A DAY AS DIRECTED 60 packet 3     Cyanocobalamin (B-12) 100 MCG TABS        diclofenac (VOLTAREN) 1 % GEL Apply  2 grams to shoulders three times daily using enclosed dosing card. 100 g 1     levothyroxine (SYNTHROID/LEVOTHROID) 25 MCG tablet TAKE ONE TABLET BY MOUTH ONCE DAILY 90 tablet 1     losartan (COZAAR) 50 MG tablet Take 1 tablet (50 mg) by mouth daily 90 tablet 3     omeprazole (PRILOSEC) 20 MG DR capsule Take 1 capsule (20 mg) by mouth daily 30 capsule 0     predniSONE (DELTASONE) 5 MG tablet PRN RA flare: Prednisone 20mg daily x5days, then 15mg daily x5days, then 10mg daily x5days, then 5mg daily x5days, then stop. 50 tablet 2     sulfaSALAzine ER (AZULFIDINE EN) 500 MG EC tablet Take 1 tablet (500 mg) by mouth 2 times daily 180 tablet 1     triamcinolone (KENALOG) 0.5 % external cream Apply sparingly to affected area three  times daily as needed, no more than 7 days in a row. 30 g 0       Allergies   Allergen Reactions     Lisinopril Cough     Plaquenil [Hydroxychloroquine] Hives       Review of Systems:  -Constitutional: Patient is otherwise feeling well, in usual state of health.   -Skin: As above in HPI. No additional skin concerns.    Physical exam:  Vitals: There were no vitals taken for this visit.  GEN: This is a well developed, well-nourished male in no acute distress, in a pleasant mood.    SKIN: Total skin excluding the undergarment areas was performed. The exam included the head/face, neck, both arms, chest, back, abdomen, both legs, digits and/or nails.   - Herrera Type II  - Xerosis of the skin diffusely  - Eczematous dermatitis from mid upper arms to forearms bilaterally.   - Trunk entirely clear  - Eczema craquele on the bilateral anterior shins  - No other lesions of concern on areas examined.     Impression/Plan:    1. Eczema craquele and intrinsic eczema. Discussed the pathogenesis of this condition and empathized the importance of gentle skin care and cream based moisturizer for long term treatment. Discussed use of topical steroid only when skin is itchy.   - For maintenance: In the shower, only use soap in areas with an increased number of sweat glands, such as the underarms. For a body soap in the shower, I recommend Dove bar soap, unscented for sensitive skin. For a liquid soap, I recommend Cetaphil gentle facial cleanser or Vanicream gentle facial cleanser. Once you are our of the shower (even if you are not itchy), apply a moisturizer from the neck down. I recommend a cream based moisturizer, like Vanicream.   - If your skin is itchy, apply the topical steroid triamcinolone 0.1% ointment twice a day only on the skin that is itchy and then apply the moisturizer.   - Gentle skin care handout provided    Follow-up PRN.    Staff Involved:  Scribe/Staff    Scribe Disclosure  Marylou MARIANO, am serving as a  scribe to document services personally performed by Dr. Delilah Zhao MD, based on data collection and the provider's statements to me.     Provider Disclosure:   The documentation recorded by the scribe accurately reflects the services I personally performed and the decisions made by me.    Delilah Zhao MD    Department of Dermatology  [facility]: Phone: [phone], Fax:[phone]  [facility]: Phone: [phone], Fax: [phone]                Again, thank you for allowing me to participate in the care of your patient.        Sincerely,        Delilah Zhao MD

## 2020-03-05 NOTE — PATIENT INSTRUCTIONS
For Maintenance: In the shower, only use soap in areas with an increased number of sweat glands, such as the underarms. For a body soap in the shower, I recommend Dove bar soap, unscented for sensitive skin. For a liquid soap, I recommend Cetaphil gentle facial cleanser or Vanicream gentle facial cleanser.     Once you are our of the shower (even if you are not itchy), apply a moisturizer from the neck down. I recommend a cream based moisturizer, like Vanicream.     If your skin is flared/itchy, apply the topical steroid triamcinolone 0.1% ointment twice a day only on the skin that is itchy and then apply the moisturizer.       Gentle Skin Care  Below is a list of products our providers recommend for gentle skin care.  Moisturizers:    Lighter; Cetaphil Cream, CeraVe, Aveeno and Vanicream Light     Thicker; Aquaphor Ointment, Vaseline, Petrolium Jelly, Eucerin and Vanicream    Avoid Lotions (too thin)  Mild Cleansers:    Dove- Fragrance Free    CeraVe     Vanicream Cleansing Bar    Cetaphil Cleanser     Aquaphor 2 in1 Gentle Wash and Shampoo       Laundry Products:    All Free and Clear    Cheer Free    Generic Brands are okay as long as they are  Fragrance Free      Avoid fabric softeners  and dryer sheets   Sunscreens: SPF 30 or greater     Sunscreens that contain Zinc Oxide or Titanium Dioxide should be applied, these are physical blockers. Spray or  chemical  sunscreens should be avoided.        Shampoo and Conditioners:    Free and Clear by Vanicream    Aquaphor 2 in 1 Gentle Wash and Shampoo Oils:    Mineral Oil     Emu Oil     For some patients, coconut and sunflower seed oil      Generic Products are an okay substitute, but make sure they are fragrance free.  *Avoid product that have fragrance added to them. Organic does not mean  fragrance free.  In fact patients with sensitive skin can become quite irritated by organic products.     1. Daily bathing is recommended. Make sure you are applying a good  moisturizer after bathing every time.  2. Use Moisturizing creams at least twice daily to the whole body.   3. Creams are more moisturizing than lotions  4. Products should be fragrance free- soaps, creams, detergents.   Care Plan:  1. Keep bathing and showering short, less than 15 minutes   2. Always use lukewarm warm when possible. AVOID very HOT or COLD water  3. DO NOT use bubble bath  4. Limit the use of soaps. Focus on the skin folds, face, armpits, groin and feet  5. Do NOT vigorously scrub when you cleanse your skin  6. After bathing, PAT your skin lightly with a towel. DO NOT rub or scrub when drying  7. ALWAYS apply a moisturizer immediately after bathing. This helps to  lock in  the moisture.  8. Reapply moisturizing agents at least twice daily to your whole body  9. Do not use products such as powders, perfumes, or colognes on your skin  10. Avoid saunas and steam baths. This temperature is too HOT  11. Avoid tight or  scratchy  clothing such as wool  12. Always wash new clothing before wearing them for the first time  13. Sometimes a humidifier or vaporizer can be used at night can help the dry skin. Remember to keep it clean to avoid mold growth.

## 2020-03-10 ENCOUNTER — INFUSION THERAPY VISIT (OUTPATIENT)
Dept: INFUSION THERAPY | Facility: CLINIC | Age: 70
End: 2020-03-10
Attending: INTERNAL MEDICINE
Payer: MEDICARE

## 2020-03-10 VITALS
HEART RATE: 68 BPM | TEMPERATURE: 97.6 F | SYSTOLIC BLOOD PRESSURE: 125 MMHG | BODY MASS INDEX: 27.98 KG/M2 | WEIGHT: 195 LBS | OXYGEN SATURATION: 96 % | DIASTOLIC BLOOD PRESSURE: 75 MMHG | RESPIRATION RATE: 16 BRPM

## 2020-03-10 DIAGNOSIS — M05.79 RHEUMATOID ARTHRITIS INVOLVING MULTIPLE SITES WITH POSITIVE RHEUMATOID FACTOR (H): Primary | ICD-10-CM

## 2020-03-10 PROCEDURE — 96413 CHEMO IV INFUSION 1 HR: CPT

## 2020-03-10 PROCEDURE — 96415 CHEMO IV INFUSION ADDL HR: CPT

## 2020-03-10 PROCEDURE — 25000132 ZZH RX MED GY IP 250 OP 250 PS 637: Mod: GY | Performed by: INTERNAL MEDICINE

## 2020-03-10 PROCEDURE — 25800030 ZZH RX IP 258 OP 636: Performed by: INTERNAL MEDICINE

## 2020-03-10 PROCEDURE — 25000128 H RX IP 250 OP 636: Performed by: INTERNAL MEDICINE

## 2020-03-10 PROCEDURE — 96375 TX/PRO/DX INJ NEW DRUG ADDON: CPT

## 2020-03-10 RX ORDER — ACETAMINOPHEN 325 MG/1
650 TABLET ORAL ONCE
Status: CANCELLED
Start: 2020-03-24

## 2020-03-10 RX ORDER — DIPHENHYDRAMINE HCL 25 MG
50 CAPSULE ORAL ONCE
Status: COMPLETED | OUTPATIENT
Start: 2020-03-10 | End: 2020-03-10

## 2020-03-10 RX ORDER — DIPHENHYDRAMINE HCL 25 MG
50 CAPSULE ORAL ONCE
Status: CANCELLED
Start: 2020-03-24

## 2020-03-10 RX ORDER — HEPARIN SODIUM,PORCINE 10 UNIT/ML
5 VIAL (ML) INTRAVENOUS
Status: CANCELLED | OUTPATIENT
Start: 2020-03-24

## 2020-03-10 RX ORDER — HEPARIN SODIUM (PORCINE) LOCK FLUSH IV SOLN 100 UNIT/ML 100 UNIT/ML
5 SOLUTION INTRAVENOUS
Status: DISCONTINUED | OUTPATIENT
Start: 2020-03-10 | End: 2020-03-10 | Stop reason: HOSPADM

## 2020-03-10 RX ORDER — HEPARIN SODIUM (PORCINE) LOCK FLUSH IV SOLN 100 UNIT/ML 100 UNIT/ML
5 SOLUTION INTRAVENOUS
Status: CANCELLED | OUTPATIENT
Start: 2020-03-24

## 2020-03-10 RX ORDER — ACETAMINOPHEN 325 MG/1
650 TABLET ORAL ONCE
Status: COMPLETED | OUTPATIENT
Start: 2020-03-10 | End: 2020-03-10

## 2020-03-10 RX ORDER — METHYLPREDNISOLONE SODIUM SUCCINATE 125 MG/2ML
125 INJECTION, POWDER, LYOPHILIZED, FOR SOLUTION INTRAMUSCULAR; INTRAVENOUS ONCE
Status: CANCELLED | OUTPATIENT
Start: 2020-03-24

## 2020-03-10 RX ORDER — HEPARIN SODIUM,PORCINE 10 UNIT/ML
5 VIAL (ML) INTRAVENOUS
Status: DISCONTINUED | OUTPATIENT
Start: 2020-03-10 | End: 2020-03-10 | Stop reason: HOSPADM

## 2020-03-10 RX ORDER — METHYLPREDNISOLONE SODIUM SUCCINATE 125 MG/2ML
125 INJECTION, POWDER, LYOPHILIZED, FOR SOLUTION INTRAMUSCULAR; INTRAVENOUS ONCE
Status: COMPLETED | OUTPATIENT
Start: 2020-03-10 | End: 2020-03-10

## 2020-03-10 RX ADMIN — RITUXIMAB 1000 MG: 10 INJECTION, SOLUTION INTRAVENOUS at 10:24

## 2020-03-10 RX ADMIN — DIPHENHYDRAMINE HYDROCHLORIDE 50 MG: 25 CAPSULE ORAL at 09:35

## 2020-03-10 RX ADMIN — SODIUM CHLORIDE 250 ML: 9 INJECTION, SOLUTION INTRAVENOUS at 09:44

## 2020-03-10 RX ADMIN — METHYLPREDNISOLONE SODIUM SUCCINATE 125 MG: 125 INJECTION, POWDER, FOR SOLUTION INTRAMUSCULAR; INTRAVENOUS at 09:50

## 2020-03-10 RX ADMIN — ACETAMINOPHEN 650 MG: 325 TABLET, FILM COATED ORAL at 09:35

## 2020-03-10 ASSESSMENT — PAIN SCALES - GENERAL: PAINLEVEL: NO PAIN (0)

## 2020-03-10 NOTE — PROGRESS NOTES
Infusion Nursing Note:  Camilo Baca presents today for Rituxan.    Patient seen by provider today: No   present during visit today: Not Applicable.    Note: Here for first dose Rituxan - tolerated well. .    Intravenous Access:  Peripheral IV placed.    Treatment Conditions:  Biological Infusion Checklist:  ~~~ NOTE: If the patient answers yes to any of the questions below, hold the infusion and contact ordering provider or on-call provider.    1. Have you recently had an elevated temperature, fever, chills, productive cough, coughing for 3 weeks or longer or hemoptysis, abnormal vital signs, night sweats,  chest pain or have you noticed a decrease in your appetite, unexplained weight loss or fatigue? No  2. Do you have any open wounds or new incisions? No  3. Do you have any recent or upcoming hospitalizations, surgeries or dental procedures? No  4. Do you currently have or recently have had any signs of illness or infection or are you on any antibiotics? No  5. Have you had any new, sudden or worsening abdominal pain? No  6. Have you or anyone in your household received a live vaccination in the past 4 weeks? Please note:  No live vaccines while on biologic/chemotherapy until 6 months after the last treatment.  Patient can receive the flu vaccine (shot only) and the pneumovax.  It is optimal for the patient to get these vaccines mid cycle, but they can be given at any time as long as it is not on the day of the infusion. No  7. Have you recently been diagnosed with any new nervous system diseases (ie. Multiple sclerosis, Guillain [place], seizures, neurological changes) or cancer diagnosis? No  8. Are you on any form of radiation or chemotherapy? No  9. Are you pregnant or breast feeding or do you have plans of pregnancy in the future? No  10. Have you been having any signs of worsening depression or suicidal ideations?  (benlysta only) No  11. Have there been any other new onset medical symptoms?  No        Post Infusion Assessment:  Patient tolerated infusion without incident.  Patient observed for 15 minutes post Rituxan infusion per protocol.  Site patent and intact, free from redness, edema or discomfort.  No evidence of extravasations.  Access discontinued per protocol.   EDUCATION POST BIOLOGICAL/CHEMOTHERAPY INFUSION  Call the triage nurse at your clinic or seek medical attention if you have chills and/or temperature greater than or equal to 100.5, uncontrolled nausea/vomiting, diarrhea, constipation, dizziness, shortness of breath, chest pain, heart palpitations, weakness or any other new or concerning symptoms, questions or concerns.  You can not have any live virus vaccines prior to or during treatment or up to 6 months post infusion.  If you have an upcoming surgery, medical procedure or dental procedure during treatment, this should be discussed with your ordering physician and your surgeon/dentist.  If you are having any concerning symptom, if you are unsure if you should get your next infusion or wish to speak to a provider before your next infusion, please call your care coordinator or triage nurse at your clinic to notify them so we can adequately serve you.        Discharge Plan:   Discharge instructions reviewed with: Patient.  Patient discharged in stable condition accompanied by: self.  Departure Mode: Ambulatory.    Emy Figueredo RN

## 2020-03-12 ENCOUNTER — OFFICE VISIT (OUTPATIENT)
Dept: FAMILY MEDICINE | Facility: OTHER | Age: 70
End: 2020-03-12
Payer: COMMERCIAL

## 2020-03-12 VITALS
RESPIRATION RATE: 16 BRPM | HEIGHT: 70 IN | DIASTOLIC BLOOD PRESSURE: 90 MMHG | HEART RATE: 68 BPM | BODY MASS INDEX: 27.53 KG/M2 | TEMPERATURE: 97.4 F | SYSTOLIC BLOOD PRESSURE: 162 MMHG | WEIGHT: 192.3 LBS | OXYGEN SATURATION: 97 %

## 2020-03-12 DIAGNOSIS — E78.5 HYPERLIPIDEMIA LDL GOAL <130: ICD-10-CM

## 2020-03-12 DIAGNOSIS — D68.51 FACTOR 5 LEIDEN MUTATION, HETEROZYGOUS (H): ICD-10-CM

## 2020-03-12 DIAGNOSIS — M10.9 ACUTE GOUTY ARTHROPATHY: ICD-10-CM

## 2020-03-12 DIAGNOSIS — I10 BENIGN ESSENTIAL HYPERTENSION: ICD-10-CM

## 2020-03-12 DIAGNOSIS — K21.9 GASTROESOPHAGEAL REFLUX DISEASE, ESOPHAGITIS PRESENCE NOT SPECIFIED: ICD-10-CM

## 2020-03-12 DIAGNOSIS — Z00.00 ROUTINE HISTORY AND PHYSICAL EXAMINATION OF ADULT: Primary | ICD-10-CM

## 2020-03-12 DIAGNOSIS — E03.4 HYPOTHYROIDISM DUE TO ACQUIRED ATROPHY OF THYROID: ICD-10-CM

## 2020-03-12 PROCEDURE — G0439 PPPS, SUBSEQ VISIT: HCPCS | Performed by: NURSE PRACTITIONER

## 2020-03-12 RX ORDER — ALLOPURINOL 300 MG/1
TABLET ORAL
Qty: 90 TABLET | Refills: 1 | Status: SHIPPED | OUTPATIENT
Start: 2020-03-12 | End: 2020-11-02

## 2020-03-12 RX ORDER — LOSARTAN POTASSIUM 100 MG/1
100 TABLET ORAL DAILY
Qty: 90 TABLET | Refills: 0 | Status: SHIPPED | OUTPATIENT
Start: 2020-03-12 | End: 2020-06-29

## 2020-03-12 RX ORDER — LEVOTHYROXINE SODIUM 25 UG/1
TABLET ORAL
Qty: 90 TABLET | Refills: 1 | Status: SHIPPED | OUTPATIENT
Start: 2020-03-12 | End: 2020-11-02

## 2020-03-12 ASSESSMENT — PAIN SCALES - GENERAL: PAINLEVEL: NO PAIN (0)

## 2020-03-12 ASSESSMENT — ACTIVITIES OF DAILY LIVING (ADL): CURRENT_FUNCTION: NO ASSISTANCE NEEDED

## 2020-03-12 ASSESSMENT — MIFFLIN-ST. JEOR: SCORE: 1658.52

## 2020-03-12 NOTE — NURSING NOTE
Health Maintenance Due   Topic Date Due     MEDICARE ANNUAL WELLNESS VISIT  08/15/2017     FALL RISK ASSESSMENT  08/03/2019      Madeline Stuart LPN........3/12/2020 8:57 AM

## 2020-03-12 NOTE — PATIENT INSTRUCTIONS
Follow up in 6 months for fasting labs - lab visit only is fine    Increase your Losartan to 100 mg daily.

## 2020-03-12 NOTE — PROGRESS NOTES
"SUBJECTIVE:   Camilo Baca is a 69 year old male who presents for Preventive Visit.    Are you in the first 12 months of your Medicare coverage?  No    Healthy Habits:     In general, how would you rate your overall health?  Very good    Frequency of exercise:  6-7 days/week    Duration of exercise:  Greater than 60 minutes    Do you usually eat at least 4 servings of fruit and vegetables a day, include whole grains    & fiber and avoid regularly eating high fat or \"junk\" foods?  No    Taking medications regularly:  No    Barriers to taking medications:  None    Medication side effects:  None    Ability to successfully perform activities of daily living:  No assistance needed    Home Safety:  No safety concerns identified    Hearing Impairment:  Difficulty following a conversation in a noisy restaurant or crowded room, feel that people are mumbling or not speaking clearly and difficulty understanding soft or whispered speech    In the past 6 months, have you been bothered by leaking of urine?  No    In general, how would you rate your overall mental or emotional health?  Excellent      PHQ-2 Total Score: 0    Additional concerns today:  No    Do you feel safe in your environment? Yes    Have you ever done Advance Care Planning? (For example, a Health Directive, POLST, or a discussion with a medical provider or your loved ones about your wishes): Yes, advance care planning is on file.      Fall risk  Fallen 2 or more times in the past year?: No  Any fall with injury in the past year?: No    Cognitive Screening   1) Repeat 3 items (Leader, Season, Table)    2) Clock draw: NORMAL  3) 3 item recall: Recalls 2 objects   Results: NORMAL clock, 1-2 items recalled: COGNITIVE IMPAIRMENT LESS LIKELY    Mini-CogTM Copyright YAMIL Reyez. Licensed by the author for use in [facility]; reprinted with permission (augusto@.[email or web address]). All rights reserved.      Do you have sleep apnea, excessive snoring or daytime drowsiness?: " no    Reviewed and updated as needed this visit by clinical staff  Tobacco  Allergies  Meds  Med Hx  Surg Hx  Fam Hx  Soc Hx        Reviewed and updated as needed this visit by Provider        Social History     Tobacco Use     Smoking status: Former Smoker     Packs/day: 0.25     Years: 2.00     Pack years: 0.50     Types: Cigarettes, Pipe     Last attempt to quit: 1979     Years since quittin.2     Smokeless tobacco: Never Used   Substance Use Topics     Alcohol use: Yes     Comment: 5 drinks per week / wine     If you drink alcohol do you typically have >3 drinks per day or >7 drinks per week? No    No flowsheet data found.      Current providers sharing in care for this patient include:   Patient Care Team:  Lyndsey Aponte APRN CNP as PCP - General (Nurse Practitioner - Family)  Lyndsey Aponte APRN CNP as Assigned PCP    The following health maintenance items are reviewed in Epic and correct as of today:  Health Maintenance   Topic Date Due     MEDICARE ANNUAL WELLNESS VISIT  08/15/2017     FALL RISK ASSESSMENT  2019     LIPID  2020     MICROALBUMIN  2020     TSH W/FREE T4 REFLEX  2020     COLORECTAL CANCER SCREENING  2020     DTAP/TDAP/TD IMMUNIZATION (3 - Td) 10/18/2022     ADVANCE CARE PLANNING  2023     HEPATITIS C SCREENING  Completed     PHQ-2  Completed     INFLUENZA VACCINE  Completed     PNEUMOCOCCAL IMMUNIZATION 65+ LOW/MEDIUM RISK  Completed     ZOSTER IMMUNIZATION  Completed     AORTIC ANEURYSM SCREENING (SYSTEM ASSIGNED)  Completed     IPV IMMUNIZATION  Aged Out     MENINGITIS IMMUNIZATION  Aged Out       Review of Systems  CONSTITUTIONAL: NEGATIVE for fever, chills, change in weight  INTEGUMENTARY/SKIN: NEGATIVE for worrisome rashes, moles or lesions  EYES: NEGATIVE for vision changes or irritation  ENT/MOUTH: NEGATIVE for ear, mouth and throat problems  RESP: NEGATIVE for significant cough or SOB  BREAST: NEGATIVE for masses, tenderness or  "discharge  CV: NEGATIVE for chest pain, palpitations or peripheral edema  GI: NEGATIVE for nausea, abdominal pain, heartburn, or change in bowel habits  : NEGATIVE for frequency, dysuria, or hematuria  MUSCULOSKELETAL: NEGATIVE for significant arthralgias or myalgia  NEURO: NEGATIVE for weakness, dizziness or paresthesias  ENDOCRINE: NEGATIVE for temperature intolerance, skin/hair changes  HEME: NEGATIVE for bleeding problems  PSYCHIATRIC: NEGATIVE for changes in mood or affect    OBJECTIVE:   BP (!) 168/90 (BP Location: Left arm, Patient Position: Sitting, Cuff Size: Adult Large)   Pulse 68   Temp 97.4  F (36.3  C) (Temporal)   Resp 16   Ht 1.786 m (5' 10.32\")   Wt 87.2 kg (192 lb 4.8 oz)   SpO2 97%   BMI 27.35 kg/m   Estimated body mass index is 27.35 kg/m  as calculated from the following:    Height as of this encounter: 1.786 m (5' 10.32\").    Weight as of this encounter: 87.2 kg (192 lb 4.8 oz).  Physical Exam  GENERAL: healthy, alert and no distress  EYES: Eyes grossly normal to inspection, PERRL and conjunctivae and sclerae normal  HENT: ear canals and TM's normal, nose and mouth without ulcers or lesions  NECK: no adenopathy, no asymmetry, masses, or scars and thyroid normal to palpation  RESP: lungs clear to auscultation - no rales, rhonchi or wheezes  CV: regular rate and rhythm, normal S1 S2, no S3 or S4, no murmur, click or rub, no peripheral edema and peripheral pulses strong  ABDOMEN: soft, nontender, no hepatosplenomegaly, no masses and bowel sounds normal  MS: no gross musculoskeletal defects noted, no edema  SKIN: no suspicious lesions or rashes  NEURO: Normal strength and tone, mentation intact and speech normal  PSYCH: mentation appears normal, affect normal/bright    Diagnostic Test Results:  none     ASSESSMENT / PLAN:   1. Routine history and physical examination of adult    2. Acute gouty arthropathy  Chronic, controlled.  No change in treatment plan.   - allopurinol (ZYLOPRIM) 300 MG " "tablet; TAKE ONE TABLET BY MOUTH ONCE DAILY (DUE FOR LABS )  Dispense: 90 tablet; Refill: 1    3. Hypothyroidism due to acquired atrophy of thyroid  Chronic, controlled.  No change in treatment plan.   - levothyroxine (SYNTHROID/LEVOTHROID) 25 MCG tablet; TAKE ONE TABLET BY MOUTH ONCE DAILY  Dispense: 90 tablet; Refill: 1  - **TSH with free T4 reflex FUTURE 6mo; Future    4. Gastroesophageal reflux disease, esophagitis presence not specified  Chronic, controlled.  No change in treatment plan.   - omeprazole (PRILOSEC) 20 MG DR capsule; Take 1 capsule (20 mg) by mouth daily  Dispense: 90 capsule; Refill: 3    5. Hyperlipidemia LDL goal <130  Chronic, controlled.  No change in treatment plan.  He wants to reduce his dose of Cholestyramine and will recheck fasting labs in 6 months with lab only visit  - Lipid panel reflex to direct LDL Fasting; Future    6. Benign essential hypertension  Not at goal today.  Increase Losartan to 100 mg daily.  Recheck with outpatient visit for his infusions in 2 weeks.    - losartan (COZAAR) 100 MG tablet; Take 1 tablet (100 mg) by mouth daily  Dispense: 90 tablet; Refill: 0    7. Factor 5 Leiden mutation, heterozygous (H)      COUNSELING:  Reviewed preventive health counseling, as reflected in patient instructions    Estimated body mass index is 27.35 kg/m  as calculated from the following:    Height as of this encounter: 1.786 m (5' 10.32\").    Weight as of this encounter: 87.2 kg (192 lb 4.8 oz).    Weight management plan: Discussed healthy diet and exercise guidelines     reports that he quit smoking about 41 years ago. His smoking use included cigarettes and pipe. He has a 0.50 pack-year smoking history. He has never used smokeless tobacco.      Appropriate preventive services were discussed with this patient, including applicable screening as appropriate for cardiovascular disease, diabetes, osteopenia/osteoporosis, and glaucoma.  As appropriate for age/gender, discussed screening " for colorectal cancer, prostate cancer, breast cancer, and cervical cancer. Checklist reviewing preventive services available has been given to the patient.    Reviewed patients plan of care and provided an AVS. The Basic Care Plan (routine screening as documented in Health Maintenance) for Camilo meets the Care Plan requirement. This Care Plan has been established and reviewed with the Patient.    Counseling Resources:  ATP IV Guidelines  Pooled Cohorts Equation Calculator  Breast Cancer Risk Calculator  FRAX Risk Assessment  ICSI Preventive Guidelines  Dietary Guidelines for Americans, 2010  USDA's MyPlate  ASA Prophylaxis  Lung CA Screening    DIANN Presley CNP  [facility]    Identified Health Risks:

## 2020-03-24 ENCOUNTER — INFUSION THERAPY VISIT (OUTPATIENT)
Dept: INFUSION THERAPY | Facility: CLINIC | Age: 70
End: 2020-03-24
Attending: INTERNAL MEDICINE
Payer: MEDICARE

## 2020-03-24 VITALS
SYSTOLIC BLOOD PRESSURE: 144 MMHG | DIASTOLIC BLOOD PRESSURE: 83 MMHG | WEIGHT: 196.4 LBS | HEART RATE: 58 BPM | BODY MASS INDEX: 27.93 KG/M2

## 2020-03-24 DIAGNOSIS — M05.79 RHEUMATOID ARTHRITIS INVOLVING MULTIPLE SITES WITH POSITIVE RHEUMATOID FACTOR (H): Primary | ICD-10-CM

## 2020-03-24 PROCEDURE — 96413 CHEMO IV INFUSION 1 HR: CPT

## 2020-03-24 PROCEDURE — 25000132 ZZH RX MED GY IP 250 OP 250 PS 637: Performed by: INTERNAL MEDICINE

## 2020-03-24 PROCEDURE — 96415 CHEMO IV INFUSION ADDL HR: CPT

## 2020-03-24 PROCEDURE — 25000128 H RX IP 250 OP 636: Performed by: INTERNAL MEDICINE

## 2020-03-24 PROCEDURE — 96375 TX/PRO/DX INJ NEW DRUG ADDON: CPT

## 2020-03-24 PROCEDURE — 25800030 ZZH RX IP 258 OP 636: Performed by: INTERNAL MEDICINE

## 2020-03-24 RX ORDER — DIPHENHYDRAMINE HCL 25 MG
50 CAPSULE ORAL ONCE
Status: COMPLETED | OUTPATIENT
Start: 2020-03-24 | End: 2020-03-24

## 2020-03-24 RX ORDER — HEPARIN SODIUM (PORCINE) LOCK FLUSH IV SOLN 100 UNIT/ML 100 UNIT/ML
5 SOLUTION INTRAVENOUS
Status: CANCELLED | OUTPATIENT
Start: 2020-03-24

## 2020-03-24 RX ORDER — ACETAMINOPHEN 325 MG/1
650 TABLET ORAL ONCE
Status: CANCELLED
Start: 2020-03-24

## 2020-03-24 RX ORDER — METHYLPREDNISOLONE SODIUM SUCCINATE 125 MG/2ML
125 INJECTION, POWDER, LYOPHILIZED, FOR SOLUTION INTRAMUSCULAR; INTRAVENOUS ONCE
Status: COMPLETED | OUTPATIENT
Start: 2020-03-24 | End: 2020-03-24

## 2020-03-24 RX ORDER — ACETAMINOPHEN 325 MG/1
650 TABLET ORAL ONCE
Status: COMPLETED | OUTPATIENT
Start: 2020-03-24 | End: 2020-03-24

## 2020-03-24 RX ORDER — DIPHENHYDRAMINE HCL 25 MG
50 CAPSULE ORAL ONCE
Status: CANCELLED
Start: 2020-03-24

## 2020-03-24 RX ORDER — METHYLPREDNISOLONE SODIUM SUCCINATE 125 MG/2ML
125 INJECTION, POWDER, LYOPHILIZED, FOR SOLUTION INTRAMUSCULAR; INTRAVENOUS ONCE
Status: CANCELLED | OUTPATIENT
Start: 2020-03-24

## 2020-03-24 RX ORDER — HEPARIN SODIUM,PORCINE 10 UNIT/ML
5 VIAL (ML) INTRAVENOUS
Status: CANCELLED | OUTPATIENT
Start: 2020-03-24

## 2020-03-24 RX ADMIN — DIPHENHYDRAMINE HYDROCHLORIDE 50 MG: 25 CAPSULE ORAL at 08:29

## 2020-03-24 RX ADMIN — METHYLPREDNISOLONE SODIUM SUCCINATE 125 MG: 125 INJECTION, POWDER, FOR SOLUTION INTRAMUSCULAR; INTRAVENOUS at 08:33

## 2020-03-24 RX ADMIN — ACETAMINOPHEN 650 MG: 325 TABLET, FILM COATED ORAL at 08:29

## 2020-03-24 RX ADMIN — SODIUM CHLORIDE 250 ML: 9 INJECTION, SOLUTION INTRAVENOUS at 08:33

## 2020-03-24 RX ADMIN — RITUXIMAB 1000 MG: 10 INJECTION, SOLUTION INTRAVENOUS at 09:03

## 2020-03-24 NOTE — PROGRESS NOTES
"Infusion Nursing Note:  Camilo Baca presents today for Day 2 Rituxan.    Patient seen by provider today: No   present during visit today: Not Applicable.    Note: PRIOR TO INFUSION OF BIOLOGICAL MEDICATIONS OR ANY OF THESE AS LISTED: Rituxan (rituximab) \".rheumbiologicalchecklist\"    Prior to Infusion of biological medications or any of these as listed:    1. Elevated temperature, fever, chills, productive cough or abnormal vital signs, night sweats, coughing up blood or sputum, no appetite or abnormal vital signs : NO    2. Open wounds or new incisions: NO    3. Recent hospitalization: NO    4.  Recent surgeries:  NO    5. Any upcoming surgeries or dental procedures?:NO    6. Any current or recent bouts of illness or infection? On any antibiotics? : NO    7. Any new, sudden or worsening abdominal pain :NO    8. Vaccination within 4 weeks? Patient or someone in the household is scheduled to receive vaccination? No live virus vaccines prior to or during treatment :NO    9. Any nervous system diseases [i.e. multiple sclerosis, Guillain-Newbern, seizures, neurological  changes]: NO    10. Pregnant or breast feeding; or plans on pregnancy in the future: NO    11. Signs of worsening depression or suicidal ideations while taking benlysta:NO    12. New-onset medical symptoms: NO    13.  New cancer diagnosis or on chemotherapy or radiation NO    14.  Evaluate for any sign of active TB [Unexplained weight loss, Loss of appetite, Night sweats, Fever, Fatigue, Chills, Coughing for 3 weeks or longer, Hemoptysis (coughing up blood), Chest pain]: NO    **Note: If answered yes to any of the above, hold the infusion and contact ordering rheumatologist or on-call rheumatologist. .    Intravenous Access:  Peripheral IV placed.    Treatment Conditions:  See above.      Post Infusion Assessment:  Patient tolerated infusion without incident. Did have persistent elevated diastolic B/P. Asymptomatic. Message sent to Dr." Eliseo re: B/P. Pt does have B/P cuff at home. Instructed patient to recheck and  call if remains elevated or symptomatic.   Patient observed for 15 minutes post Rituxan per protocol.  Blood return noted pre and post infusion.  Site patent and intact, free from redness, edema or discomfort.  No evidence of extravasations.  Peripheral IV access discontinued per protocol.    Biologic Infusion Post Education: Call the triage nurse at your clinic or seek medical attention if you have chills and/or temperature greater than or equal to 100.5, uncontrolled nausea/vomiting, diarrhea, constipation, dizziness, shortness of breath, chest pain, heart palpitations, weakness or any other new or concerning symptoms, questions or concerns.  You cannot have any live virus vaccines prior to or during treatment or up to 6 months post infusion.  If you have an upcoming surgery, medical procedure or dental procedure during treatment, this should be discussed with your ordering physician and your surgeon/dentist.  If you are having any concerning symptom, if you are unsure if you should get your next infusion or wish to speak to a provider before your next infusion, please call your care coordinator or triage nurse at your clinic to notify them so we can adequately serve you.       Discharge Plan:   Copy of AVS reviewed with patient and/or family.  Patient does not have upcoming appt scheduled at this time, will followup with Dr. Gomes and scheduled appt as directed.   Patient discharged in stable condition accompanied by: self.  Departure Mode: Ambulatory.    Yana Burnham RN

## 2020-05-28 DIAGNOSIS — Z79.899 HIGH RISK MEDICATIONS (NOT ANTICOAGULANTS) LONG-TERM USE: ICD-10-CM

## 2020-05-28 DIAGNOSIS — M05.9 SEROPOSITIVE RHEUMATOID ARTHRITIS (H): ICD-10-CM

## 2020-05-28 LAB
ALBUMIN SERPL-MCNC: 3.6 G/DL (ref 3.4–5)
ALP SERPL-CCNC: 68 U/L (ref 40–150)
ALT SERPL W P-5'-P-CCNC: 34 U/L (ref 0–70)
AST SERPL W P-5'-P-CCNC: 23 U/L (ref 0–45)
BASOPHILS # BLD AUTO: 0 10E9/L (ref 0–0.2)
BASOPHILS NFR BLD AUTO: 0.8 %
BILIRUB DIRECT SERPL-MCNC: 0.2 MG/DL (ref 0–0.2)
BILIRUB SERPL-MCNC: 0.8 MG/DL (ref 0.2–1.3)
CD19 CELLS # BLD: <1 CELLS/UL (ref 107–698)
CD19 CELLS NFR BLD: <1 % (ref 6–27)
CREAT SERPL-MCNC: 0.86 MG/DL (ref 0.66–1.25)
CRP SERPL-MCNC: <2.9 MG/L (ref 0–8)
DIFFERENTIAL METHOD BLD: ABNORMAL
EOSINOPHIL NFR BLD AUTO: 5.2 %
ERYTHROCYTE [DISTWIDTH] IN BLOOD BY AUTOMATED COUNT: 13.2 % (ref 10–15)
ERYTHROCYTE [SEDIMENTATION RATE] IN BLOOD BY WESTERGREN METHOD: 15 MM/H (ref 0–20)
GFR SERPL CREATININE-BSD FRML MDRD: 89 ML/MIN/{1.73_M2}
HCT VFR BLD AUTO: 41.7 % (ref 40–53)
HGB BLD-MCNC: 13.9 G/DL (ref 13.3–17.7)
IMM GRANULOCYTES # BLD: 0 10E9/L (ref 0–0.4)
IMM GRANULOCYTES NFR BLD: 0.3 %
LYMPHOCYTES # BLD AUTO: 0.7 10E9/L (ref 0.8–5.3)
LYMPHOCYTES NFR BLD AUTO: 19.6 %
MCH RBC QN AUTO: 32.4 PG (ref 26.5–33)
MCHC RBC AUTO-ENTMCNC: 33.3 G/DL (ref 31.5–36.5)
MCV RBC AUTO: 97 FL (ref 78–100)
MONOCYTES # BLD AUTO: 0.5 10E9/L (ref 0–1.3)
MONOCYTES NFR BLD AUTO: 14.9 %
NEUTROPHILS # BLD AUTO: 2.2 10E9/L (ref 1.6–8.3)
NEUTROPHILS NFR BLD AUTO: 59.2 %
NRBC # BLD AUTO: 0 10*3/UL
NRBC BLD AUTO-RTO: 0 /100
PLATELET # BLD AUTO: 117 10E9/L (ref 150–450)
PROT SERPL-MCNC: 7.3 G/DL (ref 6.8–8.8)
RBC # BLD AUTO: 4.29 10E12/L (ref 4.4–5.9)
WBC # BLD AUTO: 3.6 10E9/L (ref 4–11)

## 2020-05-28 PROCEDURE — 36415 COLL VENOUS BLD VENIPUNCTURE: CPT | Performed by: INTERNAL MEDICINE

## 2020-05-28 PROCEDURE — 86140 C-REACTIVE PROTEIN: CPT | Performed by: INTERNAL MEDICINE

## 2020-05-28 PROCEDURE — 80076 HEPATIC FUNCTION PANEL: CPT | Performed by: INTERNAL MEDICINE

## 2020-05-28 PROCEDURE — 86355 B CELLS TOTAL COUNT: CPT | Performed by: INTERNAL MEDICINE

## 2020-05-28 PROCEDURE — 82565 ASSAY OF CREATININE: CPT | Performed by: INTERNAL MEDICINE

## 2020-05-28 PROCEDURE — 85025 COMPLETE CBC W/AUTO DIFF WBC: CPT | Performed by: INTERNAL MEDICINE

## 2020-05-28 PROCEDURE — 85652 RBC SED RATE AUTOMATED: CPT | Performed by: INTERNAL MEDICINE

## 2020-06-02 ENCOUNTER — VIRTUAL VISIT (OUTPATIENT)
Dept: RHEUMATOLOGY | Facility: CLINIC | Age: 70
End: 2020-06-02
Payer: COMMERCIAL

## 2020-06-02 DIAGNOSIS — Z79.899 HIGH RISK MEDICATIONS (NOT ANTICOAGULANTS) LONG-TERM USE: ICD-10-CM

## 2020-06-02 DIAGNOSIS — M05.9 SEROPOSITIVE RHEUMATOID ARTHRITIS (H): Primary | ICD-10-CM

## 2020-06-02 PROCEDURE — 99213 OFFICE O/P EST LOW 20 MIN: CPT | Mod: 95 | Performed by: INTERNAL MEDICINE

## 2020-08-03 DIAGNOSIS — M05.9 SEROPOSITIVE RHEUMATOID ARTHRITIS (H): ICD-10-CM

## 2020-08-04 NOTE — TELEPHONE ENCOUNTER
Last visit on 6/2/20.  Last labs on 5/28/20.  Upcoming apt on 9/1/20.    Avery Cuellar RN....8/4/2020 11:26 AM

## 2020-08-05 RX ORDER — SULFASALAZINE 500 MG/1
500 TABLET, DELAYED RELEASE ORAL DAILY
Qty: 90 TABLET | Refills: 1 | Status: SHIPPED | OUTPATIENT
Start: 2020-08-05 | End: 2020-09-01

## 2020-08-06 NOTE — TELEPHONE ENCOUNTER
Rheumatology team: Please call to notify Mr. Baca that sulfasalazine has been refilled.  Fabricio Gomes MD  8/5/2020 10:01 PM

## 2020-08-27 DIAGNOSIS — M05.9 SEROPOSITIVE RHEUMATOID ARTHRITIS (H): ICD-10-CM

## 2020-08-27 DIAGNOSIS — E78.5 HYPERLIPIDEMIA LDL GOAL <130: ICD-10-CM

## 2020-08-27 DIAGNOSIS — Z79.899 HIGH RISK MEDICATIONS (NOT ANTICOAGULANTS) LONG-TERM USE: ICD-10-CM

## 2020-08-27 LAB
ALBUMIN SERPL-MCNC: 3.6 G/DL (ref 3.4–5)
ALP SERPL-CCNC: 71 U/L (ref 40–150)
ALT SERPL W P-5'-P-CCNC: 47 U/L (ref 0–70)
AST SERPL W P-5'-P-CCNC: 30 U/L (ref 0–45)
BASOPHILS # BLD AUTO: 0 10E9/L (ref 0–0.2)
BASOPHILS NFR BLD AUTO: 0.8 %
BILIRUB DIRECT SERPL-MCNC: 0.3 MG/DL (ref 0–0.2)
BILIRUB SERPL-MCNC: 1.2 MG/DL (ref 0.2–1.3)
CD19 CELLS # BLD: 57 CELLS/UL (ref 107–698)
CD19 CELLS NFR BLD: 7 % (ref 6–27)
CREAT SERPL-MCNC: 0.83 MG/DL (ref 0.66–1.25)
CRP SERPL-MCNC: <2.9 MG/L (ref 0–8)
DIFFERENTIAL METHOD BLD: ABNORMAL
EOSINOPHIL NFR BLD AUTO: 4.5 %
ERYTHROCYTE [DISTWIDTH] IN BLOOD BY AUTOMATED COUNT: 13 % (ref 10–15)
ERYTHROCYTE [SEDIMENTATION RATE] IN BLOOD BY WESTERGREN METHOD: 9 MM/H (ref 0–20)
GFR SERPL CREATININE-BSD FRML MDRD: >90 ML/MIN/{1.73_M2}
HCT VFR BLD AUTO: 41.4 % (ref 40–53)
HGB BLD-MCNC: 13.8 G/DL (ref 13.3–17.7)
IMM GRANULOCYTES # BLD: 0 10E9/L (ref 0–0.4)
IMM GRANULOCYTES NFR BLD: 0.3 %
LYMPHOCYTES # BLD AUTO: 0.7 10E9/L (ref 0.8–5.3)
LYMPHOCYTES NFR BLD AUTO: 19.5 %
MCH RBC QN AUTO: 32.8 PG (ref 26.5–33)
MCHC RBC AUTO-ENTMCNC: 33.3 G/DL (ref 31.5–36.5)
MCV RBC AUTO: 98 FL (ref 78–100)
MONOCYTES # BLD AUTO: 0.5 10E9/L (ref 0–1.3)
MONOCYTES NFR BLD AUTO: 13 %
NEUTROPHILS # BLD AUTO: 2.2 10E9/L (ref 1.6–8.3)
NEUTROPHILS NFR BLD AUTO: 61.9 %
NRBC # BLD AUTO: 0 10*3/UL
NRBC BLD AUTO-RTO: 0 /100
PLATELET # BLD AUTO: 102 10E9/L (ref 150–450)
PROT SERPL-MCNC: 7.2 G/DL (ref 6.8–8.8)
RBC # BLD AUTO: 4.21 10E12/L (ref 4.4–5.9)
WBC # BLD AUTO: 3.5 10E9/L (ref 4–11)

## 2020-08-27 PROCEDURE — 85652 RBC SED RATE AUTOMATED: CPT | Performed by: INTERNAL MEDICINE

## 2020-08-27 PROCEDURE — 80076 HEPATIC FUNCTION PANEL: CPT | Performed by: INTERNAL MEDICINE

## 2020-08-27 PROCEDURE — 82784 ASSAY IGA/IGD/IGG/IGM EACH: CPT | Performed by: INTERNAL MEDICINE

## 2020-08-27 PROCEDURE — 85025 COMPLETE CBC W/AUTO DIFF WBC: CPT | Performed by: INTERNAL MEDICINE

## 2020-08-27 PROCEDURE — 36415 COLL VENOUS BLD VENIPUNCTURE: CPT | Performed by: INTERNAL MEDICINE

## 2020-08-27 PROCEDURE — 86355 B CELLS TOTAL COUNT: CPT | Performed by: INTERNAL MEDICINE

## 2020-08-27 PROCEDURE — 82565 ASSAY OF CREATININE: CPT | Performed by: INTERNAL MEDICINE

## 2020-08-27 PROCEDURE — 86140 C-REACTIVE PROTEIN: CPT | Performed by: INTERNAL MEDICINE

## 2020-08-27 RX ORDER — CHOLESTYRAMINE LIGHT 4 G/5.7G
POWDER, FOR SUSPENSION ORAL
Qty: 60 PACKET | Refills: 3 | Status: SHIPPED | OUTPATIENT
Start: 2020-08-27 | End: 2021-02-08

## 2020-08-27 NOTE — TELEPHONE ENCOUNTER
Prevalite 4 GM      Last Written Prescription Date:  2/3/2020  Last Fill Quantity: 60 packet,   # refills: 3  Last Office Visit: 3/12/2020  Future Office visit:       Routing refill request to provider for review/approval because:  Drug not on the [facility], P or [facility] refill protocol or controlled substance

## 2020-08-28 LAB
IGA SERPL-MCNC: 314 MG/DL (ref 84–499)
IGG SERPL-MCNC: 968 MG/DL (ref 610–1616)
IGM SERPL-MCNC: 68 MG/DL (ref 35–242)

## 2020-09-01 ENCOUNTER — VIRTUAL VISIT (OUTPATIENT)
Dept: RHEUMATOLOGY | Facility: CLINIC | Age: 70
End: 2020-09-01
Payer: COMMERCIAL

## 2020-09-01 DIAGNOSIS — M05.9 SEROPOSITIVE RHEUMATOID ARTHRITIS (H): Primary | ICD-10-CM

## 2020-09-01 DIAGNOSIS — Z79.899 HIGH RISK MEDICATIONS (NOT ANTICOAGULANTS) LONG-TERM USE: ICD-10-CM

## 2020-09-01 DIAGNOSIS — D72.810 LYMPHOCYTOPENIA: ICD-10-CM

## 2020-09-01 PROCEDURE — 99213 OFFICE O/P EST LOW 20 MIN: CPT | Mod: 95 | Performed by: INTERNAL MEDICINE

## 2020-09-01 NOTE — PROGRESS NOTES
"Camilo Baca is a 70 year old male who is being evaluated via a billable telephone visit.      The patient has been notified of following:     \"This telephone visit will be conducted via a call between you and your physician/provider. We have found that certain health care needs can be provided without the need for a physical exam.  This service lets us provide the care you need with a short phone conversation.  If a prescription is necessary we can send it directly to your pharmacy.  If lab work is needed we can place an order for that and you can then stop by our lab to have the test done at a later time.    Telephone visits are billed at different rates depending on your insurance coverage. During this emergency period, for some insurers they may be billed the same as an in-person visit.  Please reach out to your insurance provider with any questions.    If during the course of the call the physician/provider feels a telephone visit is not appropriate, you will not be charged for this service.\"    Patient has given verbal consent for Telephone visit?  Yes    What phone number would you like to be contacted at? [phone]    How would you like to obtain your AVS? Mail a copy      Rheumatology Telephone/Telehealth  Visit      Camilo Baca MRN# [id number]   YOB: 1950 Age: 70 year old      Date of visit: 9/01/20   PCP: Lyndsey Aponte    Chief Complaint   Patient presents with:  Arthritis    Assessment and Plan     1.  Seropositive rheumatoid arthritis (, CCP >250): Dx'd 2013. Previously followed by [facility] and [facility].  Established with me on 3/20/2018. Previously on MTX (ineffective as monotherapy), Humira (effective, stopped because of insurance preference for IV), Simponi (rash on cheeks/nose after infusion), Remicade (lost efficacy), HCQ (facial rash), Orencia (partially effective).  Currently on SSZ 500mg BID and Rituximab (received 3/10/2020 & 3/24/2020). Significant improvement with " rituximab; hasn't required prednisone. Doing well at this time.  Lymphocytopenia persists; stop SSZ.  In the future may add MTX to help reduce risk for anti-drug antibodies; would like the cell counts to correct first.   Recent immunoglobulins okay; CD19 B cell count detected.   - Rituximab 1gram IV every 2 weeks for a total of 2 doses; repeat every 6 months.   - PRN RA flare: Prednisone 20mg daily x2days, then 15mg daily x2days, then 10mg daily x2days, then 5mg daily x2days, then stop.    - Currently using ibuprofen PRN OA symptoms; advised trying tylenol PRN instead  - Labs in 3 months: CBC, Creatinine, Hepatic Panel, ESR, CRP              Rapid 3, cumulative scores                       6/2/2020: doing well; virtual visit (Rituximab in March 2020; SSZ 500mg BID)                      03/03/2020: 9.8  (Orencia IV, SSZ 500mg BID)                      11/19/2019: 2.8  (Orencia IV, SSZ 500mg BID)                      08/20/2019: 4     (Orencia 750mg IV)                      04/23/2019: 0     (Orencia 750mg IV)                       01/08/2019: 0.5  (Simponi IV)                      11/06/2018: 9.3  (Simponi IV x2mo)    2.  Impingement syndrome of both shoulders: injected 10/11/2019; and referred to PT.  Significant improvement; now with full ROM but mild ache still.  Continues to do his home PT exercises.     3. History of mild plantar fasciitis, bilaterally: resolved with stretching and changing shoes. .      4. Frail fingernails: may need to see dermatology; will see if any change after stopping sulfasalazine but lower suspicion for this.     # Relevant labs and imaging were reviewed with the patient    # High risk medication toxicity monitoring: discussion and labs reviewed; appropriate labs ordered. See above.  Instructed that if confirmed to have COVID-19 or exposure to someone with confirmed COVID-19 to call this clinic for directions on DMARD management.    # Note that this is a virtual visit to reduce the risk  of COVID-19 exposure during this current pandemic.      # Considered to be at high risk of complications from the COVID-19 virus.  It is recommended to limit contact with other people and if possible to work remotely or provide a leave of absence to reduce the risk for COVID-19.      Mr. Baca verbalized agreement with and understanding of the rational for the diagnosis and treatment plan.  All questions were answered to best of my ability and the patient's satisfaction. Mr. Baca was advised to contact the clinic with any questions that may arise after the clinic visit.      Thank you for involving me in the care of the patient    Return to clinic: 3 months    HPI   Camilo Baca is a 70 year old male with a past medical history significant for hypertension, hyperlipidemia, GERD, hypothyroidism, history of DVT, factor V Leiden mutation, history of thrombocytopenia, gout, and rheumatoid arthritis who presents for follow-up of rheumatoid arthritis.     Today, he reports doing great. Mild ache in the shoulders; still doing the home exercises.  No morning stiffness. No joint swelling. No weakness.  Mild fingernail frailty.     Denies fevers, chills, nausea, vomiting, constipation, diarrhea. No abdominal pain. No chest pain/pressure, palpitations, or shortness of breath. No LE swelling. No neck pain. No oral or nasal sores.  No rash. No sicca symptoms.      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  CV: No chest pain, pressure, palpitations, or dyspnea on exertion.  PULM: No SOB, wheeze, cough.  GI: No nausea, vomiting, constipation, diarrhea. No blood in stool. No abdominal pain.  : No blood in urine.  MSK: See HPI.  NEURO: No numbness, tingling, or weakness.  EXT: No LE swelling  PSYCH: Negative    Active  Problem List     Patient Active Problem List   Diagnosis     Lumbago     NONSPECIFIC MEDICAL HISTORY     HYPERLIPIDEMIA LDL GOAL <130     History of adenomatous polyp of colon     Idiopathic chronic gout without tophus, unspecified site     Advanced directives, counseling/discussion     Seropositive rheumatoid arthritis (H)     High risk medication use     Thrombocytopenia (H)     Gastroesophageal reflux disease without esophagitis     Factor 5 Leiden mutation, heterozygous (H)     Personal history of venous thrombosis and embolism     Personal history of DVT (deep vein thrombosis)     Hypothyroidism due to acquired atrophy of thyroid     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)     Secondary hypertension with goal blood pressure less than 140/90     Rheumatoid arthritis involving multiple sites with positive rheumatoid factor (H)     Past Medical History     Past Medical History:   Diagnosis Date     Alopecia, unspecified      Closed fracture of unspecified part of humerus 1974    Arm fracture / left wrist     Esophageal reflux 2/6/2015     Factor 5 Leiden mutation, heterozygous (H)      Gout 3/28/2011     Herpes zoster without mention of complication 1965    Herpes zoster     Measles without mention of complication     Measles     Personal history of DVT (deep vein thrombosis) 4/9/2015     Personal history of venous thrombosis and embolism 4/9/2015     Pulmonary embolism (H) 12/1996    post appendectomy     RA (rheumatoid arthritis) (H) 3/11/2013     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     Rheumatoid arthritis(714.0)     Beginning symptoms     Secondary hypertension with goal blood pressure less than 140/90 11/15/2016     Thrombocytopenia, unspecified (H) 1/29/2014     Varicella without mention of complication     Chickenpox     Past Surgical History     Past Surgical History:   Procedure Laterality Date     C  APPENDECTOMY  1996     COLONOSCOPY  11/15/2010    COLONOSCOPY performed by DARIUS MESA at  [facility]     COLONOSCOPY N/A 11/2/2015    Procedure: COLONOSCOPY;  Surgeon: Bubba Odom MD;  Location:  [facility]     ESOPHAGOSCOPY, GASTROSCOPY, DUODENOSCOPY (EGD), COMBINED N/A 10/24/2018    Procedure: Esophagogastroduodenoscopy Multiple Biopsies by Biopsy;  Surgeon: Matteo Johnson DO;  Location:  [facility]     HC COLONOSCOPY W BIOPSY  08/10/05     HC REPAIR ING HERNIA,5+Y/O,REDUCIBL  1960     HERNIORRHAPHY UMBILICAL N/A 4/17/2015    Procedure: HERNIORRHAPHY UMBILICAL;  Surgeon: Rayray Avila MD;  Location: [facility]     LAPAROSCOPIC HERNIORRHAPHY INGUINAL BILATERAL Bilateral 4/17/2015    Procedure: LAPAROSCOPIC HERNIORRHAPHY INGUINAL BILATERAL;  Surgeon: Rayray Avila MD;  Location: [facility]     Allergy     Allergies   Allergen Reactions     Lisinopril Cough     Plaquenil [Hydroxychloroquine] Hives     Current Medication List     Current Outpatient Medications   Medication Sig     allopurinol (ZYLOPRIM) 300 MG tablet TAKE ONE TABLET BY MOUTH ONCE DAILY (DUE FOR LABS )     atorvastatin (LIPITOR) 80 MG tablet Take 1 tablet (80 mg) by mouth daily     capsaicin (ZOSTRIX) 0.075 % topical cream Apply  topically 3 times daily.     cetirizine (ZYRTEC) 10 MG tablet Take 1 tablet (10 mg) by mouth every evening     cholestyramine light (PREVALITE) 4 GM packet DISSOLVE ONE PACKET IN LIQUID AND DRINK BY MOUTH TWICE A DAY AS DIRECTED     Cyanocobalamin (B-12) 100 MCG TABS      diclofenac (VOLTAREN) 1 % GEL Apply  2 grams to shoulders three times daily using enclosed dosing card.     levothyroxine (SYNTHROID/LEVOTHROID) 25 MCG tablet TAKE ONE TABLET BY MOUTH ONCE DAILY     losartan (COZAAR) 100 MG tablet TAKE ONE TABLET (100MG) BY MOUTH ONCE DAILY     omeprazole (PRILOSEC) 20 MG DR capsule Take 1 capsule (20 mg) by mouth daily     sulfaSALAzine ER (AZULFIDINE EN) 500 MG EC tablet Take 1 tablet (500 mg) by mouth  "daily     triamcinolone (KENALOG) 0.1 % external ointment Apply thin layer twice daily to affected areas as needed.     triamcinolone (KENALOG) 0.5 % external cream Apply sparingly to affected area three times daily as needed, no more than 7 days in a row.     No current facility-administered medications for this visit.          Social History   See HPI    Family History     Family History   Problem Relation Age of Onset     Arthritis Mother      Cardiovascular Mother      Depression Mother      Gastrointestinal Disease Mother         IBS     Hypertension Mother      Circulatory Mother         Aortal aneurysm     Osteoporosis Mother      Allergies Father         Cortisone     Cardiovascular Father      Circulatory Father      Diabetes Father      Hypertension Father      Lipids Father      Alcohol/Drug Sister         Penicillin     Alzheimer Disease Paternal Grandfather      Blood Disease Paternal Grandmother         Clotting problems     Blood Disease Son         Factor 5     Hypertension Maternal Aunt      Cerebrovascular Disease Maternal Grandmother        Physical Exam     Temp Readings from Last 3 Encounters:   03/12/20 97.4  F (36.3  C) (Temporal)   03/10/20 97.6  F (36.4  C) (Temporal)   02/06/20 97.1  F (36.2  C) (Temporal)     BP Readings from Last 5 Encounters:   03/24/20 (!) 144/83   03/12/20 (!) 162/90   03/10/20 125/75   03/03/20 (!) 152/88   02/06/20 (!) 162/89     Pulse Readings from Last 1 Encounters:   03/24/20 58     Resp Readings from Last 1 Encounters:   03/12/20 16     Estimated body mass index is 27.93 kg/m  as calculated from the following:    Height as of 3/12/20: 1.786 m (5' 10.32\").    Weight as of 3/24/20: 89.1 kg (196 lb 6.4 oz).      Telephone Visit     Labs / Imaging (select studies)   RF/CCP  Recent Labs   Lab Test 04/24/13  0923   CCPABY >250 Strongly Positive*   *     CBC  Recent Labs   Lab Test 08/27/20  0824 05/28/20  0927 02/10/20  0922  10/15/19  0827 09/17/19  0829   WBC " 3.5* 3.6* 4.1   < > 4.8 5.2   RBC 4.21* 4.29* 4.21*   < > 4.26* 4.42   HGB 13.8 13.9 13.7   < > 13.7 14.2   HCT 41.4 41.7 40.6   < > 41.1 42.7   MCV 98 97 96   < > 97 97   RDW 13.0 13.2 12.6   < > 13.5 13.1   * 117* 105*   < > 144* 133*   MCH 32.8 32.4 32.5   < > 32.2 32.1   MCHC 33.3 33.3 33.7   < > 33.3 33.3   NEUTROPHIL 61.9 59.2 54.9   < > 60.4 65.4   LYMPH 19.5 19.6 29.0   < > 24.4 21.9   MONOCYTE 13.0 14.9 13.5   < > 14.0 10.7   EOSINOPHIL 4.5 5.2 2.4   < > 0.8 1.0   BASOPHIL 0.8 0.8 0.2   < > 0.4 1.0   ANEU 2.2 2.2 2.3   < > 2.9 3.4   ALYM 0.7* 0.7* 1.2   < > 1.2 1.1   KIESHA 0.5 0.5 0.6   < > 0.7 0.6   AEOS  --   --  0.1  --  0.0 0.1   ABAS 0.0 0.0 0.0   < > 0.0 0.1    < > = values in this interval not displayed.     CMP  Recent Labs   Lab Test 08/27/20  0824 05/28/20  0927 02/10/20  0922  11/06/18  0913  04/05/18  8883  12/22/17  0735   NA  --   --   --   --  141  --  140  --  141   POTASSIUM  --   --   --   --  4.2  --  3.4  --  4.2   CHLORIDE  --   --   --   --  105  --  104  --  107   CO2  --   --   --   --  33*  --  26  --  27   ANIONGAP  --   --   --   --  3  --  10  --  7   GLC  --   --   --   --  107*  --  124*  --  121*   BUN  --   --   --   --  15  --  32*  --  17   CR 0.83 0.86 0.85   < > 0.83   < > 0.71   < > 0.85   GFRESTIMATED >90 89 90   < > >90   < > >90   < > >90   GFRESTBLACK >90 >90 >90   < > >90   < > >90   < > >90   ANDREZ  --   --   --   --  8.8  --  7.9*  --  8.9   BILITOTAL 1.2 0.8 1.2   < > 0.6   < >  --    < > 1.5*   ALBUMIN 3.6 3.6 3.5   < > 3.1*   < >  --    < > 3.7   PROTTOTAL 7.2 7.3 7.1   < > 7.4   < >  --    < > 8.4   ALKPHOS 71 68 67   < > 60   < >  --    < > 90   AST 30 23 23   < > 41   < >  --    < > 25   ALT 47 34 38   < > 82*   < >  --    < > 49    < > = values in this interval not displayed.     Calcium/VitaminD  Recent Labs   Lab Test 11/06/18  0913 06/26/18  0955 04/05/18  9563 12/22/17  0735   ANDREZ 8.8  --  7.9* 8.9   VITDT  --  25  --   --      ESR/CRP  Recent Labs    Lab Test 08/27/20  0824 05/28/20  0927 02/10/20  0922   SED 9 15 18   CRP <2.9 <2.9 <2.9     Lipid Panel  Recent Labs   Lab Test 09/17/19  0827 12/22/17  0735 12/01/16  0822  12/29/15  0756  10/04/13  0819 06/03/13  0837   CHOL 138 134  --   --  173  --  143 208*   TRIG 85 149  --   --  211*  --  92 72   HDL 38* 46  --   --  56  --  49 77   LDL 83 58 98   < > 75   < > 75 117   VLDL  --   --   --   --   --   --  18 14   CHOLHDLRATIO  --   --   --   --   --   --  3.0 3.0   NHDL 100 88  --   --  117  --   --   --     < > = values in this interval not displayed.     Hepatitis B  Recent Labs   Lab Test 03/03/20  1405 03/20/18  1008 02/05/14  1104   HBCAB Nonreactive Nonreactive  --    HEPBANG Nonreactive Nonreactive Negative     Hepatitis C  Recent Labs   Lab Test 02/05/14  1104 06/03/13  0837   HCVAB Negative Negative       Tuberculosis Screening  Recent Labs   Lab Test 03/03/20  1405 03/20/18  1008 02/05/14  1104   TBRES Negative  --   --    TBRSLT  --  Negative Negative   TBAGN  --  0.14 0.01     Immunization History     Immunization History   Administered Date(s) Administered     HepB, Unspecified 08/19/1991, 09/23/1991, 02/24/1992     Influenza (High Dose) 3 valent vaccine 10/06/2017, 10/03/2018, 10/23/2019     Influenza (IIV3) PF 10/01/2009, 10/27/2010, 10/11/2011     Influenza Vaccine IM > 6 months Valent IIV4 10/09/2013, 10/23/2014, 09/18/2015, 11/15/2016     Pneumo Conj 13-V (2010&after) 10/06/2017     Pneumococcal 23 valent 10/23/2014, 10/23/2019     TD (ADULT, 7+) 09/05/2001     TDAP Vaccine (Boostrix) 10/18/2012     Zoster vaccine recombinant adjuvanted (SHINGRIX) 01/31/2019, 04/18/2019     Zoster vaccine, live 10/23/2014          Chart documentation done in part with Dragon Voice recognition Software. Although reviewed after completion, some word and grammatical error may remain.      Phone call start time: 2:04 PM  Phone call end time: 2:10 PM    This visit is equivalent to a [id number] visit    Location of  patient: home, in [place]  Location of provider: home    Follow up:  follow up appointment scheduled to be in Lacho Gomes MD

## 2020-09-18 ENCOUNTER — TELEPHONE (OUTPATIENT)
Dept: RHEUMATOLOGY | Facility: CLINIC | Age: 70
End: 2020-09-18

## 2020-09-18 NOTE — TELEPHONE ENCOUNTER
Reason for call:  Order   Order or referral being requested: infusion  Reason for request: infusion, 9-  Date needed: as soon as possible  Has the patient been seen by the PCP for this problem? YES    Additional comments: na     Phone number to reach patient:  Home number on file [phone] (home)    Best Time:   Any     Can we leave a detailed message on this number?  YES    Travel screening: Not Applicable

## 2020-09-18 NOTE — TELEPHONE ENCOUNTER
Called [facility] infusion again however there was no answer. Left a message to return call to [phone].    Avery Cuellar RN....9/18/2020 4:00 PM

## 2020-09-18 NOTE — TELEPHONE ENCOUNTER
Returned call to [facility] however there was no answer.  Left a message to return call to [phone].    Avery Cuellar RN....9/18/2020 10:48 AM

## 2020-09-19 RX ORDER — HEPARIN SODIUM,PORCINE 10 UNIT/ML
5 VIAL (ML) INTRAVENOUS
Status: CANCELLED | OUTPATIENT
Start: 2020-09-19

## 2020-09-19 RX ORDER — METHYLPREDNISOLONE SODIUM SUCCINATE 125 MG/2ML
125 INJECTION, POWDER, LYOPHILIZED, FOR SOLUTION INTRAMUSCULAR; INTRAVENOUS ONCE
Status: CANCELLED
Start: 2020-09-19

## 2020-09-19 RX ORDER — ACETAMINOPHEN 325 MG/1
650 TABLET ORAL ONCE
Status: CANCELLED
Start: 2020-09-19

## 2020-09-19 RX ORDER — DIPHENHYDRAMINE HCL 25 MG
50 CAPSULE ORAL ONCE
Status: CANCELLED
Start: 2020-09-19

## 2020-09-19 RX ORDER — HEPARIN SODIUM (PORCINE) LOCK FLUSH IV SOLN 100 UNIT/ML 100 UNIT/ML
5 SOLUTION INTRAVENOUS
Status: CANCELLED | OUTPATIENT
Start: 2020-09-19

## 2020-09-20 NOTE — TELEPHONE ENCOUNTER
Rituximab orders entered. Pro Breath MD message sent to patient to inform him the orders are in.    Fabricio Gomes MD  9/19/2020 7:16 PM

## 2020-09-22 ENCOUNTER — INFUSION THERAPY VISIT (OUTPATIENT)
Dept: INFUSION THERAPY | Facility: CLINIC | Age: 70
End: 2020-09-22
Attending: INTERNAL MEDICINE
Payer: MEDICARE

## 2020-09-22 VITALS
BODY MASS INDEX: 27.37 KG/M2 | WEIGHT: 192.5 LBS | RESPIRATION RATE: 16 BRPM | SYSTOLIC BLOOD PRESSURE: 145 MMHG | OXYGEN SATURATION: 100 % | DIASTOLIC BLOOD PRESSURE: 80 MMHG | TEMPERATURE: 98.7 F | HEART RATE: 55 BPM

## 2020-09-22 DIAGNOSIS — M05.79 RHEUMATOID ARTHRITIS INVOLVING MULTIPLE SITES WITH POSITIVE RHEUMATOID FACTOR (H): Primary | ICD-10-CM

## 2020-09-22 PROCEDURE — 25000128 H RX IP 250 OP 636: Performed by: INTERNAL MEDICINE

## 2020-09-22 PROCEDURE — 96413 CHEMO IV INFUSION 1 HR: CPT

## 2020-09-22 PROCEDURE — 25800030 ZZH RX IP 258 OP 636: Performed by: INTERNAL MEDICINE

## 2020-09-22 PROCEDURE — 96415 CHEMO IV INFUSION ADDL HR: CPT

## 2020-09-22 PROCEDURE — 96375 TX/PRO/DX INJ NEW DRUG ADDON: CPT

## 2020-09-22 PROCEDURE — 25000132 ZZH RX MED GY IP 250 OP 250 PS 637: Mod: GY | Performed by: INTERNAL MEDICINE

## 2020-09-22 RX ORDER — ACETAMINOPHEN 325 MG/1
650 TABLET ORAL ONCE
Status: CANCELLED
Start: 2020-10-06

## 2020-09-22 RX ORDER — HEPARIN SODIUM (PORCINE) LOCK FLUSH IV SOLN 100 UNIT/ML 100 UNIT/ML
5 SOLUTION INTRAVENOUS
Status: CANCELLED | OUTPATIENT
Start: 2020-10-06

## 2020-09-22 RX ORDER — METHYLPREDNISOLONE SODIUM SUCCINATE 125 MG/2ML
125 INJECTION, POWDER, LYOPHILIZED, FOR SOLUTION INTRAMUSCULAR; INTRAVENOUS ONCE
Status: CANCELLED
Start: 2020-10-06

## 2020-09-22 RX ORDER — DIPHENHYDRAMINE HCL 25 MG
50 CAPSULE ORAL ONCE
Status: CANCELLED
Start: 2020-10-06

## 2020-09-22 RX ORDER — DIPHENHYDRAMINE HCL 25 MG
50 CAPSULE ORAL ONCE
Status: COMPLETED | OUTPATIENT
Start: 2020-09-22 | End: 2020-09-22

## 2020-09-22 RX ORDER — ACETAMINOPHEN 325 MG/1
650 TABLET ORAL ONCE
Status: COMPLETED | OUTPATIENT
Start: 2020-09-22 | End: 2020-09-22

## 2020-09-22 RX ORDER — METHYLPREDNISOLONE SODIUM SUCCINATE 125 MG/2ML
125 INJECTION, POWDER, LYOPHILIZED, FOR SOLUTION INTRAMUSCULAR; INTRAVENOUS ONCE
Status: COMPLETED | OUTPATIENT
Start: 2020-09-22 | End: 2020-09-22

## 2020-09-22 RX ORDER — HEPARIN SODIUM,PORCINE 10 UNIT/ML
5 VIAL (ML) INTRAVENOUS
Status: CANCELLED | OUTPATIENT
Start: 2020-10-06

## 2020-09-22 RX ORDER — DIPHENHYDRAMINE HCL 25 MG
25 CAPSULE ORAL ONCE
Status: CANCELLED
Start: 2020-09-22

## 2020-09-22 RX ADMIN — METHYLPREDNISOLONE SODIUM SUCCINATE 125 MG: 125 INJECTION, POWDER, FOR SOLUTION INTRAMUSCULAR; INTRAVENOUS at 08:33

## 2020-09-22 RX ADMIN — SODIUM CHLORIDE 250 ML: 9 INJECTION, SOLUTION INTRAVENOUS at 08:33

## 2020-09-22 RX ADMIN — ACETAMINOPHEN 650 MG: 325 TABLET, FILM COATED ORAL at 08:20

## 2020-09-22 RX ADMIN — DIPHENHYDRAMINE HYDROCHLORIDE 50 MG: 25 CAPSULE ORAL at 08:20

## 2020-09-22 RX ADMIN — RITUXIMAB-ABBS 1000 MG: 10 INJECTION, SOLUTION INTRAVENOUS at 08:59

## 2020-09-22 ASSESSMENT — PAIN SCALES - GENERAL: PAINLEVEL: MILD PAIN (2)

## 2020-09-22 NOTE — PROGRESS NOTES
"Infusion Nursing Note:  Camilo ZIMMERMAN Phuong presents today for Rituximab-abbs (Truxima).    Patient seen by provider today: No   present during visit today: Not Applicable.    Note: Patient reports feeling well. Shoulders have started to have mild aching the last couple of weeks but otherwise no new concerns. Premeds given prior to infusion.  Rituximab rate: 100 ml/hr x 30\"                            200 ml/hr x 30\"                            300 ml/hr x 30\"                             400 ml/hr for remaining volume.    Intravenous Access:  Peripheral IV placed.    Treatment Conditions:  Biological Infusion Checklist:  ~~~ NOTE: If the patient answers yes to any of the questions below, hold the infusion and contact ordering provider or on-call provider.    1. Have you recently had an elevated temperature, fever, chills, productive cough, coughing for 3 weeks or longer or hemoptysis, abnormal vital signs, night sweats,  chest pain or have you noticed a decrease in your appetite, unexplained weight loss or fatigue? No  2. Do you have any open wounds or new incisions? No  3. Do you have any recent or upcoming hospitalizations, surgeries or dental procedures? No  4. Do you currently have or recently have had any signs of illness or infection or are you on any antibiotics? No  5. Have you had any new, sudden or worsening abdominal pain? No  6. Have you or anyone in your household received a live vaccination in the past 4 weeks? Please note:  No live vaccines while on biologic/chemotherapy until 6 months after the last treatment.  Patient can receive the flu vaccine (shot only) and the pneumovax.  It is optimal for the patient to get these vaccines mid cycle, but they can be given at any time as long as it is not on the day of the infusion. No  7. Have you recently been diagnosed with any new nervous system diseases (ie. Multiple sclerosis, Guillain [place], seizures, neurological changes) or cancer diagnosis? " No  8. Are you on any form of radiation or chemotherapy? No  9. Are you pregnant or breast feeding or do you have plans of pregnancy in the future? N/A  10. Have you been having any signs of worsening depression or suicidal ideations?  (benlysta only) N/A  11. Have there been any other new onset medical symptoms? No        Post Infusion Assessment:  Patient tolerated infusion without incident.  Blood return noted pre and post infusion.  Site patent and intact, free from redness, edema or discomfort.  No evidence of extravasations.  PIV access discontinued per protocol.    Biologic Infusion Post Education: Call the triage nurse at your clinic or seek medical attention if you have chills and/or temperature greater than or equal to 100.5, uncontrolled nausea/vomiting, diarrhea, constipation, dizziness, shortness of breath, chest pain, heart palpitations, weakness or any other new or concerning symptoms, questions or concerns.  You cannot have any live virus vaccines prior to or during treatment or up to 6 months post infusion.  If you have an upcoming surgery, medical procedure or dental procedure during treatment, this should be discussed with your ordering physician and your surgeon/dentist.  If you are having any concerning symptom, if you are unsure if you should get your next infusion or wish to speak to a provider before your next infusion, please call your care coordinator or triage nurse at your clinic to notify them so we can adequately serve you.       Discharge Plan:   Copy of AVS reviewed with patient and/or family.  Patient will return 10/6 for next appointment.  Patient discharged in stable condition accompanied by: self.  Departure Mode: Ambulatory.    Yana Burnham RN

## 2020-09-26 DIAGNOSIS — I10 BENIGN ESSENTIAL HYPERTENSION: ICD-10-CM

## 2020-09-28 RX ORDER — LOSARTAN POTASSIUM 100 MG/1
TABLET ORAL
Qty: 90 TABLET | Refills: 0 | Status: SHIPPED | OUTPATIENT
Start: 2020-09-28 | End: 2020-11-02

## 2020-09-28 NOTE — TELEPHONE ENCOUNTER
"Routing refill request to provider for review/approval because:  Labs out of range:  Bp  Labs not current:  K+    Cozaar  Last Written Prescription Date:  6/29/2020  Last Fill Quantity: 90,  # refills: 0   Last office visit: 3/12/2020 with prescribing provider:  Fay   Future Office Visit:      Requested Prescriptions   Pending Prescriptions Disp Refills     losartan (COZAAR) 100 MG tablet [Pharmacy Med Name: LOSARTAN POTASSIUM 100MG TABS] 90 tablet 0     Sig: TAKE ONE TABLET BY MOUTH ONCE DAILY --DUE FOR LABS       Angiotensin-II Receptors Failed - 9/26/2020  8:28 AM        Failed - Last blood pressure under 140/90 in past 12 months     BP Readings from Last 3 Encounters:   09/22/20 (!) 145/80   03/24/20 (!) 144/83   03/12/20 (!) 162/90                 Failed - Normal serum potassium on file in past 12 months     Recent Labs   Lab Test 11/06/18  0913   POTASSIUM 4.2                    Passed - Recent (12 mo) or future (30 days) visit within the authorizing provider's specialty     Patient has had an office visit with the authorizing provider or a provider within the authorizing providers department within the previous 12 mos or has a future within next 30 days. See \"Patient Info\" tab in inbasket, or \"Choose Columns\" in Meds & Orders section of the refill encounter.              Passed - Medication is active on med list        Passed - Patient is age 18 or older        Passed - Normal serum creatinine on file in past 12 months     Recent Labs   Lab Test 08/27/20  0824   CR 0.83       Ok to refill medication if creatinine is low             Emerita Ray RN on 9/28/2020 at 12:29 PM    "

## 2020-10-06 ENCOUNTER — INFUSION THERAPY VISIT (OUTPATIENT)
Dept: INFUSION THERAPY | Facility: CLINIC | Age: 70
End: 2020-10-06
Attending: INTERNAL MEDICINE
Payer: MEDICARE

## 2020-10-06 VITALS
BODY MASS INDEX: 27.45 KG/M2 | OXYGEN SATURATION: 96 % | RESPIRATION RATE: 14 BRPM | SYSTOLIC BLOOD PRESSURE: 161 MMHG | WEIGHT: 193 LBS | DIASTOLIC BLOOD PRESSURE: 81 MMHG | HEART RATE: 54 BPM | TEMPERATURE: 98 F

## 2020-10-06 DIAGNOSIS — M05.79 RHEUMATOID ARTHRITIS INVOLVING MULTIPLE SITES WITH POSITIVE RHEUMATOID FACTOR (H): Primary | ICD-10-CM

## 2020-10-06 PROCEDURE — 96375 TX/PRO/DX INJ NEW DRUG ADDON: CPT

## 2020-10-06 PROCEDURE — 96417 CHEMO IV INFUS EACH ADDL SEQ: CPT

## 2020-10-06 PROCEDURE — 96413 CHEMO IV INFUSION 1 HR: CPT

## 2020-10-06 PROCEDURE — 250N000013 HC RX MED GY IP 250 OP 250 PS 637: Performed by: INTERNAL MEDICINE

## 2020-10-06 PROCEDURE — 96542 CHEMOTHERAPY INJECTION: CPT

## 2020-10-06 PROCEDURE — 96415 CHEMO IV INFUSION ADDL HR: CPT

## 2020-10-06 PROCEDURE — 258N000003 HC RX IP 258 OP 636: Performed by: INTERNAL MEDICINE

## 2020-10-06 PROCEDURE — 250N000011 HC RX IP 250 OP 636: Performed by: INTERNAL MEDICINE

## 2020-10-06 RX ORDER — DIPHENHYDRAMINE HCL 25 MG
25 CAPSULE ORAL ONCE
Status: COMPLETED | OUTPATIENT
Start: 2020-10-06 | End: 2020-10-06

## 2020-10-06 RX ORDER — METHYLPREDNISOLONE SODIUM SUCCINATE 125 MG/2ML
125 INJECTION, POWDER, LYOPHILIZED, FOR SOLUTION INTRAMUSCULAR; INTRAVENOUS ONCE
Status: CANCELLED
Start: 2020-10-06

## 2020-10-06 RX ORDER — ACETAMINOPHEN 325 MG/1
650 TABLET ORAL ONCE
Status: CANCELLED
Start: 2020-10-06

## 2020-10-06 RX ORDER — METHYLPREDNISOLONE SODIUM SUCCINATE 125 MG/2ML
125 INJECTION, POWDER, LYOPHILIZED, FOR SOLUTION INTRAMUSCULAR; INTRAVENOUS ONCE
Status: COMPLETED | OUTPATIENT
Start: 2020-10-06 | End: 2020-10-06

## 2020-10-06 RX ORDER — HEPARIN SODIUM (PORCINE) LOCK FLUSH IV SOLN 100 UNIT/ML 100 UNIT/ML
5 SOLUTION INTRAVENOUS
Status: CANCELLED | OUTPATIENT
Start: 2020-10-06

## 2020-10-06 RX ORDER — ACETAMINOPHEN 325 MG/1
650 TABLET ORAL ONCE
Status: COMPLETED | OUTPATIENT
Start: 2020-10-06 | End: 2020-10-06

## 2020-10-06 RX ORDER — HEPARIN SODIUM,PORCINE 10 UNIT/ML
5 VIAL (ML) INTRAVENOUS
Status: CANCELLED | OUTPATIENT
Start: 2020-10-06

## 2020-10-06 RX ORDER — DIPHENHYDRAMINE HCL 25 MG
25 CAPSULE ORAL ONCE
Status: CANCELLED
Start: 2020-10-06

## 2020-10-06 RX ADMIN — DIPHENHYDRAMINE HYDROCHLORIDE 25 MG: 25 CAPSULE ORAL at 08:37

## 2020-10-06 RX ADMIN — RITUXIMAB-ABBS 1000 MG: 10 INJECTION, SOLUTION INTRAVENOUS at 09:17

## 2020-10-06 RX ADMIN — METHYLPREDNISOLONE SODIUM SUCCINATE 125 MG: 125 INJECTION, POWDER, FOR SOLUTION INTRAMUSCULAR; INTRAVENOUS at 08:42

## 2020-10-06 RX ADMIN — ACETAMINOPHEN 650 MG: 325 TABLET, FILM COATED ORAL at 08:37

## 2020-10-06 RX ADMIN — SODIUM CHLORIDE 250 ML: 9 INJECTION, SOLUTION INTRAVENOUS at 08:35

## 2020-10-06 ASSESSMENT — PAIN SCALES - GENERAL: PAINLEVEL: NO PAIN (0)

## 2020-10-06 NOTE — PROGRESS NOTES
"PRIOR TO INFUSION OF BIOLOGICAL MEDICATIONS OR ANY OF THESE AS LISTED: Rituxan (rituximab) \".rheumbiologicalchecklist\"    Prior to Infusion of biological medications or any of these as listed:    1. Elevated temperature, fever, chills, productive cough or abnormal vital signs, night sweats, coughing up blood or sputum, no appetite or abnormal vital signs : NO    2. Open wounds or new incisions: NO    3. Recent hospitalization: NO    4.  Recent surgeries:  NO    5. Any upcoming surgeries or dental procedures?:NO    6. Any current or recent bouts of illness or infection? On any antibiotics? : NO    7. Any new, sudden or worsening abdominal pain :NO    8. Vaccination within 4 weeks? Patient or someone in the household is scheduled to receive vaccination? No live virus vaccines prior to or during treatment :NO    9. Any nervous system diseases [i.e. multiple sclerosis, Guillain-[place], seizures, neurological  changes]: NO    10. Pregnant or breast feeding; or plans on pregnancy in the future: NO    11. Signs of worsening depression or suicidal ideations while taking benlysta:NO    12. New-onset medical symptoms: NO    13.  New cancer diagnosis or on chemotherapy or radiation NO    14.  Evaluate for any sign of active TB [Unexplained weight loss, Loss of appetite, Night sweats, Fever, Fatigue, Chills, Coughing for 3 weeks or longer, Hemoptysis (coughing up blood), Chest pain]: NO    **Note: If answered yes to any of the above, hold the infusion and contact ordering rheumatologist or on-call rheumatologist.     "

## 2020-10-06 NOTE — PROGRESS NOTES
Infusion Nursing Note:  Camilo Baca presents today for  Truxima   Patient seen by provider today: No   present during visit today: Not Applicable.    Note: N/A.    Intravenous Access:  Peripheral IV placed.    Treatment Conditions:  Rheumbiological check list completed.       Post Infusion Assessment:  Patient tolerated infusion without incident.  Patient observed for 10 minutes post infusion per protocol.  Site patent and intact, free from redness, edema or discomfort.  Access discontinued per protocol.  Biologic Infusion Post Education: Call the triage nurse at your clinic or seek medical attention if you have chills and/or temperature greater than or equal to 100.5, uncontrolled nausea/vomiting, diarrhea, constipation, dizziness, shortness of breath, chest pain, heart palpitations, weakness or any other new or concerning symptoms, questions or concerns.  You cannot have any live virus vaccines prior to or during treatment or up to 6 months post infusion.  If you have an upcoming surgery, medical procedure or dental procedure during treatment, this should be discussed with your ordering physician and your surgeon/dentist.  If you are having any concerning symptom, if you are unsure if you should get your next infusion or wish to speak to a provider before your next infusion, please call your care coordinator or triage nurse at your clinic to notify them so we can adequately serve you.       Discharge Plan:   Discharge instructions reviewed with: Patient.  Patient discharged in stable condition accompanied by: self.  Departure Mode: Ambulatory.    Charo Muñiz RN

## 2020-11-02 ENCOUNTER — TELEPHONE (OUTPATIENT)
Dept: RHEUMATOLOGY | Facility: CLINIC | Age: 70
End: 2020-11-02

## 2020-12-08 DIAGNOSIS — E03.4 HYPOTHYROIDISM DUE TO ACQUIRED ATROPHY OF THYROID: ICD-10-CM

## 2020-12-08 DIAGNOSIS — E78.5 HYPERLIPIDEMIA LDL GOAL <130: ICD-10-CM

## 2020-12-08 LAB
CHOLEST SERPL-MCNC: 155 MG/DL
HDLC SERPL-MCNC: 55 MG/DL
LDLC SERPL CALC-MCNC: 74 MG/DL
NONHDLC SERPL-MCNC: 100 MG/DL
T4 FREE SERPL-MCNC: 0.79 NG/DL (ref 0.76–1.46)
TRIGL SERPL-MCNC: 129 MG/DL
TSH SERPL DL<=0.005 MIU/L-ACNC: 7.06 MU/L (ref 0.4–4)

## 2020-12-08 PROCEDURE — 84439 ASSAY OF FREE THYROXINE: CPT | Performed by: NURSE PRACTITIONER

## 2020-12-08 PROCEDURE — 36415 COLL VENOUS BLD VENIPUNCTURE: CPT | Performed by: NURSE PRACTITIONER

## 2020-12-08 PROCEDURE — 84443 ASSAY THYROID STIM HORMONE: CPT | Performed by: NURSE PRACTITIONER

## 2020-12-08 PROCEDURE — 80061 LIPID PANEL: CPT | Performed by: NURSE PRACTITIONER

## 2020-12-09 NOTE — RESULT ENCOUNTER NOTE
Please notify patient, call or letter    Your results were all normal or expected.  Please continue your current plan of care.     Electronically signed by Lyndsey Aponte CNP.

## 2020-12-15 ENCOUNTER — VIRTUAL VISIT (OUTPATIENT)
Dept: RHEUMATOLOGY | Facility: CLINIC | Age: 70
End: 2020-12-15
Payer: COMMERCIAL

## 2020-12-15 DIAGNOSIS — M75.42 IMPINGEMENT SYNDROME OF BOTH SHOULDERS: ICD-10-CM

## 2020-12-15 DIAGNOSIS — M75.41 IMPINGEMENT SYNDROME OF BOTH SHOULDERS: ICD-10-CM

## 2020-12-15 DIAGNOSIS — M05.9 SEROPOSITIVE RHEUMATOID ARTHRITIS (H): Primary | ICD-10-CM

## 2020-12-15 DIAGNOSIS — Z79.899 HIGH RISK MEDICATIONS (NOT ANTICOAGULANTS) LONG-TERM USE: ICD-10-CM

## 2020-12-15 PROCEDURE — 99213 OFFICE O/P EST LOW 20 MIN: CPT | Mod: 95 | Performed by: INTERNAL MEDICINE

## 2020-12-15 RX ORDER — HEPARIN SODIUM (PORCINE) LOCK FLUSH IV SOLN 100 UNIT/ML 100 UNIT/ML
5 SOLUTION INTRAVENOUS
Status: CANCELLED | OUTPATIENT
Start: 2021-02-22

## 2020-12-15 RX ORDER — METHYLPREDNISOLONE SODIUM SUCCINATE 125 MG/2ML
125 INJECTION, POWDER, LYOPHILIZED, FOR SOLUTION INTRAMUSCULAR; INTRAVENOUS ONCE
Status: CANCELLED
Start: 2021-02-22

## 2020-12-15 RX ORDER — DIPHENHYDRAMINE HCL 25 MG
50 CAPSULE ORAL ONCE
Status: CANCELLED
Start: 2021-02-22

## 2020-12-15 RX ORDER — ACETAMINOPHEN 325 MG/1
650 TABLET ORAL ONCE
Status: CANCELLED
Start: 2021-02-22

## 2020-12-15 RX ORDER — HEPARIN SODIUM,PORCINE 10 UNIT/ML
5 VIAL (ML) INTRAVENOUS
Status: CANCELLED | OUTPATIENT
Start: 2021-02-22

## 2020-12-15 NOTE — PROGRESS NOTES
"Camilo Baca is a 70 year old male who is being evaluated via a billable telephone visit.      The patient has been notified of following:     \"This telephone visit will be conducted via a call between you and your physician/provider. We have found that certain health care needs can be provided without the need for a physical exam.  This service lets us provide the care you need with a short phone conversation.  If a prescription is necessary we can send it directly to your pharmacy.  If lab work is needed we can place an order for that and you can then stop by our lab to have the test done at a later time.    Telephone visits are billed at different rates depending on your insurance coverage. During this emergency period, for some insurers they may be billed the same as an in-person visit.  Please reach out to your insurance provider with any questions.    If during the course of the call the physician/provider feels a telephone visit is not appropriate, you will not be charged for this service.\"    Patient has given verbal consent for Telephone visit?  Yes    What phone number would you like to be contacted at? [phone]    How would you like to obtain your AVS? [facility]    Camilo Baca is a 70 year old male who is being evaluated via a billable telephone visit.      Rheumatology Telephone/Telehealth Visit      Camilo Baca MRN# [id number]   YOB: 1950 Age: 70 year old      Date of visit: 12/15/20   PCP: Lyndsey Aponte    Chief Complaint   Patient presents with:  RECHECK: RA    Assessment and Plan     1.  Seropositive rheumatoid arthritis (, CCP >250): Dx'd 2013. Previously followed by [facility] and [facility].  Established with me on 3/20/2018. Previously on MTX (ineffective as monotherapy), Humira (effective, stopped because of insurance preference for IV), Simponi (rash on cheeks/nose after infusion), Remicade (lost efficacy), HCQ (facial rash), Orencia (partially effective), SSZ " (cytopenia).  Currently on Rituximab (received 3/10/2020 & 3/24/2020, 9/22/2020 & 10/6/2020). Significant improvement with rituximab; hasn't required prednisone. Doing well at this time but notes return of symptoms starting about 4 months after rituximab dosing so will change rituximab to be every 5 months.  - Rituximab 1gram IV every 2 weeks for a total of 2 doses; change to be every 5 months.  Next due 2/22/2021  - PRN RA flare: Prednisone 20mg daily x2days, then 15mg daily x2days, then 10mg daily x2days, then 5mg daily x2days, then stop.    - Currently using ibuprofen PRN OA symptoms; advised trying tylenol PRN instead  - Labs in 3 months: CBC, Creatinine, Hepatic Panel, ESR, CRP              Rapid 3, cumulative scores                       6/2/2020: doing well; virtual visit (Rituximab in March 2020; SSZ 500mg BID)                      03/03/2020: 9.8  (Orencia IV, SSZ 500mg BID)                      11/19/2019: 2.8  (Orencia IV, SSZ 500mg BID)                      08/20/2019: 4     (Orencia 750mg IV)                      04/23/2019: 0     (Orencia 750mg IV)                       01/08/2019: 0.5  (Simponi IV)                      11/06/2018: 9.3  (Simponi IV x2mo)    2.  Impingement syndrome of both shoulders: injected 10/11/2019; and referred to PT.  Significant improvement; now with full ROM but mild ache still.  Continues to do his home PT exercises.     3. History of mild plantar fasciitis, bilaterally: resolved with stretching and changing shoes. .      4.  Discussed COVID-19 vaccine.    # Relevant labs and imaging were reviewed with the patient    # High risk medication toxicity monitoring: discussion and labs reviewed; appropriate labs ordered. See above.  Instructed that if confirmed to have COVID-19 or exposure to someone with confirmed COVID-19 to call this clinic for directions on DMARD management.    # Note that this is a virtual visit to reduce the risk of COVID-19 exposure during this current  pandemic.      # Considered to be at high risk of complications from the COVID-19 virus.  It is recommended to limit contact with other people and if possible to work remotely or provide a leave of absence to reduce the risk for COVID-19.      Mr. Baca verbalized agreement with and understanding of the rational for the diagnosis and treatment plan.  All questions were answered to best of my ability and the patient's satisfaction. Mr. Baca was advised to contact the clinic with any questions that may arise after the clinic visit.      Thank you for involving me in the care of the patient    Return to clinic: 3 months    HPI   Camilo Baca is a 70 year old male with a past medical history significant for hypertension, hyperlipidemia, GERD, hypothyroidism, history of DVT, factor V Leiden mutation, history of thrombocytopenia, gout, and rheumatoid arthritis who presents for follow-up of rheumatoid arthritis.     Today, he reports doing okay, however his symptoms are returning about 4-4.5 months after rituximab dose.  R>L shoulders, knees, elbows mildly achy, worse in the AM; reaching a baseline by 1-2 hrs.  No weakness.  Knees sometimes bother him with getting off the ground or going up and down stairs.    Denies fevers, chills, nausea, vomiting, constipation, diarrhea. No abdominal pain. No chest pain/pressure, palpitations, or shortness of breath. No LE swelling. No neck pain. No oral or nasal sores.  No rash. No sicca symptoms.      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  CV: No chest pain, pressure, palpitations, or dyspnea on exertion.  PULM: No SOB, wheeze, cough.  GI: No nausea, vomiting, constipation, diarrhea. No blood in stool. No abdominal pain.  : No blood in urine.  MSK: See HPI.  NEURO: No  numbness, tingling, or weakness.  EXT: No LE swelling  PSYCH: Negative    Active Problem List     Patient Active Problem List   Diagnosis     Lumbago     NONSPECIFIC MEDICAL HISTORY     HYPERLIPIDEMIA LDL GOAL <130     History of adenomatous polyp of colon     Idiopathic chronic gout without tophus, unspecified site     Advanced directives, counseling/discussion     Seropositive rheumatoid arthritis (H)     High risk medication use     Thrombocytopenia (H)     Gastroesophageal reflux disease without esophagitis     Factor 5 Leiden mutation, heterozygous (H)     Personal history of venous thrombosis and embolism     Personal history of DVT (deep vein thrombosis)     Hypothyroidism due to acquired atrophy of thyroid     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)     Secondary hypertension with goal blood pressure less than 140/90     Rheumatoid arthritis involving multiple sites with positive rheumatoid factor (H)     Past Medical History     Past Medical History:   Diagnosis Date     Alopecia, unspecified      Closed fracture of unspecified part of humerus 1974    Arm fracture / left wrist     Esophageal reflux 2/6/2015     Factor 5 Leiden mutation, heterozygous (H)      Gout 3/28/2011     Herpes zoster without mention of complication 1965    Herpes zoster     Measles without mention of complication     Measles     Personal history of DVT (deep vein thrombosis) 4/9/2015     Personal history of venous thrombosis and embolism 4/9/2015     Pulmonary embolism (H) 12/1996    post appendectomy     RA (rheumatoid arthritis) (H) 3/11/2013     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     Rheumatoid arthritis(714.0)     Beginning symptoms     Secondary hypertension with goal blood pressure less than 140/90 11/15/2016     Thrombocytopenia, unspecified (H) 1/29/2014     Varicella without mention of complication     Chickenpox     Past  Surgical History     Past Surgical History:   Procedure Laterality Date     C APPENDECTOMY  1996     COLONOSCOPY  11/15/2010    COLONOSCOPY performed by DARIUS MESA at  GI     COLONOSCOPY N/A 11/2/2015    Procedure: COLONOSCOPY;  Surgeon: Bubba Odom MD;  Location:  GI     ESOPHAGOSCOPY, GASTROSCOPY, DUODENOSCOPY (EGD), COMBINED N/A 10/24/2018    Procedure: Esophagogastroduodenoscopy Multiple Biopsies by Biopsy;  Surgeon: Matteo Johnson DO;  Location:  GI     HC COLONOSCOPY W BIOPSY  08/10/05     HC REPAIR ING HERNIA,5+Y/O,REDUCIBL  1960     HERNIORRHAPHY UMBILICAL N/A 4/17/2015    Procedure: HERNIORRHAPHY UMBILICAL;  Surgeon: Rayray Avila MD;  Location: [facility]     LAPAROSCOPIC HERNIORRHAPHY INGUINAL BILATERAL Bilateral 4/17/2015    Procedure: LAPAROSCOPIC HERNIORRHAPHY INGUINAL BILATERAL;  Surgeon: Rayray Avila MD;  Location: [facility]     Allergy     Allergies   Allergen Reactions     Lisinopril Cough     Plaquenil [Hydroxychloroquine] Hives     Current Medication List     Current Outpatient Medications   Medication Sig     allopurinol (ZYLOPRIM) 300 MG tablet TAKE 1 TABLET BY MOUTH EVERY DAY     atorvastatin (LIPITOR) 80 MG tablet TAKE 1 TABLET BY MOUTH EVERY DAY     capsaicin (ZOSTRIX) 0.075 % topical cream Apply  topically 3 times daily.     cetirizine (ZYRTEC) 10 MG tablet Take 1 tablet (10 mg) by mouth every evening     cholestyramine light (PREVALITE) 4 GM packet DISSOLVE ONE PACKET IN LIQUID AND DRINK BY MOUTH TWICE A DAY AS DIRECTED     Cyanocobalamin (B-12) 100 MCG TABS      diclofenac (VOLTAREN) 1 % GEL Apply  2 grams to shoulders three times daily using enclosed dosing card.     levothyroxine (SYNTHROID/LEVOTHROID) 25 MCG tablet TAKE 1 TABLET BY MOUTH EVERY DAY     losartan (COZAAR) 100 MG tablet TAKE 1 TABLET BY MOUTH EVERY DAY     omeprazole (PRILOSEC) 20 MG DR capsule Take 1 capsule (20 mg) by mouth daily     triamcinolone (KENALOG) 0.1 % external  "ointment Apply thin layer twice daily to affected areas as needed.     triamcinolone (KENALOG) 0.5 % external cream Apply sparingly to affected area three times daily as needed, no more than 7 days in a row.     No current facility-administered medications for this visit.          Social History   See HPI    Family History     Family History   Problem Relation Age of Onset     Arthritis Mother      Cardiovascular Mother      Depression Mother      Gastrointestinal Disease Mother         IBS     Hypertension Mother      Circulatory Mother         Aortal aneurysm     Osteoporosis Mother      Allergies Father         Cortisone     Cardiovascular Father      Circulatory Father      Diabetes Father      Hypertension Father      Lipids Father      Alcohol/Drug Sister         Penicillin     Alzheimer Disease Paternal Grandfather      Blood Disease Paternal Grandmother         Clotting problems     Blood Disease Son         Factor 5     Hypertension Maternal Aunt      Cerebrovascular Disease Maternal Grandmother        Physical Exam     Temp Readings from Last 3 Encounters:   10/06/20 98  F (36.7  C) (Oral)   09/22/20 98.7  F (37.1  C) (Oral)   03/12/20 97.4  F (36.3  C) (Temporal)     BP Readings from Last 5 Encounters:   10/06/20 (!) 161/81   09/22/20 (!) 145/80   03/24/20 (!) 144/83   03/12/20 (!) 162/90   03/10/20 125/75     Pulse Readings from Last 1 Encounters:   10/06/20 54     Resp Readings from Last 1 Encounters:   10/06/20 14     Estimated body mass index is 27.45 kg/m  as calculated from the following:    Height as of 3/12/20: 1.786 m (5' 10.32\").    Weight as of 10/6/20: 87.5 kg (193 lb).      Telephone Visit     Labs / Imaging (select studies)   RF/CCP  Recent Labs   Lab Test 04/24/13  0923   CCPABY >250 Strongly Positive*   *     CBC  Recent Labs   Lab Test 08/27/20  0824 05/28/20  0927 02/10/20  0922 10/15/19  0827 10/15/19  0827 09/17/19  0829   WBC 3.5* 3.6* 4.1   < > 4.8 5.2   RBC 4.21* 4.29* 4.21*  "  < > 4.26* 4.42   HGB 13.8 13.9 13.7   < > 13.7 14.2   HCT 41.4 41.7 40.6   < > 41.1 42.7   MCV 98 97 96   < > 97 97   RDW 13.0 13.2 12.6   < > 13.5 13.1   * 117* 105*   < > 144* 133*   MCH 32.8 32.4 32.5   < > 32.2 32.1   MCHC 33.3 33.3 33.7   < > 33.3 33.3   NEUTROPHIL 61.9 59.2 54.9   < > 60.4 65.4   LYMPH 19.5 19.6 29.0   < > 24.4 21.9   MONOCYTE 13.0 14.9 13.5   < > 14.0 10.7   EOSINOPHIL 4.5 5.2 2.4   < > 0.8 1.0   BASOPHIL 0.8 0.8 0.2   < > 0.4 1.0   ANEU 2.2 2.2 2.3   < > 2.9 3.4   ALYM 0.7* 0.7* 1.2   < > 1.2 1.1   KIESHA 0.5 0.5 0.6   < > 0.7 0.6   AEOS  --   --  0.1  --  0.0 0.1   ABAS 0.0 0.0 0.0   < > 0.0 0.1    < > = values in this interval not displayed.     CMP  Recent Labs   Lab Test 08/27/20  0824 05/28/20  0927 02/10/20  0922 11/06/18  0913 11/06/18  0913 04/05/18  2355 04/05/18  2355 12/22/17  0735 12/22/17  0735   NA  --   --   --   --  141  --  140  --  141   POTASSIUM  --   --   --   --  4.2  --  3.4  --  4.2   CHLORIDE  --   --   --   --  105  --  104  --  107   CO2  --   --   --   --  33*  --  26  --  27   ANIONGAP  --   --   --   --  3  --  10  --  7   GLC  --   --   --   --  107*  --  124*  --  121*   BUN  --   --   --   --  15  --  32*  --  17   CR 0.83 0.86 0.85   < > 0.83   < > 0.71   < > 0.85   GFRESTIMATED >90 89 90   < > >90   < > >90   < > >90   GFRESTBLACK >90 >90 >90   < > >90   < > >90   < > >90   ANDREZ  --   --   --   --  8.8  --  7.9*  --  8.9   BILITOTAL 1.2 0.8 1.2   < > 0.6   < >  --    < > 1.5*   ALBUMIN 3.6 3.6 3.5   < > 3.1*   < >  --    < > 3.7   PROTTOTAL 7.2 7.3 7.1   < > 7.4   < >  --    < > 8.4   ALKPHOS 71 68 67   < > 60   < >  --    < > 90   AST 30 23 23   < > 41   < >  --    < > 25   ALT 47 34 38   < > 82*   < >  --    < > 49    < > = values in this interval not displayed.     Calcium/VitaminD  Recent Labs   Lab Test 11/06/18  0913 06/26/18  0955 04/05/18  2355 12/22/17  0735   ANDREZ 8.8  --  7.9* 8.9   VITDT  --  25  --   --      ESR/CRP  Recent Labs   Lab Test  08/27/20  0824 05/28/20  0927 02/10/20  0922   SED 9 15 18   CRP <2.9 <2.9 <2.9     Lipid Panel  Recent Labs   Lab Test 12/08/20  0911 09/17/19  0827 12/22/17  0735 10/04/13  0819 10/04/13  0819 06/03/13  0837   CHOL 155 138 134   < > 143 208*   TRIG 129 85 149   < > 92 72   HDL 55 38* 46   < > 49 77   LDL 74 83 58   < > 75 117   VLDL  --   --   --   --  18 14   CHOLHDLRATIO  --   --   --   --  3.0 3.0   NHDL 100 100 88   < >  --   --     < > = values in this interval not displayed.     Hepatitis B  Recent Labs   Lab Test 03/03/20  1405 03/20/18  1008 02/05/14  1104   HBCAB Nonreactive Nonreactive  --    HEPBANG Nonreactive Nonreactive Negative     Hepatitis C  Recent Labs   Lab Test 02/05/14  1104 06/03/13  0837   HCVAB Negative Negative     Tuberculosis Screening  Recent Labs   Lab Test 03/03/20  1405 03/20/18  1008 02/05/14  1104   TBRES Negative  --   --    TBRSLT  --  Negative Negative   TBAGN  --  0.14 0.01       Immunization History     Immunization History   Administered Date(s) Administered     HepB, Unspecified 08/19/1991, 09/23/1991, 02/24/1992     Influenza (High Dose) 3 valent vaccine 10/06/2017, 10/03/2018, 10/23/2019     Influenza (IIV3) PF 10/01/2009, 10/27/2010, 10/11/2011     Influenza Vaccine IM > 6 months Valent IIV4 10/09/2013, 10/23/2014, 09/18/2015, 11/15/2016     Influenza, Quad, High Dose, Pf, 65yr + 09/02/2020     Pneumo Conj 13-V (2010&after) 10/06/2017     Pneumococcal 23 valent 10/23/2014, 10/23/2019     TD (ADULT, 7+) 09/05/2001     TDAP Vaccine (Boostrix) 10/18/2012     Zoster vaccine recombinant adjuvanted (SHINGRIX) 01/31/2019, 04/18/2019     Zoster vaccine, live 10/23/2014          Chart documentation done in part with Dragon Voice recognition Software. Although reviewed after completion, some word and grammatical error may remain.    Phone call start time: 2:00 PM  Phone call end time: 2:10 PM    This visit is equivalent to a [id number] visit    Location of patient: [place], [place]    Location of provider: home    Follow up:  follow up appointment scheduled to be in March    Fabricio Gomes MD

## 2021-01-19 ENCOUNTER — OFFICE VISIT (OUTPATIENT)
Dept: FAMILY MEDICINE | Facility: CLINIC | Age: 71
End: 2021-01-19
Payer: COMMERCIAL

## 2021-01-19 VITALS
HEART RATE: 62 BPM | BODY MASS INDEX: 27.63 KG/M2 | OXYGEN SATURATION: 97 % | SYSTOLIC BLOOD PRESSURE: 148 MMHG | TEMPERATURE: 96 F | DIASTOLIC BLOOD PRESSURE: 86 MMHG | WEIGHT: 197.38 LBS | HEIGHT: 71 IN | RESPIRATION RATE: 15 BRPM

## 2021-01-19 DIAGNOSIS — I15.9 SECONDARY HYPERTENSION WITH GOAL BLOOD PRESSURE LESS THAN 140/90: ICD-10-CM

## 2021-01-19 DIAGNOSIS — E78.5 HYPERLIPIDEMIA LDL GOAL <130: ICD-10-CM

## 2021-01-19 DIAGNOSIS — E03.4 HYPOTHYROIDISM DUE TO ACQUIRED ATROPHY OF THYROID: Primary | ICD-10-CM

## 2021-01-19 PROCEDURE — 99213 OFFICE O/P EST LOW 20 MIN: CPT | Performed by: FAMILY MEDICINE

## 2021-01-19 ASSESSMENT — PAIN SCALES - GENERAL: PAINLEVEL: NO PAIN (0)

## 2021-01-19 ASSESSMENT — MIFFLIN-ST. JEOR: SCORE: 1679.48

## 2021-01-19 NOTE — PROGRESS NOTES
"  Assessment & Plan     Encounter Diagnoses   Name Primary?     Hypothyroidism due to acquired atrophy of thyroid Yes     Hyperlipidemia LDL goal <130      Secondary hypertension with goal blood pressure less than 140/90      Blood pressure was slightly elevated the rest of his laboratory studies look good he is due for another thyroid test as his TSH was elevated on his last check but his free T4 was normal.  His TSH was just mildly elevated though.  His blood pressure is mildly elevated today we will recheck in the next 6 weeks when he comes in for his complete physical before we make any adjustments to his blood pressure medications.  I did review previous clinic notes today and previous laboratory studies to see what we need to follow-up on in the near future.      Review of external notes as documented above       [id number]}   Akash Mariee MD, MD  [facility] [place]    Kenna Page is a 70 year old who presents to clinic today for the following health issues  accompanied by his spouse:    HPI       New Patient/Transfer of Care    Jez is transferring care.  He has some rheumatoid arthritis which she follows rheumatology does have elevated lipids and thyroid problems otherwise has no skin complaints or concerns at this time.  Does need to have some follow-up laboratory studies which will get done for him in the next few weeks his wife going to come in in about 6 weeks so I think he can come in at the same time so they can have one trip [place] to [place].  No other concerns at this time.    Review of Systems   Constitutional, HEENT, cardiovascular, pulmonary, gi and gu systems are negative, except as otherwise noted.      Objective    BP (!) 148/86   Pulse 62   Temp 96  F (35.6  C) (Tympanic)   Resp 15   Ht 1.791 m (5' 10.5\")   Wt 89.5 kg (197 lb 6 oz)   SpO2 97%   BMI 27.92 kg/m    Body mass index is 27.92 kg/m .  Physical Exam   GENERAL: healthy, alert and no " distress

## 2021-02-05 DIAGNOSIS — M10.9 ACUTE GOUTY ARTHROPATHY: ICD-10-CM

## 2021-02-05 DIAGNOSIS — E78.5 HYPERLIPIDEMIA LDL GOAL <130: ICD-10-CM

## 2021-02-05 NOTE — TELEPHONE ENCOUNTER
Routing refill request to provider for review/approval because:  Drug not on the [facility] refill protocol       Requested Prescriptions   Pending Prescriptions Disp Refills     cholestyramine light (PREVALITE) 4 GM packet 60 packet 3     Sig: DISSOLVE ONE PACKET IN LIQUID AND DRINK BY MOUTH TWICE A DAY AS DIRECTED       There is no refill protocol information for this order      Last Written Prescription Date:  8/27/2020  Last Fill Quantity: 60,  # refills: 3   Last office visit: 1/19/2021 with prescribing provider:     Future Office Visit:   Hyperlipidemia LDL goal <130 [E78.5]   Next 5 appointments (look out 90 days)    Feb 16, 2021  8:40 AM  PHYSICAL with Akash Mariee MD  [facility] ([facility] ) [address] [place] [id number] [phone]           Kimberlyn Mena RN

## 2021-02-08 RX ORDER — CHOLESTYRAMINE LIGHT 4 G/5.7G
POWDER, FOR SUSPENSION ORAL
Qty: 60 PACKET | Refills: 4 | Status: SHIPPED | OUTPATIENT
Start: 2021-02-08 | End: 2021-09-01

## 2021-02-16 ENCOUNTER — OFFICE VISIT (OUTPATIENT)
Dept: FAMILY MEDICINE | Facility: CLINIC | Age: 71
End: 2021-02-16
Payer: COMMERCIAL

## 2021-02-16 VITALS
HEART RATE: 64 BPM | HEIGHT: 70 IN | OXYGEN SATURATION: 96 % | BODY MASS INDEX: 28.67 KG/M2 | DIASTOLIC BLOOD PRESSURE: 84 MMHG | RESPIRATION RATE: 13 BRPM | WEIGHT: 200.25 LBS | SYSTOLIC BLOOD PRESSURE: 148 MMHG | TEMPERATURE: 96.3 F

## 2021-02-16 DIAGNOSIS — Z00.00 ENCOUNTER FOR MEDICARE ANNUAL WELLNESS EXAM: ICD-10-CM

## 2021-02-16 DIAGNOSIS — Z12.5 SCREENING FOR PROSTATE CANCER: ICD-10-CM

## 2021-02-16 DIAGNOSIS — Z12.11 SCREEN FOR COLON CANCER: ICD-10-CM

## 2021-02-16 DIAGNOSIS — I15.9 SECONDARY HYPERTENSION WITH GOAL BLOOD PRESSURE LESS THAN 140/90: Primary | ICD-10-CM

## 2021-02-16 DIAGNOSIS — D68.51 FACTOR 5 LEIDEN MUTATION, HETEROZYGOUS (H): ICD-10-CM

## 2021-02-16 DIAGNOSIS — N52.9 ERECTILE DYSFUNCTION, UNSPECIFIED ERECTILE DYSFUNCTION TYPE: ICD-10-CM

## 2021-02-16 DIAGNOSIS — M05.9 SEROPOSITIVE RHEUMATOID ARTHRITIS (H): ICD-10-CM

## 2021-02-16 LAB
ANION GAP SERPL CALCULATED.3IONS-SCNC: 3 MMOL/L (ref 3–14)
BUN SERPL-MCNC: 15 MG/DL (ref 7–30)
CALCIUM SERPL-MCNC: 9.4 MG/DL (ref 8.5–10.1)
CHLORIDE SERPL-SCNC: 106 MMOL/L (ref 94–109)
CO2 SERPL-SCNC: 32 MMOL/L (ref 20–32)
CREAT SERPL-MCNC: 0.84 MG/DL (ref 0.66–1.25)
CREAT UR-MCNC: 120 MG/DL
GFR SERPL CREATININE-BSD FRML MDRD: 90 ML/MIN/{1.73_M2}
GLUCOSE SERPL-MCNC: 119 MG/DL (ref 70–99)
MICROALBUMIN UR-MCNC: 9 MG/L
MICROALBUMIN/CREAT UR: 7.73 MG/G CR (ref 0–17)
POTASSIUM SERPL-SCNC: 4.4 MMOL/L (ref 3.4–5.3)
PSA SERPL-ACNC: 0.5 UG/L (ref 0–4)
SODIUM SERPL-SCNC: 141 MMOL/L (ref 133–144)

## 2021-02-16 PROCEDURE — 82043 UR ALBUMIN QUANTITATIVE: CPT | Performed by: FAMILY MEDICINE

## 2021-02-16 PROCEDURE — 80048 BASIC METABOLIC PNL TOTAL CA: CPT | Performed by: FAMILY MEDICINE

## 2021-02-16 PROCEDURE — G0103 PSA SCREENING: HCPCS | Performed by: FAMILY MEDICINE

## 2021-02-16 PROCEDURE — 36415 COLL VENOUS BLD VENIPUNCTURE: CPT | Performed by: FAMILY MEDICINE

## 2021-02-16 PROCEDURE — 99397 PER PM REEVAL EST PAT 65+ YR: CPT | Performed by: FAMILY MEDICINE

## 2021-02-16 RX ORDER — SILDENAFIL 100 MG/1
TABLET, FILM COATED ORAL
Qty: 3 TABLET | Refills: 4 | Status: SHIPPED | OUTPATIENT
Start: 2021-02-16 | End: 2021-12-09

## 2021-02-16 RX ORDER — LOSARTAN POTASSIUM AND HYDROCHLOROTHIAZIDE 12.5; 1 MG/1; MG/1
1 TABLET ORAL DAILY
Qty: 90 TABLET | Refills: 3 | Status: SHIPPED | OUTPATIENT
Start: 2021-02-16 | End: 2021-11-10

## 2021-02-16 ASSESSMENT — PAIN SCALES - GENERAL: PAINLEVEL: NO PAIN (0)

## 2021-02-16 ASSESSMENT — MIFFLIN-ST. JEOR: SCORE: 1690.93

## 2021-02-16 NOTE — PROGRESS NOTES
"  SUBJECTIVE:   Camilo Baca is a 70 year old male who presents for Preventive Visit.      Patient has been advised of split billing requirements and indicates understanding: Yes  Are you in the first 12 months of your Medicare Part B coverage?  No    Physical Health:    In general, how would you rate your overall physical health? good    Outside of work, how many days during the week do you exercise? 6-7 days/week    Outside of work, approximately how many minutes a day do you exercise?greater than 60 minutes    If you drink alcohol do you typically have >3 drinks per day or >7 drinks per week? No    Do you usually eat at least 4 servings of fruit and vegetables a day, include whole grains & fiber and avoid regularly eating high fat or \"junk\" foods? Yes    Do you have any problems taking medications regularly?  No    Do you have any side effects from medications? none    Needs assistance for the following daily activities: no assistance needed    Which of the following safety concerns are present in your home?  none identified     Hearing impairment: Yes, Difficulty following a conversation in a noisy restaurant or crowded room.    Feel that people are mumbling or not speaking clearly.    Difficulty following dialogue in the theater.    Need to ask people to speak up or repeat themselves.    Find that men's voices are easier to understand than women's.    Difficulty understanding soft or whispered speech.    Difficulty understanding speech on the telephone.    In the past 6 months, have you been bothered by leaking of urine? no    Mental Health:    In general, how would you rate your overall mental or emotional health? excellent  PHQ-2 Score: 0    Do you feel safe in your environment? Yes    Have you ever done Advance Care Planning? (For example, a Health Directive, POLST, or a discussion with a medical provider or your loved ones about your wishes): Yes, advance care planning is on file.    Additional concerns " to address?  No    Fall risk:  Fallen 2 or more times in the past year?: No  Any fall with injury in the past year?: No  click delete button to remove this line now  Cognitive Screenin) Repeat 3 items (Leader, Season, Table)      2) Clock draw:   NORMAL  3) 3 item recall: Recalls 3 objects  Results: 3 items recalled: COGNITIVE IMPAIRMENT LESS LIKELY    Mini-CogTM Copyright YAMIL Reyez. Licensed by the author for use in [facility]; reprinted with permission (augusto@[facility]). All rights reserved.      Do you have sleep apnea, excessive snoring or daytime drowsiness?: he snores and naps at times            Reviewed and updated as needed this visit by clinical staff  Tobacco  Allergies  Meds   Med Hx  Surg Hx  Fam Hx  Soc Hx        Reviewed and updated as needed this visit by Provider                Social History     Tobacco Use     Smoking status: Former Smoker     Packs/day: 0.25     Years: 2.00     Pack years: 0.50     Types: Cigarettes, Pipe     Quit date: 1979     Years since quittin.1     Smokeless tobacco: Never Used   Substance Use Topics     Alcohol use: Yes     Comment: 5 drinks per week / wine                           Current providers sharing in care for this patient include:   Patient Care Team:  Akash Mariee MD as PCP - General (Family Medicine)  Delilah Zhao MD as Assigned Surgical Provider  Akash Mariee MD as Assigned PCP    The following health maintenance items are reviewed in Epic and correct as of today:  Health Maintenance   Topic Date Due     MICROALBUMIN  2020     COLORECTAL CANCER SCREENING  2020     FALL RISK ASSESSMENT  2021     LIPID  2021     TSH W/FREE T4 REFLEX  2021     MEDICARE ANNUAL WELLNESS VISIT  2022     DTAP/TDAP/TD IMMUNIZATION (3 - Td) 10/18/2022     ADVANCE CARE PLANNING  2025     HEPATITIS C SCREENING  Completed     PHQ-2  Completed     INFLUENZA VACCINE  Completed     Pneumococcal Vaccine:  65+ Years  Completed     ZOSTER IMMUNIZATION  Completed     AORTIC ANEURYSM SCREENING (SYSTEM ASSIGNED)  Completed     Pneumococcal Vaccine: Pediatrics (0 to 5 Years) and At-Risk Patients (6 to 64 Years)  Aged Out     IPV IMMUNIZATION  Aged Out     MENINGITIS IMMUNIZATION  Aged Out     HEPATITIS B IMMUNIZATION  Aged Out     BP Readings from Last 3 Encounters:   02/16/21 (!) 144/96   01/19/21 (!) 148/86   10/06/20 (!) 161/81    Wt Readings from Last 3 Encounters:   02/16/21 90.8 kg (200 lb 4 oz)   01/19/21 89.5 kg (197 lb 6 oz)   10/06/20 87.5 kg (193 lb)                  Patient Active Problem List   Diagnosis     Lumbago     NONSPECIFIC MEDICAL HISTORY     HYPERLIPIDEMIA LDL GOAL <130     History of adenomatous polyp of colon     Idiopathic chronic gout without tophus, unspecified site     Advanced directives, counseling/discussion     Seropositive rheumatoid arthritis (H)     High risk medication use     Thrombocytopenia (H)     Gastroesophageal reflux disease without esophagitis     Factor 5 Leiden mutation, heterozygous (H)     Personal history of venous thrombosis and embolism     Personal history of DVT (deep vein thrombosis)     Hypothyroidism due to acquired atrophy of thyroid     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)     Secondary hypertension with goal blood pressure less than 140/90     Rheumatoid arthritis involving multiple sites with positive rheumatoid factor (H)     Past Surgical History:   Procedure Laterality Date     C APPENDECTOMY  1996     COLONOSCOPY  11/15/2010    COLONOSCOPY performed by DARIUS MESA at  GI     COLONOSCOPY N/A 11/2/2015    Procedure: COLONOSCOPY;  Surgeon: Bubba Odom MD;  Location:  GI     ESOPHAGOSCOPY, GASTROSCOPY, DUODENOSCOPY (EGD), COMBINED N/A 10/24/2018    Procedure: Esophagogastroduodenoscopy Multiple Biopsies by Biopsy;  Surgeon: Matteo Johnson DO;  Location:  GI     HC COLONOSCOPY W BIOPSY   08/10/05     HC REPAIR ING HERNIA,5+Y/O,REDUCIBL  1960     HERNIORRHAPHY UMBILICAL N/A 2015    Procedure: HERNIORRHAPHY UMBILICAL;  Surgeon: Rayray Avila MD;  Location: [facility] OR     LAPAROSCOPIC HERNIORRHAPHY INGUINAL BILATERAL Bilateral 2015    Procedure: LAPAROSCOPIC HERNIORRHAPHY INGUINAL BILATERAL;  Surgeon: Rayray Avila MD;  Location: [facility] OR       Social History     Tobacco Use     Smoking status: Former Smoker     Packs/day: 0.25     Years: 2.00     Pack years: 0.50     Types: Cigarettes, Pipe     Quit date: 1979     Years since quittin.1     Smokeless tobacco: Never Used   Substance Use Topics     Alcohol use: Yes     Comment: 5 drinks per week / wine     Family History   Problem Relation Age of Onset     Arthritis Mother      Cardiovascular Mother      Depression Mother      Gastrointestinal Disease Mother         IBS     Hypertension Mother      Circulatory Mother         Aortal aneurysm     Osteoporosis Mother      Allergies Father         Cortisone     Cardiovascular Father      Circulatory Father      Diabetes Father      Hypertension Father      Lipids Father      Alzheimer Disease Paternal Grandfather      Blood Disease Paternal Grandmother         Clotting problems     Blood Disease Son         Factor 5     Hypertension Maternal Aunt      Cerebrovascular Disease Maternal Grandmother          Current Outpatient Medications   Medication Sig Dispense Refill     allopurinol (ZYLOPRIM) 300 MG tablet TAKE 1 TABLET BY MOUTH EVERY DAY 90 tablet 1     atorvastatin (LIPITOR) 80 MG tablet TAKE 1 TABLET BY MOUTH EVERY DAY 90 tablet 1     cholestyramine light (PREVALITE) 4 GM packet DISSOLVE ONE PACKET IN LIQUID AND DRINK BY MOUTH TWICE A DAY AS DIRECTED 60 packet 4     Cyanocobalamin (B-12) 100 MCG TABS        diclofenac (VOLTAREN) 1 % GEL Apply  2 grams to shoulders three times daily using enclosed dosing card. 100 g 1     levothyroxine (SYNTHROID/LEVOTHROID) 25 MCG tablet  "TAKE 1 TABLET BY MOUTH EVERY DAY 90 tablet 1     losartan-hydrochlorothiazide (HYZAAR) 100-12.5 MG tablet Take 1 tablet by mouth daily 90 tablet 3     omeprazole (PRILOSEC) 20 MG DR capsule Take 1 capsule (20 mg) by mouth daily 90 capsule 3     sildenafil (VIAGRA) 100 MG tablet 1/2 tab 1/2 hour prior to activity 3 tablet 4     triamcinolone (KENALOG) 0.1 % external ointment Apply thin layer twice daily to affected areas as needed. 453.6 g 3     VITAMIN D PO        cetirizine (ZYRTEC) 10 MG tablet Take 1 tablet (10 mg) by mouth every evening (Patient not taking: Reported on 1/19/2021) 90 tablet 1         ROS:  Constitutional, HEENT, cardiovascular, pulmonary, gi and gu systems are negative, except as otherwise noted.    OBJECTIVE:   BP (!) 144/96   Pulse 64   Temp 96.3  F (35.7  C) (Tympanic)   Resp 13   Ht 1.788 m (5' 10.4\")   Wt 90.8 kg (200 lb 4 oz)   SpO2 96%   BMI 28.41 kg/m   Estimated body mass index is 28.41 kg/m  as calculated from the following:    Height as of this encounter: 1.788 m (5' 10.4\").    Weight as of this encounter: 90.8 kg (200 lb 4 oz).  EXAM:   GENERAL: healthy, alert and no distress  EYES: Eyes grossly normal to inspection, PERRL and conjunctivae and sclerae normal  HENT: ear canals and TM's normal, nose and mouth without ulcers or lesions  NECK: no adenopathy, no asymmetry, masses, or scars and thyroid normal to palpation  RESP: lungs clear to auscultation - no rales, rhonchi or wheezes  CV: regular rate and rhythm, normal S1 S2, no S3 or S4, no murmur, click or rub, no peripheral edema and peripheral pulses strong  ABDOMEN: soft, nontender, no hepatosplenomegaly, no masses and bowel sounds normal   (male): normal male genitalia without lesions or urethral discharge, no hernia  MS: no gross musculoskeletal defects noted, no edema  SKIN: no suspicious lesions or rashes  NEURO: Normal strength and tone, mentation intact and speech normal  BACK: no CVA tenderness, no paralumbar " tenderness  PSYCH: mentation appears normal, affect normal/bright  LYMPH: no cervical, supraclavicular, axillary, or inguinal adenopathy    Diagnostic Test Results:  Labs reviewed in Epic  Results for orders placed or performed in visit on 02/16/21 (from the past 24 hour(s))   Basic metabolic panel  (Ca, Cl, CO2, Creat, Gluc, K, Na, BUN)   Result Value Ref Range    Sodium 141 133 - 144 mmol/L    Potassium 4.4 3.4 - 5.3 mmol/L    Chloride 106 94 - 109 mmol/L    Carbon Dioxide 32 20 - 32 mmol/L    Anion Gap 3 3 - 14 mmol/L    Glucose 119 (H) 70 - 99 mg/dL    Urea Nitrogen 15 7 - 30 mg/dL    Creatinine 0.84 0.66 - 1.25 mg/dL    GFR Estimate 90 >60 mL/min/[1.73_m2]    GFR Estimate If Black >90 >60 mL/min/[1.73_m2]    Calcium 9.4 8.5 - 10.1 mg/dL   PSA, screen   Result Value Ref Range    PSA 0.50 0 - 4 ug/L       ASSESSMENT / PLAN:   1. Screen for colon cancer    - GASTROENTEROLOGY ADULT REF PROCEDURE ONLY; Future    2. Encounter for Medicare annual wellness exam  Slightly elevated glucose will monitor for now.    3. Secondary hypertension with goal blood pressure less than 140/90  We will switch the patient over to Hyzaar which will add a little hydrochlorothiazide we will call in his blood pressures from his infusion appointments we can adjust medications accordingly.  We will need to recheck a potassium in 4 to 6 weeks  - Albumin Random Urine Quantitative with Creat Ratio  - losartan-hydrochlorothiazide (HYZAAR) 100-12.5 MG tablet; Take 1 tablet by mouth daily  Dispense: 90 tablet; Refill: 3  - Basic metabolic panel  (Ca, Cl, CO2, Creat, Gluc, K, Na, BUN)    4. Erectile dysfunction, unspecified erectile dysfunction type  She would like a trial.  - sildenafil (VIAGRA) 100 MG tablet; 1/2 tab 1/2 hour prior to activity  Dispense: 3 tablet; Refill: 4    5. Screening for prostate cancer  PSA is 0.5 is likelihood of developing prostate cancer in his lifetime is basically 0  - PSA, screen    6. Seropositive rheumatoid  "arthritis (H)  Continue to follow with rheumatology*    7. Factor 5 Leiden mutation, heterozygous (H)  Continue to follow with his rheumatologist      Patient has been advised of split billing requirements and indicates understanding: Yes    COUNSELING:  Reviewed preventive health counseling, as reflected in patient instructions       Regular exercise       Healthy diet/nutrition    Estimated body mass index is 28.41 kg/m  as calculated from the following:    Height as of this encounter: 1.788 m (5' 10.4\").    Weight as of this encounter: 90.8 kg (200 lb 4 oz).    Weight management plan: Discussed healthy diet and exercise guidelines    He reports that he quit smoking about 42 years ago. His smoking use included cigarettes and pipe. He has a 0.50 pack-year smoking history. He has never used smokeless tobacco.    Appropriate preventive services were discussed with this patient, including applicable screening as appropriate for cardiovascular disease, diabetes, osteopenia/osteoporosis, and glaucoma.  As appropriate for age/gender, discussed screening for colorectal cancer, prostate cancer, breast cancer, and cervical cancer. Checklist reviewing preventive services available has been given to the patient.    Reviewed patients plan of care and provided an AVS. The Basic Care Plan (routine screening as documented in Health Maintenance) for Camilo meets the Care Plan requirement. This Care Plan has been established and reviewed with the Patient.    Counseling Resources:  ATP IV Guidelines  Pooled Cohorts Equation Calculator  Breast Cancer Risk Calculator  BRCA-Related Cancer Risk Assessment: FHS-7 Tool  FRAX Risk Assessment  ICSI Preventive Guidelines  Dietary Guidelines for Americans, 2010  USDA's MyPlate  ASA Prophylaxis  Lung CA Screening    Akash Mariee MD, MD  [facility]  "

## 2021-02-16 NOTE — PATIENT INSTRUCTIONS
Patient Education   Personalized Prevention Plan  You are due for the preventive services outlined below.  Your care team is available to assist you in scheduling these services.  If you have already completed any of these items, please share that information with your care team to update in your medical record.  Health Maintenance Due   Topic Date Due     Kidney Microalbumin Urine Test  09/17/2020     Colorectal Cancer Screening  11/02/2020     FALL RISK ASSESSMENT  03/12/2021

## 2021-02-18 ENCOUNTER — TELEPHONE (OUTPATIENT)
Dept: FAMILY MEDICINE | Facility: CLINIC | Age: 71
End: 2021-02-18

## 2021-02-18 NOTE — TELEPHONE ENCOUNTER
Left message for patient to return call to schedule EGD/colonoscopy. If Ginna or Roslyn are not available, please transfer to same day surgery

## 2021-02-18 NOTE — LETTER

## 2021-02-19 DIAGNOSIS — Z11.59 ENCOUNTER FOR SCREENING FOR OTHER VIRAL DISEASES: Primary | ICD-10-CM

## 2021-02-19 LAB
LABORATORY COMMENT REPORT: NORMAL
SARS-COV-2 RNA RESP QL NAA+PROBE: NEGATIVE
SARS-COV-2 RNA RESP QL NAA+PROBE: NORMAL
SPECIMEN SOURCE: NORMAL
SPECIMEN SOURCE: NORMAL

## 2021-02-19 PROCEDURE — 87635 SARS-COV-2 COVID-19 AMP PRB: CPT | Performed by: OPHTHALMOLOGY

## 2021-02-23 ENCOUNTER — INFUSION THERAPY VISIT (OUTPATIENT)
Dept: INFUSION THERAPY | Facility: CLINIC | Age: 71
End: 2021-02-23
Attending: INTERNAL MEDICINE
Payer: MEDICARE

## 2021-02-23 VITALS
BODY MASS INDEX: 29.16 KG/M2 | OXYGEN SATURATION: 97 % | HEART RATE: 57 BPM | RESPIRATION RATE: 18 BRPM | TEMPERATURE: 98 F | WEIGHT: 203.7 LBS | SYSTOLIC BLOOD PRESSURE: 128 MMHG | DIASTOLIC BLOOD PRESSURE: 80 MMHG | HEIGHT: 70 IN

## 2021-02-23 DIAGNOSIS — M05.79 RHEUMATOID ARTHRITIS INVOLVING MULTIPLE SITES WITH POSITIVE RHEUMATOID FACTOR (H): Primary | ICD-10-CM

## 2021-02-23 LAB
CD19 CELLS # BLD: 2 CELLS/UL (ref 107–698)
CD19 CELLS NFR BLD: <1 % (ref 6–27)

## 2021-02-23 PROCEDURE — 82784 ASSAY IGA/IGD/IGG/IGM EACH: CPT | Performed by: INTERNAL MEDICINE

## 2021-02-23 PROCEDURE — 96415 CHEMO IV INFUSION ADDL HR: CPT

## 2021-02-23 PROCEDURE — 250N000011 HC RX IP 250 OP 636: Performed by: INTERNAL MEDICINE

## 2021-02-23 PROCEDURE — 250N000013 HC RX MED GY IP 250 OP 250 PS 637: Performed by: INTERNAL MEDICINE

## 2021-02-23 PROCEDURE — 36415 COLL VENOUS BLD VENIPUNCTURE: CPT

## 2021-02-23 PROCEDURE — 96413 CHEMO IV INFUSION 1 HR: CPT

## 2021-02-23 PROCEDURE — 258N000003 HC RX IP 258 OP 636: Performed by: INTERNAL MEDICINE

## 2021-02-23 PROCEDURE — 96375 TX/PRO/DX INJ NEW DRUG ADDON: CPT

## 2021-02-23 PROCEDURE — 86355 B CELLS TOTAL COUNT: CPT | Performed by: INTERNAL MEDICINE

## 2021-02-23 PROCEDURE — 96361 HYDRATE IV INFUSION ADD-ON: CPT

## 2021-02-23 RX ORDER — HEPARIN SODIUM (PORCINE) LOCK FLUSH IV SOLN 100 UNIT/ML 100 UNIT/ML
5 SOLUTION INTRAVENOUS
Status: CANCELLED | OUTPATIENT
Start: 2021-03-09

## 2021-02-23 RX ORDER — METHYLPREDNISOLONE SODIUM SUCCINATE 125 MG/2ML
125 INJECTION, POWDER, LYOPHILIZED, FOR SOLUTION INTRAMUSCULAR; INTRAVENOUS ONCE
Status: CANCELLED
Start: 2021-03-09

## 2021-02-23 RX ORDER — DIPHENHYDRAMINE HCL 25 MG
50 CAPSULE ORAL ONCE
Status: COMPLETED | OUTPATIENT
Start: 2021-02-23 | End: 2021-02-23

## 2021-02-23 RX ORDER — DIPHENHYDRAMINE HCL 25 MG
50 CAPSULE ORAL ONCE
Status: CANCELLED
Start: 2021-03-09

## 2021-02-23 RX ORDER — ACETAMINOPHEN 325 MG/1
650 TABLET ORAL ONCE
Status: CANCELLED
Start: 2021-03-09

## 2021-02-23 RX ORDER — METHYLPREDNISOLONE SODIUM SUCCINATE 125 MG/2ML
125 INJECTION, POWDER, LYOPHILIZED, FOR SOLUTION INTRAMUSCULAR; INTRAVENOUS ONCE
Status: COMPLETED | OUTPATIENT
Start: 2021-02-23 | End: 2021-02-23

## 2021-02-23 RX ORDER — HEPARIN SODIUM,PORCINE 10 UNIT/ML
5 VIAL (ML) INTRAVENOUS
Status: CANCELLED | OUTPATIENT
Start: 2021-03-09

## 2021-02-23 RX ORDER — ACETAMINOPHEN 325 MG/1
650 TABLET ORAL ONCE
Status: COMPLETED | OUTPATIENT
Start: 2021-02-23 | End: 2021-02-23

## 2021-02-23 RX ADMIN — METHYLPREDNISOLONE SODIUM SUCCINATE 125 MG: 125 INJECTION, POWDER, FOR SOLUTION INTRAMUSCULAR; INTRAVENOUS at 09:06

## 2021-02-23 RX ADMIN — RITUXIMAB-ABBS 1000 MG: 10 INJECTION, SOLUTION INTRAVENOUS at 09:44

## 2021-02-23 RX ADMIN — SODIUM CHLORIDE 250 ML: 9 INJECTION, SOLUTION INTRAVENOUS at 09:02

## 2021-02-23 RX ADMIN — ACETAMINOPHEN 650 MG: 325 TABLET, FILM COATED ORAL at 09:05

## 2021-02-23 RX ADMIN — DIPHENHYDRAMINE HYDROCHLORIDE 50 MG: 25 CAPSULE ORAL at 09:05

## 2021-02-23 ASSESSMENT — MIFFLIN-ST. JEOR: SCORE: 1700.23

## 2021-02-23 ASSESSMENT — PAIN SCALES - GENERAL: PAINLEVEL: MILD PAIN (3)

## 2021-02-23 NOTE — PROGRESS NOTES
Infusion Nursing Note:  Camilo Baca presents today for Truxima Day 1.    Patient seen by provider today: No   present during visit today: Not Applicable.    Note: Patient denies any new symptoms today, the usual shoulder joints and elbow joint pain.    Patient did not meet criteria for an asymptomatic covid-19 PCR test in infusion today. Negative test taken 02/19/21.    Intravenous Access:  Peripheral IV placed.    Treatment Conditions:  Biological Infusion Checklist:  ~~~ NOTE: If the patient answers yes to any of the questions below, hold the infusion and contact ordering provider or on-call provider.    1. Have you recently had an elevated temperature, fever, chills, productive cough, coughing for 3 weeks or longer or hemoptysis, abnormal vital signs, night sweats,  chest pain or have you noticed a decrease in your appetite, unexplained weight loss or fatigue? No  2. Do you have any open wounds or new incisions? No  3. Do you have any recent or upcoming hospitalizations, surgeries or dental procedures? No  4. Do you currently have or recently have had any signs of illness or infection or are you on any antibiotics? No  5. Have you had any new, sudden or worsening abdominal pain? No  6. Have you or anyone in your household received a live vaccination in the past 4 weeks? Please note:  No live vaccines while on biologic/chemotherapy until 6 months after the last treatment.  Patient can receive the flu vaccine (shot only) and the pneumovax.  It is optimal for the patient to get these vaccines mid cycle, but they can be given at any time as long as it is not on the day of the infusion. No  7. Have you recently been diagnosed with any new nervous system diseases (ie. Multiple sclerosis, Guillain [place], seizures, neurological changes) or cancer diagnosis? No  8. Are you on any form of radiation or chemotherapy? No  9. Are you pregnant or breast feeding or do you have plans of pregnancy in the future?  No  10. Have you been having any signs of worsening depression or suicidal ideations?  (benlysta only) No  11. Have there been any other new onset medical symptoms? No        Post Infusion Assessment:  Patient tolerated infusion without incident.  Patient observed for 15 minutes post infusion per protocol.  Blood return noted pre and post infusion.  Site patent and intact, free from redness, edema or discomfort.  No evidence of extravasations.  Access discontinued per protocol.  Biologic Infusion Post Education: Call the triage nurse at your clinic or seek medical attention if you have chills and/or temperature greater than or equal to 100.5, uncontrolled nausea/vomiting, diarrhea, constipation, dizziness, shortness of breath, chest pain, heart palpitations, weakness or any other new or concerning symptoms, questions or concerns.  You cannot have any live virus vaccines prior to or during treatment or up to 6 months post infusion.  If you have an upcoming surgery, medical procedure or dental procedure during treatment, this should be discussed with your ordering physician and your surgeon/dentist.  If you are having any concerning symptom, if you are unsure if you should get your next infusion or wish to speak to a provider before your next infusion, please call your care coordinator or triage nurse at your clinic to notify them so we can adequately serve you.       Discharge Plan:   Discharge instructions reviewed with: Patient.  Patient and/or family verbalized understanding of discharge instructions and all questions answered.  Patient discharged in stable condition accompanied by: self.  Departure Mode: Ambulatory.    Juliette Macias RN

## 2021-02-23 NOTE — PATIENT INSTRUCTIONS
Pt to return on 03/09/21 for Rituxan day 15. Copies of medication list and upcoming appointments given prior to discharge.

## 2021-02-24 LAB
IGA SERPL-MCNC: 310 MG/DL (ref 84–499)
IGG SERPL-MCNC: 906 MG/DL (ref 610–1616)
IGM SERPL-MCNC: 42 MG/DL (ref 35–242)

## 2021-02-25 ENCOUNTER — TRANSFERRED RECORDS (OUTPATIENT)
Dept: HEALTH INFORMATION MANAGEMENT | Facility: CLINIC | Age: 71
End: 2021-02-25

## 2021-03-03 DIAGNOSIS — K21.9 GASTROESOPHAGEAL REFLUX DISEASE WITHOUT ESOPHAGITIS: Primary | ICD-10-CM

## 2021-03-03 DIAGNOSIS — Z11.59 ENCOUNTER FOR SCREENING FOR OTHER VIRAL DISEASES: ICD-10-CM

## 2021-03-03 NOTE — TELEPHONE ENCOUNTER
omeprazole (PRILOSEC) 20 MG DR capsule      Last Written Prescription Date:  3/12/2020  Last Fill Quantity: 90,   # refills: 3  Last Office Visit: 2/16/2021-with Dr. Mariee  Future Office visit:    Next 5 appointments (look out 90 days)    Mar 15, 2021  8:30 AM  Pre-procedure Covid with [facility]  [facility] ([facility] ) [address] [place] [id number] [phone]

## 2021-03-09 ENCOUNTER — INFUSION THERAPY VISIT (OUTPATIENT)
Dept: INFUSION THERAPY | Facility: CLINIC | Age: 71
End: 2021-03-09
Attending: INTERNAL MEDICINE
Payer: MEDICARE

## 2021-03-09 VITALS
RESPIRATION RATE: 16 BRPM | OXYGEN SATURATION: 98 % | SYSTOLIC BLOOD PRESSURE: 159 MMHG | HEART RATE: 51 BPM | WEIGHT: 201.7 LBS | DIASTOLIC BLOOD PRESSURE: 84 MMHG | TEMPERATURE: 98.1 F | BODY MASS INDEX: 28.94 KG/M2

## 2021-03-09 DIAGNOSIS — M05.79 RHEUMATOID ARTHRITIS INVOLVING MULTIPLE SITES WITH POSITIVE RHEUMATOID FACTOR (H): Primary | ICD-10-CM

## 2021-03-09 PROCEDURE — 250N000013 HC RX MED GY IP 250 OP 250 PS 637: Performed by: INTERNAL MEDICINE

## 2021-03-09 PROCEDURE — 96375 TX/PRO/DX INJ NEW DRUG ADDON: CPT

## 2021-03-09 PROCEDURE — 96366 THER/PROPH/DIAG IV INF ADDON: CPT

## 2021-03-09 PROCEDURE — 96413 CHEMO IV INFUSION 1 HR: CPT

## 2021-03-09 PROCEDURE — 250N000011 HC RX IP 250 OP 636: Performed by: INTERNAL MEDICINE

## 2021-03-09 PROCEDURE — 258N000003 HC RX IP 258 OP 636: Performed by: INTERNAL MEDICINE

## 2021-03-09 PROCEDURE — 96365 THER/PROPH/DIAG IV INF INIT: CPT

## 2021-03-09 PROCEDURE — 96415 CHEMO IV INFUSION ADDL HR: CPT

## 2021-03-09 RX ORDER — HEPARIN SODIUM,PORCINE 10 UNIT/ML
5 VIAL (ML) INTRAVENOUS
Status: CANCELLED | OUTPATIENT
Start: 2021-03-09

## 2021-03-09 RX ORDER — METHYLPREDNISOLONE SODIUM SUCCINATE 125 MG/2ML
125 INJECTION, POWDER, LYOPHILIZED, FOR SOLUTION INTRAMUSCULAR; INTRAVENOUS ONCE
Status: COMPLETED | OUTPATIENT
Start: 2021-03-09 | End: 2021-03-09

## 2021-03-09 RX ORDER — ACETAMINOPHEN 325 MG/1
650 TABLET ORAL ONCE
Status: COMPLETED | OUTPATIENT
Start: 2021-03-09 | End: 2021-03-09

## 2021-03-09 RX ORDER — DIPHENHYDRAMINE HCL 25 MG
50 CAPSULE ORAL ONCE
Status: CANCELLED
Start: 2021-03-09

## 2021-03-09 RX ORDER — DIPHENHYDRAMINE HCL 25 MG
50 CAPSULE ORAL ONCE
Status: COMPLETED | OUTPATIENT
Start: 2021-03-09 | End: 2021-03-09

## 2021-03-09 RX ORDER — METHYLPREDNISOLONE SODIUM SUCCINATE 125 MG/2ML
125 INJECTION, POWDER, LYOPHILIZED, FOR SOLUTION INTRAMUSCULAR; INTRAVENOUS ONCE
Status: CANCELLED
Start: 2021-03-09

## 2021-03-09 RX ORDER — HEPARIN SODIUM (PORCINE) LOCK FLUSH IV SOLN 100 UNIT/ML 100 UNIT/ML
5 SOLUTION INTRAVENOUS
Status: CANCELLED | OUTPATIENT
Start: 2021-03-09

## 2021-03-09 RX ORDER — ACETAMINOPHEN 325 MG/1
650 TABLET ORAL ONCE
Status: CANCELLED
Start: 2021-03-09

## 2021-03-09 RX ADMIN — SODIUM CHLORIDE 250 ML: 9 INJECTION, SOLUTION INTRAVENOUS at 08:52

## 2021-03-09 RX ADMIN — DIPHENHYDRAMINE HYDROCHLORIDE 50 MG: 25 CAPSULE ORAL at 08:41

## 2021-03-09 RX ADMIN — METHYLPREDNISOLONE SODIUM SUCCINATE 125 MG: 125 INJECTION, POWDER, FOR SOLUTION INTRAMUSCULAR; INTRAVENOUS at 08:52

## 2021-03-09 RX ADMIN — ACETAMINOPHEN 650 MG: 325 TABLET, FILM COATED ORAL at 08:41

## 2021-03-09 RX ADMIN — RITUXIMAB-ABBS 1000 MG: 10 INJECTION, SOLUTION INTRAVENOUS at 09:40

## 2021-03-09 ASSESSMENT — PAIN SCALES - GENERAL: PAINLEVEL: NO PAIN (0)

## 2021-03-09 NOTE — PROGRESS NOTES
"Infusion Nursing Note:  Camilo Baca presents today for Day 2 RapidTruxima.    Patient seen by provider today: No   present during visit today: Not Applicable.    Note: Pt tolerated last infusion well. States he has \"normal stiffness\". Denies pain. Shoulders had been stiff and aching and waking him up at night recently but has felt better with that in the last couple weeks.   Patient did not meet criteria for an asymptomatic covid-19 PCR test in infusion today. Patient declined the covid-19 test.    Intravenous Access:  Peripheral IV placed.    Treatment Conditions:  Biological Infusion Checklist:  ~~~ NOTE: If the patient answers yes to any of the questions below, hold the infusion and contact ordering provider or on-call provider.    1. Have you recently had an elevated temperature, fever, chills, productive cough, coughing for 3 weeks or longer or hemoptysis, abnormal vital signs, night sweats,  chest pain or have you noticed a decrease in your appetite, unexplained weight loss or fatigue? No  2. Do you have any open wounds or new incisions? No  3. Do you have any recent or upcoming hospitalizations, surgeries or dental procedures? No, will have outpatient laser eye surgery in the near future.Not scheduled yet.  4. Do you currently have or recently have had any signs of illness or infection or are you on any antibiotics? No  5. Have you had any new, sudden or worsening abdominal pain? No  6. Have you or anyone in your household received a live vaccination in the past 4 weeks? Please note:  No live vaccines while on biologic/chemotherapy until 6 months after the last treatment.  Patient can receive the flu vaccine (shot only) and the pneumovax.  It is optimal for the patient to get these vaccines mid cycle, but they can be given at any time as long as it is not on the day of the infusion. No  7. Have you recently been diagnosed with any new nervous system diseases (ie. Multiple sclerosis, Guillain " [place], seizures, neurological changes) or cancer diagnosis? No  8. Are you on any form of radiation or chemotherapy? No  9. Are you pregnant or breast feeding or do you have plans of pregnancy in the future? N/A  10. Have you been having any signs of worsening depression or suicidal ideations?  (benlysta only) N/A  11. Have there been any other new onset medical symptoms? No        Post Infusion Assessment:  Patient tolerated infusion without incident.  Blood return noted pre and post infusion.  Site patent and intact, free from redness, edema or discomfort.  No evidence of extravasations.  PIV access discontinued per protocol.    Biologic Infusion Post Education: Call the triage nurse at your clinic or seek medical attention if you have chills and/or temperature greater than or equal to 100.5, uncontrolled nausea/vomiting, diarrhea, constipation, dizziness, shortness of breath, chest pain, heart palpitations, weakness or any other new or concerning symptoms, questions or concerns.  You cannot have any live virus vaccines prior to or during treatment or up to 6 months post infusion.  If you have an upcoming surgery, medical procedure or dental procedure during treatment, this should be discussed with your ordering physician and your surgeon/dentist.  If you are having any concerning symptom, if you are unsure if you should get your next infusion or wish to speak to a provider before your next infusion, please call your care coordinator or triage nurse at your clinic to notify them so we can adequately serve you.       Discharge Plan:   Patient and/or family verbalized understanding of discharge instructions and all questions answered. Has phone visit with Dr. Gomes at the end of the month and will make upcoming appts as indicated at that time.   Patient discharged in stable condition accompanied by: self. Wife picking up at main entrance.  Departure Mode: Ambulatory.    Yana Burnham RN

## 2021-03-15 DIAGNOSIS — Z11.59 ENCOUNTER FOR SCREENING FOR OTHER VIRAL DISEASES: ICD-10-CM

## 2021-03-15 PROCEDURE — 87635 SARS-COV-2 COVID-19 AMP PRB: CPT | Performed by: SURGERY

## 2021-03-19 ENCOUNTER — ANESTHESIA (OUTPATIENT)
Dept: GASTROENTEROLOGY | Facility: CLINIC | Age: 71
End: 2021-03-19

## 2021-03-19 ENCOUNTER — HOSPITAL ENCOUNTER (OUTPATIENT)
Facility: CLINIC | Age: 71
Discharge: HOME | End: 2021-03-19
Attending: SURGERY | Admitting: SURGERY
Payer: MEDICARE

## 2021-03-19 VITALS
RESPIRATION RATE: 16 BRPM | BODY MASS INDEX: 28.77 KG/M2 | OXYGEN SATURATION: 93 % | SYSTOLIC BLOOD PRESSURE: 155 MMHG | HEART RATE: 52 BPM | DIASTOLIC BLOOD PRESSURE: 135 MMHG | WEIGHT: 201 LBS | HEIGHT: 70 IN

## 2021-03-19 LAB — COLONOSCOPY: NORMAL

## 2021-03-19 PROCEDURE — 99153 MOD SED SAME PHYS/QHP EA: CPT

## 2021-03-19 PROCEDURE — G0105 COLORECTAL SCRN; HI RISK IND: HCPCS | Performed by: SURGERY

## 2021-03-19 PROCEDURE — 250N000011 HC RX IP 250 OP 636: Performed by: SURGERY

## 2021-03-19 PROCEDURE — G0500 MOD SEDAT ENDO SERVICE >5YRS: HCPCS | Performed by: SURGERY

## 2021-03-19 PROCEDURE — 45378 DIAGNOSTIC COLONOSCOPY: CPT | Performed by: SURGERY

## 2021-03-19 RX ORDER — LIDOCAINE 40 MG/G
CREAM TOPICAL
Status: DISCONTINUED | OUTPATIENT
Start: 2021-03-19 | End: 2021-03-19 | Stop reason: HOSPADM

## 2021-03-19 RX ORDER — FENTANYL CITRATE 50 UG/ML
INJECTION, SOLUTION INTRAMUSCULAR; INTRAVENOUS PRN
Status: DISCONTINUED | OUTPATIENT
Start: 2021-03-19 | End: 2021-03-19 | Stop reason: HOSPADM

## 2021-03-19 ASSESSMENT — MIFFLIN-ST. JEOR: SCORE: 1687.98

## 2021-03-19 NOTE — DISCHARGE INSTRUCTIONS
[facility]    Home Care Following Endoscopy          Activity:    You have just undergone an endoscopic procedure usually performed with conscious sedation.  Do not work or operate machinery (including a car) for at least 12 hours.      I encourage you to walk and attempt to pass this air as soon as possible.    Diet:    Return to the diet you were on before your procedure but eat lightly for the first 12-24 hours.    Drink plenty of water.    Resume any regular medications unless otherwise advised by your physician.  Please begin any new medication prescribed as a result of your procedure as directed by your physician.     If you had any biopsy or polyp removed please refrain from aspirin or aspirin products for 2 days.  If on Coumadin please restart as instructed by your physician.   Pain:    You may take Tylenol as needed for pain.  Expected Recovery:    You can expect some mild abdominal fullness and/or discomfort due to the air used to inflate your intestinal tract. It is also normal to have a mild sore throat after upper endoscopy.    Call Your Physician if You Have:    After Upper Endoscopy:  o Shoulder, back or chest pain.  o Difficulty breathing or swallowing.  o Vomiting blood.    After Colonoscopy:  o Worsening persisting abdominal pain which is worse with activity.  o Fevers (>101 degrees F), chills or shakes.  o Passage of continued blood with bowel movements.   Any questions or concerns about your recovery, please call [phone] or after hours [phone] Nurse Advice Line.    Follow-up Care:    You should receive a call or letter with your results within 1 week. Please call if you have not received a notification of your results.  If asked to return to clinic please make an appointment 1 week after your procedure.  Call [phone].

## 2021-03-22 DIAGNOSIS — E78.5 HYPERLIPIDEMIA LDL GOAL <130: ICD-10-CM

## 2021-03-22 RX ORDER — ATORVASTATIN CALCIUM 80 MG/1
80 TABLET, FILM COATED ORAL DAILY
Qty: 90 TABLET | Refills: 1 | Status: SHIPPED | OUTPATIENT
Start: 2021-03-22 | End: 2021-07-14

## 2021-03-22 NOTE — TELEPHONE ENCOUNTER
Prescription approved per [facility] Refill Protocol.    Radha Nesbitt RN

## 2021-03-29 DIAGNOSIS — I15.9 SECONDARY HYPERTENSION WITH GOAL BLOOD PRESSURE LESS THAN 140/90: ICD-10-CM

## 2021-03-29 DIAGNOSIS — I15.9 SECONDARY HYPERTENSION WITH GOAL BLOOD PRESSURE LESS THAN 140/90: Primary | ICD-10-CM

## 2021-03-29 LAB — POTASSIUM SERPL-SCNC: 3.5 MMOL/L (ref 3.4–5.3)

## 2021-03-29 PROCEDURE — 36415 COLL VENOUS BLD VENIPUNCTURE: CPT | Performed by: FAMILY MEDICINE

## 2021-03-29 PROCEDURE — 84132 ASSAY OF SERUM POTASSIUM: CPT | Performed by: FAMILY MEDICINE

## 2021-03-30 ENCOUNTER — VIRTUAL VISIT (OUTPATIENT)
Dept: RHEUMATOLOGY | Facility: CLINIC | Age: 71
End: 2021-03-30
Payer: COMMERCIAL

## 2021-03-30 ENCOUNTER — ANESTHESIA EVENT (OUTPATIENT)
Dept: GASTROENTEROLOGY | Facility: CLINIC | Age: 71
End: 2021-03-30

## 2021-03-30 DIAGNOSIS — Z79.899 HIGH RISK MEDICATIONS (NOT ANTICOAGULANTS) LONG-TERM USE: ICD-10-CM

## 2021-03-30 DIAGNOSIS — Z11.59 ENCOUNTER FOR SCREENING FOR OTHER VIRAL DISEASES: ICD-10-CM

## 2021-03-30 DIAGNOSIS — M05.9 SEROPOSITIVE RHEUMATOID ARTHRITIS (H): Primary | ICD-10-CM

## 2021-03-30 PROCEDURE — 99214 OFFICE O/P EST MOD 30 MIN: CPT | Mod: 95 | Performed by: INTERNAL MEDICINE

## 2021-03-30 RX ORDER — METHYLPREDNISOLONE SODIUM SUCCINATE 125 MG/2ML
125 INJECTION, POWDER, LYOPHILIZED, FOR SOLUTION INTRAMUSCULAR; INTRAVENOUS ONCE
Status: CANCELLED
Start: 2021-07-23

## 2021-03-30 RX ORDER — HEPARIN SODIUM (PORCINE) LOCK FLUSH IV SOLN 100 UNIT/ML 100 UNIT/ML
5 SOLUTION INTRAVENOUS
Status: CANCELLED | OUTPATIENT
Start: 2021-07-23

## 2021-03-30 RX ORDER — DIPHENHYDRAMINE HCL 25 MG
50 CAPSULE ORAL ONCE
Status: CANCELLED
Start: 2021-07-23

## 2021-03-30 RX ORDER — HEPARIN SODIUM,PORCINE 10 UNIT/ML
5 VIAL (ML) INTRAVENOUS
Status: CANCELLED | OUTPATIENT
Start: 2021-07-23

## 2021-03-30 RX ORDER — ACETAMINOPHEN 325 MG/1
650 TABLET ORAL ONCE
Status: CANCELLED
Start: 2021-07-23

## 2021-03-30 NOTE — PROGRESS NOTES
Camilo Baca is a 70 year old year old male who is being evaluated via a billable telephone visit.      What phone number would you like to be contacted at? [phone]  How would you like to obtain your AVS? Mail a copy    Rheumatology Telephone/Telehealth Visit      Camilo Baca MRN# [id number]   YOB: 1950 Age: 70 year old      Date of visit: 3/30/21   PCP: Lyndsey Aponte    Chief Complaint   Patient presents with:  Arthritis: RA    Assessment and Plan     1.  Seropositive rheumatoid arthritis (, CCP >250): Dx'd 2013. Previously followed by [facility] Wol[facility] and [facility].  Established with me on 3/20/2018. Previously on MTX (ineffective as monotherapy), Humira (effective, stopped because of insurance preference for IV), Simponi (rash on cheeks/nose after infusion), Remicade (lost efficacy), HCQ (facial rash), Orencia (partially effective), SSZ (cytopenia).  Currently on Rituximab (received 3/10/2020 & 3/24/2020, 9/22/2020 & 10/6/2020, 2/23/2021 & 3/9/2021). Significant improvement with rituximab; hasn't required prednisone or NSAIDs.  Very happy with how well he is doing.  Continue rituximab every 5 months.  Chronic illness, stable.    - Rituximab 1gram IV every 2 weeks for a total of 2 doses; repeat this cycle every 5 months, next due in July 2021   - PRN RA flare: Prednisone 20mg daily x2days, then 15mg daily x2days, then 10mg daily x2days, then 5mg daily x2days, then stop.    - Currently using ibuprofen PRN OA symptoms; advised trying tylenol PRN instead  - Labs within the next 3 months: CBC, Creatinine, Hepatic Panel, ESR, CRP, hepatitis B core antibody, hepatitis B surface antigen, QuantiFERON-TB gold plus              Rapid 3, cumulative scores                       6/2/2020: doing well; virtual visit (Rituximab in March 2020; SSZ 500mg BID)                      03/03/2020: 9.8  (Orencia IV, SSZ 500mg BID)                      11/19/2019: 2.8  (Orencia IV, SSZ 500mg BID)                       08/20/2019: 4     (Orencia 750mg IV)                      04/23/2019: 0     (Orencia 750mg IV)                       01/08/2019: 0.5  (Simponi IV)                      11/06/2018: 9.3  (Simponi IV x2mo)    2.  Impingement syndrome of both shoulders: injected 10/11/2019; and referred to PT.  Significant improvement; now with full ROM.  Continues to do his home PT exercises.     3. History of mild plantar fasciitis, bilaterally: resolved with stretching and changing shoes.  Not an issue today.    # s/p 2 doses of the Moderna COVID-19 vaccine    # This is a virtual visit to reduce the risk of COVID-19 exposure during this current pandemic.      Total minutes spent in evaluation with patient, documentation, , and review of pertinent studies and chart notes: 15.  Prescription drug management, 3+ lab tests ordered, chronic stable illness       Mr. Baca verbalized agreement with and understanding of the rational for the diagnosis and treatment plan.  All questions were answered to best of my ability and the patient's satisfaction. Mr. Baca was advised to contact the clinic with any questions that may arise after the clinic visit.      Thank you for involving me in the care of the patient    Return to clinic: 3 months    HPI   Camilo Baca is a 70 year old male with a past medical history significant for hypertension, hyperlipidemia, GERD, hypothyroidism, history of DVT, factor V Leiden mutation, history of thrombocytopenia, gout, and rheumatoid arthritis who presents for follow-up of rheumatoid arthritis.     Today, 3/30/2021: Doing well at this time.  No joint pain or swelling.  No morning stiffness or gelling phenomenon.  Tolerating rituximab infusions well.  Arthritis is not preventing him from doing any of his daily activities.  Says that he feels like he is 67 years old again    Denies fevers, chills, nausea, vomiting, constipation, diarrhea. No abdominal pain. No chest pain/pressure, palpitations, or  shortness of breath. No LE swelling. No neck pain. No oral or nasal sores.  No rash. No sicca symptoms.      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    ROS   12 point review of system was completed and negative except as noted in the HPI     Active Problem List     Patient Active Problem List   Diagnosis     Lumbago     NONSPECIFIC MEDICAL HISTORY     HYPERLIPIDEMIA LDL GOAL <130     History of adenomatous polyp of colon     Idiopathic chronic gout without tophus, unspecified site     Advanced directives, counseling/discussion     Seropositive rheumatoid arthritis (H)     High risk medication use     Thrombocytopenia (H)     Gastroesophageal reflux disease without esophagitis     Factor 5 Leiden mutation, heterozygous (H)     Personal history of venous thrombosis and embolism     Personal history of DVT (deep vein thrombosis)     Hypothyroidism due to acquired atrophy of thyroid     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)     Secondary hypertension with goal blood pressure less than 140/90     Rheumatoid arthritis involving multiple sites with positive rheumatoid factor (H)     Past Medical History     Past Medical History:   Diagnosis Date     Alopecia, unspecified      Closed fracture of unspecified part of humerus 1974    Arm fracture / left wrist     Esophageal reflux 2/6/2015     Factor 5 Leiden mutation, heterozygous (H)      Gout 3/28/2011     Herpes zoster without mention of complication 1965    Herpes zoster     Measles without mention of complication     Measles     Personal history of DVT (deep vein thrombosis) 4/9/2015     Personal history of venous thrombosis and embolism 4/9/2015     Pulmonary embolism (H) 12/1996    post appendectomy     RA (rheumatoid arthritis) (H) 3/11/2013     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      Rheumatoid arthritis(714.0)     Beginning symptoms     Secondary hypertension with goal blood pressure less than 140/90 11/15/2016     Thrombocytopenia, unspecified (H) 1/29/2014     Varicella without mention of complication     Chickenpox     Past Surgical History     Past Surgical History:   Procedure Laterality Date     C APPENDECTOMY  1996     COLONOSCOPY  11/15/2010    COLONOSCOPY performed by DARIUS MESA at  GI     COLONOSCOPY N/A 11/2/2015    Procedure: COLONOSCOPY;  Surgeon: Bubba Odom MD;  Location:  GI     COLONOSCOPY N/A 3/19/2021    Procedure: Colonoscopy;  Surgeon: Ludmila Beltran MD;  Location:  GI     ESOPHAGOSCOPY, GASTROSCOPY, DUODENOSCOPY (EGD), COMBINED N/A 10/24/2018    Procedure: Esophagogastroduodenoscopy Multiple Biopsies by Biopsy;  Surgeon: Matteo Johnson DO;  Location:  GI     HC COLONOSCOPY W BIOPSY  08/10/05     HC REPAIR ING HERNIA,5+Y/O,REDUCIBL  1960     HERNIORRHAPHY UMBILICAL N/A 4/17/2015    Procedure: HERNIORRHAPHY UMBILICAL;  Surgeon: Rayray Avila MD;  Location: [facility] OR     LAPAROSCOPIC HERNIORRHAPHY INGUINAL BILATERAL Bilateral 4/17/2015    Procedure: LAPAROSCOPIC HERNIORRHAPHY INGUINAL BILATERAL;  Surgeon: Rayray Avila MD;  Location: [facility] OR     Allergy     Allergies   Allergen Reactions     Lisinopril Cough     Plaquenil [Hydroxychloroquine] Hives     Current Medication List     Current Outpatient Medications   Medication Sig     allopurinol (ZYLOPRIM) 300 MG tablet TAKE 1 TABLET BY MOUTH EVERY DAY     atorvastatin (LIPITOR) 80 MG tablet Take 1 tablet (80 mg) by mouth daily     cholestyramine light (PREVALITE) 4 GM packet DISSOLVE ONE PACKET IN LIQUID AND DRINK BY MOUTH TWICE A DAY AS DIRECTED     Cyanocobalamin (B-12) 100 MCG TABS      diclofenac (VOLTAREN) 1 % GEL Apply  2 grams to shoulders three times daily using enclosed dosing card.     levothyroxine (SYNTHROID/LEVOTHROID) 25 MCG tablet TAKE 1 TABLET BY MOUTH EVERY  "DAY     losartan-hydrochlorothiazide (HYZAAR) 100-12.5 MG tablet Take 1 tablet by mouth daily     omeprazole (PRILOSEC) 20 MG DR capsule Take 1 capsule (20 mg) by mouth daily     sildenafil (VIAGRA) 100 MG tablet 1/2 tab 1/2 hour prior to activity     triamcinolone (KENALOG) 0.1 % external ointment Apply thin layer twice daily to affected areas as needed.     VITAMIN D PO      cetirizine (ZYRTEC) 10 MG tablet Take 1 tablet (10 mg) by mouth every evening (Patient not taking: Reported on 1/19/2021)     No current facility-administered medications for this visit.          Social History   See HPI    Family History     Family History   Problem Relation Age of Onset     Arthritis Mother      Cardiovascular Mother      Depression Mother      Gastrointestinal Disease Mother         IBS     Hypertension Mother      Circulatory Mother         Aortal aneurysm     Osteoporosis Mother      Allergies Father         Cortisone     Cardiovascular Father      Circulatory Father      Diabetes Father      Hypertension Father      Lipids Father      Alzheimer Disease Paternal Grandfather      Blood Disease Paternal Grandmother         Clotting problems     Blood Disease Son         Factor 5     Hypertension Maternal Aunt      Cerebrovascular Disease Maternal Grandmother        Physical Exam     Temp Readings from Last 3 Encounters:   03/09/21 98.1  F (36.7  C) (Oral)   02/23/21 98  F (36.7  C) (Oral)   02/16/21 96.3  F (35.7  C) (Tympanic)     BP Readings from Last 5 Encounters:   03/19/21 (!) 155/135   03/09/21 (!) 159/84   02/23/21 128/80   02/16/21 (!) 148/84   01/19/21 (!) 148/86     Pulse Readings from Last 1 Encounters:   03/19/21 52     Resp Readings from Last 1 Encounters:   03/19/21 16     Estimated body mass index is 28.84 kg/m  as calculated from the following:    Height as of 3/19/21: 1.778 m (5' 10\").    Weight as of 3/19/21: 91.2 kg (201 lb).    GEN: alert and no distress  PSYCH: Alert; coherent speech, normal rate and " volume, able to articulate logical thoughts, able   to abstract reason, no tangential thoughts. Normal affect.   RESP: No cough, no audible wheezing, able to talk in full sentences  Remainder of exam unable to be completed due to telephone visits      Labs / Imaging (select studies)   RF/CCP  Recent Labs   Lab Test 04/24/13  0923   CCPABY >250 Strongly Positive*   *     CBC  Recent Labs   Lab Test 08/27/20  0824 05/28/20  0927 02/10/20  0922 10/15/19  0827 10/15/19  0827 09/17/19  0829   WBC 3.5* 3.6* 4.1   < > 4.8 5.2   RBC 4.21* 4.29* 4.21*   < > 4.26* 4.42   HGB 13.8 13.9 13.7   < > 13.7 14.2   HCT 41.4 41.7 40.6   < > 41.1 42.7   MCV 98 97 96   < > 97 97   RDW 13.0 13.2 12.6   < > 13.5 13.1   * 117* 105*   < > 144* 133*   MCH 32.8 32.4 32.5   < > 32.2 32.1   MCHC 33.3 33.3 33.7   < > 33.3 33.3   NEUTROPHIL 61.9 59.2 54.9   < > 60.4 65.4   LYMPH 19.5 19.6 29.0   < > 24.4 21.9   MONOCYTE 13.0 14.9 13.5   < > 14.0 10.7   EOSINOPHIL 4.5 5.2 2.4   < > 0.8 1.0   BASOPHIL 0.8 0.8 0.2   < > 0.4 1.0   ANEU 2.2 2.2 2.3   < > 2.9 3.4   ALYM 0.7* 0.7* 1.2   < > 1.2 1.1   KIESHA 0.5 0.5 0.6   < > 0.7 0.6   AEOS  --   --  0.1  --  0.0 0.1   ABAS 0.0 0.0 0.0   < > 0.0 0.1    < > = values in this interval not displayed.     CMP  Recent Labs   Lab Test 08/27/20  0824 05/28/20  0927 02/10/20  0922 11/06/18  0913 11/06/18  0913 04/05/18  2355 04/05/18  2355 12/22/17  0735 12/22/17 0735   NA  --   --   --   --  141  --  140  --  141   POTASSIUM  --   --   --   --  4.2  --  3.4  --  4.2   CHLORIDE  --   --   --   --  105  --  104  --  107   CO2  --   --   --   --  33*  --  26  --  27   ANIONGAP  --   --   --   --  3  --  10  --  7   GLC  --   --   --   --  107*  --  124*  --  121*   BUN  --   --   --   --  15  --  32*  --  17   CR 0.83 0.86 0.85   < > 0.83   < > 0.71   < > 0.85   GFRESTIMATED >90 89 90   < > >90   < > >90   < > >90   GFRESTBLACK >90 >90 >90   < > >90   < > >90   < > >90   ANDREZ  --   --   --   --  8.8  --   7.9*  --  8.9   BILITOTAL 1.2 0.8 1.2   < > 0.6   < >  --    < > 1.5*   ALBUMIN 3.6 3.6 3.5   < > 3.1*   < >  --    < > 3.7   PROTTOTAL 7.2 7.3 7.1   < > 7.4   < >  --    < > 8.4   ALKPHOS 71 68 67   < > 60   < >  --    < > 90   AST 30 23 23   < > 41   < >  --    < > 25   ALT 47 34 38   < > 82*   < >  --    < > 49    < > = values in this interval not displayed.     Calcium/VitaminD  Recent Labs   Lab Test 11/06/18  0913 06/26/18  0955 04/05/18  2355 12/22/17  0735   ANDREZ 8.8  --  7.9* 8.9   VITDT  --  25  --   --      ESR/CRP  Recent Labs   Lab Test 08/27/20  0824 05/28/20  0927 02/10/20  0922   SED 9 15 18   CRP <2.9 <2.9 <2.9     Lipid Panel  Recent Labs   Lab Test 12/08/20  0911 09/17/19  0827 12/22/17  0735 10/04/13  0819 10/04/13  0819 06/03/13  0837   CHOL 155 138 134   < > 143 208*   TRIG 129 85 149   < > 92 72   HDL 55 38* 46   < > 49 77   LDL 74 83 58   < > 75 117   VLDL  --   --   --   --  18 14   CHOLHDLRATIO  --   --   --   --  3.0 3.0   NHDL 100 100 88   < >  --   --     < > = values in this interval not displayed.     Hepatitis B  Recent Labs   Lab Test 03/03/20  1405 03/20/18  1008 02/05/14  1104   HBCAB Nonreactive Nonreactive  --    HEPBANG Nonreactive Nonreactive Negative     Hepatitis C  Recent Labs   Lab Test 02/05/14  1104 06/03/13  0837   HCVAB Negative Negative     Tuberculosis Screening  Recent Labs   Lab Test 03/03/20  1405 03/20/18  1008 02/05/14  1104   TBRES Negative  --   --    TBRSLT  --  Negative Negative   TBAGN  --  0.14 0.01       Immunization History     Immunization History   Administered Date(s) Administered     HepB, Unspecified 08/19/1991, 09/23/1991, 02/24/1992     Influenza (High Dose) 3 valent vaccine 10/06/2017, 10/03/2018, 10/23/2019     Influenza (IIV3) PF 10/01/2009, 10/27/2010, 10/11/2011     Influenza Vaccine IM > 6 months Valent IIV4 10/09/2013, 10/23/2014, 09/18/2015, 11/15/2016     Influenza, Quad, High Dose, Pf, 65yr + 09/02/2020     Pneumo Conj 13-V (2010&after)  10/06/2017     Pneumococcal 23 valent 10/23/2014, 10/23/2019     TD (ADULT, 7+) 09/05/2001     TDAP Vaccine (Boostrix) 10/18/2012     Zoster vaccine recombinant adjuvanted (SHINGRIX) 01/31/2019, 04/18/2019     Zoster vaccine, live 10/23/2014          Chart documentation done in part with Dragon Voice recognition Software. Although reviewed after completion, some word and grammatical error may remain.    Phone call duration with patient (in minutes): 7    Location of patient: [place], [place]   Location of provider: gregorio Gomes MD

## 2021-03-30 NOTE — PATIENT INSTRUCTIONS
RHEUMATOLOGY    Dr. Fabricio Gomes    [facility]  [address] [place] [place] [address]    Our new phone number for Rheumatology is [phone], this number will be able to help you schedule appointments for Dr. Gomes or if you have any message you would like sent to us.    Thank you for choosing [facility]!    Jaquelin Muñiz [facility]

## 2021-05-03 DIAGNOSIS — M10.9 ACUTE GOUTY ARTHROPATHY: ICD-10-CM

## 2021-05-03 RX ORDER — ALLOPURINOL 300 MG/1
TABLET ORAL
Qty: 90 TABLET | Refills: 3 | Status: SHIPPED | OUTPATIENT
Start: 2021-05-03 | End: 2022-01-26

## 2021-05-03 NOTE — TELEPHONE ENCOUNTER
Routing refill request to provider for review/approval because:  Labs out of range:  Uric Acid.     Radha Nesbitt Rn

## 2021-05-20 ENCOUNTER — TRANSFERRED RECORDS (OUTPATIENT)
Dept: HEALTH INFORMATION MANAGEMENT | Facility: CLINIC | Age: 71
End: 2021-05-20

## 2021-06-25 DIAGNOSIS — Z79.899 HIGH RISK MEDICATIONS (NOT ANTICOAGULANTS) LONG-TERM USE: ICD-10-CM

## 2021-06-25 DIAGNOSIS — I15.9 SECONDARY HYPERTENSION WITH GOAL BLOOD PRESSURE LESS THAN 140/90: ICD-10-CM

## 2021-06-25 DIAGNOSIS — M05.9 SEROPOSITIVE RHEUMATOID ARTHRITIS (H): ICD-10-CM

## 2021-06-25 DIAGNOSIS — Z11.59 ENCOUNTER FOR SCREENING FOR OTHER VIRAL DISEASES: ICD-10-CM

## 2021-06-25 DIAGNOSIS — Z79.899 HIGH RISK MEDICATIONS (NOT ANTICOAGULANTS) LONG-TERM USE: Primary | ICD-10-CM

## 2021-06-25 DIAGNOSIS — R79.89 LFT ELEVATION: ICD-10-CM

## 2021-06-25 LAB
ALBUMIN SERPL-MCNC: 3.8 G/DL (ref 3.4–5)
ALP SERPL-CCNC: 63 U/L (ref 40–150)
ALT SERPL W P-5'-P-CCNC: 119 U/L (ref 0–70)
AST SERPL W P-5'-P-CCNC: 49 U/L (ref 0–45)
BASOPHILS # BLD AUTO: 0 10E9/L (ref 0–0.2)
BASOPHILS NFR BLD AUTO: 0.5 %
BILIRUB DIRECT SERPL-MCNC: 0.2 MG/DL (ref 0–0.2)
BILIRUB SERPL-MCNC: 1 MG/DL (ref 0.2–1.3)
CREAT SERPL-MCNC: 0.96 MG/DL (ref 0.66–1.25)
CRP SERPL-MCNC: <2.9 MG/L (ref 0–8)
DIFFERENTIAL METHOD BLD: ABNORMAL
EOSINOPHIL # BLD AUTO: 0.2 10E9/L (ref 0–0.7)
EOSINOPHIL NFR BLD AUTO: 3.6 %
ERYTHROCYTE [DISTWIDTH] IN BLOOD BY AUTOMATED COUNT: 13 % (ref 10–15)
ERYTHROCYTE [SEDIMENTATION RATE] IN BLOOD BY WESTERGREN METHOD: 10 MM/H (ref 0–20)
GFR SERPL CREATININE-BSD FRML MDRD: 80 ML/MIN/{1.73_M2}
HBV CORE AB SERPL QL IA: NONREACTIVE
HBV SURFACE AG SERPL QL IA: NONREACTIVE
HCT VFR BLD AUTO: 41.2 % (ref 40–53)
HGB BLD-MCNC: 14.3 G/DL (ref 13.3–17.7)
IMM GRANULOCYTES # BLD: 0 10E9/L (ref 0–0.4)
IMM GRANULOCYTES NFR BLD: 0.2 %
LYMPHOCYTES # BLD AUTO: 0.7 10E9/L (ref 0.8–5.3)
LYMPHOCYTES NFR BLD AUTO: 17.3 %
MCH RBC QN AUTO: 33.3 PG (ref 26.5–33)
MCHC RBC AUTO-ENTMCNC: 34.7 G/DL (ref 31.5–36.5)
MCV RBC AUTO: 96 FL (ref 78–100)
MONOCYTES # BLD AUTO: 0.5 10E9/L (ref 0–1.3)
MONOCYTES NFR BLD AUTO: 12.7 %
NEUTROPHILS # BLD AUTO: 2.7 10E9/L (ref 1.6–8.3)
NEUTROPHILS NFR BLD AUTO: 65.7 %
NRBC # BLD AUTO: 0 10*3/UL
NRBC BLD AUTO-RTO: 0 /100
PLATELET # BLD AUTO: 129 10E9/L (ref 150–450)
POTASSIUM SERPL-SCNC: 4.2 MMOL/L (ref 3.4–5.3)
PROT SERPL-MCNC: 7 G/DL (ref 6.8–8.8)
RBC # BLD AUTO: 4.29 10E12/L (ref 4.4–5.9)
WBC # BLD AUTO: 4.1 10E9/L (ref 4–11)

## 2021-06-25 PROCEDURE — 85652 RBC SED RATE AUTOMATED: CPT | Performed by: INTERNAL MEDICINE

## 2021-06-25 PROCEDURE — 86704 HEP B CORE ANTIBODY TOTAL: CPT | Performed by: INTERNAL MEDICINE

## 2021-06-25 PROCEDURE — 85025 COMPLETE CBC W/AUTO DIFF WBC: CPT | Performed by: INTERNAL MEDICINE

## 2021-06-25 PROCEDURE — 84132 ASSAY OF SERUM POTASSIUM: CPT | Performed by: INTERNAL MEDICINE

## 2021-06-25 PROCEDURE — 80076 HEPATIC FUNCTION PANEL: CPT | Performed by: INTERNAL MEDICINE

## 2021-06-25 PROCEDURE — 82565 ASSAY OF CREATININE: CPT | Performed by: INTERNAL MEDICINE

## 2021-06-25 PROCEDURE — 87340 HEPATITIS B SURFACE AG IA: CPT | Performed by: INTERNAL MEDICINE

## 2021-06-25 PROCEDURE — 86140 C-REACTIVE PROTEIN: CPT | Performed by: INTERNAL MEDICINE

## 2021-06-25 PROCEDURE — 36415 COLL VENOUS BLD VENIPUNCTURE: CPT | Performed by: INTERNAL MEDICINE

## 2021-06-25 PROCEDURE — 86481 TB AG RESPONSE T-CELL SUSP: CPT | Performed by: INTERNAL MEDICINE

## 2021-06-29 LAB
GAMMA INTERFERON BACKGROUND BLD IA-ACNC: 0 IU/ML
M TB IFN-G CD4+ BCKGRND COR BLD-ACNC: 9.48 IU/ML
M TB TUBERC IFN-G BLD QL: NEGATIVE
MITOGEN IGNF BCKGRD COR BLD-ACNC: 0 IU/ML
MITOGEN IGNF BCKGRD COR BLD-ACNC: 0.04 IU/ML

## 2021-06-30 ENCOUNTER — VIRTUAL VISIT (OUTPATIENT)
Dept: RHEUMATOLOGY | Facility: CLINIC | Age: 71
End: 2021-06-30
Payer: COMMERCIAL

## 2021-06-30 DIAGNOSIS — M05.9 SEROPOSITIVE RHEUMATOID ARTHRITIS (H): Primary | ICD-10-CM

## 2021-06-30 PROCEDURE — 99213 OFFICE O/P EST LOW 20 MIN: CPT | Mod: 95 | Performed by: INTERNAL MEDICINE

## 2021-06-30 NOTE — PROGRESS NOTES
Camilo is a 70 year old who is being evaluated via a billable video visit.      How would you like to obtain your AVS? Mail  If the video visit is dropped, the invitation should be resent by: Text to cell phone: [phone]  Will anyone else be joining your video visit? Yes: Wife Jacki. How would they like to receive their invitation? Text to cell phone: [phone]      Rheumatology Video Visit      Camilo Baca MRN# [id number]   YOB: 1950 Age: 70 year old      Date of visit: 6/30/21   PCP: Lyndsey Aponte    Chief Complaint   Patient presents with:  RECHECK: RA; overall doing very well    Assessment and Plan     1.  Seropositive rheumatoid arthritis (, CCP >250): Dx'd 2013. Previously followed by [facility] and [facility].  Established with me on 3/20/2018. Previously on MTX (ineffective as monotherapy), Humira (effective, stopped because of insurance preference for IV), Simponi (rash on cheeks/nose after infusion), Remicade (lost efficacy), HCQ (facial rash), Orencia (partially effective), SSZ (cytopenia).  Currently on Rituximab (received 3/10/2020 & 3/24/2020, 9/22/2020 & 10/6/2020, 2/23/2021 & 3/9/2021). Significant improvement with rituximab; hasn't required prednisone or NSAIDs.  Very happy with how well he is doing.  Continue rituximab every 5 months.  Chronic illness, stable.    - Rituximab 1gram IV every 2 weeks for a total of 2 doses; repeat this cycle every 5 months, next due in late July/early August  - PRN RA flare: Prednisone 20mg daily x2days, then 15mg daily x2days, then 10mg daily x2days, then 5mg daily x2days, then stop.    - Currently using ibuprofen PRN OA symptoms; advised trying tylenol PRN instead              Rapid 3, cumulative scores                       6/2/2020: doing well; virtual visit (Rituximab in March 2020; SSZ 500mg BID)                      03/03/2020: 9.8  (Orencia IV, SSZ 500mg BID)                      11/19/2019: 2.8  (Orencia IV, SSZ 500mg BID)                       08/20/2019: 4     (Orencia 750mg IV)                      04/23/2019: 0     (Orencia 750mg IV)                       01/08/2019: 0.5  (Simponi IV)                      11/06/2018: 9.3  (Simponi IV x2mo)    2.  Impingement syndrome of both shoulders: injected 10/11/2019; and referred to PT.  Significant improvement; now with full ROM.  Continues to do his home PT exercises.     3. History of mild plantar fasciitis, bilaterally: resolved with stretching and changing shoes.  Not an issue today.    4.  LFT elevation: No Tylenol or NSAID use.  No alcohol use.  No conventional DMARD use.  Unclear etiology.  Recheck LFTs.  - Lab within the next 1 week: AST, ALT    # s/p 2 doses of the Moderna COVID-19 vaccine      Total minutes spent in evaluation with patient, documentation, , and review of pertinent studies and chart notes: 9       Mr. Baca verbalized agreement with and understanding of the rational for the diagnosis and treatment plan.  All questions were answered to best of my ability and the patient's satisfaction. Mr. Baca was advised to contact the clinic with any questions that may arise after the clinic visit.      Thank you for involving me in the care of the patient    Return to clinic: 3 months    HPI   Camilo Baca is a 70 year old male with a past medical history significant for hypertension, hyperlipidemia, GERD, hypothyroidism, history of DVT, factor V Leiden mutation, history of thrombocytopenia, gout, and rheumatoid arthritis who presents for follow-up of rheumatoid arthritis.     Today, 6/30/2021: Doing well at this time.  No joint pain or swelling.  No morning stiffness or gelling phenomenon.  Tolerating rituximab infusions well.  Not using ibuprofen or Tylenol.  Not consuming alcohol.    Denies fevers, chills, nausea, vomiting, constipation, diarrhea. No abdominal pain. No chest pain/pressure, palpitations, or shortness of breath. No LE swelling. No neck pain. No oral or nasal  sores.  No rash. No sicca symptoms.      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    ROS   12 point review of system was completed and negative except as noted in the HPI     Active Problem List     Patient Active Problem List   Diagnosis     Lumbago     NONSPECIFIC MEDICAL HISTORY     HYPERLIPIDEMIA LDL GOAL <130     History of adenomatous polyp of colon     Idiopathic chronic gout without tophus, unspecified site     Advanced directives, counseling/discussion     Seropositive rheumatoid arthritis (H)     High risk medication use     Thrombocytopenia (H)     Gastroesophageal reflux disease without esophagitis     Factor 5 Leiden mutation, heterozygous (H)     Personal history of venous thrombosis and embolism     Personal history of DVT (deep vein thrombosis)     Hypothyroidism due to acquired atrophy of thyroid     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)     Secondary hypertension with goal blood pressure less than 140/90     Rheumatoid arthritis involving multiple sites with positive rheumatoid factor (H)     Past Medical History     Past Medical History:   Diagnosis Date     Alopecia, unspecified      Closed fracture of unspecified part of humerus 1974    Arm fracture / left wrist     Esophageal reflux 2/6/2015     Factor 5 Leiden mutation, heterozygous (H)      Gout 3/28/2011     Herpes zoster without mention of complication 1965    Herpes zoster     Measles without mention of complication     Measles     Personal history of DVT (deep vein thrombosis) 4/9/2015     Personal history of venous thrombosis and embolism 4/9/2015     Pulmonary embolism (H) 12/1996    post appendectomy     RA (rheumatoid arthritis) (H) 3/11/2013     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     Rheumatoid arthritis(714.0)     Beginning  symptoms     Secondary hypertension with goal blood pressure less than 140/90 11/15/2016     Thrombocytopenia, unspecified (H) 1/29/2014     Varicella without mention of complication     Chickenpox     Past Surgical History     Past Surgical History:   Procedure Laterality Date     C APPENDECTOMY  1996     COLONOSCOPY  11/15/2010    COLONOSCOPY performed by DARIUS MESA at  GI     COLONOSCOPY N/A 11/2/2015    Procedure: COLONOSCOPY;  Surgeon: Bubba Odom MD;  Location:  [facility]     COLONOSCOPY N/A 3/19/2021    Procedure: Colonoscopy;  Surgeon: Ludmila Beltran MD;  Location:  GI     ESOPHAGOSCOPY, GASTROSCOPY, DUODENOSCOPY (EGD), COMBINED N/A 10/24/2018    Procedure: Esophagogastroduodenoscopy Multiple Biopsies by Biopsy;  Surgeon: Matteo Johnson DO;  Location:  GI     HC COLONOSCOPY W BIOPSY  08/10/05     HC REPAIR ING HERNIA,5+Y/O,REDUCIBL  1960     HERNIORRHAPHY UMBILICAL N/A 4/17/2015    Procedure: HERNIORRHAPHY UMBILICAL;  Surgeon: Rayray Avila MD;  Location: [facility]     LAPAROSCOPIC HERNIORRHAPHY INGUINAL BILATERAL Bilateral 4/17/2015    Procedure: LAPAROSCOPIC HERNIORRHAPHY INGUINAL BILATERAL;  Surgeon: Rayray Avila MD;  Location: [facility] OR     Allergy     Allergies   Allergen Reactions     Lisinopril Cough     Plaquenil [Hydroxychloroquine] Hives     Current Medication List     Current Outpatient Medications   Medication Sig     allopurinol (ZYLOPRIM) 300 MG tablet TAKE 1 TABLET BY MOUTH EVERY DAY     atorvastatin (LIPITOR) 80 MG tablet Take 1 tablet (80 mg) by mouth daily     cholestyramine light (PREVALITE) 4 GM packet DISSOLVE ONE PACKET IN LIQUID AND DRINK BY MOUTH TWICE A DAY AS DIRECTED     Cyanocobalamin (B-12) 100 MCG TABS      diclofenac (VOLTAREN) 1 % GEL Apply  2 grams to shoulders three times daily using enclosed dosing card. (Patient taking differently: as needed Apply  2 grams to shoulders three times daily using enclosed dosing card.)      levothyroxine (SYNTHROID/LEVOTHROID) 25 MCG tablet TAKE 1 TABLET BY MOUTH EVERY DAY     losartan-hydrochlorothiazide (HYZAAR) 100-12.5 MG tablet Take 1 tablet by mouth daily     omeprazole (PRILOSEC) 20 MG DR capsule Take 1 capsule (20 mg) by mouth daily     VITAMIN D PO      cetirizine (ZYRTEC) 10 MG tablet Take 1 tablet (10 mg) by mouth every evening (Patient not taking: Reported on 6/30/2021)     sildenafil (VIAGRA) 100 MG tablet 1/2 tab 1/2 hour prior to activity (Patient not taking: Reported on 6/30/2021)     triamcinolone (KENALOG) 0.1 % external ointment Apply thin layer twice daily to affected areas as needed. (Patient not taking: Reported on 6/30/2021)     No current facility-administered medications for this visit.          Social History   See HPI    Family History     Family History   Problem Relation Age of Onset     Arthritis Mother      Cardiovascular Mother      Depression Mother      Gastrointestinal Disease Mother         IBS     Hypertension Mother      Circulatory Mother         Aortal aneurysm     Osteoporosis Mother      Allergies Father         Cortisone     Cardiovascular Father      Circulatory Father      Diabetes Father      Hypertension Father      Lipids Father      Alzheimer Disease Paternal Grandfather      Blood Disease Paternal Grandmother         Clotting problems     Blood Disease Son         Factor 5     Hypertension Maternal Aunt      Cerebrovascular Disease Maternal Grandmother        Physical Exam     Temp Readings from Last 3 Encounters:   03/09/21 98.1  F (36.7  C) (Oral)   02/23/21 98  F (36.7  C) (Oral)   02/16/21 96.3  F (35.7  C) (Tympanic)     BP Readings from Last 5 Encounters:   03/19/21 (!) 155/135   03/09/21 (!) 159/84   02/23/21 128/80   02/16/21 (!) 148/84   01/19/21 (!) 148/86     Pulse Readings from Last 1 Encounters:   03/19/21 52     Resp Readings from Last 1 Encounters:   03/19/21 16     Estimated body mass index is 28.84 kg/m  as calculated from the  "following:    Height as of 3/19/21: 1.778 m (5' 10\").    Weight as of 3/19/21: 91.2 kg (201 lb).        GEN: NAD. Healthy appearing adult.   HEENT: MMM.  Anicteric, noninjected sclera. No obvious external lesions of the ear and nose. Hearing intact.  PULM: No increased work of breathing  MSK:  Hands and wrists without swelling.   SKIN: No rash or jaundice seen  PSYCH: Alert. Appropriate.        Labs / Imaging (select studies)     RF/CCP  Recent Labs   Lab Test 04/24/13  0923   CCPABY >250 Strongly Positive*   *     CBC  Recent Labs   Lab Test 06/25/21  0841 08/27/20  0824 05/28/20  0927 02/10/20  0922 10/15/19  0827 10/15/19  0827   WBC 4.1 3.5* 3.6* 4.1   < > 4.8   RBC 4.29* 4.21* 4.29* 4.21*   < > 4.26*   HGB 14.3 13.8 13.9 13.7   < > 13.7   HCT 41.2 41.4 41.7 40.6   < > 41.1   MCV 96 98 97 96   < > 97   RDW 13.0 13.0 13.2 12.6   < > 13.5   * 102* 117* 105*   < > 144*   MCH 33.3* 32.8 32.4 32.5   < > 32.2   MCHC 34.7 33.3 33.3 33.7   < > 33.3   NEUTROPHIL 65.7 61.9 59.2 54.9   < > 60.4   LYMPH 17.3 19.5 19.6 29.0   < > 24.4   MONOCYTE 12.7 13.0 14.9 13.5   < > 14.0   EOSINOPHIL 3.6 4.5 5.2 2.4   < > 0.8   BASOPHIL 0.5 0.8 0.8 0.2   < > 0.4   ANEU 2.7 2.2 2.2 2.3   < > 2.9   ALYM 0.7* 0.7* 0.7* 1.2   < > 1.2   KIESHA 0.5 0.5 0.5 0.6   < > 0.7   AEOS 0.2  --   --  0.1  --  0.0   ABAS 0.0 0.0 0.0 0.0   < > 0.0    < > = values in this interval not displayed.     CMP  Recent Labs   Lab Test 06/25/21  0841 03/29/21  1013 02/16/21  0947 08/27/20  0824 05/28/20  0927 11/06/18  0913 11/06/18  0913 04/05/18  2355 04/05/18  2355   NA  --   --  141  --   --   --  141  --  140   POTASSIUM 4.2 3.5 4.4  --   --   --  4.2  --  3.4   CHLORIDE  --   --  106  --   --   --  105  --  104   CO2  --   --  32  --   --   --  33*  --  26   ANIONGAP  --   --  3  --   --   --  3  --  10   GLC  --   --  119*  --   --   --  107*  --  124*   BUN  --   --  15  --   --   --  15  --  32*   CR 0.96  --  0.84 0.83 0.86   < > 0.83   < > " 0.71   GFRESTIMATED 80  --  90 >90 89   < > >90   < > >90   GFRESTBLACK >90  --  >90 >90 >90   < > >90   < > >90   ANDREZ  --   --  9.4  --   --   --  8.8  --  7.9*   BILITOTAL 1.0  --   --  1.2 0.8   < > 0.6   < >  --    ALBUMIN 3.8  --   --  3.6 3.6   < > 3.1*   < >  --    PROTTOTAL 7.0  --   --  7.2 7.3   < > 7.4   < >  --    ALKPHOS 63  --   --  71 68   < > 60   < >  --    AST 49*  --   --  30 23   < > 41   < >  --    *  --   --  47 34   < > 82*   < >  --     < > = values in this interval not displayed.     Calcium/VitaminD  Recent Labs   Lab Test 02/16/21  0947 11/06/18  0913 06/26/18  0955 04/05/18  2355   ANDREZ 9.4 8.8  --  7.9*   VITDT  --   --  25  --      ESR/CRP  Recent Labs   Lab Test 06/25/21  0841 08/27/20  0824 05/28/20  0927   SED 10 9 15   CRP <2.9 <2.9 <2.9     Lipid Panel  Recent Labs   Lab Test 12/08/20  0911 09/17/19  0827 12/22/17  0735 10/04/13  0819 10/04/13  0819 06/03/13  0837   CHOL 155 138 134   < > 143 208*   TRIG 129 85 149   < > 92 72   HDL 55 38* 46   < > 49 77   LDL 74 83 58   < > 75 117   VLDL  --   --   --   --  18 14   CHOLHDLRATIO  --   --   --   --  3.0 3.0   NHDL 100 100 88   < >  --   --     < > = values in this interval not displayed.     Hepatitis B  Recent Labs   Lab Test 06/25/21  0841 03/03/20  1405 03/20/18  1008   HBCAB Nonreactive Nonreactive Nonreactive   HEPBANG Nonreactive Nonreactive Nonreactive     Hepatitis C  Recent Labs   Lab Test 02/05/14  1104 06/03/13  0837   HCVAB Negative Negative     Tuberculosis Screening  Recent Labs   Lab Test 06/25/21  0841 03/03/20  1405 03/20/18  1008 02/05/14  1104   TBRES  --  Negative  --   --    TBRSLT  --   --  Negative Negative   TBAGN  --   --  0.14 0.01   TBRST Negative  --   --   --      Immunization History     Immunization History   Administered Date(s) Administered     HepB, Unspecified 08/19/1991, 09/23/1991, 02/24/1992     Influenza (High Dose) 3 valent vaccine 10/06/2017, 10/03/2018, 10/23/2019     Influenza (IIV3)  PF 10/01/2009, 10/27/2010, 10/11/2011     Influenza Vaccine IM > 6 months Valent IIV4 10/09/2013, 10/23/2014, 09/18/2015, 11/15/2016     Influenza, Quad, High Dose, Pf, 65yr + 09/02/2020     Pneumo Conj 13-V (2010&after) 10/06/2017     Pneumococcal 23 valent 10/23/2014, 10/23/2019     TD (ADULT, 7+) 09/05/2001     TDAP Vaccine (Boostrix) 10/18/2012     Zoster vaccine recombinant adjuvanted (SHINGRIX) 01/31/2019, 04/18/2019     Zoster vaccine, live 10/23/2014          Chart documentation done in part with Dragon Voice recognition Software. Although reviewed after completion, some word and grammatical error may remain.      Video-Visit Details    Type of service:  Video Visit    Video Start Time: 1:29 PM    Video End Time: 1:35 PM    Originating Location (pt. Location): [place], [place]    Distant Location (provider location):  [facility] in [place], [place]    Platform used for Video Visit: Richa Gomes MD

## 2021-07-08 ENCOUNTER — ALLIED HEALTH/NURSE VISIT (OUTPATIENT)
Dept: FAMILY MEDICINE | Facility: CLINIC | Age: 71
End: 2021-07-08
Payer: COMMERCIAL

## 2021-07-08 VITALS
DIASTOLIC BLOOD PRESSURE: 80 MMHG | SYSTOLIC BLOOD PRESSURE: 136 MMHG | HEART RATE: 65 BPM | WEIGHT: 201 LBS | BODY MASS INDEX: 28.84 KG/M2

## 2021-07-08 DIAGNOSIS — R79.89 LFT ELEVATION: ICD-10-CM

## 2021-07-08 DIAGNOSIS — I15.9 SECONDARY HYPERTENSION WITH GOAL BLOOD PRESSURE LESS THAN 140/90: Primary | ICD-10-CM

## 2021-07-08 DIAGNOSIS — Z79.899 HIGH RISK MEDICATIONS (NOT ANTICOAGULANTS) LONG-TERM USE: ICD-10-CM

## 2021-07-08 LAB
ALT SERPL W P-5'-P-CCNC: 121 U/L (ref 0–70)
AST SERPL W P-5'-P-CCNC: 57 U/L (ref 0–45)

## 2021-07-08 PROCEDURE — 36415 COLL VENOUS BLD VENIPUNCTURE: CPT | Performed by: INTERNAL MEDICINE

## 2021-07-08 PROCEDURE — 84460 ALANINE AMINO (ALT) (SGPT): CPT | Performed by: INTERNAL MEDICINE

## 2021-07-08 PROCEDURE — 99207 PR NO CHARGE NURSE ONLY: CPT

## 2021-07-08 PROCEDURE — 84450 TRANSFERASE (AST) (SGOT): CPT | Performed by: INTERNAL MEDICINE

## 2021-07-08 NOTE — PROGRESS NOTES
Camilo Baca is a 70 year old patient who comes in today for a Blood Pressure check.  Initial BP:  /80   Pulse 65   Wt 91.2 kg (201 lb)   BMI 28.84 kg/m       65  Disposition: results routed to provider      Pt is not checking blood pressures at home. He does have a machine and I advised him to check periodically and If elevated to let us know. He is fine with this plan.  Yamile Alba, [facility]

## 2021-07-09 NOTE — RESULT ENCOUNTER NOTE
RN: Please call to notify Camilo Baca that liver enzymes AST and ALT remain elevated, similar to labs 2 weeks ago.  Because of the persistent elevation without a clear etiology, I recommend that he follow-up with his primary care provider to determine what additional work-up is needed for this lab abnormality.    Fabricio Gomes MD  7/9/2021 12:50 PM

## 2021-07-13 ENCOUNTER — TRANSFERRED RECORDS (OUTPATIENT)
Dept: HEALTH INFORMATION MANAGEMENT | Facility: CLINIC | Age: 71
End: 2021-07-13

## 2021-07-13 LAB — RETINOPATHY: NORMAL

## 2021-07-14 ENCOUNTER — OFFICE VISIT (OUTPATIENT)
Dept: FAMILY MEDICINE | Facility: CLINIC | Age: 71
End: 2021-07-14
Payer: COMMERCIAL

## 2021-07-14 VITALS
TEMPERATURE: 98 F | RESPIRATION RATE: 18 BRPM | BODY MASS INDEX: 28.27 KG/M2 | DIASTOLIC BLOOD PRESSURE: 82 MMHG | WEIGHT: 197 LBS | OXYGEN SATURATION: 98 % | SYSTOLIC BLOOD PRESSURE: 130 MMHG | HEART RATE: 58 BPM

## 2021-07-14 DIAGNOSIS — R79.89 ELEVATED LFTS: Primary | ICD-10-CM

## 2021-07-14 PROCEDURE — 36415 COLL VENOUS BLD VENIPUNCTURE: CPT | Performed by: FAMILY MEDICINE

## 2021-07-14 PROCEDURE — 86769 SARS-COV-2 COVID-19 ANTIBODY: CPT | Performed by: FAMILY MEDICINE

## 2021-07-14 PROCEDURE — 99214 OFFICE O/P EST MOD 30 MIN: CPT | Performed by: FAMILY MEDICINE

## 2021-07-14 ASSESSMENT — PAIN SCALES - GENERAL: PAINLEVEL: NO PAIN (0)

## 2021-07-14 NOTE — PROGRESS NOTES
Assessment & Plan     Elevated LFTs  Camilo Baca is a 70 year old male who presents with long standing history of RA, currently on Rituxan with recently noted mild elevation of LFT, 1.5X normal. No elevation of bilirubin or alk. Phosphatase. He is asymptomatic, avoids hepatotoxic medications and supplements and alcohol. He is taking high dose statin. He had prior episode of elevated LFT in 2006 due to medication. He was screen for viral hepatitis prior to Rituxan initiation and again recently. Negative. He was vaccinated for COVID 19 4 months ago and has had prior negative pre procedural screenings. No antibody testing and no recent liver imaging.   Will hold Lipitor, obtain COVID serology to determine prior infection as cause of elevated AST/ALT and obtain RUQ US to look at liver and GB. If negative then follow up in 1 month to repeat AST/ALT.  - SARS-CoV-2 Nucleocapsid Total Ab; Future  - US Abdomen Limited; Future  - SARS-CoV-2 Nucleocapsid Total Ab    Ordering of each unique test         MEDICATIONS:        - Hold Lipitro       - Continue other medications without change    Return in about 1 month (around 8/14/2021) for Follow up, with me, in person for fasting lab work recheck labs.    Steev Ambrosio MD  [facility] ZACH Page is a 70 year old who presents for the following health issues     HPI     Follow up Abnormal labs Dr. Gomes     Patient Active Problem List   Diagnosis     Lumbago     NONSPECIFIC MEDICAL HISTORY     HYPERLIPIDEMIA LDL GOAL <130     History of adenomatous polyp of colon     Idiopathic chronic gout without tophus, unspecified site     Advanced directives, counseling/discussion     Seropositive rheumatoid arthritis (H)     High risk medication use     Thrombocytopenia (H)     Gastroesophageal reflux disease without esophagitis     Factor 5 Leiden mutation, heterozygous (H)     Personal history of venous thrombosis and embolism     Personal  history of DVT (deep vein thrombosis)     Hypothyroidism due to acquired atrophy of thyroid     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)     Secondary hypertension with goal blood pressure less than 140/90     Rheumatoid arthritis involving multiple sites with positive rheumatoid factor (H)       Current Outpatient Medications   Medication Sig Dispense Refill     allopurinol (ZYLOPRIM) 300 MG tablet TAKE 1 TABLET BY MOUTH EVERY DAY 90 tablet 3     atorvastatin (LIPITOR) 80 MG tablet Take 1 tablet (80 mg) by mouth daily 90 tablet 1     cholestyramine light (PREVALITE) 4 GM packet DISSOLVE ONE PACKET IN LIQUID AND DRINK BY MOUTH TWICE A DAY AS DIRECTED 60 packet 4     Cyanocobalamin (B-12) 100 MCG TABS        levothyroxine (SYNTHROID/LEVOTHROID) 25 MCG tablet TAKE 1 TABLET BY MOUTH EVERY DAY 90 tablet 3     losartan-hydrochlorothiazide (HYZAAR) 100-12.5 MG tablet Take 1 tablet by mouth daily 90 tablet 3     omeprazole (PRILOSEC) 20 MG DR capsule Take 1 capsule (20 mg) by mouth daily 90 capsule 3     riTUXimab 500 MG/50ML SOLN Inject 1,000 mg into the vein       sildenafil (VIAGRA) 100 MG tablet 1/2 tab 1/2 hour prior to activity 3 tablet 4     VITAMIN D PO        cetirizine (ZYRTEC) 10 MG tablet Take 1 tablet (10 mg) by mouth every evening (Patient not taking: Reported on 6/30/2021) 90 tablet 1     diclofenac (VOLTAREN) 1 % GEL Apply  2 grams to shoulders three times daily using enclosed dosing card. (Patient not taking: Reported on 7/14/2021) 100 g 1     triamcinolone (KENALOG) 0.1 % external ointment Apply thin layer twice daily to affected areas as needed. (Patient not taking: Reported on 6/30/2021) 453.6 g 3     Last Comprehensive Metabolic Panel:  Sodium   Date Value Ref Range Status   02/16/2021 141 133 - 144 mmol/L Final     Potassium   Date Value Ref Range Status   06/25/2021 4.2 3.4 - 5.3 mmol/L Final     Chloride   Date Value Ref Range Status   02/16/2021 106 94 - 109  mmol/L Final     Carbon Dioxide   Date Value Ref Range Status   02/16/2021 32 20 - 32 mmol/L Final     Anion Gap   Date Value Ref Range Status   02/16/2021 3 3 - 14 mmol/L Final     Glucose   Date Value Ref Range Status   02/16/2021 119 (H) 70 - 99 mg/dL Final     Urea Nitrogen   Date Value Ref Range Status   02/16/2021 15 7 - 30 mg/dL Final     Creatinine   Date Value Ref Range Status   06/25/2021 0.96 0.66 - 1.25 mg/dL Final     GFR Estimate   Date Value Ref Range Status   06/25/2021 80 >60 mL/min/[1.73_m2] Final     Comment:     Non  GFR Calc  Starting 12/18/2018, serum creatinine based estimated GFR (eGFR) will be   calculated using the Chronic Kidney Disease Epidemiology Collaboration   (CKD-EPI) equation.       Calcium   Date Value Ref Range Status   02/16/2021 9.4 8.5 - 10.1 mg/dL Final     Bilirubin Total   Date Value Ref Range Status   06/25/2021 1.0 0.2 - 1.3 mg/dL Final     Alkaline Phosphatase   Date Value Ref Range Status   06/25/2021 63 40 - 150 U/L Final     ALT   Date Value Ref Range Status   07/08/2021 121 (H) 0 - 70 U/L Final     AST   Date Value Ref Range Status   07/08/2021 57 (H) 0 - 45 U/L Final     Immunization History   Administered Date(s) Administered     COVID-19,PF,Moderna 02/26/2021, 03/25/2021     HepB, Unspecified 08/19/1991, 09/23/1991, 02/24/1992     Influenza (High Dose) 3 valent vaccine 10/06/2017, 10/03/2018, 10/23/2019     Influenza (IIV3) PF 10/01/2009, 10/27/2010, 10/11/2011     Influenza Vaccine IM > 6 months Valent IIV4 10/09/2013, 10/23/2014, 09/18/2015, 11/15/2016     Influenza, Quad, High Dose, Pf, 65yr + 09/02/2020     Pneumo Conj 13-V (2010&after) 10/06/2017     Pneumococcal 23 valent 10/23/2014, 10/23/2019     TD (ADULT, 7+) 09/05/2001     TDAP Vaccine (Boostrix) 10/18/2012     Zoster vaccine recombinant adjuvanted (SHINGRIX) 01/31/2019, 04/18/2019     Zoster vaccine, live 10/23/2014         Review of Systems   CONSTITUTIONAL: NEGATIVE for fever, chills,  change in weight  ENT/MOUTH: NEGATIVE for ear, mouth and throat problems  RESP: NEGATIVE for significant cough or SOB  CV: NEGATIVE for chest pain, palpitations or peripheral edema  GI: NEGATIVE for nausea, abdominal pain, heartburn, or change in bowel habits  MUSCULOSKELETAL: POSITIVE  for arthralgias RA  ROS otherwise negative      Objective    /82 (BP Location: Left arm, Patient Position: Chair, Cuff Size: Adult Regular)   Pulse 58   Temp 98  F (36.7  C) (Temporal)   Resp 18   Wt 89.4 kg (197 lb)   SpO2 98%   BMI 28.27 kg/m    Body mass index is 28.27 kg/m .  Physical Exam   GENERAL: healthy, alert and no distress  NECK: no adenopathy, no asymmetry, masses, or scars and thyroid normal to palpation  RESP: lungs clear to auscultation - no rales, rhonchi or wheezes  CV: regular rate and rhythm, normal S1 S2, no S3 or S4, no murmur, click or rub, no peripheral edema and peripheral pulses strong  ABDOMEN: soft, nontender, no hepatosplenomegaly, no masses and bowel sounds normal  MS: no gross musculoskeletal defects noted, no edema    Orders Only on 07/08/2021   Component Date Value Ref Range Status     ALT 07/08/2021 121* 0 - 70 U/L Final     AST 07/08/2021 57* 0 - 45 U/L Final

## 2021-07-15 LAB — SARS-COV-2 AB SERPL QL IA: NEGATIVE

## 2021-07-21 ENCOUNTER — TRANSFERRED RECORDS (OUTPATIENT)
Dept: HEALTH INFORMATION MANAGEMENT | Facility: CLINIC | Age: 71
End: 2021-07-21
Payer: COMMERCIAL

## 2021-07-26 ENCOUNTER — TELEPHONE (OUTPATIENT)
Dept: FAMILY MEDICINE | Facility: CLINIC | Age: 71
End: 2021-07-26

## 2021-07-26 DIAGNOSIS — R79.89 ELEVATED LFTS: Primary | ICD-10-CM

## 2021-07-26 NOTE — TELEPHONE ENCOUNTER
----- Message from Akash Mariee MD sent at 7/26/2021 12:57 PM CDT -----  Can we get Camilo into gastroenterology clinic for evaluation of elevated liver function tests

## 2021-07-26 NOTE — TELEPHONE ENCOUNTER
Akash Mariee MD  P [facility]  Can we get Camilo into gastroenterology clinic for evaluation of elevated liver function tests

## 2021-07-27 ENCOUNTER — TRANSFERRED RECORDS (OUTPATIENT)
Dept: HEALTH INFORMATION MANAGEMENT | Facility: CLINIC | Age: 71
End: 2021-07-27

## 2021-08-02 ENCOUNTER — VIRTUAL VISIT (OUTPATIENT)
Dept: GASTROENTEROLOGY | Facility: CLINIC | Age: 71
End: 2021-08-02
Attending: FAMILY MEDICINE
Payer: COMMERCIAL

## 2021-08-02 DIAGNOSIS — R79.89 ELEVATED LFTS: ICD-10-CM

## 2021-08-02 PROCEDURE — 99205 OFFICE O/P NEW HI 60 MIN: CPT | Mod: 95 | Performed by: INTERNAL MEDICINE

## 2021-08-02 NOTE — PROGRESS NOTES
Camilo is a 70 year old who is being evaluated via a billable video visit.      How would you like to obtain your AVS? Biankahart  If the video visit is dropped, the invitation should be resent by: Text to cell phone: [phone]  Will anyone else be joining your video visit? No      Video Start Time: 10:01 AM    [facility]    Hepatology New Patient Visit    Referring provider:  Akash Mariee    Chief complaint: Abnormal liver function tests     HPI: Mr. Baca is a 70-year-old male with rheumatoid arthritis.  He is coming to us for abnormal liver function tests.  He was initially noted to have abnormal liver function tests in 2006.  This was attributed to medications.  He anyway was lately on atorvastatin at 80 mg.  This was reduced to 40 and eventually discontinuation when his liver tests did not normalize.    Mr. Baca is overweight with a BMI of 28.27.  He  lipid profile the last time done was normal and he is not known to be diabetic.  He admitted that over 4 -5 years ago,  he had used  methotrexate.  He is not quite sure how long he has been taking that    Mr. Baca denies jaundice, abdominal distension, lower extremity edema, lethargy or confusion. No history of melena, hematemesis or hematochezia.  He also claims that his weight has remained stable. Denies any abdominal pain,  Nausea and  Vomiting.  He is moving his bowel once a  day with no blood in it and his last colonoscopy was normal.  Patient denies fevers, sweats, chills.  He was vaccinated with moderna for COVID-19 and has done both doses.    Medical hx Surgical hx   Past Medical History:   Diagnosis Date     Alopecia, unspecified      Closed fracture of unspecified part of humerus 1974    Arm fracture / left wrist     Esophageal reflux 2/6/2015     Factor 5 Leiden mutation, heterozygous (H)      Gout 3/28/2011     Herpes zoster without mention of complication 1965    Herpes zoster     Measles without mention of complication      Measles     Personal history of DVT (deep vein thrombosis) 4/9/2015     Personal history of venous thrombosis and embolism 4/9/2015     Pulmonary embolism (H) 12/1996    post appendectomy     RA (rheumatoid arthritis) (H) 3/11/2013     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     Rheumatoid arthritis(714.0)     Beginning symptoms     Secondary hypertension with goal blood pressure less than 140/90 11/15/2016     Thrombocytopenia, unspecified (H) 1/29/2014     Varicella without mention of complication     Chickenpox      Past Surgical History:   Procedure Laterality Date     C APPENDECTOMY  1996     COLONOSCOPY  11/15/2010    COLONOSCOPY performed by DARIUS MESA at  GI     COLONOSCOPY N/A 11/2/2015    Procedure: COLONOSCOPY;  Surgeon: Bubba Odom MD;  Location:  GI     COLONOSCOPY N/A 3/19/2021    Procedure: Colonoscopy;  Surgeon: Ludmila Beltran MD;  Location:  GI     ESOPHAGOSCOPY, GASTROSCOPY, DUODENOSCOPY (EGD), COMBINED N/A 10/24/2018    Procedure: Esophagogastroduodenoscopy Multiple Biopsies by Biopsy;  Surgeon: Matteo Johnson DO;  Location:  GI     HC REPAIR ING HERNIA,5+Y/O,REDUCIBL  1960     HERNIORRHAPHY UMBILICAL N/A 4/17/2015    Procedure: HERNIORRHAPHY UMBILICAL;  Surgeon: Rayray Avila MD;  Location:  OR     LAPAROSCOPIC HERNIORRHAPHY INGUINAL BILATERAL Bilateral 4/17/2015    Procedure: LAPAROSCOPIC HERNIORRHAPHY INGUINAL BILATERAL;  Surgeon: Rayray Avila MD;  Location:  OR     [facility] COLONOSCOPY W BIOPSY  08/10/05          Medications  Prior to Admission medications    Medication Sig Start Date End Date Taking? Authorizing Provider   allopurinol (ZYLOPRIM) 300 MG tablet TAKE 1 TABLET BY MOUTH EVERY DAY 5/3/21  Yes Akash Mariee MD   cholestyramine light (PREVALITE) 4 GM packet DISSOLVE ONE PACKET IN LIQUID AND DRINK BY MOUTH TWICE A DAY AS DIRECTED 2/8/21  Yes Akash Mariee MD    Cyanocobalamin (B-12) 100 MCG TABS    Yes Reported, Patient   diclofenac (VOLTAREN) 1 % GEL Apply  2 grams to shoulders three times daily using enclosed dosing card. 9/25/15  Yes Alexsandra Wolfe MD   levothyroxine (SYNTHROID/LEVOTHROID) 25 MCG tablet TAKE 1 TABLET BY MOUTH EVERY DAY 5/17/21  Yes Akash Mariee MD   losartan-hydrochlorothiazide (HYZAAR) 100-12.5 MG tablet Take 1 tablet by mouth daily 2/16/21  Yes Akash Mariee MD   omeprazole (PRILOSEC) 20 MG DR capsule Take 1 capsule (20 mg) by mouth daily 3/3/21  Yes Akash Mariee MD   riTUXimab 500 MG/50ML SOLN Inject 1,000 mg into the vein   Yes Reported, Patient   sildenafil (VIAGRA) 100 MG tablet 1/2 tab 1/2 hour prior to activity 2/16/21  Yes Akash Mariee MD   triamcinolone (KENALOG) 0.1 % external ointment Apply thin layer twice daily to affected areas as needed. 3/5/20  Yes Delilah Zhao MD   VITAMIN D PO    Yes Reported, Patient   cetirizine (ZYRTEC) 10 MG tablet Take 1 tablet (10 mg) by mouth every evening 11/15/16   Ru Gamez PA-C       Allergies  Allergies   Allergen Reactions     Lisinopril Cough     Plaquenil [Hydroxychloroquine] Hives       Family hx Social hx   Family History   Problem Relation Age of Onset     Arthritis Mother      Cardiovascular Mother      Depression Mother      Gastrointestinal Disease Mother         IBS     Hypertension Mother      Circulatory Mother         Aortal aneurysm     Osteoporosis Mother      Allergies Father         Cortisone     Cardiovascular Father      Circulatory Father      Diabetes Father      Hypertension Father      Lipids Father      Alzheimer Disease Paternal Grandfather      Blood Disease Paternal Grandmother         Clotting problems     Blood Disease Son         Factor 5     Hypertension Maternal Aunt      Cerebrovascular Disease Maternal Grandmother       Social History     Tobacco Use     Smoking status: Former Smoker     Packs/day: 0.25     Years: 2.00     Pack years: 0.50      Types: Cigarettes, Pipe     Quit date: 1979     Years since quittin.6     Smokeless tobacco: Never Used   Substance Use Topics     Alcohol use: Yes     Comment: 5 drinks per week / wine     Drug use: No          Review of systems  Does not have any recent infections.  Has some fatigue,  no headaches or seizures.  Denies any cough,  shortness of breath or chest pain.  He has no anemia or easy bruising.  Denies any thyroid or diabetes.  He has now rheumatoid arthritis otherwise does not have any significant eye or hearing issues.  He has no skin problems either.  A 10-point review of systems was otherwise negative if not stated above.     Examination  There were no vitals taken for this visit.  There is no height or weight on file to calculate BMI.    Gen- well, NAD, A+Ox3, normal color  Psych- normal mood    Laboratory  Lab Results   Component Value Date     2021    POTASSIUM 4.2 2021    CHLORIDE 106 2021    CO2 32 2021    BUN 15 2021    CR 0.96 2021       Lab Results   Component Value Date    BILITOTAL 1.0 2021     2021    AST 57 2021    ALKPHOS 63 2021       Lab Results   Component Value Date    ALBUMIN 3.8 2021    PROTTOTAL 7.0 2021        Lab Results   Component Value Date    WBC 4.1 2021    HGB 14.3 2021    MCV 96 2021     2021       Lab Results   Component Value Date    INR 0.9 2015         Radiology    Assessment    Abnormal liver function tests: Mr. Baca is a 70-year-old male with history of rheumatoid arthritis,  past history of methotrexate treatment, overweight and we are seeing him for abnormal liver function tests.  He was noted to have the abnormal liver function tests in 2006.  He has been on different medications which can cause abnormal liver function tests among which statins.  Please note also that his past history of methotrexate use could have led to fatty  infiltration of the liver, on the top of his other risk factors for nonalcoholic fatty liver (CALVIN).  He also has history of some alcohol use between (7-8 drinks usually wine a week).      Plan  We will find out if he has other reasons to have abnormal liver function tests besides medications.  At the same time we will check if he has any significant fibrosis with an  MR elastography.  We will repeat his liver function test. .The MRE will give us an idea of how much fibrosis the patient has.  Please note that his platelets were mildly depressed below 150.  We will also get autoimmune markers as patient has rheumatoid arthritis and hypothyroidism.    For all his other medical issues he will follow with his primary care Physician.  This was a 1 hour visit of which more than 50% was spent in explaining to the patient what our plan of care was and we answered all his questions some of the time was also use it for coordination of care and chart review.    Gem Garay MD  Hepatology  [facility]      Video-Visit Details    Type of service:  Video Visit    Video End Time:10:34 AM.    Originating Location (pt. Location): Home.    Distant Location (provider location):  [facility]     Platform used for Video Visit: "[employer].

## 2021-08-09 ENCOUNTER — APPOINTMENT (OUTPATIENT)
Dept: LAB | Facility: CLINIC | Age: 71
End: 2021-08-09
Payer: COMMERCIAL

## 2021-08-09 ENCOUNTER — INFUSION THERAPY VISIT (OUTPATIENT)
Dept: INFUSION THERAPY | Facility: CLINIC | Age: 71
End: 2021-08-09
Attending: FAMILY MEDICINE
Payer: MEDICARE

## 2021-08-09 ENCOUNTER — HOSPITAL ENCOUNTER (OUTPATIENT)
Dept: ULTRASOUND IMAGING | Facility: CLINIC | Age: 71
End: 2021-08-09
Attending: FAMILY MEDICINE
Payer: MEDICARE

## 2021-08-09 VITALS
BODY MASS INDEX: 28.47 KG/M2 | TEMPERATURE: 99.2 F | DIASTOLIC BLOOD PRESSURE: 75 MMHG | SYSTOLIC BLOOD PRESSURE: 129 MMHG | RESPIRATION RATE: 18 BRPM | HEART RATE: 53 BPM | WEIGHT: 198.4 LBS | OXYGEN SATURATION: 97 %

## 2021-08-09 DIAGNOSIS — R79.89 ELEVATED LFTS: ICD-10-CM

## 2021-08-09 DIAGNOSIS — M05.79 RHEUMATOID ARTHRITIS INVOLVING MULTIPLE SITES WITH POSITIVE RHEUMATOID FACTOR (H): Primary | ICD-10-CM

## 2021-08-09 LAB
CD19 CELLS # BLD: 1 CELLS/UL (ref 107–698)
CD19 CELLS NFR BLD: <1 % (ref 6–27)

## 2021-08-09 PROCEDURE — 36415 COLL VENOUS BLD VENIPUNCTURE: CPT

## 2021-08-09 PROCEDURE — 250N000011 HC RX IP 250 OP 636: Performed by: INTERNAL MEDICINE

## 2021-08-09 PROCEDURE — 96375 TX/PRO/DX INJ NEW DRUG ADDON: CPT

## 2021-08-09 PROCEDURE — 258N000003 HC RX IP 258 OP 636: Performed by: INTERNAL MEDICINE

## 2021-08-09 PROCEDURE — 250N000013 HC RX MED GY IP 250 OP 250 PS 637: Performed by: INTERNAL MEDICINE

## 2021-08-09 PROCEDURE — 76705 ECHO EXAM OF ABDOMEN: CPT

## 2021-08-09 PROCEDURE — 86038 ANTINUCLEAR ANTIBODIES: CPT

## 2021-08-09 PROCEDURE — 83516 IMMUNOASSAY NONANTIBODY: CPT

## 2021-08-09 PROCEDURE — 36415 COLL VENOUS BLD VENIPUNCTURE: CPT | Performed by: INTERNAL MEDICINE

## 2021-08-09 PROCEDURE — 86355 B CELLS TOTAL COUNT: CPT | Performed by: INTERNAL MEDICINE

## 2021-08-09 PROCEDURE — 96366 THER/PROPH/DIAG IV INF ADDON: CPT

## 2021-08-09 PROCEDURE — 82784 ASSAY IGA/IGD/IGG/IGM EACH: CPT | Performed by: INTERNAL MEDICINE

## 2021-08-09 PROCEDURE — 96365 THER/PROPH/DIAG IV INF INIT: CPT

## 2021-08-09 RX ORDER — METHYLPREDNISOLONE SODIUM SUCCINATE 125 MG/2ML
125 INJECTION, POWDER, LYOPHILIZED, FOR SOLUTION INTRAMUSCULAR; INTRAVENOUS ONCE
Status: COMPLETED | OUTPATIENT
Start: 2021-08-09 | End: 2021-08-09

## 2021-08-09 RX ORDER — DIPHENHYDRAMINE HCL 25 MG
50 CAPSULE ORAL ONCE
Status: COMPLETED | OUTPATIENT
Start: 2021-08-09 | End: 2021-08-09

## 2021-08-09 RX ORDER — HEPARIN SODIUM,PORCINE 10 UNIT/ML
5 VIAL (ML) INTRAVENOUS
Status: CANCELLED | OUTPATIENT
Start: 2021-08-23

## 2021-08-09 RX ORDER — ACETAMINOPHEN 325 MG/1
650 TABLET ORAL ONCE
Status: COMPLETED | OUTPATIENT
Start: 2021-08-09 | End: 2021-08-09

## 2021-08-09 RX ORDER — DIPHENHYDRAMINE HCL 25 MG
50 CAPSULE ORAL ONCE
Status: CANCELLED
Start: 2021-08-23

## 2021-08-09 RX ORDER — METHYLPREDNISOLONE SODIUM SUCCINATE 125 MG/2ML
125 INJECTION, POWDER, LYOPHILIZED, FOR SOLUTION INTRAMUSCULAR; INTRAVENOUS ONCE
Status: CANCELLED
Start: 2021-08-23

## 2021-08-09 RX ORDER — ACETAMINOPHEN 325 MG/1
650 TABLET ORAL ONCE
Status: CANCELLED
Start: 2021-08-23

## 2021-08-09 RX ORDER — HEPARIN SODIUM (PORCINE) LOCK FLUSH IV SOLN 100 UNIT/ML 100 UNIT/ML
5 SOLUTION INTRAVENOUS
Status: CANCELLED | OUTPATIENT
Start: 2021-08-23

## 2021-08-09 RX ADMIN — RITUXIMAB-ABBS 1000 MG: 10 INJECTION, SOLUTION INTRAVENOUS at 09:18

## 2021-08-09 RX ADMIN — ACETAMINOPHEN 650 MG: 325 TABLET, FILM COATED ORAL at 08:47

## 2021-08-09 RX ADMIN — SODIUM CHLORIDE 250 ML: 9 INJECTION, SOLUTION INTRAVENOUS at 08:57

## 2021-08-09 RX ADMIN — DIPHENHYDRAMINE HYDROCHLORIDE 50 MG: 25 CAPSULE ORAL at 08:47

## 2021-08-09 RX ADMIN — METHYLPREDNISOLONE SODIUM SUCCINATE 125 MG: 125 INJECTION, POWDER, FOR SOLUTION INTRAMUSCULAR; INTRAVENOUS at 08:57

## 2021-08-09 ASSESSMENT — PAIN SCALES - GENERAL: PAINLEVEL: NO PAIN (0)

## 2021-08-09 NOTE — PROGRESS NOTES
Infusion Nursing Note:  Camilo Baca presents today for Rapid Rituximab.    Patient seen by provider today: No   present during visit today: Not Applicable.    Note: Patient states he feels good, no new or concerning symptoms. Denies fatigue and pain today. Does experience some intermittent bilateral shoulder pain due to Hx of impingement syndrome. Has been dealing with vision issues r/t blurred vision and cataracts which was taking place at last appt. Is having last cataract surgery tomorrow. VSS, afebrile. Premeds given as ordered. Rituximab infused at 200ml/hr x 30 min then 400 ml/hr for remainder of infusion.       Intravenous Access:  Peripheral IV placed.    Treatment Conditions:  Biological Infusion Checklist:  ~~~ NOTE: If the patient answers yes to any of the questions below, hold the infusion and contact ordering provider or on-call provider.    1. Have you recently had an elevated temperature, fever, chills, productive cough, coughing for 3 weeks or longer or hemoptysis, abnormal vital signs, night sweats,  chest pain or have you noticed a decrease in your appetite, unexplained weight loss or fatigue? No  2. Do you have any open wounds or new incisions? No  3. Do you have any recent or upcoming hospitalizations, surgeries or dental procedures? No Having cataract procedure tomorrow.  4. Do you currently have or recently have had any signs of illness or infection or are you on any antibiotics? No  5. Have you had any new, sudden or worsening abdominal pain? No  6. Have you or anyone in your household received a live vaccination in the past 4 weeks? Please note:  No live vaccines while on biologic/chemotherapy until 6 months after the last treatment.  Patient can receive the flu vaccine (shot only) and the pneumovax.  It is optimal for the patient to get these vaccines mid cycle, but they can be given at any time as long as it is not on the day of the infusion. No  7. Have you recently been  diagnosed with any new nervous system diseases (ie. Multiple sclerosis, Guillain Lyons, seizures, neurological changes) or cancer diagnosis? No  8. Are you on any form of radiation or chemotherapy? No  9. Are you pregnant or breast feeding or do you have plans of pregnancy in the future? No  10. Have you been having any signs of worsening depression or suicidal ideations?  (benlysta only)N/A  11. Have there been any other new onset medical symptoms? No        Post Infusion Assessment:  Patient tolerated infusion without incident. VSS, asymptomatic.  Blood return noted pre and post infusion.  Site patent and intact, free from redness, edema or discomfort.  No evidence of extravasations.  PIV access discontinued per protocol.  Biologic Infusion Post Education: Call the triage nurse at your clinic or seek medical attention if you have chills and/or temperature greater than or equal to 100.5, uncontrolled nausea/vomiting, diarrhea, constipation, dizziness, shortness of breath, chest pain, heart palpitations, weakness or any other new or concerning symptoms, questions or concerns.  You cannot have any live virus vaccines prior to or during treatment or up to 6 months post infusion.  If you have an upcoming surgery, medical procedure or dental procedure during treatment, this should be discussed with your ordering physician and your surgeon/dentist.  If you are having any concerning symptom, if you are unsure if you should get your next infusion or wish to speak to a provider before your next infusion, please call your care coordinator or triage nurse at your clinic to notify them so we can adequately serve you.       Discharge Plan:   Copy of AVS reviewed with patient. Patient will return 8/23 for next appointment.  Patient discharged in stable condition accompanied by: wife.  Departure Mode: Ambulatory.      Yana Burnham RN

## 2021-08-10 ENCOUNTER — TRANSFERRED RECORDS (OUTPATIENT)
Dept: HEALTH INFORMATION MANAGEMENT | Facility: CLINIC | Age: 71
End: 2021-08-10
Payer: COMMERCIAL

## 2021-08-10 LAB
ANA PAT SER IF-IMP: ABNORMAL
ANA PAT SER IF-IMP: ABNORMAL
ANA SER QL IF: POSITIVE
ANA TITR SER IF: ABNORMAL {TITER}
ANA TITR SER IF: ABNORMAL {TITER}
IGA SERPL-MCNC: 271 MG/DL (ref 84–499)
IGG SERPL-MCNC: 778 MG/DL (ref 610–1616)
IGM SERPL-MCNC: 32 MG/DL (ref 35–242)

## 2021-08-11 ENCOUNTER — TRANSFERRED RECORDS (OUTPATIENT)
Dept: HEALTH INFORMATION MANAGEMENT | Facility: CLINIC | Age: 71
End: 2021-08-11

## 2021-08-11 LAB — SMA IGG SER-ACNC: 14 UNITS

## 2021-08-13 ENCOUNTER — OFFICE VISIT (OUTPATIENT)
Dept: FAMILY MEDICINE | Facility: CLINIC | Age: 71
End: 2021-08-13
Payer: COMMERCIAL

## 2021-08-13 VITALS
TEMPERATURE: 98.3 F | HEART RATE: 70 BPM | DIASTOLIC BLOOD PRESSURE: 80 MMHG | WEIGHT: 195.5 LBS | BODY MASS INDEX: 28.05 KG/M2 | RESPIRATION RATE: 14 BRPM | OXYGEN SATURATION: 96 % | SYSTOLIC BLOOD PRESSURE: 114 MMHG

## 2021-08-13 DIAGNOSIS — R79.89 ELEVATED LFTS: Primary | ICD-10-CM

## 2021-08-13 PROCEDURE — 99213 OFFICE O/P EST LOW 20 MIN: CPT | Performed by: FAMILY MEDICINE

## 2021-08-13 ASSESSMENT — PAIN SCALES - GENERAL: PAINLEVEL: NO PAIN (0)

## 2021-08-13 NOTE — PROGRESS NOTES
"    Assessment & Plan     Elevated LFTs  Most likely due to medications for RA vs CALVIN. Plan for follow up with GI and Rheumatology.                BMI:   Estimated body mass index is 28.05 kg/m  as calculated from the following:    Height as of 3/19/21: 1.778 m (5' 10\").    Weight as of this encounter: 88.7 kg (195 lb 8 oz).   Weight management plan: Discussed healthy diet and exercise guidelines    Work on weight loss  Regular exercise    No follow-ups on file.    Steve Ambrosio MD  [facility]    Kenna Page is a 70 year old who presents for the following health issues     HPI     Follow up Recheck Elevated LFTs     He was seen by GI last week with a plan for follow up evaluation of elevated LFT.    Assessment     Abnormal liver function tests: Mr. Baca is a 70-year-old male with history of rheumatoid arthritis,  past history of methotrexate treatment, overweight and we are seeing him for abnormal liver function tests.  He was noted to have the abnormal liver function tests in 2006.  He has been on different medications which can cause abnormal liver function tests among which statins.  Please note also that his past history of methotrexate use could have led to fatty infiltration of the liver, on the top of his other risk factors for nonalcoholic fatty liver (CALVIN).  He also has history of some alcohol use between (7-8 drinks usually wine a week).        Plan  We will find out if he has other reasons to have abnormal liver function tests besides medications.  At the same time we will check if he has any significant fibrosis with an  MR elastography.  We will repeat his liver function test. .The MRE will give us an idea of how much fibrosis the patient has.  Please note that his platelets were mildly depressed below 150.  We will also get autoimmune markers as patient has rheumatoid arthritis and hypothyroidism.     For all his other medical issues he will follow with his primary " care Physician.  This was a 1 hour visit of which more than 50% was spent in explaining to the patient what our plan of care was and we answered all his questions some of the time was also use it for coordination of care and chart review.     Gem Garay MD  Hepatology  [facility]      He is off statin and has follow up MRE next week. He will follow up with GI and Rheumatology after that to review MRE and autoimmune markers.    Review of Systems   CONSTITUTIONAL: NEGATIVE for fever, chills, change in weight  ENT/MOUTH: NEGATIVE for ear, mouth and throat problems  RESP: NEGATIVE for significant cough or SOB  CV: NEGATIVE for chest pain, palpitations or peripheral edema  GI: NEGATIVE for nausea, abdominal pain, heartburn, or change in bowel habits  MUSCULOSKELETAL: POSITIVE  for history of rheumatoid arthritis    ROS otherwise negative      Objective    /80 (BP Location: Right arm, Patient Position: Chair, Cuff Size: Adult Regular)   Pulse 70   Temp 98.3  F (36.8  C) (Temporal)   Resp 14   Wt 88.7 kg (195 lb 8 oz)   SpO2 96%   BMI 28.05 kg/m    Body mass index is 28.05 kg/m .  Physical Exam   GENERAL: healthy, alert and no distress  NECK: no adenopathy, no asymmetry, masses, or scars and thyroid normal to palpation  RESP: lungs clear to auscultation - no rales, rhonchi or wheezes  CV: regular rate and rhythm, normal S1 S2, no S3 or S4, no murmur, click or rub, no peripheral edema and peripheral pulses strong  ABDOMEN: soft, nontender, no hepatosplenomegaly, no masses and bowel sounds normal  MS: no gross musculoskeletal defects noted, no edema    Infusion Therapy Visit on 08/09/2021   Component Date Value Ref Range Status     CD19% B Cells 08/09/2021 <1* 6 - 27 % Final     Absolute CD19, B Cells 08/09/2021 1* 107 - 698 cells/uL Final     Immunoglobulin G 08/09/2021 778  610-1,616 mg/dL Final     Immunoglobulin A 08/09/2021 271  84 - 499 mg/dL Final     Immunoglobulin M 08/09/2021 32* 35 -  242 mg/dL Final     F-Actin (Smooth Muscle) Ab, IgG by* 08/09/2021 14  0 - 19 Units Final    REFERENCE INTERVAL: F-Actin (Smooth Muscle) Antibody, IgG   by                       LG      19 Units or less ....... Negative    20 - 30 Units .......... Weak Positive-Suggest repeat                             testing in two to three weeks                             with fresh specimen.    31 Units or greater..... Positive-Suggestive of                             autoimmune hepatitis type 1                             or chronic active hepatitis.    F-actin IgG antibodies have been shown to have increased   sensitivity for autoimmune hepatitis (AIH) but lower   specificity than smooth muscle antibodies (SMA). F-actin   IgG antibodies can also be seen in SMA-negative disease   controls (non-AIH), especially in patients with primary   biliary cirrhosis and chronic hepatitis C infections. Some   patients with AIH may be SMA-positive but negative for   F-actin IgG. Consider testing for SMA by IFA if suspicion   for AIH is strong.     FROY interpretation 08/09/2021 Positive* Negative Final      Negative:              <1:40  Borderline Positive:   1:40 - 1:80  Positive:              >1:80     FROY pattern 1 08/09/2021 Homogeneous   Final     FROY titer 1 08/09/2021 1:160   Final     FROY pattern 2 08/09/2021 Speckled   Final     FROY titer 2 08/09/2021 1:160   Final

## 2021-08-16 ENCOUNTER — ANCILLARY PROCEDURE (OUTPATIENT)
Dept: MRI IMAGING | Facility: CLINIC | Age: 71
End: 2021-08-16
Attending: INTERNAL MEDICINE
Payer: COMMERCIAL

## 2021-08-16 DIAGNOSIS — R79.89 ELEVATED LFTS: ICD-10-CM

## 2021-08-16 PROCEDURE — 74181 MRI ABDOMEN W/O CONTRAST: CPT | Performed by: RADIOLOGY

## 2021-08-19 ENCOUNTER — TELEPHONE (OUTPATIENT)
Dept: PULMONOLOGY | Facility: CLINIC | Age: 71
End: 2021-08-19

## 2021-08-19 ENCOUNTER — TELEPHONE (OUTPATIENT)
Dept: GASTROENTEROLOGY | Facility: CLINIC | Age: 71
End: 2021-08-19

## 2021-08-19 DIAGNOSIS — D69.6 THROMBOCYTOPENIA (H): ICD-10-CM

## 2021-08-19 DIAGNOSIS — R79.89 ELEVATED LFTS: Primary | ICD-10-CM

## 2021-08-19 DIAGNOSIS — I15.9 SECONDARY HYPERTENSION WITH GOAL BLOOD PRESSURE LESS THAN 140/90: ICD-10-CM

## 2021-08-19 NOTE — TELEPHONE ENCOUNTER
[facility] Call Center    Phone Message    May a detailed message be left on voicemail: yes     Reason for Call: Order(s): Other:   Reason for requested: Lab Orders  Date needed: ASAP  Provider name: Dr. Garay      Action Taken: Message routed to:  [facility]    Travel Screening: Not Applicable

## 2021-08-23 ENCOUNTER — INFUSION THERAPY VISIT (OUTPATIENT)
Dept: INFUSION THERAPY | Facility: CLINIC | Age: 71
End: 2021-08-23
Attending: INTERNAL MEDICINE
Payer: MEDICARE

## 2021-08-23 VITALS
DIASTOLIC BLOOD PRESSURE: 86 MMHG | RESPIRATION RATE: 16 BRPM | HEART RATE: 82 BPM | WEIGHT: 199.7 LBS | BODY MASS INDEX: 28.65 KG/M2 | SYSTOLIC BLOOD PRESSURE: 152 MMHG | OXYGEN SATURATION: 98 %

## 2021-08-23 DIAGNOSIS — M05.79 RHEUMATOID ARTHRITIS INVOLVING MULTIPLE SITES WITH POSITIVE RHEUMATOID FACTOR (H): Primary | ICD-10-CM

## 2021-08-23 PROCEDURE — 250N000011 HC RX IP 250 OP 636: Performed by: INTERNAL MEDICINE

## 2021-08-23 PROCEDURE — 250N000013 HC RX MED GY IP 250 OP 250 PS 637: Performed by: INTERNAL MEDICINE

## 2021-08-23 PROCEDURE — 96366 THER/PROPH/DIAG IV INF ADDON: CPT

## 2021-08-23 PROCEDURE — 96375 TX/PRO/DX INJ NEW DRUG ADDON: CPT

## 2021-08-23 PROCEDURE — 96365 THER/PROPH/DIAG IV INF INIT: CPT

## 2021-08-23 PROCEDURE — 258N000003 HC RX IP 258 OP 636: Performed by: INTERNAL MEDICINE

## 2021-08-23 RX ORDER — HEPARIN SODIUM,PORCINE 10 UNIT/ML
5 VIAL (ML) INTRAVENOUS
Status: CANCELLED | OUTPATIENT
Start: 2021-08-23

## 2021-08-23 RX ORDER — DIPHENHYDRAMINE HCL 25 MG
50 CAPSULE ORAL ONCE
Status: CANCELLED
Start: 2021-08-23

## 2021-08-23 RX ORDER — DIPHENHYDRAMINE HCL 25 MG
50 CAPSULE ORAL ONCE
Status: COMPLETED | OUTPATIENT
Start: 2021-08-23 | End: 2021-08-23

## 2021-08-23 RX ORDER — ACETAMINOPHEN 325 MG/1
650 TABLET ORAL ONCE
Status: CANCELLED
Start: 2021-08-23

## 2021-08-23 RX ORDER — ACETAMINOPHEN 325 MG/1
650 TABLET ORAL ONCE
Status: COMPLETED | OUTPATIENT
Start: 2021-08-23 | End: 2021-08-23

## 2021-08-23 RX ORDER — METHYLPREDNISOLONE SODIUM SUCCINATE 125 MG/2ML
125 INJECTION, POWDER, LYOPHILIZED, FOR SOLUTION INTRAMUSCULAR; INTRAVENOUS ONCE
Status: CANCELLED
Start: 2021-08-23

## 2021-08-23 RX ORDER — METHYLPREDNISOLONE SODIUM SUCCINATE 125 MG/2ML
125 INJECTION, POWDER, LYOPHILIZED, FOR SOLUTION INTRAMUSCULAR; INTRAVENOUS ONCE
Status: COMPLETED | OUTPATIENT
Start: 2021-08-23 | End: 2021-08-23

## 2021-08-23 RX ORDER — HEPARIN SODIUM (PORCINE) LOCK FLUSH IV SOLN 100 UNIT/ML 100 UNIT/ML
5 SOLUTION INTRAVENOUS
Status: CANCELLED | OUTPATIENT
Start: 2021-08-23

## 2021-08-23 RX ADMIN — ACETAMINOPHEN 650 MG: 325 TABLET, FILM COATED ORAL at 08:35

## 2021-08-23 RX ADMIN — SODIUM CHLORIDE 250 ML: 9 INJECTION, SOLUTION INTRAVENOUS at 08:45

## 2021-08-23 RX ADMIN — DIPHENHYDRAMINE HYDROCHLORIDE 50 MG: 25 CAPSULE ORAL at 08:35

## 2021-08-23 RX ADMIN — RITUXIMAB-ABBS 1000 MG: 10 INJECTION, SOLUTION INTRAVENOUS at 09:26

## 2021-08-23 RX ADMIN — METHYLPREDNISOLONE SODIUM SUCCINATE 125 MG: 125 INJECTION, POWDER, FOR SOLUTION INTRAMUSCULAR; INTRAVENOUS at 08:50

## 2021-08-23 ASSESSMENT — PAIN SCALES - GENERAL: PAINLEVEL: NO PAIN (0)

## 2021-08-23 NOTE — PROGRESS NOTES
Infusion Nursing Note:  Camilo Baca presents today for Day 14 of Rapid Rituximab.    Patient seen by provider today: No   present during visit today: Not Applicable.    Note: N/A.      Intravenous Access:  Peripheral IV placed.    Treatment Conditions:  Biological Infusion Checklist:  ~~~ NOTE: If the patient answers yes to any of the questions below, hold the infusion and contact ordering provider or on-call provider.    1. Have you recently had an elevated temperature, fever, chills, productive cough, coughing for 3 weeks or longer or hemoptysis, abnormal vital signs, night sweats,  chest pain or have you noticed a decrease in your appetite, unexplained weight loss or fatigue? No  2. Do you have any open wounds or new incisions? No  3. Do you have any recent or upcoming hospitalizations, surgeries or dental procedures? No  4. Do you currently have or recently have had any signs of illness or infection or are you on any antibiotics? No  5. Have you had any new, sudden or worsening abdominal pain? No  6. Have you or anyone in your household received a live vaccination in the past 4 weeks? Please note:  No live vaccines while on biologic/chemotherapy until 6 months after the last treatment.  Patient can receive the flu vaccine (shot only) and the pneumovax.  It is optimal for the patient to get these vaccines mid cycle, but they can be given at any time as long as it is not on the day of the infusion. No  7. Have you recently been diagnosed with any new nervous system diseases (ie. Multiple sclerosis, Guillain Howe, seizures, neurological changes) or cancer diagnosis? No  8. Are you on any form of radiation or chemotherapy? No  9. Are you pregnant or breast feeding or do you have plans of pregnancy in the future? No  10. Have you been having any signs of worsening depression or suicidal ideations?  (benlysta only) No  11. Have there been any other new onset medical symptoms? No        Post Infusion  Assessment:  Patient tolerated infusion without incident.  Patient observed for 15 minutes post Rituxan.  Site patent and intact, free from redness, edema or discomfort.  No evidence of extravasations.  Access discontinued per protocol.       Discharge Plan:   Discharge instructions reviewed with: Patient.  Patient discharged in stable condition accompanied by: self.  Departure Mode: Ambulatory.      Emy Figueredo RN

## 2021-10-05 ENCOUNTER — VIRTUAL VISIT (OUTPATIENT)
Dept: RHEUMATOLOGY | Facility: CLINIC | Age: 71
End: 2021-10-05
Payer: COMMERCIAL

## 2021-10-05 DIAGNOSIS — M05.79 RHEUMATOID ARTHRITIS INVOLVING MULTIPLE SITES WITH POSITIVE RHEUMATOID FACTOR (H): Primary | ICD-10-CM

## 2021-10-05 DIAGNOSIS — Z11.59 SCREENING FOR VIRAL DISEASE: ICD-10-CM

## 2021-10-05 PROCEDURE — 99213 OFFICE O/P EST LOW 20 MIN: CPT | Mod: 95 | Performed by: INTERNAL MEDICINE

## 2021-10-05 RX ORDER — EPINEPHRINE 1 MG/ML
0.3 INJECTION, SOLUTION, CONCENTRATE INTRAVENOUS EVERY 5 MIN PRN
Status: CANCELLED | OUTPATIENT
Start: 2022-01-23

## 2021-10-05 RX ORDER — MEPERIDINE HYDROCHLORIDE 25 MG/ML
25 INJECTION INTRAMUSCULAR; INTRAVENOUS; SUBCUTANEOUS EVERY 30 MIN PRN
Status: CANCELLED | OUTPATIENT
Start: 2022-01-23

## 2021-10-05 RX ORDER — ALBUTEROL SULFATE 90 UG/1
1-2 AEROSOL, METERED RESPIRATORY (INHALATION)
Status: CANCELLED
Start: 2022-01-23

## 2021-10-05 RX ORDER — ALBUTEROL SULFATE 0.83 MG/ML
2.5 SOLUTION RESPIRATORY (INHALATION)
Status: CANCELLED | OUTPATIENT
Start: 2022-01-23

## 2021-10-05 RX ORDER — HEPARIN SODIUM (PORCINE) LOCK FLUSH IV SOLN 100 UNIT/ML 100 UNIT/ML
5 SOLUTION INTRAVENOUS
Status: CANCELLED | OUTPATIENT
Start: 2022-01-23

## 2021-10-05 RX ORDER — METHYLPREDNISOLONE SODIUM SUCCINATE 125 MG/2ML
125 INJECTION, POWDER, LYOPHILIZED, FOR SOLUTION INTRAMUSCULAR; INTRAVENOUS ONCE
Status: CANCELLED
Start: 2022-01-23

## 2021-10-05 RX ORDER — DIPHENHYDRAMINE HCL 25 MG
50 CAPSULE ORAL ONCE
Status: CANCELLED
Start: 2022-01-23

## 2021-10-05 RX ORDER — DIPHENHYDRAMINE HYDROCHLORIDE 50 MG/ML
50 INJECTION INTRAMUSCULAR; INTRAVENOUS
Status: CANCELLED
Start: 2022-01-23

## 2021-10-05 RX ORDER — METHYLPREDNISOLONE SODIUM SUCCINATE 125 MG/2ML
125 INJECTION, POWDER, LYOPHILIZED, FOR SOLUTION INTRAMUSCULAR; INTRAVENOUS
Status: CANCELLED
Start: 2022-01-23

## 2021-10-05 RX ORDER — NALOXONE HYDROCHLORIDE 0.4 MG/ML
0.2 INJECTION, SOLUTION INTRAMUSCULAR; INTRAVENOUS; SUBCUTANEOUS
Status: CANCELLED | OUTPATIENT
Start: 2022-01-23

## 2021-10-05 RX ORDER — HEPARIN SODIUM,PORCINE 10 UNIT/ML
5 VIAL (ML) INTRAVENOUS
Status: CANCELLED | OUTPATIENT
Start: 2022-01-23

## 2021-10-05 RX ORDER — ACETAMINOPHEN 325 MG/1
650 TABLET ORAL ONCE
Status: CANCELLED
Start: 2022-01-23

## 2021-10-05 NOTE — PROGRESS NOTES
Camilo Baca  is a 71 year old year old male who is being evaluated via a billable video visit.      How would you like to obtain your AVS? Mail a copy  If the video visit is dropped, the invitation should be resent by: Text to cell phone: [phone]  Will anyone else be joining your video visit? No    Rheumatology Video Visit      Camilo Baca MRN# [id number]   YOB: 1950 Age: 71 year old      Date of visit: 10/05/21   PCP: Lyndsey Aponte    Chief Complaint   Patient presents with:  Rheumatoid Arthritis: Doing good, no new issues and feeling good.    Assessment and Plan     1.  Seropositive rheumatoid arthritis (, CCP >250): Dx'd 2013. Previously followed by [facility] and [facility].  Established with me on 3/20/2018. Previously on MTX (ineffective as monotherapy), Humira (effective, stopped because of insurance preference for IV), Simponi (rash on cheeks/nose after infusion), Remicade (lost efficacy), HCQ (facial rash), Orencia (partially effective), SSZ (cytopenia).  Currently on Rituximab (received 3/10/2020 & 3/24/2020, 9/22/2020 & 10/6/2020, 2/23/2021 & 3/9/2021). Significant improvement with rituximab; hasn't required prednisone or NSAIDs.   RA is controlled.  Continue rituximab every 5 months.  Chronic illness, stable.    - Rituximab 1gram IV every 2 weeks for a total of 2 doses; repeat this cycle every 5 months, next due on 1/23/2022  - PRN RA flare: Prednisone 20mg daily x2days, then 15mg daily x2days, then 10mg daily x2days, then 5mg daily x2days, then stop.    - Lab prior to infusion, and he will be called by central scheduling to arrange: COVID-19 PCR              Rapid 3, cumulative scores                       6/2/2020: doing well; virtual visit (Rituximab in March 2020; SSZ 500mg BID)                      03/03/2020: 9.8  (Orencia IV, SSZ 500mg BID)                      11/19/2019: 2.8  (Orencia IV, SSZ 500mg BID)                      08/20/2019: 4     (Orencia 750mg IV)                       04/23/2019: 0     (Orencia 750mg IV)                       01/08/2019: 0.5  (Simponi IV)                      11/06/2018: 9.3  (Simponi IV x2mo)    # Rituximab (Rituxan) Risks and Benefits: The risks and benefits of rituximab were discussed in detail and the patient verbalized understanding.  The risks discussed include, but are not limited to, the risk for hypersensitivity, anaphylaxis, anaphylactoid reactions, fatal infusion reactions, an increased risk for serious infections leading to hospitalization or death, severe mucocutaneous reactions, reactivation of hepatitis B, and development of progressive multifocal leukoencephalopathy (PML) resulting in death.  The most common adverse reactions are infections, nasopharyngitis, urinary tract infections, nausea, diarrhea, headache, muscle spasms, and anemia.  It was discussed that the medication would need to be discontinued if a serious infection develops.  It was discussed that live vaccinations should not be received while using rituximab or within 30 days prior to starting rituximab.  I encouraged reviewing the package insert and asking any questions about the medication.       2.  Impingement syndrome of both shoulders: injected 10/11/2019 PT exercises resolved the shoulder issue.  Not an issue at this time.     3. History of mild plantar fasciitis, bilaterally: resolved with stretching and changing shoes.  Not an issue today.    4.  LFT elevation: Avoid hepatotoxins.  Avoid alcohol.  Following with hepatology.      - COVID-19: has received the Moderna COVID-19 vaccine on 2/26/2021 and 3/25/2021    A single additional dose of the Pfizer or Moderna COVID-19 vaccine is recommended at least 28 days after the completion of the 2-dose mRNA vaccine series for patients receiving any immunosuppressive or immunomodulatory therapy. Attempts should be made to match the additional mRNA dose type to the type given in the mRNA primary series; however, if that is not  feasible, a booster dose with the alternative mRNA vaccine is permitted.  Advised that he receive the COVID-19 booster dose on 12/23/2021, so that it is 4 months after his last rituximab infusion and at least 2 weeks prior to his next rituximab infusion.    Total minutes spent in evaluation with patient, documentation, , and review of pertinent studies and chart notes: 21      Mr. Baca verbalized agreement with and understanding of the rational for the diagnosis and treatment plan.  All questions were answered to best of my ability and the patient's satisfaction. Mr. Baca was advised to contact the clinic with any questions that may arise after the clinic visit.      Thank you for involving me in the care of the patient    Return to clinic: 3 months    HPI   Camilo Baca is a 71 year old male with a past medical history significant for hypertension, hyperlipidemia, GERD, hypothyroidism, history of DVT, factor V Leiden mutation, history of thrombocytopenia, gout, and rheumatoid arthritis who presents for follow-up of rheumatoid arthritis.     Today, 10/5/2021: Rheumatoid arthritis is well controlled.  He finds that rituximab is very effective.  Morning stiffness for about 10 minutes.  No gelling phenomenon.  No joint swelling or pain.  States that he has an upcoming appointment with hepatology.    Denies fevers, chills, nausea, vomiting, constipation, diarrhea. No abdominal pain. No chest pain/pressure, palpitations, or shortness of breath. No LE swelling. No neck pain. No oral or nasal sores.  No rash. No sicca symptoms.      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    ROS   12 point review of system was completed and negative except as noted in the HPI     Active Problem List     Patient Active Problem List   Diagnosis     Lumbago     NONSPECIFIC MEDICAL HISTORY     HYPERLIPIDEMIA LDL GOAL <130      History of adenomatous polyp of colon     Idiopathic chronic gout without tophus, unspecified site     Advanced directives, counseling/discussion     Seropositive rheumatoid arthritis (H)     High risk medication use     Thrombocytopenia (H)     Gastroesophageal reflux disease without esophagitis     Factor 5 Leiden mutation, heterozygous (H)     Personal history of venous thrombosis and embolism     Personal history of DVT (deep vein thrombosis)     Hypothyroidism due to acquired atrophy of thyroid     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)     Secondary hypertension with goal blood pressure less than 140/90     Rheumatoid arthritis involving multiple sites with positive rheumatoid factor (H)     Past Medical History     Past Medical History:   Diagnosis Date     Alopecia, unspecified      Closed fracture of unspecified part of humerus 1974    Arm fracture / left wrist     Esophageal reflux 2/6/2015     Factor 5 Leiden mutation, heterozygous (H)      Gout 3/28/2011     Herpes zoster without mention of complication 1965    Herpes zoster     Measles without mention of complication     Measles     Personal history of DVT (deep vein thrombosis) 4/9/2015     Personal history of venous thrombosis and embolism 4/9/2015     Pulmonary embolism (H) 12/1996    post appendectomy     RA (rheumatoid arthritis) (H) 3/11/2013     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     Rheumatoid arthritis(714.0)     Beginning symptoms     Secondary hypertension with goal blood pressure less than 140/90 11/15/2016     Thrombocytopenia, unspecified (H) 1/29/2014     Varicella without mention of complication     Chickenpox     Past Surgical History     Past Surgical History:   Procedure Laterality Date     C APPENDECTOMY  1996     COLONOSCOPY  11/15/2010    COLONOSCOPY performed by DARIUS MESA at  GI     COLONOSCOPY N/A 11/2/2015    Procedure:  COLONOSCOPY;  Surgeon: Bubba Odom MD;  Location: [facility]     COLONOSCOPY N/A 3/19/2021    Procedure: Colonoscopy;  Surgeon: Ludmila Beltran MD;  Location: [facility]     ESOPHAGOSCOPY, GASTROSCOPY, DUODENOSCOPY (EGD), COMBINED N/A 10/24/2018    Procedure: Esophagogastroduodenoscopy Multiple Biopsies by Biopsy;  Surgeon: Matteo Johnson DO;  Location: [facility]     HC REPAIR ING HERNIA,5+Y/O,REDUCIBL  1960     HERNIORRHAPHY UMBILICAL N/A 4/17/2015    Procedure: HERNIORRHAPHY UMBILICAL;  Surgeon: Rayray Avila MD;  Location: [facility]     LAPAROSCOPIC HERNIORRHAPHY INGUINAL BILATERAL Bilateral 4/17/2015    Procedure: LAPAROSCOPIC HERNIORRHAPHY INGUINAL BILATERAL;  Surgeon: Rayray Avila MD;  Location: [facility]     ZZHC COLONOSCOPY W BIOPSY  08/10/05     Allergy     Allergies   Allergen Reactions     Lisinopril Cough     Plaquenil [Hydroxychloroquine] Hives     Current Medication List     Current Outpatient Medications   Medication Sig     allopurinol (ZYLOPRIM) 300 MG tablet TAKE 1 TABLET BY MOUTH EVERY DAY     cholestyramine light (PREVALITE) 4 GM packet DISSOLVE ONE PACKET IN LIQUID AND DRINK BY MOUTH TWICE A DAY AS DIRECTED     Cyanocobalamin (B-12) 100 MCG TABS      levothyroxine (SYNTHROID/LEVOTHROID) 25 MCG tablet TAKE 1 TABLET BY MOUTH EVERY DAY     losartan-hydrochlorothiazide (HYZAAR) 100-12.5 MG tablet Take 1 tablet by mouth daily     omeprazole (PRILOSEC) 20 MG DR capsule Take 1 capsule (20 mg) by mouth daily     riTUXimab 500 MG/50ML SOLN Inject 1,000 mg into the vein     sildenafil (VIAGRA) 100 MG tablet 1/2 tab 1/2 hour prior to activity     triamcinolone (KENALOG) 0.1 % external ointment Apply thin layer twice daily to affected areas as needed.     VITAMIN D PO      cetirizine (ZYRTEC) 10 MG tablet Take 1 tablet (10 mg) by mouth every evening (Patient not taking: Reported on 8/13/2021)     diclofenac (VOLTAREN) 1 % GEL Apply  2 grams to shoulders three times daily using  "enclosed dosing card. (Patient not taking: Reported on 8/13/2021)     No current facility-administered medications for this visit.         Social History   See HPI    Family History     Family History   Problem Relation Age of Onset     Arthritis Mother      Cardiovascular Mother      Depression Mother      Gastrointestinal Disease Mother         IBS     Hypertension Mother      Circulatory Mother         Aortal aneurysm     Osteoporosis Mother      Allergies Father         Cortisone     Cardiovascular Father      Circulatory Father      Diabetes Father      Hypertension Father      Lipids Father      Alzheimer Disease Paternal Grandfather      Blood Disease Paternal Grandmother         Clotting problems     Blood Disease Son         Factor 5     Hypertension Maternal Aunt      Cerebrovascular Disease Maternal Grandmother        Physical Exam     Temp Readings from Last 3 Encounters:   08/13/21 98.3  F (36.8  C) (Temporal)   08/09/21 99.2  F (37.3  C) (Oral)   07/14/21 98  F (36.7  C) (Temporal)     BP Readings from Last 5 Encounters:   08/23/21 (!) 152/86   08/13/21 114/80   08/09/21 129/75   07/14/21 130/82   07/08/21 136/80     Pulse Readings from Last 1 Encounters:   08/23/21 82     Resp Readings from Last 1 Encounters:   08/23/21 16     Estimated body mass index is 28.65 kg/m  as calculated from the following:    Height as of 3/19/21: 1.778 m (5' 10\").    Weight as of 8/23/21: 90.6 kg (199 lb 11.2 oz).      GEN: NAD. Healthy appearing adult.   HEENT: MMM.  Anicteric, noninjected sclera. No obvious external lesions of the ear and nose. Hearing intact.  PULM: No increased work of breathing  MSK:  Hands and wrists without swelling.   SKIN: No rash or jaundice seen  PSYCH: Alert. Appropriate.        Labs / Imaging (select studies)       CBC  Recent Labs   Lab Test 06/25/21  0841 08/27/20  0824 05/28/20  0927 02/10/20  0922 02/10/20  0922 11/12/19  0748 10/15/19  0827   WBC 4.1 3.5* 3.6*   < > 4.1   < > 4.8   RBC " 4.29* 4.21* 4.29*   < > 4.21*   < > 4.26*   HGB 14.3 13.8 13.9   < > 13.7   < > 13.7   HCT 41.2 41.4 41.7   < > 40.6   < > 41.1   MCV 96 98 97   < > 96   < > 97   RDW 13.0 13.0 13.2   < > 12.6   < > 13.5   * 102* 117*   < > 105*   < > 144*   MCH 33.3* 32.8 32.4   < > 32.5   < > 32.2   MCHC 34.7 33.3 33.3   < > 33.7   < > 33.3   NEUTROPHIL 65.7 61.9 59.2   < > 54.9   < > 60.4   LYMPH 17.3 19.5 19.6   < > 29.0   < > 24.4   MONOCYTE 12.7 13.0 14.9   < > 13.5   < > 14.0   EOSINOPHIL 3.6 4.5 5.2   < > 2.4   < > 0.8   BASOPHIL 0.5 0.8 0.8   < > 0.2   < > 0.4   ANEU 2.7 2.2 2.2   < > 2.3   < > 2.9   ALYM 0.7* 0.7* 0.7*   < > 1.2   < > 1.2   KIESHA 0.5 0.5 0.5   < > 0.6   < > 0.7   AEOS 0.2  --   --   --  0.1  --  0.0   ABAS 0.0 0.0 0.0   < > 0.0   < > 0.0    < > = values in this interval not displayed.     CMP  Recent Labs   Lab Test 07/08/21  0958 06/25/21  0841 03/29/21  1013 02/16/21  0947 08/27/20  0824 05/28/20  0927 05/28/20  0927 04/18/19  0826 11/06/18  0913 11/06/18  0913 06/26/18  0955 04/05/18  2355   NA  --   --   --  141  --   --   --   --   --  141  --  140   POTASSIUM  --  4.2 3.5 4.4  --   --   --   --    < > 4.2  --  3.4   CHLORIDE  --   --   --  106  --   --   --   --   --  105  --  104   CO2  --   --   --  32  --   --   --   --   --  33*  --  26   ANIONGAP  --   --   --  3  --   --   --   --   --  3  --  10   GLC  --   --   --  119*  --   --   --   --   --  107*  --  124*   BUN  --   --   --  15  --   --   --   --   --  15  --  32*   CR  --  0.96  --  0.84 0.83   < > 0.86   < >  --  0.83   < > 0.71   GFRESTIMATED  --  80  --  90 >90   < > 89   < >  --  >90   < > >90   GFRESTBLACK  --  >90  --  >90 >90   < > >90   < >  --  >90   < > >90   ANDREZ  --   --   --  9.4  --   --   --   --   --  8.8  --  7.9*   BILITOTAL  --  1.0  --   --  1.2  --  0.8   < >  --  0.6   < >  --    ALBUMIN  --  3.8  --   --  3.6  --  3.6   < >  --  3.1*   < >  --    PROTTOTAL  --  7.0  --   --  7.2  --  7.3   < >  --  7.4   < >   --    ALKPHOS  --  63  --   --  71  --  68   < >  --  60   < >  --    AST 57* 49*  --   --  30   < > 23   < >  --  41   < >  --    * 119*  --   --  47   < > 34   < >  --  82*   < >  --     < > = values in this interval not displayed.     Calcium/VitaminD  Recent Labs   Lab Test 02/16/21  0947 11/06/18  0913 06/26/18  0955 04/05/18  2355   ANDREZ 9.4 8.8  --  7.9*   VITDT  --   --  25  --      ESR/CRP  Recent Labs   Lab Test 06/25/21  0841 08/27/20  0824 05/28/20  0927   SED 10 9 15   CRP <2.9 <2.9 <2.9     Lipid Panel  Recent Labs   Lab Test 12/08/20  0911 09/17/19  0827 12/22/17  0735 01/29/14  0833 10/04/13  0819   CHOL 155 138 134   < > 143   TRIG 129 85 149   < > 92   HDL 55 38* 46   < > 49   LDL 74 83 58   < > 75   VLDL  --   --   --   --  18   CHOLHDLRATIO  --   --   --   --  3.0   NHDL 100 100 88   < >  --     < > = values in this interval not displayed.     Hepatitis B  Recent Labs   Lab Test 06/25/21  0841 03/03/20  1405 03/20/18  1008   HBCAB Nonreactive Nonreactive Nonreactive   HEPBANG Nonreactive Nonreactive Nonreactive     Hepatitis C  Recent Labs   Lab Test 02/05/14  1104   HCVAB Negative     Tuberculosis Screening  Recent Labs   Lab Test 06/25/21  0841 03/03/20  1405 03/20/18  1008 02/05/14  1104   TBRES  --  Negative  --   --    TBRSLT  --   --  Negative Negative   TBAGN  --   --  0.14 0.01   TBRST Negative  --   --   --        Immunization History     Immunization History   Administered Date(s) Administered     COVID-19,PF,Moderna 02/26/2021, 03/25/2021     HepB, Unspecified 08/19/1991, 09/23/1991, 02/24/1992     Influenza (High Dose) 3 valent vaccine 10/06/2017, 10/03/2018, 10/23/2019     Influenza (IIV3) PF 10/01/2009, 10/27/2010, 10/11/2011     Influenza Vaccine IM > 6 months Valent IIV4 (Alfuria,Fluzone) 10/09/2013, 10/23/2014, 09/18/2015, 11/15/2016     Influenza, Quad, High Dose, Pf, 65yr+ (Fluzone HD) 09/02/2020     Pneumo Conj 13-V (2010&after) 10/06/2017     Pneumococcal 23 valent  10/23/2014, 10/23/2019     TD (ADULT, 7+) 09/05/2001     TDAP Vaccine (Boostrix) 10/18/2012     Zoster vaccine recombinant adjuvanted (SHINGRIX) 01/31/2019, 04/18/2019     Zoster vaccine, live 10/23/2014          Chart documentation done in part with Dragon Voice recognition Software. Although reviewed after completion, some word and grammatical error may remain.      Video-Visit Details    Type of service:  Video Visit    Video Start Time: 1:09 PM    Video End Time: 1:24 PM    Originating Location (pt. Location): [place], [place]    Distant Location (provider location):  Home    Platform used for Video Visit: Mariza Gomes MD

## 2021-10-05 NOTE — PATIENT INSTRUCTIONS
RHEUMATOLOGY    Dr. Fabricio Gomes    [facility] [address] [place]  [address] [place] [address] [place] [address]  Phone number: [phone]  Fax number: [phone]    Thank you for choosing [facility]!    Jaquelin Muñiz CMA Rheumatology    -----------------------------    Please receive the booster dose of the Moderna COVID-19 vaccine on or shortly after 12/23/2021, so that it is 4 months after the last rituximab infusion and at least 2 weeks prior to the next rituximab infusion.    The next rituximab infusion will be on 1/23/2022.  Please call to schedule this infusion.                  R/O Stroke

## 2021-10-11 DIAGNOSIS — E78.5 HYPERLIPIDEMIA LDL GOAL <130: ICD-10-CM

## 2021-10-11 RX ORDER — CHOLESTYRAMINE LIGHT 4 G/5.7G
POWDER, FOR SUSPENSION ORAL
Qty: 180 PACKET | Refills: 3 | Status: SHIPPED | OUTPATIENT
Start: 2021-10-11 | End: 2022-11-29

## 2021-10-11 NOTE — TELEPHONE ENCOUNTER
Routing refill request to provider for review/approval because:  Drug not on the [facility] refill protocol     Radha Nesbitt RN

## 2021-10-23 ENCOUNTER — HEALTH MAINTENANCE LETTER (OUTPATIENT)
Age: 71
End: 2021-10-23

## 2021-11-02 ENCOUNTER — MYC MEDICAL ADVICE (OUTPATIENT)
Dept: FAMILY MEDICINE | Facility: CLINIC | Age: 71
End: 2021-11-02

## 2021-11-02 ENCOUNTER — TELEPHONE (OUTPATIENT)
Dept: FAMILY MEDICINE | Facility: CLINIC | Age: 71
End: 2021-11-02

## 2021-11-09 DIAGNOSIS — I15.9 SECONDARY HYPERTENSION WITH GOAL BLOOD PRESSURE LESS THAN 140/90: ICD-10-CM

## 2021-11-10 RX ORDER — LOSARTAN POTASSIUM AND HYDROCHLOROTHIAZIDE 12.5; 1 MG/1; MG/1
1 TABLET ORAL DAILY
Qty: 90 TABLET | Refills: 2 | Status: SHIPPED | OUTPATIENT
Start: 2021-11-10 | End: 2022-05-04

## 2021-11-10 NOTE — TELEPHONE ENCOUNTER
Routing refill request to provider for review/approval because:  Elevated BP on file.     Radha Nesbitt RN

## 2021-11-17 ENCOUNTER — APPOINTMENT (OUTPATIENT)
Dept: LAB | Facility: CLINIC | Age: 71
End: 2021-11-17
Payer: COMMERCIAL

## 2021-11-17 DIAGNOSIS — Z11.59 SCREENING FOR VIRAL DISEASE: ICD-10-CM

## 2021-11-17 DIAGNOSIS — D69.6 THROMBOCYTOPENIA (H): ICD-10-CM

## 2021-11-17 DIAGNOSIS — R79.89 ELEVATED LFTS: ICD-10-CM

## 2021-11-17 LAB
ALBUMIN SERPL-MCNC: 3.3 G/DL (ref 3.4–5)
ALP SERPL-CCNC: 60 U/L (ref 40–150)
ALT SERPL W P-5'-P-CCNC: 138 U/L (ref 0–70)
ANION GAP SERPL CALCULATED.3IONS-SCNC: 1 MMOL/L (ref 3–14)
AST SERPL W P-5'-P-CCNC: 59 U/L (ref 0–45)
BILIRUB DIRECT SERPL-MCNC: 0.2 MG/DL (ref 0–0.2)
BILIRUB SERPL-MCNC: 0.8 MG/DL (ref 0.2–1.3)
BUN SERPL-MCNC: 13 MG/DL (ref 7–30)
CALCIUM SERPL-MCNC: 9.2 MG/DL (ref 8.5–10.1)
CHLORIDE BLD-SCNC: 107 MMOL/L (ref 94–109)
CO2 SERPL-SCNC: 32 MMOL/L (ref 20–32)
CREAT SERPL-MCNC: 0.87 MG/DL (ref 0.66–1.25)
ERYTHROCYTE [DISTWIDTH] IN BLOOD BY AUTOMATED COUNT: 12.4 % (ref 10–15)
GFR SERPL CREATININE-BSD FRML MDRD: 88 ML/MIN/1.73M2
GLUCOSE BLD-MCNC: 98 MG/DL (ref 70–99)
HCT VFR BLD AUTO: 41.4 % (ref 40–53)
HGB BLD-MCNC: 14 G/DL (ref 13.3–17.7)
INR PPP: 1 (ref 0.85–1.15)
MCH RBC QN AUTO: 33 PG (ref 26.5–33)
MCHC RBC AUTO-ENTMCNC: 33.8 G/DL (ref 31.5–36.5)
MCV RBC AUTO: 98 FL (ref 78–100)
PLATELET # BLD AUTO: 189 10E3/UL (ref 150–450)
POTASSIUM BLD-SCNC: 4.5 MMOL/L (ref 3.4–5.3)
PROT SERPL-MCNC: 7.2 G/DL (ref 6.8–8.8)
RBC # BLD AUTO: 4.24 10E6/UL (ref 4.4–5.9)
SODIUM SERPL-SCNC: 140 MMOL/L (ref 133–144)
WBC # BLD AUTO: 3.3 10E3/UL (ref 4–11)

## 2021-11-17 PROCEDURE — 82248 BILIRUBIN DIRECT: CPT

## 2021-11-17 PROCEDURE — 85610 PROTHROMBIN TIME: CPT

## 2021-11-17 PROCEDURE — 36415 COLL VENOUS BLD VENIPUNCTURE: CPT

## 2021-11-17 PROCEDURE — 80053 COMPREHEN METABOLIC PANEL: CPT

## 2021-11-17 PROCEDURE — 85027 COMPLETE CBC AUTOMATED: CPT

## 2021-11-22 ENCOUNTER — OFFICE VISIT (OUTPATIENT)
Dept: GASTROENTEROLOGY | Facility: CLINIC | Age: 71
End: 2021-11-22
Payer: COMMERCIAL

## 2021-11-22 VITALS
SYSTOLIC BLOOD PRESSURE: 133 MMHG | HEIGHT: 70 IN | BODY MASS INDEX: 26.76 KG/M2 | DIASTOLIC BLOOD PRESSURE: 80 MMHG | TEMPERATURE: 97.7 F | WEIGHT: 186.9 LBS | OXYGEN SATURATION: 98 % | HEART RATE: 63 BPM

## 2021-11-22 DIAGNOSIS — K21.9 GASTROESOPHAGEAL REFLUX DISEASE WITHOUT ESOPHAGITIS: ICD-10-CM

## 2021-11-22 DIAGNOSIS — K74.00 HEPATIC FIBROSIS, UNSPECIFIED: ICD-10-CM

## 2021-11-22 DIAGNOSIS — R79.89 ELEVATED LFTS: Primary | ICD-10-CM

## 2021-11-22 PROCEDURE — 99214 OFFICE O/P EST MOD 30 MIN: CPT | Performed by: INTERNAL MEDICINE

## 2021-11-22 ASSESSMENT — MIFFLIN-ST. JEOR: SCORE: 1611.08

## 2021-11-22 ASSESSMENT — PAIN SCALES - GENERAL: PAINLEVEL: NO PAIN (0)

## 2021-11-22 NOTE — NURSING NOTE
"Chief Complaint   Patient presents with     RECHECK     Elevated LFT's       Vitals:    11/22/21 0947   BP: 133/80   BP Location: Left arm   Patient Position: Sitting   Cuff Size: Adult Large   Pulse: 63   Temp: 97.7  F (36.5  C)   TempSrc: Oral   SpO2: 98%   Weight: 84.8 kg (186 lb 14.4 oz)   Height: 1.765 m (5' 9.5\")       Body mass index is 27.2 kg/m .    Nadja Marina MA  "

## 2021-11-22 NOTE — PROGRESS NOTES
[facility]    Follow-up Visit    Follow-up visit for abnormal liver function tests.    Subjective:  71 year old male with rheumatoid arthritis,  factor V of Leyden, right lower extremity DVT and PE.  We initially saw him for abnormal liver function tests.  These were noted in 2006 and were attributed to medications.  He was on atorvastatin 80 mg which was later on reduce it to 40 mg and eventually discontinued it.  His liver tests did not normalize.  I have said before in my previous note Mr. Baca is overweight and is completely not symptomatic for liver disease.    Patient denies jaundice, lower extremity edema, abdominal distension, lethargy or confusion. Patient denies melena, hematemesis or hematochezia.  He has no change in his bowel habits. Denies any pruritus has some fatigue but is otherwise doing quite well.  The fatigue he attributes this when he recently had Covid infection although he was fully vaccinated.  He received Regeneron infusion and he recuperated completely now.  The infection was in late October now over 3 weeks ago. Patient denies fevers, sweats or chills.  Weight stable.      Medical hx Surgical hx   Past Medical History:   Diagnosis Date     Alopecia, unspecified      Closed fracture of unspecified part of humerus 1974    Arm fracture / left wrist     Esophageal reflux 2/6/2015     Factor 5 Leiden mutation, heterozygous (H)      Gout 3/28/2011     Herpes zoster without mention of complication 1965    Herpes zoster     Measles without mention of complication     Measles     Personal history of DVT (deep vein thrombosis) 4/9/2015     Personal history of venous thrombosis and embolism 4/9/2015     Pulmonary embolism (H) 12/1996    post appendectomy     RA (rheumatoid arthritis) (H) 3/11/2013     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     Rheumatoid arthritis(714.0)     Beginning symptoms     Secondary hypertension  with goal blood pressure less than 140/90 11/15/2016     Thrombocytopenia, unspecified (H) 1/29/2014     Varicella without mention of complication     Chickenpox      Past Surgical History:   Procedure Laterality Date     C APPENDECTOMY  1996     COLONOSCOPY  11/15/2010    COLONOSCOPY performed by DARIUS MESA at  GI     COLONOSCOPY N/A 11/2/2015    Procedure: COLONOSCOPY;  Surgeon: Bubba Odom MD;  Location:  GI     COLONOSCOPY N/A 3/19/2021    Procedure: Colonoscopy;  Surgeon: Ludmila Beltran MD;  Location:  GI     ESOPHAGOSCOPY, GASTROSCOPY, DUODENOSCOPY (EGD), COMBINED N/A 10/24/2018    Procedure: Esophagogastroduodenoscopy Multiple Biopsies by Biopsy;  Surgeon: Matteo Johnson DO;  Location:  GI     HC REPAIR ING HERNIA,5+Y/O,REDUCIBL  1960     HERNIORRHAPHY UMBILICAL N/A 4/17/2015    Procedure: HERNIORRHAPHY UMBILICAL;  Surgeon: Rayray Avila MD;  Location: [facility]     LAPAROSCOPIC HERNIORRHAPHY INGUINAL BILATERAL Bilateral 4/17/2015    Procedure: LAPAROSCOPIC HERNIORRHAPHY INGUINAL BILATERAL;  Surgeon: Rayray Avila MD;  Location:  OR     ZZHC COLONOSCOPY W BIOPSY  08/10/05          Medications  Prior to Admission medications    Medication Sig Start Date End Date Taking? Authorizing Provider   allopurinol (ZYLOPRIM) 300 MG tablet TAKE 1 TABLET BY MOUTH EVERY DAY 5/3/21  Yes Akash Mariee MD   cholestyramine light (PREVALITE) 4 GM packet DISSOLVE ONE PACKET IN LIQUID AND DRINK BY MOUTH TWICE A DAY AS DIRECTED 10/11/21  Yes Akash Mariee MD   Cyanocobalamin (B-12) 100 MCG TABS    Yes Reported, Patient   levothyroxine (SYNTHROID/LEVOTHROID) 25 MCG tablet TAKE 1 TABLET BY MOUTH EVERY DAY 5/17/21  Yes Akash Mariee MD   losartan-hydrochlorothiazide (HYZAAR) 100-12.5 MG tablet TAKE 1 TABLET BY MOUTH DAILY 11/10/21  Yes Schoen, Gregory G, MD   omeprazole (PRILOSEC) 20 MG DR capsule Take 1 capsule (20 mg) by mouth daily 3/3/21  Yes Akash Mariee MD  "  riTUXimab 500 MG/50ML SOLN Inject 1,000 mg into the vein   Yes Reported, Patient   sildenafil (VIAGRA) 100 MG tablet 1/2 tab 1/2 hour prior to activity 2/16/21  Yes Akash Mariee MD   VITAMIN D PO    Yes Reported, Patient   cetirizine (ZYRTEC) 10 MG tablet Take 1 tablet (10 mg) by mouth every evening  Patient not taking: Reported on 8/13/2021 11/15/16   Ru Gamez PA-C   diclofenac (VOLTAREN) 1 % GEL Apply  2 grams to shoulders three times daily using enclosed dosing card.  Patient not taking: Reported on 8/13/2021 9/25/15   Alexsandra Wolfe MD   triamcinolone (KENALOG) 0.1 % external ointment Apply thin layer twice daily to affected areas as needed.  Patient not taking: Reported on 11/22/2021 3/5/20   Dleilah Zhao MD       Allergies  Allergies   Allergen Reactions     Lisinopril Cough     Plaquenil [Hydroxychloroquine] Hives       Review of systems  A 10-point review of systems was negative    Examination  /80 (BP Location: Left arm, Patient Position: Sitting, Cuff Size: Adult Large)   Pulse 63   Temp 97.7  F (36.5  C) (Oral)   Ht 1.765 m (5' 9.5\")   Wt 84.8 kg (186 lb 14.4 oz)   SpO2 98%   BMI 27.20 kg/m    Body mass index is 27.2 kg/m .    Gen- well, NAD, A+Ox3, normal color  Lym- no palpable LAD  CVS- RRR  RS- CTA  Abd- Mildly overweight. No hepatosplenomegaly.  Extr- hands normal, no BEBE  Skin- no rash or jaundice  Neuro- no asterixis  Psych- normal mood    Laboratory  Lab Results   Component Value Date     11/17/2021     02/16/2021    POTASSIUM 4.5 11/17/2021    POTASSIUM 4.2 06/25/2021    CHLORIDE 107 11/17/2021    CHLORIDE 106 02/16/2021    CO2 32 11/17/2021    CO2 32 02/16/2021    BUN 13 11/17/2021    BUN 15 02/16/2021    CR 0.87 11/17/2021    CR 0.96 06/25/2021       Lab Results   Component Value Date    BILITOTAL 0.8 11/17/2021    BILITOTAL 1.0 06/25/2021     11/17/2021     07/08/2021    AST 59 11/17/2021    AST 57 07/08/2021    ALKPHOS 60 11/17/2021    " ALKPHOS 63 06/25/2021       Lab Results   Component Value Date    ALBUMIN 3.3 11/17/2021    ALBUMIN 3.8 06/25/2021    PROTTOTAL 7.2 11/17/2021    PROTTOTAL 7.0 06/25/2021        Lab Results   Component Value Date    WBC 3.3 11/17/2021    WBC 4.1 06/25/2021    HGB 14.0 11/17/2021    HGB 14.3 06/25/2021    MCV 98 11/17/2021    MCV 96 06/25/2021     11/17/2021     06/25/2021       Lab Results   Component Value Date    INR 1.00 11/17/2021    INR 0.9 04/09/2015       Radiology    Assessment and plan  71 year old male with abnormal liver function tests dating back to 2006.  This was attributed to medications including atorvastatin.  Mr. Baca has risk factors for nonalcoholic steatohepatitis and this is in my opinion the most likely diagnosis the patient has.  He will need to do lifestyle modifications including weight loss and he will have his labs followed.     We will plan to see him every 6 months and see if he improves.  He will also have his labs redone every 6 months and we ordered now an MR elastography to check if he has any fibrosis.  He will need not to drink any alcoholic beverages and he is agreeable to that.    This was a 35-minute visit of which more than 50% was spent in explaining to the patient what our plan of care was we answered all his questions.    Gem Garay MD  Hepatology  [facility]

## 2021-11-22 NOTE — LETTER
11/22/2021         RE: Camilo Baca  [address]  [place] [address]        Dear Colleague,    Thank you for referring your patient, Camilo Baca, to the [facility] [place]. Please see a copy of my visit note below.    [facility] Hepatology    Follow-up Visit    Follow-up visit for abnormal liver function tests.    Subjective:  71 year old male with rheumatoid arthritis,  factor V of Leyden, right lower extremity DVT and PE.  We initially saw him for abnormal liver function tests.  These were noted in 2006 and were attributed to medications.  He was on atorvastatin 80 mg which was later on reduce it to 40 mg and eventually discontinued it.  His liver tests did not normalize.  I have said before in my previous note Mr. Baca is overweight and is completely not symptomatic for liver disease.    Patient denies jaundice, lower extremity edema, abdominal distension, lethargy or confusion. Patient denies melena, hematemesis or hematochezia.  He has no change in his bowel habits. Denies any pruritus has some fatigue but is otherwise doing quite well.  The fatigue he attributes this when he recently had Covid infection although he was fully vaccinated.  He received Regeneron infusion and he recuperated completely now.  The infection was in late October now over 3 weeks ago. Patient denies fevers, sweats or chills.  Weight stable.      Medical hx Surgical hx   Past Medical History:   Diagnosis Date     Alopecia, unspecified      Closed fracture of unspecified part of humerus 1974    Arm fracture / left wrist     Esophageal reflux 2/6/2015     Factor 5 Leiden mutation, heterozygous (H)      Gout 3/28/2011     Herpes zoster without mention of complication 1965    Herpes zoster     Measles without mention of complication     Measles     Personal history of DVT (deep vein thrombosis) 4/9/2015     Personal history of venous thrombosis and embolism 4/9/2015     Pulmonary embolism (H)  12/1996    post appendectomy     RA (rheumatoid arthritis) (H) 3/11/2013     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     Rheumatoid arthritis(714.0)     Beginning symptoms     Secondary hypertension with goal blood pressure less than 140/90 11/15/2016     Thrombocytopenia, unspecified (H) 1/29/2014     Varicella without mention of complication     Chickenpox      Past Surgical History:   Procedure Laterality Date     C APPENDECTOMY  1996     COLONOSCOPY  11/15/2010    COLONOSCOPY performed by DARIUS MESA at  GI     COLONOSCOPY N/A 11/2/2015    Procedure: COLONOSCOPY;  Surgeon: Bubba Odom MD;  Location:  GI     COLONOSCOPY N/A 3/19/2021    Procedure: Colonoscopy;  Surgeon: Ludmila Beltran MD;  Location:  GI     ESOPHAGOSCOPY, GASTROSCOPY, DUODENOSCOPY (EGD), COMBINED N/A 10/24/2018    Procedure: Esophagogastroduodenoscopy Multiple Biopsies by Biopsy;  Surgeon: Matteo Johnson DO;  Location:  GI     HC REPAIR ING HERNIA,5+Y/O,REDUCIBL  1960     HERNIORRHAPHY UMBILICAL N/A 4/17/2015    Procedure: HERNIORRHAPHY UMBILICAL;  Surgeon: Rayray Avila MD;  Location:  OR     LAPAROSCOPIC HERNIORRHAPHY INGUINAL BILATERAL Bilateral 4/17/2015    Procedure: LAPAROSCOPIC HERNIORRHAPHY INGUINAL BILATERAL;  Surgeon: Rayray Avila MD;  Location:  OR     ZZHC COLONOSCOPY W BIOPSY  08/10/05          Medications  Prior to Admission medications    Medication Sig Start Date End Date Taking? Authorizing Provider   allopurinol (ZYLOPRIM) 300 MG tablet TAKE 1 TABLET BY MOUTH EVERY DAY 5/3/21  Yes Akash Mariee MD   cholestyramine light (PREVALITE) 4 GM packet DISSOLVE ONE PACKET IN LIQUID AND DRINK BY MOUTH TWICE A DAY AS DIRECTED 10/11/21  Yes Akash Mariee MD   Cyanocobalamin (B-12) 100 MCG TABS    Yes Reported, Patient   levothyroxine (SYNTHROID/LEVOTHROID) 25 MCG tablet TAKE 1 TABLET BY MOUTH EVERY DAY 5/17/21  Yes  "Akash Mariee MD   losartan-hydrochlorothiazide (HYZAAR) 100-12.5 MG tablet TAKE 1 TABLET BY MOUTH DAILY 11/10/21  Yes Schoen, Gregory G, MD   omeprazole (PRILOSEC) 20 MG DR capsule Take 1 capsule (20 mg) by mouth daily 3/3/21  Yes Akash Mariee MD   riTUXimab 500 MG/50ML SOLN Inject 1,000 mg into the vein   Yes Reported, Patient   sildenafil (VIAGRA) 100 MG tablet 1/2 tab 1/2 hour prior to activity 2/16/21  Yes Akash Mariee MD   VITAMIN D PO    Yes Reported, Patient   cetirizine (ZYRTEC) 10 MG tablet Take 1 tablet (10 mg) by mouth every evening  Patient not taking: Reported on 8/13/2021 11/15/16   Ru Gamez PA-C   diclofenac (VOLTAREN) 1 % GEL Apply  2 grams to shoulders three times daily using enclosed dosing card.  Patient not taking: Reported on 8/13/2021 9/25/15   Alexsandra Wolfe MD   triamcinolone (KENALOG) 0.1 % external ointment Apply thin layer twice daily to affected areas as needed.  Patient not taking: Reported on 11/22/2021 3/5/20   Delilah Zhao MD       Allergies  Allergies   Allergen Reactions     Lisinopril Cough     Plaquenil [Hydroxychloroquine] Hives       Review of systems  A 10-point review of systems was negative    Examination  /80 (BP Location: Left arm, Patient Position: Sitting, Cuff Size: Adult Large)   Pulse 63   Temp 97.7  F (36.5  C) (Oral)   Ht 1.765 m (5' 9.5\")   Wt 84.8 kg (186 lb 14.4 oz)   SpO2 98%   BMI 27.20 kg/m    Body mass index is 27.2 kg/m .    Gen- well, NAD, A+Ox3, normal color  Lym- no palpable LAD  CVS- RRR  RS- CTA  Abd- Mildly overweight. No hepatosplenomegaly.  Extr- hands normal, no BEBE  Skin- no rash or jaundice  Neuro- no asterixis  Psych- normal mood    Laboratory  Lab Results   Component Value Date     11/17/2021     02/16/2021    POTASSIUM 4.5 11/17/2021    POTASSIUM 4.2 06/25/2021    CHLORIDE 107 11/17/2021    CHLORIDE 106 02/16/2021    CO2 32 11/17/2021    CO2 32 02/16/2021    BUN 13 11/17/2021    BUN 15 " 02/16/2021    CR 0.87 11/17/2021    CR 0.96 06/25/2021       Lab Results   Component Value Date    BILITOTAL 0.8 11/17/2021    BILITOTAL 1.0 06/25/2021     11/17/2021     07/08/2021    AST 59 11/17/2021    AST 57 07/08/2021    ALKPHOS 60 11/17/2021    ALKPHOS 63 06/25/2021       Lab Results   Component Value Date    ALBUMIN 3.3 11/17/2021    ALBUMIN 3.8 06/25/2021    PROTTOTAL 7.2 11/17/2021    PROTTOTAL 7.0 06/25/2021        Lab Results   Component Value Date    WBC 3.3 11/17/2021    WBC 4.1 06/25/2021    HGB 14.0 11/17/2021    HGB 14.3 06/25/2021    MCV 98 11/17/2021    MCV 96 06/25/2021     11/17/2021     06/25/2021       Lab Results   Component Value Date    INR 1.00 11/17/2021    INR 0.9 04/09/2015       Radiology    Assessment and plan  71 year old male with abnormal liver function tests dating back to 2006.  This was attributed to medications including atorvastatin.  Mr. Baca has risk factors for nonalcoholic steatohepatitis and this is in my opinion the most likely diagnosis the patient has.  He will need to do lifestyle modifications including weight loss and he will have his labs followed.     We will plan to see him every 6 months and see if he improves.  He will also have his labs redone every 6 months and we ordered now an MR elastography to check if he has any fibrosis.  He will need not to drink any alcoholic beverages and he is agreeable to that.    This was a 35-minute visit of which more than 50% was spent in explaining to the patient what our plan of care was we answered all his questions.    Gem Garay MD  Hepatology  [facility]      Again, thank you for allowing me to participate in the care of your patient.        Sincerely,        Gem Garay MD

## 2021-12-09 ENCOUNTER — MYC MEDICAL ADVICE (OUTPATIENT)
Dept: FAMILY MEDICINE | Facility: CLINIC | Age: 71
End: 2021-12-09
Payer: COMMERCIAL

## 2021-12-09 DIAGNOSIS — N52.9 ERECTILE DYSFUNCTION, UNSPECIFIED ERECTILE DYSFUNCTION TYPE: ICD-10-CM

## 2021-12-10 RX ORDER — SILDENAFIL 100 MG/1
TABLET, FILM COATED ORAL
Qty: 3 TABLET | Refills: 4 | Status: SHIPPED | OUTPATIENT
Start: 2021-12-10 | End: 2023-02-01

## 2022-01-24 ENCOUNTER — APPOINTMENT (OUTPATIENT)
Dept: LAB | Facility: CLINIC | Age: 72
End: 2022-01-24
Payer: COMMERCIAL

## 2022-01-24 ENCOUNTER — INFUSION THERAPY VISIT (OUTPATIENT)
Dept: INFUSION THERAPY | Facility: CLINIC | Age: 72
End: 2022-01-24
Attending: INTERNAL MEDICINE
Payer: MEDICARE

## 2022-01-24 VITALS
RESPIRATION RATE: 16 BRPM | OXYGEN SATURATION: 98 % | DIASTOLIC BLOOD PRESSURE: 85 MMHG | BODY MASS INDEX: 28.67 KG/M2 | TEMPERATURE: 97.8 F | HEART RATE: 83 BPM | WEIGHT: 196.98 LBS | SYSTOLIC BLOOD PRESSURE: 138 MMHG

## 2022-01-24 DIAGNOSIS — M05.79 RHEUMATOID ARTHRITIS INVOLVING MULTIPLE SITES WITH POSITIVE RHEUMATOID FACTOR (H): Primary | ICD-10-CM

## 2022-01-24 LAB
CD19 CELLS # BLD: <1 CELLS/UL (ref 107–698)
CD19 CELLS NFR BLD: <1 % (ref 6–27)

## 2022-01-24 PROCEDURE — 82784 ASSAY IGA/IGD/IGG/IGM EACH: CPT | Performed by: INTERNAL MEDICINE

## 2022-01-24 PROCEDURE — 250N000013 HC RX MED GY IP 250 OP 250 PS 637: Performed by: INTERNAL MEDICINE

## 2022-01-24 PROCEDURE — 36415 COLL VENOUS BLD VENIPUNCTURE: CPT | Performed by: INTERNAL MEDICINE

## 2022-01-24 PROCEDURE — 250N000011 HC RX IP 250 OP 636: Performed by: INTERNAL MEDICINE

## 2022-01-24 PROCEDURE — 96415 CHEMO IV INFUSION ADDL HR: CPT

## 2022-01-24 PROCEDURE — 86355 B CELLS TOTAL COUNT: CPT | Performed by: INTERNAL MEDICINE

## 2022-01-24 PROCEDURE — 96413 CHEMO IV INFUSION 1 HR: CPT

## 2022-01-24 PROCEDURE — 96375 TX/PRO/DX INJ NEW DRUG ADDON: CPT

## 2022-01-24 PROCEDURE — 258N000003 HC RX IP 258 OP 636: Performed by: INTERNAL MEDICINE

## 2022-01-24 RX ORDER — ACETAMINOPHEN 325 MG/1
650 TABLET ORAL ONCE
Status: CANCELLED
Start: 2022-02-07

## 2022-01-24 RX ORDER — ACETAMINOPHEN 325 MG/1
650 TABLET ORAL ONCE
Status: COMPLETED | OUTPATIENT
Start: 2022-01-24 | End: 2022-01-24

## 2022-01-24 RX ORDER — HEPARIN SODIUM (PORCINE) LOCK FLUSH IV SOLN 100 UNIT/ML 100 UNIT/ML
5 SOLUTION INTRAVENOUS
Status: DISCONTINUED | OUTPATIENT
Start: 2022-01-24 | End: 2022-01-24 | Stop reason: HOSPADM

## 2022-01-24 RX ORDER — HEPARIN SODIUM,PORCINE 10 UNIT/ML
5 VIAL (ML) INTRAVENOUS
Status: DISCONTINUED | OUTPATIENT
Start: 2022-01-24 | End: 2022-01-24 | Stop reason: HOSPADM

## 2022-01-24 RX ORDER — DIPHENHYDRAMINE HYDROCHLORIDE 50 MG/ML
50 INJECTION INTRAMUSCULAR; INTRAVENOUS
Status: CANCELLED
Start: 2022-02-07

## 2022-01-24 RX ORDER — HEPARIN SODIUM (PORCINE) LOCK FLUSH IV SOLN 100 UNIT/ML 100 UNIT/ML
5 SOLUTION INTRAVENOUS
Status: CANCELLED | OUTPATIENT
Start: 2022-02-07

## 2022-01-24 RX ORDER — ALBUTEROL SULFATE 90 UG/1
1-2 AEROSOL, METERED RESPIRATORY (INHALATION)
Status: CANCELLED
Start: 2022-02-07

## 2022-01-24 RX ORDER — DIPHENHYDRAMINE HCL 25 MG
50 CAPSULE ORAL ONCE
Status: CANCELLED
Start: 2022-02-07

## 2022-01-24 RX ORDER — METHYLPREDNISOLONE SODIUM SUCCINATE 125 MG/2ML
125 INJECTION, POWDER, LYOPHILIZED, FOR SOLUTION INTRAMUSCULAR; INTRAVENOUS ONCE
Status: CANCELLED
Start: 2022-02-07

## 2022-01-24 RX ORDER — MEPERIDINE HYDROCHLORIDE 25 MG/ML
25 INJECTION INTRAMUSCULAR; INTRAVENOUS; SUBCUTANEOUS EVERY 30 MIN PRN
Status: CANCELLED | OUTPATIENT
Start: 2022-02-07

## 2022-01-24 RX ORDER — METHYLPREDNISOLONE SODIUM SUCCINATE 125 MG/2ML
125 INJECTION, POWDER, LYOPHILIZED, FOR SOLUTION INTRAMUSCULAR; INTRAVENOUS ONCE
Status: COMPLETED | OUTPATIENT
Start: 2022-01-24 | End: 2022-01-24

## 2022-01-24 RX ORDER — DIPHENHYDRAMINE HCL 25 MG
50 CAPSULE ORAL ONCE
Status: COMPLETED | OUTPATIENT
Start: 2022-01-24 | End: 2022-01-24

## 2022-01-24 RX ORDER — METHYLPREDNISOLONE SODIUM SUCCINATE 125 MG/2ML
125 INJECTION, POWDER, LYOPHILIZED, FOR SOLUTION INTRAMUSCULAR; INTRAVENOUS
Status: CANCELLED
Start: 2022-02-07

## 2022-01-24 RX ORDER — EPINEPHRINE 1 MG/ML
0.3 INJECTION, SOLUTION INTRAMUSCULAR; SUBCUTANEOUS EVERY 5 MIN PRN
Status: CANCELLED | OUTPATIENT
Start: 2022-02-07

## 2022-01-24 RX ORDER — NALOXONE HYDROCHLORIDE 0.4 MG/ML
0.2 INJECTION, SOLUTION INTRAMUSCULAR; INTRAVENOUS; SUBCUTANEOUS
Status: CANCELLED | OUTPATIENT
Start: 2022-02-07

## 2022-01-24 RX ORDER — ALBUTEROL SULFATE 0.83 MG/ML
2.5 SOLUTION RESPIRATORY (INHALATION)
Status: CANCELLED | OUTPATIENT
Start: 2022-02-07

## 2022-01-24 RX ORDER — HEPARIN SODIUM,PORCINE 10 UNIT/ML
5 VIAL (ML) INTRAVENOUS
Status: CANCELLED | OUTPATIENT
Start: 2022-02-07

## 2022-01-24 RX ADMIN — ACETAMINOPHEN 650 MG: 325 TABLET, FILM COATED ORAL at 08:46

## 2022-01-24 RX ADMIN — DIPHENHYDRAMINE HYDROCHLORIDE 50 MG: 25 CAPSULE ORAL at 08:46

## 2022-01-24 RX ADMIN — SODIUM CHLORIDE 250 ML: 9 INJECTION, SOLUTION INTRAVENOUS at 08:43

## 2022-01-24 RX ADMIN — METHYLPREDNISOLONE SODIUM SUCCINATE 125 MG: 125 INJECTION, POWDER, FOR SOLUTION INTRAMUSCULAR; INTRAVENOUS at 08:49

## 2022-01-24 RX ADMIN — RITUXIMAB-ABBS 1000 MG: 10 INJECTION, SOLUTION INTRAVENOUS at 09:25

## 2022-01-24 ASSESSMENT — PAIN SCALES - GENERAL: PAINLEVEL: NO PAIN (0)

## 2022-01-24 NOTE — PROGRESS NOTES
Infusion Nursing Note:      Post Infusion Assessment:  Patient tolerated infusion without incident.  Patient observed for 15 minutes post infusion per protocol.  Blood return noted pre and post infusion.  Site patent and intact, free from redness, edema or discomfort.  No evidence of extravasations.  Access discontinued per protocol.   EDUCATION POST BIOLOGICAL/CHEMOTHERAPY INFUSION  Call the triage nurse at your clinic or seek medical attention if you have chills and/or temperature greater than or equal to 100.5, uncontrolled nausea/vomiting, diarrhea, constipation, dizziness, shortness of breath, chest pain, heart palpitations, weakness or any other new or concerning symptoms, questions or concerns.  You can not have any live virus vaccines prior to or during treatment or up to 6 months post infusion.  If you have an upcoming surgery, medical procedure or dental procedure during treatment, this should be discussed with your ordering physician and your surgeon/dentist.  If you are having any concerning symptom, if you are unsure if you should get your next infusion or wish to speak to a provider before your next infusion, please call your care coordinator or triage nurse at your clinic to notify them so we can adequately serve you.    Discharge Plan:   Discharge instructions reviewed with: Patient.  Patient discharged in stable condition accompanied by: self.  Departure Mode: Ambulatory.      Charo Muñiz RN

## 2022-01-25 DIAGNOSIS — M10.9 ACUTE GOUTY ARTHROPATHY: ICD-10-CM

## 2022-01-25 DIAGNOSIS — E03.4 HYPOTHYROIDISM DUE TO ACQUIRED ATROPHY OF THYROID: ICD-10-CM

## 2022-01-25 LAB
IGA SERPL-MCNC: 308 MG/DL (ref 84–499)
IGG SERPL-MCNC: 758 MG/DL (ref 610–1616)
IGM SERPL-MCNC: 29 MG/DL (ref 35–242)

## 2022-01-26 ENCOUNTER — MYC MEDICAL ADVICE (OUTPATIENT)
Dept: FAMILY MEDICINE | Facility: CLINIC | Age: 72
End: 2022-01-26
Payer: COMMERCIAL

## 2022-01-26 RX ORDER — LEVOTHYROXINE SODIUM 25 UG/1
TABLET ORAL
Qty: 90 TABLET | Refills: 0 | Status: SHIPPED | OUTPATIENT
Start: 2022-01-26 | End: 2022-05-04

## 2022-01-26 RX ORDER — ALLOPURINOL 300 MG/1
TABLET ORAL
Qty: 90 TABLET | Refills: 0 | Status: SHIPPED | OUTPATIENT
Start: 2022-01-26 | End: 2022-08-01

## 2022-01-26 NOTE — TELEPHONE ENCOUNTER
Routing to covering provider.     Levothyroxine  Routing refill request to provider for review/approval because:  Labs not current:  TSH    Allopurinol  Routing refill request to provider for review/approval because:  Labs not current:  Uric Acid    Radha Nesbitt Rn

## 2022-02-01 ENCOUNTER — VIRTUAL VISIT (OUTPATIENT)
Dept: RHEUMATOLOGY | Facility: CLINIC | Age: 72
End: 2022-02-01
Payer: COMMERCIAL

## 2022-02-01 DIAGNOSIS — Z79.899 HIGH RISK MEDICATIONS (NOT ANTICOAGULANTS) LONG-TERM USE: ICD-10-CM

## 2022-02-01 DIAGNOSIS — M05.79 RHEUMATOID ARTHRITIS INVOLVING MULTIPLE SITES WITH POSITIVE RHEUMATOID FACTOR (H): Primary | ICD-10-CM

## 2022-02-01 DIAGNOSIS — Z11.59 ENCOUNTER FOR SCREENING FOR OTHER VIRAL DISEASES: ICD-10-CM

## 2022-02-01 PROCEDURE — 99214 OFFICE O/P EST MOD 30 MIN: CPT | Mod: 95 | Performed by: INTERNAL MEDICINE

## 2022-02-01 RX ORDER — MULTIVIT WITH MINERALS/LUTEIN
1000 TABLET ORAL DAILY
COMMUNITY

## 2022-02-01 NOTE — PROGRESS NOTES
Camilo is a 71 year old who is being evaluated via a billable video visit.      How would you like to obtain your AVS? Mail  If the video visit is dropped, the invitation should be resent by: Text to cell phone: [phone]  Will anyone else be joining your video visit? Yes: Jacki. How would they like to receive their invitation? Text to cell phone: [phone]    Rheumatology Video Visit      Camilo Baca MRN# [id number]   YOB: 1950 Age: 71 year old      Date of visit: 2/01/22   PCP: Lyndsey Aponte    Chief Complaint   Patient presents with:  RECHECK: RA; No questions or concerns    Assessment and Plan     1.  Seropositive rheumatoid arthritis (, CCP >250): Dx'd 2013. Previously followed by Mukesh Wolfe and [facility].  Established with me on 3/20/2018. Previously on MTX (ineffective as monotherapy), Humira (effective, stopped because of insurance preference for IV), Simponi (rash on cheeks/nose after infusion), Remicade (lost efficacy), HCQ (facial rash), Orencia (partially effective), SSZ (cytopenia).  Currently on Rituximab (received 3/10/2020 & 3/24/2020, 9/22/2020 & 10/6/2020, 2/23/2021 & 3/9/2021, 8/9/2021 & 8/23/2021, 1/24/2022 & planning to receive 2/7/2022).  Has done great since starting rituximab and has not required NSAIDs or prednisone.  RA is controlled.  Rituximab is being received every 5 months.  Plan to follow-up in June at which time may place the orders for a July 2022 rituximab infusion, and that infusion can be reduced to 500 mg once.  We briefly discussed this today that a reduced dose of rituximab may be just as effective.  Chronic illness, stable.     - Rituximab 1gram IV every 2 weeks for a total of 2 doses; repeat this cycle every 5 months, he is currently midcycle with the second of the two infusions being received on 2/7/2022  - PRN RA flare: Prednisone 20mg daily x2days, then 15mg daily x2days, then 10mg daily x2days, then 5mg daily x2days, then stop.    - Labs 2-3 days  prior to June rheumatology follow-up appointment: CBC, creatinine, hepatic panel, ESR, CRP, hepatitis B, QuantiFERON-TB gold plus              Rapid 3, cumulative scores                       2/1/2022: asymptomatic (Rituximab)                      6/2/2020: doing well; virtual visit (Rituximab in March 2020; SSZ 500mg BID)                      03/03/2020: 9.8  (Orencia IV, SSZ 500mg BID)                       11/19/2019: 2.8  (Orencia IV, SSZ 500mg BID)                      08/20/2019: 4     (Orencia 750mg IV)                      04/23/2019: 0     (Orencia 750mg IV)                       01/08/2019: 0.5  (Simponi IV)                      11/06/2018: 9.3  (Simponi IV x2mo)    # Rituximab (Rituxan) Risks and Benefits: The risks and benefits of rituximab were discussed in detail and the patient verbalized understanding.  The risks discussed include, but are not limited to, the risk for hypersensitivity, anaphylaxis, anaphylactoid reactions, fatal infusion reactions, an increased risk for serious infections leading to hospitalization or death, severe mucocutaneous reactions, reactivation of hepatitis B, and development of progressive multifocal leukoencephalopathy (PML) resulting in death.  The most common adverse reactions are infections, nasopharyngitis, urinary tract infections, nausea, diarrhea, headache, muscle spasms, and anemia.  It was discussed that the medication would need to be discontinued if a serious infection develops.  It was discussed that live vaccinations should not be received while using rituximab or within 30 days prior to starting rituximab.  Higher risk for complications from COVID-19 infection so it is very important to follow COVID-19 precautions including wearing a mask, vaccination, and social distancing.  I encouraged reviewing the package insert and asking any questions about the medication.      2.  History of impingement syndrome of both shoulders: injected 10/11/2019 PT exercises  resolved the shoulder issue.  Not an issue at this time.     3. History of mild plantar fasciitis, bilaterally: resolved with stretching and changing shoes.  Not an issue today.    4.  LFT elevation: Avoid hepatotoxins.  Avoid alcohol.  Following with hepatology who suspects steatohepatitis    5.  Vaccinations: Vaccinations reviewed with Mr. Baca.  Risks and benefits of vaccinations were discussed.    - Influenza: up to date  - [id number]: up to date  - [id number]: up to date  - Shingrix: Up to date  - COVID-19: has received the Moderna COVID-19 vaccine on 2/26/2021 and 3/25/2021 and 1/3/2022; fourth COVID-19 vaccine should be received on 6/3/2022, based on today's recommendations.    Total minutes spent in evaluation with patient, documentation, , and review of pertinent studies and chart notes: 20      Mr. Baca verbalized agreement with and understanding of the rational for the diagnosis and treatment plan.  All questions were answered to best of my ability and the patient's satisfaction. Mr. Baca was advised to contact the clinic with any questions that may arise after the clinic visit.      Thank you for involving me in the care of the patient    Return to clinic: June 2022    HPI   Camilo Baca is a 71 year old male with a past medical history significant for hypertension, hyperlipidemia, GERD, hypothyroidism, history of DVT, factor V Leiden mutation, history of thrombocytopenia, gout, and rheumatoid arthritis who presents for follow-up of rheumatoid arthritis.     Today, 2/1/2022: Doing well at this time.  No joint pain or swelling.  No morning stiffness or gelling phenomenon.  States that since he has been using rituximab he has felt great.  Following with hepatology who suspects fatty liver disease and plans to monitor, per patient.  Has not required NSAIDs or prednisone.    Denies fevers, chills, nausea, vomiting, constipation, diarrhea. No abdominal pain. No chest pain/pressure, palpitations,  or shortness of breath. No LE swelling. No neck pain. No oral or nasal sores.  No rash. No sicca symptoms.      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    ROS   12 point review of system was completed and negative except as noted in the HPI     Active Problem List     Patient Active Problem List   Diagnosis     Lumbago     NONSPECIFIC MEDICAL HISTORY     HYPERLIPIDEMIA LDL GOAL <130     History of adenomatous polyp of colon     Idiopathic chronic gout without tophus, unspecified site     Advanced directives, counseling/discussion     Seropositive rheumatoid arthritis (H)     High risk medication use     Thrombocytopenia (H)     Gastroesophageal reflux disease without esophagitis     Factor 5 Leiden mutation, heterozygous (H)     Personal history of venous thrombosis and embolism     Personal history of DVT (deep vein thrombosis)     Hypothyroidism due to acquired atrophy of thyroid     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)     Secondary hypertension with goal blood pressure less than 140/90     Rheumatoid arthritis involving multiple sites with positive rheumatoid factor (H)     Past Medical History     Past Medical History:   Diagnosis Date     Alopecia, unspecified      Closed fracture of unspecified part of humerus 1974    Arm fracture / left wrist     Esophageal reflux 2/6/2015     Factor 5 Leiden mutation, heterozygous (H)      Gout 3/28/2011     Herpes zoster without mention of complication 1965    Herpes zoster     Measles without mention of complication     Measles     Personal history of DVT (deep vein thrombosis) 4/9/2015     Personal history of venous thrombosis and embolism 4/9/2015     Pulmonary embolism (H) 12/1996    post appendectomy     RA (rheumatoid arthritis) (H) 3/11/2013     Rheumatoid arthritis involving both shoulders with positive rheumatoid  factor (H) 2/2/2016     Rheumatoid arthritis(714.0)     Beginning symptoms     Secondary hypertension with goal blood pressure less than 140/90 11/15/2016     Thrombocytopenia, unspecified (H) 1/29/2014     Varicella without mention of complication     Chickenpox     Past Surgical History     Past Surgical History:   Procedure Laterality Date     COLONOSCOPY  11/15/2010    COLONOSCOPY performed by DARIUS MESA at  GI     COLONOSCOPY N/A 11/2/2015    Procedure: COLONOSCOPY;  Surgeon: Bubba Odom MD;  Location:  GI     COLONOSCOPY N/A 3/19/2021    Procedure: Colonoscopy;  Surgeon: Ludmila Beltran MD;  Location:  GI     ESOPHAGOSCOPY, GASTROSCOPY, DUODENOSCOPY (EGD), COMBINED N/A 10/24/2018    Procedure: Esophagogastroduodenoscopy Multiple Biopsies by Biopsy;  Surgeon: Matteo Johnson DO;  Location:  GI     HC REPAIR ING HERNIA,5+Y/O,REDUCIBL  1960     HERNIORRHAPHY UMBILICAL N/A 4/17/2015    Procedure: HERNIORRHAPHY UMBILICAL;  Surgeon: Rayray Avila MD;  Location: [facility]     LAPAROSCOPIC HERNIORRHAPHY INGUINAL BILATERAL Bilateral 4/17/2015    Procedure: LAPAROSCOPIC HERNIORRHAPHY INGUINAL BILATERAL;  Surgeon: Rayray Avila MD;  Location:  OR     [facility] APPENDECTOMY  1996     [facility] COLONOSCOPY W BIOPSY  08/10/05     Allergy     Allergies   Allergen Reactions     Lisinopril Cough     Plaquenil [Hydroxychloroquine] Hives     Current Medication List     Current Outpatient Medications   Medication Sig     allopurinol (ZYLOPRIM) 300 MG tablet TAKE 1 TABLET BY MOUTH EVERY DAY     cholestyramine light (PREVALITE) 4 GM packet DISSOLVE ONE PACKET IN LIQUID AND DRINK BY MOUTH TWICE A DAY AS DIRECTED     Cyanocobalamin (B-12) 100 MCG TABS      levothyroxine (SYNTHROID/LEVOTHROID) 25 MCG tablet TAKE 1 TABLET BY MOUTH EVERY DAY     losartan-hydrochlorothiazide (HYZAAR) 100-12.5 MG tablet TAKE 1 TABLET BY MOUTH DAILY     omeprazole (PRILOSEC) 20 MG DR capsule Take 1 capsule (20 mg)  by mouth daily     riTUXimab 500 MG/50ML SOLN Inject 1,000 mg into the vein     sildenafil (VIAGRA) 100 MG tablet 1/2 tab 1/2 hour prior to activity     vitamin C (ASCORBIC ACID) 1000 MG TABS Take 1,000 mg by mouth daily     VITAMIN D PO      cetirizine (ZYRTEC) 10 MG tablet Take 1 tablet (10 mg) by mouth every evening (Patient not taking: Reported on 8/13/2021)     diclofenac (VOLTAREN) 1 % GEL Apply  2 grams to shoulders three times daily using enclosed dosing card. (Patient not taking: Reported on 8/13/2021)     triamcinolone (KENALOG) 0.1 % external ointment Apply thin layer twice daily to affected areas as needed. (Patient not taking: Reported on 11/22/2021)     No current facility-administered medications for this visit.         Social History   See HPI    Family History     Family History   Problem Relation Age of Onset     Arthritis Mother      Cardiovascular Mother      Depression Mother      Gastrointestinal Disease Mother         IBS     Hypertension Mother      Circulatory Mother         Aortal aneurysm     Osteoporosis Mother      Allergies Father         Cortisone     Cardiovascular Father      Circulatory Father      Diabetes Father      Hypertension Father      Lipids Father      Alzheimer Disease Paternal Grandfather      Blood Disease Paternal Grandmother         Clotting problems     Blood Disease Son         Factor 5     Hypertension Maternal Aunt      Cerebrovascular Disease Maternal Grandmother        Physical Exam     Temp Readings from Last 3 Encounters:   01/24/22 97.8  F (36.6  C) (Tympanic)   11/22/21 97.7  F (36.5  C) (Oral)   08/13/21 98.3  F (36.8  C) (Temporal)     BP Readings from Last 5 Encounters:   01/24/22 138/85   11/22/21 133/80   08/23/21 (!) 152/86   08/13/21 114/80   08/09/21 129/75     Pulse Readings from Last 1 Encounters:   01/24/22 83     Resp Readings from Last 1 Encounters:   01/24/22 16     Estimated body mass index is 28.67 kg/m  as calculated from the following:     "Height as of 11/22/21: 1.765 m (5' 9.5\").    Weight as of 1/24/22: 89.4 kg (196 lb 15.7 oz).    GEN: NAD. Healthy appearing adult.   HEENT: MMM.  Anicteric, noninjected sclera. No obvious external lesions of the ear and nose. Hearing intact.  PULM: No increased work of breathing  MSK:  Hands and wrists without swelling.   SKIN: No rash or jaundice seen  PSYCH: Alert. Appropriate.       Labs / Imaging (select studies)     CBC  Recent Labs   Lab Test 11/17/21  0944 06/25/21  0841 08/27/20  0824 05/28/20  0927 02/10/20  0922 11/12/19  0748 10/15/19  0827   WBC 3.3* 4.1 3.5* 3.6* 4.1   < > 4.8   RBC 4.24* 4.29* 4.21* 4.29* 4.21*   < > 4.26*   HGB 14.0 14.3 13.8 13.9 13.7   < > 13.7   HCT 41.4 41.2 41.4 41.7 40.6   < > 41.1   MCV 98 96 98 97 96   < > 97   RDW 12.4 13.0 13.0 13.2 12.6   < > 13.5    129* 102* 117* 105*   < > 144*   MCH 33.0 33.3* 32.8 32.4 32.5   < > 32.2   MCHC 33.8 34.7 33.3 33.3 33.7   < > 33.3   NEUTROPHIL  --  65.7 61.9 59.2 54.9   < > 60.4   LYMPH  --  17.3 19.5 19.6 29.0   < > 24.4   MONOCYTE  --  12.7 13.0 14.9 13.5   < > 14.0   EOSINOPHIL  --  3.6 4.5 5.2 2.4   < > 0.8   BASOPHIL  --  0.5 0.8 0.8 0.2   < > 0.4   ANEU  --  2.7 2.2 2.2 2.3   < > 2.9   ALYM  --  0.7* 0.7* 0.7* 1.2   < > 1.2   KIESHA  --  0.5 0.5 0.5 0.6   < > 0.7   AEOS  --  0.2  --   --  0.1  --  0.0   ABAS  --  0.0 0.0 0.0 0.0   < > 0.0    < > = values in this interval not displayed.     CMP  Recent Labs   Lab Test 11/17/21  0943 07/08/21  0958 06/25/21  0841 03/29/21  1013 02/16/21  0947 08/27/20  0824 04/18/19  0826 11/06/18  0913     --   --   --  141  --   --  141   POTASSIUM 4.5  --  4.2 3.5 4.4  --   --  4.2   CHLORIDE 107  --   --   --  106  --   --  105   CO2 32  --   --   --  32  --   --  33*   ANIONGAP 1*  --   --   --  3  --   --  3   GLC 98  --   --   --  119*  --   --  107*   BUN 13  --   --   --  15  --   --  15   CR 0.87  --  0.96  --  0.84 0.83   < > 0.83   GFRESTIMATED 88  --  80  --  90 >90   < > >90 "   GFRESTBLACK  --   --  >90  --  >90 >90   < > >90   ANDREZ 9.2  --   --   --  9.4  --   --  8.8   BILITOTAL 0.8  --  1.0  --   --  1.2   < > 0.6   ALBUMIN 3.3*  --  3.8  --   --  3.6   < > 3.1*   PROTTOTAL 7.2  --  7.0  --   --  7.2   < > 7.4   ALKPHOS 60  --  63  --   --  71   < > 60   AST 59* 57* 49*  --   --  30   < > 41   * 121* 119*  --   --  47   < > 82*    < > = values in this interval not displayed.     Calcium/VitaminD  Recent Labs   Lab Test 11/17/21  0943 02/16/21  0947 11/06/18  0913 06/26/18  0955   ANDREZ 9.2 9.4 8.8  --    VITDT  --   --   --  25     ESR/CRP  Recent Labs   Lab Test 06/25/21  0841 08/27/20  0824 05/28/20  0927   SED 10 9 15   CRP <2.9 <2.9 <2.9     Lipid Panel  Recent Labs   Lab Test 12/08/20  0911 09/17/19  0827 12/22/17  0735   CHOL 155 138 134   TRIG 129 85 149   HDL 55 38* 46   LDL 74 83 58   NHDL 100 100 88     Hepatitis B  Recent Labs   Lab Test 06/25/21  0841 03/03/20  1405 03/20/18  1008   HBCAB Nonreactive Nonreactive Nonreactive   HEPBANG Nonreactive Nonreactive Nonreactive     Hepatitis C  Recent Labs   Lab Test 02/05/14  1104   HCVAB Negative     Tuberculosis Screening  Recent Labs   Lab Test 06/25/21  0841 03/03/20  1405 03/20/18  1008 02/05/14  1104   TBRES  --  Negative  --   --    TBRSLT  --   --  Negative Negative   TBAGN  --   --  0.14 0.01   TBRST Negative  --   --   --      Immunization History     Immunization History   Administered Date(s) Administered     COVID-19,PF,Moderna 02/26/2021, 03/25/2021, 01/03/2022     HepB, Unspecified 08/19/1991, 09/23/1991, 02/24/1992     Influenza (High Dose) 3 valent vaccine 10/06/2017, 10/03/2018, 10/23/2019     Influenza (IIV3) PF 10/01/2009, 10/27/2010, 10/11/2011     Influenza Vaccine IM > 6 months Valent IIV4 (Alfuria,Fluzone) 10/09/2013, 10/23/2014, 09/18/2015, 11/15/2016     Influenza, Quad, High Dose, Pf, 65yr+ (Fluzone HD) 09/02/2020     Pneumo Conj 13-V (2010&after) 10/06/2017     Pneumococcal 23 valent 10/23/2014,  10/23/2019     TD (ADULT, 7+) 09/05/2001     TDAP Vaccine (Boostrix) 10/18/2012     Zoster vaccine recombinant adjuvanted (SHINGRIX) 01/31/2019, 04/18/2019     Zoster vaccine, live 10/23/2014          Chart documentation done in part with Dragon Voice recognition Software. Although reviewed after completion, some word and grammatical error may remain.      Video-Visit Details    Type of service:  Video Visit    Video Start Time: 1:09 PM    Video End Time:1:18 PM    Originating Location (pt. Location): [place], [place]    Distant Location (provider location):  Home    Platform used for Video Visit: Mariza Gomes MD

## 2022-02-01 NOTE — PATIENT INSTRUCTIONS
Based on today's recommendations, a fourth mRNA COVID-19 vaccination should be received on Gracie 3, 2022.

## 2022-02-07 ENCOUNTER — INFUSION THERAPY VISIT (OUTPATIENT)
Dept: INFUSION THERAPY | Facility: CLINIC | Age: 72
End: 2022-02-07
Attending: INTERNAL MEDICINE
Payer: MEDICARE

## 2022-02-07 VITALS
HEART RATE: 78 BPM | BODY MASS INDEX: 29.02 KG/M2 | WEIGHT: 199.4 LBS | TEMPERATURE: 97.8 F | SYSTOLIC BLOOD PRESSURE: 138 MMHG | OXYGEN SATURATION: 96 % | DIASTOLIC BLOOD PRESSURE: 84 MMHG | RESPIRATION RATE: 16 BRPM

## 2022-02-07 DIAGNOSIS — M05.79 RHEUMATOID ARTHRITIS INVOLVING MULTIPLE SITES WITH POSITIVE RHEUMATOID FACTOR (H): Primary | ICD-10-CM

## 2022-02-07 PROCEDURE — 250N000011 HC RX IP 250 OP 636: Performed by: INTERNAL MEDICINE

## 2022-02-07 PROCEDURE — 96375 TX/PRO/DX INJ NEW DRUG ADDON: CPT

## 2022-02-07 PROCEDURE — 96415 CHEMO IV INFUSION ADDL HR: CPT

## 2022-02-07 PROCEDURE — 96413 CHEMO IV INFUSION 1 HR: CPT

## 2022-02-07 PROCEDURE — 258N000003 HC RX IP 258 OP 636: Performed by: INTERNAL MEDICINE

## 2022-02-07 PROCEDURE — 250N000013 HC RX MED GY IP 250 OP 250 PS 637: Performed by: INTERNAL MEDICINE

## 2022-02-07 RX ORDER — HEPARIN SODIUM (PORCINE) LOCK FLUSH IV SOLN 100 UNIT/ML 100 UNIT/ML
5 SOLUTION INTRAVENOUS
Status: CANCELLED | OUTPATIENT
Start: 2022-02-07

## 2022-02-07 RX ORDER — ACETAMINOPHEN 325 MG/1
650 TABLET ORAL ONCE
Status: CANCELLED
Start: 2022-02-07

## 2022-02-07 RX ORDER — METHYLPREDNISOLONE SODIUM SUCCINATE 125 MG/2ML
125 INJECTION, POWDER, LYOPHILIZED, FOR SOLUTION INTRAMUSCULAR; INTRAVENOUS
Status: CANCELLED
Start: 2022-02-07

## 2022-02-07 RX ORDER — DIPHENHYDRAMINE HYDROCHLORIDE 50 MG/ML
50 INJECTION INTRAMUSCULAR; INTRAVENOUS
Status: CANCELLED
Start: 2022-02-07

## 2022-02-07 RX ORDER — METHYLPREDNISOLONE SODIUM SUCCINATE 125 MG/2ML
125 INJECTION, POWDER, LYOPHILIZED, FOR SOLUTION INTRAMUSCULAR; INTRAVENOUS ONCE
Status: COMPLETED | OUTPATIENT
Start: 2022-02-07 | End: 2022-02-07

## 2022-02-07 RX ORDER — NALOXONE HYDROCHLORIDE 0.4 MG/ML
0.2 INJECTION, SOLUTION INTRAMUSCULAR; INTRAVENOUS; SUBCUTANEOUS
Status: CANCELLED | OUTPATIENT
Start: 2022-02-07

## 2022-02-07 RX ORDER — ALBUTEROL SULFATE 0.83 MG/ML
2.5 SOLUTION RESPIRATORY (INHALATION)
Status: CANCELLED | OUTPATIENT
Start: 2022-02-07

## 2022-02-07 RX ORDER — DIPHENHYDRAMINE HCL 25 MG
50 CAPSULE ORAL ONCE
Status: COMPLETED | OUTPATIENT
Start: 2022-02-07 | End: 2022-02-07

## 2022-02-07 RX ORDER — MEPERIDINE HYDROCHLORIDE 25 MG/ML
25 INJECTION INTRAMUSCULAR; INTRAVENOUS; SUBCUTANEOUS EVERY 30 MIN PRN
Status: CANCELLED | OUTPATIENT
Start: 2022-02-07

## 2022-02-07 RX ORDER — HEPARIN SODIUM,PORCINE 10 UNIT/ML
5 VIAL (ML) INTRAVENOUS
Status: CANCELLED | OUTPATIENT
Start: 2022-02-07

## 2022-02-07 RX ORDER — ACETAMINOPHEN 325 MG/1
650 TABLET ORAL ONCE
Status: COMPLETED | OUTPATIENT
Start: 2022-02-07 | End: 2022-02-07

## 2022-02-07 RX ORDER — DIPHENHYDRAMINE HCL 25 MG
50 CAPSULE ORAL ONCE
Status: CANCELLED
Start: 2022-02-07

## 2022-02-07 RX ORDER — EPINEPHRINE 1 MG/ML
0.3 INJECTION, SOLUTION INTRAMUSCULAR; SUBCUTANEOUS EVERY 5 MIN PRN
Status: CANCELLED | OUTPATIENT
Start: 2022-02-07

## 2022-02-07 RX ORDER — ALBUTEROL SULFATE 90 UG/1
1-2 AEROSOL, METERED RESPIRATORY (INHALATION)
Status: CANCELLED
Start: 2022-02-07

## 2022-02-07 RX ORDER — METHYLPREDNISOLONE SODIUM SUCCINATE 125 MG/2ML
125 INJECTION, POWDER, LYOPHILIZED, FOR SOLUTION INTRAMUSCULAR; INTRAVENOUS ONCE
Status: CANCELLED
Start: 2022-02-07

## 2022-02-07 RX ADMIN — SODIUM CHLORIDE 250 ML: 9 INJECTION, SOLUTION INTRAVENOUS at 08:45

## 2022-02-07 RX ADMIN — ACETAMINOPHEN 650 MG: 325 TABLET, FILM COATED ORAL at 08:38

## 2022-02-07 RX ADMIN — DIPHENHYDRAMINE HYDROCHLORIDE 50 MG: 25 CAPSULE ORAL at 08:38

## 2022-02-07 RX ADMIN — METHYLPREDNISOLONE SODIUM SUCCINATE 125 MG: 125 INJECTION, POWDER, FOR SOLUTION INTRAMUSCULAR; INTRAVENOUS at 08:43

## 2022-02-07 RX ADMIN — RITUXIMAB-ABBS 1000 MG: 10 INJECTION, SOLUTION INTRAVENOUS at 09:12

## 2022-02-07 NOTE — PROGRESS NOTES
Infusion Nursing Note:  Camilo ZIMMERMAN [place] presents today for Rituxan infusion.    Patient seen by provider today: No   present during visit today: Not Applicable.    Note: Patient here today for Rituxan infusion.  States has been feeling well..      Intravenous Access:  Peripheral IV placed.    Treatment Conditions:  Biological Infusion Checklist:  ~~~ NOTE: If the patient answers yes to any of the questions below, hold the infusion and contact ordering provider or on-call provider.    1. Have you recently had an elevated temperature, fever, chills, productive cough, coughing for 3 weeks or longer or hemoptysis, abnormal vital signs, night sweats,  chest pain or have you noticed a decrease in your appetite, unexplained weight loss or fatigue? No  2. Do you have any open wounds or new incisions? No  3. Do you have any recent or upcoming hospitalizations, surgeries or dental procedures? No  4. Do you currently have or recently have had any signs of illness or infection or are you on any antibiotics? No  5. Have you had any new, sudden or worsening abdominal pain? No  6. Have you or anyone in your household received a live vaccination in the past 4 weeks? Please note:  No live vaccines while on biologic/chemotherapy until 6 months after the last treatment.  Patient can receive the flu vaccine (shot only) and the pneumovax.  It is optimal for the patient to get these vaccines mid cycle, but they can be given at any time as long as it is not on the day of the infusion. No  7. Have you recently been diagnosed with any new nervous system diseases (ie. Multiple sclerosis, Guillain [place], seizures, neurological changes) or cancer diagnosis? No  8. Are you on any form of radiation or chemotherapy? No  9. Are you pregnant or breast feeding or do you have plans of pregnancy in the future? No  10. Have you been having any signs of worsening depression or suicidal ideations?  (benlysta only) No  11. Have there been any  other new onset medical symptoms? No        Post Infusion Assessment:  Patient tolerated infusion without incident.  Site patent and intact, free from redness, edema or discomfort.  No evidence of extravasations.  Access discontinued per protocol.  Biologic Infusion Post Education: Call the triage nurse at your clinic or seek medical attention if you have chills and/or temperature greater than or equal to 100.5, uncontrolled nausea/vomiting, diarrhea, constipation, dizziness, shortness of breath, chest pain, heart palpitations, weakness or any other new or concerning symptoms, questions or concerns.  You cannot have any live virus vaccines prior to or during treatment or up to 6 months post infusion.  If you have an upcoming surgery, medical procedure or dental procedure during treatment, this should be discussed with your ordering physician and your surgeon/dentist.  If you are having any concerning symptom, if you are unsure if you should get your next infusion or wish to speak to a provider before your next infusion, please call your care coordinator or triage nurse at your clinic to notify them so we can adequately serve you.       Discharge Plan:   Patient discharged in stable condition accompanied by: wife is  today.  Departure Mode: Ambulatory.      Elvie Miller RN

## 2022-02-21 ENCOUNTER — HOSPITAL ENCOUNTER (OUTPATIENT)
Dept: ULTRASOUND IMAGING | Facility: CLINIC | Age: 72
Discharge: HOME | End: 2022-02-21
Attending: INTERNAL MEDICINE | Admitting: INTERNAL MEDICINE
Payer: MEDICARE

## 2022-02-21 DIAGNOSIS — K74.00 HEPATIC FIBROSIS, UNSPECIFIED: ICD-10-CM

## 2022-02-21 DIAGNOSIS — R79.89 ELEVATED LFTS: ICD-10-CM

## 2022-02-21 PROCEDURE — 93975 VASCULAR STUDY: CPT

## 2022-02-25 DIAGNOSIS — K21.9 GASTROESOPHAGEAL REFLUX DISEASE WITHOUT ESOPHAGITIS: ICD-10-CM

## 2022-02-25 NOTE — TELEPHONE ENCOUNTER
prilosec  Prescription approved per [facility] Refill Protocol.    Kimberlyn Mena RN

## 2022-04-09 ENCOUNTER — HEALTH MAINTENANCE LETTER (OUTPATIENT)
Age: 72
End: 2022-04-09

## 2022-05-04 ENCOUNTER — OFFICE VISIT (OUTPATIENT)
Dept: FAMILY MEDICINE | Facility: CLINIC | Age: 72
End: 2022-05-04
Payer: COMMERCIAL

## 2022-05-04 VITALS
TEMPERATURE: 97.9 F | WEIGHT: 202 LBS | SYSTOLIC BLOOD PRESSURE: 114 MMHG | OXYGEN SATURATION: 97 % | BODY MASS INDEX: 29.4 KG/M2 | HEART RATE: 64 BPM | DIASTOLIC BLOOD PRESSURE: 82 MMHG | RESPIRATION RATE: 18 BRPM

## 2022-05-04 DIAGNOSIS — E03.4 HYPOTHYROIDISM DUE TO ACQUIRED ATROPHY OF THYROID: ICD-10-CM

## 2022-05-04 DIAGNOSIS — Z13.220 SCREENING FOR HYPERLIPIDEMIA: Primary | ICD-10-CM

## 2022-05-04 DIAGNOSIS — Z12.5 SCREENING FOR PROSTATE CANCER: ICD-10-CM

## 2022-05-04 DIAGNOSIS — I15.9 SECONDARY HYPERTENSION WITH GOAL BLOOD PRESSURE LESS THAN 140/90: ICD-10-CM

## 2022-05-04 DIAGNOSIS — D68.51 FACTOR 5 LEIDEN MUTATION, HETEROZYGOUS (H): ICD-10-CM

## 2022-05-04 PROBLEM — H93.19 TINNITUS: Status: ACTIVE | Noted: 2022-05-04

## 2022-05-04 PROBLEM — M10.9 GOUT: Status: ACTIVE | Noted: 2022-05-04

## 2022-05-04 PROBLEM — H90.5 SENSORINEURAL HEARING LOSS (SNHL): Status: ACTIVE | Noted: 2022-05-04

## 2022-05-04 PROCEDURE — 99397 PER PM REEVAL EST PAT 65+ YR: CPT | Performed by: FAMILY MEDICINE

## 2022-05-04 RX ORDER — LOSARTAN POTASSIUM AND HYDROCHLOROTHIAZIDE 12.5; 1 MG/1; MG/1
1 TABLET ORAL DAILY
Qty: 90 TABLET | Refills: 3 | Status: SHIPPED | OUTPATIENT
Start: 2022-05-04 | End: 2023-05-16

## 2022-05-04 RX ORDER — LEVOTHYROXINE SODIUM 25 UG/1
25 TABLET ORAL DAILY
Qty: 90 TABLET | Refills: 3 | Status: SHIPPED | OUTPATIENT
Start: 2022-05-04 | End: 2023-05-01

## 2022-05-04 ASSESSMENT — ENCOUNTER SYMPTOMS
ARTHRALGIAS: 1
JOINT SWELLING: 0
CONSTIPATION: 0
SORE THROAT: 0
FREQUENCY: 0
HEADACHES: 0
MYALGIAS: 0
COUGH: 0
WEAKNESS: 0
HEARTBURN: 0
SHORTNESS OF BREATH: 0
CHILLS: 0
DYSURIA: 0
PARESTHESIAS: 0
NAUSEA: 0
HEMATOCHEZIA: 0
DIZZINESS: 0
PALPITATIONS: 0
FEVER: 0
ABDOMINAL PAIN: 0
NERVOUS/ANXIOUS: 0
HEMATURIA: 0
DIARRHEA: 0
EYE PAIN: 0

## 2022-05-04 ASSESSMENT — ACTIVITIES OF DAILY LIVING (ADL): CURRENT_FUNCTION: NO ASSISTANCE NEEDED

## 2022-05-04 ASSESSMENT — PAIN SCALES - GENERAL: PAINLEVEL: NO PAIN (0)

## 2022-05-04 NOTE — PROGRESS NOTES
"SUBJECTIVE:   Camilo Baca is a 71 year old male who presents for Preventive Visit.  Patient has been advised of split billing requirements and indicates understanding: Yes  Are you in the first 12 months of your Medicare coverage?  No    Healthy Habits:     In general, how would you rate your overall health?  Good    Frequency of exercise:  None    Do you usually eat at least 4 servings of fruit and vegetables a day, include whole grains    & fiber and avoid regularly eating high fat or \"junk\" foods?  Yes    Taking medications regularly:  Yes    Medication side effects:  None    Ability to successfully perform activities of daily living:  No assistance needed    Home Safety:  No safety concerns identified    Hearing Impairment:  Difficulty following a conversation in a noisy restaurant or crowded room and difficulty understanding soft or whispered speech    In the past 6 months, have you been bothered by leaking of urine?  No    In general, how would you rate your overall mental or emotional health?  Good      PHQ-2 Total Score: 0    Additional concerns today:  No    Do you feel safe in your environment? Yes    Have you ever done Advance Care Planning? (For example, a Health Directive, POLST, or a discussion with a medical provider or your loved ones about your wishes): Yes, advance care planning is on file.     Fall risk  Fallen 2 or more times in the past year?: No  Any fall with injury in the past year?: No  click delete button to remove this line now  Cognitive Screening   1) Repeat 3 items (Leader, Season, Table)    2) Clock draw: NORMAL  3) 3 item recall: Recalls 3 objects  Results: 3 items recalled: COGNITIVE IMPAIRMENT LESS LIKELY    Mini-CogTM Copyright YAMIL Reyez. Licensed by the author for use in [facility]; reprinted with permission (augusto@.[facility]). All rights reserved.      Reviewed and updated as needed this visit by clinical staff   Tobacco  Allergies  Meds                Reviewed and " updated as needed this visit by Provider                   Social History     Tobacco Use     Smoking status: Former Smoker     Packs/day: 0.25     Years: 2.00     Pack years: 0.50     Types: Cigarettes, Pipe     Quit date: 1979     Years since quittin.3     Smokeless tobacco: Never Used   Substance Use Topics     Alcohol use: Yes     Comment: 5 drinks per week / wine     If you drink alcohol do you typically have >3 drinks per day or >7 drinks per week? No    Alcohol Use 2022   Prescreen: >3 drinks/day or >7 drinks/week? No   Prescreen: >3 drinks/day or >7 drinks/week? -   No flowsheet data found.        Current providers sharing in care for this patient include:   Patient Care Team:  Akash Mariee MD as PCP - General (Family Medicine)  Akash Mariee MD as Assigned PCP  Gem Garay MD as MD (Gastroenterology)  Gem Garay MD as Assigned Gastroenterology Provider  Fabricio Gomes MD as Assigned Rheumatology Provider    The following health maintenance items are reviewed in Epic and correct as of today:  Health Maintenance Due   Topic Date Due     ANNUAL REVIEW OF HM ORDERS  Never done     DTAP/TDAP/TD IMMUNIZATION (1 - Tdap) 10/18/2012     LIPID  2021     TSH W/FREE T4 REFLEX  2021     MICROALBUMIN  2022     COVID-19 Vaccine (4 - Booster for Moderna series) 2022     BP Readings from Last 3 Encounters:   22 114/82   22 138/84   22 138/85    Wt Readings from Last 3 Encounters:   22 91.6 kg (202 lb)   22 90.4 kg (199 lb 6.4 oz)   22 89.4 kg (196 lb 15.7 oz)                  Patient Active Problem List   Diagnosis     Lumbago     NONSPECIFIC MEDICAL HISTORY     HYPERLIPIDEMIA LDL GOAL <130     History of adenomatous polyp of colon     Idiopathic chronic gout without tophus, unspecified site     Advanced directives, counseling/discussion     Seropositive rheumatoid arthritis (H)     High risk medication  use     Thrombocytopenia (H)     Gastroesophageal reflux disease without esophagitis     Factor 5 Leiden mutation, heterozygous (H)     Personal history of venous thrombosis and embolism     Personal history of DVT (deep vein thrombosis)     Hypothyroidism due to acquired atrophy of thyroid     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)     Secondary hypertension with goal blood pressure less than 140/90     Rheumatoid arthritis involving multiple sites with positive rheumatoid factor (H)     Gout     Sensorineural hearing loss (SNHL)     Tinnitus     Past Surgical History:   Procedure Laterality Date     COLONOSCOPY  11/15/2010    COLONOSCOPY performed by DARIUS MESA at  GI     COLONOSCOPY N/A 2015    Procedure: COLONOSCOPY;  Surgeon: Bubba Odom MD;  Location:  GI     COLONOSCOPY N/A 3/19/2021    Procedure: Colonoscopy;  Surgeon: Ludmila Beltran MD;  Location:  GI     ESOPHAGOSCOPY, GASTROSCOPY, DUODENOSCOPY (EGD), COMBINED N/A 10/24/2018    Procedure: Esophagogastroduodenoscopy Multiple Biopsies by Biopsy;  Surgeon: Matteo Johnson DO;  Location:  GI     HC REPAIR ING HERNIA,5+Y/O,REDUCIBL  1960     HERNIORRHAPHY UMBILICAL N/A 2015    Procedure: HERNIORRHAPHY UMBILICAL;  Surgeon: Rayray Avila MD;  Location: [facility]     LAPAROSCOPIC HERNIORRHAPHY INGUINAL BILATERAL Bilateral 2015    Procedure: LAPAROSCOPIC HERNIORRHAPHY INGUINAL BILATERAL;  Surgeon: Rayray Avila MD;  Location:  OR     [facility] APPENDECTOMY       [facility] COLONOSCOPY W BIOPSY  08/10/05       Social History     Tobacco Use     Smoking status: Former Smoker     Packs/day: 0.25     Years: 2.00     Pack years: 0.50     Types: Cigarettes, Pipe     Quit date: 1979     Years since quittin.3     Smokeless tobacco: Never Used   Substance Use Topics     Alcohol use: Yes     Comment: 5 drinks per week / wine     Family History   Problem Relation  Age of Onset     Arthritis Mother      Cardiovascular Mother      Depression Mother      Gastrointestinal Disease Mother         IBS     Hypertension Mother      Circulatory Mother         Aortal aneurysm     Osteoporosis Mother      Allergies Father         Cortisone     Cardiovascular Father      Circulatory Father      Diabetes Father      Hypertension Father      Lipids Father      Alzheimer Disease Paternal Grandfather      Blood Disease Paternal Grandmother         Clotting problems     Blood Disease Son         Factor 5     Hypertension Maternal Aunt      Cerebrovascular Disease Maternal Grandmother          Current Outpatient Medications   Medication Sig Dispense Refill     allopurinol (ZYLOPRIM) 300 MG tablet TAKE 1 TABLET BY MOUTH EVERY DAY 90 tablet 0     cholestyramine light (PREVALITE) 4 GM packet DISSOLVE ONE PACKET IN LIQUID AND DRINK BY MOUTH TWICE A DAY AS DIRECTED 180 packet 3     Cyanocobalamin (B-12) 100 MCG TABS        diclofenac (VOLTAREN) 1 % GEL Apply  2 grams to shoulders three times daily using enclosed dosing card. (Patient taking differently: Apply  2 grams to shoulders three times daily using enclosed dosing card.) 100 g 1     levothyroxine (SYNTHROID/LEVOTHROID) 25 MCG tablet Take 1 tablet (25 mcg) by mouth daily 90 tablet 3     losartan-hydrochlorothiazide (HYZAAR) 100-12.5 MG tablet Take 1 tablet by mouth daily 90 tablet 3     omeprazole (PRILOSEC) 20 MG DR capsule TAKE 1 CAPSULE BY MOUTH EVERY DAY 90 capsule 1     riTUXimab 500 MG/50ML SOLN Inject 1,000 mg into the vein       sildenafil (VIAGRA) 100 MG tablet 1/2 tab 1/2 hour prior to activity 3 tablet 4     vitamin C (ASCORBIC ACID) 1000 MG TABS Take 1,000 mg by mouth daily       VITAMIN D PO                Review of Systems   Constitutional: Negative for chills and fever.   HENT: Positive for hearing loss. Negative for congestion, ear pain and sore throat.    Eyes: Negative for pain and visual disturbance.   Respiratory: Negative  "for cough and shortness of breath.    Cardiovascular: Negative for chest pain, palpitations and peripheral edema.   Gastrointestinal: Negative for abdominal pain, constipation, diarrhea, heartburn, hematochezia and nausea.   Genitourinary: Positive for impotence. Negative for dysuria, frequency, genital sores, hematuria, penile discharge and urgency.   Musculoskeletal: Positive for arthralgias. Negative for joint swelling and myalgias.   Skin: Negative for rash.   Neurological: Negative for dizziness, weakness, headaches and paresthesias.   Psychiatric/Behavioral: Negative for mood changes. The patient is not nervous/anxious.      Constitutional, HEENT, cardiovascular, pulmonary, gi and gu systems are negative, except as otherwise noted.    OBJECTIVE:   /82   Pulse 64   Temp 97.9  F (36.6  C) (Temporal)   Resp 18   Wt 91.6 kg (202 lb)   SpO2 97%   BMI 29.40 kg/m   Estimated body mass index is 29.4 kg/m  as calculated from the following:    Height as of 11/22/21: 1.765 m (5' 9.5\").    Weight as of this encounter: 91.6 kg (202 lb).  Physical Exam  GENERAL: healthy, alert and no distress  EYES: Eyes grossly normal to inspection, PERRL and conjunctivae and sclerae normal  HENT: ear canals and TM's normal, nose and mouth without ulcers or lesions  NECK: no adenopathy, no asymmetry, masses, or scars and thyroid normal to palpation  RESP: lungs clear to auscultation - no rales, rhonchi or wheezes  CV: regular rate and rhythm, normal S1 S2, no S3 or S4, no murmur, click or rub, no peripheral edema and peripheral pulses strong  ABDOMEN: soft, nontender, no hepatosplenomegaly, no masses and bowel sounds normal  MS: no gross musculoskeletal defects noted, no edema  SKIN: no suspicious lesions or rashes  NEURO: Normal strength and tone, mentation intact and speech normal  BACK: no CVA tenderness, no paralumbar tenderness  PSYCH: mentation appears normal, affect normal/bright    Diagnostic Test Results:  Labs " reviewed in Epic    ASSESSMENT / PLAN:   (Z13.220) Screening for hyperlipidemia  (primary encounter diagnosis)        (D68.51) Factor 5 Leiden mutation, heterozygous (H)        (Z12.5) Screening for prostate cancer    Plan: PSA, screen      (E03.4) Hypothyroidism due to acquired atrophy of thyroid    Plan: TSH WITH FREE T4 REFLEX, levothyroxine         (SYNTHROID/LEVOTHROID) 25 MCG tablet            (I15.9) Secondary hypertension with goal blood pressure less than 140/90    Plan: Albumin Random Urine Quantitative with Creat         Ratio, losartan-hydrochlorothiazide (HYZAAR)         100-12.5 MG tablet                 He reports that he quit smoking about 43 years ago. His smoking use included cigarettes and pipe. He has a 0.50 pack-year smoking history. He has never used smokeless tobacco.      Appropriate preventive services were discussed with this patient, including applicable screening as appropriate for cardiovascular disease, diabetes, osteopenia/osteoporosis, and glaucoma.  As appropriate for age/gender, discussed screening for colorectal cancer, prostate cancer, breast cancer, and cervical cancer. Checklist reviewing preventive services available has been given to the patient.    Counseling Resources:  ATP IV Guidelines  Pooled Cohorts Equation Calculator  Breast Cancer Risk Calculator  Breast Cancer: Medication to Reduce Risk  FRAX Risk Assessment  ICSI Preventive Guidelines  Dietary Guidelines for Americans, 2010  [employer]s MyPlate  ASA Prophylaxis  Lung CA Screening    Akash Mariee MD  [facility]    Identified Health Risks:

## 2022-05-23 DIAGNOSIS — K21.9 GASTROESOPHAGEAL REFLUX DISEASE WITHOUT ESOPHAGITIS: ICD-10-CM

## 2022-05-26 ENCOUNTER — LAB (OUTPATIENT)
Dept: LAB | Facility: CLINIC | Age: 72
End: 2022-05-26
Payer: COMMERCIAL

## 2022-05-26 DIAGNOSIS — I15.9 SECONDARY HYPERTENSION WITH GOAL BLOOD PRESSURE LESS THAN 140/90: ICD-10-CM

## 2022-05-26 DIAGNOSIS — E03.4 HYPOTHYROIDISM DUE TO ACQUIRED ATROPHY OF THYROID: Primary | ICD-10-CM

## 2022-05-26 DIAGNOSIS — Z12.5 SCREENING FOR PROSTATE CANCER: ICD-10-CM

## 2022-05-26 DIAGNOSIS — E03.4 HYPOTHYROIDISM DUE TO ACQUIRED ATROPHY OF THYROID: ICD-10-CM

## 2022-05-26 DIAGNOSIS — M05.79 RHEUMATOID ARTHRITIS INVOLVING MULTIPLE SITES WITH POSITIVE RHEUMATOID FACTOR (H): ICD-10-CM

## 2022-05-26 DIAGNOSIS — Z79.899 HIGH RISK MEDICATIONS (NOT ANTICOAGULANTS) LONG-TERM USE: ICD-10-CM

## 2022-05-26 DIAGNOSIS — Z11.59 ENCOUNTER FOR SCREENING FOR OTHER VIRAL DISEASES: ICD-10-CM

## 2022-05-26 LAB
ALBUMIN SERPL-MCNC: 3.9 G/DL (ref 3.4–5)
ALP SERPL-CCNC: 54 U/L (ref 40–150)
ALT SERPL W P-5'-P-CCNC: 125 U/L (ref 0–70)
AST SERPL W P-5'-P-CCNC: 57 U/L (ref 0–45)
BASOPHILS # BLD AUTO: 0 10E3/UL (ref 0–0.2)
BASOPHILS NFR BLD AUTO: 0 %
BILIRUB DIRECT SERPL-MCNC: 0.2 MG/DL (ref 0–0.2)
BILIRUB SERPL-MCNC: 1.1 MG/DL (ref 0.2–1.3)
CREAT SERPL-MCNC: 0.89 MG/DL (ref 0.66–1.25)
CREAT UR-MCNC: 133 MG/DL
CRP SERPL-MCNC: <2.9 MG/L (ref 0–8)
EOSINOPHIL # BLD AUTO: 0.2 10E3/UL (ref 0–0.7)
EOSINOPHIL NFR BLD AUTO: 6 %
ERYTHROCYTE [DISTWIDTH] IN BLOOD BY AUTOMATED COUNT: 12.9 % (ref 10–15)
ERYTHROCYTE [SEDIMENTATION RATE] IN BLOOD BY WESTERGREN METHOD: 8 MM/HR (ref 0–20)
GFR SERPL CREATININE-BSD FRML MDRD: >90 ML/MIN/1.73M2
HBV CORE AB SERPL QL IA: NONREACTIVE
HBV SURFACE AG SERPL QL IA: NONREACTIVE
HCT VFR BLD AUTO: 42.7 % (ref 40–53)
HGB BLD-MCNC: 14.8 G/DL (ref 13.3–17.7)
IMM GRANULOCYTES # BLD: 0 10E3/UL
IMM GRANULOCYTES NFR BLD: 0 %
LYMPHOCYTES # BLD AUTO: 0.6 10E3/UL (ref 0.8–5.3)
LYMPHOCYTES NFR BLD AUTO: 18 %
MCH RBC QN AUTO: 33.8 PG (ref 26.5–33)
MCHC RBC AUTO-ENTMCNC: 34.7 G/DL (ref 31.5–36.5)
MCV RBC AUTO: 98 FL (ref 78–100)
MICROALBUMIN UR-MCNC: 8 MG/L
MICROALBUMIN/CREAT UR: 6.02 MG/G CR (ref 0–17)
MONOCYTES # BLD AUTO: 0.5 10E3/UL (ref 0–1.3)
MONOCYTES NFR BLD AUTO: 15 %
NEUTROPHILS # BLD AUTO: 2.1 10E3/UL (ref 1.6–8.3)
NEUTROPHILS NFR BLD AUTO: 61 %
NRBC # BLD AUTO: 0 10E3/UL
NRBC BLD AUTO-RTO: 0 /100
PLATELET # BLD AUTO: 104 10E3/UL (ref 150–450)
PROT SERPL-MCNC: 7 G/DL (ref 6.8–8.8)
PSA SERPL-MCNC: 0.45 UG/L (ref 0–4)
RBC # BLD AUTO: 4.38 10E6/UL (ref 4.4–5.9)
T4 FREE SERPL-MCNC: 0.92 NG/DL (ref 0.76–1.46)
TSH SERPL DL<=0.005 MIU/L-ACNC: 5.04 MU/L (ref 0.4–4)
WBC # BLD AUTO: 3.5 10E3/UL (ref 4–11)

## 2022-05-26 PROCEDURE — 86140 C-REACTIVE PROTEIN: CPT

## 2022-05-26 PROCEDURE — 84439 ASSAY OF FREE THYROXINE: CPT

## 2022-05-26 PROCEDURE — 85652 RBC SED RATE AUTOMATED: CPT

## 2022-05-26 PROCEDURE — 85025 COMPLETE CBC W/AUTO DIFF WBC: CPT

## 2022-05-26 PROCEDURE — 36415 COLL VENOUS BLD VENIPUNCTURE: CPT

## 2022-05-26 PROCEDURE — 86481 TB AG RESPONSE T-CELL SUSP: CPT

## 2022-05-26 PROCEDURE — 86704 HEP B CORE ANTIBODY TOTAL: CPT

## 2022-05-26 PROCEDURE — 82043 UR ALBUMIN QUANTITATIVE: CPT

## 2022-05-26 PROCEDURE — 80076 HEPATIC FUNCTION PANEL: CPT

## 2022-05-26 PROCEDURE — G0103 PSA SCREENING: HCPCS

## 2022-05-26 PROCEDURE — 84443 ASSAY THYROID STIM HORMONE: CPT

## 2022-05-26 PROCEDURE — 82565 ASSAY OF CREATININE: CPT

## 2022-05-26 PROCEDURE — 87340 HEPATITIS B SURFACE AG IA: CPT

## 2022-05-27 LAB
GAMMA INTERFERON BACKGROUND BLD IA-ACNC: 0.03 IU/ML
M TB IFN-G BLD-IMP: NEGATIVE
M TB IFN-G CD4+ BCKGRND COR BLD-ACNC: 9.97 IU/ML
MITOGEN IGNF BCKGRD COR BLD-ACNC: 0.01 IU/ML
MITOGEN IGNF BCKGRD COR BLD-ACNC: 0.01 IU/ML
QUANTIFERON MITOGEN: 10 IU/ML
QUANTIFERON NIL TUBE: 0.03 IU/ML
QUANTIFERON TB1 TUBE: 0.04 IU/ML
QUANTIFERON TB2 TUBE: 0.04

## 2022-06-07 ENCOUNTER — VIRTUAL VISIT (OUTPATIENT)
Dept: RHEUMATOLOGY | Facility: CLINIC | Age: 72
End: 2022-06-07
Payer: COMMERCIAL

## 2022-06-07 DIAGNOSIS — Z79.899 HIGH RISK MEDICATION USE: ICD-10-CM

## 2022-06-07 DIAGNOSIS — D84.9 IMMUNOCOMPROMISED (H): Primary | ICD-10-CM

## 2022-06-07 DIAGNOSIS — M05.79 RHEUMATOID ARTHRITIS INVOLVING MULTIPLE SITES WITH POSITIVE RHEUMATOID FACTOR (H): ICD-10-CM

## 2022-06-07 PROCEDURE — 99213 OFFICE O/P EST LOW 20 MIN: CPT | Mod: 95 | Performed by: INTERNAL MEDICINE

## 2022-06-07 RX ORDER — HEPARIN SODIUM (PORCINE) LOCK FLUSH IV SOLN 100 UNIT/ML 100 UNIT/ML
5 SOLUTION INTRAVENOUS
Status: CANCELLED | OUTPATIENT
Start: 2022-07-08

## 2022-06-07 RX ORDER — MEPERIDINE HYDROCHLORIDE 25 MG/ML
25 INJECTION INTRAMUSCULAR; INTRAVENOUS; SUBCUTANEOUS EVERY 30 MIN PRN
Status: CANCELLED | OUTPATIENT
Start: 2022-07-08

## 2022-06-07 RX ORDER — DIPHENHYDRAMINE HYDROCHLORIDE 50 MG/ML
50 INJECTION INTRAMUSCULAR; INTRAVENOUS
Status: CANCELLED
Start: 2022-07-08

## 2022-06-07 RX ORDER — NALOXONE HYDROCHLORIDE 0.4 MG/ML
0.2 INJECTION, SOLUTION INTRAMUSCULAR; INTRAVENOUS; SUBCUTANEOUS
Status: CANCELLED | OUTPATIENT
Start: 2022-07-08

## 2022-06-07 RX ORDER — METHYLPREDNISOLONE SODIUM SUCCINATE 125 MG/2ML
125 INJECTION, POWDER, LYOPHILIZED, FOR SOLUTION INTRAMUSCULAR; INTRAVENOUS ONCE
Status: CANCELLED | OUTPATIENT
Start: 2022-07-08

## 2022-06-07 RX ORDER — METHYLPREDNISOLONE SODIUM SUCCINATE 125 MG/2ML
125 INJECTION, POWDER, LYOPHILIZED, FOR SOLUTION INTRAMUSCULAR; INTRAVENOUS
Status: CANCELLED
Start: 2022-07-08

## 2022-06-07 RX ORDER — ALBUTEROL SULFATE 0.83 MG/ML
2.5 SOLUTION RESPIRATORY (INHALATION)
Status: CANCELLED | OUTPATIENT
Start: 2022-07-08

## 2022-06-07 RX ORDER — DIPHENHYDRAMINE HCL 25 MG
50 CAPSULE ORAL ONCE
Status: CANCELLED
Start: 2022-07-08

## 2022-06-07 RX ORDER — ACETAMINOPHEN 325 MG/1
650 TABLET ORAL ONCE
Status: CANCELLED
Start: 2022-07-08

## 2022-06-07 RX ORDER — HEPARIN SODIUM,PORCINE 10 UNIT/ML
5 VIAL (ML) INTRAVENOUS
Status: CANCELLED | OUTPATIENT
Start: 2022-07-08

## 2022-06-07 RX ORDER — ALBUTEROL SULFATE 90 UG/1
1-2 AEROSOL, METERED RESPIRATORY (INHALATION)
Status: CANCELLED
Start: 2022-07-08

## 2022-06-07 RX ORDER — EPINEPHRINE 1 MG/ML
0.3 INJECTION, SOLUTION, CONCENTRATE INTRAVENOUS EVERY 5 MIN PRN
Status: CANCELLED | OUTPATIENT
Start: 2022-07-08

## 2022-06-07 NOTE — PROGRESS NOTES
Camilo is a 71 year old who is being evaluated via a billable video visit.      How would you like to obtain your AVS? Mail  If the video visit is dropped, the invitation should be resent by: Text to cell phone: [phone]  Will anyone else be joining your video visit? Yes: Jacki. How would they like to receive their invitation? Text to cell phone: [phone]     Rheumatology Video Visit      Camilo Baca MRN# [id number]   YOB: 1950 Age: 71 year old      Date of visit: 6/07/22   PCP: Lyndsey Aponte    Chief Complaint   Patient presents with:  Rheumatoid Arthritis: Very little swelling, just a little stifffness    Assessment and Plan     1.  Seropositive rheumatoid arthritis (, CCP >250): Dx'd 2013. Previously followed by [facility] and [facility].  Established with me on 3/20/2018. Previously on MTX (ineffective as monotherapy), Humira (effective, stopped because of insurance preference for IV), Simponi (rash on cheeks/nose after infusion), Remicade (lost efficacy), HCQ (facial rash), Orencia (partially effective), SSZ (cytopenia).  Currently on Rituximab (received 3/10/2020 & 3/24/2020, 9/22/2020 & 10/6/2020, 2/23/2021 & 3/9/2021, 8/9/2021 & 8/23/2021, 1/24/2022 & 2/7/2022).  Has done great since starting rituximab and has not required NSAIDs or prednisone.  RA is controlled.  Plan to reduce rituximab to be 5 mg every 5 months to see if this controls his arthritis as well; the first time this will be done is in July.   Chronic illness, stable.    - Rituximab (Truxima) 500 mg IV once, every 5 months; next dose in July 2022  - PRN RA flare: Prednisone 20mg daily x2days, then 15mg daily x2days, then 10mg daily x2days, then 5mg daily x2days, then stop.                Rapid 3, cumulative scores                       2/1/2022: asymptomatic (Rituximab)                      6/2/2020: doing well; virtual visit (Rituximab in March 2020; SSZ 500mg BID)                      03/03/2020: 9.8  (Orencia IV, SSZ  500mg BID)                       11/19/2019: 2.8  (Orencia IV, SSZ 500mg BID)                      08/20/2019: 4     (Orencia 750mg IV)                      04/23/2019: 0     (Orencia 750mg IV)                       01/08/2019: 0.5  (Simponi IV)                      11/06/2018: 9.3  (Simponi IV x2mo)    # Rituximab (Rituxan) Risks and Benefits: The risks and benefits of rituximab were discussed in detail and the patient verbalized understanding.  The risks discussed include, but are not limited to, the risk for hypersensitivity, anaphylaxis, anaphylactoid reactions, fatal infusion reactions, an increased risk for serious infections leading to hospitalization or death, severe mucocutaneous reactions, reactivation of hepatitis B, and development of progressive multifocal leukoencephalopathy (PML) resulting in death.  The most common adverse reactions are infections, nasopharyngitis, urinary tract infections, nausea, diarrhea, headache, muscle spasms, and anemia.  It was discussed that the medication would nee mean d to be discontinued if a serious infection develops.  It was discussed that live vaccinations should not be received while using rituximab or within 30 days prior to starting rituximab.  Higher risk for complications from COVID-19 infection so it is very important to follow COVID-19 precautions including wearing a mask, vaccination, and social distancing.  I encouraged reviewing the package insert and asking any questions about the medication.      2.  History of impingement syndrome of both shoulders: injected 10/11/2019 PT exercises resolved the shoulder issue.  Not an issue at this time.     3. History of mild plantar fasciitis, bilaterally: resolved with stretching and changing shoes.  Not an issue today.    4.  LFT elevation: Avoid hepatotoxins.  Avoid alcohol.  Following with hepatology who suspects steatohepatitis.  Documented here for historical significance only.    5.  Vaccinations: Vaccinations  reviewed with Mr. Baca.  Risks and benefits of vaccinations were discussed.    - Influenza: up to date  - [id number]: up to date  - [id number]: up to date  - Shingrix: Up to date  - COVID-19: has received the Moderna COVID-19 vaccine on 2/26/2021 and 3/25/2021 and 1/3/2022 and 5/26/2022.  Advised that the next COVID-19 vaccination be received 1 month prior to December 2022 rituximab dose.  Also discussed Evusheld, that it is approved under EUA, and that there are cardiovascular risks associated with this.  We reviewed the known risks associated with this treatment.  Reviewed the rationale for getting Evusheld and he is in agreement; order placed and phone number provided so that he may call to schedule.     Total minutes spent in evaluation with patient, documentation, , and review of pertinent studies and chart notes: 20      Mr. Baca verbalized agreement with and understanding of the rational for the diagnosis and treatment plan.  All questions were answered to best of my ability and the patient's satisfaction. Mr. Baca was advised to contact the clinic with any questions that may arise after the clinic visit.      Thank you for involving me in the care of the patient    Return to clinic: June 2022    HPI   Camilo Baca is a 71 year old male with a past medical history significant for hypertension, hyperlipidemia, GERD, hypothyroidism, history of DVT, factor V Leiden mutation, history of thrombocytopenia, gout, and rheumatoid arthritis who presents for follow-up of rheumatoid arthritis.     Today, 6/7/2022: Morning stiffness for about 5-10 minutes.  No joint pain or swelling.  No gelling phenomenon.  Tolerating rituximab well and says that it is effective at controlling his arthritis.  He is still in agreement with the plan to reduce rituximab to be 500 mg every 5 months to see if this controls his arthritis as well as it has in the past.      Denies fevers, chills, nausea, vomiting, constipation,  diarrhea. No abdominal pain. No chest pain/pressure, palpitations, or shortness of breath. No LE swelling. No neck pain. No oral or nasal sores.  No rash. No sicca symptoms.      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    ROS   12 point review of system was completed and negative except as noted in the HPI     Active Problem List     Patient Active Problem List   Diagnosis     Lumbago     NONSPECIFIC MEDICAL HISTORY     HYPERLIPIDEMIA LDL GOAL <130     History of adenomatous polyp of colon     Idiopathic chronic gout without tophus, unspecified site     Advanced directives, counseling/discussion     Seropositive rheumatoid arthritis (H)     High risk medication use     Thrombocytopenia (H)     Gastroesophageal reflux disease without esophagitis     Factor 5 Leiden mutation, heterozygous (H)     Personal history of venous thrombosis and embolism     Personal history of DVT (deep vein thrombosis)     Hypothyroidism due to acquired atrophy of thyroid     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)     Secondary hypertension with goal blood pressure less than 140/90     Rheumatoid arthritis involving multiple sites with positive rheumatoid factor (H)     Gout     Sensorineural hearing loss (SNHL)     Tinnitus     Past Medical History     Past Medical History:   Diagnosis Date     Alopecia, unspecified      Closed fracture of unspecified part of humerus 1974    Arm fracture / left wrist     Esophageal reflux 2/6/2015     Factor 5 Leiden mutation, heterozygous (H)      Gout 3/28/2011     Herpes zoster without mention of complication 1965    Herpes zoster     Measles without mention of complication     Measles     Personal history of DVT (deep vein thrombosis) 4/9/2015     Personal history of venous thrombosis and embolism 4/9/2015     Pulmonary embolism (H) 12/1996    post  appendectomy     RA (rheumatoid arthritis) (H) 3/11/2013     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     Rheumatoid arthritis(714.0)     Beginning symptoms     Secondary hypertension with goal blood pressure less than 140/90 11/15/2016     Thrombocytopenia, unspecified (H) 1/29/2014     Varicella without mention of complication     Chickenpox     Past Surgical History     Past Surgical History:   Procedure Laterality Date     COLONOSCOPY  11/15/2010    COLONOSCOPY performed by DARIUS MESA at  [facility]     COLONOSCOPY N/A 11/2/2015    Procedure: COLONOSCOPY;  Surgeon: Bubba Odom MD;  Location:  [facility]     COLONOSCOPY N/A 3/19/2021    Procedure: Colonoscopy;  Surgeon: Ludmila Beltran MD;  Location:  GI     ESOPHAGOSCOPY, GASTROSCOPY, DUODENOSCOPY (EGD), COMBINED N/A 10/24/2018    Procedure: Esophagogastroduodenoscopy Multiple Biopsies by Biopsy;  Surgeon: Matteo Johnson DO;  Location:  GI     HC REPAIR ING HERNIA,5+Y/O,REDUCIBL  1960     HERNIORRHAPHY UMBILICAL N/A 4/17/2015    Procedure: HERNIORRHAPHY UMBILICAL;  Surgeon: Rayray Avila MD;  Location:  OR     LAPAROSCOPIC HERNIORRHAPHY INGUINAL BILATERAL Bilateral 4/17/2015    Procedure: LAPAROSCOPIC HERNIORRHAPHY INGUINAL BILATERAL;  Surgeon: Rayray Avila MD;  Location:  OR     [facility] APPENDECTOMY  1996     [facility] COLONOSCOPY W BIOPSY  08/10/05     Allergy     Allergies   Allergen Reactions     Lisinopril Cough     Plaquenil [Hydroxychloroquine] Hives     Current Medication List     Current Outpatient Medications   Medication Sig     allopurinol (ZYLOPRIM) 300 MG tablet TAKE 1 TABLET BY MOUTH EVERY DAY     cholestyramine light (PREVALITE) 4 GM packet DISSOLVE ONE PACKET IN LIQUID AND DRINK BY MOUTH TWICE A DAY AS DIRECTED     Cyanocobalamin (B-12) 100 MCG TABS      diclofenac (VOLTAREN) 1 % GEL Apply  2 grams to shoulders three times daily using enclosed dosing card.  "(Patient taking differently: Apply  2 grams to shoulders three times daily using enclosed dosing card.)     levothyroxine (SYNTHROID/LEVOTHROID) 25 MCG tablet Take 1 tablet (25 mcg) by mouth daily     losartan-hydrochlorothiazide (HYZAAR) 100-12.5 MG tablet Take 1 tablet by mouth daily     omeprazole (PRILOSEC) 20 MG DR capsule TAKE 1 CAPSULE BY MOUTH EVERY DAY     riTUXimab 500 MG/50ML SOLN Inject 1,000 mg into the vein     sildenafil (VIAGRA) 100 MG tablet 1/2 tab 1/2 hour prior to activity     vitamin C (ASCORBIC ACID) 1000 MG TABS Take 1,000 mg by mouth daily     VITAMIN D PO      No current facility-administered medications for this visit.         Social History   See HPI    Family History     Family History   Problem Relation Age of Onset     Arthritis Mother      Cardiovascular Mother      Depression Mother      Gastrointestinal Disease Mother         IBS     Hypertension Mother      Circulatory Mother         Aortal aneurysm     Osteoporosis Mother      Allergies Father         Cortisone     Cardiovascular Father      Circulatory Father      Diabetes Father      Hypertension Father      Lipids Father      Alzheimer Disease Paternal Grandfather      Blood Disease Paternal Grandmother         Clotting problems     Blood Disease Son         Factor 5     Hypertension Maternal Aunt      Cerebrovascular Disease Maternal Grandmother        Physical Exam     Temp Readings from Last 3 Encounters:   05/04/22 97.9  F (36.6  C) (Temporal)   02/07/22 97.8  F (36.6  C) (Temporal)   01/24/22 97.8  F (36.6  C) (Tympanic)     BP Readings from Last 5 Encounters:   05/04/22 114/82   02/07/22 138/84   01/24/22 138/85   11/22/21 133/80   08/23/21 (!) 152/86     Pulse Readings from Last 1 Encounters:   05/04/22 64     Resp Readings from Last 1 Encounters:   05/04/22 18     Estimated body mass index is 29.4 kg/m  as calculated from the following:    Height as of 11/22/21: 1.765 m (5' 9.5\").    Weight as of 5/4/22: 91.6 kg (202 " lb).    GEN: NAD. Healthy appearing adult.   HEENT: MMM.  Anicteric, noninjected sclera. No obvious external lesions of the ear and nose. Hearing intact.  PULM: No increased work of breathing  MSK:  Hands and wrists without swelling.  No difficulty making a fist with his hands.  SKIN: No rash or jaundice seen  PSYCH: Alert. Appropriate.     Labs / Imaging (select studies)       CBC  Recent Labs   Lab Test 05/26/22  0855 11/17/21  0944 06/25/21  0841 08/27/20  0824 05/28/20  0927 02/10/20  0922 11/12/19  0748 10/15/19  0827   WBC 3.5* 3.3* 4.1 3.5* 3.6* 4.1   < > 4.8   RBC 4.38* 4.24* 4.29* 4.21* 4.29* 4.21*   < > 4.26*   HGB 14.8 14.0 14.3 13.8 13.9 13.7   < > 13.7   HCT 42.7 41.4 41.2 41.4 41.7 40.6   < > 41.1   MCV 98 98 96 98 97 96   < > 97   RDW 12.9 12.4 13.0 13.0 13.2 12.6   < > 13.5   * 189 129* 102* 117* 105*   < > 144*   MCH 33.8* 33.0 33.3* 32.8 32.4 32.5   < > 32.2   MCHC 34.7 33.8 34.7 33.3 33.3 33.7   < > 33.3   NEUTROPHIL 61  --  65.7 61.9 59.2 54.9   < > 60.4   LYMPH 18  --  17.3 19.5 19.6 29.0   < > 24.4   MONOCYTE 15  --  12.7 13.0 14.9 13.5   < > 14.0   EOSINOPHIL 6  --  3.6 4.5 5.2 2.4   < > 0.8   BASOPHIL 0  --  0.5 0.8 0.8 0.2   < > 0.4   ANEU  --   --  2.7 2.2 2.2 2.3   < > 2.9   ALYM  --   --  0.7* 0.7* 0.7* 1.2   < > 1.2   KIESHA  --   --  0.5 0.5 0.5 0.6   < > 0.7   AEOS  --   --  0.2  --   --  0.1  --  0.0   ABAS  --   --  0.0 0.0 0.0 0.0   < > 0.0   ANEUTAUTO 2.1  --   --   --   --   --   --   --    ALYMPAUTO 0.6*  --   --   --   --   --   --   --    AMONOAUTO 0.5  --   --   --   --   --   --   --    AEOSAUTO 0.2  --   --   --   --   --   --   --    ABSBASO 0.0  --   --   --   --   --   --   --     < > = values in this interval not displayed.     CMP  Recent Labs   Lab Test 05/26/22  0855 11/17/21  0943 07/08/21  0958 06/25/21  0841 03/29/21  1013 02/16/21  0947 08/27/20  0824 04/18/19  0826 11/06/18  0913   NA  --  140  --   --   --  141  --   --  141   POTASSIUM  --  4.5  --  4.2  3.5 4.4  --   --  4.2   CHLORIDE  --  107  --   --   --  106  --   --  105   CO2  --  32  --   --   --  32  --   --  33*   ANIONGAP  --  1*  --   --   --  3  --   --  3   GLC  --  98  --   --   --  119*  --   --  107*   BUN  --  13  --   --   --  15  --   --  15   CR 0.89 0.87  --  0.96  --  0.84 0.83   < > 0.83   GFRESTIMATED >90 88  --  80  --  90 >90   < > >90   GFRESTBLACK  --   --   --  >90  --  >90 >90   < > >90   ANDREZ  --  9.2  --   --   --  9.4  --   --  8.8   BILITOTAL 1.1 0.8  --  1.0  --   --  1.2   < > 0.6   ALBUMIN 3.9 3.3*  --  3.8  --   --  3.6   < > 3.1*   PROTTOTAL 7.0 7.2  --  7.0  --   --  7.2   < > 7.4   ALKPHOS 54 60  --  63  --   --  71   < > 60   AST 57* 59* 57* 49*  --   --  30   < > 41   * 138* 121* 119*  --   --  47   < > 82*    < > = values in this interval not displayed.     Calcium/VitaminD  Recent Labs   Lab Test 11/17/21  0943 02/16/21  0947 11/06/18  0913 06/26/18  0955   ANDREZ 9.2 9.4 8.8  --    VITDT  --   --   --  25     ESR/CRP  Recent Labs   Lab Test 05/26/22  0855 06/25/21  0841 08/27/20  0824   SED 8 10 9   CRP <2.9 <2.9 <2.9     Lipid Panel  Recent Labs   Lab Test 12/08/20  0911 09/17/19  0827 12/22/17  0735   CHOL 155 138 134   TRIG 129 85 149   HDL 55 38* 46   LDL 74 83 58   NHDL 100 100 88     Hepatitis B  Recent Labs   Lab Test 05/26/22  0855 06/25/21  0841 03/03/20  1405   HBCAB Nonreactive Nonreactive Nonreactive   HEPBANG Nonreactive Nonreactive Nonreactive     Tuberculosis Screening  Recent Labs   Lab Test 05/26/22  0855 06/25/21  0841 03/03/20  1405 03/20/18  1008   TBRES Negative  --  Negative  --    TBRSLT  --   --   --  Negative   TBAGN  --   --   --  0.14   TBRST  --  Negative  --   --      Immunization History     Immunization History   Administered Date(s) Administered     COVID-19,PF,Moderna 02/26/2021, 03/25/2021, 01/03/2022, 05/26/2022     Flu, Unspecified 02/01/2011, 10/19/2011     HepB, Unspecified 08/19/1991, 09/23/1991, 02/24/1992     Influenza (High  Dose) 3 valent vaccine 10/06/2017, 10/03/2018, 10/23/2019     Influenza (IIV3) PF 10/01/2009, 10/27/2010, 10/11/2011     Influenza Quad, Recombinant, pf(RIV4) (Flublok) 10/27/2021     Influenza Vaccine IM > 6 months Valent IIV4 (Alfuria,Fluzone) 10/09/2013, 10/23/2014, 09/18/2015, 11/15/2016     Influenza, Quad, High Dose, Pf, 65yr+ (Fluzone HD) 09/02/2020     Pneumo Conj 13-V (2010&after) 10/06/2017     Pneumococcal 23 valent 10/23/2014, 10/23/2019     TD (ADULT, 7+) 09/05/2001     TDAP Vaccine (Boostrix) 10/18/2012     Zoster vaccine recombinant adjuvanted (SHINGRIX) 01/31/2019, 04/18/2019     Zoster vaccine, live 10/23/2014          Chart documentation done in part with Dragon Voice recognition Software. Although reviewed after completion, some word and grammatical error may remain.      Video-Visit Details    Type of service:  Video Visit    Video Start Time: 1:21 PM    Video End Time:1:36 PM    Originating Location (pt. Location): [place], [place]    Distant Location (provider location):  [place]    Platform used for Video Visit: Mariza Gomes MD

## 2022-06-22 ENCOUNTER — ALLIED HEALTH/NURSE VISIT (OUTPATIENT)
Dept: NURSING | Facility: CLINIC | Age: 72
End: 2022-06-22
Payer: COMMERCIAL

## 2022-06-22 VITALS
HEART RATE: 56 BPM | BODY MASS INDEX: 29.4 KG/M2 | SYSTOLIC BLOOD PRESSURE: 145 MMHG | WEIGHT: 202 LBS | OXYGEN SATURATION: 98 % | DIASTOLIC BLOOD PRESSURE: 88 MMHG

## 2022-06-22 DIAGNOSIS — Z79.620 ON RITUXIMAB THERAPY: Primary | ICD-10-CM

## 2022-06-22 PROCEDURE — 96372 THER/PROPH/DIAG INJ SC/IM: CPT | Performed by: INTERNAL MEDICINE

## 2022-06-22 NOTE — PROGRESS NOTES
Evusheld Consent   Confirmed patient received the Emergency Use Authorization Fact Sheet for Patients, Parents and Caregivers prior to receiving medication. We discussed the following risks and benefits of receiving EVUSHELD, as well as alternative treatments and the patient wished to proceed with EVUSHELD.     Providers believe the benefits of Evusheld outweigh the risks for all moderately to severely immunocompromised patients.      Benefits:   Evusheld is a synthetic antibody that provides protection against COVID-19 for 6 months and is recommended for patients that have a weakened immune system that may not be able to make antibodies themselves.     Risks:    There is a very small risk of allergic reactions and you should notify us if you have any symptoms of an allergic reaction after the injection. There were also some extremely rare cardiac events reported in the initial trials in people that already had heart disease, but experts do not think these were caused by the medication. We will observe you for any chest pain or trouble breathing after the injections and you can reach out to your provider if any of these symptoms develop.     Alternatives:   Vaccines to prevent COVID-19 are approved or available under Emergency Use Authorization. Use of EVUSHELD does not replace vaccination against COVID-19. You can continue to mask and isolate to avoid infections. It is your choice to receive or not receive EVUSHELD. Should you decide not to receive EVUSHELD, it will not change your standard medical care.     Patient does consent to the injection.    Trinidad Warren, MSN, RN   Rheumatology RN Care Coordinator   [facility]   [phone]

## 2022-06-22 NOTE — PROGRESS NOTES
S[facility] Administration Note:  Camilo Baca presents today for [employer]SK[employer].   present during visit today: Not Applicable.    Consent:   Informed Consent confirmed in chart, obtained on this date: 6/22/22    Post Injection Assessment:   Patient tolerated injection without incident.      Patient was observed in the room for a minimum of 10 minutes after injection per standard, then remained in the buidling for a total 60 minute observation period.         Discharge Plan:    Discharge instructions reviewed with: Patient.       JACY Torres  Rheumatology/Infectious disease  [facility]   [phone]

## 2022-07-08 ENCOUNTER — INFUSION THERAPY VISIT (OUTPATIENT)
Dept: INFUSION THERAPY | Facility: CLINIC | Age: 72
End: 2022-07-08
Attending: INTERNAL MEDICINE
Payer: MEDICARE

## 2022-07-08 VITALS — SYSTOLIC BLOOD PRESSURE: 147 MMHG | HEART RATE: 54 BPM | DIASTOLIC BLOOD PRESSURE: 80 MMHG

## 2022-07-08 DIAGNOSIS — M05.79 RHEUMATOID ARTHRITIS INVOLVING MULTIPLE SITES WITH POSITIVE RHEUMATOID FACTOR (H): Primary | ICD-10-CM

## 2022-07-08 LAB
CD19 CELLS # BLD: 1 CELLS/UL (ref 107–698)
CD19 CELLS NFR BLD: <1 % (ref 6–27)

## 2022-07-08 PROCEDURE — 36415 COLL VENOUS BLD VENIPUNCTURE: CPT | Performed by: INTERNAL MEDICINE

## 2022-07-08 PROCEDURE — 96415 CHEMO IV INFUSION ADDL HR: CPT

## 2022-07-08 PROCEDURE — 96375 TX/PRO/DX INJ NEW DRUG ADDON: CPT

## 2022-07-08 PROCEDURE — 250N000013 HC RX MED GY IP 250 OP 250 PS 637: Performed by: INTERNAL MEDICINE

## 2022-07-08 PROCEDURE — 258N000003 HC RX IP 258 OP 636: Performed by: INTERNAL MEDICINE

## 2022-07-08 PROCEDURE — 250N000011 HC RX IP 250 OP 636: Performed by: INTERNAL MEDICINE

## 2022-07-08 PROCEDURE — 86355 B CELLS TOTAL COUNT: CPT | Performed by: INTERNAL MEDICINE

## 2022-07-08 PROCEDURE — 96413 CHEMO IV INFUSION 1 HR: CPT

## 2022-07-08 PROCEDURE — 82784 ASSAY IGA/IGD/IGG/IGM EACH: CPT | Performed by: INTERNAL MEDICINE

## 2022-07-08 RX ORDER — ALBUTEROL SULFATE 90 UG/1
1-2 AEROSOL, METERED RESPIRATORY (INHALATION)
Status: CANCELLED
Start: 2022-07-08

## 2022-07-08 RX ORDER — DIPHENHYDRAMINE HCL 25 MG
50 CAPSULE ORAL ONCE
Status: COMPLETED | OUTPATIENT
Start: 2022-07-08 | End: 2022-07-08

## 2022-07-08 RX ORDER — METHYLPREDNISOLONE SODIUM SUCCINATE 125 MG/2ML
125 INJECTION, POWDER, LYOPHILIZED, FOR SOLUTION INTRAMUSCULAR; INTRAVENOUS ONCE
Status: COMPLETED | OUTPATIENT
Start: 2022-07-08 | End: 2022-07-08

## 2022-07-08 RX ORDER — EPINEPHRINE 1 MG/ML
0.3 INJECTION, SOLUTION INTRAMUSCULAR; SUBCUTANEOUS EVERY 5 MIN PRN
Status: CANCELLED | OUTPATIENT
Start: 2022-07-08

## 2022-07-08 RX ORDER — ACETAMINOPHEN 325 MG/1
650 TABLET ORAL ONCE
Status: COMPLETED | OUTPATIENT
Start: 2022-07-08 | End: 2022-07-08

## 2022-07-08 RX ORDER — HEPARIN SODIUM (PORCINE) LOCK FLUSH IV SOLN 100 UNIT/ML 100 UNIT/ML
5 SOLUTION INTRAVENOUS
Status: CANCELLED | OUTPATIENT
Start: 2022-07-08

## 2022-07-08 RX ORDER — ACETAMINOPHEN 325 MG/1
650 TABLET ORAL ONCE
Status: CANCELLED
Start: 2022-07-08

## 2022-07-08 RX ORDER — METHYLPREDNISOLONE SODIUM SUCCINATE 125 MG/2ML
125 INJECTION, POWDER, LYOPHILIZED, FOR SOLUTION INTRAMUSCULAR; INTRAVENOUS ONCE
Status: CANCELLED | OUTPATIENT
Start: 2022-07-08

## 2022-07-08 RX ORDER — DIPHENHYDRAMINE HYDROCHLORIDE 50 MG/ML
50 INJECTION INTRAMUSCULAR; INTRAVENOUS
Status: CANCELLED
Start: 2022-07-08

## 2022-07-08 RX ORDER — METHYLPREDNISOLONE SODIUM SUCCINATE 125 MG/2ML
125 INJECTION, POWDER, LYOPHILIZED, FOR SOLUTION INTRAMUSCULAR; INTRAVENOUS
Status: CANCELLED
Start: 2022-07-08

## 2022-07-08 RX ORDER — NALOXONE HYDROCHLORIDE 0.4 MG/ML
0.2 INJECTION, SOLUTION INTRAMUSCULAR; INTRAVENOUS; SUBCUTANEOUS
Status: CANCELLED | OUTPATIENT
Start: 2022-07-08

## 2022-07-08 RX ORDER — DIPHENHYDRAMINE HCL 25 MG
50 CAPSULE ORAL ONCE
Status: CANCELLED
Start: 2022-07-08

## 2022-07-08 RX ORDER — ALBUTEROL SULFATE 0.83 MG/ML
2.5 SOLUTION RESPIRATORY (INHALATION)
Status: CANCELLED | OUTPATIENT
Start: 2022-07-08

## 2022-07-08 RX ORDER — HEPARIN SODIUM,PORCINE 10 UNIT/ML
5 VIAL (ML) INTRAVENOUS
Status: CANCELLED | OUTPATIENT
Start: 2022-07-08

## 2022-07-08 RX ORDER — MEPERIDINE HYDROCHLORIDE 25 MG/ML
25 INJECTION INTRAMUSCULAR; INTRAVENOUS; SUBCUTANEOUS EVERY 30 MIN PRN
Status: CANCELLED | OUTPATIENT
Start: 2022-07-08

## 2022-07-08 RX ADMIN — ACETAMINOPHEN 650 MG: 325 TABLET, FILM COATED ORAL at 08:48

## 2022-07-08 RX ADMIN — RITUXIMAB-ABBS 500 MG: 10 INJECTION, SOLUTION INTRAVENOUS at 09:17

## 2022-07-08 RX ADMIN — SODIUM CHLORIDE 250 ML: 9 INJECTION, SOLUTION INTRAVENOUS at 08:52

## 2022-07-08 RX ADMIN — METHYLPREDNISOLONE SODIUM SUCCINATE 125 MG: 125 INJECTION, POWDER, FOR SOLUTION INTRAMUSCULAR; INTRAVENOUS at 08:56

## 2022-07-08 RX ADMIN — DIPHENHYDRAMINE HYDROCHLORIDE 50 MG: 25 CAPSULE ORAL at 08:48

## 2022-07-08 NOTE — PROGRESS NOTES
Infusion Nursing Note:  Camilo ELSIE Baca presents today for Rituxan.    Patient seen by provider today: No   present during visit today: Not Applicable.    Note: N/A.    Intravenous Access:  Peripheral IV placed.    Treatment Conditions:  Biological Infusion Checklist:  ~~~ NOTE: If the patient answers yes to any of the questions below, hold the infusion and contact ordering provider or on-call provider.    1. Have you recently had an elevated temperature, fever, chills, productive cough, coughing for 3 weeks or longer or hemoptysis, abnormal vital signs, night sweats,  chest pain or have you noticed a decrease in your appetite, unexplained weight loss or fatigue? No  2. Do you have any open wounds or new incisions? No  3. Do you have any recent or upcoming hospitalizations, surgeries or dental procedures? No  4. Do you currently have or recently have had any signs of illness or infection or are you on any antibiotics? No  5. Have you had any new, sudden or worsening abdominal pain? No  6. Have you or anyone in your household received a live vaccination in the past 4 weeks? Please note:  No live vaccines while on biologic/chemotherapy until 6 months after the last treatment.  Patient can receive the flu vaccine (shot only) and the pneumovax.  It is optimal for the patient to get these vaccines mid cycle, but they can be given at any time as long as it is not on the day of the infusion. No  7. Have you recently been diagnosed with any new nervous system diseases (ie. Multiple sclerosis, Guillain [place], seizures, neurological changes) or cancer diagnosis? No  8. Are you on any form of radiation or chemotherapy? No  9. Are you pregnant or breast feeding or do you have plans of pregnancy in the future? No  10. Have you been having any signs of worsening depression or suicidal ideations?  (benlysta only) No  11. Have there been any other new onset medical symptoms? No         Post Infusion Assessment:  Patient  tolerated infusion without incident.  Patient observed for 15 minutes post Rituxan.  Site patent and intact, free from redness, edema or discomfort.  No evidence of extravasations.  Access discontinued per protocol.     Discharge Plan:   Discharge instructions reviewed with: Patient.  Patient discharged in stable condition accompanied by: wife.  Departure Mode: Ambulatory.      Emy Figueredo RN

## 2022-07-11 LAB
IGA SERPL-MCNC: 260 MG/DL (ref 84–499)
IGG SERPL-MCNC: 727 MG/DL (ref 610–1616)
IGM SERPL-MCNC: 25 MG/DL (ref 35–242)

## 2022-07-19 DIAGNOSIS — R79.89 ELEVATED LFTS: Primary | ICD-10-CM

## 2022-07-19 DIAGNOSIS — K74.00 HEPATIC FIBROSIS, UNSPECIFIED: ICD-10-CM

## 2022-07-25 ENCOUNTER — OFFICE VISIT (OUTPATIENT)
Dept: GASTROENTEROLOGY | Facility: CLINIC | Age: 72
End: 2022-07-25
Payer: COMMERCIAL

## 2022-07-25 VITALS
WEIGHT: 198.3 LBS | SYSTOLIC BLOOD PRESSURE: 134 MMHG | BODY MASS INDEX: 28.39 KG/M2 | DIASTOLIC BLOOD PRESSURE: 88 MMHG | HEIGHT: 70 IN | OXYGEN SATURATION: 100 % | HEART RATE: 59 BPM

## 2022-07-25 DIAGNOSIS — K74.00 HEPATIC FIBROSIS, UNSPECIFIED: Primary | ICD-10-CM

## 2022-07-25 PROCEDURE — 99213 OFFICE O/P EST LOW 20 MIN: CPT | Performed by: INTERNAL MEDICINE

## 2022-07-25 ASSESSMENT — PAIN SCALES - GENERAL: PAINLEVEL: NO PAIN (0)

## 2022-07-25 NOTE — LETTER
7/25/2022         RE: Camilo Baca  [address]  [place] [address]        Dear Colleague,    Thank you for referring your patient, Camilo Baca, to the [facility] [place]. Please see a copy of my visit note below.    [facility] Hepatology    Follow-up Visit    Follow-up visit for abnormal liver function tests.    Subjective:  71 year old male with rheumatoid arthritis, factor V of Leyden, right lower extremity DVT and PE.  We saw him initially for abnormal liver function tests.  His liver function abnormalities were first noted in 2006.  At that time they were attributed to medications i.e. statins.  But upon discontinuation of the statin his liver function tests did not normalize.  Is mostly asymptomatic for any liver disease.    Patient denies jaundice, lower extremity edema, abdominal distension, lethargy or confusion. Patient denies melena, hematemesis or hematochezia.  He is overweight and mostly carries his weight in his abdomen.  Does not have any abdominal pain, nausea or vomiting he is moving his bowels regularly.  He has done his COVID vaccinations and he was recently given Evusheld as he was on rituximab.    Medical hx Surgical hx   Past Medical History:   Diagnosis Date     Alopecia, unspecified      Closed fracture of unspecified part of humerus 1974    Arm fracture / left wrist     Esophageal reflux 2/6/2015     Factor 5 Leiden mutation, heterozygous (H)      Gout 3/28/2011     Herpes zoster without mention of complication 1965    Herpes zoster     Measles without mention of complication     Measles     Personal history of DVT (deep vein thrombosis) 4/9/2015     Personal history of venous thrombosis and embolism 4/9/2015     Pulmonary embolism (H) 12/1996    post appendectomy     RA (rheumatoid arthritis) (H) 3/11/2013     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     Rheumatoid arthritis(714.0)     Beginning  symptoms     Secondary hypertension with goal blood pressure less than 140/90 11/15/2016     Thrombocytopenia, unspecified (H) 1/29/2014     Varicella without mention of complication     Chickenpox      Past Surgical History:   Procedure Laterality Date     COLONOSCOPY  11/15/2010    COLONOSCOPY performed by DARIUS MESA at  GI     COLONOSCOPY N/A 11/2/2015    Procedure: COLONOSCOPY;  Surgeon: Bubba Odom MD;  Location:  GI     COLONOSCOPY N/A 3/19/2021    Procedure: Colonoscopy;  Surgeon: Ludmila Beltran MD;  Location:  GI     ESOPHAGOSCOPY, GASTROSCOPY, DUODENOSCOPY (EGD), COMBINED N/A 10/24/2018    Procedure: Esophagogastroduodenoscopy Multiple Biopsies by Biopsy;  Surgeon: Matteo Johnson DO;  Location:  GI     HC REPAIR ING HERNIA,5+Y/O,REDUCIBL  1960     HERNIORRHAPHY UMBILICAL N/A 4/17/2015    Procedure: HERNIORRHAPHY UMBILICAL;  Surgeon: Rayray Avila MD;  Location:  OR     LAPAROSCOPIC HERNIORRHAPHY INGUINAL BILATERAL Bilateral 4/17/2015    Procedure: LAPAROSCOPIC HERNIORRHAPHY INGUINAL BILATERAL;  Surgeon: Rayray Avila MD;  Location:  OR     [facility] APPENDECTOMY  1996     [facility] COLONOSCOPY W BIOPSY  08/10/05          Medications  Prior to Admission medications    Medication Sig Start Date End Date Taking? Authorizing Provider   allopurinol (ZYLOPRIM) 300 MG tablet TAKE 1 TABLET BY MOUTH EVERY DAY 1/26/22  Yes Virgilio Bryant MD   cholestyramine light (PREVALITE) 4 GM packet DISSOLVE ONE PACKET IN LIQUID AND DRINK BY MOUTH TWICE A DAY AS DIRECTED 10/11/21  Yes Akash Mariee MD   Cyanocobalamin (B-12) 100 MCG TABS    Yes Reported, Patient   diclofenac (VOLTAREN) 1 % GEL Apply  2 grams to shoulders three times daily using enclosed dosing card.  Patient taking differently: Apply  2 grams to shoulders three times daily using enclosed dosing card. 9/25/15  Yes Alexsandra Wolfe MD   levothyroxine (SYNTHROID/LEVOTHROID) 25 MCG tablet Take 1 tablet (25  "mcg) by mouth daily 5/4/22  Yes Akash Mariee MD   losartan-hydrochlorothiazide (HYZAAR) 100-12.5 MG tablet Take 1 tablet by mouth daily 5/4/22  Yes Akash Mariee MD   omeprazole (PRILOSEC) 20 MG DR capsule TAKE 1 CAPSULE BY MOUTH EVERY DAY 2/25/22  Yes Akash Mariee MD   riTUXimab 500 MG/50ML SOLN Inject 1,000 mg into the vein   Yes Reported, Patient   sildenafil (VIAGRA) 100 MG tablet 1/2 tab 1/2 hour prior to activity 12/10/21  Yes Akash Mariee MD   vitamin C (ASCORBIC ACID) 1000 MG TABS Take 1,000 mg by mouth daily   Yes Reported, Patient   VITAMIN D PO    Yes Reported, Patient       Allergies  Allergies   Allergen Reactions     Lisinopril Cough     Plaquenil [Hydroxychloroquine] Hives       Review of systems  A 10-point review of systems was negative    Examination  /88 (BP Location: Left arm, Patient Position: Sitting, Cuff Size: Adult Large)   Pulse 59   Ht 1.778 m (5' 10\")   Wt 89.9 kg (198 lb 4.8 oz)   SpO2 100%   BMI 28.45 kg/m    Body mass index is 28.45 kg/m .    Gen- well, NAD, A+Ox3, normal color  Lym- no palpable LAD  CVS- RRR  RS- CTA  Abd-obese but nontender.  Extr- hands normal, no BEBE  Skin- no rash or jaundice  Neuro- no asterixis  Psych- normal mood    Laboratory  Lab Results   Component Value Date     11/17/2021     02/16/2021    POTASSIUM 4.5 11/17/2021    POTASSIUM 4.2 06/25/2021    CHLORIDE 107 11/17/2021    CHLORIDE 106 02/16/2021    CO2 32 11/17/2021    CO2 32 02/16/2021    BUN 13 11/17/2021    BUN 15 02/16/2021    CR 0.89 05/26/2022    CR 0.96 06/25/2021       Lab Results   Component Value Date    BILITOTAL 1.1 05/26/2022    BILITOTAL 1.0 06/25/2021     05/26/2022     07/08/2021    AST 57 05/26/2022    AST 57 07/08/2021    ALKPHOS 54 05/26/2022    ALKPHOS 63 06/25/2021       Lab Results   Component Value Date    ALBUMIN 3.9 05/26/2022    ALBUMIN 3.8 06/25/2021    PROTTOTAL 7.0 05/26/2022    PROTTOTAL 7.0 06/25/2021        Lab Results "   Component Value Date    WBC 3.5 05/26/2022    WBC 4.1 06/25/2021    HGB 14.8 05/26/2022    HGB 14.3 06/25/2021    MCV 98 05/26/2022    MCV 96 06/25/2021     05/26/2022     06/25/2021       Lab Results   Component Value Date    INR 1.00 11/17/2021    INR 0.9 04/09/2015       Radiology    Assessment  71 year old male with abnormal liver function tests.  His work-up showed that he has no fibrosis with the MR elastography and he has mild fatty infiltration of the liver.  His risk factors are abdominal obesity and hyperlipidemia.  It is reassuring that he does not have any significant fibrosis on the MR elastography.    Plan  We will monitor his liver function test this and in the interim he will need to work on his weight.  This could be done by his primary care physician or he can follow-up with us here if needed.  We advise it patient to completely abstain from any alcoholic beverages.  He admits is now that he is drinking 2-3 a day usually wine.    For all his other medical issues he will follow with his rheumatologist and primary care physician.    I spent 30 minutes on the encounter of July 25, 2022 in chart reviewing, history taking, physical examination and documentation.  I spent some of the time also in coordination of care and counseling.    Gem Garay MD  Hepatology  [facility]      Again, thank you for allowing me to participate in the care of your patient.        Sincerely,        Gem Garay MD

## 2022-07-25 NOTE — PROGRESS NOTES
[facility] Hepatology    Follow-up Visit    Follow-up visit for abnormal liver function tests.    Subjective:  71 year old male with rheumatoid arthritis, factor V of Leyden, right lower extremity DVT and PE.  We saw him initially for abnormal liver function tests.  His liver function abnormalities were first noted in 2006.  At that time they were attributed to medications i.e. statins.  But upon discontinuation of the statin his liver function tests did not normalize.  Is mostly asymptomatic for any liver disease.    Patient denies jaundice, lower extremity edema, abdominal distension, lethargy or confusion. Patient denies melena, hematemesis or hematochezia.  He is overweight and mostly carries his weight in his abdomen.  Does not have any abdominal pain, nausea or vomiting he is moving his bowels regularly.  He has done his COVID vaccinations and he was recently given Evusheld as he was on rituximab.    Medical hx Surgical hx   Past Medical History:   Diagnosis Date     Alopecia, unspecified      Closed fracture of unspecified part of humerus 1974    Arm fracture / left wrist     Esophageal reflux 2/6/2015     Factor 5 Leiden mutation, heterozygous (H)      Gout 3/28/2011     Herpes zoster without mention of complication 1965    Herpes zoster     Measles without mention of complication     Measles     Personal history of DVT (deep vein thrombosis) 4/9/2015     Personal history of venous thrombosis and embolism 4/9/2015     Pulmonary embolism (H) 12/1996    post appendectomy     RA (rheumatoid arthritis) (H) 3/11/2013     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     Rheumatoid arthritis(714.0)     Beginning symptoms     Secondary hypertension with goal blood pressure less than 140/90 11/15/2016     Thrombocytopenia, unspecified (H) 1/29/2014     Varicella without mention of complication     Chickenpox      Past Surgical History:   Procedure Laterality Date      COLONOSCOPY  11/15/2010    COLONOSCOPY performed by DARIUS MESA at  GI     COLONOSCOPY N/A 11/2/2015    Procedure: COLONOSCOPY;  Surgeon: Bubba Odom MD;  Location:  GI     COLONOSCOPY N/A 3/19/2021    Procedure: Colonoscopy;  Surgeon: Ludmila Beltran MD;  Location:  GI     ESOPHAGOSCOPY, GASTROSCOPY, DUODENOSCOPY (EGD), COMBINED N/A 10/24/2018    Procedure: Esophagogastroduodenoscopy Multiple Biopsies by Biopsy;  Surgeon: Matteo Johnson DO;  Location:  GI     HC REPAIR ING HERNIA,5+Y/O,REDUCIBL  1960     HERNIORRHAPHY UMBILICAL N/A 4/17/2015    Procedure: HERNIORRHAPHY UMBILICAL;  Surgeon: Rayray Avila MD;  Location: [facility] OR     LAPAROSCOPIC HERNIORRHAPHY INGUINAL BILATERAL Bilateral 4/17/2015    Procedure: LAPAROSCOPIC HERNIORRHAPHY INGUINAL BILATERAL;  Surgeon: Rayray Avila MD;  Location:  OR     [facility] APPENDECTOMY  1996     [facility] COLONOSCOPY W BIOPSY  08/10/05          Medications  Prior to Admission medications    Medication Sig Start Date End Date Taking? Authorizing Provider   allopurinol (ZYLOPRIM) 300 MG tablet TAKE 1 TABLET BY MOUTH EVERY DAY 1/26/22  Yes Virgilio Bryant MD   cholestyramine light (PREVALITE) 4 GM packet DISSOLVE ONE PACKET IN LIQUID AND DRINK BY MOUTH TWICE A DAY AS DIRECTED 10/11/21  Yes Akash Mariee MD   Cyanocobalamin (B-12) 100 MCG TABS    Yes Reported, Patient   diclofenac (VOLTAREN) 1 % GEL Apply  2 grams to shoulders three times daily using enclosed dosing card.  Patient taking differently: Apply  2 grams to shoulders three times daily using enclosed dosing card. 9/25/15  Yes Alexsandra Wolfe MD   levothyroxine (SYNTHROID/LEVOTHROID) 25 MCG tablet Take 1 tablet (25 mcg) by mouth daily 5/4/22  Yes Akash Mariee MD   losartan-hydrochlorothiazide (HYZAAR) 100-12.5 MG tablet Take 1 tablet by mouth daily 5/4/22  Yes Akash Mariee MD   omeprazole (PRILOSEC) 20 MG DR capsule TAKE 1 CAPSULE BY MOUTH EVERY DAY  "2/25/22  Yes Akash Mariee MD   riTUXimab 500 MG/50ML SOLN Inject 1,000 mg into the vein   Yes Reported, Patient   sildenafil (VIAGRA) 100 MG tablet 1/2 tab 1/2 hour prior to activity 12/10/21  Yes Akash Mariee MD   vitamin C (ASCORBIC ACID) 1000 MG TABS Take 1,000 mg by mouth daily   Yes Reported, Patient   VITAMIN D PO    Yes Reported, Patient       Allergies  Allergies   Allergen Reactions     Lisinopril Cough     Plaquenil [Hydroxychloroquine] Hives       Review of systems  A 10-point review of systems was negative    Examination  /88 (BP Location: Left arm, Patient Position: Sitting, Cuff Size: Adult Large)   Pulse 59   Ht 1.778 m (5' 10\")   Wt 89.9 kg (198 lb 4.8 oz)   SpO2 100%   BMI 28.45 kg/m    Body mass index is 28.45 kg/m .    Gen- well, NAD, A+Ox3, normal color  Lym- no palpable LAD  CVS- RRR  RS- CTA  Abd-obese but nontender.  Extr- hands normal, no BEBE  Skin- no rash or jaundice  Neuro- no asterixis  Psych- normal mood    Laboratory  Lab Results   Component Value Date     11/17/2021     02/16/2021    POTASSIUM 4.5 11/17/2021    POTASSIUM 4.2 06/25/2021    CHLORIDE 107 11/17/2021    CHLORIDE 106 02/16/2021    CO2 32 11/17/2021    CO2 32 02/16/2021    BUN 13 11/17/2021    BUN 15 02/16/2021    CR 0.89 05/26/2022    CR 0.96 06/25/2021       Lab Results   Component Value Date    BILITOTAL 1.1 05/26/2022    BILITOTAL 1.0 06/25/2021     05/26/2022     07/08/2021    AST 57 05/26/2022    AST 57 07/08/2021    ALKPHOS 54 05/26/2022    ALKPHOS 63 06/25/2021       Lab Results   Component Value Date    ALBUMIN 3.9 05/26/2022    ALBUMIN 3.8 06/25/2021    PROTTOTAL 7.0 05/26/2022    PROTTOTAL 7.0 06/25/2021        Lab Results   Component Value Date    WBC 3.5 05/26/2022    WBC 4.1 06/25/2021    HGB 14.8 05/26/2022    HGB 14.3 06/25/2021    MCV 98 05/26/2022    MCV 96 06/25/2021     05/26/2022     06/25/2021       Lab Results   Component Value Date    INR " 1.00 11/17/2021    INR 0.9 04/09/2015       Radiology    Assessment  71 year old male with abnormal liver function tests.  His work-up showed that he has no fibrosis with the MR elastography and he has mild fatty infiltration of the liver.  His risk factors are abdominal obesity and hyperlipidemia.  It is reassuring that he does not have any significant fibrosis on the MR elastography.    Plan  We will monitor his liver function test this and in the interim he will need to work on his weight.  This could be done by his primary care physician or he can follow-up with us here if needed.  We advise it patient to completely abstain from any alcoholic beverages.  He admits is now that he is drinking 2-3 a day usually wine.    For all his other medical issues he will follow with his rheumatologist and primary care physician.    I spent 30 minutes on the encounter of July 25, 2022 in chart reviewing, history taking, physical examination and documentation.  I spent some of the time also in coordination of care and counseling.    Gem Garay MD  Hepatology  [facility]

## 2022-07-25 NOTE — NURSING NOTE
"Chief Complaint   Patient presents with     Follow Up     Elevated LFTs  Hepatic fibrosis, unspecified           Vitals:    07/25/22 1033   BP: 134/88   BP Location: Left arm   Patient Position: Sitting   Cuff Size: Adult Large   Pulse: 59   SpO2: 100%   Weight: 89.9 kg (198 lb 4.8 oz)   Height: 1.778 m (5' 10\")       Body mass index is 28.45 kg/m .    Nadja Marina, ICVF    "

## 2022-07-28 ENCOUNTER — LAB (OUTPATIENT)
Dept: LAB | Facility: CLINIC | Age: 72
End: 2022-07-28
Payer: COMMERCIAL

## 2022-07-28 DIAGNOSIS — K74.00 HEPATIC FIBROSIS, UNSPECIFIED: ICD-10-CM

## 2022-07-28 DIAGNOSIS — R79.89 ELEVATED LFTS: ICD-10-CM

## 2022-07-28 LAB
ALBUMIN SERPL-MCNC: 3.8 G/DL (ref 3.4–5)
ALP SERPL-CCNC: 53 U/L (ref 40–150)
ALT SERPL W P-5'-P-CCNC: 126 U/L (ref 0–70)
ANION GAP SERPL CALCULATED.3IONS-SCNC: 2 MMOL/L (ref 3–14)
AST SERPL W P-5'-P-CCNC: 50 U/L (ref 0–45)
BILIRUB DIRECT SERPL-MCNC: 0.2 MG/DL (ref 0–0.2)
BILIRUB SERPL-MCNC: 1.2 MG/DL (ref 0.2–1.3)
BUN SERPL-MCNC: 17 MG/DL (ref 7–30)
CALCIUM SERPL-MCNC: 9.2 MG/DL (ref 8.5–10.1)
CHLORIDE BLD-SCNC: 108 MMOL/L (ref 94–109)
CO2 SERPL-SCNC: 31 MMOL/L (ref 20–32)
CREAT SERPL-MCNC: 0.79 MG/DL (ref 0.66–1.25)
ERYTHROCYTE [DISTWIDTH] IN BLOOD BY AUTOMATED COUNT: 12.6 % (ref 10–15)
GFR SERPL CREATININE-BSD FRML MDRD: >90 ML/MIN/1.73M2
GLUCOSE BLD-MCNC: 127 MG/DL (ref 70–99)
HCT VFR BLD AUTO: 42.1 % (ref 40–53)
HGB BLD-MCNC: 14.9 G/DL (ref 13.3–17.7)
INR PPP: 1 (ref 0.85–1.15)
MCH RBC QN AUTO: 34.6 PG (ref 26.5–33)
MCHC RBC AUTO-ENTMCNC: 35.4 G/DL (ref 31.5–36.5)
MCV RBC AUTO: 98 FL (ref 78–100)
PLATELET # BLD AUTO: 115 10E3/UL (ref 150–450)
POTASSIUM BLD-SCNC: 3.9 MMOL/L (ref 3.4–5.3)
PROT SERPL-MCNC: 7 G/DL (ref 6.8–8.8)
RBC # BLD AUTO: 4.31 10E6/UL (ref 4.4–5.9)
SODIUM SERPL-SCNC: 141 MMOL/L (ref 133–144)
WBC # BLD AUTO: 3.3 10E3/UL (ref 4–11)

## 2022-07-28 PROCEDURE — 82248 BILIRUBIN DIRECT: CPT

## 2022-07-28 PROCEDURE — 85610 PROTHROMBIN TIME: CPT

## 2022-07-28 PROCEDURE — 85027 COMPLETE CBC AUTOMATED: CPT

## 2022-07-28 PROCEDURE — 80053 COMPREHEN METABOLIC PANEL: CPT

## 2022-07-28 PROCEDURE — 36415 COLL VENOUS BLD VENIPUNCTURE: CPT

## 2022-07-30 DIAGNOSIS — M10.9 ACUTE GOUTY ARTHROPATHY: ICD-10-CM

## 2022-08-01 RX ORDER — ALLOPURINOL 300 MG/1
TABLET ORAL
Qty: 90 TABLET | Refills: 0 | Status: SHIPPED | OUTPATIENT
Start: 2022-08-01 | End: 2022-11-02

## 2022-08-01 NOTE — TELEPHONE ENCOUNTER
"   Requested Prescriptions   Pending Prescriptions Disp Refills    allopurinol (ZYLOPRIM) 300 MG tablet [Pharmacy Med Name: ALLOPURINOL 300MG TABLET] 90 tablet 0     Sig: TAKE 1 TABLET BY MOUTH EVERY DAY        Gout Agents Protocol Failed - 7/30/2022  8:09 AM        Failed - Has Uric Acid on file in past 12 months and value is less than 6     Recent Labs   Lab Test 09/17/19  0827   URIC 3.1*     If level is 6mg/dL or greater, ok to refill one time and refer to provider.             Passed - CBC on file in past 12 months     Recent Labs   Lab Test 07/28/22  0838   WBC 3.3*   RBC 4.31*   HGB 14.9   HCT 42.1   *                   Passed - ALT on file in past 12 months     Recent Labs   Lab Test 07/28/22  0838   *               Passed - Recent (12 mo) or future (30 days) visit within the authorizing provider's specialty     Patient has had an office visit with the authorizing provider or a provider within the authorizing providers department within the previous 12 mos or has a future within next 30 days. See \"Patient Info\" tab in inbasket, or \"Choose Columns\" in Meds & Orders section of the refill encounter.              Passed - Medication is active on med list        Passed - Patient is age 18 or older        Passed - Normal serum creatinine on file in the past 12 months     Recent Labs   Lab Test 07/28/22  0838   CR 0.79       Ok to refill medication if creatinine is low                WILFRED EastN, RN     "

## 2022-08-16 ENCOUNTER — OFFICE VISIT (OUTPATIENT)
Dept: FAMILY MEDICINE | Facility: CLINIC | Age: 72
End: 2022-08-16
Payer: COMMERCIAL

## 2022-08-16 VITALS
WEIGHT: 197 LBS | TEMPERATURE: 98.2 F | DIASTOLIC BLOOD PRESSURE: 82 MMHG | SYSTOLIC BLOOD PRESSURE: 120 MMHG | HEART RATE: 60 BPM | RESPIRATION RATE: 14 BRPM | BODY MASS INDEX: 28.27 KG/M2 | OXYGEN SATURATION: 98 %

## 2022-08-16 DIAGNOSIS — D68.51 FACTOR 5 LEIDEN MUTATION, HETEROZYGOUS (H): ICD-10-CM

## 2022-08-16 DIAGNOSIS — E78.2 MIXED HYPERLIPIDEMIA: ICD-10-CM

## 2022-08-16 DIAGNOSIS — R10.31 ABDOMINAL PAIN, RIGHT LOWER QUADRANT: ICD-10-CM

## 2022-08-16 DIAGNOSIS — Z13.220 SCREENING FOR HYPERLIPIDEMIA: ICD-10-CM

## 2022-08-16 LAB
ALT SERPL W P-5'-P-CCNC: 144 U/L (ref 0–70)
AST SERPL W P-5'-P-CCNC: 60 U/L (ref 0–45)
CHOLEST SERPL-MCNC: 211 MG/DL
FASTING STATUS PATIENT QL REPORTED: NO
HDLC SERPL-MCNC: 46 MG/DL
LDLC SERPL CALC-MCNC: 133 MG/DL
NONHDLC SERPL-MCNC: 165 MG/DL
TRIGL SERPL-MCNC: 158 MG/DL

## 2022-08-16 PROCEDURE — 80061 LIPID PANEL: CPT | Performed by: FAMILY MEDICINE

## 2022-08-16 PROCEDURE — 84460 ALANINE AMINO (ALT) (SGPT): CPT | Performed by: FAMILY MEDICINE

## 2022-08-16 PROCEDURE — 99214 OFFICE O/P EST MOD 30 MIN: CPT | Performed by: FAMILY MEDICINE

## 2022-08-16 PROCEDURE — 84450 TRANSFERASE (AST) (SGOT): CPT | Performed by: FAMILY MEDICINE

## 2022-08-16 PROCEDURE — 36415 COLL VENOUS BLD VENIPUNCTURE: CPT | Performed by: FAMILY MEDICINE

## 2022-08-16 RX ORDER — ROSUVASTATIN CALCIUM 10 MG/1
10 TABLET, COATED ORAL DAILY
Qty: 90 TABLET | Refills: 0 | Status: SHIPPED | OUTPATIENT
Start: 2022-08-16 | End: 2022-11-10

## 2022-08-16 ASSESSMENT — PAIN SCALES - GENERAL: PAINLEVEL: NO PAIN (1)

## 2022-08-16 NOTE — PROGRESS NOTES
Assessment & Plan     (R10.31) Abdominal pain, right lower quadrant  Comment: Right lower abdominal pain extending down into the inguinal region which could be a recurrence of one of his hernias.  He had bilateral repair back in 2015 with Dr. Avila.  He has tenderness that extends into the inguinal canal when I examined him on that right hemiscrotum.  Plan: CT Abdomen Pelvis w Contrast        We will obtain a CT scan of the pelvis and abdomen to look for recurrence of hernia.  If this is present then we will make a referral back to general surgery for reevaluation and recommendations.    (Z13.220) Screening for hyperlipidemia  Comment: Patient is due for lipid screening  Plan: Lipid panel reflex to direct LDL Non-fasting,         AST, ALT        Labs were drawn today.  Based on his labs today, blood pressure and history of hypertension, his 10-year ASCVD risk is 20% which is quite high.  I will make the recommendations for starting a statin to decrease his risk.    (D68.51) Factor 5 Leiden mutation, heterozygous (H)  Comment: Patient has factor V Leiden mutation heterozygous.  Plan: This was not addressed today.    Review of prior external note(s) from - Dr. Avila's operative note from 2015  25 minutes spent on the date of the encounter doing chart review, history and exam, documentation and further activities per the note         Return on 8/17/2022) for his CT scan.    Electronically signed by:  Cuauhtemoc Carrasquillo M.D.  8/16/2022      Kenna Page is a 72 year old, presenting for the following health issues:  Abdominal Pain (Lower right side)      History of Present Illness       Reason for visit:  Side pain    He eats 2-3 servings of fruits and vegetables daily.He consumes 0 sweetened beverage(s) daily.He exercises with enough effort to increase his heart rate 30 to 60 minutes per day.  He exercises with enough effort to increase his heart rate 5 days per week.   He is taking medications  regularly.       Pain in the lower abdomen bilaterally, right worse than the left. No bulging but some swelling.  Noticed this about 1-2 weeks.  Worse with changing positions or laying on his back  Worse with lifting or increased activity.  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  He said this pain is very similar to when he had his bilateral hernias prior to his repair of them in 2015.  He denies any issues with bowel or bladder control but really has been doing minimal activities since the pain has been worsening and is worsened by any increased activity particular with any lifting.  He initially thought that he had strained a muscle when he was doing the rock work as noted above but now it seems to be just getting worse and does not feel muscular but seems deeper than that.  Patient has tried some Tylenol and ibuprofen with minimal effects.  Has not done any ice or heat.    He is on his medications without missing any doses.  He has no other concerns today.      Review of Systems   Constitutional, HEENT, cardiovascular, pulmonary, gi and gu systems are negative, except as otherwise noted.      Objective    /82   Pulse 60   Temp 98.2  F (36.8  C) (Temporal)   Resp 14   Wt 89.4 kg (197 lb)   SpO2 98%   BMI 28.27 kg/m    Body mass index is 28.27 kg/m .  Physical Exam   GENERAL: healthy, alert and no distress  RESP: lungs clear to auscultation - no rales, rhonchi or wheezes  CV: regular rate and rhythm, normal S1 S2, no S3 or S4, no murmur, click or rub, no peripheral edema and peripheral pulses strong  ABDOMEN: In the standing position she has tenderness in the right lower abdomen just above the pubis and right of midline.  It is quite tender and with Valsalva I feel increased pressure but no defined bulge or mass.  He has similar tenderness over on the left side but is not as dramatic.  I do not notice any specific abdominal wall defects.  There is a scar that is well-healed from where his  appendix was removed many many years ago.  He has tenderness on scrotal exam as they insert a finger up along the inguinal canal.  There is a slight bulge at that inguinal ring with Valsalva.  He has pain associated with this.  In the supine position he is tender in that lower abdominal area and inguinal region and again I do not notice any bulging or wall defects.  The pain does not extend up above the iliac crest or laterally.    Results for orders placed or performed in visit on 08/16/22 (from the past 24 hour(s))   Lipid panel reflex to direct LDL Non-fasting   Result Value Ref Range    Cholesterol 211 (H) <200 mg/dL    Triglycerides 158 (H) <150 mg/dL    Direct Measure HDL 46 >=40 mg/dL    LDL Cholesterol Calculated 133 (H) <=100 mg/dL    Non HDL Cholesterol 165 (H) <130 mg/dL    Patient Fasting > 8hrs? No     Narrative    Cholesterol  Desirable:  <200 mg/dL    Triglycerides  Normal:  Less than 150 mg/dL  Borderline High:  150-199 mg/dL  High:  200-499 mg/dL  Very High:  Greater than or equal to 500 mg/dL    Direct Measure HDL  Female:  Greater than or equal to 50 mg/dL   Male:  Greater than or equal to 40 mg/dL    LDL Cholesterol  Desirable:  <100mg/dL  Above Desirable:  100-129 mg/dL   Borderline High:  130-159 mg/dL   High:  160-189 mg/dL   Very High:  >= 190 mg/dL    Non HDL Cholesterol  Desirable:  130 mg/dL  Above Desirable:  130-159 mg/dL  Borderline High:  160-189 mg/dL  High:  190-219 mg/dL  Very High:  Greater than or equal to 220 mg/dL   AST   Result Value Ref Range    AST 60 (H) 0 - 45 U/L   ALT   Result Value Ref Range     (H) 0 - 70 U/L                 .  ..

## 2022-08-17 ENCOUNTER — HOSPITAL ENCOUNTER (OUTPATIENT)
Dept: CT IMAGING | Facility: CLINIC | Age: 72
Discharge: HOME | End: 2022-08-17
Attending: FAMILY MEDICINE | Admitting: FAMILY MEDICINE
Payer: MEDICARE

## 2022-08-17 DIAGNOSIS — R10.31 ABDOMINAL PAIN, RIGHT LOWER QUADRANT: ICD-10-CM

## 2022-08-17 PROCEDURE — G1010 CDSM STANSON: HCPCS

## 2022-08-17 PROCEDURE — 250N000011 HC RX IP 250 OP 636: Performed by: FAMILY MEDICINE

## 2022-08-17 PROCEDURE — 250N000009 HC RX 250: Performed by: FAMILY MEDICINE

## 2022-08-17 RX ORDER — IOPAMIDOL 755 MG/ML
500 INJECTION, SOLUTION INTRAVASCULAR ONCE
Status: COMPLETED | OUTPATIENT
Start: 2022-08-17 | End: 2022-08-17

## 2022-08-17 RX ADMIN — IOPAMIDOL 95 ML: 755 INJECTION, SOLUTION INTRAVENOUS at 09:09

## 2022-08-17 RX ADMIN — SODIUM CHLORIDE 60 ML: 9 INJECTION, SOLUTION INTRAVENOUS at 09:10

## 2022-08-18 ENCOUNTER — MYC MEDICAL ADVICE (OUTPATIENT)
Dept: FAMILY MEDICINE | Facility: CLINIC | Age: 72
End: 2022-08-18

## 2022-08-19 NOTE — TELEPHONE ENCOUNTER
Patient calling to schedule the virtual visit to further discuss his CT results. Virtual scheduled with his primary on Monday, 8/22 at 4:40 pm. Did instruct that provider may not call him right at the 4:40 pm time as he has been worked in. Patient was fine with this. Teresa Abbott LPN

## 2022-08-22 ENCOUNTER — VIRTUAL VISIT (OUTPATIENT)
Dept: FAMILY MEDICINE | Facility: CLINIC | Age: 72
End: 2022-08-22
Payer: COMMERCIAL

## 2022-08-22 DIAGNOSIS — I71.40 ABDOMINAL AORTIC ANEURYSM (AAA) WITHOUT RUPTURE (H): Primary | ICD-10-CM

## 2022-08-22 PROCEDURE — 99213 OFFICE O/P EST LOW 20 MIN: CPT | Mod: 95 | Performed by: FAMILY MEDICINE

## 2022-08-22 NOTE — PROGRESS NOTES
Camilo is a 72 year old who is being evaluated via a billable telephone visit.      What phone number would you like to be contacted at?   [phone]  How would you like to obtain your AVS? [facility]    Assessment & Plan   Problem List Items Addressed This Visit    None     Visit Diagnoses     Abdominal aortic aneurysm (AAA) without rupture (H)    -  Primary    Relevant Orders    Adult Cardiology Eval  Referral         Referral done for cardiology vascular  Reviewed results for his abdominal CT done on August 17               No follow-ups on file.    Lillian Owens MD  [facility]    Subjective   Camilo is a 72 year old presenting for the following health issues: following up for CT results on abdominal CT, done last week, symptoms have improved slightly, pain is no longer sharp but is still noticable  Results (Abdominal CT)      HPI   Patient reports having an achy pain in his right lower quadrant.  It radiates to the groin.  He has had his appendix out in the past.  In 2015 he had a bilateral hernia repair.  The pain is still present and sometimes will radiate over to the left side as well.  He notices it when he is lying down flat in his body stretched out.  He was seen on August 16 and had an abdominal CT done on August 17.  He would like to go over results from the CT scan        Review of Systems   Negative septa as listed in HPI        Objective           Vitals:  No vitals were obtained today due to virtual visit.    Physical Exam   healthy, alert and no distress  PSYCH: Alert and oriented times 3; coherent speech, normal   rate and volume, able to articulate logical thoughts, able   to abstract reason, no tangential thoughts, no hallucinations   or delusions  His affect is normal  RESP: No cough, no audible wheezing, able to talk in full sentences  Remainder of exam unable to be completed due to telephone visits    Reviewed results of abdominal CT scan done on  August 17.  New aneurysmal dilatation of the infrarenal abdominal aorta measuring up to 3.8 cm in maximum transaxial dimension with large mural thrombus  IMPRESSION:   1.  Diffuse hepatic steatosis is noted.  2.  Small amount of soft tissue and adipose tissue in the left  inguinal region likely represents postop changes status post inguinal  hernia repair. Recurrent hernia is considered less likely as I do not  see an opening of a hernia in this location.  3.  Right inguinal hernia is completely resolved. No evidence for  recurrence.  4.  New aneurysmal dilatation of the infrarenal abdominal aorta  measuring up to 3.8 cm in maximum transaxial dimension with large  mural thrombus.  5.  Bilateral L5 spondylolysis and grade 1 anterolisthesis of L5 on  S1, similar to the prior study.  6.  Diffuse hepatic steatosis.  7.  New atelectasis versus scarring in the right medial costophrenic  angle.  8.  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  9.  Small subcapsular fluid collection along the anterolateral aspect  of the upper spleen of uncertain clinical significance and etiology.  This could represent a small hematoma.               Phone call duration: 13 minutes  Patient at home in [facility]  Physician clinic in [facility]  Phone call 5:31 PM through 5:44 PM  .  ..

## 2022-08-23 DIAGNOSIS — K21.9 GASTROESOPHAGEAL REFLUX DISEASE WITHOUT ESOPHAGITIS: ICD-10-CM

## 2022-09-08 ENCOUNTER — OFFICE VISIT (OUTPATIENT)
Dept: CARDIOLOGY | Facility: CLINIC | Age: 72
End: 2022-09-08
Attending: FAMILY MEDICINE
Payer: COMMERCIAL

## 2022-09-08 ENCOUNTER — HOSPITAL ENCOUNTER (OUTPATIENT)
Dept: CARDIOLOGY | Facility: CLINIC | Age: 72
Discharge: HOME | End: 2022-09-08
Attending: INTERNAL MEDICINE | Admitting: INTERNAL MEDICINE
Payer: MEDICARE

## 2022-09-08 VITALS
DIASTOLIC BLOOD PRESSURE: 72 MMHG | SYSTOLIC BLOOD PRESSURE: 124 MMHG | BODY MASS INDEX: 28.2 KG/M2 | WEIGHT: 197 LBS | OXYGEN SATURATION: 96 % | HEIGHT: 70 IN | HEART RATE: 60 BPM

## 2022-09-08 DIAGNOSIS — E78.5 DYSLIPIDEMIA: ICD-10-CM

## 2022-09-08 DIAGNOSIS — I71.40 ABDOMINAL AORTIC ANEURYSM (AAA) WITHOUT RUPTURE (H): ICD-10-CM

## 2022-09-08 DIAGNOSIS — I10 ESSENTIAL HYPERTENSION: Primary | ICD-10-CM

## 2022-09-08 PROCEDURE — 93005 ELECTROCARDIOGRAM TRACING: CPT | Performed by: REHABILITATION PRACTITIONER

## 2022-09-08 PROCEDURE — 99204 OFFICE O/P NEW MOD 45 MIN: CPT | Performed by: INTERNAL MEDICINE

## 2022-09-08 PROCEDURE — 93010 ELECTROCARDIOGRAM REPORT: CPT | Performed by: INTERNAL MEDICINE

## 2022-09-08 NOTE — LETTER
9/8/2022    Akash Mariee MD, MD  [address] [place] [address]    RE: Camilo Baca       Dear Colleague,     I had the pleasure of seeing Camilo Baca in the [facility].  HISTORY OF PRESENT ILLNESS:  No  Symptoms. Not smoking more than 45 years. Mom had abdominal aneurysm. BP controlled    2 kids. 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 No tobacco no drugs.     Feet hands shoulders.     apetite good. omrptoxxr;    Orders this Visit:  Orders Placed This Encounter   Procedures     EKG 12-LEAD CLINIC READ     No orders of the defined types were placed in this encounter.    There are no discontinued medications.    Encounter Diagnosis   Name Primary?     Abdominal aortic aneurysm (AAA) without rupture (H)        CURRENT MEDICATIONS:  Current Outpatient Medications   Medication Sig Dispense Refill     allopurinol (ZYLOPRIM) 300 MG tablet TAKE 1 TABLET BY MOUTH EVERY DAY 90 tablet 0     cholestyramine light (PREVALITE) 4 GM packet DISSOLVE ONE PACKET IN LIQUID AND DRINK BY MOUTH TWICE A DAY AS DIRECTED 180 packet 3     Cyanocobalamin (B-12) 100 MCG TABS        levothyroxine (SYNTHROID/LEVOTHROID) 25 MCG tablet Take 1 tablet (25 mcg) by mouth daily 90 tablet 3     losartan-hydrochlorothiazide (HYZAAR) 100-12.5 MG tablet Take 1 tablet by mouth daily 90 tablet 3     omeprazole (PRILOSEC) 20 MG DR capsule TAKE 1 CAPSULE BY MOUTH EVERY DAY 90 capsule 1     riTUXimab 500 MG/50ML SOLN Inject 1,000 mg into the vein       rosuvastatin (CRESTOR) 10 MG tablet Take 1 tablet (10 mg) by mouth daily 90 tablet 0     sildenafil (VIAGRA) 100 MG tablet 1/2 tab 1/2 hour prior to activity 3 tablet 4     vitamin C (ASCORBIC ACID) 1000 MG TABS Take 1,000 mg by mouth daily       VITAMIN D PO        diclofenac (VOLTAREN) 1 % GEL Apply  2 grams to shoulders three times daily using enclosed dosing card. (Patient not taking: No sig reported) 100 g 1       ALLERGIES    "  Allergies   Allergen Reactions     Lisinopril Cough     Plaquenil [Hydroxychloroquine] Hives       PAST MEDICAL, SURGICAL, FAMILY, SOCIAL HISTORY:  History was reviewed and updated as needed, see medical record.    Review of Systems:  A 12-point review of systems was completed, see medical record for detailed review of systems information.    Physical Exam:  Vitals: /72 (BP Location: Right arm, Patient Position: Sitting, Cuff Size: Adult Regular)   Pulse 60   Ht 1.778 m (5' 10\")   Wt 89.4 kg (197 lb)   SpO2 96%   BMI 28.27 kg/m      Constitutional:           Skin:           Head:           Eyes:           ENT:           Neck:           Chest:           Cardiac:                    Abdomen:           Vascular:                                        Extremities and Back:           Neurological:           ASSESSMENT: CT in one year continue excellent medical rx and life style       RECOMMENDATIONS:see above      Recent Lab Results:  LIPID RESULTS:  Lab Results   Component Value Date    CHOL 211 (H) 08/16/2022    CHOL 155 12/08/2020    HDL 46 08/16/2022    HDL 55 12/08/2020     (H) 08/16/2022    LDL 74 12/08/2020    TRIG 158 (H) 08/16/2022    TRIG 129 12/08/2020    CHOLHDLRATIO 3.0 10/04/2013       LIVER ENZYME RESULTS:  Lab Results   Component Value Date    AST 60 (H) 08/16/2022    AST 57 (H) 07/08/2021     (H) 08/16/2022     (H) 07/08/2021       CBC RESULTS:  Lab Results   Component Value Date    WBC 3.3 (L) 07/28/2022    WBC 4.1 06/25/2021    RBC 4.31 (L) 07/28/2022    RBC 4.29 (L) 06/25/2021    HGB 14.9 07/28/2022    HGB 14.3 06/25/2021    HCT 42.1 07/28/2022    HCT 41.2 06/25/2021    MCV 98 07/28/2022    MCV 96 06/25/2021    MCH 34.6 (H) 07/28/2022    MCH 33.3 (H) 06/25/2021    MCHC 35.4 07/28/2022    MCHC 34.7 06/25/2021    RDW 12.6 07/28/2022    RDW 13.0 06/25/2021     (L) 07/28/2022     (L) 06/25/2021       BMP RESULTS:  Lab Results   Component Value Date     " 07/28/2022     02/16/2021    POTASSIUM 3.9 07/28/2022    POTASSIUM 4.2 06/25/2021    CHLORIDE 108 07/28/2022    CHLORIDE 106 02/16/2021    CO2 31 07/28/2022    CO2 32 02/16/2021    ANIONGAP 2 (L) 07/28/2022    ANIONGAP 3 02/16/2021     (H) 07/28/2022     (H) 02/16/2021    BUN 17 07/28/2022    BUN 15 02/16/2021    CR 0.79 07/28/2022    CR 0.96 06/25/2021    GFRESTIMATED >90 07/28/2022    GFRESTIMATED 80 06/25/2021    GFRESTBLACK >90 06/25/2021    ANDREZ 9.2 07/28/2022    ANDREZ 9.4 02/16/2021        A1C RESULTS:  No results found for: A1C    INR RESULTS:  Lab Results   Component Value Date    INR 1.00 07/28/2022    INR 1.00 11/17/2021    INR 0.9 04/09/2015       We greatly appreciate the opportunity to be involved in the care of your patient, Camilo Baca.    Sincerely,  Camilo Robles MD      CC  Lillian Owens MD  [address] [place] [address]                                                                       Service Date: 09/08/2022    HISTORY OF PRESENT ILLNESS:  Camilo Baca, a 72-year-old man with rheumatoid arthritis, hypertension, a very distant smoking history and a family history of abdominal aortic aneurysm, was evaluated in consultation at your request for recommendations regarding long-term management of the abdominal aortic aneurysm.    Mr. Baca has been aware of having an abdominal aortic aneurysm since 2015, when a CT scan showed mild aneurysmal dilation of the distal aorta measuring about 2.7 cm.  The patient was seen by Dr. Carrasquillo in 08/2022 with complaints of discomfort at the site of a previous inguinal hernia repair.  A CT scan showed an abdominal aortic aneurysm measuring 3.8 cm in maximal dimension just above the bifurcation of the iliacs.  Cardiology consultation was recommended.    In the meantime, the patient's abdominal discomfort has resolved.  He denies chest, arm, neck, jaw or back discomfort with exertion, focal neurologic deficit or change in  bowel function.  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    PAST MEDICAL HISTORY:    1.  Seropositive rheumatoid arthritis, primary joint complaints of shoulders, feet and hips.  Currently on every 5 months, rituximab 5 mg with good control of symptoms.  2.  Dyslipidemia, on rosuvastatin.  3.  Hypertension.  4.  Factor V Leiden mutation, no current thrombotic diathesis.  5.  Old history of bilateral inguinal herniorrhaphies.    FAMILY HISTORY:  His mother had an abdominal aortic aneurysm that required surgery.  There is a history of arthritis, hypertension and osteoporosis in his mother.  His father had diabetes, hypertension, dyslipidemia.  There is a paternal grandfather with Alzheimer disease.    ALLERGIES:  Plaquenil causes hives.  The patient was intolerant of lisinopril because of cough.    MEDICATIONS:    1.  Allopurinol 300 mg daily.  2.  Cholestyramine 1 packet twice a day.  3.  Diclofenac gel.  4.  Levothyroxine 25 mcg daily.  5.  Losartan/hydrochlorothiazide 100/12.5 mg daily.  6.  Rituximab 5 mg every 5 months.  7.  Rosuvastatin 10 mg daily.  8.  Sildenafil p.r.n.    REVIEW OF SYSTEMS:  A 12-point review of systems was performed.  Outside the issues mentioned in HPI, there are no complaints.    PHYSICAL EXAMINATION:    GENERAL:  Exam today demonstrates a very pleasant, cooperative and intelligent 72-year-old man.  VITAL SIGNS:  His blood pressure is 124/72, his heart rate 60 and regular.  His height is 1.8 meters, his weight is 84 kg.  BMI is 28.3.  RESPIRATORY:  His lungs are clear to percussion and auscultation.  CARDIOVASCULAR:  Reveals a normal S1 with a normal S2.  There is no S3.  There is no murmur, rub or click.  His pulses are symmetrical in the carotid, radial, brachial femoral, popliteal, dorsalis pedis, posterior tibials.  ABDOMEN:  Bowel sounds are present in all 4 quadrants.  Liver  percusses to 7 cm.  Spleen was not palpable.  The aorta is not tender.  LOWER EXTREMITIES:  There is no swelling, cyanosis or clubbing.  NEUROLOGIC:  Cranial nerves II through XII are intact.  Strength equal and symmetrical.  He displays normal insight and judgment.    LABORATORY STUDIES:  His ECG shows a normal sinus rhythm with normal axis and normal intervals.  His CT scan shows an infrarenal abdominal aortic aneurysm measuring 3.8 cm with no thrombus present.    ASSESSMENT:  Mr. Baca has a known history of abdominal aortic aneurysm.  It is difficult to determine the rate of  the aneurysm growth,  but appears to be fairly stable.  In general, most vascular specialists favor surveillance imaging on at least an every 3-year basis for aneurysms that are less than 3.9 cm.  Since the patient has a family history of abdominal aortic aneurysm an  inflammatory collagen vascular disease  condition, and we are not exactly certain the rate of aneurysm growth,  I would recommend an abdominal CT scan in 1 year.  If the aneurysm is stable, we can likely stretch that imaging to every 2-3 years per current guidelines and recommendations.  Should the patient develop any symptoms between now and then, he should be imaged earlier.    In the meantime, I would recommend the patient remains on his current excellent guideline-directed medical therapy including a statin drug with ideal blood pressure control.    RECOMMENDATIONS:    1.  Continue high-potency statin.  Target LDL less than 70.  2.  Continue excellent blood pressure control.  3.  Abdominal CT scan in about 1 year to assess the rate of growth of aneurysm.  After that time, surveillance can likely be done every 2-3 years as long as there are no critical changes.    We greatly appreciate the opportunity to care for your patient, Mr. Camilo Baca.    cc:   Akash Mariee MD   [address] [address]     Lillian Owens,  MD Nena Robles MD    D: 2022   T: 2022   MT: CHITO    Name:     NENA MERCADO  MRN:      [id number]        Account:      [id number]   :      1950           Service Date: 2022       Document: [id number]      Thank you for allowing me to participate in the care of your patient.      Sincerely,     Nena Robles MD     [facility]  cc:   Lillian Owens MD  [address] [place] [id number]

## 2022-09-08 NOTE — PROGRESS NOTES
HISTORY OF PRESENT ILLNESS:  No  Symptoms. Not smoking more than 45 years. Mom had abdominal aneurysm. BP controlled    2 kids. 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 No tobacco no drugs.     Feet hands shoulders.     apetite good. omrptoxxr;    Orders this Visit:  Orders Placed This Encounter   Procedures     EKG 12-LEAD CLINIC READ     No orders of the defined types were placed in this encounter.    There are no discontinued medications.    Encounter Diagnosis   Name Primary?     Abdominal aortic aneurysm (AAA) without rupture (H)        CURRENT MEDICATIONS:  Current Outpatient Medications   Medication Sig Dispense Refill     allopurinol (ZYLOPRIM) 300 MG tablet TAKE 1 TABLET BY MOUTH EVERY DAY 90 tablet 0     cholestyramine light (PREVALITE) 4 GM packet DISSOLVE ONE PACKET IN LIQUID AND DRINK BY MOUTH TWICE A DAY AS DIRECTED 180 packet 3     Cyanocobalamin (B-12) 100 MCG TABS        levothyroxine (SYNTHROID/LEVOTHROID) 25 MCG tablet Take 1 tablet (25 mcg) by mouth daily 90 tablet 3     losartan-hydrochlorothiazide (HYZAAR) 100-12.5 MG tablet Take 1 tablet by mouth daily 90 tablet 3     omeprazole (PRILOSEC) 20 MG DR capsule TAKE 1 CAPSULE BY MOUTH EVERY DAY 90 capsule 1     riTUXimab 500 MG/50ML SOLN Inject 1,000 mg into the vein       rosuvastatin (CRESTOR) 10 MG tablet Take 1 tablet (10 mg) by mouth daily 90 tablet 0     sildenafil (VIAGRA) 100 MG tablet 1/2 tab 1/2 hour prior to activity 3 tablet 4     vitamin C (ASCORBIC ACID) 1000 MG TABS Take 1,000 mg by mouth daily       VITAMIN D PO        diclofenac (VOLTAREN) 1 % GEL Apply  2 grams to shoulders three times daily using enclosed dosing card. (Patient not taking: No sig reported) 100 g 1       ALLERGIES     Allergies   Allergen Reactions     Lisinopril Cough     Plaquenil [Hydroxychloroquine] Hives       PAST MEDICAL, SURGICAL, FAMILY, SOCIAL HISTORY:  History was reviewed and updated as needed, see medical  "record.    Review of Systems:  A 12-point review of systems was completed, see medical record for detailed review of systems information.    Physical Exam:  Vitals: /72 (BP Location: Right arm, Patient Position: Sitting, Cuff Size: Adult Regular)   Pulse 60   Ht 1.778 m (5' 10\")   Wt 89.4 kg (197 lb)   SpO2 96%   BMI 28.27 kg/m      Constitutional:           Skin:           Head:           Eyes:           ENT:           Neck:           Chest:           Cardiac:                    Abdomen:           Vascular:                                        Extremities and Back:           Neurological:           ASSESSMENT: CT in one year continue excellent medical rx and life style       RECOMMENDATIONS:see above      Recent Lab Results:  LIPID RESULTS:  Lab Results   Component Value Date    CHOL 211 (H) 08/16/2022    CHOL 155 12/08/2020    HDL 46 08/16/2022    HDL 55 12/08/2020     (H) 08/16/2022    LDL 74 12/08/2020    TRIG 158 (H) 08/16/2022    TRIG 129 12/08/2020    CHOLHDLRATIO 3.0 10/04/2013       LIVER ENZYME RESULTS:  Lab Results   Component Value Date    AST 60 (H) 08/16/2022    AST 57 (H) 07/08/2021     (H) 08/16/2022     (H) 07/08/2021       CBC RESULTS:  Lab Results   Component Value Date    WBC 3.3 (L) 07/28/2022    WBC 4.1 06/25/2021    RBC 4.31 (L) 07/28/2022    RBC 4.29 (L) 06/25/2021    HGB 14.9 07/28/2022    HGB 14.3 06/25/2021    HCT 42.1 07/28/2022    HCT 41.2 06/25/2021    MCV 98 07/28/2022    MCV 96 06/25/2021    MCH 34.6 (H) 07/28/2022    MCH 33.3 (H) 06/25/2021    MCHC 35.4 07/28/2022    MCHC 34.7 06/25/2021    RDW 12.6 07/28/2022    RDW 13.0 06/25/2021     (L) 07/28/2022     (L) 06/25/2021       BMP RESULTS:  Lab Results   Component Value Date     07/28/2022     02/16/2021    POTASSIUM 3.9 07/28/2022    POTASSIUM 4.2 06/25/2021    CHLORIDE 108 07/28/2022    CHLORIDE 106 02/16/2021    CO2 31 07/28/2022    CO2 32 02/16/2021    ANIONGAP 2 (L) " 07/28/2022    ANIONGAP 3 02/16/2021     (H) 07/28/2022     (H) 02/16/2021    BUN 17 07/28/2022    BUN 15 02/16/2021    CR 0.79 07/28/2022    CR 0.96 06/25/2021    GFRESTIMATED >90 07/28/2022    GFRESTIMATED 80 06/25/2021    GFRESTBLACK >90 06/25/2021    ANDREZ 9.2 07/28/2022    ANDREZ 9.4 02/16/2021        A1C RESULTS:  No results found for: A1C    INR RESULTS:  Lab Results   Component Value Date    INR 1.00 07/28/2022    INR 1.00 11/17/2021    INR 0.9 04/09/2015       We greatly appreciate the opportunity to be involved in the care of your patient, Camilo Baca.    Sincerely,  Camilo Robles MD      CC  Lillian Owens MD  [address] [place] [id number]

## 2022-09-08 NOTE — PROGRESS NOTES
Service Date: 09/08/2022    HISTORY OF PRESENT ILLNESS:  Camilo Baca, a 72-year-old man with rheumatoid arthritis, hypertension, a very distant smoking history and a family history of abdominal aortic aneurysm, was evaluated in consultation at your request for recommendations regarding long-term management of the abdominal aortic aneurysm.    Mr. Baca has been aware of having an abdominal aortic aneurysm since 2015, when a CT scan showed mild aneurysmal dilation of the distal aorta measuring about 2.7 cm.  The patient was seen by Dr. Carrasquillo in 08/2022 with complaints of discomfort at the site of a previous inguinal hernia repair.  A CT scan showed an abdominal aortic aneurysm measuring 3.8 cm in maximal dimension just above the bifurcation of the iliacs.  Cardiology consultation was recommended.    In the meantime, the patient's abdominal discomfort has resolved.  He denies chest, arm, neck, jaw or back discomfort with exertion, focal neurologic deficit or change in bowel function.  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    PAST MEDICAL HISTORY:    1.  Seropositive rheumatoid arthritis, primary joint complaints of shoulders, feet and hips.  Currently on every 5 months, rituximab 5 mg with good control of symptoms.  2.  Dyslipidemia, on rosuvastatin.  3.  Hypertension.  4.  Factor V Leiden mutation, no current thrombotic diathesis.  5.  Old history of bilateral inguinal herniorrhaphies.    FAMILY HISTORY:  His mother had an abdominal aortic aneurysm that required surgery.  There is a history of arthritis, hypertension and osteoporosis in his mother.  His father had diabetes, hypertension, dyslipidemia.  There is a paternal grandfather with Alzheimer disease.    ALLERGIES:  Plaquenil causes hives.  The patient was intolerant of lisinopril because of cough.    MEDICATIONS:    1.  Allopurinol 300 mg daily.  2.   Cholestyramine 1 packet twice a day.  3.  Diclofenac gel.  4.  Levothyroxine 25 mcg daily.  5.  Losartan/hydrochlorothiazide 100/12.5 mg daily.  6.  Rituximab 5 mg every 5 months.  7.  Rosuvastatin 10 mg daily.  8.  Sildenafil p.r.n.    REVIEW OF SYSTEMS:  A 12-point review of systems was performed.  Outside the issues mentioned in HPI, there are no complaints.    PHYSICAL EXAMINATION:    GENERAL:  Exam today demonstrates a very pleasant, cooperative and intelligent 72-year-old man.  VITAL SIGNS:  His blood pressure is 124/72, his heart rate 60 and regular.  His height is 1.8 meters, his weight is 84 kg.  BMI is 28.3.  RESPIRATORY:  His lungs are clear to percussion and auscultation.  CARDIOVASCULAR:  Reveals a normal S1 with a normal S2.  There is no S3.  There is no murmur, rub or click.  His pulses are symmetrical in the carotid, radial, brachial femoral, popliteal, dorsalis pedis, posterior tibials.  ABDOMEN:  Bowel sounds are present in all 4 quadrants.  Liver percusses to 7 cm.  Spleen was not palpable.  The aorta is not tender.  LOWER EXTREMITIES:  There is no swelling, cyanosis or clubbing.  NEUROLOGIC:  Cranial nerves II through XII are intact.  Strength equal and symmetrical.  He displays normal insight and judgment.    LABORATORY STUDIES:  His ECG shows a normal sinus rhythm with normal axis and normal intervals.  His CT scan shows an infrarenal abdominal aortic aneurysm measuring 3.8 cm with no thrombus present.    ASSESSMENT:  Mr. Baca has a known history of abdominal aortic aneurysm.  It is difficult to determine the rate of  the aneurysm growth,  but appears to be fairly stable.  In general, most vascular specialists favor surveillance imaging on at least an every 3-year basis for aneurysms that are less than 3.9 cm.  Since the patient has a family history of abdominal aortic aneurysm an  inflammatory collagen vascular disease  condition, and we are not exactly certain the rate of aneurysm growth,  I  would recommend an abdominal CT scan in 1 year.  If the aneurysm is stable, we can likely stretch that imaging to every 2-3 years per current guidelines and recommendations.  Should the patient develop any symptoms between now and then, he should be imaged earlier.    In the meantime, I would recommend the patient remains on his current excellent guideline-directed medical therapy including a statin drug with ideal blood pressure control.    RECOMMENDATIONS:    1.  Continue high-potency statin.  Target LDL less than 70.  2.  Continue excellent blood pressure control.  3.  Abdominal CT scan in about 1 year to assess the rate of growth of aneurysm.  After that time, surveillance can likely be done every 2-3 years as long as there are no critical changes.    We greatly appreciate the opportunity to care for your patient, Mr. Nena Baca.    cc:   [place] [address]     MD Nena Garcia MD        D: 2022   T: 2022   MT: CHITO    Name:     NENA BACA  MRN:      [id number]        Account:      [id number]   :      1950           Service Date: 2022       Document: [id number]

## 2022-09-26 ENCOUNTER — LAB (OUTPATIENT)
Dept: LAB | Facility: CLINIC | Age: 72
End: 2022-09-26
Payer: COMMERCIAL

## 2022-09-26 DIAGNOSIS — K74.00 HEPATIC FIBROSIS, UNSPECIFIED: ICD-10-CM

## 2022-09-26 LAB — INR PPP: 1.03 (ref 0.85–1.15)

## 2022-09-26 PROCEDURE — 36415 COLL VENOUS BLD VENIPUNCTURE: CPT

## 2022-09-26 PROCEDURE — 85610 PROTHROMBIN TIME: CPT

## 2022-10-03 ENCOUNTER — TRANSFERRED RECORDS (OUTPATIENT)
Dept: RHEUMATOLOGY | Facility: CLINIC | Age: 72
End: 2022-10-03

## 2022-10-09 ENCOUNTER — HEALTH MAINTENANCE LETTER (OUTPATIENT)
Age: 72
End: 2022-10-09

## 2022-10-14 ENCOUNTER — LAB (OUTPATIENT)
Dept: LAB | Facility: CLINIC | Age: 72
End: 2022-10-14
Payer: COMMERCIAL

## 2022-10-14 DIAGNOSIS — E78.2 MIXED HYPERLIPIDEMIA: ICD-10-CM

## 2022-10-14 LAB
ALT SERPL W P-5'-P-CCNC: 123 U/L (ref 0–70)
AST SERPL W P-5'-P-CCNC: 50 U/L (ref 0–45)
CHOLEST SERPL-MCNC: 174 MG/DL
FASTING STATUS PATIENT QL REPORTED: YES
HDLC SERPL-MCNC: 58 MG/DL
LDLC SERPL CALC-MCNC: 96 MG/DL
NONHDLC SERPL-MCNC: 116 MG/DL
TRIGL SERPL-MCNC: 102 MG/DL

## 2022-10-14 PROCEDURE — 84460 ALANINE AMINO (ALT) (SGPT): CPT

## 2022-10-14 PROCEDURE — 84450 TRANSFERASE (AST) (SGOT): CPT

## 2022-10-14 PROCEDURE — 36415 COLL VENOUS BLD VENIPUNCTURE: CPT

## 2022-10-14 PROCEDURE — 80061 LIPID PANEL: CPT

## 2022-10-29 DIAGNOSIS — M10.9 ACUTE GOUTY ARTHROPATHY: ICD-10-CM

## 2022-11-02 RX ORDER — ALLOPURINOL 300 MG/1
TABLET ORAL
Qty: 90 TABLET | Refills: 0 | Status: SHIPPED | OUTPATIENT
Start: 2022-11-02 | End: 2023-01-30

## 2022-11-02 NOTE — TELEPHONE ENCOUNTER
"Requested Prescriptions   Pending Prescriptions Disp Refills    allopurinol (ZYLOPRIM) 300 MG tablet [Pharmacy Med Name: ALLOPURINOL 300MG TABLET] 90 tablet 0     Sig: TAKE 1 TABLET BY MOUTH EVERY DAY       Gout Agents Protocol Failed - 10/29/2022  8:32 AM        Failed - Has Uric Acid on file in past 12 months and value is less than 6     Recent Labs   Lab Test 09/17/19  0827   URIC 3.1*     If level is 6mg/dL or greater, ok to refill one time and refer to provider.           Passed - CBC on file in past 12 months     Recent Labs   Lab Test 07/28/22  0838   WBC 3.3*   RBC 4.31*   HGB 14.9   HCT 42.1   *                 Passed - ALT on file in past 12 months     Recent Labs   Lab Test 10/14/22  0831   *             Passed - Recent (12 mo) or future (30 days) visit within the authorizing provider's specialty     Patient has had an office visit with the authorizing provider or a provider within the authorizing providers department within the previous 12 mos or has a future within next 30 days. See \"Patient Info\" tab in inbasket, or \"Choose Columns\" in Meds & Orders section of the refill encounter.              Passed - Medication is active on med list        Passed - Patient is age 18 or older        Passed - Normal serum creatinine on file in the past 12 months     Recent Labs   Lab Test 07/28/22  0838   CR 0.79       Ok to refill medication if creatinine is low               WILFRED EastN, RN     "

## 2022-11-07 DIAGNOSIS — E78.2 MIXED HYPERLIPIDEMIA: ICD-10-CM

## 2022-11-09 ENCOUNTER — MYC MEDICAL ADVICE (OUTPATIENT)
Dept: RHEUMATOLOGY | Facility: CLINIC | Age: 72
End: 2022-11-09

## 2022-11-10 RX ORDER — ROSUVASTATIN CALCIUM 10 MG/1
TABLET, COATED ORAL
Qty: 90 TABLET | Refills: 1 | Status: SHIPPED | OUTPATIENT
Start: 2022-11-10 | End: 2023-05-10

## 2022-11-10 NOTE — TELEPHONE ENCOUNTER
Prescription approved per [facility] Refill Protocol.    Neyda Min, BSN, RN

## 2022-11-15 ENCOUNTER — VIRTUAL VISIT (OUTPATIENT)
Dept: RHEUMATOLOGY | Facility: CLINIC | Age: 72
End: 2022-11-15
Payer: COMMERCIAL

## 2022-11-15 DIAGNOSIS — M05.79 RHEUMATOID ARTHRITIS INVOLVING MULTIPLE SITES WITH POSITIVE RHEUMATOID FACTOR (H): Primary | ICD-10-CM

## 2022-11-15 DIAGNOSIS — D84.9 IMMUNOCOMPROMISED (H): ICD-10-CM

## 2022-11-15 PROCEDURE — 99214 OFFICE O/P EST MOD 30 MIN: CPT | Mod: 95 | Performed by: INTERNAL MEDICINE

## 2022-11-15 RX ORDER — DIPHENHYDRAMINE HYDROCHLORIDE 50 MG/ML
50 INJECTION INTRAMUSCULAR; INTRAVENOUS
Status: CANCELLED
Start: 2023-01-13

## 2022-11-15 RX ORDER — MEPERIDINE HYDROCHLORIDE 25 MG/ML
25 INJECTION INTRAMUSCULAR; INTRAVENOUS; SUBCUTANEOUS EVERY 30 MIN PRN
Status: CANCELLED | OUTPATIENT
Start: 2023-01-13

## 2022-11-15 RX ORDER — METHYLPREDNISOLONE SODIUM SUCCINATE 125 MG/2ML
125 INJECTION, POWDER, LYOPHILIZED, FOR SOLUTION INTRAMUSCULAR; INTRAVENOUS
Status: CANCELLED
Start: 2023-01-13

## 2022-11-15 RX ORDER — METHYLPREDNISOLONE SODIUM SUCCINATE 125 MG/2ML
125 INJECTION, POWDER, LYOPHILIZED, FOR SOLUTION INTRAMUSCULAR; INTRAVENOUS ONCE
Status: CANCELLED | OUTPATIENT
Start: 2023-01-13

## 2022-11-15 RX ORDER — ACETAMINOPHEN 325 MG/1
650 TABLET ORAL ONCE
Status: CANCELLED
Start: 2023-01-13

## 2022-11-15 RX ORDER — HEPARIN SODIUM,PORCINE 10 UNIT/ML
5 VIAL (ML) INTRAVENOUS
Status: CANCELLED | OUTPATIENT
Start: 2023-01-13

## 2022-11-15 RX ORDER — DIPHENHYDRAMINE HCL 25 MG
50 CAPSULE ORAL ONCE
Status: CANCELLED
Start: 2023-01-13

## 2022-11-15 RX ORDER — ALBUTEROL SULFATE 90 UG/1
1-2 AEROSOL, METERED RESPIRATORY (INHALATION)
Status: CANCELLED
Start: 2023-01-13

## 2022-11-15 RX ORDER — ALBUTEROL SULFATE 0.83 MG/ML
2.5 SOLUTION RESPIRATORY (INHALATION)
Status: CANCELLED | OUTPATIENT
Start: 2023-01-13

## 2022-11-15 RX ORDER — EPINEPHRINE 1 MG/ML
0.3 INJECTION, SOLUTION, CONCENTRATE INTRAVENOUS EVERY 5 MIN PRN
Status: CANCELLED | OUTPATIENT
Start: 2023-01-13

## 2022-11-15 RX ORDER — HEPARIN SODIUM (PORCINE) LOCK FLUSH IV SOLN 100 UNIT/ML 100 UNIT/ML
5 SOLUTION INTRAVENOUS
Status: CANCELLED | OUTPATIENT
Start: 2023-01-13

## 2022-11-15 NOTE — PATIENT INSTRUCTIONS
RHEUMATOLOGY    Dr. Fabricio Gomes    [facility]  [address] [place] [address]  [place] [address]  Phone number: [phone]  Fax number: [phone]    You may schedule your FLU shot by calling [phone] or if you would like to get your shot at a [facility] you may schedule online at [email or web address].    Thank you for choosing [facility]!    Jaquelin Muñiz CMA Rheumatology

## 2022-11-15 NOTE — PROGRESS NOTES
Camilo Baca  is a 72 year old year old who is being evaluated via a billable video visit.      How would you like to obtain your AVS? MyChart  If the video visit is dropped, the invitation should be resent by: Text to cell phone: [phone]   Will anyone else be joining your video visit? No     Rheumatology Video Visit      Camilo Baca MRN# [id number]   YOB: 1950 Age: 72 year old      Date of visit: 11/15/22   PCP: Lyndsey Aponte    Chief Complaint   Patient presents with:  Rheumatoid Arthritis    Assessment and Plan     1.  Seropositive rheumatoid arthritis (, CCP >250): Dx'd . Previously followed by [facility] Wol[facility] and [facility].  Established with me on 3/20/2018. Previously on MTX (ineffective as monotherapy), Humira (effective, stopped because of insurance preference for IV), Simponi (rash on cheeks/nose after infusion), Remicade (lost efficacy), HCQ (facial rash), Orencia (partially effective), SSZ (cytopenia).  Currently on Rituximab (received 1g on each of the following: 3/10/2020 & 3/24/2020, 2020 & 10/6/2020, 2021 & 3/9/2021, 2021 & 2021, 2022 & 2022; 500 mg on each of the followin2022).  Has done great since starting rituximab and has not required NSAIDs or prednisone.  RA is controlled.  Doing well since reducing rituximab to be 500 mg once and he would like to see how he does with receiving this every 6 months.    Chronic illness, stable.    - Rituximab (Truxima) 500 mg IV once, every 6 months; next dose in 2022  - PRN RA flare: Prednisone 20mg daily x2days, then 15mg daily x2days, then 10mg daily x2days, then 5mg daily x2days, then stop.    - Lab 1-2 days before rituximab infusion: COVID-19 (okay to do home test or lab test; and if positive then should delay rituximab)  - Labs in 6 months: CBC, creatinine, hepatic panel, ESR, CRP, QuantiFERON-TB gold plus              Rapid 3, cumulative scores                       2022: asymptomatic  (Rituximab)                      6/2/2020: doing well; virtual visit (Rituximab in March 2020; SSZ 500mg BID)                      03/03/2020: 9.8  (Orencia IV, SSZ 500mg BID)                       11/19/2019: 2.8  (Orencia IV, SSZ 500mg BID)                      08/20/2019: 4     (Orencia 750mg IV)                      04/23/2019: 0     (Orencia 750mg IV)                       01/08/2019: 0.5  (Simponi IV)                      11/06/2018: 9.3  (Simponi IV x2mo)    # Rituximab (Rituxan) Risks and Benefits: The risks and benefits of rituximab were discussed in detail and the patient verbalized understanding.  The risks discussed include, but are not limited to, the risk for hypersensitivity, anaphylaxis, anaphylactoid reactions, fatal infusion reactions, an increased risk for serious infections leading to hospitalization or death, severe mucocutaneous reactions, reactivation of hepatitis B, and development of progressive multifocal leukoencephalopathy (PML) resulting in death.  The most common adverse reactions are infections, nasopharyngitis, urinary tract infections, nausea, diarrhea, headache, muscle spasms, and anemia.  It was discussed that the medication would need to be discontinued if a serious infection develops.  It was discussed that live vaccinations should not be received while using rituximab or within 30 days prior to starting rituximab.  I encouraged reviewing the package insert and asking any questions about the medication.      2.  History of impingement syndrome of both shoulders: injected 10/11/2019 PT exercises resolved the shoulder issue.  Not an issue at this time.     3. History of mild plantar fasciitis, bilaterally: resolved with stretching and changing shoes.  Not an issue today.    4.  LFT elevation: Avoid hepatotoxins.  Avoid alcohol.  Following with hepatology who suspects steatohepatitis and he says he has an upcoming hepatology appointment.      5.  Vaccinations: Vaccinations reviewed  with Mr. Baca.  Risks and benefits of vaccinations were discussed.    - Influenza: up to date  - [id number]: up to date  - [id number]: up to date  - Shingrix: Up to date  - COVID-19: Up to date. Evusheld received 6/2022    Total minutes spent in evaluation with patient, documentation, , and review of pertinent studies and chart notes: 20      Mr. Baca verbalized agreement with and understanding of the rational for the diagnosis and treatment plan.  All questions were answered to best of my ability and the patient's satisfaction. Mr. Baca was advised to contact the clinic with any questions that may arise after the clinic visit.      Thank you for involving me in the care of the patient    Return to clinic: 6 months    HPI   Camilo Baca is a 72 year old male with a past medical history significant for hypertension, hyperlipidemia, GERD, hypothyroidism, history of DVT, factor V Leiden mutation, history of thrombocytopenia, gout, and rheumatoid arthritis who presents for follow-up of rheumatoid arthritis.     Today, 11/15/2022: Currently doing well.  No joint pain or swelling.  No morning stiffness or gelling phenomenon.  Rituximab is effective.  Would like to continue with rituximab every 6 months for now. Received Evusheld in 6/2022.  Arthritis is not limiting his daily activities.    Denies fevers, chills, nausea, vomiting, constipation, diarrhea. No abdominal pain. No chest pain/pressure, palpitations, or shortness of breath. No LE swelling. No neck pain. No oral or nasal sores.  No rash. No sicca symptoms.      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    ROS   12 point review of system was completed and negative except as noted in the HPI     Active Problem List     Patient Active Problem List   Diagnosis     Lumbago     Mixed hyperlipidemia     NONSPECIFIC MEDICAL HISTORY     HYPERLIPIDEMIA LDL  GOAL <130     History of adenomatous polyp of colon     Idiopathic chronic gout without tophus, unspecified site     Advanced directives, counseling/discussion     Seropositive rheumatoid arthritis (H)     High risk medication use     Thrombocytopenia (H)     Gastroesophageal reflux disease without esophagitis     Factor 5 Leiden mutation, heterozygous (H)     Personal history of venous thrombosis and embolism     Personal history of DVT (deep vein thrombosis)     Hypothyroidism due to acquired atrophy of thyroid     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)     Secondary hypertension with goal blood pressure less than 140/90     Rheumatoid arthritis involving multiple sites with positive rheumatoid factor (H)     Gout     Sensorineural hearing loss (SNHL)     Tinnitus     Past Medical History     Past Medical History:   Diagnosis Date     Alopecia, unspecified      Closed fracture of unspecified part of humerus 1974    Arm fracture / left wrist     Esophageal reflux 2/6/2015     Factor 5 Leiden mutation, heterozygous (H)      Gout 3/28/2011     Herpes zoster without mention of complication 1965    Herpes zoster     Measles without mention of complication     Measles     Personal history of DVT (deep vein thrombosis) 4/9/2015     Personal history of venous thrombosis and embolism 4/9/2015     Pulmonary embolism (H) 12/1996    post appendectomy     RA (rheumatoid arthritis) (H) 3/11/2013     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     Rheumatoid arthritis(714.0)     Beginning symptoms     Secondary hypertension with goal blood pressure less than 140/90 11/15/2016     Thrombocytopenia, unspecified (H) 1/29/2014     Varicella without mention of complication     Chickenpox     Past Surgical History     Past Surgical History:   Procedure Laterality Date     COLONOSCOPY  11/15/2010    COLONOSCOPY performed by DARIUS MEAS at   GI     COLONOSCOPY N/A 11/2/2015    Procedure: COLONOSCOPY;  Surgeon: Bubba Odom MD;  Location: [facility]     COLONOSCOPY N/A 3/19/2021    Procedure: Colonoscopy;  Surgeon: Ludmila Beltran MD;  Location: [facility]     ESOPHAGOSCOPY, GASTROSCOPY, DUODENOSCOPY (EGD), COMBINED N/A 10/24/2018    Procedure: Esophagogastroduodenoscopy Multiple Biopsies by Biopsy;  Surgeon: Matteo Johnson DO;  Location: [facility]     HC REPAIR ING HERNIA,5+Y/O,REDUCIBL  1960     HERNIORRHAPHY UMBILICAL N/A 4/17/2015    Procedure: HERNIORRHAPHY UMBILICAL;  Surgeon: Rayray Avila MD;  Location: [facility]     LAPAROSCOPIC HERNIORRHAPHY INGUINAL BILATERAL Bilateral 4/17/2015    Procedure: LAPAROSCOPIC HERNIORRHAPHY INGUINAL BILATERAL;  Surgeon: Rayray Avila MD;  Location: [facility]     ZZC APPENDECTOMY  1996     ZZ COLONOSCOPY W BIOPSY  08/10/05     Allergy     Allergies   Allergen Reactions     Lisinopril Cough     Plaquenil [Hydroxychloroquine] Hives     Current Medication List     Current Outpatient Medications   Medication Sig     allopurinol (ZYLOPRIM) 300 MG tablet TAKE 1 TABLET BY MOUTH EVERY DAY     cholestyramine light (PREVALITE) 4 GM packet DISSOLVE ONE PACKET IN LIQUID AND DRINK BY MOUTH TWICE A DAY AS DIRECTED     Cyanocobalamin (B-12) 100 MCG TABS      diclofenac (VOLTAREN) 1 % GEL Apply  2 grams to shoulders three times daily using enclosed dosing card.     levothyroxine (SYNTHROID/LEVOTHROID) 25 MCG tablet Take 1 tablet (25 mcg) by mouth daily     losartan-hydrochlorothiazide (HYZAAR) 100-12.5 MG tablet Take 1 tablet by mouth daily     omeprazole (PRILOSEC) 20 MG DR capsule TAKE 1 CAPSULE BY MOUTH EVERY DAY     PREVALITE 4 GM/DOSE powder DISSOLVE 1 LEVEL SCOOP IN LIQUID AND DRINK BY MOUTH TWICE A DAY AS DIRECTED     riTUXimab 500 MG/50ML SOLN Inject 1,000 mg into the vein     rosuvastatin (CRESTOR) 10 MG tablet TAKE 1 TABLET BY MOUTH EVERY DAY     sildenafil (VIAGRA) 100 MG tablet 1/2 tab 1/2 hour  "prior to activity     vitamin C (ASCORBIC ACID) 1000 MG TABS Take 1,000 mg by mouth daily     VITAMIN D PO      Current Facility-Administered Medications   Medication     cilgavimab 300 mg/tixagevimab 300 mg (EVUSHELD) - FULL DOSE *FOR CLINIC USE ONLY*         Social History   See HPI    Family History     Family History   Problem Relation Age of Onset     Arthritis Mother      Cardiovascular Mother      Depression Mother      Gastrointestinal Disease Mother         IBS     Hypertension Mother      Circulatory Mother         Aortal aneurysm     Osteoporosis Mother      Allergies Father         Cortisone     Cardiovascular Father      Circulatory Father      Diabetes Father      Hypertension Father      Lipids Father      Alzheimer Disease Paternal Grandfather      Blood Disease Paternal Grandmother         Clotting problems     Blood Disease Son         Factor 5     Hypertension Maternal Aunt      Cerebrovascular Disease Maternal Grandmother        Physical Exam     Temp Readings from Last 3 Encounters:   08/16/22 98.2  F (36.8  C) (Temporal)   05/04/22 97.9  F (36.6  C) (Temporal)   02/07/22 97.8  F (36.6  C) (Temporal)     BP Readings from Last 5 Encounters:   09/08/22 124/72   08/16/22 120/82   07/25/22 134/88   07/08/22 (!) 147/80   06/22/22 (!) 145/88     Pulse Readings from Last 1 Encounters:   09/08/22 60     Resp Readings from Last 1 Encounters:   08/16/22 14     Estimated body mass index is 28.27 kg/m  as calculated from the following:    Height as of 9/8/22: 1.778 m (5' 10\").    Weight as of 9/8/22: 89.4 kg (197 lb).    GEN: NAD. Healthy appearing adult.   HEENT:  Anicteric, noninjected sclera. No obvious external lesions of the ear and nose. Hearing intact.  PULM: No increased work of breathing  MSK:  Hands and wrists without swelling.  No difficulty making a fist with his hands.  SKIN: No rash or jaundice seen  PSYCH: Alert. Appropriate.     Labs / Imaging (select studies)     CBC  Recent Labs   Lab Test " 07/28/22  0838 05/26/22  0855 11/17/21  0944 06/25/21  0841 08/27/20  0824 05/28/20  0927 02/10/20  0922 11/12/19  0748 10/15/19  0827   WBC 3.3* 3.5* 3.3* 4.1 3.5* 3.6* 4.1   < > 4.8   RBC 4.31* 4.38* 4.24* 4.29* 4.21* 4.29* 4.21*   < > 4.26*   HGB 14.9 14.8 14.0 14.3 13.8 13.9 13.7   < > 13.7   HCT 42.1 42.7 41.4 41.2 41.4 41.7 40.6   < > 41.1   MCV 98 98 98 96 98 97 96   < > 97   RDW 12.6 12.9 12.4 13.0 13.0 13.2 12.6   < > 13.5   * 104* 189 129* 102* 117* 105*   < > 144*   MCH 34.6* 33.8* 33.0 33.3* 32.8 32.4 32.5   < > 32.2   MCHC 35.4 34.7 33.8 34.7 33.3 33.3 33.7   < > 33.3   NEUTROPHIL  --  61  --  65.7 61.9 59.2 54.9   < > 60.4   LYMPH  --  18  --  17.3 19.5 19.6 29.0   < > 24.4   MONOCYTE  --  15  --  12.7 13.0 14.9 13.5   < > 14.0   EOSINOPHIL  --  6  --  3.6 4.5 5.2 2.4   < > 0.8   BASOPHIL  --  0  --  0.5 0.8 0.8 0.2   < > 0.4   ANEU  --   --   --  2.7 2.2 2.2 2.3   < > 2.9   ALYM  --   --   --  0.7* 0.7* 0.7* 1.2   < > 1.2   KIESHA  --   --   --  0.5 0.5 0.5 0.6   < > 0.7   AEOS  --   --   --  0.2  --   --  0.1  --  0.0   ABAS  --   --   --  0.0 0.0 0.0 0.0   < > 0.0   ANEUTAUTO  --  2.1  --   --   --   --   --   --   --    ALYMPAUTO  --  0.6*  --   --   --   --   --   --   --    AMONOAUTO  --  0.5  --   --   --   --   --   --   --    AEOSAUTO  --  0.2  --   --   --   --   --   --   --    ABSBASO  --  0.0  --   --   --   --   --   --   --     < > = values in this interval not displayed.     CMP  Recent Labs   Lab Test 10/14/22  0831 08/16/22  0844 07/28/22  0838 05/26/22  0855 11/17/21  0943 07/08/21  0958 06/25/21  0841 03/29/21  1013 02/16/21  0947 08/27/20  0824   NA  --   --  141  --  140  --   --   --  141  --    POTASSIUM  --   --  3.9  --  4.5  --  4.2   < > 4.4  --    CHLORIDE  --   --  108  --  107  --   --   --  106  --    CO2  --   --  31  --  32  --   --   --  32  --    ANIONGAP  --   --  2*  --  1*  --   --   --  3  --    GLC  --   --  127*  --  98  --   --   --  119*  --    BUN  --    --  17  --  13  --   --   --  15  --    CR  --   --  0.79 0.89 0.87  --  0.96  --  0.84 0.83   GFRESTIMATED  --   --  >90 >90 88  --  80  --  90 >90   GFRESTBLACK  --   --   --   --   --   --  >90  --  >90 >90   ANDREZ  --   --  9.2  --  9.2  --   --   --  9.4  --    BILITOTAL  --   --  1.2 1.1 0.8  --  1.0  --   --  1.2   ALBUMIN  --   --  3.8 3.9 3.3*  --  3.8  --   --  3.6   PROTTOTAL  --   --  7.0 7.0 7.2  --  7.0  --   --  7.2   ALKPHOS  --   --  53 54 60  --  63  --   --  71   AST 50* 60* 50* 57* 59*   < > 49*  --   --  30   * 144* 126* 125* 138*   < > 119*  --   --  47    < > = values in this interval not displayed.     Calcium/VitaminD  Recent Labs   Lab Test 07/28/22  0838 11/17/21  0943 02/16/21  0947 11/06/18  0913 06/26/18  0955   ANDREZ 9.2 9.2 9.4   < >  --    VITDT  --   --   --   --  25    < > = values in this interval not displayed.     ESR/CRP  Recent Labs   Lab Test 05/26/22  0855 06/25/21  0841 08/27/20  0824   SED 8 10 9   CRP <2.9 <2.9 <2.9     Lipid Panel  Recent Labs   Lab Test 10/14/22  0831 08/16/22  0844 12/08/20  0911   CHOL 174 211* 155   TRIG 102 158* 129   HDL 58 46 55   LDL 96 133* 74   NHDL 116 165* 100     Hepatitis B  Recent Labs   Lab Test 05/26/22  0855 06/25/21  0841 03/03/20  1405   HBCAB Nonreactive Nonreactive Nonreactive   HEPBANG Nonreactive Nonreactive Nonreactive     Tuberculosis Screening  Recent Labs   Lab Test 05/26/22  0855 06/25/21  0841 03/03/20  1405 03/20/18  1008   TBRES Negative  --  Negative  --    TBRSLT  --   --   --  Negative   TBAGN  --   --   --  0.14   TBRST  --  Negative  --   --      Immunization History     Immunization History   Administered Date(s) Administered     COVID-19,PF,Moderna 02/26/2021, 03/25/2021, 01/03/2022, 05/26/2022     COVID-19,PF,Moderna BIVALENT Booster 10/31/2022     Flu, Unspecified 02/01/2011, 10/19/2011     HepB, Unspecified 08/19/1991, 09/23/1991, 02/24/1992     Influenza (High Dose) 3 valent vaccine 10/06/2017, 10/03/2018,  10/23/2019     Influenza (IIV3) PF 10/01/2009, 10/27/2010, 10/11/2011     Influenza Quad, Recombinant, pf(RIV4) (Flublok) 10/27/2021     Influenza Vaccine IM > 6 months Valent IIV4 (Alfuria,Fluzone) 10/09/2013, 10/23/2014, 09/18/2015, 11/15/2016     Influenza, Quad, High Dose, Pf, 65yr+ (Fluzone HD) 09/02/2020     Pneumo Conj 13-V (2010&after) 10/06/2017     Pneumococcal 23 valent 10/23/2014, 10/23/2019     TD (ADULT, 7+) 09/05/2001     TDAP Vaccine (Boostrix) 10/18/2012     Zoster vaccine recombinant adjuvanted (SHINGRIX) 01/31/2019, 04/18/2019     Zoster vaccine, live 10/23/2014          Chart documentation done in part with Dragon Voice recognition Software. Although reviewed after completion, some word and grammatical error may remain.      Video-Visit Details    Type of service:  Video Visit    Video Start Time: 12:54 PM    Video End Time:1:06 PM    Originating Location (pt. Location): Home, [place]    Distant Location (provider location):  off-site, [place]    Platform used for Video Visit: Mariza Gomes MD

## 2022-11-28 DIAGNOSIS — E78.5 HYPERLIPIDEMIA LDL GOAL <130: ICD-10-CM

## 2022-11-29 RX ORDER — CHOLESTYRAMINE LIGHT 4 G/5.7G
POWDER, FOR SUSPENSION ORAL
Qty: 180 PACKET | Refills: 3 | Status: SHIPPED | OUTPATIENT
Start: 2022-11-29 | End: 2023-08-30

## 2022-11-29 NOTE — TELEPHONE ENCOUNTER
Routing refill request to provider for review/approval because:    Requested Prescriptions   Pending Prescriptions Disp Refills    cholestyramine light (PREVALITE) 4 GM packet 180 packet 3     Sig: DISSOLVE ONE PACKET IN LIQUID AND DRINK BY MOUTH TWICE A DAY AS DIRECTED       There is no refill protocol information for this order

## 2022-12-10 DIAGNOSIS — R19.5 LOOSE STOOLS: ICD-10-CM

## 2022-12-12 RX ORDER — CHOLESTYRAMINE LIGHT 4 G/5.7G
POWDER, FOR SUSPENSION ORAL
Qty: 231 G | Refills: 1 | OUTPATIENT
Start: 2022-12-12

## 2022-12-16 DIAGNOSIS — R19.5 LOOSE STOOLS: ICD-10-CM

## 2022-12-19 RX ORDER — CHOLESTYRAMINE LIGHT 4 G/5.7G
POWDER, FOR SUSPENSION ORAL
Qty: 231 G | Refills: 1 | Status: SHIPPED | OUTPATIENT
Start: 2022-12-19 | End: 2023-01-30

## 2022-12-19 NOTE — TELEPHONE ENCOUNTER
Pending Prescriptions:                       Disp   Refills    cholestyramine light (PREVALITE) 4 GM powd*       0        Sig: DISSOLVE 1 LEVEL SCOOP IN LIQUID AND DRINK BY MOUTH           TWICE DAILY AS DIRECTED      Routing refill request to provider for review/approval because:  Requesting supply goes to local pharmacy, [facility], Not on protocol.    Linh Haq RN

## 2023-01-13 ENCOUNTER — INFUSION THERAPY VISIT (OUTPATIENT)
Dept: INFUSION THERAPY | Facility: CLINIC | Age: 73
End: 2023-01-13
Attending: INTERNAL MEDICINE
Payer: MEDICARE

## 2023-01-13 ENCOUNTER — APPOINTMENT (OUTPATIENT)
Dept: LAB | Facility: CLINIC | Age: 73
End: 2023-01-13
Payer: COMMERCIAL

## 2023-01-13 VITALS
BODY MASS INDEX: 29.51 KG/M2 | WEIGHT: 206.1 LBS | HEART RATE: 55 BPM | DIASTOLIC BLOOD PRESSURE: 72 MMHG | HEIGHT: 70 IN | TEMPERATURE: 96.8 F | SYSTOLIC BLOOD PRESSURE: 122 MMHG | OXYGEN SATURATION: 97 % | RESPIRATION RATE: 16 BRPM

## 2023-01-13 DIAGNOSIS — M05.79 RHEUMATOID ARTHRITIS INVOLVING MULTIPLE SITES WITH POSITIVE RHEUMATOID FACTOR (H): Primary | ICD-10-CM

## 2023-01-13 LAB
CD19 CELLS # BLD: 67 CELLS/UL (ref 107–698)
CD19 CELLS NFR BLD: 7 % (ref 6–27)

## 2023-01-13 PROCEDURE — 82784 ASSAY IGA/IGD/IGG/IGM EACH: CPT | Performed by: INTERNAL MEDICINE

## 2023-01-13 PROCEDURE — 250N000011 HC RX IP 250 OP 636: Performed by: INTERNAL MEDICINE

## 2023-01-13 PROCEDURE — 36415 COLL VENOUS BLD VENIPUNCTURE: CPT | Performed by: INTERNAL MEDICINE

## 2023-01-13 PROCEDURE — 250N000013 HC RX MED GY IP 250 OP 250 PS 637: Performed by: INTERNAL MEDICINE

## 2023-01-13 PROCEDURE — 258N000003 HC RX IP 258 OP 636: Performed by: INTERNAL MEDICINE

## 2023-01-13 PROCEDURE — 96415 CHEMO IV INFUSION ADDL HR: CPT

## 2023-01-13 PROCEDURE — 86355 B CELLS TOTAL COUNT: CPT | Performed by: INTERNAL MEDICINE

## 2023-01-13 PROCEDURE — 96413 CHEMO IV INFUSION 1 HR: CPT

## 2023-01-13 PROCEDURE — 96375 TX/PRO/DX INJ NEW DRUG ADDON: CPT

## 2023-01-13 RX ORDER — HEPARIN SODIUM,PORCINE 10 UNIT/ML
5 VIAL (ML) INTRAVENOUS
Status: CANCELLED | OUTPATIENT
Start: 2023-01-13

## 2023-01-13 RX ORDER — EPINEPHRINE 1 MG/ML
0.3 INJECTION, SOLUTION INTRAMUSCULAR; SUBCUTANEOUS EVERY 5 MIN PRN
Status: CANCELLED | OUTPATIENT
Start: 2023-01-13

## 2023-01-13 RX ORDER — ACETAMINOPHEN 325 MG/1
650 TABLET ORAL ONCE
Status: COMPLETED | OUTPATIENT
Start: 2023-01-13 | End: 2023-01-13

## 2023-01-13 RX ORDER — DIPHENHYDRAMINE HCL 25 MG
50 CAPSULE ORAL ONCE
Status: COMPLETED | OUTPATIENT
Start: 2023-01-13 | End: 2023-01-13

## 2023-01-13 RX ORDER — DIPHENHYDRAMINE HYDROCHLORIDE 50 MG/ML
50 INJECTION INTRAMUSCULAR; INTRAVENOUS
Status: CANCELLED
Start: 2023-01-13

## 2023-01-13 RX ORDER — HEPARIN SODIUM (PORCINE) LOCK FLUSH IV SOLN 100 UNIT/ML 100 UNIT/ML
5 SOLUTION INTRAVENOUS
Status: CANCELLED | OUTPATIENT
Start: 2023-01-13

## 2023-01-13 RX ORDER — ALBUTEROL SULFATE 90 UG/1
1-2 AEROSOL, METERED RESPIRATORY (INHALATION)
Status: CANCELLED
Start: 2023-01-13

## 2023-01-13 RX ORDER — METHYLPREDNISOLONE SODIUM SUCCINATE 125 MG/2ML
125 INJECTION, POWDER, LYOPHILIZED, FOR SOLUTION INTRAMUSCULAR; INTRAVENOUS
Status: CANCELLED
Start: 2023-01-13

## 2023-01-13 RX ORDER — ACETAMINOPHEN 325 MG/1
650 TABLET ORAL ONCE
Status: CANCELLED
Start: 2023-01-13

## 2023-01-13 RX ORDER — MEPERIDINE HYDROCHLORIDE 25 MG/ML
25 INJECTION INTRAMUSCULAR; INTRAVENOUS; SUBCUTANEOUS EVERY 30 MIN PRN
Status: CANCELLED | OUTPATIENT
Start: 2023-01-13

## 2023-01-13 RX ORDER — METHYLPREDNISOLONE SODIUM SUCCINATE 125 MG/2ML
125 INJECTION, POWDER, LYOPHILIZED, FOR SOLUTION INTRAMUSCULAR; INTRAVENOUS ONCE
Status: CANCELLED | OUTPATIENT
Start: 2023-01-13

## 2023-01-13 RX ORDER — DIPHENHYDRAMINE HCL 25 MG
50 CAPSULE ORAL ONCE
Status: CANCELLED
Start: 2023-01-13

## 2023-01-13 RX ORDER — ALBUTEROL SULFATE 0.83 MG/ML
2.5 SOLUTION RESPIRATORY (INHALATION)
Status: CANCELLED | OUTPATIENT
Start: 2023-01-13

## 2023-01-13 RX ORDER — METHYLPREDNISOLONE SODIUM SUCCINATE 125 MG/2ML
125 INJECTION, POWDER, LYOPHILIZED, FOR SOLUTION INTRAMUSCULAR; INTRAVENOUS ONCE
Status: COMPLETED | OUTPATIENT
Start: 2023-01-13 | End: 2023-01-13

## 2023-01-13 RX ADMIN — RITUXIMAB-ABBS 500 MG: 10 INJECTION, SOLUTION INTRAVENOUS at 09:17

## 2023-01-13 RX ADMIN — SODIUM CHLORIDE 250 ML: 9 INJECTION, SOLUTION INTRAVENOUS at 08:56

## 2023-01-13 RX ADMIN — DIPHENHYDRAMINE HYDROCHLORIDE 50 MG: 25 CAPSULE ORAL at 08:39

## 2023-01-13 RX ADMIN — METHYLPREDNISOLONE SODIUM SUCCINATE 125 MG: 125 INJECTION, POWDER, FOR SOLUTION INTRAMUSCULAR; INTRAVENOUS at 08:56

## 2023-01-13 RX ADMIN — ACETAMINOPHEN 650 MG: 325 TABLET, FILM COATED ORAL at 08:39

## 2023-01-13 ASSESSMENT — PAIN SCALES - GENERAL: PAINLEVEL: NO PAIN (0)

## 2023-01-13 NOTE — PROGRESS NOTES
Infusion Nursing Note:  Camilo ELSIE Baca presents today for Rapid Truxima  Patient seen by provider today: No   present during visit today: Not Applicable.    Note: Patient doing well, has no complaints..    Intravenous Access:  Peripheral IV placed.    Treatment Conditions:  Biological Infusion Checklist:  ~~~ NOTE: If the patient answers yes to any of the questions below, hold the infusion and contact ordering provider or on-call provider.    1. Have you recently had an elevated temperature, fever, chills, productive cough, coughing for 3 weeks or longer or hemoptysis, abnormal vital signs, night sweats,  chest pain or have you noticed a decrease in your appetite, unexplained weight loss or fatigue? No  2. Do you have any open wounds or new incisions? No  3. Do you have any recent or upcoming hospitalizations, surgeries or dental procedures? No  4. Do you currently have or recently have had any signs of illness or infection or are you on any antibiotics? No  5. Have you had any new, sudden or worsening abdominal pain? No  6. Have you or anyone in your household received a live vaccination in the past 4 weeks? Please note:  No live vaccines while on biologic/chemotherapy until 6 months after the last treatment.  Patient can receive the flu vaccine (shot only) and the pneumovax.  It is optimal for the patient to get these vaccines mid cycle, but they can be given at any time as long as it is not on the day of the infusion. No  7. Have you recently been diagnosed with any new nervous system diseases (ie. Multiple sclerosis, Guillain Milligan, seizures, neurological changes) or cancer diagnosis? No  8. Are you on any form of radiation or chemotherapy? No  9. Are you pregnant or breast feeding or do you have plans of pregnancy in the future? No  10. Have you been having any signs of worsening depression or suicidal ideations?  (benlysta only) No  11. Have there been any other new onset medical symptoms?  No      Post Infusion Assessment:  Patient tolerated infusion without incident.  Patient observed for 15 minutes post infusion per protocol.  Blood return noted pre and post infusion.  Site patent and intact, free from redness, edema or discomfort.  No evidence of extravasations.  Access discontinued per protocol.   POST-INFUSION OF BIOLOGICAL MEDICATION:    Reviewed with patient.  Given biologic medication or medication hand-out. Inform patient if any fever, chills or signs of infection, new symptoms, abdominal pain, heart palpitations, shortness of breath, reaction, weakness, neurological changes, seek medical attention immediately and should not receive infusions. No live virus vaccines prior to or during treatment or up to 6 months post infusion. If the patient has an upcoming procedure or surgery, this should be discussed with the rheumatologist and surgeon or provider.  Discharge Plan:   Discharge instructions reviewed with: Patient.  Patient discharged in stable condition accompanied by: self.  Departure Mode: Ambulatory.      Charo Muñiz RN

## 2023-01-16 LAB
IGA SERPL-MCNC: 274 MG/DL (ref 84–499)
IGG SERPL-MCNC: 729 MG/DL (ref 610–1616)
IGM SERPL-MCNC: 28 MG/DL (ref 35–242)

## 2023-01-20 ENCOUNTER — TRANSFERRED RECORDS (OUTPATIENT)
Dept: HEALTH INFORMATION MANAGEMENT | Facility: CLINIC | Age: 73
End: 2023-01-20
Payer: COMMERCIAL

## 2023-01-23 ENCOUNTER — LAB (OUTPATIENT)
Dept: LAB | Facility: CLINIC | Age: 73
End: 2023-01-23
Payer: COMMERCIAL

## 2023-01-23 ENCOUNTER — OFFICE VISIT (OUTPATIENT)
Dept: GASTROENTEROLOGY | Facility: CLINIC | Age: 73
End: 2023-01-23
Payer: COMMERCIAL

## 2023-01-23 VITALS
WEIGHT: 201.5 LBS | DIASTOLIC BLOOD PRESSURE: 90 MMHG | HEART RATE: 57 BPM | OXYGEN SATURATION: 97 % | SYSTOLIC BLOOD PRESSURE: 149 MMHG | BODY MASS INDEX: 28.91 KG/M2

## 2023-01-23 DIAGNOSIS — R79.89 ELEVATED LFTS: Primary | ICD-10-CM

## 2023-01-23 DIAGNOSIS — K70.0 ALCOHOLIC FATTY LIVER: ICD-10-CM

## 2023-01-23 DIAGNOSIS — R79.89 ELEVATED LFTS: ICD-10-CM

## 2023-01-23 LAB
ALBUMIN SERPL BCG-MCNC: 4.5 G/DL (ref 3.5–5.2)
ALP SERPL-CCNC: 57 U/L (ref 40–129)
ALT SERPL W P-5'-P-CCNC: 75 U/L (ref 10–50)
ANION GAP SERPL CALCULATED.3IONS-SCNC: 10 MMOL/L (ref 7–15)
AST SERPL W P-5'-P-CCNC: 41 U/L (ref 10–50)
BILIRUB SERPL-MCNC: 1 MG/DL
BUN SERPL-MCNC: 18.3 MG/DL (ref 8–23)
CALCIUM SERPL-MCNC: 9.5 MG/DL (ref 8.8–10.2)
CHLORIDE SERPL-SCNC: 102 MMOL/L (ref 98–107)
CREAT SERPL-MCNC: 0.89 MG/DL (ref 0.67–1.17)
DEPRECATED HCO3 PLAS-SCNC: 28 MMOL/L (ref 22–29)
ERYTHROCYTE [DISTWIDTH] IN BLOOD BY AUTOMATED COUNT: 12.4 % (ref 10–15)
GFR SERPL CREATININE-BSD FRML MDRD: >90 ML/MIN/1.73M2
GLUCOSE SERPL-MCNC: 133 MG/DL (ref 70–99)
HCT VFR BLD AUTO: 43.3 % (ref 40–53)
HGB BLD-MCNC: 14.8 G/DL (ref 13.3–17.7)
INR PPP: 0.94 (ref 0.85–1.15)
MCH RBC QN AUTO: 33.3 PG (ref 26.5–33)
MCHC RBC AUTO-ENTMCNC: 34.2 G/DL (ref 31.5–36.5)
MCV RBC AUTO: 98 FL (ref 78–100)
PLATELET # BLD AUTO: 122 10E3/UL (ref 150–450)
POTASSIUM SERPL-SCNC: 4.1 MMOL/L (ref 3.4–5.3)
PROT SERPL-MCNC: 7.2 G/DL (ref 6.4–8.3)
RBC # BLD AUTO: 4.44 10E6/UL (ref 4.4–5.9)
SODIUM SERPL-SCNC: 140 MMOL/L (ref 136–145)
WBC # BLD AUTO: 5 10E3/UL (ref 4–11)

## 2023-01-23 PROCEDURE — 80053 COMPREHEN METABOLIC PANEL: CPT

## 2023-01-23 PROCEDURE — 85610 PROTHROMBIN TIME: CPT

## 2023-01-23 PROCEDURE — 36415 COLL VENOUS BLD VENIPUNCTURE: CPT

## 2023-01-23 PROCEDURE — 85027 COMPLETE CBC AUTOMATED: CPT

## 2023-01-23 PROCEDURE — 99214 OFFICE O/P EST MOD 30 MIN: CPT | Performed by: INTERNAL MEDICINE

## 2023-01-23 ASSESSMENT — PAIN SCALES - GENERAL: PAINLEVEL: NO PAIN (0)

## 2023-01-23 NOTE — LETTER
1/23/2023         RE: Camilo Baca  [address]  [place] [address]        Dear Colleague,    Thank you for referring your patient, Camilo Baca, to the [facility] [place]. Please see a copy of my visit note below.    [facility] Hepatology    Follow-up Visit    Follow-up visit for abnormal liver function tests.    Subjective:  72 year old male with rheumatoid arthritis, factor V Leyden, right lower extremity DVT and PE.  We are following him for abnormal liver function tests.  As I have said in my previous note he did have this first seen in 2006.  At that time they were attributed to his statins but even after he discontinued the statin his liver tests continue to be abnormal.    Patient denies jaundice, lower extremity edema, abdominal distension, lethargy or confusion. Patient denies melena, hematemesis or hematochezia.  He is obese and mostly carries his weight in his abdomen as I have previously said.  His viral markers were negative.  His CT scan showed that he has diffuse hepatic steatosis.    Patient denies fevers, sweats or chills.  He was vaccinated for the COVID virus with Moderna and has done 4 doses.    Medical hx Surgical hx   Past Medical History:   Diagnosis Date     Alopecia, unspecified      Closed fracture of unspecified part of humerus 1974    Arm fracture / left wrist     Esophageal reflux 2/6/2015     Factor 5 Leiden mutation, heterozygous (H)      Gout 3/28/2011     Herpes zoster without mention of complication 1965    Herpes zoster     Measles without mention of complication     Measles     Personal history of DVT (deep vein thrombosis) 4/9/2015     Personal history of venous thrombosis and embolism 4/9/2015     Pulmonary embolism (H) 12/1996    post appendectomy     RA (rheumatoid arthritis) (H) 3/11/2013     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     Rheumatoid arthritis(714.0)     Beginning  symptoms     Secondary hypertension with goal blood pressure less than 140/90 11/15/2016     Thrombocytopenia, unspecified (H) 1/29/2014     Varicella without mention of complication     Chickenpox      Past Surgical History:   Procedure Laterality Date     COLONOSCOPY  11/15/2010    COLONOSCOPY performed by DARIUS MESA at  GI     COLONOSCOPY N/A 11/2/2015    Procedure: COLONOSCOPY;  Surgeon: Bubba Odom MD;  Location:  GI     COLONOSCOPY N/A 3/19/2021    Procedure: Colonoscopy;  Surgeon: Ludmila Beltran MD;  Location:  GI     ESOPHAGOSCOPY, GASTROSCOPY, DUODENOSCOPY (EGD), COMBINED N/A 10/24/2018    Procedure: Esophagogastroduodenoscopy Multiple Biopsies by Biopsy;  Surgeon: Matteo Johnson DO;  Location:  GI     HC REPAIR ING HERNIA,5+Y/O,REDUCIBL  1960     HERNIORRHAPHY UMBILICAL N/A 4/17/2015    Procedure: HERNIORRHAPHY UMBILICAL;  Surgeon: Rayray Avila MD;  Location:  OR     LAPAROSCOPIC HERNIORRHAPHY INGUINAL BILATERAL Bilateral 4/17/2015    Procedure: LAPAROSCOPIC HERNIORRHAPHY INGUINAL BILATERAL;  Surgeon: Rayray Avila MD;  Location:  OR     [facility] APPENDECTOMY  1996     [facility] COLONOSCOPY W BIOPSY  08/10/05          Medications  Prior to Admission medications    Medication Sig Start Date End Date Taking? Authorizing Provider   allopurinol (ZYLOPRIM) 300 MG tablet TAKE 1 TABLET BY MOUTH EVERY DAY 11/2/22  Yes Schoen, Gregory G, MD   cholestyramine light (PREVALITE) 4 GM packet DISSOLVE ONE PACKET IN LIQUID AND DRINK BY MOUTH TWICE A DAY AS DIRECTED 11/29/22  Yes Akash Mariee MD   cholestyramine light (PREVALITE) 4 GM powder DISSOLVE 1 LEVEL SCOOP IN LIQUID AND DRINK BY MOUTH TWICE DAILY AS DIRECTED 12/19/22  Yes Virgilio Bryant MD   Cyanocobalamin (B-12) 100 MCG TABS    Yes Reported, Patient   diclofenac (VOLTAREN) 1 % GEL Apply  2 grams to shoulders three times daily using enclosed dosing card. 9/25/15  Yes Alexsandra Wolfe MD    levothyroxine (SYNTHROID/LEVOTHROID) 25 MCG tablet Take 1 tablet (25 mcg) by mouth daily 5/4/22  Yes Akash Mariee MD   losartan-hydrochlorothiazide (HYZAAR) 100-12.5 MG tablet Take 1 tablet by mouth daily 5/4/22  Yes Akash Mariee MD   omeprazole (PRILOSEC) 20 MG DR capsule TAKE 1 CAPSULE BY MOUTH EVERY DAY 8/26/22  Yes Akash Mariee MD   riTUXimab 500 MG/50ML SOLN Inject 1,000 mg into the vein   Yes Reported, Patient   rosuvastatin (CRESTOR) 10 MG tablet TAKE 1 TABLET BY MOUTH EVERY DAY 11/10/22  Yes Cuauhtemoc Carrasquillo MD   sildenafil (VIAGRA) 100 MG tablet 1/2 tab 1/2 hour prior to activity 12/10/21  Yes Akash Mariee MD   vitamin C (ASCORBIC ACID) 1000 MG TABS Take 1,000 mg by mouth daily   Yes Reported, Patient   VITAMIN D PO    Yes Reported, Patient       Allergies  Allergies   Allergen Reactions     Lisinopril Cough     Plaquenil [Hydroxychloroquine] Hives       Review of systems  A 10-point review of systems was negative    Examination  BP (!) 149/90 (BP Location: Left arm, Patient Position: Sitting, Cuff Size: Adult Regular)   Pulse 57   Wt 91.4 kg (201 lb 8 oz)   SpO2 97%   BMI 28.91 kg/m    Body mass index is 28.91 kg/m .    Gen- well, NAD, A+Ox3, normal color  Lym- no palpable LAD  CVS- RRR  RS- CTA  Abd-obese.  Extr- hands normal, no BEBE  Skin- no rash or jaundice  Neuro- no asterixis  Psych- normal mood    Laboratory  Lab Results   Component Value Date     01/23/2023     02/16/2021    POTASSIUM 4.1 01/23/2023    POTASSIUM 3.9 07/28/2022    POTASSIUM 4.2 06/25/2021    CHLORIDE 102 01/23/2023    CHLORIDE 108 07/28/2022    CHLORIDE 106 02/16/2021    CO2 28 01/23/2023    CO2 31 07/28/2022    CO2 32 02/16/2021    BUN 18.3 01/23/2023    BUN 17 07/28/2022    BUN 15 02/16/2021    CR 0.89 01/23/2023    CR 0.96 06/25/2021       Lab Results   Component Value Date    BILITOTAL 1.0 01/23/2023    BILITOTAL 1.0 06/25/2021    ALT 75 01/23/2023     07/08/2021    AST 41  01/23/2023    AST 57 07/08/2021    ALKPHOS 57 01/23/2023    ALKPHOS 63 06/25/2021       Lab Results   Component Value Date    ALBUMIN 4.5 01/23/2023    ALBUMIN 3.8 07/28/2022    ALBUMIN 3.8 06/25/2021    PROTTOTAL 7.2 01/23/2023    PROTTOTAL 7.0 06/25/2021        Lab Results   Component Value Date    WBC 5.0 01/23/2023    WBC 4.1 06/25/2021    HGB 14.8 01/23/2023    HGB 14.3 06/25/2021    MCV 98 01/23/2023    MCV 96 06/25/2021     01/23/2023     06/25/2021       Lab Results   Component Value Date    INR 0.94 01/23/2023    INR 0.9 04/09/2015       Radiology    Assessment  72 year old male whom we have seen for abnormal liver function tests.  As of now his ALT is mildly elevated.  His imaging shows that he has diffuse fatty infiltration of the liver.  Please note that he has mild elevation of the FROY but his F-actin's are negative.  His viral markers were also noncontributory.  The etiology of his abnormal liver function test is most likely CALVIN and a component of KYMBERLY cannot be ruled out.    Plan  Avoid alcohol.  Need to do lifestyle modifications including weight loss.  If unsuccessful we might see our medical weight management group.  Please note that the patient is MR elastography was normal as far as fibrosis is concerned but showed diffuse hepatic steatosis.    Mr. Baca will follow with his primary physician and rheumatologist for his other medical issues and will be seen here in 6 months.    I spent 30 minutes on this encounter of January 23, 2023 in chart reviewing, history taking, physical examination and documentation.  I spent some of the time also in coordination of care and counseling.    Gem Garay MD  Hepatology  [facility]        Again, thank you for allowing me to participate in the care of your patient.        Sincerely,        Gem Garay MD

## 2023-01-23 NOTE — PROGRESS NOTES
[facility] Hepatology    Follow-up Visit    Follow-up visit for abnormal liver function tests.    Subjective:  72 year old male with rheumatoid arthritis, factor V Leyden, right lower extremity DVT and PE.  We are following him for abnormal liver function tests.  As I have said in my previous note he did have this first seen in 2006.  At that time they were attributed to his statins but even after he discontinued the statin his liver tests continue to be abnormal.    Patient denies jaundice, lower extremity edema, abdominal distension, lethargy or confusion. Patient denies melena, hematemesis or hematochezia.  He is obese and mostly carries his weight in his abdomen as I have previously said.  His viral markers were negative.  His CT scan showed that he has diffuse hepatic steatosis.    Patient denies fevers, sweats or chills.  He was vaccinated for the COVID virus with Moderna and has done 4 doses.    Medical hx Surgical hx   Past Medical History:   Diagnosis Date     Alopecia, unspecified      Closed fracture of unspecified part of humerus 1974    Arm fracture / left wrist     Esophageal reflux 2/6/2015     Factor 5 Leiden mutation, heterozygous (H)      Gout 3/28/2011     Herpes zoster without mention of complication 1965    Herpes zoster     Measles without mention of complication     Measles     Personal history of DVT (deep vein thrombosis) 4/9/2015     Personal history of venous thrombosis and embolism 4/9/2015     Pulmonary embolism (H) 12/1996    post appendectomy     RA (rheumatoid arthritis) (H) 3/11/2013     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     Rheumatoid arthritis(714.0)     Beginning symptoms     Secondary hypertension with goal blood pressure less than 140/90 11/15/2016     Thrombocytopenia, unspecified (H) 1/29/2014     Varicella without mention of complication     Chickenpox      Past Surgical History:   Procedure Laterality Date      COLONOSCOPY  11/15/2010    COLONOSCOPY performed by DARIUS MESA at  GI     COLONOSCOPY N/A 11/2/2015    Procedure: COLONOSCOPY;  Surgeon: Bubba Odom MD;  Location:  GI     COLONOSCOPY N/A 3/19/2021    Procedure: Colonoscopy;  Surgeon: Ludmila Beltran MD;  Location:  GI     ESOPHAGOSCOPY, GASTROSCOPY, DUODENOSCOPY (EGD), COMBINED N/A 10/24/2018    Procedure: Esophagogastroduodenoscopy Multiple Biopsies by Biopsy;  Surgeon: Matteo Johnson DO;  Location:  GI     HC REPAIR ING HERNIA,5+Y/O,REDUCIBL  1960     HERNIORRHAPHY UMBILICAL N/A 4/17/2015    Procedure: HERNIORRHAPHY UMBILICAL;  Surgeon: Rayray Avila MD;  Location: [facility] OR     LAPAROSCOPIC HERNIORRHAPHY INGUINAL BILATERAL Bilateral 4/17/2015    Procedure: LAPAROSCOPIC HERNIORRHAPHY INGUINAL BILATERAL;  Surgeon: Rayray Avila MD;  Location:  OR     [facility] APPENDECTOMY  1996     [facility] COLONOSCOPY W BIOPSY  08/10/05          Medications  Prior to Admission medications    Medication Sig Start Date End Date Taking? Authorizing Provider   allopurinol (ZYLOPRIM) 300 MG tablet TAKE 1 TABLET BY MOUTH EVERY DAY 11/2/22  Yes Schoen, Gregory G, MD   cholestyramine light (PREVALITE) 4 GM packet DISSOLVE ONE PACKET IN LIQUID AND DRINK BY MOUTH TWICE A DAY AS DIRECTED 11/29/22  Yes Akash Mariee MD   cholestyramine light (PREVALITE) 4 GM powder DISSOLVE 1 LEVEL SCOOP IN LIQUID AND DRINK BY MOUTH TWICE DAILY AS DIRECTED 12/19/22  Yes Virgilio Bryant MD   Cyanocobalamin (B-12) 100 MCG TABS    Yes Reported, Patient   diclofenac (VOLTAREN) 1 % GEL Apply  2 grams to shoulders three times daily using enclosed dosing card. 9/25/15  Yes Alexsandra Wolfe MD   levothyroxine (SYNTHROID/LEVOTHROID) 25 MCG tablet Take 1 tablet (25 mcg) by mouth daily 5/4/22  Yes Akash Mariee MD   losartan-hydrochlorothiazide (HYZAAR) 100-12.5 MG tablet Take 1 tablet by mouth daily 5/4/22  Yes Akash Mariee MD   omeprazole (PRILOSEC) 20  MG DR capsule TAKE 1 CAPSULE BY MOUTH EVERY DAY 8/26/22  Yes Akash Mariee MD   riTUXimab 500 MG/50ML SOLN Inject 1,000 mg into the vein   Yes Reported, Patient   rosuvastatin (CRESTOR) 10 MG tablet TAKE 1 TABLET BY MOUTH EVERY DAY 11/10/22  Yes Cuauhtemoc Carrasquillo MD   sildenafil (VIAGRA) 100 MG tablet 1/2 tab 1/2 hour prior to activity 12/10/21  Yes Akash Mariee MD   vitamin C (ASCORBIC ACID) 1000 MG TABS Take 1,000 mg by mouth daily   Yes Reported, Patient   VITAMIN D PO    Yes Reported, Patient       Allergies  Allergies   Allergen Reactions     Lisinopril Cough     Plaquenil [Hydroxychloroquine] Hives       Review of systems  A 10-point review of systems was negative    Examination  BP (!) 149/90 (BP Location: Left arm, Patient Position: Sitting, Cuff Size: Adult Regular)   Pulse 57   Wt 91.4 kg (201 lb 8 oz)   SpO2 97%   BMI 28.91 kg/m    Body mass index is 28.91 kg/m .    Gen- well, NAD, A+Ox3, normal color  Lym- no palpable LAD  CVS- RRR  RS- CTA  Abd-obese.  Extr- hands normal, no BEBE  Skin- no rash or jaundice  Neuro- no asterixis  Psych- normal mood    Laboratory  Lab Results   Component Value Date     01/23/2023     02/16/2021    POTASSIUM 4.1 01/23/2023    POTASSIUM 3.9 07/28/2022    POTASSIUM 4.2 06/25/2021    CHLORIDE 102 01/23/2023    CHLORIDE 108 07/28/2022    CHLORIDE 106 02/16/2021    CO2 28 01/23/2023    CO2 31 07/28/2022    CO2 32 02/16/2021    BUN 18.3 01/23/2023    BUN 17 07/28/2022    BUN 15 02/16/2021    CR 0.89 01/23/2023    CR 0.96 06/25/2021       Lab Results   Component Value Date    BILITOTAL 1.0 01/23/2023    BILITOTAL 1.0 06/25/2021    ALT 75 01/23/2023     07/08/2021    AST 41 01/23/2023    AST 57 07/08/2021    ALKPHOS 57 01/23/2023    ALKPHOS 63 06/25/2021       Lab Results   Component Value Date    ALBUMIN 4.5 01/23/2023    ALBUMIN 3.8 07/28/2022    ALBUMIN 3.8 06/25/2021    PROTTOTAL 7.2 01/23/2023    PROTTOTAL 7.0 06/25/2021        Lab Results    Component Value Date    WBC 5.0 01/23/2023    WBC 4.1 06/25/2021    HGB 14.8 01/23/2023    HGB 14.3 06/25/2021    MCV 98 01/23/2023    MCV 96 06/25/2021     01/23/2023     06/25/2021       Lab Results   Component Value Date    INR 0.94 01/23/2023    INR 0.9 04/09/2015       Radiology    Assessment  72 year old male whom we have seen for abnormal liver function tests.  As of now his ALT is mildly elevated.  His imaging shows that he has diffuse fatty infiltration of the liver.  Please note that he has mild elevation of the FROY but his F-actin's are negative.  His viral markers were also noncontributory.  The etiology of his abnormal liver function test is most likely CALVIN and a component of KYMBERLY cannot be ruled out.    Plan  Avoid alcohol.  Need to do lifestyle modifications including weight loss.  If unsuccessful we might see our medical weight management group.  Please note that the patient is MR elastography was normal as far as fibrosis is concerned but showed diffuse hepatic steatosis.    Mr. Baca will follow with his primary physician and rheumatologist for his other medical issues and will be seen here in 6 months.    I spent 30 minutes on this encounter of January 23, 2023 in chart reviewing, history taking, physical examination and documentation.  I spent some of the time also in coordination of care and counseling.    Gem Garay MD  Hepatology  [facility]

## 2023-01-23 NOTE — NURSING NOTE
Follow up on labs.     No chief complaint on file.      Vitals:    01/23/23 1018   BP: (!) 149/90   BP Location: Left arm   Patient Position: Sitting   Cuff Size: Adult Regular   Pulse: 57   SpO2: 97%   Weight: 91.4 kg (201 lb 8 oz)       Body mass index is 28.91 kg/m .      Jaquelin Rosenbaum RN

## 2023-01-28 DIAGNOSIS — R19.5 LOOSE STOOLS: ICD-10-CM

## 2023-01-28 DIAGNOSIS — M10.9 ACUTE GOUTY ARTHROPATHY: ICD-10-CM

## 2023-01-30 RX ORDER — ALLOPURINOL 300 MG/1
TABLET ORAL
Qty: 90 TABLET | Refills: 0 | Status: SHIPPED | OUTPATIENT
Start: 2023-01-30 | End: 2023-05-03

## 2023-01-30 RX ORDER — CHOLESTYRAMINE LIGHT 4 G/5.7G
POWDER, FOR SUSPENSION ORAL
Qty: 231 G | Refills: 1 | Status: SHIPPED | OUTPATIENT
Start: 2023-01-30 | End: 2023-03-29

## 2023-01-30 NOTE — TELEPHONE ENCOUNTER
Requested Prescriptions   Pending Prescriptions Disp Refills    cholestyramine light (PREVALITE) 4 GM powder [Pharmacy Med Name: CHOLESTYRAMINE LT 4G PWD SUSP] 231 g 1     Sig: DISSOLVE 1 LEVEL SCOOP IN LIQUID AND DRINK BY MOUTH TWICE DAILY AS DIRECTED       There is no refill protocol information for this order          WILFRED EastN, RN

## 2023-01-30 NOTE — TELEPHONE ENCOUNTER
Pending Prescriptions:                       Disp   Refills    allopurinol (ZYLOPRIM) 300 MG tablet [Phar*90 tab*0        Sig: TAKE 1 TABLET BY MOUTH ONCE DAILY      Routing refill request to provider for review/approval because:  Labs out of range:  uric    Lucy Astorga RN on 1/30/2023 at 2:19 PM

## 2023-02-01 DIAGNOSIS — N52.9 ERECTILE DYSFUNCTION, UNSPECIFIED ERECTILE DYSFUNCTION TYPE: ICD-10-CM

## 2023-02-01 RX ORDER — SILDENAFIL 100 MG/1
TABLET, FILM COATED ORAL
Qty: 3 TABLET | Refills: 4 | Status: SHIPPED | OUTPATIENT
Start: 2023-02-01 | End: 2024-05-14

## 2023-02-02 RX ORDER — SILDENAFIL 100 MG/1
TABLET, FILM COATED ORAL
Qty: 3 TABLET | Refills: 4 | Status: CANCELLED | OUTPATIENT
Start: 2023-02-02

## 2023-02-03 NOTE — NURSING NOTE
"Chief Complaint   Patient presents with     Jaw Pain     left side       Initial /70  Pulse 84  Temp 97.4  F (36.3  C) (Tympanic)  Resp 20  Wt 193 lb (87.5 kg)  SpO2 95%  BMI 26.23 kg/m2 Estimated body mass index is 26.23 kg/(m^2) as calculated from the following:    Height as of 4/19/16: 5' 11.93\" (1.827 m).    Weight as of this encounter: 193 lb (87.5 kg).  Medication Reconciliation: complete   ................Mitchel Jean LPN,   April 7, 2017,      1:50 PM,   [facility]     " Detail Level: Detailed Biopsy Type: H and E Bill As?: Malignant Destruction Size Of Lesion In Cm (Optional): 1.1 Size Of Lesion After Curettage: 1.4 Anesthesia Type: 0.5% lidocaine without epinephrine Anesthesia Volume In Cc: 0.5 Hemostasis: Drysol Destruction Type: electrodesiccation Number Of Curettages: 3 Wound Care: Petrolatum Lab: [id number] Notes In Note?: No Consent: Written consent was obtained and risks were reviewed including but not limited to scarring, infection, bleeding, scabbing, incomplete removal, nerve damage and allergy to anesthesia. Post-Care Instructions: I reviewed with the patient in detail post-care instructions. Patient is to keep the biopsy site dry overnight, and then apply bacitracin twice daily until healed. Patient may apply hydrogen peroxide soaks to remove any crusting. Notification Instructions: Patient will be notified of biopsy results. However, patient instructed to call the office if not contacted within 2 weeks. Billing Type: Third-Party Bill Size Of Lesion In Cm (Optional): 0.7 Lab: 451 Lab Facility: [id number] Billing Type: Third-Party Bill Lab: 451 Lab Facility: [id number]

## 2023-02-04 DIAGNOSIS — E78.2 MIXED HYPERLIPIDEMIA: ICD-10-CM

## 2023-02-04 DIAGNOSIS — K21.9 GASTROESOPHAGEAL REFLUX DISEASE WITHOUT ESOPHAGITIS: ICD-10-CM

## 2023-02-07 RX ORDER — ROSUVASTATIN CALCIUM 10 MG/1
TABLET, COATED ORAL
Qty: 90 TABLET | Refills: 1 | OUTPATIENT
Start: 2023-02-07

## 2023-02-22 DIAGNOSIS — R19.5 LOOSE STOOLS: ICD-10-CM

## 2023-02-22 RX ORDER — CHOLESTYRAMINE 4 G/5.5G
POWDER, FOR SUSPENSION ORAL
Qty: 231 G | Refills: 1 | OUTPATIENT
Start: 2023-02-22

## 2023-02-22 NOTE — TELEPHONE ENCOUNTER
Prescription was sent 1/30/23 for #231g with 1 refills.  Pharmacy notified via E-Prescribe refusal.    WILFRED EastN, RN

## 2023-03-22 DIAGNOSIS — R19.5 LOOSE STOOLS: ICD-10-CM

## 2023-03-29 RX ORDER — CHOLESTYRAMINE LIGHT 4 G/5.7G
POWDER, FOR SUSPENSION ORAL
Qty: 231 G | Refills: 11 | Status: SHIPPED | OUTPATIENT
Start: 2023-03-29 | End: 2023-05-23

## 2023-04-29 DIAGNOSIS — E03.4 HYPOTHYROIDISM DUE TO ACQUIRED ATROPHY OF THYROID: ICD-10-CM

## 2023-04-29 DIAGNOSIS — M10.9 ACUTE GOUTY ARTHROPATHY: ICD-10-CM

## 2023-05-01 RX ORDER — LEVOTHYROXINE SODIUM 25 UG/1
25 TABLET ORAL DAILY
Qty: 90 TABLET | Refills: 3 | Status: SHIPPED | OUTPATIENT
Start: 2023-05-01 | End: 2024-04-22

## 2023-05-01 NOTE — TELEPHONE ENCOUNTER
Pending Prescriptions:                       Disp   Refills    levothyroxine (SYNTHROID/LEVOTHROID) 25 MC*90 tab*3        Sig: Take 1 tablet (25 mcg) by mouth daily      Routing refill request to provider for review/approval because:  Labs out of range:  tsh    Lucy Astorga RN on 5/1/2023 at 3:14 PM

## 2023-05-01 NOTE — TELEPHONE ENCOUNTER
Pending Prescriptions:                       Disp   Refills    allopurinol (ZYLOPRIM) 300 MG tablet [Phar*90 tab*0        Sig: TAKE 1 TABLET BY MOUTH ONCEDAILY      Routing refill request to provider for review/approval because:  Labs not current:  uric acid    Lucy Astorga RN on 5/1/2023 at 3:13 PM

## 2023-05-03 RX ORDER — ALLOPURINOL 300 MG/1
TABLET ORAL
Qty: 90 TABLET | Refills: 0 | Status: SHIPPED | OUTPATIENT
Start: 2023-05-03 | End: 2023-05-25

## 2023-05-05 NOTE — TELEPHONE ENCOUNTER
RN accidentally approved wrong medication (Allopurinol). Ru is that OK or would you like RN to call and cancel RX? Also, RN unable to sign Synthroid due to drug interaction.    Will route to provider to advise.     Bettye Hernandez RN  [facility]         05-May-2023 12:30

## 2023-05-10 DIAGNOSIS — E78.2 MIXED HYPERLIPIDEMIA: ICD-10-CM

## 2023-05-10 RX ORDER — ROSUVASTATIN CALCIUM 10 MG/1
TABLET, COATED ORAL
Qty: 90 TABLET | Refills: 0 | Status: SHIPPED | OUTPATIENT
Start: 2023-05-10 | End: 2023-07-24

## 2023-05-13 DIAGNOSIS — K21.9 GASTROESOPHAGEAL REFLUX DISEASE WITHOUT ESOPHAGITIS: ICD-10-CM

## 2023-05-13 DIAGNOSIS — I15.9 SECONDARY HYPERTENSION WITH GOAL BLOOD PRESSURE LESS THAN 140/90: ICD-10-CM

## 2023-05-16 RX ORDER — LOSARTAN POTASSIUM AND HYDROCHLOROTHIAZIDE 12.5; 1 MG/1; MG/1
1 TABLET ORAL DAILY
Qty: 90 TABLET | Refills: 3 | Status: SHIPPED | OUTPATIENT
Start: 2023-05-16 | End: 2024-04-22

## 2023-05-16 NOTE — TELEPHONE ENCOUNTER
Prescription approved per [facility] Refill Protocol.  Lucy Astorga RN on 5/16/2023 at 1:59 PM

## 2023-05-16 NOTE — TELEPHONE ENCOUNTER
Pending Prescriptions:                       Disp   Refills    losartan-hydrochlorothiazide (HYZAAR) 100-*90 tab*3        Sig: TAKE 1 TABLET BY MOUTH DAILY    Signed Prescriptions:                        Disp   Refills    omeprazole (PRILOSEC) 20 MG DR capsule     90 cap*0        Sig: TAKE 1 CAPSULE BY MOUTH EVERY DAY  Authorizing Provider: TAMIKO GUTIERREZ  Ordering User: DEEPAK LANG      Routing refill request to provider for review/approval because:  Blood pressure is out of range    Deepak Lang RN on 5/16/2023 at 1:59 PM

## 2023-05-17 ENCOUNTER — LAB (OUTPATIENT)
Dept: LAB | Facility: CLINIC | Age: 73
End: 2023-05-17
Payer: COMMERCIAL

## 2023-05-17 DIAGNOSIS — M05.79 RHEUMATOID ARTHRITIS INVOLVING MULTIPLE SITES WITH POSITIVE RHEUMATOID FACTOR (H): ICD-10-CM

## 2023-05-17 LAB
ALBUMIN SERPL BCG-MCNC: 4.3 G/DL (ref 3.5–5.2)
ALP SERPL-CCNC: 51 U/L (ref 40–129)
ALT SERPL W P-5'-P-CCNC: 64 U/L (ref 10–50)
AST SERPL W P-5'-P-CCNC: 30 U/L (ref 10–50)
BASOPHILS # BLD AUTO: 0 10E3/UL (ref 0–0.2)
BASOPHILS NFR BLD AUTO: 1 %
BILIRUB DIRECT SERPL-MCNC: <0.2 MG/DL (ref 0–0.3)
BILIRUB SERPL-MCNC: 1.1 MG/DL
CREAT SERPL-MCNC: 0.93 MG/DL (ref 0.67–1.17)
CRP SERPL-MCNC: <3 MG/L
EOSINOPHIL # BLD AUTO: 0.2 10E3/UL (ref 0–0.7)
EOSINOPHIL NFR BLD AUTO: 4 %
ERYTHROCYTE [DISTWIDTH] IN BLOOD BY AUTOMATED COUNT: 13 % (ref 10–15)
ERYTHROCYTE [SEDIMENTATION RATE] IN BLOOD BY WESTERGREN METHOD: 8 MM/HR (ref 0–20)
GFR SERPL CREATININE-BSD FRML MDRD: 88 ML/MIN/1.73M2
HCT VFR BLD AUTO: 42.2 % (ref 40–53)
HGB BLD-MCNC: 14.5 G/DL (ref 13.3–17.7)
IMM GRANULOCYTES # BLD: 0 10E3/UL
IMM GRANULOCYTES NFR BLD: 0 %
LYMPHOCYTES # BLD AUTO: 0.8 10E3/UL (ref 0.8–5.3)
LYMPHOCYTES NFR BLD AUTO: 18 %
MCH RBC QN AUTO: 33.6 PG (ref 26.5–33)
MCHC RBC AUTO-ENTMCNC: 34.4 G/DL (ref 31.5–36.5)
MCV RBC AUTO: 98 FL (ref 78–100)
MONOCYTES # BLD AUTO: 0.6 10E3/UL (ref 0–1.3)
MONOCYTES NFR BLD AUTO: 13 %
NEUTROPHILS # BLD AUTO: 2.9 10E3/UL (ref 1.6–8.3)
NEUTROPHILS NFR BLD AUTO: 64 %
NRBC # BLD AUTO: 0 10E3/UL
NRBC BLD AUTO-RTO: 0 /100
PLATELET # BLD AUTO: 107 10E3/UL (ref 150–450)
PROT SERPL-MCNC: 6.8 G/DL (ref 6.4–8.3)
RBC # BLD AUTO: 4.32 10E6/UL (ref 4.4–5.9)
WBC # BLD AUTO: 4.6 10E3/UL (ref 4–11)

## 2023-05-17 PROCEDURE — 85652 RBC SED RATE AUTOMATED: CPT

## 2023-05-17 PROCEDURE — 36415 COLL VENOUS BLD VENIPUNCTURE: CPT

## 2023-05-17 PROCEDURE — 82565 ASSAY OF CREATININE: CPT

## 2023-05-17 PROCEDURE — 80076 HEPATIC FUNCTION PANEL: CPT

## 2023-05-17 PROCEDURE — 86481 TB AG RESPONSE T-CELL SUSP: CPT

## 2023-05-17 PROCEDURE — 85025 COMPLETE CBC W/AUTO DIFF WBC: CPT

## 2023-05-17 PROCEDURE — 86140 C-REACTIVE PROTEIN: CPT

## 2023-05-18 LAB
GAMMA INTERFERON BACKGROUND BLD IA-ACNC: 0.07 IU/ML
M TB IFN-G BLD-IMP: NEGATIVE
M TB IFN-G CD4+ BCKGRND COR BLD-ACNC: 9.93 IU/ML
MITOGEN IGNF BCKGRD COR BLD-ACNC: 0 IU/ML
MITOGEN IGNF BCKGRD COR BLD-ACNC: 0.18 IU/ML
QUANTIFERON MITOGEN: 10 IU/ML
QUANTIFERON NIL TUBE: 0.07 IU/ML
QUANTIFERON TB1 TUBE: 0.07 IU/ML
QUANTIFERON TB2 TUBE: 0.25

## 2023-05-19 ENCOUNTER — VIRTUAL VISIT (OUTPATIENT)
Dept: RHEUMATOLOGY | Facility: CLINIC | Age: 73
End: 2023-05-19
Payer: COMMERCIAL

## 2023-05-19 DIAGNOSIS — Z11.59 ENCOUNTER FOR SCREENING FOR OTHER VIRAL DISEASES: ICD-10-CM

## 2023-05-19 DIAGNOSIS — G89.29 CHRONIC RIGHT SHOULDER PAIN: ICD-10-CM

## 2023-05-19 DIAGNOSIS — M05.79 RHEUMATOID ARTHRITIS INVOLVING MULTIPLE SITES WITH POSITIVE RHEUMATOID FACTOR (H): Primary | ICD-10-CM

## 2023-05-19 DIAGNOSIS — M25.511 CHRONIC RIGHT SHOULDER PAIN: ICD-10-CM

## 2023-05-19 PROCEDURE — 99214 OFFICE O/P EST MOD 30 MIN: CPT | Mod: VID | Performed by: INTERNAL MEDICINE

## 2023-05-19 RX ORDER — ALBUTEROL SULFATE 0.83 MG/ML
2.5 SOLUTION RESPIRATORY (INHALATION)
Status: CANCELLED | OUTPATIENT
Start: 2023-07-14

## 2023-05-19 RX ORDER — HEPARIN SODIUM (PORCINE) LOCK FLUSH IV SOLN 100 UNIT/ML 100 UNIT/ML
5 SOLUTION INTRAVENOUS
Status: CANCELLED | OUTPATIENT
Start: 2023-07-14

## 2023-05-19 RX ORDER — DIPHENHYDRAMINE HCL 25 MG
50 CAPSULE ORAL ONCE
Status: CANCELLED
Start: 2023-07-14

## 2023-05-19 RX ORDER — METHYLPREDNISOLONE SODIUM SUCCINATE 125 MG/2ML
125 INJECTION, POWDER, LYOPHILIZED, FOR SOLUTION INTRAMUSCULAR; INTRAVENOUS ONCE
Status: CANCELLED | OUTPATIENT
Start: 2023-07-14

## 2023-05-19 RX ORDER — EPINEPHRINE 1 MG/ML
0.3 INJECTION, SOLUTION, CONCENTRATE INTRAVENOUS EVERY 5 MIN PRN
Status: CANCELLED | OUTPATIENT
Start: 2023-07-14

## 2023-05-19 RX ORDER — METHYLPREDNISOLONE SODIUM SUCCINATE 125 MG/2ML
125 INJECTION, POWDER, LYOPHILIZED, FOR SOLUTION INTRAMUSCULAR; INTRAVENOUS
Status: CANCELLED
Start: 2023-07-14

## 2023-05-19 RX ORDER — DIPHENHYDRAMINE HYDROCHLORIDE 50 MG/ML
50 INJECTION INTRAMUSCULAR; INTRAVENOUS
Status: CANCELLED
Start: 2023-07-14

## 2023-05-19 RX ORDER — MEPERIDINE HYDROCHLORIDE 25 MG/ML
25 INJECTION INTRAMUSCULAR; INTRAVENOUS; SUBCUTANEOUS EVERY 30 MIN PRN
Status: CANCELLED | OUTPATIENT
Start: 2023-07-14

## 2023-05-19 RX ORDER — HEPARIN SODIUM,PORCINE 10 UNIT/ML
5 VIAL (ML) INTRAVENOUS
Status: CANCELLED | OUTPATIENT
Start: 2023-07-14

## 2023-05-19 RX ORDER — ACETAMINOPHEN 325 MG/1
650 TABLET ORAL ONCE
Status: CANCELLED
Start: 2023-07-14

## 2023-05-19 RX ORDER — ALBUTEROL SULFATE 90 UG/1
1-2 AEROSOL, METERED RESPIRATORY (INHALATION)
Status: CANCELLED
Start: 2023-07-14

## 2023-05-19 NOTE — PROGRESS NOTES
Camilo Baca  is a 72 year old year old who is being evaluated via a billable video visit.      How would you like to obtain your AVS? MyChart  If the video visit is dropped, the invitation should be resent by: Text to cell phone: [phone]   Will anyone else be joining your video visit? No     Rheumatology Video Visit      Camilo Baca MRN# [id number]   YOB: 1950 Age: 72 year old      Date of visit: 23   PCP: Lyndsey Aponte    Chief Complaint   Patient presents with:  Rheumatoid Arthritis: Right shoulder pain comes and goes but does not stop him.    Assessment and Plan     1.  Seropositive rheumatoid arthritis (, CCP >250): Dx'd . Previously followed by [facility] and [facility].  Established with me on 3/20/2018. Previously on MTX (ineffective as monotherapy), Humira (effective, stopped because of insurance preference for IV), Simponi (rash on cheeks/nose after infusion), Remicade (lost efficacy), HCQ (facial rash), Orencia (partially effective), SSZ (cytopenia).  Currently on Rituximab (received 1g on each of the following: 3/10/2020 & 3/24/2020, 2020 & 10/6/2020, 2021 & 3/9/2021, 2021 & 2021, 2022 & 2022; 500 mg on each of the followin2022, 2023).  Has done great since starting rituximab and has not required NSAIDs or prednisone.  RA is controlled.   Chronic illness, stable.    - Rituximab (Truxima) 500 mg IV once, every 6 months; next due in 2023  - PRN RA flare: Prednisone 20mg daily x2days, then 15mg daily x2days, then 10mg daily x2days, then 5mg daily x2days, then stop.    - Lab 1-2 days before rituximab infusion: COVID-19 (okay to do home test or lab test; and if positive then should delay rituximab)  - Labs in 6 months: CBC, creatinine, hepatic panel, ESR, CRP, hepatitis C, hepatitis B core antibody and surface antigen, HIV              Rapid 3, cumulative scores                       2023: RA controlled (rituximab)                       2/1/2022: asymptomatic (Rituximab)                      6/2/2020: doing well; virtual visit (Rituximab in March 2020; SSZ 500mg BID)                      03/03/2020: 9.8  (Orencia IV, SSZ 500mg BID)                       11/19/2019: 2.8  (Orencia IV, SSZ 500mg BID)                      08/20/2019: 4     (Orencia 750mg IV)                      04/23/2019: 0     (Orencia 750mg IV)                       01/08/2019: 0.5  (Simponi IV)                      11/06/2018: 9.3  (Simponi IV x2mo)    # Rituximab (Rituxan, Truxima) Risks and Benefits: The risks and benefits of rituximab were discussed in detail and the patient verbalized understanding.  The risks discussed include, but are not limited to, the risk for hypersensitivity, anaphylaxis, anaphylactoid reactions, fatal infusion reactions, an increased risk for serious infections leading to hospitalization or death, severe mucocutaneous reactions, reactivation of hepatitis B, and development of progressive multifocal leukoencephalopathy (PML) resulting in death.  The most common adverse reactions are infections, nasopharyngitis, urinary tract infections, nausea, diarrhea, headache, muscle spasms, and anemia.  It was discussed that the medication would need to be discontinued if a serious infection develops.  It was discussed that live vaccinations should not be received while using rituximab or within 30 days prior to starting rituximab.  I encouraged reviewing the package insert and asking any questions about the medication.      2.  History of impingement syndrome of both shoulders, now with intermittent right shoulder symptoms: injected 10/11/2019 PT exercises resolved the shoulder issue.   intermittent right shoulder symptoms now and advised that he restart physical therapy exercises.  He says that he will try doing this on his own and if not improving then he will call to schedule with physical therapy.  - Physical therapy referral     3. History of mild  plantar fasciitis, bilaterally: resolved with stretching and changing shoes.  Not an issue today.    4.  LFT elevation: Avoid hepatotoxins.  Avoid alcohol.  Following with hepatology who suspects steatohepatitis and he says he has an upcoming hepatology appointment.      5.  Vaccinations: Vaccinations reviewed with Mr. Baca.  Risks and benefits of vaccinations were discussed.    - Influenza: up to date  - [id number]: up to date  - [id number]: up to date  - Shingrix: Up to date  - COVID-19: advised updating in early June, to be at least 4 months after the last rituximab infusion and at least 1 month before the next rituximab infusion.  Also advised that he not use prednisone around the time of vaccination because prednisone would weaken the vaccine response.    Total minutes spent in evaluation with patient, documentation, , and review of pertinent studies and chart notes: 20      Mr. Baca verbalized agreement with and understanding of the rational for the diagnosis and treatment plan.  All questions were answered to best of my ability and the patient's satisfaction. Mr. Baca was advised to contact the clinic with any questions that may arise after the clinic visit.      Thank you for involving me in the care of the patient    Return to clinic: 6 months    HPI   Camilo Baca is a 72 year old male with a past medical history significant for hypertension, hyperlipidemia, GERD, hypothyroidism, history of DVT, factor V Leiden mutation, history of thrombocytopenia, gout, and rheumatoid arthritis who presents for follow-up of rheumatoid arthritis.     Today, 5/19/2023: RA controlled.  Intermittent right shoulder pain with increased activity but not each time he does activity, mild, resolves with rest.  Not doing physical therapy exercises that he was previously taught that were effective.  He states that he will start the exercises on his own and if no improvement then he will call to schedule an appointment  with physical therapy.  Other joints are doing great.  Planning to have next rituximab dose on 7/14/2023.  Arthritis does not limit daily activities.    Denies fevers, chills, nausea, vomiting, constipation, diarrhea. No abdominal pain. No chest pain/pressure, palpitations, or shortness of breath. No LE swelling. No neck pain. No oral or nasal sores.  No rash. No sicca symptoms.      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    ROS   12 point review of system was completed and negative except as noted in the HPI     Active Problem List     Patient Active Problem List   Diagnosis     Lumbago     Mixed hyperlipidemia     NONSPECIFIC MEDICAL HISTORY     HYPERLIPIDEMIA LDL GOAL <130     History of adenomatous polyp of colon     Idiopathic chronic gout without tophus, unspecified site     Advanced directives, counseling/discussion     Seropositive rheumatoid arthritis (H)     High risk medication use     Thrombocytopenia (H)     Gastroesophageal reflux disease without esophagitis     Factor 5 Leiden mutation, heterozygous (H)     Personal history of venous thrombosis and embolism     Personal history of DVT (deep vein thrombosis)     Hypothyroidism due to acquired atrophy of thyroid     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)     Secondary hypertension with goal blood pressure less than 140/90     Rheumatoid arthritis involving multiple sites with positive rheumatoid factor (H)     Gout     Sensorineural hearing loss (SNHL)     Tinnitus     Past Medical History     Past Medical History:   Diagnosis Date     Alopecia, unspecified      Closed fracture of unspecified part of humerus 1974    Arm fracture / left wrist     Esophageal reflux 2/6/2015     Factor 5 Leiden mutation, heterozygous (H)      Gout 3/28/2011     Herpes zoster without mention of complication 1965    Herpes zoster      Measles without mention of complication     Measles     Personal history of DVT (deep vein thrombosis) 4/9/2015     Personal history of venous thrombosis and embolism 4/9/2015     Pulmonary embolism (H) 12/1996    post appendectomy     RA (rheumatoid arthritis) (H) 3/11/2013     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     Rheumatoid arthritis(714.0)     Beginning symptoms     Secondary hypertension with goal blood pressure less than 140/90 11/15/2016     Thrombocytopenia, unspecified (H) 1/29/2014     Varicella without mention of complication     Chickenpox     Past Surgical History     Past Surgical History:   Procedure Laterality Date     COLONOSCOPY  11/15/2010    COLONOSCOPY performed by DARIUS MESA at  GI     COLONOSCOPY N/A 11/2/2015    Procedure: COLONOSCOPY;  Surgeon: Bubba Odom MD;  Location:  GI     COLONOSCOPY N/A 3/19/2021    Procedure: Colonoscopy;  Surgeon: Ludmila Beltran MD;  Location:  GI     ESOPHAGOSCOPY, GASTROSCOPY, DUODENOSCOPY (EGD), COMBINED N/A 10/24/2018    Procedure: Esophagogastroduodenoscopy Multiple Biopsies by Biopsy;  Surgeon: Matteo Johnson DO;  Location:  GI     HC REPAIR ING HERNIA,5+Y/O,REDUCIBL  1960     HERNIORRHAPHY UMBILICAL N/A 4/17/2015    Procedure: HERNIORRHAPHY UMBILICAL;  Surgeon: Rayray Avila MD;  Location:  OR     LAPAROSCOPIC HERNIORRHAPHY INGUINAL BILATERAL Bilateral 4/17/2015    Procedure: LAPAROSCOPIC HERNIORRHAPHY INGUINAL BILATERAL;  Surgeon: Rayray Avila MD;  Location:  OR     [facility] APPENDECTOMY  1996     [facility] COLONOSCOPY W BIOPSY  08/10/05     Allergy     Allergies   Allergen Reactions     Lisinopril Cough     Plaquenil [Hydroxychloroquine] Hives     Current Medication List     Current Outpatient Medications   Medication Sig     allopurinol (ZYLOPRIM) 300 MG tablet TAKE 1 TABLET BY MOUTH ONCEDAILY     cholestyramine light (PREVALITE) 4 GM packet  DISSOLVE ONE PACKET IN LIQUID AND DRINK BY MOUTH TWICE A DAY AS DIRECTED     cholestyramine light (PREVALITE) 4 GM powder DISSOLVE 1 LEVEL SCOOP IN LIQUID AND DRINK BY MOUTH TWICE DAILY AS DIRECTED     Cyanocobalamin (B-12) 100 MCG TABS      diclofenac (VOLTAREN) 1 % GEL Apply  2 grams to shoulders three times daily using enclosed dosing card.     levothyroxine (SYNTHROID/LEVOTHROID) 25 MCG tablet TAKE 1 TABLET (25 MCG) BY MOUTH DAILY     losartan-hydrochlorothiazide (HYZAAR) 100-12.5 MG tablet TAKE 1 TABLET BY MOUTH DAILY     omeprazole (PRILOSEC) 20 MG DR capsule TAKE 1 CAPSULE BY MOUTH EVERY DAY     riTUXimab 500 MG/50ML SOLN Inject 1,000 mg into the vein     rosuvastatin (CRESTOR) 10 MG tablet TAKE 1 TABLET BY MOUTH EVERY DAY     sildenafil (VIAGRA) 100 MG tablet 1/2 tab 1/2 hour prior to activity     vitamin C (ASCORBIC ACID) 1000 MG TABS Take 1,000 mg by mouth daily     VITAMIN D PO      Current Facility-Administered Medications   Medication     cilgavimab 300 mg/tixagevimab 300 mg (EVUSHELD) - FULL DOSE *FOR CLINIC USE ONLY*         Social History   See HPI    Family History     Family History   Problem Relation Age of Onset     Arthritis Mother      Cardiovascular Mother      Depression Mother      Gastrointestinal Disease Mother         IBS     Hypertension Mother      Circulatory Mother         Aortal aneurysm     Osteoporosis Mother      Allergies Father         Cortisone     Cardiovascular Father      Circulatory Father      Diabetes Father      Hypertension Father      Lipids Father      Alzheimer Disease Paternal Grandfather      Blood Disease Paternal Grandmother         Clotting problems     Blood Disease Son         Factor 5     Hypertension Maternal Aunt      Cerebrovascular Disease Maternal Grandmother        Physical Exam     Temp Readings from Last 3 Encounters:   01/13/23 96.8  F (36  C) (Temporal)   08/16/22 98.2  F (36.8  C) (Temporal)   05/04/22 97.9  F (36.6  C) (Temporal)     BP Readings  "from Last 5 Encounters:   01/23/23 (!) 149/90   01/13/23 122/72   09/08/22 124/72   08/16/22 120/82   07/25/22 134/88     Pulse Readings from Last 1 Encounters:   01/23/23 57     Resp Readings from Last 1 Encounters:   01/13/23 16     Estimated body mass index is 28.91 kg/m  as calculated from the following:    Height as of 1/13/23: 1.778 m (5' 10\").    Weight as of 1/23/23: 91.4 kg (201 lb 8 oz).    GEN: NAD. Healthy appearing adult.   HEENT:  Anicteric, noninjected sclera. No obvious external lesions of the ear and nose. Hearing intact.  PULM: No increased work of breathing  MSK:  Hands and wrists without swelling.  No difficulty making a fist with his hands.  SKIN: No rash or jaundice seen  PSYCH: Alert. Appropriate.     Labs / Imaging (select studies)     CBC  Recent Labs   Lab Test 05/17/23  0828 01/23/23  1253 07/28/22  0838 05/26/22  0855 11/17/21  0944 06/25/21  0841 08/27/20  0824 05/28/20  0927 02/10/20  0922 11/12/19  0748 10/15/19  0827   WBC 4.6 5.0 3.3* 3.5*   < > 4.1 3.5* 3.6* 4.1   < > 4.8   RBC 4.32* 4.44 4.31* 4.38*   < > 4.29* 4.21* 4.29* 4.21*   < > 4.26*   HGB 14.5 14.8 14.9 14.8   < > 14.3 13.8 13.9 13.7   < > 13.7   HCT 42.2 43.3 42.1 42.7   < > 41.2 41.4 41.7 40.6   < > 41.1   MCV 98 98 98 98   < > 96 98 97 96   < > 97   RDW 13.0 12.4 12.6 12.9   < > 13.0 13.0 13.2 12.6   < > 13.5   * 122* 115* 104*   < > 129* 102* 117* 105*   < > 144*   MCH 33.6* 33.3* 34.6* 33.8*   < > 33.3* 32.8 32.4 32.5   < > 32.2   MCHC 34.4 34.2 35.4 34.7   < > 34.7 33.3 33.3 33.7   < > 33.3   NEUTROPHIL 64  --   --  61  --  65.7 61.9 59.2 54.9   < > 60.4   LYMPH 18  --   --  18  --  17.3 19.5 19.6 29.0   < > 24.4   MONOCYTE 13  --   --  15  --  12.7 13.0 14.9 13.5   < > 14.0   EOSINOPHIL 4  --   --  6  --  3.6 4.5 5.2 2.4   < > 0.8   BASOPHIL 1  --   --  0  --  0.5 0.8 0.8 0.2   < > 0.4   ANEU  --   --   --   --   --  2.7 2.2 2.2 2.3   < > 2.9   ALYM  --   --   --   --   --  0.7* 0.7* 0.7* 1.2   < > 1.2   KIESHA  " --   --   --   --   --  0.5 0.5 0.5 0.6   < > 0.7   AEOS  --   --   --   --   --  0.2  --   --  0.1  --  0.0   ABAS  --   --   --   --   --  0.0 0.0 0.0 0.0   < > 0.0   ANEUTAUTO 2.9  --   --  2.1  --   --   --   --   --   --   --    ALYMPAUTO 0.8  --   --  0.6*  --   --   --   --   --   --   --    AMONOAUTO 0.6  --   --  0.5  --   --   --   --   --   --   --    AEOSAUTO 0.2  --   --  0.2  --   --   --   --   --   --   --    ABSBASO 0.0  --   --  0.0  --   --   --   --   --   --   --     < > = values in this interval not displayed.     [facility]  Recent Labs   Lab Test 05/17/23  0828 01/23/23  1253 10/14/22  0831 08/16/22  0844 07/28/22  0838 05/26/22  0855 11/17/21  0943 07/08/21  0958 06/25/21  0841 03/29/21  1013 02/16/21  0947 08/27/20  0824   NA  --  140  --   --  141  --  140  --   --   --  141  --    POTASSIUM  --  4.1  --   --  3.9  --  4.5  --  4.2   < > 4.4  --    CHLORIDE  --  102  --   --  108  --  107  --   --   --  106  --    CO2  --  28  --   --  31  --  32  --   --   --  32  --    ANIONGAP  --  10  --   --  2*  --  1*  --   --   --  3  --    GLC  --  133*  --   --  127*  --  98  --   --   --  119*  --    BUN  --  18.3  --   --  17  --  13  --   --   --  15  --    CR 0.93 0.89  --   --  0.79   < > 0.87  --  0.96  --  0.84 0.83   GFRESTIMATED 88 >90  --   --  >90   < > 88  --  80  --  90 >90   GFRESTBLACK  --   --   --   --   --   --   --   --  >90  --  >90 >90   ANDREZ  --  9.5  --   --  9.2  --  9.2  --   --   --  9.4  --    BILITOTAL 1.1 1.0  --   --  1.2   < > 0.8  --  1.0  --   --  1.2   ALBUMIN 4.3 4.5  --   --  3.8   < > 3.3*  --  3.8  --   --  3.6   PROTTOTAL 6.8 7.2  --   --  7.0   < > 7.2  --  7.0  --   --  7.2   ALKPHOS 51 57  --   --  53   < > 60  --  63  --   --  71   AST 30 41 50*   < > 50*   < > 59*   < > 49*  --   --  30   ALT 64* 75* 123*   < > 126*   < > 138*   < > 119*  --   --  47    < > = values in this interval not displayed.     Calcium/VitaminD  Recent Labs   Lab Test 01/23/23  4020  07/28/22  0838 11/17/21  0943 11/06/18  0913 06/26/18  0955   ANDREZ 9.5 9.2 9.2   < >  --    VITDT  --   --   --   --  25    < > = values in this interval not displayed.     ESR/CRP  Recent Labs   Lab Test 05/17/23  0828 05/26/22  0855 06/25/21  0841 08/27/20  0824   SED 8 8 10 9   CRP  --  <2.9 <2.9 <2.9   CRPI <3.00  --   --   --      Lipid Panel  Recent Labs   Lab Test 10/14/22  0831 08/16/22  0844 12/08/20  0911   CHOL 174 211* 155   TRIG 102 158* 129   HDL 58 46 55   LDL 96 133* 74   NHDL 116 165* 100     Hepatitis B  Recent Labs   Lab Test 05/26/22  0855 06/25/21  0841 03/03/20  1405   HBCAB Nonreactive Nonreactive Nonreactive   HEPBANG Nonreactive Nonreactive Nonreactive     Tuberculosis Screening  Recent Labs   Lab Test 05/17/23  0828 05/26/22  0855 06/25/21  0841 03/03/20  1405 03/20/18  1008   TBRES Negative Negative  --  Negative  --    TBRSLT  --   --   --   --  Negative   TBAGN  --   --   --   --  0.14   TBRST  --   --  Negative  --   --        Immunization History     Immunization History   Administered Date(s) Administered     COVID-19 Bivalent 18+ (Moderna) 10/31/2022     COVID-19 Monovalent 18+ (Moderna) 02/26/2021, 03/25/2021, 01/03/2022, 05/26/2022     Flu, Unspecified 02/01/2011, 10/19/2011     HepB, Unspecified 08/19/1991, 09/23/1991, 02/24/1992     Influenza (High Dose) 3 valent vaccine 10/06/2017, 10/03/2018, 10/23/2019     Influenza (IIV3) PF 10/01/2009, 10/27/2010, 10/11/2011     Influenza Vaccine 50-64 or 18-64 w/egg allergy (Flublok) 10/27/2021     Influenza Vaccine 65+ (Fluzone HD) 09/02/2020     Influenza Vaccine >6 months (Alfuria,Fluzone) 10/09/2013, 10/23/2014, 09/18/2015, 11/15/2016     Pneumo Conj 13-V (2010&after) 10/06/2017     Pneumococcal 23 valent 10/23/2014, 10/23/2019     TD,PF 7+ (Tenivac) 09/05/2001     TDAP Vaccine (Boostrix) 10/18/2012     Zoster recombinant adjuvanted (SHINGRIX) 01/31/2019, 04/18/2019     Zoster vaccine, live 10/23/2014          Chart documentation done in  part with Dragon Voice recognition Software. Although reviewed after completion, some word and grammatical error may remain.      Video-Visit Details    Type of service:  Video Visit    Video Start Time: 10:45 AM    Video End Time: 11:00 AM    Originating Location (pt. Location): Home, [place]    Distant Location (provider location):  Off site, [place]    Platform used for Video Visit: Mariza Gomes MD

## 2023-05-19 NOTE — PATIENT INSTRUCTIONS
RHEUMATOLOGY    [facility]  [address]  [place] [address]    Phone number: [phone]  Fax number: [phone]      Thank you for choosing [facility]!    Jaquelin Muñiz CMA

## 2023-05-23 DIAGNOSIS — R19.5 LOOSE STOOLS: ICD-10-CM

## 2023-05-23 RX ORDER — CHOLESTYRAMINE LIGHT 4 G/5.7G
POWDER, FOR SUSPENSION ORAL
Qty: 231 G | Refills: 12 | Status: SHIPPED | OUTPATIENT
Start: 2023-05-23 | End: 2024-03-26

## 2023-05-24 ENCOUNTER — TRANSFERRED RECORDS (OUTPATIENT)
Dept: HEALTH INFORMATION MANAGEMENT | Facility: CLINIC | Age: 73
End: 2023-05-24

## 2023-06-10 ENCOUNTER — HEALTH MAINTENANCE LETTER (OUTPATIENT)
Age: 73
End: 2023-06-10

## 2023-07-14 ENCOUNTER — APPOINTMENT (OUTPATIENT)
Dept: LAB | Facility: CLINIC | Age: 73
End: 2023-07-14
Payer: COMMERCIAL

## 2023-07-14 ENCOUNTER — INFUSION THERAPY VISIT (OUTPATIENT)
Dept: INFUSION THERAPY | Facility: CLINIC | Age: 73
End: 2023-07-14
Attending: INTERNAL MEDICINE
Payer: MEDICARE

## 2023-07-14 VITALS
TEMPERATURE: 97.8 F | WEIGHT: 201.9 LBS | SYSTOLIC BLOOD PRESSURE: 153 MMHG | RESPIRATION RATE: 16 BRPM | HEART RATE: 47 BPM | DIASTOLIC BLOOD PRESSURE: 79 MMHG | OXYGEN SATURATION: 97 % | BODY MASS INDEX: 28.97 KG/M2

## 2023-07-14 DIAGNOSIS — M05.79 RHEUMATOID ARTHRITIS INVOLVING MULTIPLE SITES WITH POSITIVE RHEUMATOID FACTOR (H): Primary | ICD-10-CM

## 2023-07-14 LAB
CD19 B CELL COMMENT: ABNORMAL
CD19 CELLS # BLD: 41 CELLS/UL (ref 107–698)
CD19 CELLS NFR BLD: 4 % (ref 6–27)

## 2023-07-14 PROCEDURE — 96413 CHEMO IV INFUSION 1 HR: CPT

## 2023-07-14 PROCEDURE — 96375 TX/PRO/DX INJ NEW DRUG ADDON: CPT

## 2023-07-14 PROCEDURE — 250N000011 HC RX IP 250 OP 636: Mod: JZ | Performed by: INTERNAL MEDICINE

## 2023-07-14 PROCEDURE — 36415 COLL VENOUS BLD VENIPUNCTURE: CPT | Performed by: INTERNAL MEDICINE

## 2023-07-14 PROCEDURE — 250N000013 HC RX MED GY IP 250 OP 250 PS 637: Performed by: INTERNAL MEDICINE

## 2023-07-14 PROCEDURE — 258N000003 HC RX IP 258 OP 636: Performed by: INTERNAL MEDICINE

## 2023-07-14 PROCEDURE — 96415 CHEMO IV INFUSION ADDL HR: CPT

## 2023-07-14 PROCEDURE — 82784 ASSAY IGA/IGD/IGG/IGM EACH: CPT | Performed by: INTERNAL MEDICINE

## 2023-07-14 PROCEDURE — 86355 B CELLS TOTAL COUNT: CPT | Performed by: INTERNAL MEDICINE

## 2023-07-14 RX ORDER — HEPARIN SODIUM,PORCINE 10 UNIT/ML
5 VIAL (ML) INTRAVENOUS
Status: CANCELLED | OUTPATIENT
Start: 2024-01-10

## 2023-07-14 RX ORDER — METHYLPREDNISOLONE SODIUM SUCCINATE 125 MG/2ML
125 INJECTION, POWDER, LYOPHILIZED, FOR SOLUTION INTRAMUSCULAR; INTRAVENOUS
Status: CANCELLED
Start: 2024-01-10

## 2023-07-14 RX ORDER — DIPHENHYDRAMINE HCL 25 MG
50 CAPSULE ORAL ONCE
Status: COMPLETED | OUTPATIENT
Start: 2023-07-14 | End: 2023-07-14

## 2023-07-14 RX ORDER — METHYLPREDNISOLONE SODIUM SUCCINATE 125 MG/2ML
125 INJECTION, POWDER, LYOPHILIZED, FOR SOLUTION INTRAMUSCULAR; INTRAVENOUS ONCE
Status: COMPLETED | OUTPATIENT
Start: 2023-07-14 | End: 2023-07-14

## 2023-07-14 RX ORDER — DIPHENHYDRAMINE HCL 25 MG
50 CAPSULE ORAL ONCE
Status: CANCELLED
Start: 2024-01-10

## 2023-07-14 RX ORDER — ALBUTEROL SULFATE 90 UG/1
1-2 AEROSOL, METERED RESPIRATORY (INHALATION)
Status: CANCELLED
Start: 2024-01-10

## 2023-07-14 RX ORDER — METHYLPREDNISOLONE SODIUM SUCCINATE 125 MG/2ML
125 INJECTION, POWDER, LYOPHILIZED, FOR SOLUTION INTRAMUSCULAR; INTRAVENOUS ONCE
Status: CANCELLED | OUTPATIENT
Start: 2024-01-10

## 2023-07-14 RX ORDER — MEPERIDINE HYDROCHLORIDE 25 MG/ML
25 INJECTION INTRAMUSCULAR; INTRAVENOUS; SUBCUTANEOUS EVERY 30 MIN PRN
Status: CANCELLED | OUTPATIENT
Start: 2024-01-10

## 2023-07-14 RX ORDER — EPINEPHRINE 1 MG/ML
0.3 INJECTION, SOLUTION INTRAMUSCULAR; SUBCUTANEOUS EVERY 5 MIN PRN
Status: CANCELLED | OUTPATIENT
Start: 2024-01-10

## 2023-07-14 RX ORDER — ALBUTEROL SULFATE 0.83 MG/ML
2.5 SOLUTION RESPIRATORY (INHALATION)
Status: CANCELLED | OUTPATIENT
Start: 2024-01-10

## 2023-07-14 RX ORDER — DIPHENHYDRAMINE HYDROCHLORIDE 50 MG/ML
50 INJECTION INTRAMUSCULAR; INTRAVENOUS
Status: CANCELLED
Start: 2024-01-10

## 2023-07-14 RX ORDER — ACETAMINOPHEN 325 MG/1
650 TABLET ORAL ONCE
Status: CANCELLED
Start: 2024-01-10

## 2023-07-14 RX ORDER — HEPARIN SODIUM (PORCINE) LOCK FLUSH IV SOLN 100 UNIT/ML 100 UNIT/ML
5 SOLUTION INTRAVENOUS
Status: CANCELLED | OUTPATIENT
Start: 2024-01-10

## 2023-07-14 RX ORDER — ACETAMINOPHEN 325 MG/1
650 TABLET ORAL ONCE
Status: COMPLETED | OUTPATIENT
Start: 2023-07-14 | End: 2023-07-14

## 2023-07-14 RX ADMIN — DIPHENHYDRAMINE HYDROCHLORIDE 50 MG: 25 CAPSULE ORAL at 08:38

## 2023-07-14 RX ADMIN — METHYLPREDNISOLONE SODIUM SUCCINATE 125 MG: 125 INJECTION, POWDER, FOR SOLUTION INTRAMUSCULAR; INTRAVENOUS at 08:54

## 2023-07-14 RX ADMIN — SODIUM CHLORIDE 250 ML: 9 INJECTION, SOLUTION INTRAVENOUS at 08:51

## 2023-07-14 RX ADMIN — ACETAMINOPHEN 650 MG: 325 TABLET, FILM COATED ORAL at 08:37

## 2023-07-14 RX ADMIN — RITUXIMAB-ABBS 500 MG: 10 INJECTION, SOLUTION INTRAVENOUS at 09:26

## 2023-07-14 ASSESSMENT — PAIN SCALES - GENERAL: PAINLEVEL: NO PAIN (0)

## 2023-07-14 NOTE — PROGRESS NOTES
Infusion Nursing Note:  Camilo Baca presents today for Rapid Rituxan.    Patient seen by provider today: No   present during visit today: Not Applicable.    Note:Patient doing well. Has no complaints. Achy at times, but able to do activities he wants.       Intravenous Access:  Peripheral IV placed.    Treatment Conditions:  Biological Infusion Checklist:  ~~~ NOTE: If the patient answers yes to any of the questions below, hold the infusion and contact ordering provider or on-call provider.    Have you recently had an elevated temperature, fever, chills, productive cough, coughing for 3 weeks or longer or hemoptysis,  abnormal vital signs, night sweats,  chest pain or have you noticed a decrease in your appetite, unexplained weight loss or fatigue? No  Do you have any open wounds or new incisions? No  Do you have any upcoming hospitalizations or surgeries? Does not include esophagogastroduodenoscopy, colonoscopy, endoscopic retrograde cholangiopancreatography (ERCP), endoscopic ultrasound (EUS), dental procedures or joint aspiration/steroid injections No  Do you currently have any signs of illness or infection or are you on any antibiotics? No  Have you had any new, sudden or worsening abdominal pain? No  Have you or anyone in your household received a live vaccination in the past 4 weeks? Please note: No live vaccines while on biologic/chemotherapy until 6 months after the last treatment. Patient can receive the flu vaccine (shot only), pneumovax and the Covid vaccine. It is optimal for the patient to get these vaccines mid cycle, but they can be given at any time as long as it is not on the day of the infusion. No  Have you recently been diagnosed with any new nervous system diseases (ie. Multiple sclerosis, Guillain [place], seizures, neurological changes) or cancer diagnosis? Are you on any form of radiation or chemotherapy? No  Are you pregnant or breast feeding or do you have plans of pregnancy in  the future? No  Have you been having any signs of worsening depression or suicidal ideations?  (benlysta only) No  Have there been any other new onset medical symptoms? No  Have you had any new blood clots? (IVIG only) No      Post Infusion Assessment:  Patient tolerated infusion without incident.  Patient observed for 15 minutes post truxima per protocol.  Site patent and intact, free from redness, edema or discomfort.  Access discontinued per protocol.   POST-INFUSION OF BIOLOGICAL MEDICATION:    Reviewed with patient.  Given biologic medication or medication hand-out. Inform patient if any fever, chills or signs of infection, new symptoms, abdominal pain, heart palpitations, shortness of breath, reaction, weakness, neurological changes, seek medical attention immediately and should not receive infusions. No live virus vaccines prior to or during treatment or up to 6 months post infusion. If the patient has an upcoming procedure or surgery, this should be discussed with the rheumatologist and surgeon or provider.     Discharge Plan:   Patient discharged in stable condition accompanied by: self and wife.  Departure Mode: Ambulatory.  Return  in 6 months.      Charo Muñiz RN

## 2023-07-17 LAB
IGA SERPL-MCNC: 265 MG/DL (ref 84–499)
IGG SERPL-MCNC: 666 MG/DL (ref 610–1616)
IGM SERPL-MCNC: 30 MG/DL (ref 35–242)

## 2023-07-21 ENCOUNTER — TRANSFERRED RECORDS (OUTPATIENT)
Dept: HEALTH INFORMATION MANAGEMENT | Facility: CLINIC | Age: 73
End: 2023-07-21
Payer: COMMERCIAL

## 2023-07-24 DIAGNOSIS — E78.2 MIXED HYPERLIPIDEMIA: ICD-10-CM

## 2023-07-24 RX ORDER — ROSUVASTATIN CALCIUM 10 MG/1
TABLET, COATED ORAL
Qty: 90 TABLET | Refills: 0 | Status: SHIPPED | OUTPATIENT
Start: 2023-07-24 | End: 2023-08-30

## 2023-08-30 ENCOUNTER — OFFICE VISIT (OUTPATIENT)
Dept: FAMILY MEDICINE | Facility: CLINIC | Age: 73
End: 2023-08-30
Payer: COMMERCIAL

## 2023-08-30 VITALS
DIASTOLIC BLOOD PRESSURE: 84 MMHG | RESPIRATION RATE: 12 BRPM | WEIGHT: 202 LBS | HEIGHT: 70 IN | SYSTOLIC BLOOD PRESSURE: 124 MMHG | TEMPERATURE: 97.3 F | BODY MASS INDEX: 28.92 KG/M2 | OXYGEN SATURATION: 98 % | HEART RATE: 52 BPM

## 2023-08-30 DIAGNOSIS — K21.9 GASTROESOPHAGEAL REFLUX DISEASE WITHOUT ESOPHAGITIS: ICD-10-CM

## 2023-08-30 DIAGNOSIS — E03.4 HYPOTHYROIDISM DUE TO ACQUIRED ATROPHY OF THYROID: ICD-10-CM

## 2023-08-30 DIAGNOSIS — E78.2 MIXED HYPERLIPIDEMIA: ICD-10-CM

## 2023-08-30 DIAGNOSIS — D84.9 IMMUNOCOMPROMISED (H): ICD-10-CM

## 2023-08-30 DIAGNOSIS — Z00.00 ENCOUNTER FOR MEDICARE ANNUAL WELLNESS EXAM: ICD-10-CM

## 2023-08-30 DIAGNOSIS — D68.51 FACTOR 5 LEIDEN MUTATION, HETEROZYGOUS (H): ICD-10-CM

## 2023-08-30 DIAGNOSIS — E78.5 HYPERLIPIDEMIA LDL GOAL <130: ICD-10-CM

## 2023-08-30 DIAGNOSIS — Z00.00 ENCOUNTER FOR ROUTINE HISTORY AND PHYSICAL EXAM FOR MALE: Primary | ICD-10-CM

## 2023-08-30 DIAGNOSIS — I10 HYPERTENSION GOAL BP (BLOOD PRESSURE) < 140/90: ICD-10-CM

## 2023-08-30 LAB
ALT SERPL W P-5'-P-CCNC: 64 U/L (ref 0–70)
CHOLEST SERPL-MCNC: 194 MG/DL
HDLC SERPL-MCNC: 55 MG/DL
LDLC SERPL CALC-MCNC: 115 MG/DL
NONHDLC SERPL-MCNC: 139 MG/DL
T4 FREE SERPL-MCNC: 1.22 NG/DL (ref 0.9–1.7)
TRIGL SERPL-MCNC: 119 MG/DL
TSH SERPL DL<=0.005 MIU/L-ACNC: 4.83 UIU/ML (ref 0.3–4.2)

## 2023-08-30 PROCEDURE — 80061 LIPID PANEL: CPT | Performed by: FAMILY MEDICINE

## 2023-08-30 PROCEDURE — 36415 COLL VENOUS BLD VENIPUNCTURE: CPT | Performed by: FAMILY MEDICINE

## 2023-08-30 PROCEDURE — 84443 ASSAY THYROID STIM HORMONE: CPT | Performed by: FAMILY MEDICINE

## 2023-08-30 PROCEDURE — 84439 ASSAY OF FREE THYROXINE: CPT | Performed by: FAMILY MEDICINE

## 2023-08-30 PROCEDURE — G0439 PPPS, SUBSEQ VISIT: HCPCS | Performed by: FAMILY MEDICINE

## 2023-08-30 PROCEDURE — 84460 ALANINE AMINO (ALT) (SGPT): CPT | Performed by: FAMILY MEDICINE

## 2023-08-30 RX ORDER — ROSUVASTATIN CALCIUM 10 MG/1
10 TABLET, COATED ORAL DAILY
Qty: 90 TABLET | Refills: 3 | Status: SHIPPED | OUTPATIENT
Start: 2023-08-30 | End: 2024-08-26

## 2023-08-30 ASSESSMENT — ENCOUNTER SYMPTOMS
COUGH: 0
EYE PAIN: 0
SHORTNESS OF BREATH: 0
PARESTHESIAS: 0
NERVOUS/ANXIOUS: 0
ABDOMINAL PAIN: 0
HEMATURIA: 0
CONSTIPATION: 0
DIZZINESS: 0
ARTHRALGIAS: 1
JOINT SWELLING: 0
HEARTBURN: 0
HEADACHES: 0
FEVER: 0
WEAKNESS: 0
FREQUENCY: 0
PALPITATIONS: 0
DIARRHEA: 0
SORE THROAT: 0
DYSURIA: 0
MYALGIAS: 0
CHILLS: 0
NAUSEA: 0
HEMATOCHEZIA: 0

## 2023-08-30 ASSESSMENT — ACTIVITIES OF DAILY LIVING (ADL): CURRENT_FUNCTION: NO ASSISTANCE NEEDED

## 2023-08-30 NOTE — PATIENT INSTRUCTIONS
"Patient Education   Personalized Prevention Plan  You are due for the preventive services outlined below.  Your care team is available to assist you in scheduling these services.  If you have already completed any of these items, please share that information with your care team to update in your medical record.  Health Maintenance Due   Topic Date Due     Kidney Microalbumin Urine Test  05/26/2023     Learning About Being Physically Active  What is physical activity?     Being physically active means doing any kind of activity that gets your body moving.  The types of physical activity that can help you get fit and stay healthy include:  Aerobic or \"cardio\" activities. These make your heart beat faster and make you breathe harder, such as brisk walking, riding a bike, or running. They strengthen your heart and lungs and build up your endurance.  Strength training activities. These make your muscles work against, or \"resist,\" something. Examples include lifting weights or doing push-ups. These activities help tone and strengthen your muscles and bones.  Stretches. These let you move your joints and muscles through their full range of motion. Stretching helps you be more flexible.  Reaching a balance between these three types of physical activity is important because each one contributes to your overall fitness.  What are the benefits of being active?  Being active is one of the best things you can do for your health. It helps you to:  Feel stronger and have more energy to do all the things you like to do.  Focus better at school or work.  Feel, think, and sleep better.  Reach and stay at a healthy weight.  Lose fat and build lean muscle.  Lower your risk for serious health problems, including diabetes, heart attack, high blood pressure, and some cancers.  Keep your heart, lungs, bones, muscles, and joints strong and healthy.  How can you make being active part of your life?  Start slowly. Make it your long-term goal " "to get at least 30 minutes of exercise on most days of the week. Walking is a good choice. You also may want to do other activities, such as running, swimming, cycling, or playing tennis or team sports.  Pick activities that you like--ones that make your heart beat faster, your muscles stronger, and your muscles and joints more flexible. If you find more than one thing you like doing, do them all. You don't have to do the same thing every day.  Get your heart pumping every day. Any activity that makes your heart beat faster and keeps it at that rate for a while counts.  Here are some great ways to get your heart beating faster:  Go for a brisk walk, run, or bike ride.  Go for a hike or swim.  Go in-line skating.  Play a game of touch football, basketball, or soccer.  Ride a bike.  Play tennis or racquetball.  Climb stairs.  Even some household chores can be aerobic--just do them at a faster pace. Vacuuming, raking or mowing the lawn, sweeping the garage, and washing and waxing the car all can help get your heart rate up.  Strengthen your muscles during the week. You don't have to lift heavy weights or grow big, bulky muscles to get stronger. Doing a few simple activities that make your muscles work against, or \"resist,\" something can help you get stronger.  For example, you can:  Do push-ups or sit-ups, which use your own body weight as resistance.  Lift weights or dumbbells or use stretch bands at home or in a gym or community center.  Stretch your muscles often. Stretching will help you as you become more active. It can help you stay flexible, loosen tight muscles, and avoid injury. It can also help improve your balance and posture and can be a great way to relax.  Be sure to stretch the muscles you'll be using when you work out. It's best to warm your muscles slightly before you stretch them. Walk or do some other light aerobic activity for a few minutes, and then start stretching.  When you stretch your " "muscles:  Do it slowly. Stretching is not about going fast or making sudden movements.  Don't push or bounce during a stretch.  Hold each stretch for at least 15 to 30 seconds, if you can. You should feel a stretch in the muscle, but not pain.  Breathe out as you do the stretch. Then breathe in as you hold the stretch. Don't hold your breath.  If you're worried about how more activity might affect your health, have a checkup before you start. Follow any special advice your doctor gives you for getting a smart start.  Where can you learn more?  Go to https://www.[employer].net/patiented  Enter W332 in the search box to learn more about \"Learning About Being Physically Active.\"  Current as of: October 10, 2022               Content Version: 13.7    9313-1979 [employer].   Care instructions adapted under license by your healthcare professional. If you have questions about a medical condition or this instruction, always ask your healthcare professional. [employer] disclaims any warranty or liability for your use of this information.      Hearing Loss: Care Instructions  Overview     Hearing loss is a sudden or slow decrease in how well you hear. It can range from slight to profound. Permanent hearing loss can occur with aging. It also can happen when you are exposed long-term to loud noise. Examples include listening to loud music, riding motorcycles, or being around other loud machines.  Hearing loss can affect your work and home life. It can make you feel lonely or depressed. You may feel that you have lost your independence. But hearing aids and other devices can help you hear better and feel connected to others.  Follow-up care is a key part of your treatment and safety. Be sure to make and go to all appointments, and call your doctor if you are having problems. It's also a good idea to know your test results and keep a list of the medicines you take.  How can you care for yourself at " home?  Avoid loud noises whenever possible. This helps keep your hearing from getting worse.  Always wear hearing protection around loud noises.  Wear a hearing aid as directed.  A professional can help you pick a hearing aid that will work best for you.  You can also get hearing aids over the counter for mild to moderate hearing loss.  Have hearing tests as your doctor suggests. They can show whether your hearing has changed. Your hearing aid may need to be adjusted.  Use other devices as needed. These may include:  Telephone amplifiers and hearing aids that can connect to a television, stereo, radio, or microphone.  Devices that use lights or vibrations. These alert you to the doorbell, a ringing telephone, or a baby monitor.  Television closed-captioning. This shows the words at the bottom of the screen. Most new TVs can do this.  TTY (text telephone). This lets you type messages back and forth on the telephone instead of talking or listening. These devices are also called TDD. When messages are typed on the keyboard, they are sent over the phone line to a receiving TTY. The message is shown on a monitor.  Use text messaging, social media, and email if it is hard for you to communicate by telephone.  Try to learn a listening technique called speechreading. It is not lipreading. You pay attention to people's gestures, expressions, posture, and tone of voice. These clues can help you understand what a person is saying. Face the person you are talking to, and have them face you. Make sure the lighting is good. You need to see the other person's face clearly.  Think about counseling if you need help to adjust to your hearing loss.  When should you call for help?  Watch closely for changes in your health, and be sure to contact your doctor if:    You think your hearing is getting worse.     You have new symptoms, such as dizziness or nausea.   Where can you learn more?  Go to  "https://www.[employer].net/patiented  Enter R798 in the search box to learn more about \"Hearing Loss: Care Instructions.\"  Current as of: March 1, 2023               Content Version: 13.7 2006-2023 [employer].   Care instructions adapted under license by your healthcare professional. If you have questions about a medical condition or this instruction, always ask your healthcare professional. [employer], [employer] disclaims any warranty or liability for your use of this information.         "

## 2023-08-30 NOTE — PROGRESS NOTES
"SUBJECTIVE:   Camilo is a 73 year old who presents for Preventive Visit.      8/30/2023     7:58 AM   Additional Questions   Roomed by Carla boyce       Are you in the first 12 months of your Medicare coverage?  No    Healthy Habits:     In general, how would you rate your overall health?  Good    Frequency of exercise:  None    Do you usually eat at least 4 servings of fruit and vegetables a day, include whole grains    & fiber and avoid regularly eating high fat or \"junk\" foods?  Yes    Taking medications regularly:  Yes    Barriers to taking medications:  None    Medication side effects:  None    Ability to successfully perform activities of daily living:  No assistance needed    Home Safety:  No safety concerns identified    Hearing Impairment:  Difficulty following a conversation in a noisy restaurant or crowded room, need to ask people to speak up or repeat themselves and difficulty understanding soft or whispered speech    In the past 6 months, have you been bothered by leaking of urine?  No    In general, how would you rate your overall mental or emotional health?  Excellent    Additional concerns today:  No        Have you ever done Advance Care Planning? (For example, a Health Directive, POLST, or a discussion with a medical provider or your loved ones about your wishes): Yes, advance care planning is on file.       Fall risk  Fallen 2 or more times in the past year?: No  Any fall with injury in the past year?: No    Cognitive Screening   1) Repeat 3 items (Leader, Season, Table)    2) Clock draw: NORMAL  3) 3 item recall: Recalls 3 objects  Results: 3 items recalled: COGNITIVE IMPAIRMENT LESS LIKELY    Mini-CogTM Copyright YAMIL Reyez. Licensed by the author for use in [facility]; reprinted with permission (augusto@.[facility]). All rights reserved.      Do you have sleep apnea, excessive snoring or daytime drowsiness? : no    Reviewed and updated as needed this visit by clinical staff   Tobacco  " Allergies  Meds              Reviewed and updated as needed this visit by Provider                 Social History     Tobacco Use     Smoking status: Former     Packs/day: 0.25     Years: 2.00     Pack years: 0.50     Types: Cigarettes, Pipe     Quit date: 1979     Years since quittin.6     Smokeless tobacco: Never   Substance Use Topics     Alcohol use: Yes     Comment: 5 drinks per week / wine             2023     7:49 AM   Alcohol Use   Prescreen: >3 drinks/day or >7 drinks/week? Yes   AUDIT SCORE  3     Do you have a current opioid prescription? No  Do you use any other controlled substances or medications that are not prescribed by a provider? None              Current providers sharing in care for this patient include:   Patient Care Team:  Akash Mariee MD as PCP - General (Family Medicine)  Akash Mariee MD as Assigned PCP  Gem Garay MD as MD (Gastroenterology)  Gem Garay MD as Assigned Gastroenterology Provider  Fabricio Gomes MD as Assigned Rheumatology Provider  Camilo Robles MD as Assigned Heart and Vascular Provider  Reina Ocasio MD as MD (Gastroenterology)    The following health maintenance items are reviewed in Epic and correct as of today:  Health Maintenance   Topic Date Due     MICROALBUMIN  2023     INFLUENZA VACCINE (1) 2023     MEDICARE ANNUAL WELLNESS VISIT  2024     LIPID  2024     TSH W/FREE T4 REFLEX  2024     ANNUAL REVIEW OF HM ORDERS  2024     FALL RISK ASSESSMENT  2024     COLORECTAL CANCER SCREENING  2026     ADVANCE CARE PLANNING  2028     DTAP/TDAP/TD IMMUNIZATION (3 - Td or Tdap) 2032     HEPATITIS C SCREENING  Completed     PHQ-2 (once per calendar year)  Completed     Pneumococcal Vaccine: 65+ Years  Completed     ZOSTER IMMUNIZATION  Completed     AORTIC ANEURYSM SCREENING (SYSTEM ASSIGNED)  Completed     COVID-19 Vaccine  Completed     IPV  IMMUNIZATION  Aged Out     HPV IMMUNIZATION  Aged Out     MENINGITIS IMMUNIZATION  Aged Out     BP Readings from Last 3 Encounters:   08/30/23 124/84   07/14/23 (!) 153/79   01/23/23 (!) 149/90    Wt Readings from Last 3 Encounters:   08/30/23 91.6 kg (202 lb)   07/14/23 91.6 kg (201 lb 14.4 oz)   01/23/23 91.4 kg (201 lb 8 oz)                  Patient Active Problem List   Diagnosis     Lumbago     Mixed hyperlipidemia     NONSPECIFIC MEDICAL HISTORY     HYPERLIPIDEMIA LDL GOAL <130     History of adenomatous polyp of colon     Idiopathic chronic gout without tophus, unspecified site     Advanced directives, counseling/discussion     Seropositive rheumatoid arthritis (H)     High risk medication use     Thrombocytopenia (H)     Gastroesophageal reflux disease without esophagitis     Factor 5 Leiden mutation, heterozygous (H)     Personal history of venous thrombosis and embolism     Personal history of DVT (deep vein thrombosis)     Hypothyroidism due to acquired atrophy of thyroid     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)     Secondary hypertension with goal blood pressure less than 140/90     Rheumatoid arthritis involving multiple sites with positive rheumatoid factor (H)     Gout     Sensorineural hearing loss (SNHL)     Tinnitus     Immunocompromised (H)     Past Surgical History:   Procedure Laterality Date     COLONOSCOPY  11/15/2010    COLONOSCOPY performed by DARIUS MESA at  GI     COLONOSCOPY N/A 11/2/2015    Procedure: COLONOSCOPY;  Surgeon: Bubba Odom MD;  Location:  GI     COLONOSCOPY N/A 3/19/2021    Procedure: Colonoscopy;  Surgeon: Ludmila Beltran MD;  Location:  GI     ESOPHAGOSCOPY, GASTROSCOPY, DUODENOSCOPY (EGD), COMBINED N/A 10/24/2018    Procedure: Esophagogastroduodenoscopy Multiple Biopsies by Biopsy;  Surgeon: Matteo Johnson DO;  Location:  GI     HC REPAIR ING HERNIA,5+Y/O,REDUCIBL  1960     HERNIORRHAPHY  UMBILICAL N/A 2015    Procedure: HERNIORRHAPHY UMBILICAL;  Surgeon: Rayray Avila MD;  Location: [facility] OR     LAPAROSCOPIC HERNIORRHAPHY INGUINAL BILATERAL Bilateral 2015    Procedure: LAPAROSCOPIC HERNIORRHAPHY INGUINAL BILATERAL;  Surgeon: Rayray Avila MD;  Location: [facility] OR     ZZC APPENDECTOMY       [facility] COLONOSCOPY W BIOPSY  08/10/05       Social History     Tobacco Use     Smoking status: Former     Packs/day: 0.25     Years: 2.00     Pack years: 0.50     Types: Cigarettes, Pipe     Quit date: 1979     Years since quittin.6     Smokeless tobacco: Never   Substance Use Topics     Alcohol use: Yes     Comment: 5 drinks per week / wine     Family History   Problem Relation Age of Onset     Arthritis Mother      Cardiovascular Mother      Depression Mother      Gastrointestinal Disease Mother         IBS     Hypertension Mother      Circulatory Mother         Aortal aneurysm     Osteoporosis Mother      Allergies Father         Cortisone     Cardiovascular Father      Circulatory Father      Diabetes Father      Hypertension Father      Lipids Father      Alzheimer Disease Paternal Grandfather      Blood Disease Paternal Grandmother         Clotting problems     Blood Disease Son         Factor 5     Hypertension Maternal Aunt      Cerebrovascular Disease Maternal Grandmother          Current Outpatient Medications   Medication Sig Dispense Refill     allopurinol (ZYLOPRIM) 300 MG tablet TAKE 1 TABLET BY MOUTH ONCE DAILY 90 tablet 3     cholestyramine light (PREVALITE) 4 GM powder DISSOLVE 1 LEVEL SCOOP IN LIQUID AND DRINK BY MOUTH TWICE DAILY AS DIRECTED 231 g 12     Cyanocobalamin (B-12) 100 MCG TABS        diclofenac (VOLTAREN) 1 % GEL Apply  2 grams to shoulders three times daily using enclosed dosing card. 100 g 1     levothyroxine (SYNTHROID/LEVOTHROID) 25 MCG tablet TAKE 1 TABLET (25 MCG) BY MOUTH DAILY 90 tablet 3     losartan-hydrochlorothiazide (HYZAAR) 100-12.5  "MG tablet TAKE 1 TABLET BY MOUTH DAILY 90 tablet 3     omeprazole (PRILOSEC) 20 MG DR capsule Take 1 capsule (20 mg) by mouth daily 90 capsule 3     riTUXimab 500 MG/50ML SOLN Inject 1,000 mg into the vein       rosuvastatin (CRESTOR) 10 MG tablet Take 1 tablet (10 mg) by mouth daily 90 tablet 3     sildenafil (VIAGRA) 100 MG tablet 1/2 tab 1/2 hour prior to activity 3 tablet 4     vitamin C (ASCORBIC ACID) 1000 MG TABS Take 1,000 mg by mouth daily       VITAMIN D PO                Review of Systems   Constitutional:  Negative for chills and fever.   HENT:  Negative for congestion, ear pain, hearing loss and sore throat.    Eyes:  Negative for pain and visual disturbance.   Respiratory:  Negative for cough and shortness of breath.    Cardiovascular:  Negative for chest pain, palpitations and peripheral edema.   Gastrointestinal:  Negative for abdominal pain, constipation, diarrhea, heartburn, hematochezia and nausea.   Genitourinary:  Negative for dysuria, frequency, genital sores, hematuria, impotence, penile discharge and urgency.   Musculoskeletal:  Positive for arthralgias. Negative for joint swelling and myalgias.   Skin:  Negative for rash.   Neurological:  Negative for dizziness, weakness, headaches and paresthesias.   Psychiatric/Behavioral:  Negative for mood changes. The patient is not nervous/anxious.          OBJECTIVE:   /84   Pulse 52   Temp 97.3  F (36.3  C)   Resp 12   Ht 1.778 m (5' 10\")   Wt 91.6 kg (202 lb)   SpO2 98%   BMI 28.98 kg/m   Estimated body mass index is 28.98 kg/m  as calculated from the following:    Height as of this encounter: 1.778 m (5' 10\").    Weight as of this encounter: 91.6 kg (202 lb).  Physical Exam  GENERAL: healthy, alert and no distress  EYES: Eyes grossly normal to inspection, PERRL and conjunctivae and sclerae normal  HENT: ear canals and TM's normal, nose and mouth without ulcers or lesions  NECK: no adenopathy, no asymmetry, masses, or scars and thyroid " normal to palpation  RESP: lungs clear to auscultation - no rales, rhonchi or wheezes  CV: regular rate and rhythm, normal S1 S2, no S3 or S4, no murmur, click or rub, no peripheral edema and peripheral pulses strong  ABDOMEN: soft, nontender, no hepatosplenomegaly, no masses and bowel sounds normal  MS: no gross musculoskeletal defects noted, no edema  NEURO: Normal strength and tone, mentation intact and speech normal  BACK: no CVA tenderness, no paralumbar tenderness  PSYCH: mentation appears normal, affect normal/bright    Diagnostic Test Results:  Labs reviewed in Epic    Results for orders placed or performed in visit on 08/30/23   TSH WITH FREE T4 REFLEX     Status: Abnormal   Result Value Ref Range    TSH 4.83 (H) 0.30 - 4.20 uIU/mL   Lipid panel reflex to direct LDL Fasting     Status: Abnormal   Result Value Ref Range    Cholesterol 194 <200 mg/dL    Triglycerides 119 <150 mg/dL    Direct Measure HDL 55 >=40 mg/dL    LDL Cholesterol Calculated 115 (H) <=100 mg/dL    Non HDL Cholesterol 139 (H) <130 mg/dL    Narrative    Cholesterol  Desirable:  <200 mg/dL    Triglycerides  Normal:  Less than 150 mg/dL  Borderline High:  150-199 mg/dL  High:  200-499 mg/dL  Very High:  Greater than or equal to 500 mg/dL    Direct Measure HDL  Female:  Greater than or equal to 50 mg/dL   Male:  Greater than or equal to 40 mg/dL    LDL Cholesterol  Desirable:  <100mg/dL  Above Desirable:  100-129 mg/dL   Borderline High:  130-159 mg/dL   High:  160-189 mg/dL   Very High:  >= 190 mg/dL    Non HDL Cholesterol  Desirable:  130 mg/dL  Above Desirable:  130-159 mg/dL  Borderline High:  160-189 mg/dL  High:  190-219 mg/dL  Very High:  Greater than or equal to 220 mg/dL   ALT     Status: Normal   Result Value Ref Range    ALT 64 0 - 70 U/L   T4 free     Status: Normal   Result Value Ref Range    Free T4 1.22 0.90 - 1.70 ng/dL       ASSESSMENT / PLAN:       ICD-10-CM    1. Encounter for routine history and physical exam for male   "Z00.00       2. Hypothyroidism due to acquired atrophy of thyroid  E03.4 TSH WITH FREE T4 REFLEX     TSH WITH FREE T4 REFLEX     T4 free      3. Hyperlipidemia LDL goal <130  E78.5 ALT     ALT      4. Gastroesophageal reflux disease without esophagitis  K21.9 omeprazole (PRILOSEC) 20 MG DR capsule      5. Mixed hyperlipidemia  E78.2 rosuvastatin (CRESTOR) 10 MG tablet     Lipid panel reflex to direct LDL Fasting     Lipid panel reflex to direct LDL Fasting      6. Hypertension goal BP (blood pressure) < 140/90  I10 Albumin Random Urine Quantitative with Creat Ratio      7. Factor 5 Leiden mutation, heterozygous (H)  D68.51       8. Immunocompromised (H)  D84.9       9. Encounter for Medicare annual wellness exam  Z00.00         Oratory studies are reasonable no change in medications at this time he will continue to follow with rheumatology.          COUNSELING:  Reviewed preventive health counseling, as reflected in patient instructions       Regular exercise       Healthy diet/nutrition      BMI:   Estimated body mass index is 28.98 kg/m  as calculated from the following:    Height as of this encounter: 1.778 m (5' 10\").    Weight as of this encounter: 91.6 kg (202 lb).   Weight management plan: Discussed healthy diet and exercise guidelines      He reports that he quit smoking about 44 years ago. His smoking use included cigarettes and pipe. He has a 0.50 pack-year smoking history. He has never used smokeless tobacco.      Appropriate preventive services were discussed with this patient, including applicable screening as appropriate for cardiovascular disease, diabetes, osteopenia/osteoporosis, and glaucoma.  As appropriate for age/gender, discussed screening for colorectal cancer, prostate cancer, breast cancer, and cervical cancer. Checklist reviewing preventive services available has been given to the patient.    Reviewed patients plan of care and provided an AVS. The Basic Care Plan (routine screening as " documented in Health Maintenance) for Camilo meets the Care Plan requirement. This Care Plan has been established and reviewed with the Patient.          Akash Mariee MD, MD  [facility]    Identified Health Risks:    He is at risk for lack of exercise and has been provided with information to increase physical activity for the benefit of his well-being.  The patient was provided with written information regarding signs of hearing loss.

## 2023-09-08 ENCOUNTER — HOSPITAL ENCOUNTER (OUTPATIENT)
Dept: CT IMAGING | Facility: CLINIC | Age: 73
Discharge: HOME | End: 2023-09-08
Attending: INTERNAL MEDICINE | Admitting: INTERNAL MEDICINE
Payer: MEDICARE

## 2023-09-08 DIAGNOSIS — E78.5 DYSLIPIDEMIA: ICD-10-CM

## 2023-09-08 DIAGNOSIS — I10 ESSENTIAL HYPERTENSION: ICD-10-CM

## 2023-09-08 DIAGNOSIS — I71.40 ABDOMINAL AORTIC ANEURYSM (AAA) WITHOUT RUPTURE (H): ICD-10-CM

## 2023-09-08 LAB
CREAT BLD-MCNC: 0.8 MG/DL (ref 0.7–1.3)
EGFRCR SERPLBLD CKD-EPI 2021: >60 ML/MIN/1.73M2

## 2023-09-08 PROCEDURE — 250N000011 HC RX IP 250 OP 636: Performed by: INTERNAL MEDICINE

## 2023-09-08 PROCEDURE — 82565 ASSAY OF CREATININE: CPT

## 2023-09-08 PROCEDURE — 250N000009 HC RX 250: Performed by: INTERNAL MEDICINE

## 2023-09-08 PROCEDURE — 74174 CTA ABD&PLVS W/CONTRAST: CPT | Mod: MG

## 2023-09-08 RX ORDER — IOPAMIDOL 755 MG/ML
500 INJECTION, SOLUTION INTRAVASCULAR ONCE
Status: COMPLETED | OUTPATIENT
Start: 2023-09-08 | End: 2023-09-08

## 2023-09-08 RX ADMIN — IOPAMIDOL 80 ML: 755 INJECTION, SOLUTION INTRAVENOUS at 09:18

## 2023-09-08 RX ADMIN — SODIUM CHLORIDE 80 ML: 9 INJECTION, SOLUTION INTRAVENOUS at 09:18

## 2023-09-19 DIAGNOSIS — K74.00 HEPATIC FIBROSIS, UNSPECIFIED: Primary | ICD-10-CM

## 2023-09-20 ENCOUNTER — TRANSFERRED RECORDS (OUTPATIENT)
Dept: HEALTH INFORMATION MANAGEMENT | Facility: CLINIC | Age: 73
End: 2023-09-20

## 2023-10-02 ENCOUNTER — LAB (OUTPATIENT)
Dept: LAB | Facility: CLINIC | Age: 73
End: 2023-10-02
Payer: COMMERCIAL

## 2023-10-02 ENCOUNTER — OFFICE VISIT (OUTPATIENT)
Dept: GASTROENTEROLOGY | Facility: CLINIC | Age: 73
End: 2023-10-02
Payer: COMMERCIAL

## 2023-10-02 VITALS
HEART RATE: 52 BPM | OXYGEN SATURATION: 96 % | BODY MASS INDEX: 29.03 KG/M2 | SYSTOLIC BLOOD PRESSURE: 156 MMHG | HEIGHT: 70 IN | DIASTOLIC BLOOD PRESSURE: 89 MMHG | WEIGHT: 202.8 LBS

## 2023-10-02 DIAGNOSIS — R79.89 ELEVATED LFTS: ICD-10-CM

## 2023-10-02 DIAGNOSIS — K74.00 HEPATIC FIBROSIS, UNSPECIFIED: Primary | ICD-10-CM

## 2023-10-02 DIAGNOSIS — K74.00 HEPATIC FIBROSIS, UNSPECIFIED: ICD-10-CM

## 2023-10-02 LAB
ALBUMIN SERPL BCG-MCNC: 4.8 G/DL (ref 3.5–5.2)
ALP SERPL-CCNC: 56 U/L (ref 40–129)
ALT SERPL W P-5'-P-CCNC: 57 U/L (ref 0–70)
ANION GAP SERPL CALCULATED.3IONS-SCNC: 10 MMOL/L (ref 7–15)
AST SERPL W P-5'-P-CCNC: 38 U/L (ref 0–45)
BILIRUB DIRECT SERPL-MCNC: 0.25 MG/DL (ref 0–0.3)
BILIRUB SERPL-MCNC: 1.1 MG/DL
BUN SERPL-MCNC: 17.5 MG/DL (ref 8–23)
CALCIUM SERPL-MCNC: 9.6 MG/DL (ref 8.8–10.2)
CHLORIDE SERPL-SCNC: 103 MMOL/L (ref 98–107)
CREAT SERPL-MCNC: 1.05 MG/DL (ref 0.67–1.17)
DEPRECATED HCO3 PLAS-SCNC: 27 MMOL/L (ref 22–29)
EGFRCR SERPLBLD CKD-EPI 2021: 76 ML/MIN/1.73M2
ERYTHROCYTE [DISTWIDTH] IN BLOOD BY AUTOMATED COUNT: 12.8 % (ref 10–15)
GLUCOSE SERPL-MCNC: 102 MG/DL (ref 70–99)
HCT VFR BLD AUTO: 43.6 % (ref 40–53)
HGB BLD-MCNC: 14.9 G/DL (ref 13.3–17.7)
INR PPP: 1.07 (ref 0.85–1.15)
MCH RBC QN AUTO: 33.6 PG (ref 26.5–33)
MCHC RBC AUTO-ENTMCNC: 34.2 G/DL (ref 31.5–36.5)
MCV RBC AUTO: 98 FL (ref 78–100)
PLATELET # BLD AUTO: 120 10E3/UL (ref 150–450)
POTASSIUM SERPL-SCNC: 4.1 MMOL/L (ref 3.4–5.3)
PROT SERPL-MCNC: 7.2 G/DL (ref 6.4–8.3)
RBC # BLD AUTO: 4.44 10E6/UL (ref 4.4–5.9)
SODIUM SERPL-SCNC: 140 MMOL/L (ref 135–145)
WBC # BLD AUTO: 4.5 10E3/UL (ref 4–11)

## 2023-10-02 PROCEDURE — 80053 COMPREHEN METABOLIC PANEL: CPT

## 2023-10-02 PROCEDURE — 82248 BILIRUBIN DIRECT: CPT

## 2023-10-02 PROCEDURE — 85027 COMPLETE CBC AUTOMATED: CPT

## 2023-10-02 PROCEDURE — 85610 PROTHROMBIN TIME: CPT

## 2023-10-02 PROCEDURE — 36415 COLL VENOUS BLD VENIPUNCTURE: CPT

## 2023-10-02 PROCEDURE — 99213 OFFICE O/P EST LOW 20 MIN: CPT | Performed by: STUDENT IN AN ORGANIZED HEALTH CARE EDUCATION/TRAINING PROGRAM

## 2023-10-02 ASSESSMENT — PAIN SCALES - GENERAL: PAINLEVEL: NO PAIN (0)

## 2023-10-02 NOTE — NURSING NOTE
"Chief Complaint   Patient presents with    Follow Up     Follow up for Elevated LFTs and Alcoholic fatty liver.  Last seen on 01/23/23 by Dr. Garay.     He requests these members of his care team be copied on today's visit information:  PCP: Akash Mariee    Vitals:    10/02/23 1517   BP: (!) 153/90   BP Location: Left arm   Patient Position: Sitting   Cuff Size: Adult Regular   Pulse: 53   SpO2: 96%   Weight: 92 kg (202 lb 12.8 oz)   Height: 1.778 m (5' 10\")     Body mass index is 29.1 kg/m .    Medications were reconciled.    Does patient need any medication refills at today's visit? None        Dorcas Deluna CMA    "

## 2023-10-02 NOTE — LETTER
10/2/2023         RE: Camilo Baca  [address]  [facility] [address]        Dear Colleague,    Thank you for referring your patient, Camilo Baca, to the [facility]. Please see a copy of my visit note below.    [facility] Return Patient Visit    Date of Visit: October 2, 2023    Reason for referral: follow up liver disease    Subjective: Mr. Baca is a 73 year old man with a history of F5L with LE DVT and PE, here for hepatic steatosis and elevated LFTs.     He had MRI elastography that showed mild diffuse steatosis, liver stiffness 2.37 kPA - normal    He drinks 1-3 drinks a few days a week.  Does not think you are probably cutting back.  No other changes to his health.    Hepatitis B and C testing was negative in the past.  Is on Rituxan.    ROS: 14 point ROS negative except for positives noted in HPI.    PMHx:  Past Medical History:   Diagnosis Date     Alopecia, unspecified      Closed fracture of unspecified part of humerus 1974    Arm fracture / left wrist     Esophageal reflux 2/6/2015     Factor 5 Leiden mutation, heterozygous (H24)      Gout 3/28/2011     Herpes zoster without mention of complication 1965    Herpes zoster     Measles without mention of complication     Measles     Personal history of DVT (deep vein thrombosis) 4/9/2015     Personal history of venous thrombosis and embolism 4/9/2015     Pulmonary embolism (H) 12/1996    post appendectomy     RA (rheumatoid arthritis) (H) 3/11/2013     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     Rheumatoid arthritis(714.0)     Beginning symptoms     Secondary hypertension with goal blood pressure less than 140/90 11/15/2016     Thrombocytopenia, unspecified (H24) 1/29/2014     Varicella without mention of complication     Chickenpox       PSHx:  Past Surgical History:   Procedure Laterality Date     COLONOSCOPY  11/15/2010    COLONOSCOPY  performed by DARIUS MESA at  GI     COLONOSCOPY N/A 11/2/2015    Procedure: COLONOSCOPY;  Surgeon: Bubba Odom MD;  Location:  GI     COLONOSCOPY N/A 3/19/2021    Procedure: Colonoscopy;  Surgeon: Ludmila Beltran MD;  Location:  GI     ESOPHAGOSCOPY, GASTROSCOPY, DUODENOSCOPY (EGD), COMBINED N/A 10/24/2018    Procedure: Esophagogastroduodenoscopy Multiple Biopsies by Biopsy;  Surgeon: Matteo Johnson DO;  Location:  GI     HC REPAIR ING HERNIA,5+Y/O,REDUCIBL  1960     HERNIORRHAPHY UMBILICAL N/A 4/17/2015    Procedure: HERNIORRHAPHY UMBILICAL;  Surgeon: Rayray Avila MD;  Location: [facility]     LAPAROSCOPIC HERNIORRHAPHY INGUINAL BILATERAL Bilateral 4/17/2015    Procedure: LAPAROSCOPIC HERNIORRHAPHY INGUINAL BILATERAL;  Surgeon: Rayray Avila MD;  Location:  OR     [facility]  1996     [facility]       FamHx:  Family History   Problem Relation Age of Onset     Arthritis Mother      Cardiovascular Mother      Depression Mother      Gastrointestinal Disease Mother         IBS     Hypertension Mother      Circulatory Mother         Aortal aneurysm     Osteoporosis Mother      Allergies Father         Cortisone     Cardiovascular Father      Circulatory Father      Diabetes Father      Hypertension Father      Lipids Father      Alzheimer Disease Paternal Grandfather      Blood Disease Paternal Grandmother         Clotting problems     Blood Disease Son         Factor 5     Hypertension Maternal Aunt      Cerebrovascular Disease Maternal Grandmother      No family history of liver disease, liver cancer    SocHx:  Social History     Socioeconomic History     Marital status:      Spouse name: Jacki     Number of children: 2     Years of education: 16     Highest education level: Not on file   Occupational History     Occupation:      Employer: [employer]   Tobacco Use     Smoking status: Former     Packs/day: 0.25      Years: 2.00     Pack years: 0.50     Types: Cigarettes, Pipe     Quit date: 1979     Years since quittin.7     Smokeless tobacco: Never   Vaping Use     Vaping Use: Never used   Substance and Sexual Activity     Alcohol use: Yes     Comment: 5 drinks per week / wine     Drug use: No     Sexual activity: Yes     Partners: Female   Other Topics Concern      Service Yes     Comment: [employer]     Blood Transfusions No     Caffeine Concern No     Occupational Exposure Yes     Comment:      Hobby Hazards Yes     Comment: hunting     Sleep Concern No     Stress Concern No     Weight Concern No     Special Diet No     Back Care No     Exercise Yes     Comment: Walks     Bike Helmet No     Seat Belt Yes     Self-Exams Not Asked     Parent/sibling w/ CABG, MI or angioplasty before 65F 55M? No   Social History Narrative     Not on file     Social Determinants of Health     Financial Resource Strain: Not on file   Food Insecurity: Not on file   Transportation Needs: Not on file   Physical Activity: Not on file   Stress: Not on file   Social Connections: Not on file   Interpersonal Safety: Not on file   Housing Stability: Not on file       Medications:  Current Outpatient Medications   Medication     allopurinol (ZYLOPRIM) 300 MG tablet     cholestyramine light (PREVALITE) 4 GM powder     Cyanocobalamin (B-12) 100 MCG TABS     diclofenac (VOLTAREN) 1 % GEL     levothyroxine (SYNTHROID/LEVOTHROID) 25 MCG tablet     losartan-hydrochlorothiazide (HYZAAR) 100-12.5 MG tablet     omeprazole (PRILOSEC) 20 MG DR capsule     riTUXimab 500 MG/50ML SOLN     rosuvastatin (CRESTOR) 10 MG tablet     sildenafil (VIAGRA) 100 MG tablet     vitamin C (ASCORBIC ACID) 1000 MG TABS     VITAMIN D PO     Current Facility-Administered Medications   Medication     cilgavimab 300 mg/tixagevimab 300 mg (EVUSHELD) - FULL DOSE *FOR CLINIC USE ONLY*     No OTCs, herbals    Allergies:  Allergies   Allergen Reactions      "Lisinopril Cough     Plaquenil [Hydroxychloroquine] Hives       Objective:  BP (!) 156/89 (BP Location: Left arm, Patient Position: Sitting, Cuff Size: Adult Regular)   Pulse 52   Ht 1.778 m (5' 10\")   Wt 92 kg (202 lb 12.8 oz)   SpO2 96%   BMI 29.10 kg/m    Constitutional: pleasant man in NAD  Eyes: non icteric  Respiratory: Normal respiratory excursion   MSK: normal range of motion of visualized extremities  Abd: Non distended  Skin: No jaundice  Psychiatric: normal mood and orientation    Labs:  Last Comprehensive Metabolic Panel:  Sodium   Date Value Ref Range Status   10/02/2023 140 135 - 145 mmol/L Final     Comment:     Reference intervals for this test were updated on 09/26/2023 to more accurately reflect our healthy population. There may be differences in the flagging of prior results with similar values performed with this method. Interpretation of those prior results can be made in the context of the updated reference intervals.    02/16/2021 141 133 - 144 mmol/L Final     Potassium   Date Value Ref Range Status   10/02/2023 4.1 3.4 - 5.3 mmol/L Final   07/28/2022 3.9 3.4 - 5.3 mmol/L Final   06/25/2021 4.2 3.4 - 5.3 mmol/L Final     Chloride   Date Value Ref Range Status   10/02/2023 103 98 - 107 mmol/L Final   07/28/2022 108 94 - 109 mmol/L Final   02/16/2021 106 94 - 109 mmol/L Final     Carbon Dioxide   Date Value Ref Range Status   02/16/2021 32 20 - 32 mmol/L Final     Carbon Dioxide (CO2)   Date Value Ref Range Status   10/02/2023 27 22 - 29 mmol/L Final   07/28/2022 31 20 - 32 mmol/L Final     Anion Gap   Date Value Ref Range Status   10/02/2023 10 7 - 15 mmol/L Final   07/28/2022 2 (L) 3 - 14 mmol/L Final   02/16/2021 3 3 - 14 mmol/L Final     Glucose   Date Value Ref Range Status   10/02/2023 102 (H) 70 - 99 mg/dL Final   07/28/2022 127 (H) 70 - 99 mg/dL Final   02/16/2021 119 (H) 70 - 99 mg/dL Final     Urea Nitrogen   Date Value Ref Range Status   10/02/2023 17.5 8.0 - 23.0 mg/dL Final "   07/28/2022 17 7 - 30 mg/dL Final   02/16/2021 15 7 - 30 mg/dL Final     Creatinine   Date Value Ref Range Status   10/02/2023 1.05 0.67 - 1.17 mg/dL Final   06/25/2021 0.96 0.66 - 1.25 mg/dL Final     GFR Estimate   Date Value Ref Range Status   10/02/2023 76 >60 mL/min/1.73m2 Final   06/25/2021 80 >60 mL/min/[1.73_m2] Final     Comment:     Non  GFR Calc  Starting 12/18/2018, serum creatinine based estimated GFR (eGFR) will be   calculated using the Chronic Kidney Disease Epidemiology Collaboration   (CKD-EPI) equation.       GFR, ESTIMATED POCT   Date Value Ref Range Status   09/08/2023 >60 >60 mL/min/1.73m2 Final     Calcium   Date Value Ref Range Status   10/02/2023 9.6 8.8 - 10.2 mg/dL Final   02/16/2021 9.4 8.5 - 10.1 mg/dL Final     Bilirubin Total   Date Value Ref Range Status   10/02/2023 1.1 <=1.2 mg/dL Final   06/25/2021 1.0 0.2 - 1.3 mg/dL Final     Alkaline Phosphatase   Date Value Ref Range Status   10/02/2023 56 40 - 129 U/L Final   06/25/2021 63 40 - 150 U/L Final     ALT   Date Value Ref Range Status   10/02/2023 57 0 - 70 U/L Final     Comment:     Reference intervals for this test were updated on 6/12/2023 to more accurately reflect our healthy population. There may be differences in the flagging of prior results with similar values performed with this method. Interpretation of those prior results can be made in the context of the updated reference intervals.     07/08/2021 121 (H) 0 - 70 U/L Final     AST   Date Value Ref Range Status   10/02/2023 38 0 - 45 U/L Final     Comment:     Reference intervals for this test were updated on 6/12/2023 to more accurately reflect our healthy population. There may be differences in the flagging of prior results with similar values performed with this method. Interpretation of those prior results can be made in the context of the updated reference intervals.   07/08/2021 57 (H) 0 - 45 U/L Final       Lab Results   Component Value Date    WBC  4.5 10/02/2023    WBC 4.1 06/25/2021     Lab Results   Component Value Date    RBC 4.44 10/02/2023    RBC 4.29 06/25/2021     Lab Results   Component Value Date    HGB 14.9 10/02/2023    HGB 14.3 06/25/2021     Lab Results   Component Value Date    HCT 43.6 10/02/2023    HCT 41.2 06/25/2021     Lab Results   Component Value Date    MCV 98 10/02/2023    MCV 96 06/25/2021     Lab Results   Component Value Date    MCH 33.6 10/02/2023    MCH 33.3 06/25/2021     Lab Results   Component Value Date    MCHC 34.2 10/02/2023    MCHC 34.7 06/25/2021     Lab Results   Component Value Date    RDW 12.8 10/02/2023    RDW 13.0 06/25/2021     Lab Results   Component Value Date     10/02/2023     06/25/2021       INR   Date Value Ref Range Status   10/02/2023 1.07 0.85 - 1.15 Final     INR Point of Care   Date Value Ref Range Status   04/09/2015 0.9 0.86 - 1.14 Final     Comment:     This test is intended for monitoring Coumadin therapy.  Results are not   accurate   in patients with prolonged INR due to factor deficiency.        Previous work-up:   Lab Results   Component Value Date    HEPBANG Nonreactive 05/26/2022    HBCAB Nonreactive 05/26/2022    HCVAB Negative 02/05/2014     07/14/2023    STEVE Positive (A) 08/09/2021    ANAT1 1:160 08/09/2021    SMOOTHMUS 14 08/09/2021    TSH 4.83 (H) 08/30/2023    CHOL 194 08/30/2023    HDL 55 08/30/2023     (H) 08/30/2023    TRIG 119 08/30/2023      No results found for: SPECDES, LDRESULTS    Imaging: Reviewed in EHR    Independently reviewed labs and imaging.     Assessment/Plan: Mr. Baca is a 73-year-old male with a history rheumatoid arthritis and factor V Leiden deficiency with a history of VTE, who presents for evaluation of hepatic steatosis without fibrosis.  He had an MRI elastography in 2021 that did not show fibrosis.  In the interim his fib 4 is elevated.  Concerned that he may have developed fibrosis in the interim.  His platelet count is low, of note  it was at a similar level when his previous fibrosis scan was negative for fibrosis.  Recommend repeated MRI elastography.  Recommend that he drink alcohol very rarely meantime.  If his MRI shows any fibrosis he would need to completely abstain.    Orders Placed This Encounter   Procedures     MR Elastography without Contrast       RTC 12 months.    Reina Ocasio MD MS  Hepatology/Liver Transplant  [facility]        Again, thank you for allowing me to participate in the care of your patient.        Sincerely,        Reina Ocasio MD

## 2023-10-02 NOTE — PROGRESS NOTES
[facility] Return Patient Visit    Date of Visit: October 2, 2023    Reason for referral: follow up liver disease    Subjective: Mr. Baca is a 73 year old man with a history of F5L with LE DVT and PE, here for hepatic steatosis and elevated LFTs.     He had MRI elastography that showed mild diffuse steatosis, liver stiffness 2.37 kPA - normal    He drinks 1-3 drinks a few days a week.  Does not think you are probably cutting back.  No other changes to his health.    Hepatitis B and C testing was negative in the past.  Is on Rituxan.    ROS: 14 point ROS negative except for positives noted in HPI.    PMHx:  Past Medical History:   Diagnosis Date    Alopecia, unspecified     Closed fracture of unspecified part of humerus 1974    Arm fracture / left wrist    Esophageal reflux 2/6/2015    Factor 5 Leiden mutation, heterozygous (H24)     Gout 3/28/2011    Herpes zoster without mention of complication 1965    Herpes zoster    Measles without mention of complication     Measles    Personal history of DVT (deep vein thrombosis) 4/9/2015    Personal history of venous thrombosis and embolism 4/9/2015    Pulmonary embolism (H) 12/1996    post appendectomy    RA (rheumatoid arthritis) (H) 3/11/2013    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    Rheumatoid arthritis(714.0)     Beginning symptoms    Secondary hypertension with goal blood pressure less than 140/90 11/15/2016    Thrombocytopenia, unspecified (H24) 1/29/2014    Varicella without mention of complication     Chickenpox       PSHx:  Past Surgical History:   Procedure Laterality Date    COLONOSCOPY  11/15/2010    COLONOSCOPY performed by DARIUS MESA at  GI    COLONOSCOPY N/A 11/2/2015    Procedure: COLONOSCOPY;  Surgeon: Bubba Odom MD;  Location:  GI    COLONOSCOPY N/A 3/19/2021    Procedure: Colonoscopy;  Surgeon: Ludmila Beltran MD;  Location:  GI    ESOPHAGOSCOPY,  GASTROSCOPY, DUODENOSCOPY (EGD), COMBINED N/A 10/24/2018    Procedure: Esophagogastroduodenoscopy Multiple Biopsies by Biopsy;  Surgeon: Matteo Johnson DO;  Location: [facility]    HC REPAIR ING HERNIA,5+Y/O,REDUCIBL  1960    HERNIORRHAPHY UMBILICAL N/A 2015    Procedure: HERNIORRHAPHY UMBILICAL;  Surgeon: Rayray Avila MD;  Location: [facility]    LAPAROSCOPIC HERNIORRHAPHY INGUINAL BILATERAL Bilateral 2015    Procedure: LAPAROSCOPIC HERNIORRHAPHY INGUINAL BILATERAL;  Surgeon: Rayray Avila MD;  Location: [facility] OR    ZZC APPENDECTOMY      ZZ COLONOSCOPY W BIOPSY  08/10/05       FamHx:  Family History   Problem Relation Age of Onset    Arthritis Mother     Cardiovascular Mother     Depression Mother     Gastrointestinal Disease Mother         IBS    Hypertension Mother     Circulatory Mother         Aortal aneurysm    Osteoporosis Mother     Allergies Father         Cortisone    Cardiovascular Father     Circulatory Father     Diabetes Father     Hypertension Father     Lipids Father     Alzheimer Disease Paternal Grandfather     Blood Disease Paternal Grandmother         Clotting problems    Blood Disease Son         Factor 5    Hypertension Maternal Aunt     Cerebrovascular Disease Maternal Grandmother      No family history of liver disease, liver cancer    SocHx:  Social History     Socioeconomic History    Marital status:      Spouse name: Jacki    Number of children: 2    Years of education: 16    Highest education level: Not on file   Occupational History    Occupation:      Employer: [employer]   Tobacco Use    Smoking status: Former     Packs/day: 0.25     Years: 2.00     Pack years: 0.50     Types: Cigarettes, Pipe     Quit date: 1979     Years since quittin.7    Smokeless tobacco: Never   Vaping Use    Vaping Use: Never used   Substance and Sexual Activity    Alcohol use: Yes     Comment: 5 drinks per week / wine    Drug use: No     "Sexual activity: Yes     Partners: Female   Other Topics Concern     Service Yes     Comment: [employer]    Blood Transfusions No    Caffeine Concern No    Occupational Exposure Yes     Comment:     Hobby Hazards Yes     Comment: hunting    Sleep Concern No    Stress Concern No    Weight Concern No    Special Diet No    Back Care No    Exercise Yes     Comment: Walks    Bike Helmet No    Seat Belt Yes    Self-Exams Not Asked    Parent/sibling w/ CABG, MI or angioplasty before 65F 55M? No   Social History Narrative    Not on file     Social Determinants of Health     Financial Resource Strain: Not on file   Food Insecurity: Not on file   Transportation Needs: Not on file   Physical Activity: Not on file   Stress: Not on file   Social Connections: Not on file   Interpersonal Safety: Not on file   Housing Stability: Not on file       Medications:  Current Outpatient Medications   Medication    allopurinol (ZYLOPRIM) 300 MG tablet    cholestyramine light (PREVALITE) 4 GM powder    Cyanocobalamin (B-12) 100 MCG TABS    diclofenac (VOLTAREN) 1 % GEL    levothyroxine (SYNTHROID/LEVOTHROID) 25 MCG tablet    losartan-hydrochlorothiazide (HYZAAR) 100-12.5 MG tablet    omeprazole (PRILOSEC) 20 MG DR capsule    riTUXimab 500 MG/50ML SOLN    rosuvastatin (CRESTOR) 10 MG tablet    sildenafil (VIAGRA) 100 MG tablet    vitamin C (ASCORBIC ACID) 1000 MG TABS    VITAMIN D PO     Current Facility-Administered Medications   Medication    cilgavimab 300 mg/tixagevimab 300 mg (EVUSHELD) - FULL DOSE *FOR CLINIC USE ONLY*     No OTCs, herbals    Allergies:  Allergies   Allergen Reactions    Lisinopril Cough    Plaquenil [Hydroxychloroquine] Hives       Objective:  BP (!) 156/89 (BP Location: Left arm, Patient Position: Sitting, Cuff Size: Adult Regular)   Pulse 52   Ht 1.778 m (5' 10\")   Wt 92 kg (202 lb 12.8 oz)   SpO2 96%   BMI 29.10 kg/m    Constitutional: pleasant man in NAD  Eyes: non icteric  Respiratory: Normal " respiratory excursion   MSK: normal range of motion of visualized extremities  Abd: Non distended  Skin: No jaundice  Psychiatric: normal mood and orientation    Labs:  Last Comprehensive Metabolic Panel:  Sodium   Date Value Ref Range Status   10/02/2023 140 135 - 145 mmol/L Final     Comment:     Reference intervals for this test were updated on 09/26/2023 to more accurately reflect our healthy population. There may be differences in the flagging of prior results with similar values performed with this method. Interpretation of those prior results can be made in the context of the updated reference intervals.    02/16/2021 141 133 - 144 mmol/L Final     Potassium   Date Value Ref Range Status   10/02/2023 4.1 3.4 - 5.3 mmol/L Final   07/28/2022 3.9 3.4 - 5.3 mmol/L Final   06/25/2021 4.2 3.4 - 5.3 mmol/L Final     Chloride   Date Value Ref Range Status   10/02/2023 103 98 - 107 mmol/L Final   07/28/2022 108 94 - 109 mmol/L Final   02/16/2021 106 94 - 109 mmol/L Final     Carbon Dioxide   Date Value Ref Range Status   02/16/2021 32 20 - 32 mmol/L Final     Carbon Dioxide (CO2)   Date Value Ref Range Status   10/02/2023 27 22 - 29 mmol/L Final   07/28/2022 31 20 - 32 mmol/L Final     Anion Gap   Date Value Ref Range Status   10/02/2023 10 7 - 15 mmol/L Final   07/28/2022 2 (L) 3 - 14 mmol/L Final   02/16/2021 3 3 - 14 mmol/L Final     Glucose   Date Value Ref Range Status   10/02/2023 102 (H) 70 - 99 mg/dL Final   07/28/2022 127 (H) 70 - 99 mg/dL Final   02/16/2021 119 (H) 70 - 99 mg/dL Final     Urea Nitrogen   Date Value Ref Range Status   10/02/2023 17.5 8.0 - 23.0 mg/dL Final   07/28/2022 17 7 - 30 mg/dL Final   02/16/2021 15 7 - 30 mg/dL Final     Creatinine   Date Value Ref Range Status   10/02/2023 1.05 0.67 - 1.17 mg/dL Final   06/25/2021 0.96 0.66 - 1.25 mg/dL Final     GFR Estimate   Date Value Ref Range Status   10/02/2023 76 >60 mL/min/1.73m2 Final   06/25/2021 80 >60 mL/min/[1.73_m2] Final     Comment:      Non  GFR Calc  Starting 12/18/2018, serum creatinine based estimated GFR (eGFR) will be   calculated using the Chronic Kidney Disease Epidemiology Collaboration   (CKD-EPI) equation.       GFR, ESTIMATED POCT   Date Value Ref Range Status   09/08/2023 >60 >60 mL/min/1.73m2 Final     Calcium   Date Value Ref Range Status   10/02/2023 9.6 8.8 - 10.2 mg/dL Final   02/16/2021 9.4 8.5 - 10.1 mg/dL Final     Bilirubin Total   Date Value Ref Range Status   10/02/2023 1.1 <=1.2 mg/dL Final   06/25/2021 1.0 0.2 - 1.3 mg/dL Final     Alkaline Phosphatase   Date Value Ref Range Status   10/02/2023 56 40 - 129 U/L Final   06/25/2021 63 40 - 150 U/L Final     ALT   Date Value Ref Range Status   10/02/2023 57 0 - 70 U/L Final     Comment:     Reference intervals for this test were updated on 6/12/2023 to more accurately reflect our healthy population. There may be differences in the flagging of prior results with similar values performed with this method. Interpretation of those prior results can be made in the context of the updated reference intervals.     07/08/2021 121 (H) 0 - 70 U/L Final     AST   Date Value Ref Range Status   10/02/2023 38 0 - 45 U/L Final     Comment:     Reference intervals for this test were updated on 6/12/2023 to more accurately reflect our healthy population. There may be differences in the flagging of prior results with similar values performed with this method. Interpretation of those prior results can be made in the context of the updated reference intervals.   07/08/2021 57 (H) 0 - 45 U/L Final       Lab Results   Component Value Date    WBC 4.5 10/02/2023    WBC 4.1 06/25/2021     Lab Results   Component Value Date    RBC 4.44 10/02/2023    RBC 4.29 06/25/2021     Lab Results   Component Value Date    HGB 14.9 10/02/2023    HGB 14.3 06/25/2021     Lab Results   Component Value Date    HCT 43.6 10/02/2023    HCT 41.2 06/25/2021     Lab Results   Component Value Date    MCV 98  10/02/2023    MCV 96 06/25/2021     Lab Results   Component Value Date    MCH 33.6 10/02/2023    MCH 33.3 06/25/2021     Lab Results   Component Value Date    MCHC 34.2 10/02/2023    MCHC 34.7 06/25/2021     Lab Results   Component Value Date    RDW 12.8 10/02/2023    RDW 13.0 06/25/2021     Lab Results   Component Value Date     10/02/2023     06/25/2021       INR   Date Value Ref Range Status   10/02/2023 1.07 0.85 - 1.15 Final     INR Point of Care   Date Value Ref Range Status   04/09/2015 0.9 0.86 - 1.14 Final     Comment:     This test is intended for monitoring Coumadin therapy.  Results are not   accurate   in patients with prolonged INR due to factor deficiency.        Previous work-up:   Lab Results   Component Value Date    HEPBANG Nonreactive 05/26/2022    HBCAB Nonreactive 05/26/2022    HCVAB Negative 02/05/2014     07/14/2023    STEVE Positive (A) 08/09/2021    ANAT1 1:160 08/09/2021    SMOOTHMUS 14 08/09/2021    TSH 4.83 (H) 08/30/2023    CHOL 194 08/30/2023    HDL 55 08/30/2023     (H) 08/30/2023    TRIG 119 08/30/2023      No results found for: SPECDES, LDRESULTS    Imaging: Reviewed in EHR    Independently reviewed labs and imaging.     Assessment/Plan: Mr. Baca is a 73-year-old male with a history rheumatoid arthritis and factor V Leiden deficiency with a history of VTE, who presents for evaluation of hepatic steatosis without fibrosis.  He had an MRI elastography in 2021 that did not show fibrosis.  In the interim his fib 4 is elevated.  Concerned that he may have developed fibrosis in the interim.  His platelet count is low, of note it was at a similar level when his previous fibrosis scan was negative for fibrosis.  Recommend repeated MRI elastography.  Recommend that he drink alcohol very rarely meantime.  If his MRI shows any fibrosis he would need to completely abstain.    Orders Placed This Encounter   Procedures    MR Elastography without Contrast       RTC 12  months.    Reina Ocasio MD MS  Hepatology/Liver Transplant  [facility]

## 2023-10-19 ENCOUNTER — OFFICE VISIT (OUTPATIENT)
Dept: CARDIOLOGY | Facility: CLINIC | Age: 73
End: 2023-10-19
Payer: COMMERCIAL

## 2023-10-19 VITALS
HEIGHT: 70 IN | WEIGHT: 205 LBS | DIASTOLIC BLOOD PRESSURE: 92 MMHG | SYSTOLIC BLOOD PRESSURE: 154 MMHG | HEART RATE: 60 BPM | BODY MASS INDEX: 29.35 KG/M2 | RESPIRATION RATE: 16 BRPM | OXYGEN SATURATION: 97 %

## 2023-10-19 DIAGNOSIS — E78.5 DYSLIPIDEMIA: ICD-10-CM

## 2023-10-19 DIAGNOSIS — I10 ESSENTIAL HYPERTENSION: ICD-10-CM

## 2023-10-19 DIAGNOSIS — I71.43 INFRARENAL ABDOMINAL AORTIC ANEURYSM (AAA) WITHOUT RUPTURE (H): Primary | ICD-10-CM

## 2023-10-19 PROCEDURE — 99214 OFFICE O/P EST MOD 30 MIN: CPT | Performed by: INTERNAL MEDICINE

## 2023-10-19 RX ORDER — KETOROLAC TROMETHAMINE 5 MG/ML
SOLUTION OPHTHALMIC
COMMUNITY
Start: 2023-09-07

## 2023-10-19 RX ORDER — PREDNISOLONE ACETATE 10 MG/ML
SUSPENSION/ DROPS OPHTHALMIC
COMMUNITY
Start: 2022-10-28 | End: 2024-05-14

## 2023-10-19 RX ORDER — BRIMONIDINE TARTRATE 2 MG/ML
SOLUTION/ DROPS OPHTHALMIC
COMMUNITY
Start: 2023-10-16

## 2023-10-19 RX ORDER — DORZOLAMIDE HYDROCHLORIDE AND TIMOLOL MALEATE 20; 5 MG/ML; MG/ML
SOLUTION/ DROPS OPHTHALMIC
COMMUNITY
Start: 2023-09-07

## 2023-10-19 RX ORDER — AMLODIPINE BESYLATE 5 MG/1
5 TABLET ORAL DAILY
Qty: 90 TABLET | Refills: 11 | Status: SHIPPED | OUTPATIENT
Start: 2023-10-19

## 2023-10-19 ASSESSMENT — PAIN SCALES - GENERAL: PAINLEVEL: NO PAIN (0)

## 2023-10-19 NOTE — PROGRESS NOTES
HISTORY OF PRESENT ILLNESS:  Camilo Baca a 73 year old male with  rheumatoid arthritis, hypertension, a very distant smoking history and a family history of abdominal aortic aneurysm, was seen today for follow-up of infrarenal abdominal aortic aneurysm    Since last seen, the patient remains asymptomatic and specifically denies abdominal pain chest pain dyspnea syncope or fatigue.  His blood pressures have been elevated on the last 2 occasions in the range of 150/90 on current therapy.  He has not seen his primary care physician recently.  The patient has a blood pressure cuff at home, but has not been monitoring his blood pressure recently.  An abdominal CT scan performed just prior to this visit on 9/8/2023 showed no change in the size of his juxtarenal/infrarenal abdominal aortic aneurysm.  The maximal dimension was 3.7 cm.    The patient's rheumatoid arthritis remains well controlled on current therapy.    PAST MEDICAL HISTORY:    1.  Seropositive rheumatoid arthritis, primary joint complaints of shoulders, feet and hips.  Currently on every 5 months, rituximab 5 mg with good control of symptoms.  2.  Dyslipidemia, on rosuvastatin.  3.  Hypertension. On losartan/hydrochlorothiazide.  4.  Factor V Leiden mutation, no current thrombotic diathesis.  5.  Old history of bilateral inguinal herniorrhaphies  6. AAA stable ( 3.7cm) on serial imaging. Asymptomatic.     Orders this Visit:  Orders Placed This Encounter   Procedures    US Abdominal Aorta Imaging     Orders Placed This Encounter   Medications    brimonidine (ALPHAGAN) 0.2 % ophthalmic solution    dorzolamide-timolol (COSOPT) 22.3-6.8 MG/ML ophthalmic solution    ketorolac (ACULAR) 0.5 % ophthalmic solution    prednisoLONE acetate (PRED FORTE) 1 % ophthalmic suspension     Sig: INSTILL 1 DROP INTO THE LEFT EYE ONCE A DAY    amLODIPine (NORVASC) 5 MG tablet     Sig: Take 1 tablet (5 mg) by mouth daily     Dispense:  90 tablet     Refill:  11     There are no  discontinued medications.    Encounter Diagnoses   Name Primary?    Essential hypertension     Dyslipidemia     Infrarenal abdominal aortic aneurysm (AAA) without rupture (H24) Yes       CURRENT MEDICATIONS:  Current Outpatient Medications   Medication Sig Dispense Refill    allopurinol (ZYLOPRIM) 300 MG tablet TAKE 1 TABLET BY MOUTH ONCE DAILY 90 tablet 3    amLODIPine (NORVASC) 5 MG tablet Take 1 tablet (5 mg) by mouth daily 90 tablet 11    brimonidine (ALPHAGAN) 0.2 % ophthalmic solution       cholestyramine light (PREVALITE) 4 GM powder DISSOLVE 1 LEVEL SCOOP IN LIQUID AND DRINK BY MOUTH TWICE DAILY AS DIRECTED 231 g 12    Cyanocobalamin (B-12) 100 MCG TABS       dorzolamide-timolol (COSOPT) 22.3-6.8 MG/ML ophthalmic solution       ketorolac (ACULAR) 0.5 % ophthalmic solution       levothyroxine (SYNTHROID/LEVOTHROID) 25 MCG tablet TAKE 1 TABLET (25 MCG) BY MOUTH DAILY 90 tablet 3    losartan-hydrochlorothiazide (HYZAAR) 100-12.5 MG tablet TAKE 1 TABLET BY MOUTH DAILY 90 tablet 3    omeprazole (PRILOSEC) 20 MG DR capsule Take 1 capsule (20 mg) by mouth daily 90 capsule 3    riTUXimab 500 MG/50ML SOLN Inject 1,000 mg into the vein      rosuvastatin (CRESTOR) 10 MG tablet Take 1 tablet (10 mg) by mouth daily 90 tablet 3    sildenafil (VIAGRA) 100 MG tablet 1/2 tab 1/2 hour prior to activity 3 tablet 4    vitamin C (ASCORBIC ACID) 1000 MG TABS Take 1,000 mg by mouth daily      VITAMIN D PO       diclofenac (VOLTAREN) 1 % GEL Apply  2 grams to shoulders three times daily using enclosed dosing card. (Patient not taking: Reported on 10/19/2023) 100 g 1    prednisoLONE acetate (PRED FORTE) 1 % ophthalmic suspension INSTILL 1 DROP INTO THE LEFT EYE ONCE A DAY (Patient not taking: Reported on 10/19/2023)         ALLERGIES     Allergies   Allergen Reactions    Lisinopril Cough    Plaquenil [Hydroxychloroquine] Hives       PAST MEDICAL, SURGICAL, FAMILY, SOCIAL HISTORY:  History was reviewed and updated as needed, see  "medical record.        Review of Systems:  A 12-point review of systems was completed, see medical record for detailed review of systems information.    Physical Exam:  Vitals: BP (!) 154/92 (BP Location: Right arm, Patient Position: Sitting, Cuff Size: Adult Regular)   Pulse 60   Resp 16   Ht 1.778 m (5' 10\")   Wt 93 kg (205 lb)   SpO2 97%   BMI 29.41 kg/m      Constitutional: No apparent distress    HEENT: pupils equal round reactive to light, thyroid normal size, JVP is normal    Chest: Clear to percussion and auscultation    Cardiac: Normal S1, normal S2 no S3, no murmur or click    Abdomen:  Liver percusses to 6 cm spleen is not palpable aorta is not tender or enlarged    Extremitiies: no edema.          ASSESSMENT: The patient's abdominal aortic aneurysm has remained stable on serial imaging over the last 2 years.  There is good correlation between the findings on abdominal ultrasound and noted on CT.  His blood pressures are above current American College of cardiology guideline target ( 130/80 mm Hg In addition to lifestyle intervention , I would add amlodipine 5 mg daily to his regimen. We reviewed the benefits of a regular exercise program, and a Mediterranean-style weight loss diet. The patient has an excellent primary care physician who can easily manage his hypertension and follow his abdominal aortic aneurysm which appears to be stable at this time.     I have ordered an abdominal ultrasound for about 1 year from now, and asked him to follow up with  in the next 3 months.  Indications for intervention for the AAA include new symptoms, rapid progression in aneurysm dimension and/or a dimension of 5.5 cm or greater.     RECOMMENDATIONS:   1) Mediterranean style weight loss diet and regular exercise program  2) Add amlodipine 5 mg daily and monitor blood pressure with home cuff, target 130/80 or less  3) Abdominal ultrasound in one year and on annual basis as long as stable. Referral to " vascular specialist with symptoms, rapid change in dimension and/or diameter greater than or equal to 5.5 cm.   4) See primary care MD for follow up of blood pressure in 3 months .    Unless his primary care MD prefers otherwise we will see on a prn basis.        Recent Lab Results:  LIPID RESULTS:  Lab Results   Component Value Date    CHOL 194 08/30/2023    CHOL 155 12/08/2020    HDL 55 08/30/2023    HDL 55 12/08/2020     (H) 08/30/2023    LDL 74 12/08/2020    TRIG 119 08/30/2023    TRIG 129 12/08/2020    CHOLHDLRATIO 3.0 10/04/2013       LIVER ENZYME RESULTS:  Lab Results   Component Value Date    AST 38 10/02/2023    AST 57 (H) 07/08/2021    ALT 57 10/02/2023     (H) 07/08/2021       CBC RESULTS:  Lab Results   Component Value Date    WBC 4.5 10/02/2023    WBC 4.1 06/25/2021    RBC 4.44 10/02/2023    RBC 4.29 (L) 06/25/2021    HGB 14.9 10/02/2023    HGB 14.3 06/25/2021    HCT 43.6 10/02/2023    HCT 41.2 06/25/2021    MCV 98 10/02/2023    MCV 96 06/25/2021    MCH 33.6 (H) 10/02/2023    MCH 33.3 (H) 06/25/2021    MCHC 34.2 10/02/2023    MCHC 34.7 06/25/2021    RDW 12.8 10/02/2023    RDW 13.0 06/25/2021     (L) 10/02/2023     (L) 06/25/2021       BMP RESULTS:  Lab Results   Component Value Date     10/02/2023     02/16/2021    POTASSIUM 4.1 10/02/2023    POTASSIUM 3.9 07/28/2022    POTASSIUM 4.2 06/25/2021    CHLORIDE 103 10/02/2023    CHLORIDE 108 07/28/2022    CHLORIDE 106 02/16/2021    CO2 27 10/02/2023    CO2 31 07/28/2022    CO2 32 02/16/2021    ANIONGAP 10 10/02/2023    ANIONGAP 2 (L) 07/28/2022    ANIONGAP 3 02/16/2021     (H) 10/02/2023     (H) 07/28/2022     (H) 02/16/2021    BUN 17.5 10/02/2023    BUN 17 07/28/2022    BUN 15 02/16/2021    CR 1.05 10/02/2023    CR 0.96 06/25/2021    GFRESTIMATED 76 10/02/2023    GFRESTIMATED >60 09/08/2023    GFRESTIMATED 80 06/25/2021    GFRESTBLACK >90 06/25/2021    ANDREZ 9.6 10/02/2023    ANDREZ 9.4 02/16/2021     "    A1C RESULTS:  No results found for: \"A1C\"    INR RESULTS:  Lab Results   Component Value Date    INR 1.07 10/02/2023    INR 0.94 01/23/2023    INR 0.9 04/09/2015       We greatly appreciate the opportunity to be involved in the care of your patient, Camilo Baca.    Sincerely,  Camilo Robles MD      CC  Camilo Robles MD  [address]  [place] [address]                                                                   HISTORY OF PRESENT ILLNESS:  Camilo Baca a 73 year old male with   RA now well controlled active regular exercise program    Orders this Visit:  No orders of the defined types were placed in this encounter.    Orders Placed This Encounter   Medications    brimonidine (ALPHAGAN) 0.2 % ophthalmic solution    dorzolamide-timolol (COSOPT) 22.3-6.8 MG/ML ophthalmic solution    ketorolac (ACULAR) 0.5 % ophthalmic solution    prednisoLONE acetate (PRED FORTE) 1 % ophthalmic suspension     Sig: INSTILL 1 DROP INTO THE LEFT EYE ONCE A DAY     There are no discontinued medications.    Encounter Diagnoses   Name Primary?    Abdominal aortic aneurysm (AAA) without rupture (H24)     Essential hypertension     Dyslipidemia        CURRENT MEDICATIONS:  Current Outpatient Medications   Medication Sig Dispense Refill    allopurinol (ZYLOPRIM) 300 MG tablet TAKE 1 TABLET BY MOUTH ONCE DAILY 90 tablet 3    brimonidine (ALPHAGAN) 0.2 % ophthalmic solution       cholestyramine light (PREVALITE) 4 GM powder DISSOLVE 1 LEVEL SCOOP IN LIQUID AND DRINK BY MOUTH TWICE DAILY AS DIRECTED 231 g 12    Cyanocobalamin (B-12) 100 MCG TABS       dorzolamide-timolol (COSOPT) 22.3-6.8 MG/ML ophthalmic solution       ketorolac (ACULAR) 0.5 % ophthalmic solution       levothyroxine (SYNTHROID/LEVOTHROID) 25 MCG tablet TAKE 1 TABLET (25 MCG) BY MOUTH DAILY 90 tablet 3    losartan-hydrochlorothiazide (HYZAAR) 100-12.5 MG tablet TAKE 1 TABLET BY MOUTH DAILY 90 tablet 3    omeprazole (PRILOSEC) 20 MG DR capsule " "Take 1 capsule (20 mg) by mouth daily 90 capsule 3    riTUXimab 500 MG/50ML SOLN Inject 1,000 mg into the vein      rosuvastatin (CRESTOR) 10 MG tablet Take 1 tablet (10 mg) by mouth daily 90 tablet 3    sildenafil (VIAGRA) 100 MG tablet 1/2 tab 1/2 hour prior to activity 3 tablet 4    vitamin C (ASCORBIC ACID) 1000 MG TABS Take 1,000 mg by mouth daily      VITAMIN D PO       diclofenac (VOLTAREN) 1 % GEL Apply  2 grams to shoulders three times daily using enclosed dosing card. (Patient not taking: Reported on 10/19/2023) 100 g 1    prednisoLONE acetate (PRED FORTE) 1 % ophthalmic suspension INSTILL 1 DROP INTO THE LEFT EYE ONCE A DAY (Patient not taking: Reported on 10/19/2023)         ALLERGIES     Allergies   Allergen Reactions    Lisinopril Cough    Plaquenil [Hydroxychloroquine] Hives       PAST MEDICAL, SURGICAL, FAMILY, SOCIAL HISTORY:  History was reviewed and updated as needed, see medical record.        Review of Systems:  A 12-point review of systems was completed, see medical record for detailed review of systems information.    Physical Exam:  Vitals: BP (!) 154/92 (BP Location: Right arm, Patient Position: Sitting, Cuff Size: Adult Regular)   Pulse 60   Resp 16   Ht 1.778 m (5' 10\")   Wt 93 kg (205 lb)   SpO2 97%   BMI 29.41 kg/m      Constitutional: No apparent distress    HEENT: pupils equal round reactive to light, thyroid normal size, JVP is normal    Chest: Clear to percussion and auscultation    Cardiac: Normal S1, normal S2 no S3, no murmur or click    Abdomen: Scaphoid.  Liver percusses to 6 cm spleen is not palpable aorta is not tender or enlarged    Extremitiies: no edema.    Neurological:  Cranial nerves II through XII are intact, strength equal and symmetrical, displays normal insight and judgment        ASSESSMENT: Camilo Baca is a 73 year old male with          We reviewed the benefits of a regular exercise program, a Mediterranean-style weight loss diet, and contacting us for " any changes in between now and the time of her next visit.     RECOMMENDATIONS:   1) Follow up with primary care MD for hypertension management and follow up of stable infrarenal AAA  2) amlodipine 5 mg daily  3) Mediterranean style weight loss diet   4) regular exercise program  5) annual abdominal ultrasound with primary care to follow aneurysm        Recent Lab Results:  LIPID RESULTS:  Lab Results   Component Value Date    CHOL 194 08/30/2023    CHOL 155 12/08/2020    HDL 55 08/30/2023    HDL 55 12/08/2020     (H) 08/30/2023    LDL 74 12/08/2020    TRIG 119 08/30/2023    TRIG 129 12/08/2020    CHOLHDLRATIO 3.0 10/04/2013       LIVER ENZYME RESULTS:  Lab Results   Component Value Date    AST 38 10/02/2023    AST 57 (H) 07/08/2021    ALT 57 10/02/2023     (H) 07/08/2021       CBC RESULTS:  Lab Results   Component Value Date    WBC 4.5 10/02/2023    WBC 4.1 06/25/2021    RBC 4.44 10/02/2023    RBC 4.29 (L) 06/25/2021    HGB 14.9 10/02/2023    HGB 14.3 06/25/2021    HCT 43.6 10/02/2023    HCT 41.2 06/25/2021    MCV 98 10/02/2023    MCV 96 06/25/2021    MCH 33.6 (H) 10/02/2023    MCH 33.3 (H) 06/25/2021    MCHC 34.2 10/02/2023    MCHC 34.7 06/25/2021    RDW 12.8 10/02/2023    RDW 13.0 06/25/2021     (L) 10/02/2023     (L) 06/25/2021       BMP RESULTS:  Lab Results   Component Value Date     10/02/2023     02/16/2021    POTASSIUM 4.1 10/02/2023    POTASSIUM 3.9 07/28/2022    POTASSIUM 4.2 06/25/2021    CHLORIDE 103 10/02/2023    CHLORIDE 108 07/28/2022    CHLORIDE 106 02/16/2021    CO2 27 10/02/2023    CO2 31 07/28/2022    CO2 32 02/16/2021    ANIONGAP 10 10/02/2023    ANIONGAP 2 (L) 07/28/2022    ANIONGAP 3 02/16/2021     (H) 10/02/2023     (H) 07/28/2022     (H) 02/16/2021    BUN 17.5 10/02/2023    BUN 17 07/28/2022    BUN 15 02/16/2021    CR 1.05 10/02/2023    CR 0.96 06/25/2021    GFRESTIMATED 76 10/02/2023    GFRESTIMATED >60 09/08/2023    GFRESTIMATED  "80 06/25/2021    GFRESTBLACK >90 06/25/2021    ANDREZ 9.6 10/02/2023    ANDREZ 9.4 02/16/2021        A1C RESULTS:  No results found for: \"A1C\"    INR RESULTS:  Lab Results   Component Value Date    INR 1.07 10/02/2023    INR 0.94 01/23/2023    INR 0.9 04/09/2015       We greatly appreciate the opportunity to be involved in the care of your patient, Camilo Baca.    Sincerely,  Camilo Robles MD        Camilo Robles MD  [address]  [place] [address]                                                                     "

## 2023-10-19 NOTE — LETTER
10/19/2023    Akash Mariee MD, MD  [address] [place] [address]    RE: Camilo Baca       Dear Colleague,     I had the pleasure of seeing Camilo Baca in the [facility].  HISTORY OF PRESENT ILLNESS:  Camilo Baca a 73 year old male with  rheumatoid arthritis, hypertension, a very distant smoking history and a family history of abdominal aortic aneurysm, was seen today for follow-up of infrarenal abdominal aortic aneurysm    Since last seen, the patient remains asymptomatic and specifically denies abdominal pain chest pain dyspnea syncope or fatigue.  His blood pressures have been elevated on the last 2 occasions in the range of 150/90 on current therapy.  He has not seen his primary care physician recently.  The patient has a blood pressure cuff at home, but has not been monitoring his blood pressure recently.  An abdominal CT scan performed just prior to this visit on 9/8/2023 showed no change in the size of his juxtarenal/infrarenal abdominal aortic aneurysm.  The maximal dimension was 3.7 cm.    The patient's rheumatoid arthritis remains well controlled on current therapy.    PAST MEDICAL HISTORY:    1.  Seropositive rheumatoid arthritis, primary joint complaints of shoulders, feet and hips.  Currently on every 5 months, rituximab 5 mg with good control of symptoms.  2.  Dyslipidemia, on rosuvastatin.  3.  Hypertension. On losartan/hydrochlorothiazide.  4.  Factor V Leiden mutation, no current thrombotic diathesis.  5.  Old history of bilateral inguinal herniorrhaphies  6. AAA stable ( 3.7cm) on serial imaging. Asymptomatic.     Orders this Visit:  Orders Placed This Encounter   Procedures    US Abdominal Aorta Imaging     Orders Placed This Encounter   Medications    brimonidine (ALPHAGAN) 0.2 % ophthalmic solution    dorzolamide-timolol (COSOPT) 22.3-6.8 MG/ML ophthalmic solution    ketorolac (ACULAR) 0.5 % ophthalmic solution    prednisoLONE acetate (PRED FORTE) 1 %  ophthalmic suspension     Sig: INSTILL 1 DROP INTO THE LEFT EYE ONCE A DAY    amLODIPine (NORVASC) 5 MG tablet     Sig: Take 1 tablet (5 mg) by mouth daily     Dispense:  90 tablet     Refill:  11     There are no discontinued medications.    Encounter Diagnoses   Name Primary?    Essential hypertension     Dyslipidemia     Infrarenal abdominal aortic aneurysm (AAA) without rupture (H24) Yes       CURRENT MEDICATIONS:  Current Outpatient Medications   Medication Sig Dispense Refill    allopurinol (ZYLOPRIM) 300 MG tablet TAKE 1 TABLET BY MOUTH ONCE DAILY 90 tablet 3    amLODIPine (NORVASC) 5 MG tablet Take 1 tablet (5 mg) by mouth daily 90 tablet 11    brimonidine (ALPHAGAN) 0.2 % ophthalmic solution       cholestyramine light (PREVALITE) 4 GM powder DISSOLVE 1 LEVEL SCOOP IN LIQUID AND DRINK BY MOUTH TWICE DAILY AS DIRECTED 231 g 12    Cyanocobalamin (B-12) 100 MCG TABS       dorzolamide-timolol (COSOPT) 22.3-6.8 MG/ML ophthalmic solution       ketorolac (ACULAR) 0.5 % ophthalmic solution       levothyroxine (SYNTHROID/LEVOTHROID) 25 MCG tablet TAKE 1 TABLET (25 MCG) BY MOUTH DAILY 90 tablet 3    losartan-hydrochlorothiazide (HYZAAR) 100-12.5 MG tablet TAKE 1 TABLET BY MOUTH DAILY 90 tablet 3    omeprazole (PRILOSEC) 20 MG DR capsule Take 1 capsule (20 mg) by mouth daily 90 capsule 3    riTUXimab 500 MG/50ML SOLN Inject 1,000 mg into the vein      rosuvastatin (CRESTOR) 10 MG tablet Take 1 tablet (10 mg) by mouth daily 90 tablet 3    sildenafil (VIAGRA) 100 MG tablet 1/2 tab 1/2 hour prior to activity 3 tablet 4    vitamin C (ASCORBIC ACID) 1000 MG TABS Take 1,000 mg by mouth daily      VITAMIN D PO       diclofenac (VOLTAREN) 1 % GEL Apply  2 grams to shoulders three times daily using enclosed dosing card. (Patient not taking: Reported on 10/19/2023) 100 g 1    prednisoLONE acetate (PRED FORTE) 1 % ophthalmic suspension INSTILL 1 DROP INTO THE LEFT EYE ONCE A DAY (Patient not taking: Reported on 10/19/2023)    "      ALLERGIES     Allergies   Allergen Reactions    Lisinopril Cough    Plaquenil [Hydroxychloroquine] Hives       PAST MEDICAL, SURGICAL, FAMILY, SOCIAL HISTORY:  History was reviewed and updated as needed, see medical record.        Review of Systems:  A 12-point review of systems was completed, see medical record for detailed review of systems information.    Physical Exam:  Vitals: BP (!) 154/92 (BP Location: Right arm, Patient Position: Sitting, Cuff Size: Adult Regular)   Pulse 60   Resp 16   Ht 1.778 m (5' 10\")   Wt 93 kg (205 lb)   SpO2 97%   BMI 29.41 kg/m      Constitutional: No apparent distress    HEENT: pupils equal round reactive to light, thyroid normal size, JVP is normal    Chest: Clear to percussion and auscultation    Cardiac: Normal S1, normal S2 no S3, no murmur or click    Abdomen:  Liver percusses to 6 cm spleen is not palpable aorta is not tender or enlarged    Extremitiies: no edema.          ASSESSMENT: The patient's abdominal aortic aneurysm has remained stable on serial imaging over the last 2 years.  There is good correlation between the findings on abdominal ultrasound and noted on CT.  His blood pressures are above current American College of cardiology guideline target ( 130/80 mm Hg In addition to lifestyle intervention , I would add amlodipine 5 mg daily to his regimen. We reviewed the benefits of a regular exercise program, and a Mediterranean-style weight loss diet. The patient has an excellent primary care physician who can easily manage his hypertension and follow his abdominal aortic aneurysm which appears to be stable at this time.     I have ordered an abdominal ultrasound for about 1 year from now, and asked him to follow up with  in the next 3 months.  Indications for intervention for the AAA include new symptoms, rapid progression in aneurysm dimension and/or a dimension of 5.5 cm or greater.     RECOMMENDATIONS:   1) Mediterranean style weight loss diet " and regular exercise program  2) Add amlodipine 5 mg daily and monitor blood pressure with home cuff, target 130/80 or less  3) Abdominal ultrasound in one year and on annual basis as long as stable. Referral to vascular specialist with symptoms, rapid change in dimension and/or diameter greater than or equal to 5.5 cm.   4) See primary care MD for follow up of blood pressure in 3 months .    Unless his primary care MD prefers otherwise we will see on a prn basis.        Recent Lab Results:  LIPID RESULTS:  Lab Results   Component Value Date    CHOL 194 08/30/2023    CHOL 155 12/08/2020    HDL 55 08/30/2023    HDL 55 12/08/2020     (H) 08/30/2023    LDL 74 12/08/2020    TRIG 119 08/30/2023    TRIG 129 12/08/2020    CHOLHDLRATIO 3.0 10/04/2013       LIVER ENZYME RESULTS:  Lab Results   Component Value Date    AST 38 10/02/2023    AST 57 (H) 07/08/2021    ALT 57 10/02/2023     (H) 07/08/2021       CBC RESULTS:  Lab Results   Component Value Date    WBC 4.5 10/02/2023    WBC 4.1 06/25/2021    RBC 4.44 10/02/2023    RBC 4.29 (L) 06/25/2021    HGB 14.9 10/02/2023    HGB 14.3 06/25/2021    HCT 43.6 10/02/2023    HCT 41.2 06/25/2021    MCV 98 10/02/2023    MCV 96 06/25/2021    MCH 33.6 (H) 10/02/2023    MCH 33.3 (H) 06/25/2021    MCHC 34.2 10/02/2023    MCHC 34.7 06/25/2021    RDW 12.8 10/02/2023    RDW 13.0 06/25/2021     (L) 10/02/2023     (L) 06/25/2021       BMP RESULTS:  Lab Results   Component Value Date     10/02/2023     02/16/2021    POTASSIUM 4.1 10/02/2023    POTASSIUM 3.9 07/28/2022    POTASSIUM 4.2 06/25/2021    CHLORIDE 103 10/02/2023    CHLORIDE 108 07/28/2022    CHLORIDE 106 02/16/2021    CO2 27 10/02/2023    CO2 31 07/28/2022    CO2 32 02/16/2021    ANIONGAP 10 10/02/2023    ANIONGAP 2 (L) 07/28/2022    ANIONGAP 3 02/16/2021     (H) 10/02/2023     (H) 07/28/2022     (H) 02/16/2021    BUN 17.5 10/02/2023    BUN 17 07/28/2022    BUN 15 02/16/2021  "   CR 1.05 10/02/2023    CR 0.96 06/25/2021    GFRESTIMATED 76 10/02/2023    GFRESTIMATED >60 09/08/2023    GFRESTIMATED 80 06/25/2021    GFRESTBLACK >90 06/25/2021    ANDREZ 9.6 10/02/2023    ANDREZ 9.4 02/16/2021        A1C RESULTS:  No results found for: \"A1C\"    INR RESULTS:  Lab Results   Component Value Date    INR 1.07 10/02/2023    INR 0.94 01/23/2023    INR 0.9 04/09/2015       We greatly appreciate the opportunity to be involved in the care of your patient, Camilo Baca.    Sincerely,  Camilo Robles MD        Camilo Robles MD  [address]  [place] [address]                                                                   HISTORY OF PRESENT ILLNESS:  Camilo Baca a 73 year old male with   RA now well controlled active regular exercise program    Orders this Visit:  No orders of the defined types were placed in this encounter.    Orders Placed This Encounter   Medications    brimonidine (ALPHAGAN) 0.2 % ophthalmic solution    dorzolamide-timolol (COSOPT) 22.3-6.8 MG/ML ophthalmic solution    ketorolac (ACULAR) 0.5 % ophthalmic solution    prednisoLONE acetate (PRED FORTE) 1 % ophthalmic suspension     Sig: INSTILL 1 DROP INTO THE LEFT EYE ONCE A DAY     There are no discontinued medications.    Encounter Diagnoses   Name Primary?    Abdominal aortic aneurysm (AAA) without rupture (H24)     Essential hypertension     Dyslipidemia        CURRENT MEDICATIONS:  Current Outpatient Medications   Medication Sig Dispense Refill    allopurinol (ZYLOPRIM) 300 MG tablet TAKE 1 TABLET BY MOUTH ONCE DAILY 90 tablet 3    brimonidine (ALPHAGAN) 0.2 % ophthalmic solution       cholestyramine light (PREVALITE) 4 GM powder DISSOLVE 1 LEVEL SCOOP IN LIQUID AND DRINK BY MOUTH TWICE DAILY AS DIRECTED 231 g 12    Cyanocobalamin (B-12) 100 MCG TABS       dorzolamide-timolol (COSOPT) 22.3-6.8 MG/ML ophthalmic solution       ketorolac (ACULAR) 0.5 % ophthalmic solution       levothyroxine " "(SYNTHROID/LEVOTHROID) 25 MCG tablet TAKE 1 TABLET (25 MCG) BY MOUTH DAILY 90 tablet 3    losartan-hydrochlorothiazide (HYZAAR) 100-12.5 MG tablet TAKE 1 TABLET BY MOUTH DAILY 90 tablet 3    omeprazole (PRILOSEC) 20 MG DR capsule Take 1 capsule (20 mg) by mouth daily 90 capsule 3    riTUXimab 500 MG/50ML SOLN Inject 1,000 mg into the vein      rosuvastatin (CRESTOR) 10 MG tablet Take 1 tablet (10 mg) by mouth daily 90 tablet 3    sildenafil (VIAGRA) 100 MG tablet 1/2 tab 1/2 hour prior to activity 3 tablet 4    vitamin C (ASCORBIC ACID) 1000 MG TABS Take 1,000 mg by mouth daily      VITAMIN D PO       diclofenac (VOLTAREN) 1 % GEL Apply  2 grams to shoulders three times daily using enclosed dosing card. (Patient not taking: Reported on 10/19/2023) 100 g 1    prednisoLONE acetate (PRED FORTE) 1 % ophthalmic suspension INSTILL 1 DROP INTO THE LEFT EYE ONCE A DAY (Patient not taking: Reported on 10/19/2023)         ALLERGIES     Allergies   Allergen Reactions    Lisinopril Cough    Plaquenil [Hydroxychloroquine] Hives       PAST MEDICAL, SURGICAL, FAMILY, SOCIAL HISTORY:  History was reviewed and updated as needed, see medical record.        Review of Systems:  A 12-point review of systems was completed, see medical record for detailed review of systems information.    Physical Exam:  Vitals: BP (!) 154/92 (BP Location: Right arm, Patient Position: Sitting, Cuff Size: Adult Regular)   Pulse 60   Resp 16   Ht 1.778 m (5' 10\")   Wt 93 kg (205 lb)   SpO2 97%   BMI 29.41 kg/m      Constitutional: No apparent distress    HEENT: pupils equal round reactive to light, thyroid normal size, JVP is normal    Chest: Clear to percussion and auscultation    Cardiac: Normal S1, normal S2 no S3, no murmur or click    Abdomen: Scaphoid.  Liver percusses to 6 cm spleen is not palpable aorta is not tender or enlarged    Extremitiies: no edema.    Neurological:  Cranial nerves II through XII are intact, strength equal and " symmetrical, displays normal insight and judgment        ASSESSMENT: Camilo Baca is a 73 year old male with          We reviewed the benefits of a regular exercise program, a Mediterranean-style weight loss diet, and contacting us for any changes in between now and the time of her next visit.     RECOMMENDATIONS:   1) Follow up with primary care MD for hypertension management and follow up of stable infrarenal AAA  2) amlodipine 5 mg daily  3) Mediterranean style weight loss diet   4) regular exercise program  5) annual abdominal ultrasound with primary care to follow aneurysm        Recent Lab Results:  LIPID RESULTS:  Lab Results   Component Value Date    CHOL 194 08/30/2023    CHOL 155 12/08/2020    HDL 55 08/30/2023    HDL 55 12/08/2020     (H) 08/30/2023    LDL 74 12/08/2020    TRIG 119 08/30/2023    TRIG 129 12/08/2020    CHOLHDLRATIO 3.0 10/04/2013       LIVER ENZYME RESULTS:  Lab Results   Component Value Date    AST 38 10/02/2023    AST 57 (H) 07/08/2021    ALT 57 10/02/2023     (H) 07/08/2021       CBC RESULTS:  Lab Results   Component Value Date    WBC 4.5 10/02/2023    WBC 4.1 06/25/2021    RBC 4.44 10/02/2023    RBC 4.29 (L) 06/25/2021    HGB 14.9 10/02/2023    HGB 14.3 06/25/2021    HCT 43.6 10/02/2023    HCT 41.2 06/25/2021    MCV 98 10/02/2023    MCV 96 06/25/2021    MCH 33.6 (H) 10/02/2023    MCH 33.3 (H) 06/25/2021    MCHC 34.2 10/02/2023    MCHC 34.7 06/25/2021    RDW 12.8 10/02/2023    RDW 13.0 06/25/2021     (L) 10/02/2023     (L) 06/25/2021       BMP RESULTS:  Lab Results   Component Value Date     10/02/2023     02/16/2021    POTASSIUM 4.1 10/02/2023    POTASSIUM 3.9 07/28/2022    POTASSIUM 4.2 06/25/2021    CHLORIDE 103 10/02/2023    CHLORIDE 108 07/28/2022    CHLORIDE 106 02/16/2021    CO2 27 10/02/2023    CO2 31 07/28/2022    CO2 32 02/16/2021    ANIONGAP 10 10/02/2023    ANIONGAP 2 (L) 07/28/2022    ANIONGAP 3 02/16/2021     (H) 10/02/2023  "    (H) 07/28/2022     (H) 02/16/2021    BUN 17.5 10/02/2023    BUN 17 07/28/2022    BUN 15 02/16/2021    CR 1.05 10/02/2023    CR 0.96 06/25/2021    GFRESTIMATED 76 10/02/2023    GFRESTIMATED >60 09/08/2023    GFRESTIMATED 80 06/25/2021    GFRESTBLACK >90 06/25/2021    ANDREZ 9.6 10/02/2023    ANDREZ 9.4 02/16/2021        A1C RESULTS:  No results found for: \"A1C\"    INR RESULTS:  Lab Results   Component Value Date    INR 1.07 10/02/2023    INR 0.94 01/23/2023    INR 0.9 04/09/2015       We greatly appreciate the opportunity to be involved in the care of your patient, Camilo Baca.    Sincerely,  Camilo Robles MD            Camilo Robles MD  [address]  [place] [address]      "

## 2023-10-19 NOTE — NURSING NOTE
Camilo to follow up with Primary Care provider regarding elevated blood pressure.  Yamile BROWN [facility]  [phone]

## 2023-11-08 ENCOUNTER — LAB (OUTPATIENT)
Dept: LAB | Facility: CLINIC | Age: 73
End: 2023-11-08
Payer: COMMERCIAL

## 2023-11-08 DIAGNOSIS — I10 HYPERTENSION GOAL BP (BLOOD PRESSURE) < 140/90: ICD-10-CM

## 2023-11-08 DIAGNOSIS — Z11.59 ENCOUNTER FOR SCREENING FOR OTHER VIRAL DISEASES: ICD-10-CM

## 2023-11-08 DIAGNOSIS — M05.79 RHEUMATOID ARTHRITIS INVOLVING MULTIPLE SITES WITH POSITIVE RHEUMATOID FACTOR (H): ICD-10-CM

## 2023-11-08 LAB
ALBUMIN SERPL BCG-MCNC: 4.4 G/DL (ref 3.5–5.2)
ALP SERPL-CCNC: 55 U/L (ref 40–129)
ALT SERPL W P-5'-P-CCNC: 41 U/L (ref 0–70)
AST SERPL W P-5'-P-CCNC: 27 U/L (ref 0–45)
BASOPHILS # BLD AUTO: 0 10E3/UL (ref 0–0.2)
BASOPHILS NFR BLD AUTO: 1 %
BILIRUB DIRECT SERPL-MCNC: <0.2 MG/DL (ref 0–0.3)
BILIRUB SERPL-MCNC: 1 MG/DL
CREAT SERPL-MCNC: 0.85 MG/DL (ref 0.67–1.17)
CREAT UR-MCNC: 98.3 MG/DL
CRP SERPL-MCNC: <3 MG/L
EGFRCR SERPLBLD CKD-EPI 2021: >90 ML/MIN/1.73M2
EOSINOPHIL # BLD AUTO: 0.2 10E3/UL (ref 0–0.7)
EOSINOPHIL NFR BLD AUTO: 4 %
ERYTHROCYTE [DISTWIDTH] IN BLOOD BY AUTOMATED COUNT: 12.4 % (ref 10–15)
ERYTHROCYTE [SEDIMENTATION RATE] IN BLOOD BY WESTERGREN METHOD: 14 MM/HR (ref 0–20)
HBV CORE AB SERPL QL IA: NONREACTIVE
HBV SURFACE AG SERPL QL IA: NONREACTIVE
HCT VFR BLD AUTO: 40.7 % (ref 40–53)
HCV AB SERPL QL IA: NONREACTIVE
HGB BLD-MCNC: 13.9 G/DL (ref 13.3–17.7)
HIV 1+2 AB+HIV1 P24 AG SERPL QL IA: NONREACTIVE
IMM GRANULOCYTES # BLD: 0 10E3/UL
IMM GRANULOCYTES NFR BLD: 0 %
LYMPHOCYTES # BLD AUTO: 0.7 10E3/UL (ref 0.8–5.3)
LYMPHOCYTES NFR BLD AUTO: 16 %
MCH RBC QN AUTO: 32.9 PG (ref 26.5–33)
MCHC RBC AUTO-ENTMCNC: 34.2 G/DL (ref 31.5–36.5)
MCV RBC AUTO: 96 FL (ref 78–100)
MICROALBUMIN UR-MCNC: <12 MG/L
MICROALBUMIN/CREAT UR: NORMAL MG/G{CREAT}
MONOCYTES # BLD AUTO: 0.5 10E3/UL (ref 0–1.3)
MONOCYTES NFR BLD AUTO: 11 %
NEUTROPHILS # BLD AUTO: 2.9 10E3/UL (ref 1.6–8.3)
NEUTROPHILS NFR BLD AUTO: 68 %
NRBC # BLD AUTO: 0 10E3/UL
NRBC BLD AUTO-RTO: 0 /100
PLATELET # BLD AUTO: 135 10E3/UL (ref 150–450)
PROT SERPL-MCNC: 6.7 G/DL (ref 6.4–8.3)
RBC # BLD AUTO: 4.23 10E6/UL (ref 4.4–5.9)
WBC # BLD AUTO: 4.3 10E3/UL (ref 4–11)

## 2023-11-08 PROCEDURE — 85652 RBC SED RATE AUTOMATED: CPT

## 2023-11-08 PROCEDURE — 86803 HEPATITIS C AB TEST: CPT

## 2023-11-08 PROCEDURE — 85025 COMPLETE CBC W/AUTO DIFF WBC: CPT

## 2023-11-08 PROCEDURE — 86704 HEP B CORE ANTIBODY TOTAL: CPT

## 2023-11-08 PROCEDURE — 87389 HIV-1 AG W/HIV-1&-2 AB AG IA: CPT

## 2023-11-08 PROCEDURE — 80076 HEPATIC FUNCTION PANEL: CPT

## 2023-11-08 PROCEDURE — 86140 C-REACTIVE PROTEIN: CPT

## 2023-11-08 PROCEDURE — 36415 COLL VENOUS BLD VENIPUNCTURE: CPT

## 2023-11-08 PROCEDURE — 82570 ASSAY OF URINE CREATININE: CPT

## 2023-11-08 PROCEDURE — 87340 HEPATITIS B SURFACE AG IA: CPT

## 2023-11-08 PROCEDURE — 82043 UR ALBUMIN QUANTITATIVE: CPT

## 2023-11-08 PROCEDURE — 82565 ASSAY OF CREATININE: CPT

## 2023-11-17 ENCOUNTER — VIRTUAL VISIT (OUTPATIENT)
Dept: RHEUMATOLOGY | Facility: CLINIC | Age: 73
End: 2023-11-17
Payer: COMMERCIAL

## 2023-11-17 DIAGNOSIS — G89.29 CHRONIC RIGHT SHOULDER PAIN: ICD-10-CM

## 2023-11-17 DIAGNOSIS — M05.79 RHEUMATOID ARTHRITIS INVOLVING MULTIPLE SITES WITH POSITIVE RHEUMATOID FACTOR (H): Primary | ICD-10-CM

## 2023-11-17 DIAGNOSIS — M19.041 PRIMARY OSTEOARTHRITIS OF BOTH HANDS: ICD-10-CM

## 2023-11-17 DIAGNOSIS — M25.511 CHRONIC RIGHT SHOULDER PAIN: ICD-10-CM

## 2023-11-17 DIAGNOSIS — M19.042 PRIMARY OSTEOARTHRITIS OF BOTH HANDS: ICD-10-CM

## 2023-11-17 PROCEDURE — 99214 OFFICE O/P EST MOD 30 MIN: CPT | Mod: VID | Performed by: INTERNAL MEDICINE

## 2023-11-17 RX ORDER — HEPARIN SODIUM (PORCINE) LOCK FLUSH IV SOLN 100 UNIT/ML 100 UNIT/ML
5 SOLUTION INTRAVENOUS
Status: CANCELLED | OUTPATIENT
Start: 2024-01-14

## 2023-11-17 RX ORDER — METHYLPREDNISOLONE SODIUM SUCCINATE 125 MG/2ML
125 INJECTION, POWDER, LYOPHILIZED, FOR SOLUTION INTRAMUSCULAR; INTRAVENOUS
Status: CANCELLED
Start: 2024-01-14

## 2023-11-17 RX ORDER — MEPERIDINE HYDROCHLORIDE 25 MG/ML
25 INJECTION INTRAMUSCULAR; INTRAVENOUS; SUBCUTANEOUS EVERY 30 MIN PRN
Status: CANCELLED | OUTPATIENT
Start: 2024-01-14

## 2023-11-17 RX ORDER — ACETAMINOPHEN 325 MG/1
650 TABLET ORAL ONCE
Status: CANCELLED
Start: 2024-01-14

## 2023-11-17 RX ORDER — METHYLPREDNISOLONE SODIUM SUCCINATE 125 MG/2ML
125 INJECTION, POWDER, LYOPHILIZED, FOR SOLUTION INTRAMUSCULAR; INTRAVENOUS ONCE
Status: CANCELLED | OUTPATIENT
Start: 2024-01-14

## 2023-11-17 RX ORDER — DIPHENHYDRAMINE HCL 25 MG
50 CAPSULE ORAL ONCE
Status: CANCELLED
Start: 2024-01-14

## 2023-11-17 RX ORDER — DIPHENHYDRAMINE HYDROCHLORIDE 50 MG/ML
50 INJECTION INTRAMUSCULAR; INTRAVENOUS
Status: CANCELLED
Start: 2024-01-14

## 2023-11-17 RX ORDER — ALBUTEROL SULFATE 0.83 MG/ML
2.5 SOLUTION RESPIRATORY (INHALATION)
Status: CANCELLED | OUTPATIENT
Start: 2024-01-14

## 2023-11-17 RX ORDER — EPINEPHRINE 1 MG/ML
0.3 INJECTION, SOLUTION, CONCENTRATE INTRAVENOUS EVERY 5 MIN PRN
Status: CANCELLED | OUTPATIENT
Start: 2024-01-14

## 2023-11-17 RX ORDER — HEPARIN SODIUM,PORCINE 10 UNIT/ML
5-20 VIAL (ML) INTRAVENOUS DAILY PRN
Status: CANCELLED | OUTPATIENT
Start: 2024-01-14

## 2023-11-17 RX ORDER — ALBUTEROL SULFATE 90 UG/1
1-2 AEROSOL, METERED RESPIRATORY (INHALATION)
Status: CANCELLED
Start: 2024-01-14

## 2023-11-17 NOTE — PATIENT INSTRUCTIONS
RHEUMATOLOGY    [facility] [place] [place]  [address] [place] [address]  [place] [address]    Phone number: [phone]  Fax number: [phone]    If you need a medication refill, please contact us as you may need lab work and/or a follow up visit prior to your refill.      Thank you for choosing [facility]!    Jaquelin Muñiz CMA Rheumatology

## 2023-11-17 NOTE — PROGRESS NOTES
Camilo Baca  is a 73 year old year old who is being evaluated via a billable video visit.      How would you like to obtain your AVS? MyChart  If the video visit is dropped, the invitation should be resent by: Text to cell phone: [phone]  Will anyone else be joining your video visit? No      Rheumatology Video Visit      Camilo Baca MRN# [id number]   YOB: 1950 Age: 73 year old      Date of visit: 23   PCP: Lyndsey Aponte    Chief Complaint   Patient presents with:  Rheumatoid Arthritis    Assessment and Plan     1.  Seropositive rheumatoid arthritis (, CCP >250): Dx'd . Previously followed by [facility] and [facility].  Established with me on 3/20/2018. Previously on MTX (ineffective as monotherapy), Humira (effective, stopped because of insurance preference for IV), Simponi (rash on cheeks/nose after infusion), Remicade (lost efficacy), HCQ (facial rash), Orencia (partially effective), SSZ (cytopenia).  Currently on Rituximab (received 1g on each of the following: 3/10/2020 & 3/24/2020, 2020 & 10/6/2020, 2021 & 3/9/2021, 2021 & 2021, 2022 & 2022; 500 mg on each of the followin2022, 2023, 2023).  Has done great since starting rituximab and has not required NSAIDs or prednisone.  RA is controlled.   Chronic illness, stable.    - Rituximab (Truxima) 500 mg IV once, every 6 months; next due in 2024  - PRN RA flare: Prednisone 20mg daily x2days, then 15mg daily x2days, then 10mg daily x2days, then 5mg daily x2days, then stop.    - Lab 1-2 days before rituximab infusion: COVID-19 (he plans to do a home test, and if positive then he needs to delay the rituximab infusion and notify this clinic)  - Labs in 6 months: CBC, creatinine, hepatic panel, ESR, CRP              Rapid 3, cumulative scores                       2023: RA controlled (rituximab)                      2022: asymptomatic (Rituximab)                      2020:  doing well; virtual visit (Rituximab in March 2020; SSZ 500mg BID)                      03/03/2020: 9.8  (Orencia IV, SSZ 500mg BID)                       11/19/2019: 2.8  (Orencia IV, SSZ 500mg BID)                      08/20/2019: 4     (Orencia 750mg IV)                      04/23/2019: 0     (Orencia 750mg IV)                       01/08/2019: 0.5  (Simponi IV)                      11/06/2018: 9.3  (Simponi IV x2mo)    2.  History of impingement syndrome of both shoulders, now with intermittent right shoulder symptoms: injected 10/11/2019 PT exercises resolved the shoulder issue.   intermittent right shoulder symptoms now; home PT exercises did not resolve the intermittent shoulder pain so he is wanting to go to formal physical therapy.  Physical therapy was referred previously and I provided the phone number so that he may call to schedule.      3. History of mild plantar fasciitis, bilaterally: resolved with stretching and changing shoes.  Not an issue today.    4.  History of LFT elevation: Avoid hepatotoxins.  Avoid alcohol.  Following with hepatology.      5.  Vaccinations: Vaccinations reviewed with Mr. Baca.  Risks and benefits of vaccinations were discussed.  Discussed receiving the COVID-19 and influenza vaccination now, and then the RSV vaccine 2-4 weeks later.  All vaccines should be performed at least 2 weeks before the next rituximab infusion but preferably at least 4 weeks before the next rituximab infusion  - Influenza: Advised vaccination now  - [id number]: up to date  - [id number]: up to date  - Shingrix: Up to date  - COVID-19:  advised vaccination now.  - RSV: Discussed vaccination and he is going to consider receiving this 2-4 weeks after receiving COVID-19/influenza vaccines together    Total minutes spent in evaluation with patient, documentation, , and review of pertinent studies and chart notes: 20      Mr. Baca verbalized agreement with and understanding of the rational for  the diagnosis and treatment plan.  All questions were answered to best of my ability and the patient's satisfaction. Mr. Baca was advised to contact the clinic with any questions that may arise after the clinic visit.      Thank you for involving me in the care of the patient    Return to clinic: 6 months    HPI   Camilo Baca is a 73 year old male with a past medical history significant for hypertension, hyperlipidemia, GERD, hypothyroidism, history of DVT, factor V Leiden mutation, history of thrombocytopenia, gout, and rheumatoid arthritis who presents for follow-up of rheumatoid arthritis.     Today, 11/17/2023: Currently doing well.  No joint pain or swelling except for the right shoulder that intermittently bothers him so he is wanting to go to physical therapy now.  Arthritis does not limit his daily activities.  No morning stiffness or gelling phenomenon.    Denies fevers, chills, nausea, vomiting, constipation, diarrhea. No abdominal pain. No chest pain/pressure, palpitations, or shortness of breath. No LE swelling. No neck pain. No oral or nasal sores.  No rash. No sicca symptoms.      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    ROS   12 point review of system was completed and negative except as noted in the HPI     Active Problem List     Patient Active Problem List   Diagnosis    Lumbago    Mixed hyperlipidemia    NONSPECIFIC MEDICAL HISTORY    HYPERLIPIDEMIA LDL GOAL <130    History of adenomatous polyp of colon    Idiopathic chronic gout without tophus, unspecified site    Advanced directives, counseling/discussion    Seropositive rheumatoid arthritis (H)    High risk medication use    Thrombocytopenia (H24)    Gastroesophageal reflux disease without esophagitis    Factor 5 Leiden mutation, heterozygous (H24)    Personal history of venous thrombosis and embolism    Personal history of DVT (deep  vein thrombosis)    Hypothyroidism due to acquired atrophy of thyroid    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)    Secondary hypertension with goal blood pressure less than 140/90    Rheumatoid arthritis involving multiple sites with positive rheumatoid factor (H)    Gout    Sensorineural hearing loss (SNHL)    Tinnitus    Immunocompromised (H24)     Past Medical History     Past Medical History:   Diagnosis Date    Alopecia, unspecified     Closed fracture of unspecified part of humerus 1974    Arm fracture / left wrist    Esophageal reflux 2/6/2015    Factor 5 Leiden mutation, heterozygous (H24)     Gout 3/28/2011    Herpes zoster without mention of complication 1965    Herpes zoster    Measles without mention of complication     Measles    Personal history of DVT (deep vein thrombosis) 4/9/2015    Personal history of venous thrombosis and embolism 4/9/2015    Pulmonary embolism (H) 12/1996    post appendectomy    RA (rheumatoid arthritis) (H) 3/11/2013    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    Rheumatoid arthritis(714.0)     Beginning symptoms    Secondary hypertension with goal blood pressure less than 140/90 11/15/2016    Thrombocytopenia, unspecified (H24) 1/29/2014    Varicella without mention of complication     Chickenpox     Past Surgical History     Past Surgical History:   Procedure Laterality Date    COLONOSCOPY  11/15/2010    COLONOSCOPY performed by DARIUS MESA at  [facility]    COLONOSCOPY N/A 11/2/2015    Procedure: COLONOSCOPY;  Surgeon: Bubba Odom MD;  Location:  [facility]    COLONOSCOPY N/A 3/19/2021    Procedure: Colonoscopy;  Surgeon: Ludmila Beltran MD;  Location:  GI    ESOPHAGOSCOPY, GASTROSCOPY, DUODENOSCOPY (EGD), COMBINED N/A 10/24/2018    Procedure: Esophagogastroduodenoscopy Multiple Biopsies by Biopsy;  Surgeon: Matteo Johnson DO;  Location:  GI    HC REPAIR ING  HERNIA,5+Y/O,REDUCIBL  1960    HERNIORRHAPHY UMBILICAL N/A 4/17/2015    Procedure: HERNIORRHAPHY UMBILICAL;  Surgeon: Rayray Avila MD;  Location: [facility]    LAPAROSCOPIC HERNIORRHAPHY INGUINAL BILATERAL Bilateral 4/17/2015    Procedure: LAPAROSCOPIC HERNIORRHAPHY INGUINAL BILATERAL;  Surgeon: Rayray Avila MD;  Location: [facility] OR    ZZC APPENDECTOMY  1996    [facility] COLONOSCOPY W BIOPSY  08/10/05     Allergy     Allergies   Allergen Reactions    Lisinopril Cough    Plaquenil [Hydroxychloroquine] Hives     Current Medication List     Current Outpatient Medications   Medication Sig    allopurinol (ZYLOPRIM) 300 MG tablet TAKE 1 TABLET BY MOUTH ONCE DAILY    amLODIPine (NORVASC) 5 MG tablet Take 1 tablet (5 mg) by mouth daily    brimonidine (ALPHAGAN) 0.2 % ophthalmic solution     cholestyramine light (PREVALITE) 4 GM powder DISSOLVE 1 LEVEL SCOOP IN LIQUID AND DRINK BY MOUTH TWICE DAILY AS DIRECTED    Cyanocobalamin (B-12) 100 MCG TABS     diclofenac (VOLTAREN) 1 % GEL Apply  2 grams to shoulders three times daily using enclosed dosing card.    dorzolamide-timolol (COSOPT) 22.3-6.8 MG/ML ophthalmic solution     ketorolac (ACULAR) 0.5 % ophthalmic solution     levothyroxine (SYNTHROID/LEVOTHROID) 25 MCG tablet TAKE 1 TABLET (25 MCG) BY MOUTH DAILY    losartan-hydrochlorothiazide (HYZAAR) 100-12.5 MG tablet TAKE 1 TABLET BY MOUTH DAILY    omeprazole (PRILOSEC) 20 MG DR capsule Take 1 capsule (20 mg) by mouth daily    prednisoLONE acetate (PRED FORTE) 1 % ophthalmic suspension     riTUXimab 500 MG/50ML SOLN Inject 1,000 mg into the vein    rosuvastatin (CRESTOR) 10 MG tablet Take 1 tablet (10 mg) by mouth daily    sildenafil (VIAGRA) 100 MG tablet 1/2 tab 1/2 hour prior to activity    vitamin C (ASCORBIC ACID) 1000 MG TABS Take 1,000 mg by mouth daily    VITAMIN D PO      Current Facility-Administered Medications   Medication    cilgavimab 300 mg/tixagevimab 300 mg (EVUSHELD) - FULL DOSE *FOR CLINIC USE  "ONLY*         Social History   See HPI    Family History     Family History   Problem Relation Age of Onset    Arthritis Mother     Cardiovascular Mother     Depression Mother     Gastrointestinal Disease Mother         IBS    Hypertension Mother     Circulatory Mother         Aortal aneurysm    Osteoporosis Mother     Allergies Father         Cortisone    Cardiovascular Father     Circulatory Father     Diabetes Father     Hypertension Father     Lipids Father     Alzheimer Disease Paternal Grandfather     Blood Disease Paternal Grandmother         Clotting problems    Blood Disease Son         Factor 5    Hypertension Maternal Aunt     Cerebrovascular Disease Maternal Grandmother        Physical Exam     Temp Readings from Last 3 Encounters:   08/30/23 97.3  F (36.3  C)   07/14/23 97.8  F (36.6  C) (Oral)   01/13/23 96.8  F (36  C) (Temporal)     BP Readings from Last 5 Encounters:   10/19/23 (!) 154/92   10/02/23 (!) 156/89   08/30/23 124/84   07/14/23 (!) 153/79   01/23/23 (!) 149/90     Pulse Readings from Last 1 Encounters:   10/19/23 60     Resp Readings from Last 1 Encounters:   10/19/23 16     Estimated body mass index is 29.41 kg/m  as calculated from the following:    Height as of 10/19/23: 1.778 m (5' 10\").    Weight as of 10/19/23: 93 kg (205 lb).    GEN: NAD. Healthy appearing adult.   HEENT:  Anicteric, noninjected sclera. No obvious external lesions of the ear and nose. Hearing intact.  PULM: No increased work of breathing  MSK:  Hands and wrists without swelling.  No difficulty making a fist with his hands.  SKIN: No rash or jaundice seen  PSYCH: Alert. Appropriate.     Labs / Imaging (select studies)     CBC  Recent Labs   Lab Test 11/08/23  0850 10/02/23  1415 05/17/23  0828 07/28/22  0838 05/26/22  0855 11/17/21  0944 06/25/21  0841 08/27/20  0824 05/28/20  0927 02/10/20  0922 11/12/19  0748 10/15/19  0827   WBC 4.3 4.5 4.6   < > 3.5*   < > 4.1 3.5* 3.6* 4.1   < > 4.8   RBC 4.23* 4.44 4.32*   < > " 4.38*   < > 4.29* 4.21* 4.29* 4.21*   < > 4.26*   HGB 13.9 14.9 14.5   < > 14.8   < > 14.3 13.8 13.9 13.7   < > 13.7   HCT 40.7 43.6 42.2   < > 42.7   < > 41.2 41.4 41.7 40.6   < > 41.1   MCV 96 98 98   < > 98   < > 96 98 97 96   < > 97   RDW 12.4 12.8 13.0   < > 12.9   < > 13.0 13.0 13.2 12.6   < > 13.5   * 120* 107*   < > 104*   < > 129* 102* 117* 105*   < > 144*   MCH 32.9 33.6* 33.6*   < > 33.8*   < > 33.3* 32.8 32.4 32.5   < > 32.2   MCHC 34.2 34.2 34.4   < > 34.7   < > 34.7 33.3 33.3 33.7   < > 33.3   NEUTROPHIL 68  --  64  --  61  --  65.7 61.9 59.2 54.9   < > 60.4   LYMPH 16  --  18  --  18  --  17.3 19.5 19.6 29.0   < > 24.4   MONOCYTE 11  --  13  --  15  --  12.7 13.0 14.9 13.5   < > 14.0   EOSINOPHIL 4  --  4  --  6  --  3.6 4.5 5.2 2.4   < > 0.8   BASOPHIL 1  --  1  --  0  --  0.5 0.8 0.8 0.2   < > 0.4   ANEU  --   --   --   --   --   --  2.7 2.2 2.2 2.3   < > 2.9   ALYM  --   --   --   --   --   --  0.7* 0.7* 0.7* 1.2   < > 1.2   KIESHA  --   --   --   --   --   --  0.5 0.5 0.5 0.6   < > 0.7   AEOS  --   --   --   --   --   --  0.2  --   --  0.1  --  0.0   ABAS  --   --   --   --   --   --  0.0 0.0 0.0 0.0   < > 0.0   ANEUTAUTO 2.9  --  2.9  --  2.1  --   --   --   --   --   --   --    ALYMPAUTO 0.7*  --  0.8  --  0.6*  --   --   --   --   --   --   --    AMONOAUTO 0.5  --  0.6  --  0.5  --   --   --   --   --   --   --    AEOSAUTO 0.2  --  0.2  --  0.2  --   --   --   --   --   --   --    ABSBASO 0.0  --  0.0  --  0.0  --   --   --   --   --   --   --     < > = values in this interval not displayed.     CMP  Recent Labs   Lab Test 11/08/23  0850 10/02/23  1415 09/08/23  0920 08/30/23  0900 05/17/23  0828 01/23/23  1253 08/16/22  0844 07/28/22  0838 07/08/21  0958 06/25/21  0841 03/29/21  1013 02/16/21  0947 08/27/20  0824   NA  --  140  --   --   --  140  --  141   < >  --   --  141  --    POTASSIUM  --  4.1  --   --   --  4.1  --  3.9   < > 4.2   < > 4.4  --    CHLORIDE  --  103  --   --   --   102  --  108   < >  --   --  106  --    CO2  --  27  --   --   --  28  --  31   < >  --   --  32  --    ANIONGAP  --  10  --   --   --  10  --  2*   < >  --   --  3  --    GLC  --  102*  --   --   --  133*  --  127*   < >  --   --  119*  --    BUN  --  17.5  --   --   --  18.3  --  17   < >  --   --  15  --    CR 0.85 1.05 0.8  --  0.93 0.89  --  0.79   < > 0.96  --  0.84 0.83   GFRESTIMATED >90 76 >60  --  88 >90  --  >90   < > 80  --  90 >90   GFRESTBLACK  --   --   --   --   --   --   --   --   --  >90  --  >90 >90   ANDREZ  --  9.6  --   --   --  9.5  --  9.2   < >  --   --  9.4  --    BILITOTAL 1.0 1.1  --   --  1.1 1.0  --  1.2   < > 1.0  --   --  1.2   ALBUMIN 4.4 4.8  --   --  4.3 4.5   < > 3.8   < > 3.8  --   --  3.6   PROTTOTAL 6.7 7.2  --   --  6.8 7.2  --  7.0   < > 7.0  --   --  7.2   ALKPHOS 55 56  --   --  51 57  --  53   < > 63  --   --  71   AST 27 38  --   --  30 41   < > 50*   < > 49*  --   --  30   ALT 41 57  --  64 64* 75*   < > 126*   < > 119*  --   --  47    < > = values in this interval not displayed.     Calcium/VitaminD  Recent Labs   Lab Test 10/02/23  1415 01/23/23  1253 07/28/22  0838 11/06/18  0913 06/26/18  0955   ANDREZ 9.6 9.5 9.2   < >  --    VITDT  --   --   --   --  25    < > = values in this interval not displayed.     ESR/CRP  Recent Labs   Lab Test 11/08/23  0850 05/17/23  0828 05/26/22  0855 06/25/21  0841 08/27/20  0824   SED 14 8 8 10 9   CRP  --   --  <2.9 <2.9 <2.9   CRPI <3.00 <3.00  --   --   --      Lipid Panel  Recent Labs   Lab Test 08/30/23  0900 10/14/22  0831 08/16/22  0844   CHOL 194 174 211*   TRIG 119 102 158*   HDL 55 58 46   * 96 133*   NHDL 139* 116 165*     Hepatitis B  Recent Labs   Lab Test 11/08/23  0850 05/26/22  0855 06/25/21  0841   HBCAB Nonreactive Nonreactive Nonreactive   HEPBANG Nonreactive Nonreactive Nonreactive     Hepatitis C  Recent Labs   Lab Test 11/08/23  0850   HCVAB Nonreactive     Tuberculosis Screening  Recent Labs   Lab Test  05/17/23  0828 05/26/22  0855 06/25/21  0841 03/03/20  1405 03/20/18  1008   TBRES Negative Negative  --  Negative  --    TBRSLT  --   --   --   --  Negative   TBAGN  --   --   --   --  0.14   TBRST  --   --  Negative  --   --      HIV Screening  Recent Labs   Lab Test 11/08/23  0850   HIAGAB Nonreactive       Immunization History     Immunization History   Administered Date(s) Administered    COVID-19 Bivalent 12+ (Pfizer) 05/31/2023    COVID-19 Bivalent 18+ (Moderna) 10/31/2022    COVID-19 Monovalent 18+ (Moderna) 02/26/2021, 03/25/2021, 01/03/2022, 05/26/2022    Flu, Unspecified 02/01/2011, 10/19/2011    HepB, Unspecified 08/19/1991, 09/23/1991, 02/24/1992    Influenza (High Dose) 3 valent vaccine 10/06/2017, 10/03/2018, 10/23/2019    Influenza (IIV3) PF 10/01/2009, 10/27/2010, 10/11/2011    Influenza Vaccine 18-64 (Flublok) 10/27/2021    Influenza Vaccine 65+ (Fluzone HD) 09/02/2020    Influenza Vaccine >6 months (Alfuria,Fluzone) 10/09/2013, 10/23/2014, 09/18/2015, 11/15/2016    Pneumo Conj 13-V (2010&after) 10/06/2017    Pneumococcal 23 valent 10/23/2014, 10/23/2019    TD,PF 7+ (Tenivac) 09/05/2001    TDAP Vaccine (Boostrix) 10/18/2012    Zoster recombinant adjuvanted (SHINGRIX) 01/31/2019, 04/18/2019    Zoster vaccine, live 10/23/2014          Chart documentation done in part with Dragon Voice recognition Software. Although reviewed after completion, some word and grammatical error may remain.      Video-Visit Details    Type of service:  Video Visit    Video Start Time: 10:49 AM    Video End Time: 11:05 AM    Originating Location (pt. Location): Home, [place]    Distant Location (provider location):  off site, [place]    Platform used for Video Visit: Mariza Gomes MD

## 2023-12-09 ENCOUNTER — ANCILLARY PROCEDURE (OUTPATIENT)
Dept: MRI IMAGING | Facility: CLINIC | Age: 73
End: 2023-12-09
Attending: STUDENT IN AN ORGANIZED HEALTH CARE EDUCATION/TRAINING PROGRAM
Payer: COMMERCIAL

## 2023-12-09 DIAGNOSIS — K74.00 HEPATIC FIBROSIS, UNSPECIFIED: ICD-10-CM

## 2023-12-09 DIAGNOSIS — R79.89 ELEVATED LFTS: ICD-10-CM

## 2023-12-09 PROCEDURE — 74181 MRI ABDOMEN W/O CONTRAST: CPT | Performed by: STUDENT IN AN ORGANIZED HEALTH CARE EDUCATION/TRAINING PROGRAM

## 2023-12-18 ENCOUNTER — PATIENT OUTREACH (OUTPATIENT)
Dept: GASTROENTEROLOGY | Facility: CLINIC | Age: 73
End: 2023-12-18
Payer: COMMERCIAL

## 2024-01-08 ENCOUNTER — TRANSFERRED RECORDS (OUTPATIENT)
Dept: HEALTH INFORMATION MANAGEMENT | Facility: CLINIC | Age: 74
End: 2024-01-08
Payer: COMMERCIAL

## 2024-01-12 ENCOUNTER — INFUSION THERAPY VISIT (OUTPATIENT)
Dept: INFUSION THERAPY | Facility: CLINIC | Age: 74
End: 2024-01-12
Attending: INTERNAL MEDICINE
Payer: MEDICARE

## 2024-01-12 ENCOUNTER — APPOINTMENT (OUTPATIENT)
Dept: LAB | Facility: CLINIC | Age: 74
End: 2024-01-12
Payer: COMMERCIAL

## 2024-01-12 VITALS
HEART RATE: 51 BPM | BODY MASS INDEX: 29.63 KG/M2 | OXYGEN SATURATION: 97 % | TEMPERATURE: 97.2 F | WEIGHT: 206.5 LBS | DIASTOLIC BLOOD PRESSURE: 71 MMHG | SYSTOLIC BLOOD PRESSURE: 135 MMHG | RESPIRATION RATE: 18 BRPM

## 2024-01-12 DIAGNOSIS — M05.79 RHEUMATOID ARTHRITIS INVOLVING MULTIPLE SITES WITH POSITIVE RHEUMATOID FACTOR (H): Primary | ICD-10-CM

## 2024-01-12 LAB
CD19 B CELL COMMENT: ABNORMAL
CD19 CELLS # BLD: 25 CELLS/UL (ref 107–698)
CD19 CELLS NFR BLD: 3 % (ref 6–27)
IGA SERPL-MCNC: 249 MG/DL (ref 84–499)
IGG SERPL-MCNC: 645 MG/DL (ref 610–1616)
IGM SERPL-MCNC: 22 MG/DL (ref 35–242)

## 2024-01-12 PROCEDURE — 96415 CHEMO IV INFUSION ADDL HR: CPT

## 2024-01-12 PROCEDURE — 96375 TX/PRO/DX INJ NEW DRUG ADDON: CPT

## 2024-01-12 PROCEDURE — 82784 ASSAY IGA/IGD/IGG/IGM EACH: CPT | Performed by: INTERNAL MEDICINE

## 2024-01-12 PROCEDURE — 36415 COLL VENOUS BLD VENIPUNCTURE: CPT | Performed by: INTERNAL MEDICINE

## 2024-01-12 PROCEDURE — 96413 CHEMO IV INFUSION 1 HR: CPT

## 2024-01-12 PROCEDURE — 250N000013 HC RX MED GY IP 250 OP 250 PS 637: Performed by: INTERNAL MEDICINE

## 2024-01-12 PROCEDURE — 86355 B CELLS TOTAL COUNT: CPT | Performed by: INTERNAL MEDICINE

## 2024-01-12 PROCEDURE — 258N000003 HC RX IP 258 OP 636: Performed by: INTERNAL MEDICINE

## 2024-01-12 PROCEDURE — 250N000011 HC RX IP 250 OP 636: Performed by: INTERNAL MEDICINE

## 2024-01-12 RX ORDER — METHYLPREDNISOLONE SODIUM SUCCINATE 125 MG/2ML
125 INJECTION, POWDER, LYOPHILIZED, FOR SOLUTION INTRAMUSCULAR; INTRAVENOUS ONCE
Status: CANCELLED | OUTPATIENT
Start: 2024-01-12

## 2024-01-12 RX ORDER — ALBUTEROL SULFATE 90 UG/1
1-2 AEROSOL, METERED RESPIRATORY (INHALATION)
Start: 2024-01-12

## 2024-01-12 RX ORDER — DIPHENHYDRAMINE HCL 25 MG
50 CAPSULE ORAL ONCE
Status: CANCELLED
Start: 2024-01-12

## 2024-01-12 RX ORDER — MEPERIDINE HYDROCHLORIDE 25 MG/ML
25 INJECTION INTRAMUSCULAR; INTRAVENOUS; SUBCUTANEOUS EVERY 30 MIN PRN
OUTPATIENT
Start: 2024-01-12

## 2024-01-12 RX ORDER — EPINEPHRINE 1 MG/ML
0.3 INJECTION, SOLUTION, CONCENTRATE INTRAVENOUS EVERY 5 MIN PRN
OUTPATIENT
Start: 2024-01-12

## 2024-01-12 RX ORDER — HEPARIN SODIUM,PORCINE 10 UNIT/ML
5-20 VIAL (ML) INTRAVENOUS DAILY PRN
Status: DISCONTINUED | OUTPATIENT
Start: 2024-01-12 | End: 2024-01-12 | Stop reason: HOSPADM

## 2024-01-12 RX ORDER — ACETAMINOPHEN 325 MG/1
650 TABLET ORAL ONCE
Status: COMPLETED | OUTPATIENT
Start: 2024-01-12 | End: 2024-01-12

## 2024-01-12 RX ORDER — ALBUTEROL SULFATE 0.83 MG/ML
2.5 SOLUTION RESPIRATORY (INHALATION)
OUTPATIENT
Start: 2024-01-12

## 2024-01-12 RX ORDER — DIPHENHYDRAMINE HYDROCHLORIDE 50 MG/ML
50 INJECTION INTRAMUSCULAR; INTRAVENOUS
Start: 2024-01-12

## 2024-01-12 RX ORDER — METHYLPREDNISOLONE SODIUM SUCCINATE 125 MG/2ML
125 INJECTION, POWDER, LYOPHILIZED, FOR SOLUTION INTRAMUSCULAR; INTRAVENOUS ONCE
Status: COMPLETED | OUTPATIENT
Start: 2024-01-12 | End: 2024-01-12

## 2024-01-12 RX ORDER — HEPARIN SODIUM,PORCINE 10 UNIT/ML
5-20 VIAL (ML) INTRAVENOUS DAILY PRN
Status: CANCELLED | OUTPATIENT
Start: 2024-01-12

## 2024-01-12 RX ORDER — ACETAMINOPHEN 325 MG/1
650 TABLET ORAL ONCE
Status: CANCELLED
Start: 2024-01-12

## 2024-01-12 RX ORDER — HEPARIN SODIUM (PORCINE) LOCK FLUSH IV SOLN 100 UNIT/ML 100 UNIT/ML
5 SOLUTION INTRAVENOUS
OUTPATIENT
Start: 2024-01-12

## 2024-01-12 RX ORDER — METHYLPREDNISOLONE SODIUM SUCCINATE 125 MG/2ML
125 INJECTION, POWDER, LYOPHILIZED, FOR SOLUTION INTRAMUSCULAR; INTRAVENOUS
Start: 2024-01-12

## 2024-01-12 RX ORDER — DIPHENHYDRAMINE HCL 25 MG
50 CAPSULE ORAL ONCE
Status: COMPLETED | OUTPATIENT
Start: 2024-01-12 | End: 2024-01-12

## 2024-01-12 RX ADMIN — ACETAMINOPHEN 650 MG: 325 TABLET, FILM COATED ORAL at 10:10

## 2024-01-12 RX ADMIN — SODIUM CHLORIDE 250 ML: 9 INJECTION, SOLUTION INTRAVENOUS at 09:51

## 2024-01-12 RX ADMIN — METHYLPREDNISOLONE SODIUM SUCCINATE 125 MG: 125 INJECTION, POWDER, FOR SOLUTION INTRAMUSCULAR; INTRAVENOUS at 10:10

## 2024-01-12 RX ADMIN — DIPHENHYDRAMINE HYDROCHLORIDE 50 MG: 25 CAPSULE ORAL at 10:10

## 2024-01-12 RX ADMIN — RITUXIMAB-ABBS 500 MG: 10 INJECTION, SOLUTION INTRAVENOUS at 10:30

## 2024-01-12 NOTE — PROGRESS NOTES
Infusion Nursing Note:  Camilo Baca presents today for Rapid Rltuxan.    Patient seen by provider today: No   present during visit today: Not Applicable.    Note: Patient states he feels well today, no complaints.       Intravenous Access:  Peripheral IV placed.    Treatment Conditions:  Biological Infusion Checklist:  ~~~ NOTE: If the patient answers yes to any of the questions below, hold the infusion and contact ordering provider or on-call provider.    Have you recently had an elevated temperature, fever, chills, productive cough, coughing for 3 weeks or longer or hemoptysis,  abnormal vital signs, night sweats,  chest pain or have you noticed a decrease in your appetite, unexplained weight loss or fatigue? No  Do you have any open wounds or new incisions? No  Do you have any upcoming hospitalizations or surgeries? Does not include esophagogastroduodenoscopy, colonoscopy, endoscopic retrograde cholangiopancreatography (ERCP), endoscopic ultrasound (EUS), dental procedures or joint aspiration/steroid injections No  Do you currently have any signs of illness or infection or are you on any antibiotics? No  Have you had any new, sudden or worsening abdominal pain? No  Have you or anyone in your household received a live vaccination in the past 4 weeks? Please note: No live vaccines while on biologic/chemotherapy until 6 months after the last treatment. Patient can receive the flu vaccine (shot only), pneumovax and the Covid vaccine. It is optimal for the patient to get these vaccines mid cycle, but they can be given at any time as long as it is not on the day of the infusion. No  Have you recently been diagnosed with any new nervous system diseases (ie. Multiple sclerosis, Guillain [place], seizures, neurological changes) or cancer diagnosis? Are you on any form of radiation or chemotherapy? No  Are you pregnant or breast feeding or do you have plans of pregnancy in the future? N/A  Have you been having  any signs of worsening depression or suicidal ideations?  (benlysta only) N/A  Have there been any other new onset medical symptoms? No  Have you had any new blood clots? (IVIG only) N/A      Post Infusion Assessment:  Patient tolerated infusion without incident.  Patient observed for 15 minutes post Rapid Rltuxan per protocol.  No evidence of extravasations.  Access discontinued per protocol.  Biologic Infusion Post Education: Call the triage nurse at your clinic or seek medical attention if you have chills and/or temperature greater than or equal to 100.5, uncontrolled nausea/vomiting, diarrhea, constipation, dizziness, shortness of breath, chest pain, heart palpitations, weakness or any other new or concerning symptoms, questions or concerns.  You cannot have any live virus vaccines prior to or during treatment or up to 6 months post infusion.  If you have an upcoming surgery, medical procedure or dental procedure during treatment, this should be discussed with your ordering physician and your surgeon/dentist.  If you are having any concerning symptom, if you are unsure if you should get your next infusion or wish to speak to a provider before your next infusion, please call your care coordinator or triage nurse at your clinic to notify them so we can adequately serve you.       Discharge Plan:   AVS to patient via [employer].  Patient will return 7/12/2024 for next appointment.   Patient discharged in stable condition accompanied by: self.  Departure Mode: Ambulatory.      Denisa Camargo RN

## 2024-01-24 NOTE — PATIENT INSTRUCTIONS
Pt to return on 6-28-19 for Orencia. Copies of medication list and upcoming appointments given prior to discharge.    Recommend that we recheck the B12 and get Neuropsych testing to further evaluate the memory.  Will increase Lexapro 10 mg for anxiety and see if that helps.  Follow up after the Neuropsych testing. Once you have it scheduled give the office a call and will try to set up a follow up about one month later.

## 2024-02-05 ENCOUNTER — TRANSFERRED RECORDS (OUTPATIENT)
Dept: HEALTH INFORMATION MANAGEMENT | Facility: CLINIC | Age: 74
End: 2024-02-05
Payer: COMMERCIAL

## 2024-02-05 LAB — RETINOPATHY: NEGATIVE

## 2024-03-26 DIAGNOSIS — R19.5 LOOSE STOOLS: ICD-10-CM

## 2024-03-26 RX ORDER — CHOLESTYRAMINE LIGHT 4 G/5.7G
POWDER, FOR SUSPENSION ORAL
Qty: 231 G | Refills: 12 | Status: SHIPPED | OUTPATIENT
Start: 2024-03-26

## 2024-04-09 ENCOUNTER — OFFICE VISIT (OUTPATIENT)
Dept: AUDIOLOGY | Facility: CLINIC | Age: 74
End: 2024-04-09
Payer: COMMERCIAL

## 2024-04-09 DIAGNOSIS — H90.3 SENSORINEURAL HEARING LOSS, ASYMMETRICAL: Primary | ICD-10-CM

## 2024-04-09 DIAGNOSIS — H93.13 TINNITUS OF BOTH EARS: ICD-10-CM

## 2024-04-09 PROCEDURE — 92557 COMPREHENSIVE HEARING TEST: CPT | Performed by: AUDIOLOGIST

## 2024-04-09 PROCEDURE — 92550 TYMPANOMETRY & REFLEX THRESH: CPT | Performed by: AUDIOLOGIST

## 2024-04-09 NOTE — PROGRESS NOTES
AUDIOLOGY REPORT - HEARING TEST    BACKGROUND:  Self-referred for concern regarding increased functional difficulty hearing especially in group situations. Patient notes extensive history of firearm noise exposure as an [employer]. 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 Consistent use of hearing protection. He notes constant bilateral tinnitus, frequency modulates and volume fluctuates. Denied dizziness, vertigo, and denied any falls in the last year. Passed safety screening. Denied ear problems or history of ear surgeries. He noted he is service connected for hearing loss and tinnitus. [facility] but was not able to adjust to the sound quality and ultimately discontinued usage of the device.    RESULTS:  Otoscopy showed visible eardrums and landmarks bilaterally. Tympanograms showed normal eardrum mobility right ear and left ear. Reflexes: Ipsi and contra present bilaterally. Pure tones right ear showed normal hearing sensitivity 250-2k Hz sloping to mild to moderate sensorineural hearing loss right ear and normal hearing sensitivity 250-500 Hz sloping to mild to severe rising to moderate sensorineural hearing loss left ear. Good SRT/PTA agreement. Good word understanding in quiet, 100% right and 82% left, 50-word lists.     RECOMMENDATIONS:  ENT consultation given asymmetric sensorineural hearing loss, for words and tones.   Discussed options for hearing aid consultation, [facility] or [facility]   Return in 1-2 years for hearing testing, sooner if there are changes in hearing, tinnitus, or balance.        Ezekiel Amezcua.  [place] Licensed Audiologist #[id number]

## 2024-04-12 ENCOUNTER — TRANSFERRED RECORDS (OUTPATIENT)
Dept: HEALTH INFORMATION MANAGEMENT | Facility: CLINIC | Age: 74
End: 2024-04-12
Payer: COMMERCIAL

## 2024-04-20 DIAGNOSIS — M10.9 ACUTE GOUTY ARTHROPATHY: ICD-10-CM

## 2024-04-20 DIAGNOSIS — I15.9 SECONDARY HYPERTENSION WITH GOAL BLOOD PRESSURE LESS THAN 140/90: ICD-10-CM

## 2024-04-20 DIAGNOSIS — E03.4 HYPOTHYROIDISM DUE TO ACQUIRED ATROPHY OF THYROID: ICD-10-CM

## 2024-04-22 RX ORDER — ALLOPURINOL 300 MG/1
TABLET ORAL
Qty: 90 TABLET | Refills: 0 | Status: SHIPPED | OUTPATIENT
Start: 2024-04-22 | End: 2024-09-06

## 2024-04-22 RX ORDER — LOSARTAN POTASSIUM AND HYDROCHLOROTHIAZIDE 12.5; 1 MG/1; MG/1
1 TABLET ORAL DAILY
Qty: 90 TABLET | Refills: 0 | Status: SHIPPED | OUTPATIENT
Start: 2024-04-22 | End: 2024-07-23

## 2024-04-22 RX ORDER — LEVOTHYROXINE SODIUM 25 UG/1
25 TABLET ORAL DAILY
Qty: 90 TABLET | Refills: 0 | Status: SHIPPED | OUTPATIENT
Start: 2024-04-22 | End: 2024-09-06

## 2024-05-14 ENCOUNTER — OFFICE VISIT (OUTPATIENT)
Dept: FAMILY MEDICINE | Facility: CLINIC | Age: 74
End: 2024-05-14
Payer: COMMERCIAL

## 2024-05-14 VITALS
TEMPERATURE: 98 F | DIASTOLIC BLOOD PRESSURE: 70 MMHG | SYSTOLIC BLOOD PRESSURE: 126 MMHG | OXYGEN SATURATION: 98 % | RESPIRATION RATE: 12 BRPM | HEART RATE: 56 BPM | BODY MASS INDEX: 29.37 KG/M2 | WEIGHT: 204.7 LBS

## 2024-05-14 DIAGNOSIS — D69.6 THROMBOCYTOPENIA (H): ICD-10-CM

## 2024-05-14 DIAGNOSIS — H40.003 GLAUCOMA SUSPECT, BILATERAL: ICD-10-CM

## 2024-05-14 DIAGNOSIS — Z76.89 ENCOUNTER TO ESTABLISH CARE: Primary | ICD-10-CM

## 2024-05-14 DIAGNOSIS — M10.9 GOUT, UNSPECIFIED CAUSE, UNSPECIFIED CHRONICITY, UNSPECIFIED SITE: ICD-10-CM

## 2024-05-14 DIAGNOSIS — G89.29 CHRONIC RIGHT SHOULDER PAIN: ICD-10-CM

## 2024-05-14 DIAGNOSIS — M25.511 CHRONIC RIGHT SHOULDER PAIN: ICD-10-CM

## 2024-05-14 DIAGNOSIS — D68.51 FACTOR 5 LEIDEN MUTATION, HETEROZYGOUS (H): ICD-10-CM

## 2024-05-14 DIAGNOSIS — Z29.11 NEED FOR VACCINATION AGAINST RESPIRATORY SYNCYTIAL VIRUS: ICD-10-CM

## 2024-05-14 DIAGNOSIS — E03.4 HYPOTHYROIDISM DUE TO ACQUIRED ATROPHY OF THYROID: ICD-10-CM

## 2024-05-14 DIAGNOSIS — Z86.718 PERSONAL HISTORY OF DVT (DEEP VEIN THROMBOSIS): ICD-10-CM

## 2024-05-14 DIAGNOSIS — M72.2 PLANTAR FASCIITIS: ICD-10-CM

## 2024-05-14 DIAGNOSIS — N52.9 ERECTILE DYSFUNCTION, UNSPECIFIED ERECTILE DYSFUNCTION TYPE: ICD-10-CM

## 2024-05-14 DIAGNOSIS — Z86.0101 HISTORY OF ADENOMATOUS POLYP OF COLON: ICD-10-CM

## 2024-05-14 DIAGNOSIS — D84.9 IMMUNOCOMPROMISED (H): ICD-10-CM

## 2024-05-14 DIAGNOSIS — I71.43 INFRARENAL ABDOMINAL AORTIC ANEURYSM (AAA) WITHOUT RUPTURE (H): ICD-10-CM

## 2024-05-14 DIAGNOSIS — I10 HYPERTENSION GOAL BP (BLOOD PRESSURE) < 140/90: ICD-10-CM

## 2024-05-14 DIAGNOSIS — E78.2 MIXED HYPERLIPIDEMIA: ICD-10-CM

## 2024-05-14 DIAGNOSIS — M05.711 RHEUMATOID ARTHRITIS INVOLVING BOTH SHOULDERS WITH POSITIVE RHEUMATOID FACTOR (H): ICD-10-CM

## 2024-05-14 DIAGNOSIS — M05.712 RHEUMATOID ARTHRITIS INVOLVING BOTH SHOULDERS WITH POSITIVE RHEUMATOID FACTOR (H): ICD-10-CM

## 2024-05-14 PROCEDURE — 99214 OFFICE O/P EST MOD 30 MIN: CPT | Performed by: STUDENT IN AN ORGANIZED HEALTH CARE EDUCATION/TRAINING PROGRAM

## 2024-05-14 RX ORDER — CETIRIZINE HYDROCHLORIDE 10 MG/1
10 TABLET ORAL
COMMUNITY

## 2024-05-14 RX ORDER — SILDENAFIL 100 MG/1
TABLET, FILM COATED ORAL
Qty: 3 TABLET | Refills: 4 | Status: SHIPPED | OUTPATIENT
Start: 2024-05-14

## 2024-05-14 RX ORDER — RESPIRATORY SYNCYTIAL VIRUS VACCINE 120MCG/0.5
0.5 KIT INTRAMUSCULAR ONCE
Qty: 1 EACH | Refills: 0 | Status: CANCELLED | OUTPATIENT
Start: 2024-05-14 | End: 2024-05-14

## 2024-05-14 ASSESSMENT — PAIN SCALES - GENERAL: PAINLEVEL: NO PAIN (0)

## 2024-05-14 NOTE — PROGRESS NOTES
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)    Relevant Medications    cetirizine (ZYRTEC) 10 MG tablet       Hematologic    Thrombocytopenia (H24)    Factor 5 Leiden mutation, heterozygous (H24)       Other    History of adenomatous polyp of colon    Personal history of DVT (deep vein thrombosis)    Immunocompromised (H24)    Glaucoma suspect, bilateral     Other Visit Diagnoses       Encounter to establish care    -  Primary    Need for vaccination against respiratory syncytial virus        Erectile dysfunction, unspecified erectile dysfunction type        Relevant Medications    sildenafil (VIAGRA) 100 MG tablet    Hypertension goal BP (blood pressure) < 140/90        Chronic right shoulder pain               History reviewed and updated.  Stable at this time and okay to continue Viagra for his erectile dysfunction.  Recommend lubrication to avoid irritation after sex but let me know if worsening issues.  Foot pain consistent with plantar fasciitis and we did discuss home exercises as well as proper arch support and heel cups given fat pad tenderness.  If things not improving would have him see physical therapy and get orthotics.  Recommend PT for his right shoulder and did discuss consideration of imaging as I suspect some AC joint degeneration.  Will plan to work on home exercises now.  Let me know if things not improving.  Follow-up this fall for physical and lab work.    BMI  Estimated body mass index is 29.37 kg/m  as calculated from the following:    Height as of 10/19/23: 1.778 m (5' 10\").    Weight as of this encounter: 92.9 kg (204 lb 11.2 oz). "   Weight management plan: Discussed healthy diet and exercise guidelines      Kenna Page is a 73 year old, presenting for the following health issues:  Recheck Medication ([facility])        5/14/2024    11:00 AM   Additional Questions   Roomed by Sebas Dillard CMA     History of Present Illness       Reason for visit:  [facility] check some concerns    He eats 2-3 servings of fruits and vegetables daily.He consumes 0 sweetened beverage(s) daily.He exercises with enough effort to increase his heart rate 30 to 60 minutes per day.  He exercises with enough effort to increase his heart rate 6 days per week.   He is taking medications regularly.     Patient here to [facility] also has been having right-sided chronic shoulder pain that has been seen by rheumatology and did have physical therapy orders placed.  Also bilateral foot pain worse on the left 20 for steps in the morning out of bed.  He did get new shoes recently.  No injury he is aware of.  Following regarding his rheumatoid arthritis which has been well-controlled with rituximab recently.      Review of Systems  Constitutional, HEENT, cardiovascular, pulmonary, GI, , musculoskeletal, neuro, skin, endocrine and psych systems are negative, except as otherwise noted.      Objective    /70 (BP Location: Left arm, Patient Position: Chair, Cuff Size: Adult Large)   Pulse 56   Temp 98  F (36.7  C) (Temporal)   Resp 12   Wt 92.9 kg (204 lb 11.2 oz)   SpO2 98%   BMI 29.37 kg/m    Body mass index is 29.37 kg/m .  Physical Exam   GENERAL: alert and no distress  EYES: Eyes grossly normal to inspection, PERRL and conjunctivae and sclerae normal  HENT: nose and mouth without ulcers or lesions  NECK: no adenopathy, no asymmetry, masses, or scars  RESP: lungs clear to auscultation - no rales, rhonchi or wheezes  CV: regular rate and rhythm, normal S1 S2, no S3 or S4, no murmur, click or rub, no peripheral edema  MS: no gross musculoskeletal  defects noted, no edema, left plantar fascial and heel tenderness to palpation without Achilles tenderness.  No other bony or joint line tenderness.  Right shoulder with deltoid as well as AC joint tenderness and positive empty can.  Negative Jeffrey and apprehension.  No pain with internal or external rotation of the elbow or flexion.  Range of motion intact  SKIN: no suspicious lesions or rashes  NEURO: Normal strength and tone, mentation intact and speech normal  PSYCH: mentation appears normal, affect normal/bright          Signed Electronically by: Ruiz Barney MD

## 2024-05-20 ENCOUNTER — OFFICE VISIT (OUTPATIENT)
Dept: RHEUMATOLOGY | Facility: CLINIC | Age: 74
End: 2024-05-20
Payer: COMMERCIAL

## 2024-05-20 VITALS
DIASTOLIC BLOOD PRESSURE: 84 MMHG | OXYGEN SATURATION: 97 % | SYSTOLIC BLOOD PRESSURE: 132 MMHG | WEIGHT: 203.6 LBS | RESPIRATION RATE: 12 BRPM | HEART RATE: 59 BPM | BODY MASS INDEX: 29.21 KG/M2

## 2024-05-20 DIAGNOSIS — M22.2X1 BILATERAL PATELLOFEMORAL SYNDROME: ICD-10-CM

## 2024-05-20 DIAGNOSIS — M22.2X2 BILATERAL PATELLOFEMORAL SYNDROME: ICD-10-CM

## 2024-05-20 DIAGNOSIS — M05.79 RHEUMATOID ARTHRITIS INVOLVING MULTIPLE SITES WITH POSITIVE RHEUMATOID FACTOR (H): Primary | ICD-10-CM

## 2024-05-20 LAB
ALBUMIN SERPL BCG-MCNC: 4.6 G/DL (ref 3.5–5.2)
ALP SERPL-CCNC: 58 U/L (ref 40–150)
ALT SERPL W P-5'-P-CCNC: 26 U/L (ref 0–70)
AST SERPL W P-5'-P-CCNC: 23 U/L (ref 0–45)
BASOPHILS # BLD AUTO: 0 10E3/UL (ref 0–0.2)
BASOPHILS NFR BLD AUTO: 0 %
BILIRUB DIRECT SERPL-MCNC: 0.26 MG/DL (ref 0–0.3)
BILIRUB SERPL-MCNC: 1.3 MG/DL
CREAT SERPL-MCNC: 0.84 MG/DL (ref 0.67–1.17)
CRP SERPL-MCNC: <3 MG/L
EGFRCR SERPLBLD CKD-EPI 2021: >90 ML/MIN/1.73M2
EOSINOPHIL # BLD AUTO: 0.2 10E3/UL (ref 0–0.7)
EOSINOPHIL NFR BLD AUTO: 4 %
ERYTHROCYTE [DISTWIDTH] IN BLOOD BY AUTOMATED COUNT: 13.1 % (ref 10–15)
ERYTHROCYTE [SEDIMENTATION RATE] IN BLOOD BY WESTERGREN METHOD: 7 MM/HR (ref 0–20)
HCT VFR BLD AUTO: 40.9 % (ref 40–53)
HGB BLD-MCNC: 14.2 G/DL (ref 13.3–17.7)
IMM GRANULOCYTES # BLD: 0 10E3/UL
IMM GRANULOCYTES NFR BLD: 0 %
LYMPHOCYTES # BLD AUTO: 0.7 10E3/UL (ref 0.8–5.3)
LYMPHOCYTES NFR BLD AUTO: 16 %
MCH RBC QN AUTO: 33.6 PG (ref 26.5–33)
MCHC RBC AUTO-ENTMCNC: 34.7 G/DL (ref 31.5–36.5)
MCV RBC AUTO: 97 FL (ref 78–100)
MONOCYTES # BLD AUTO: 0.7 10E3/UL (ref 0–1.3)
MONOCYTES NFR BLD AUTO: 14 %
NEUTROPHILS # BLD AUTO: 2.9 10E3/UL (ref 1.6–8.3)
NEUTROPHILS NFR BLD AUTO: 65 %
PLATELET # BLD AUTO: 128 10E3/UL (ref 150–450)
PROT SERPL-MCNC: 7.1 G/DL (ref 6.4–8.3)
RBC # BLD AUTO: 4.23 10E6/UL (ref 4.4–5.9)
WBC # BLD AUTO: 4.5 10E3/UL (ref 4–11)

## 2024-05-20 PROCEDURE — 86140 C-REACTIVE PROTEIN: CPT | Performed by: INTERNAL MEDICINE

## 2024-05-20 PROCEDURE — 80076 HEPATIC FUNCTION PANEL: CPT | Performed by: INTERNAL MEDICINE

## 2024-05-20 PROCEDURE — 99214 OFFICE O/P EST MOD 30 MIN: CPT | Performed by: INTERNAL MEDICINE

## 2024-05-20 PROCEDURE — 86481 TB AG RESPONSE T-CELL SUSP: CPT | Performed by: INTERNAL MEDICINE

## 2024-05-20 PROCEDURE — 36415 COLL VENOUS BLD VENIPUNCTURE: CPT | Performed by: INTERNAL MEDICINE

## 2024-05-20 PROCEDURE — 85652 RBC SED RATE AUTOMATED: CPT | Performed by: INTERNAL MEDICINE

## 2024-05-20 PROCEDURE — 85025 COMPLETE CBC W/AUTO DIFF WBC: CPT | Performed by: INTERNAL MEDICINE

## 2024-05-20 PROCEDURE — 82565 ASSAY OF CREATININE: CPT | Performed by: INTERNAL MEDICINE

## 2024-05-20 PROCEDURE — G2211 COMPLEX E/M VISIT ADD ON: HCPCS | Performed by: INTERNAL MEDICINE

## 2024-05-20 RX ORDER — HEPARIN SODIUM,PORCINE 10 UNIT/ML
5-20 VIAL (ML) INTRAVENOUS DAILY PRN
Status: CANCELLED | OUTPATIENT
Start: 2024-07-12

## 2024-05-20 RX ORDER — ALBUTEROL SULFATE 90 UG/1
1-2 AEROSOL, METERED RESPIRATORY (INHALATION)
Status: CANCELLED
Start: 2024-07-12

## 2024-05-20 RX ORDER — EPINEPHRINE 1 MG/ML
0.3 INJECTION, SOLUTION, CONCENTRATE INTRAVENOUS EVERY 5 MIN PRN
Status: CANCELLED | OUTPATIENT
Start: 2024-07-12

## 2024-05-20 RX ORDER — METHYLPREDNISOLONE SODIUM SUCCINATE 125 MG/2ML
125 INJECTION, POWDER, LYOPHILIZED, FOR SOLUTION INTRAMUSCULAR; INTRAVENOUS ONCE
Status: CANCELLED | OUTPATIENT
Start: 2024-07-12

## 2024-05-20 RX ORDER — DIPHENHYDRAMINE HCL 25 MG
50 CAPSULE ORAL ONCE
Status: CANCELLED
Start: 2024-07-12

## 2024-05-20 RX ORDER — DIPHENHYDRAMINE HYDROCHLORIDE 50 MG/ML
50 INJECTION INTRAMUSCULAR; INTRAVENOUS
Status: CANCELLED
Start: 2024-07-12

## 2024-05-20 RX ORDER — METHYLPREDNISOLONE SODIUM SUCCINATE 125 MG/2ML
125 INJECTION, POWDER, LYOPHILIZED, FOR SOLUTION INTRAMUSCULAR; INTRAVENOUS
Status: CANCELLED
Start: 2024-07-12

## 2024-05-20 RX ORDER — DIPHENHYDRAMINE HCL 25 MG
25 CAPSULE ORAL ONCE
Status: CANCELLED
Start: 2024-07-12

## 2024-05-20 RX ORDER — ACETAMINOPHEN 325 MG/1
650 TABLET ORAL ONCE
Status: CANCELLED
Start: 2024-07-12

## 2024-05-20 RX ORDER — MEPERIDINE HYDROCHLORIDE 25 MG/ML
25 INJECTION INTRAMUSCULAR; INTRAVENOUS; SUBCUTANEOUS EVERY 30 MIN PRN
Status: CANCELLED | OUTPATIENT
Start: 2024-07-12

## 2024-05-20 RX ORDER — HEPARIN SODIUM (PORCINE) LOCK FLUSH IV SOLN 100 UNIT/ML 100 UNIT/ML
5 SOLUTION INTRAVENOUS
Status: CANCELLED | OUTPATIENT
Start: 2024-07-12

## 2024-05-20 RX ORDER — ALBUTEROL SULFATE 0.83 MG/ML
2.5 SOLUTION RESPIRATORY (INHALATION)
Status: CANCELLED | OUTPATIENT
Start: 2024-07-12

## 2024-05-20 NOTE — PATIENT INSTRUCTIONS
RHEUMATOLOGY    [facility]  [address] [place] [address]  [place] [address]    Phone number: [phone]  Fax number: [phone]    If you need a medication refill, please contact us as you may need lab work and/or a follow up visit prior to your refill.      Thank you for choosing [facility]!    Jaquelin Muñiz CMA Rheumatology

## 2024-05-20 NOTE — PROGRESS NOTES
Rheumatology Clinic Visit      Camilo Baca MRN# [id number]   YOB: 1950 Age: 73 year old      Date of visit: 24   PCP: Lyndsey Aponte    Chief Complaint   Patient presents with:  Rheumatoid Arthritis    Assessment and Plan     1.  Seropositive rheumatoid arthritis (, CCP >250): Dx'd . Previously followed by [facility] Wol[facility] and [facility].  Established with me on 3/20/2018. Previously on MTX (ineffective as monotherapy), Humira (effective, stopped because of insurance preference for IV), Simponi (rash on cheeks/nose after infusion), Remicade (lost efficacy), HCQ (facial rash), Orencia (partially effective), SSZ (cytopenia, rash).  Currently on Rituximab (received 1g on each of the following: 3/10/2020 & 3/24/2020, 2020 & 10/6/2020, 2021 & 3/9/2021, 2021 & 2021, 2022 & 2022; 500 mg on each of the followin2022, 2023, 2023, 2024).  Has done great since starting rituximab and has not required NSAIDs or prednisone.  RA is controlled.   Chronic illness, stable.    - Rituximab (Truxima) 500 mg IV once, every 6 months; next due on or shortly after 2024  - PRN RA flare: Prednisone 20mg daily x2days, then 15mg daily x2days, then 10mg daily x2days, then 5mg daily x2days, then stop.    - Lab 1-2 days before rituximab infusion: COVID-19 (he plans to do a home test, and if positive then he needs to delay the rituximab infusion and notify this clinic)  - Labs today: CBC, creatinine, hepatic panel, ESR, CRP, QuantiFERON-TB gold plus              Rapid 3, cumulative scores                       2024:   2    (rituximab)                      2023: RA controlled (rituximab)                      2022: asymptomatic (Rituximab)                      2020: doing well; virtual visit (Rituximab in 2020; SSZ 500mg BID)                      2020: 9.8  (Orencia IV, SSZ 500mg BID)                       2019: 2.8  (Orencia IV, SSZ 500mg  BID)                      08/20/2019: 4     (Orencia 750mg IV)                      04/23/2019: 0     (Orencia 750mg IV)                       01/08/2019: 0.5  (Simponi IV)                      11/06/2018: 9.3  (Simponi IV x2mo)    2.  History of impingement syndrome of both shoulders, now with intermittent right shoulder symptoms: injected 10/11/2019 PT exercises resolved the shoulder issue.   intermittent right shoulder symptoms now; home PT exercises did not resolve the intermittent shoulder pain so he is wanting to go to formal physical therapy.  Physical therapy was referred previously and I provided the phone number so that he may call to schedule.      3. History of mild plantar fasciitis, bilaterally: resolved with stretching and changing shoes.  Not an issue today.    4.  History of LFT elevation: Avoid hepatotoxins.  Avoid alcohol.  Has seen hepatology.    5.  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  Advised using topical Voltaren gel as needed.  Discussed the importance of physical therapy exercises and he plans to try the exercises on his own at home; he was directed to the physical therapy exercise handout available online from the [employer].    6.  Vaccinations: Vaccinations reviewed with Mr. Baca.  Risks and benefits of vaccinations were discussed.  All vaccines should ideally be at least 2 weeks before the next rituximab infusion and preferably at least 4 weeks before the next rituximab infusion  - Influenza: Advised vaccination now  - [id number]: up to date  - [id number]: up to date  - Shingrix: Up to date  - COVID-19:  advised vaccination now.  Discussed the importance of having vaccination at least 4 months after rituximab infusion, and at least 2-4 weeks before rituximab infusion  - RSV: Discussed vaccination and he is going to consider receiving this 2-4 weeks after receiving COVID-19/influenza  vaccines together    Total minutes spent in evaluation with patient, documentation, , and review of pertinent studies and chart notes: 16  The longitudinal plan of care for the rheumatology problem(s) were addressed during this visit.  Due to added complexity of care, we will continue to support the patient and the subsequent management of this condition with ongoing continuity of care.       Mr. Baca verbalized agreement with and understanding of the rational for the diagnosis and treatment plan.  All questions were answered to best of my ability and the patient's satisfaction. Mr. Baca was advised to contact the clinic with any questions that may arise after the clinic visit.      Thank you for involving me in the care of the patient    Return to clinic: 6 months    HPI   Camilo Baca is a 73 year old male with a past medical history significant for hypertension, hyperlipidemia, GERD, hypothyroidism, history of DVT, factor V Leiden mutation, history of thrombocytopenia, gout, and rheumatoid arthritis who presents for follow-up of rheumatoid arthritis.     11/17/2023: Currently doing well.  No joint pain or swelling except for the right shoulder that intermittently bothers him so he is wanting to go to physical therapy now.  Arthritis does not limit his daily activities.  No morning stiffness or gelling phenomenon.    Today, 5/20/2024: Bilateral knee pain when he was working in a tractor for 6 or more hours at a time; no increased warmth but occasionally have will have a little bit of swelling on the right knee that resolves within a couple hours.  He is not doing exercises for his knees.  Other joints are doing well; no other joint pain.  No morning stiffness.  No gelling phenomenon.    Denies fevers, chills, nausea, vomiting, constipation, diarrhea. No abdominal pain. No chest pain/pressure, palpitations, or shortness of breath. No LE swelling. No neck pain. No oral or nasal sores.  No rash. No sicca  symptoms.      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    ROS   12 point review of system was completed and negative except as noted in the HPI     Active Problem List     Patient Active Problem List   Diagnosis    Lumbago    Mixed hyperlipidemia    NONSPECIFIC MEDICAL HISTORY    HYPERLIPIDEMIA LDL GOAL <130    History of adenomatous polyp of colon    Idiopathic chronic gout without tophus, unspecified site    Advanced directives, counseling/discussion    Seropositive rheumatoid arthritis (H)    High risk medication use    Thrombocytopenia (H24)    Gastroesophageal reflux disease without esophagitis    Factor 5 Leiden mutation, heterozygous (H24)    Personal history of venous thrombosis and embolism    Personal history of DVT (deep vein thrombosis)    Hypothyroidism due to acquired atrophy of thyroid    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)    Secondary hypertension with goal blood pressure less than 140/90    Rheumatoid arthritis involving multiple sites with positive rheumatoid factor (H)    Gout    Sensorineural hearing loss (SNHL)    Tinnitus    Immunocompromised (H24)    Infrarenal abdominal aortic aneurysm (AAA) without rupture (H24)    Glaucoma suspect, bilateral    Plantar fasciitis     Past Medical History     Past Medical History:   Diagnosis Date    Alopecia, unspecified     Closed fracture of unspecified part of humerus 1974    Arm fracture / left wrist    Esophageal reflux 2/6/2015    Factor 5 Leiden mutation, heterozygous (H24)     Gout 3/28/2011    Herpes zoster without mention of complication 1965    Herpes zoster    Measles without mention of complication     Measles    Personal history of DVT (deep vein thrombosis) 4/9/2015    Personal history of venous thrombosis and embolism 4/9/2015    Pulmonary embolism (H) 12/1996    post appendectomy    RA  (rheumatoid arthritis) (H) 3/11/2013    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    Rheumatoid arthritis(714.0)     Beginning symptoms    Secondary hypertension with goal blood pressure less than 140/90 11/15/2016    Thrombocytopenia, unspecified (H24) 1/29/2014    Varicella without mention of complication     Chickenpox     Past Surgical History     Past Surgical History:   Procedure Laterality Date    COLONOSCOPY  11/15/2010    COLONOSCOPY performed by DARIUS MESA at  GI    COLONOSCOPY N/A 11/2/2015    Procedure: COLONOSCOPY;  Surgeon: Bubba Odom MD;  Location:  GI    COLONOSCOPY N/A 3/19/2021    Procedure: Colonoscopy;  Surgeon: Ludmila Beltran MD;  Location:  GI    ESOPHAGOSCOPY, GASTROSCOPY, DUODENOSCOPY (EGD), COMBINED N/A 10/24/2018    Procedure: Esophagogastroduodenoscopy Multiple Biopsies by Biopsy;  Surgeon: Matteo Johnson DO;  Location:  GI    HC REPAIR ING HERNIA,5+Y/O,REDUCIBL  1960    HERNIORRHAPHY UMBILICAL N/A 4/17/2015    Procedure: HERNIORRHAPHY UMBILICAL;  Surgeon: Rayray Avila MD;  Location: [facility] OR    LAPAROSCOPIC HERNIORRHAPHY INGUINAL BILATERAL Bilateral 4/17/2015    Procedure: LAPAROSCOPIC HERNIORRHAPHY INGUINAL BILATERAL;  Surgeon: Rayray Avila MD;  Location:  OR    [facility] APPENDECTOMY  1996    [facility] COLONOSCOPY W BIOPSY  08/10/05     Allergy     Allergies   Allergen Reactions    Lisinopril Cough    Plaquenil [Hydroxychloroquine] Hives     Current Medication List     Current Outpatient Medications   Medication Sig Dispense Refill    allopurinol (ZYLOPRIM) 300 MG tablet TAKE 1 TABLET BY MOUTH EVERY DAY 90 tablet 0    amLODIPine (NORVASC) 5 MG tablet Take 1 tablet (5 mg) by mouth daily 90 tablet 11    brimonidine (ALPHAGAN) 0.2 % ophthalmic solution       cetirizine (ZYRTEC) 10 MG tablet Take 10 mg by mouth      cholestyramine light (PREVALITE) 4 GM powder DISSOLVE 1 LEVEL SCOOP IN LIQUID  AND DRINK BY MOUTH TWICE DAILY AS DIRECTED 231 g 12    Cyanocobalamin (B-12) 100 MCG TABS       diclofenac (VOLTAREN) 1 % GEL Apply  2 grams to shoulders three times daily using enclosed dosing card. 100 g 1    diclofenac (VOLTAREN) 1 % topical gel Apply up to 2 grams of 1% gel to hands up to 4 times daily as needed for joint pain (maximum: 8 g per upper extremity per day) 400 g 1    dorzolamide-timolol (COSOPT) 22.3-6.8 MG/ML ophthalmic solution       ketorolac (ACULAR) 0.5 % ophthalmic solution       levothyroxine (SYNTHROID/LEVOTHROID) 25 MCG tablet TAKE 1 TABLET (25 MCG) BY MOUTH DAILY 90 tablet 0    losartan-hydrochlorothiazide (HYZAAR) 100-12.5 MG tablet TAKE 1 TABLET BY MOUTH DAILY 90 tablet 0    omeprazole (PRILOSEC) 20 MG DR capsule Take 1 capsule (20 mg) by mouth daily 90 capsule 3    riTUXimab 500 MG/50ML SOLN Inject 1,000 mg into the vein      rosuvastatin (CRESTOR) 10 MG tablet Take 1 tablet (10 mg) by mouth daily 90 tablet 3    sildenafil (VIAGRA) 100 MG tablet 1/2 tab 1/2 hour prior to activity 3 tablet 4    vitamin C (ASCORBIC ACID) 1000 MG TABS Take 1,000 mg by mouth daily      VITAMIN D PO        Current Facility-Administered Medications   Medication Dose Route Frequency Provider Last Rate Last Admin    cilgavimab 300 mg/tixagevimab 300 mg (EVUSHELD) - FULL DOSE *FOR CLINIC USE ONLY*  6 mL Intramuscular Once Fabricio Gomes MD             Social History   See HPI    Family History     Family History   Problem Relation Age of Onset    Arthritis Mother     Cardiovascular Mother     Depression Mother     Gastrointestinal Disease Mother         IBS    Hypertension Mother     Circulatory Mother         Aortal aneurysm    Osteoporosis Mother     Allergies Father         Cortisone    Cardiovascular Father     Circulatory Father     Diabetes Father     Hypertension Father     Lipids Father     Alzheimer Disease Paternal Grandfather     Blood Disease Paternal Grandmother         Clotting problems    Blood  "Disease Son         Factor 5    Hypertension Maternal Aunt     Cerebrovascular Disease Maternal Grandmother        Physical Exam     Temp Readings from Last 3 Encounters:   05/14/24 98  F (36.7  C) (Temporal)   01/12/24 97.2  F (36.2  C) (Temporal)   08/30/23 97.3  F (36.3  C)     BP Readings from Last 5 Encounters:   05/20/24 132/84   05/14/24 126/70   01/12/24 135/71   10/19/23 (!) 154/92   10/02/23 (!) 156/89     Pulse Readings from Last 1 Encounters:   05/20/24 59     Resp Readings from Last 1 Encounters:   05/20/24 12     Estimated body mass index is 29.21 kg/m  as calculated from the following:    Height as of 10/19/23: 1.778 m (5' 10\").    Weight as of this encounter: 92.4 kg (203 lb 9.6 oz).      GEN: NAD. Healthy appearing adult.   HEENT:  Anicteric, noninjected sclera. No obvious external lesions of the ear and nose. Hearing intact.  CV: S1, S2. RRR. No m/r/g  PULM: No increased work of breathing. CTA bilaterally   MSK: MCPs, PIPs, DIPs without swelling or tenderness to palpation.  Wrists without swelling or tenderness to palpation.  Elbows and shoulders without swelling or tenderness to palpation.  Shoulders with normal range of motion.  Knees with crepitation bilaterally but no swelling, tenderness to palpation, increased warmth, or overlying erythema.  Ankles and MTPs without swelling or tenderness to palpation.    SKIN: No rash or jaundice seen  PSYCH: Alert. Appropriate.      Labs / Imaging (select studies)     CBC  Recent Labs   Lab Test 11/08/23  0850 10/02/23  1415 05/17/23  0828 07/28/22  0838 05/26/22  0855 11/17/21  0944 06/25/21  0841 08/27/20  0824 05/28/20  0927 02/10/20  0922 11/12/19  0748 10/15/19  0827   WBC 4.3 4.5 4.6   < > 3.5*   < > 4.1 3.5* 3.6* 4.1   < > 4.8   RBC 4.23* 4.44 4.32*   < > 4.38*   < > 4.29* 4.21* 4.29* 4.21*   < > 4.26*   HGB 13.9 14.9 14.5   < > 14.8   < > 14.3 13.8 13.9 13.7   < > 13.7   HCT 40.7 43.6 42.2   < > 42.7   < > 41.2 41.4 41.7 40.6   < > 41.1   MCV 96 98 " 98   < > 98   < > 96 98 97 96   < > 97   RDW 12.4 12.8 13.0   < > 12.9   < > 13.0 13.0 13.2 12.6   < > 13.5   * 120* 107*   < > 104*   < > 129* 102* 117* 105*   < > 144*   MCH 32.9 33.6* 33.6*   < > 33.8*   < > 33.3* 32.8 32.4 32.5   < > 32.2   MCHC 34.2 34.2 34.4   < > 34.7   < > 34.7 33.3 33.3 33.7   < > 33.3   NEUTROPHIL 68  --  64  --  61  --  65.7 61.9 59.2 54.9   < > 60.4   LYMPH 16  --  18  --  18  --  17.3 19.5 19.6 29.0   < > 24.4   MONOCYTE 11  --  13  --  15  --  12.7 13.0 14.9 13.5   < > 14.0   EOSINOPHIL 4  --  4  --  6  --  3.6 4.5 5.2 2.4   < > 0.8   BASOPHIL 1  --  1  --  0  --  0.5 0.8 0.8 0.2   < > 0.4   ANEU  --   --   --   --   --   --  2.7 2.2 2.2 2.3   < > 2.9   ALYM  --   --   --   --   --   --  0.7* 0.7* 0.7* 1.2   < > 1.2   KIESHA  --   --   --   --   --   --  0.5 0.5 0.5 0.6   < > 0.7   AEOS  --   --   --   --   --   --  0.2  --   --  0.1  --  0.0   ABAS  --   --   --   --   --   --  0.0 0.0 0.0 0.0   < > 0.0   ANEUTAUTO 2.9  --  2.9  --  2.1  --   --   --   --   --   --   --    ALYMPAUTO 0.7*  --  0.8  --  0.6*  --   --   --   --   --   --   --    AMONOAUTO 0.5  --  0.6  --  0.5  --   --   --   --   --   --   --    AEOSAUTO 0.2  --  0.2  --  0.2  --   --   --   --   --   --   --    ABSBASO 0.0  --  0.0  --  0.0  --   --   --   --   --   --   --     < > = values in this interval not displayed.     CMP  Recent Labs   Lab Test 11/08/23  0850 10/02/23  1415 09/08/23  0920 08/30/23  0900 05/17/23  0828 01/23/23  1253 08/16/22  0844 07/28/22  0838 07/08/21  0958 06/25/21  0841 03/29/21  1013 02/16/21  0947 08/27/20  0824   NA  --  140  --   --   --  140  --  141   < >  --   --  141  --    POTASSIUM  --  4.1  --   --   --  4.1  --  3.9   < > 4.2   < > 4.4  --    CHLORIDE  --  103  --   --   --  102  --  108   < >  --   --  106  --    CO2  --  27  --   --   --  28  --  31   < >  --   --  32  --    ANIONGAP  --  10  --   --   --  10  --  2*   < >  --   --  3  --    GLC  --  102*  --   --   --   133*  --  127*   < >  --   --  119*  --    BUN  --  17.5  --   --   --  18.3  --  17   < >  --   --  15  --    CR 0.85 1.05 0.8  --  0.93 0.89  --  0.79   < > 0.96  --  0.84 0.83   GFRESTIMATED >90 76 >60  --  88 >90  --  >90   < > 80  --  90 >90   GFRESTBLACK  --   --   --   --   --   --   --   --   --  >90  --  >90 >90   ANDREZ  --  9.6  --   --   --  9.5  --  9.2   < >  --   --  9.4  --    BILITOTAL 1.0 1.1  --   --  1.1 1.0  --  1.2   < > 1.0  --   --  1.2   ALBUMIN 4.4 4.8  --   --  4.3 4.5   < > 3.8   < > 3.8  --   --  3.6   PROTTOTAL 6.7 7.2  --   --  6.8 7.2  --  7.0   < > 7.0  --   --  7.2   ALKPHOS 55 56  --   --  51 57  --  53   < > 63  --   --  71   AST 27 38  --   --  30 41   < > 50*   < > 49*  --   --  30   ALT 41 57  --  64 64* 75*   < > 126*   < > 119*  --   --  47    < > = values in this interval not displayed.     Calcium/VitaminD  Recent Labs   Lab Test 10/02/23  1415 01/23/23  1253 07/28/22  0838 11/06/18  0913 06/26/18  0955   ANDREZ 9.6 9.5 9.2   < >  --    VITDT  --   --   --   --  25    < > = values in this interval not displayed.     ESR/CRP  Recent Labs   Lab Test 11/08/23  0850 05/17/23  0828 05/26/22  0855 06/25/21  0841 08/27/20  0824   SED 14 8 8 10 9   CRP  --   --  <2.9 <2.9 <2.9   CRPI <3.00 <3.00  --   --   --          Hepatitis B  Recent Labs   Lab Test 11/08/23  0850 05/26/22  0855 06/25/21  0841   HBCAB Nonreactive Nonreactive Nonreactive   HEPBANG Nonreactive Nonreactive Nonreactive     Hepatitis C  Recent Labs   Lab Test 11/08/23  0850   HCVAB Nonreactive     Tuberculosis Screening  Recent Labs   Lab Test 05/17/23  0828 05/26/22  0855 06/25/21  0841 03/03/20  1405 03/20/18  1008   TBRES Negative Negative  --  Negative  --    TBRSLT  --   --   --   --  Negative   TBAGN  --   --   --   --  0.14   TBRST  --   --  Negative  --   --      HIV Screening  Recent Labs   Lab Test 11/08/23  0850   HIAGAB Nonreactive     Immunization History     Immunization History   Administered Date(s)  Administered    COVID-19 12+ (2023-24) (MODERNA) 11/20/2023    COVID-19 Bivalent 12+ (Pfizer) 05/31/2023    COVID-19 Bivalent 18+ (Moderna) 10/31/2022    COVID-19 Monovalent 18+ (Moderna) 02/26/2021, 03/25/2021, 01/03/2022, 05/26/2022    Flu, Unspecified 02/01/2011, 10/19/2011    HepB, Unspecified 08/19/1991, 09/23/1991, 02/24/1992    Influenza (High Dose) 3 valent vaccine 10/06/2017, 10/03/2018, 10/23/2019    Influenza (IIV3) PF 10/01/2009, 10/27/2010, 10/11/2011    Influenza Vaccine 18-64 (Flublok) 10/27/2021    Influenza Vaccine 65+ (Fluzone HD) 09/02/2020    Influenza Vaccine >6 months,quad, PF 10/09/2013, 10/23/2014, 09/18/2015, 11/15/2016    Pneumo Conj 13-V (2010&after) 10/06/2017    Pneumococcal 23 valent 10/23/2014, 10/23/2019    TD,PF 7+ (Tenivac) 09/05/2001    TDAP Vaccine (Boostrix) 10/18/2012    Zoster recombinant adjuvanted (SHINGRIX) 01/31/2019, 04/18/2019    Zoster vaccine, live 10/23/2014          Chart documentation done in part with Dragon Voice recognition Software. Although reviewed after completion, some word and grammatical error may remain.    Fabricio Gomes MD

## 2024-05-21 LAB
QUANTIFERON MITOGEN: 10 IU/ML
QUANTIFERON NIL TUBE: 0.01 IU/ML
QUANTIFERON TB1 TUBE: 0.01 IU/ML
QUANTIFERON TB2 TUBE: 0.02

## 2024-05-22 LAB
GAMMA INTERFERON BACKGROUND BLD IA-ACNC: 0.01 IU/ML
M TB IFN-G BLD-IMP: NEGATIVE
M TB IFN-G CD4+ BCKGRND COR BLD-ACNC: 9.99 IU/ML
MITOGEN IGNF BCKGRD COR BLD-ACNC: 0 IU/ML
MITOGEN IGNF BCKGRD COR BLD-ACNC: 0.01 IU/ML

## 2024-06-06 ENCOUNTER — TRANSFERRED RECORDS (OUTPATIENT)
Dept: HEALTH INFORMATION MANAGEMENT | Facility: CLINIC | Age: 74
End: 2024-06-06
Payer: COMMERCIAL

## 2024-07-12 ENCOUNTER — APPOINTMENT (OUTPATIENT)
Dept: LAB | Facility: CLINIC | Age: 74
End: 2024-07-12
Payer: COMMERCIAL

## 2024-07-12 ENCOUNTER — INFUSION THERAPY VISIT (OUTPATIENT)
Dept: INFUSION THERAPY | Facility: CLINIC | Age: 74
End: 2024-07-12
Attending: INTERNAL MEDICINE
Payer: MEDICARE

## 2024-07-12 VITALS
RESPIRATION RATE: 16 BRPM | OXYGEN SATURATION: 97 % | WEIGHT: 205.5 LBS | SYSTOLIC BLOOD PRESSURE: 150 MMHG | TEMPERATURE: 97.7 F | BODY MASS INDEX: 29.49 KG/M2 | HEART RATE: 49 BPM | DIASTOLIC BLOOD PRESSURE: 77 MMHG

## 2024-07-12 DIAGNOSIS — M05.79 RHEUMATOID ARTHRITIS INVOLVING MULTIPLE SITES WITH POSITIVE RHEUMATOID FACTOR (H): Primary | ICD-10-CM

## 2024-07-12 LAB
CD19 B CELL COMMENT: ABNORMAL
CD19 CELLS # BLD: 11 CELLS/UL (ref 107–698)
CD19 CELLS NFR BLD: 1 % (ref 6–27)

## 2024-07-12 PROCEDURE — 36415 COLL VENOUS BLD VENIPUNCTURE: CPT | Performed by: INTERNAL MEDICINE

## 2024-07-12 PROCEDURE — 82784 ASSAY IGA/IGD/IGG/IGM EACH: CPT | Performed by: INTERNAL MEDICINE

## 2024-07-12 PROCEDURE — 96415 CHEMO IV INFUSION ADDL HR: CPT

## 2024-07-12 PROCEDURE — 96413 CHEMO IV INFUSION 1 HR: CPT

## 2024-07-12 PROCEDURE — 258N000003 HC RX IP 258 OP 636: Performed by: INTERNAL MEDICINE

## 2024-07-12 PROCEDURE — 250N000011 HC RX IP 250 OP 636: Performed by: INTERNAL MEDICINE

## 2024-07-12 PROCEDURE — 86355 B CELLS TOTAL COUNT: CPT | Performed by: INTERNAL MEDICINE

## 2024-07-12 PROCEDURE — 250N000013 HC RX MED GY IP 250 OP 250 PS 637: Performed by: INTERNAL MEDICINE

## 2024-07-12 PROCEDURE — 96375 TX/PRO/DX INJ NEW DRUG ADDON: CPT

## 2024-07-12 RX ORDER — EPINEPHRINE 1 MG/ML
0.3 INJECTION, SOLUTION, CONCENTRATE INTRAVENOUS EVERY 5 MIN PRN
OUTPATIENT
Start: 2024-07-12

## 2024-07-12 RX ORDER — HEPARIN SODIUM (PORCINE) LOCK FLUSH IV SOLN 100 UNIT/ML 100 UNIT/ML
5 SOLUTION INTRAVENOUS
OUTPATIENT
Start: 2024-07-12

## 2024-07-12 RX ORDER — DIPHENHYDRAMINE HYDROCHLORIDE 50 MG/ML
50 INJECTION INTRAMUSCULAR; INTRAVENOUS
Start: 2024-07-12

## 2024-07-12 RX ORDER — ACETAMINOPHEN 325 MG/1
650 TABLET ORAL ONCE
Status: CANCELLED
Start: 2024-07-12

## 2024-07-12 RX ORDER — HEPARIN SODIUM,PORCINE 10 UNIT/ML
5-20 VIAL (ML) INTRAVENOUS DAILY PRN
OUTPATIENT
Start: 2024-07-12

## 2024-07-12 RX ORDER — MEPERIDINE HYDROCHLORIDE 25 MG/ML
25 INJECTION INTRAMUSCULAR; INTRAVENOUS; SUBCUTANEOUS EVERY 30 MIN PRN
OUTPATIENT
Start: 2024-07-12

## 2024-07-12 RX ORDER — DIPHENHYDRAMINE HCL 25 MG
25 CAPSULE ORAL ONCE
Status: COMPLETED | OUTPATIENT
Start: 2024-07-12 | End: 2024-07-12

## 2024-07-12 RX ORDER — METHYLPREDNISOLONE SODIUM SUCCINATE 125 MG/2ML
125 INJECTION, POWDER, LYOPHILIZED, FOR SOLUTION INTRAMUSCULAR; INTRAVENOUS
Start: 2024-07-12

## 2024-07-12 RX ORDER — ALBUTEROL SULFATE 0.83 MG/ML
2.5 SOLUTION RESPIRATORY (INHALATION)
OUTPATIENT
Start: 2024-07-12

## 2024-07-12 RX ORDER — ACETAMINOPHEN 325 MG/1
650 TABLET ORAL ONCE
Status: COMPLETED | OUTPATIENT
Start: 2024-07-12 | End: 2024-07-12

## 2024-07-12 RX ORDER — DIPHENHYDRAMINE HCL 25 MG
25 CAPSULE ORAL ONCE
Status: CANCELLED
Start: 2024-07-12

## 2024-07-12 RX ORDER — ALBUTEROL SULFATE 90 UG/1
1-2 AEROSOL, METERED RESPIRATORY (INHALATION)
Start: 2024-07-12

## 2024-07-12 RX ORDER — METHYLPREDNISOLONE SODIUM SUCCINATE 125 MG/2ML
125 INJECTION, POWDER, LYOPHILIZED, FOR SOLUTION INTRAMUSCULAR; INTRAVENOUS ONCE
Status: CANCELLED | OUTPATIENT
Start: 2024-07-12

## 2024-07-12 RX ORDER — METHYLPREDNISOLONE SODIUM SUCCINATE 125 MG/2ML
125 INJECTION, POWDER, LYOPHILIZED, FOR SOLUTION INTRAMUSCULAR; INTRAVENOUS ONCE
Status: COMPLETED | OUTPATIENT
Start: 2024-07-12 | End: 2024-07-12

## 2024-07-12 RX ADMIN — ACETAMINOPHEN 650 MG: 325 TABLET, FILM COATED ORAL at 09:16

## 2024-07-12 RX ADMIN — METHYLPREDNISOLONE SODIUM SUCCINATE 125 MG: 125 INJECTION, POWDER, FOR SOLUTION INTRAMUSCULAR; INTRAVENOUS at 09:29

## 2024-07-12 RX ADMIN — SODIUM CHLORIDE 250 ML: 9 INJECTION, SOLUTION INTRAVENOUS at 09:27

## 2024-07-12 RX ADMIN — DIPHENHYDRAMINE HYDROCHLORIDE 25 MG: 25 CAPSULE ORAL at 09:16

## 2024-07-12 RX ADMIN — RITUXIMAB-ABBS 500 MG: 10 INJECTION, SOLUTION INTRAVENOUS at 09:50

## 2024-07-12 ASSESSMENT — PAIN SCALES - GENERAL: PAINLEVEL: NO PAIN (0)

## 2024-07-12 NOTE — PROGRESS NOTES
Infusion Nursing Note:  Camilo Baca presents today for Rituximab.    Patient seen by provider today: No   present during visit today: Not Applicable.    Note: Jez arrives ambulatory, alert, pleasant. Denies changes in health since last visit. Denies current sx of illness or infection. Stays active. VSS, afebrile. Denies pain. See treatment conditions below.       Intravenous Access:  Peripheral IV placed.    Treatment Conditions:  Biological Infusion Checklist:  ~~~ NOTE: If the patient answers yes to any of the questions below, hold the infusion and contact ordering provider or on-call provider.    Have you recently had an elevated temperature, fever, chills, productive cough, coughing for 3 weeks or longer or hemoptysis,  abnormal vital signs, night sweats,  chest pain or have you noticed a decrease in your appetite, unexplained weight loss or fatigue? No  Do you have any open wounds or new incisions? No  Do you have any upcoming hospitalizations or surgeries? Does not include esophagogastroduodenoscopy, colonoscopy, endoscopic retrograde cholangiopancreatography (ERCP), endoscopic ultrasound (EUS), dental procedures or joint aspiration/steroid injections No  Do you currently have any signs of illness or infection or are you on any antibiotics? No  Have you had any new, sudden or worsening abdominal pain? No  Have you or anyone in your household received a live vaccination in the past 4 weeks? Please note: No live vaccines while on biologic/chemotherapy until 6 months after the last treatment. Patient can receive the flu vaccine (shot only), pneumovax and the Covid vaccine. It is optimal for the patient to get these vaccines mid cycle, but they can be given at any time as long as it is not on the day of the infusion. No  Have you recently been diagnosed with any new nervous system diseases (ie. Multiple sclerosis, Guillain [place], seizures, neurological changes) or cancer diagnosis? Are you on any  form of radiation or chemotherapy? No  Are you pregnant or breast feeding or do you have plans of pregnancy in the future? N/A  Have you been having any signs of worsening depression or suicidal ideations?  (benlysta only) N/A  Have there been any other new onset medical symptoms? No  Have you had any new blood clots? (IVIG only) N/A      Post Infusion Assessment:  Patient tolerated infusion without incident.  Blood return noted pre and post infusion.  Site patent and intact, free from redness, edema or discomfort.  No evidence of extravasations.  PIV access discontinued per protocol.       Discharge Plan:   [facility]  Patient will have his next infusion planned for a different infusion center in approx 6 months.  Patient discharged in stable condition accompanied by: wife.  Departure Mode: Ambulatory.      Yana Burnham RN

## 2024-07-15 LAB
IGA SERPL-MCNC: 210 MG/DL (ref 84–499)
IGG SERPL-MCNC: 574 MG/DL (ref 610–1616)
IGM SERPL-MCNC: 22 MG/DL (ref 35–242)

## 2024-07-20 DIAGNOSIS — I15.9 SECONDARY HYPERTENSION WITH GOAL BLOOD PRESSURE LESS THAN 140/90: ICD-10-CM

## 2024-07-22 ENCOUNTER — TELEPHONE (OUTPATIENT)
Dept: RHEUMATOLOGY | Facility: CLINIC | Age: 74
End: 2024-07-22
Payer: COMMERCIAL

## 2024-07-22 NOTE — TELEPHONE ENCOUNTER
Health Call Center    Phone Message    May a detailed message be left on voicemail: yes     Reason for Call: Order(s): Other:   Reason for requested: Infusion Orders  Date needed: asap    Provider name: Dr. Gomes       Pt is requesting orders for infusion be sent closer to home. Please send orders to [facility] infusion center. PH: [phone], FX: [phone]. Please follow up with pt once completed.       Action Taken: Other: rheum      Travel Screening: Not Applicable     Date of Service:

## 2024-07-22 NOTE — TELEPHONE ENCOUNTER
Confirmed okay with provider- therapy plan faxed with last infusion date. Pt called and updated that they will need to do a PA for the infusion up there    JOEY Galan RN 7/22/2024 4:11 PM

## 2024-07-23 RX ORDER — LOSARTAN POTASSIUM AND HYDROCHLOROTHIAZIDE 12.5; 1 MG/1; MG/1
1 TABLET ORAL DAILY
Qty: 90 TABLET | Refills: 3 | Status: SHIPPED | OUTPATIENT
Start: 2024-07-23

## 2024-07-23 NOTE — PROGRESS NOTES
ENT Consultation    Camilo Baca who is a 73 year old male seen in consultation at the request of audiologist.      History of Present Illness - Camilo Baca is a 73 year old male Check for blockage before prior to hearing aids.    Patient presents for relation of his hearing loss.  He has had gradual hearing loss over the years.  Felt that his left ear was always worse than his right.  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  He is right-handed and his left ear was largely unprotected over the years.  He does get bilateral tinnitus not all the time.  Denies any vertigo or any dizziness.  No significant family history of hearing loss.  [facility] but [facility] were not working well for him.  Denies history of ear infections.      BP Readings from Last 1 Encounters:   08/01/24 128/70       BP noted to be well controlled today in office.     Camilo IS NOT a smoker/uses chewing tobacco.  Camilo already quit      Past Medical History -   Past Medical History:   Diagnosis Date    Alopecia, unspecified     Closed fracture of unspecified part of humerus 1974    Arm fracture / left wrist    Esophageal reflux 2/6/2015    Factor 5 Leiden mutation, heterozygous (H24)     Gout 3/28/2011    Herpes zoster without mention of complication 1965    Herpes zoster    Measles without mention of complication     Measles    Personal history of DVT (deep vein thrombosis) 4/9/2015    Personal history of venous thrombosis and embolism 4/9/2015    Pulmonary embolism (H) 12/1996    post appendectomy    RA (rheumatoid arthritis) (H) 3/11/2013    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    Rheumatoid arthritis(714.0)     Beginning symptoms    Secondary hypertension with goal blood pressure less than 140/90 11/15/2016    Thrombocytopenia, unspecified (H24) 1/29/2014    Varicella without  mention of complication     Chickenpox       Current Medications -   Current Outpatient Medications:     allopurinol (ZYLOPRIM) 300 MG tablet, TAKE 1 TABLET BY MOUTH EVERY DAY, Disp: 90 tablet, Rfl: 0    amLODIPine (NORVASC) 5 MG tablet, Take 1 tablet (5 mg) by mouth daily, Disp: 90 tablet, Rfl: 11    brimonidine (ALPHAGAN) 0.2 % ophthalmic solution, , Disp: , Rfl:     cetirizine (ZYRTEC) 10 MG tablet, Take 10 mg by mouth, Disp: , Rfl:     cholestyramine light (PREVALITE) 4 GM powder, DISSOLVE 1 LEVEL SCOOP IN LIQUID AND DRINK BY MOUTH TWICE DAILY AS DIRECTED, Disp: 231 g, Rfl: 12    Cyanocobalamin (B-12) 100 MCG TABS, , Disp: , Rfl:     diclofenac (VOLTAREN) 1 % GEL, Apply  2 grams to shoulders three times daily using enclosed dosing card., Disp: 100 g, Rfl: 1    diclofenac (VOLTAREN) 1 % topical gel, Apply up to 2 grams of 1% gel to hands up to 4 times daily as needed for joint pain (maximum: 8 g per upper extremity per day), Disp: 400 g, Rfl: 1    dorzolamide-timolol (COSOPT) 22.3-6.8 MG/ML ophthalmic solution, , Disp: , Rfl:     ketorolac (ACULAR) 0.5 % ophthalmic solution, , Disp: , Rfl:     levothyroxine (SYNTHROID/LEVOTHROID) 25 MCG tablet, TAKE 1 TABLET (25 MCG) BY MOUTH DAILY, Disp: 90 tablet, Rfl: 0    losartan-hydrochlorothiazide (HYZAAR) 100-12.5 MG tablet, TAKE 1 TABLET BY MOUTH DAILY, Disp: 90 tablet, Rfl: 3    omeprazole (PRILOSEC) 20 MG DR capsule, Take 1 capsule (20 mg) by mouth daily, Disp: 90 capsule, Rfl: 3    riTUXimab 500 MG/50ML SOLN, Inject 1,000 mg into the vein, Disp: , Rfl:     rosuvastatin (CRESTOR) 10 MG tablet, Take 1 tablet (10 mg) by mouth daily, Disp: 90 tablet, Rfl: 3    sildenafil (VIAGRA) 100 MG tablet, 1/2 tab 1/2 hour prior to activity, Disp: 3 tablet, Rfl: 4    vitamin C (ASCORBIC ACID) 1000 MG TABS, Take 1,000 mg by mouth daily, Disp: , Rfl:     VITAMIN D PO, , Disp: , Rfl:     Current Facility-Administered Medications:     cilgavimab 300 mg/tixagevimab 300 mg (SORAIDA) -  FULL DOSE *FOR CLINIC USE ONLY*, 6 mL, Intramuscular, Once, Fabricio Gomes MD    Allergies -   Allergies   Allergen Reactions    Lisinopril Cough    Plaquenil [Hydroxychloroquine] Hives       Social History -   Social History     Socioeconomic History    Marital status:      Spouse name: Jacki    Number of children: 2    Years of education: 16   Occupational History    Occupation:      Employer: [employer]   Tobacco Use    Smoking status: Former     Current packs/day: 0.00     Average packs/day: 0.3 packs/day for 2.0 years (0.5 ttl pk-yrs)     Types: Cigarettes, Pipe     Start date: 1977     Quit date: 1979     Years since quittin.5    Smokeless tobacco: Never   Vaping Use    Vaping status: Never Used   Substance and Sexual Activity    Alcohol use: Yes     Comment: 5 drinks per week / wine    Drug use: No    Sexual activity: Yes     Partners: Female   Other Topics Concern     Service Yes     Comment: [employer]    Blood Transfusions No    Caffeine Concern No    Occupational Exposure Yes     Comment:     Hobby Hazards Yes     Comment: hunting    Sleep Concern No    Stress Concern No    Weight Concern No    Special Diet No    Back Care No    Exercise Yes     Comment: Walks    Bike Helmet No    Seat Belt Yes    Parent/sibling w/ CABG, MI or angioplasty before 65F 55M? No     Social Determinants of Health     Interpersonal Safety: Low Risk  (2024)    Interpersonal Safety     Do you feel physically and emotionally safe where you currently live?: Yes     Within the past 12 months, have you been hit, slapped, kicked or otherwise physically hurt by someone?: No     Within the past 12 months, have you been humiliated or emotionally abused in other ways by your partner or ex-partner?: No       Family History -   Family History   Problem Relation Age of Onset    Arthritis Mother     Cardiovascular Mother     Depression Mother     Gastrointestinal Disease Mother          "IBS    Hypertension Mother     Circulatory Mother         Aortal aneurysm    Osteoporosis Mother     Allergies Father         Cortisone    Cardiovascular Father     Circulatory Father     Diabetes Father     Hypertension Father     Lipids Father     Alzheimer Disease Paternal Grandfather     Blood Disease Paternal Grandmother         Clotting problems    Blood Disease Son         Factor 5    Hypertension Maternal Aunt     Cerebrovascular Disease Maternal Grandmother        Review of Systems - As per HPI and PMHx, otherwise review of system review of the head and neck negative. Otherwise 10+ review of system is negative    Physical Exam  /70 (BP Location: Right arm, Patient Position: Right side, Cuff Size: Adult Regular)   Pulse 54   Temp 97.7  F (36.5  C) (Temporal)   Ht 1.778 m (5' 10\")   Wt 91.6 kg (202 lb)   BMI 28.98 kg/m    BMI: Body mass index is 28.98 kg/m .    General - The patient is well nourished and well developed, and appears to have good nutritional status.  Alert and oriented to person and place, answers questions and cooperates with examination appropriately.    SKIN - No suspicious lesions or rashes.  Respiration - No respiratory distress.  Head and Face - Normocephalic and atraumatic, with no gross asymmetry noted of the contour of the facial features.  The facial nerve is intact, with strong symmetric movements.    Voice and Breathing - The patient was breathing comfortably without the use of accessory muscles. The patients voice was clear and strong, and had appropriate pitch and quality.    Ears - Bilateral pinna and EACs with normal appearing overlying skin. Tympanic membrane intact with good mobility on pneumatic otoscopy bilaterally. Bony landmarks of the ossicular chain are normal. The tympanic membranes are normal in appearance. No retraction, perforation, or masses.  No fluid or purulence was seen in the external canal or the middle ear.     Eyes - Extraocular movements intact.  " Sclera were not icteric or injected, conjunctiva were pink and moist.    Mouth - Examination of the oral cavity showed pink, healthy oral mucosa. No lesions or ulcerations noted.  The tongue was mobile and midline, and the dentition were in good condition.      Throat - The walls of the oropharynx were smooth, pink, moist, symmetric, and had no lesions or ulcerations.  The tonsillar pillars and soft palate were symmetric.  The uvula was midline on elevation.    Neck - Normal midline excursion of the laryngotracheal complex during swallowing.  Full range of motion on passive movement.  Palpation of the occipital, submental, submandibular, internal jugular chain, and supraclavicular nodes did not demonstrate any abnormal lymph nodes or masses.  The carotid pulse was palpable bilaterally.  Palpation of the thyroid was soft and smooth, with no nodules or goiter appreciated.  The trachea was mobile and midline.    Nose - External contour is symmetric, no gross deflection or scars.  Nasal mucosa is pink and moist with no abnormal mucus.  The septum was midline and non-obstructive, turbinates of normal size and position.  No polyps, masses, or purulence noted on examination.    Neuro - Nonfocal neuro exam is normal, CN 2 through 12 intact, normal gait and muscle tone.      Performed in clinic today:  Audiologic Studies - An audiogram and tympanogram were performed today as part of the evaluation and personally reviewed. The tympanogram shows Type A curves on the right and Type A curves on the left, with normal canal volumes and middle ear pressures.  The audiogram showed mild high-frequency SNHL on the right and mild to moderate SNHL on the left.      Word recognition score 100% on the right versus 82% on the left.  A/P - Camilo Baca is a 73 year old male with asymmetric sensorineural hearing loss worse on the left.  Certainly there is a history to explain that he is use of weapons without protection of the left ear.   However we do explain to him the small chance of retrocochlear pathology such as acoustic neuroma with asymmetric sensorineural hearing loss.  He understands that and at this point does not wish to go through any imaging to further evaluate.  Therefore we will have his hearing rechecked in a year.  In the meantime we will protect his ears and wear hearing aids.      Aaron Grande MD

## 2024-08-01 ENCOUNTER — OFFICE VISIT (OUTPATIENT)
Dept: OTOLARYNGOLOGY | Facility: CLINIC | Age: 74
End: 2024-08-01
Payer: COMMERCIAL

## 2024-08-01 VITALS
WEIGHT: 202 LBS | TEMPERATURE: 97.7 F | DIASTOLIC BLOOD PRESSURE: 70 MMHG | HEART RATE: 54 BPM | BODY MASS INDEX: 28.92 KG/M2 | HEIGHT: 70 IN | SYSTOLIC BLOOD PRESSURE: 128 MMHG

## 2024-08-01 DIAGNOSIS — H90.3 ASYMMETRICAL SENSORINEURAL HEARING LOSS: Primary | ICD-10-CM

## 2024-08-01 PROCEDURE — 99203 OFFICE O/P NEW LOW 30 MIN: CPT | Performed by: OTOLARYNGOLOGY

## 2024-08-01 NOTE — LETTER
8/1/2024      Camilo Baca  [address]  [place] [address]      Dear Colleague,    Thank you for referring your patient, Camilo Baca, to the [facility]. Please see a copy of my visit note below.    ENT Consultation    Camilo Baca who is a 73 year old male seen in consultation at the request of audiologist.      History of Present Illness - Camilo Baca is a 73 year old male Check for blockage before prior to hearing aids.    Patient presents for relation of his hearing loss.  He has had gradual hearing loss over the years.  Felt that his left ear was always worse than his right.  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  He is right-handed and his left ear was largely unprotected over the years.  He does get bilateral tinnitus not all the time.  Denies any vertigo or any dizziness.  No significant family history of hearing loss.  [facility] but [facility] were not working well for him.  Denies history of ear infections.      BP Readings from Last 1 Encounters:   08/01/24 128/70       BP noted to be well controlled today in office.     Camilo IS NOT a smoker/uses chewing tobacco.  Camilo already quit      Past Medical History -   Past Medical History:   Diagnosis Date     Alopecia, unspecified      Closed fracture of unspecified part of humerus 1974    Arm fracture / left wrist     Esophageal reflux 2/6/2015     Factor 5 Leiden mutation, heterozygous (H24)      Gout 3/28/2011     Herpes zoster without mention of complication 1965    Herpes zoster     Measles without mention of complication     Measles     Personal history of DVT (deep vein thrombosis) 4/9/2015     Personal history of venous thrombosis and embolism 4/9/2015     Pulmonary embolism (H) 12/1996    post appendectomy     RA (rheumatoid arthritis) (H) 3/11/2013     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 (H) 2/2/2016     Rheumatoid arthritis(714.0)     Beginning symptoms     Secondary hypertension with goal blood pressure less than 140/90 11/15/2016     Thrombocytopenia, unspecified (H24) 1/29/2014     Varicella without mention of complication     Chickenpox       Current Medications -   Current Outpatient Medications:      allopurinol (ZYLOPRIM) 300 MG tablet, TAKE 1 TABLET BY MOUTH EVERY DAY, Disp: 90 tablet, Rfl: 0     amLODIPine (NORVASC) 5 MG tablet, Take 1 tablet (5 mg) by mouth daily, Disp: 90 tablet, Rfl: 11     brimonidine (ALPHAGAN) 0.2 % ophthalmic solution, , Disp: , Rfl:      cetirizine (ZYRTEC) 10 MG tablet, Take 10 mg by mouth, Disp: , Rfl:      cholestyramine light (PREVALITE) 4 GM powder, DISSOLVE 1 LEVEL SCOOP IN LIQUID AND DRINK BY MOUTH TWICE DAILY AS DIRECTED, Disp: 231 g, Rfl: 12     Cyanocobalamin (B-12) 100 MCG TABS, , Disp: , Rfl:      diclofenac (VOLTAREN) 1 % GEL, Apply  2 grams to shoulders three times daily using enclosed dosing card., Disp: 100 g, Rfl: 1     diclofenac (VOLTAREN) 1 % topical gel, Apply up to 2 grams of 1% gel to hands up to 4 times daily as needed for joint pain (maximum: 8 g per upper extremity per day), Disp: 400 g, Rfl: 1     dorzolamide-timolol (COSOPT) 22.3-6.8 MG/ML ophthalmic solution, , Disp: , Rfl:      ketorolac (ACULAR) 0.5 % ophthalmic solution, , Disp: , Rfl:      levothyroxine (SYNTHROID/LEVOTHROID) 25 MCG tablet, TAKE 1 TABLET (25 MCG) BY MOUTH DAILY, Disp: 90 tablet, Rfl: 0     losartan-hydrochlorothiazide (HYZAAR) 100-12.5 MG tablet, TAKE 1 TABLET BY MOUTH DAILY, Disp: 90 tablet, Rfl: 3     omeprazole (PRILOSEC) 20 MG DR capsule, Take 1 capsule (20 mg) by mouth daily, Disp: 90 capsule, Rfl: 3     riTUXimab 500 MG/50ML SOLN, Inject 1,000 mg into the vein, Disp: , Rfl:      rosuvastatin (CRESTOR) 10 MG tablet, Take 1 tablet (10 mg) by mouth daily, Disp: 90 tablet, Rfl: 3     sildenafil (VIAGRA) 100 MG tablet, 1/2 tab 1/2  hour prior to activity, Disp: 3 tablet, Rfl: 4     vitamin C (ASCORBIC ACID) 1000 MG TABS, Take 1,000 mg by mouth daily, Disp: , Rfl:      VITAMIN D PO, , Disp: , Rfl:     Current Facility-Administered Medications:      cilgavimab 300 mg/tixagevimab 300 mg (EVUSHELD) - FULL DOSE *FOR CLINIC USE ONLY*, 6 mL, Intramuscular, Once, Fabricio Gomes MD    Allergies -   Allergies   Allergen Reactions     Lisinopril Cough     Plaquenil [Hydroxychloroquine] Hives       Social History -   Social History     Socioeconomic History     Marital status:      Spouse name: Jacki     Number of children: 2     Years of education: 16   Occupational History     Occupation:      Employer: [employer]   Tobacco Use     Smoking status: Former     Current packs/day: 0.00     Average packs/day: 0.3 packs/day for 2.0 years (0.5 ttl pk-yrs)     Types: Cigarettes, Pipe     Start date: 1977     Quit date: 1979     Years since quittin.5     Smokeless tobacco: Never   Vaping Use     Vaping status: Never Used   Substance and Sexual Activity     Alcohol use: Yes     Comment: 5 drinks per week / wine     Drug use: No     Sexual activity: Yes     Partners: Female   Other Topics Concern      Service Yes     Comment: [employer]     Blood Transfusions No     Caffeine Concern No     Occupational Exposure Yes     Comment:      Hobby Hazards Yes     Comment: hunting     Sleep Concern No     Stress Concern No     Weight Concern No     Special Diet No     Back Care No     Exercise Yes     Comment: Walks     Bike Helmet No     Seat Belt Yes     Parent/sibling w/ CABG, MI or angioplasty before 65F 55M? No     Social Determinants of Health     Interpersonal Safety: Low Risk  (2024)    Interpersonal Safety      Do you feel physically and emotionally safe where you currently live?: Yes      Within the past 12 months, have you been hit, slapped, kicked or otherwise physically hurt by someone?: No      Within  "the past 12 months, have you been humiliated or emotionally abused in other ways by your partner or ex-partner?: No       Family History -   Family History   Problem Relation Age of Onset     Arthritis Mother      Cardiovascular Mother      Depression Mother      Gastrointestinal Disease Mother         IBS     Hypertension Mother      Circulatory Mother         Aortal aneurysm     Osteoporosis Mother      Allergies Father         Cortisone     Cardiovascular Father      Circulatory Father      Diabetes Father      Hypertension Father      Lipids Father      Alzheimer Disease Paternal Grandfather      Blood Disease Paternal Grandmother         Clotting problems     Blood Disease Son         Factor 5     Hypertension Maternal Aunt      Cerebrovascular Disease Maternal Grandmother        Review of Systems - As per HPI and PMHx, otherwise review of system review of the head and neck negative. Otherwise 10+ review of system is negative    Physical Exam  /70 (BP Location: Right arm, Patient Position: Right side, Cuff Size: Adult Regular)   Pulse 54   Temp 97.7  F (36.5  C) (Temporal)   Ht 1.778 m (5' 10\")   Wt 91.6 kg (202 lb)   BMI 28.98 kg/m    BMI: Body mass index is 28.98 kg/m .    General - The patient is well nourished and well developed, and appears to have good nutritional status.  Alert and oriented to person and place, answers questions and cooperates with examination appropriately.    SKIN - No suspicious lesions or rashes.  Respiration - No respiratory distress.  Head and Face - Normocephalic and atraumatic, with no gross asymmetry noted of the contour of the facial features.  The facial nerve is intact, with strong symmetric movements.    Voice and Breathing - The patient was breathing comfortably without the use of accessory muscles. The patients voice was clear and strong, and had appropriate pitch and quality.    Ears - Bilateral pinna and EACs with normal appearing overlying skin. Tympanic " membrane intact with good mobility on pneumatic otoscopy bilaterally. Bony landmarks of the ossicular chain are normal. The tympanic membranes are normal in appearance. No retraction, perforation, or masses.  No fluid or purulence was seen in the external canal or the middle ear.     Eyes - Extraocular movements intact.  Sclera were not icteric or injected, conjunctiva were pink and moist.    Mouth - Examination of the oral cavity showed pink, healthy oral mucosa. No lesions or ulcerations noted.  The tongue was mobile and midline, and the dentition were in good condition.      Throat - The walls of the oropharynx were smooth, pink, moist, symmetric, and had no lesions or ulcerations.  The tonsillar pillars and soft palate were symmetric.  The uvula was midline on elevation.    Neck - Normal midline excursion of the laryngotracheal complex during swallowing.  Full range of motion on passive movement.  Palpation of the occipital, submental, submandibular, internal jugular chain, and supraclavicular nodes did not demonstrate any abnormal lymph nodes or masses.  The carotid pulse was palpable bilaterally.  Palpation of the thyroid was soft and smooth, with no nodules or goiter appreciated.  The trachea was mobile and midline.    Nose - External contour is symmetric, no gross deflection or scars.  Nasal mucosa is pink and moist with no abnormal mucus.  The septum was midline and non-obstructive, turbinates of normal size and position.  No polyps, masses, or purulence noted on examination.    Neuro - Nonfocal neuro exam is normal, CN 2 through 12 intact, normal gait and muscle tone.      Performed in clinic today:  Audiologic Studies - An audiogram and tympanogram were performed today as part of the evaluation and personally reviewed. The tympanogram shows Type A curves on the right and Type A curves on the left, with normal canal volumes and middle ear pressures.  The audiogram showed mild high-frequency SNHL on the  right and mild to moderate SNHL on the left.      Word recognition score 100% on the right versus 82% on the left.  A/P - Camilo Baca is a 73 year old male with asymmetric sensorineural hearing loss worse on the left.  Certainly there is a history to explain that he is use of weapons without protection of the left ear.  However we do explain to him the small chance of retrocochlear pathology such as acoustic neuroma with asymmetric sensorineural hearing loss.  He understands that and at this point does not wish to go through any imaging to further evaluate.  Therefore we will have his hearing rechecked in a year.  In the meantime we will protect his ears and wear hearing aids.      Aaron Grande MD       Again, thank you for allowing me to participate in the care of your patient.        Sincerely,        Aaron Grande MD, MD

## 2024-08-03 DIAGNOSIS — K21.9 GASTROESOPHAGEAL REFLUX DISEASE WITHOUT ESOPHAGITIS: ICD-10-CM

## 2024-08-13 ENCOUNTER — MEDICAL CORRESPONDENCE (OUTPATIENT)
Dept: HEALTH INFORMATION MANAGEMENT | Facility: CLINIC | Age: 74
End: 2024-08-13

## 2024-08-22 ENCOUNTER — OFFICE VISIT (OUTPATIENT)
Dept: AUDIOLOGY | Facility: CLINIC | Age: 74
End: 2024-08-22
Payer: COMMERCIAL

## 2024-08-22 DIAGNOSIS — H90.3 SENSORINEURAL HEARING LOSS, ASYMMETRICAL: Primary | ICD-10-CM

## 2024-08-22 DIAGNOSIS — H93.13 TINNITUS OF BOTH EARS: ICD-10-CM

## 2024-08-22 PROCEDURE — 92591 PR HEARING AID EXAM BINAURAL: CPT | Performed by: AUDIOLOGIST

## 2024-08-22 NOTE — PROGRESS NOTES
AUDIOLOGY REPORT    SUBJECTIVE:   Camilo Baca is a 74 year old male was seen in the Audiology Clinic at  [facility] on 8/22/24 to discuss concerns with hearing and functional communication difficulties. The patient was unaccompanied today. His last hearing test was 4/9/2024, it showed normal hearing sloping to low normal 250-2k, precipitously sloping to moderate high frequency sensorineural hearing loss 3k-8k right ear and low normal sloping to moderate to severe sensorineural hearing loss left ear 500-8k, with good word understanding in quiet 100% right and 82% left. The patient was medically evaluated and determined to be cleared for binaural hearing aids by YAMEL Grande M.D. Camilo notes difficulty with communication in a variety of listening situations, but particular difficulty in group situations.    OBJECTIVE:  Patient is a hearing aid candidate. Patient would like to move forward with a hearing aid evaluation today. Therefore, the patient was presented with different options for amplification to help aid in communication. Discussed styles, levels of technology and monaural vs. binaural fitting.     The hearing aid(s) mutually chosen were:  Binaural: ReSound Key 4 -[facility]  COLOR: Black  BATTERY SIZE: rechargeable  EARMOLD/TIPS: non-custom domes (specific size to be determined at fitting)  CANAL/ LENGTH: 1 low power right and left    ASSESSMENT:     ICD-10-CM    1. Sensorineural hearing loss, asymmetrical  H90.3 Hearing Aid Exam, Binaural (06317)      2. Tinnitus of both ears  H93.13 Hearing Aid Exam, Binaural (50384)          Reviewed purchase information and warranty information with patient. The 45 day trial period was explained to patient. The patient was given a copy of the [place] Department of Health consumer brochure on purchasing hearing instruments. Patient risk factors have been provided to the patient in writing prior to the sale of the hearing aid per FDA  regulation. The risk factors are also available in the User Instructional Booklet to be presented on the day of the hearing aid fitting. Hearing aid(s) ordered. Hearing aid evaluation completed.    PLAN:   Camilo is scheduled to return in 2-3 weeks for a hearing aid fitting and programming. Purchase agreement will be completed on that date. Please contact this clinic with any questions or concerns.      Roseline Amezcua Licensed Audiologist #[id number]

## 2024-08-24 DIAGNOSIS — E78.2 MIXED HYPERLIPIDEMIA: ICD-10-CM

## 2024-08-26 RX ORDER — ROSUVASTATIN CALCIUM 10 MG/1
10 TABLET, COATED ORAL DAILY
Qty: 90 TABLET | Refills: 1 | Status: SHIPPED | OUTPATIENT
Start: 2024-08-26 | End: 2024-08-26

## 2024-08-26 RX ORDER — ROSUVASTATIN CALCIUM 10 MG/1
10 TABLET, COATED ORAL DAILY
Qty: 90 TABLET | Refills: 1 | Status: SHIPPED | OUTPATIENT
Start: 2024-08-26

## 2024-09-06 ENCOUNTER — OFFICE VISIT (OUTPATIENT)
Dept: FAMILY MEDICINE | Facility: CLINIC | Age: 74
End: 2024-09-06
Payer: COMMERCIAL

## 2024-09-06 VITALS
SYSTOLIC BLOOD PRESSURE: 122 MMHG | TEMPERATURE: 97.3 F | HEART RATE: 56 BPM | RESPIRATION RATE: 12 BRPM | DIASTOLIC BLOOD PRESSURE: 72 MMHG | HEIGHT: 71 IN | WEIGHT: 203.2 LBS | OXYGEN SATURATION: 98 % | BODY MASS INDEX: 28.45 KG/M2

## 2024-09-06 DIAGNOSIS — D69.6 THROMBOCYTOPENIA (H): ICD-10-CM

## 2024-09-06 DIAGNOSIS — M05.9 SEROPOSITIVE RHEUMATOID ARTHRITIS (H): ICD-10-CM

## 2024-09-06 DIAGNOSIS — Z86.718 PERSONAL HISTORY OF DVT (DEEP VEIN THROMBOSIS): ICD-10-CM

## 2024-09-06 DIAGNOSIS — Z00.00 ENCOUNTER FOR MEDICARE ANNUAL WELLNESS EXAM: Primary | ICD-10-CM

## 2024-09-06 DIAGNOSIS — E03.4 HYPOTHYROIDISM DUE TO ACQUIRED ATROPHY OF THYROID: ICD-10-CM

## 2024-09-06 DIAGNOSIS — D84.9 IMMUNOCOMPROMISED (H): ICD-10-CM

## 2024-09-06 DIAGNOSIS — D68.51 FACTOR 5 LEIDEN MUTATION, HETEROZYGOUS (H): ICD-10-CM

## 2024-09-06 DIAGNOSIS — M10.9 ACUTE GOUTY ARTHROPATHY: ICD-10-CM

## 2024-09-06 DIAGNOSIS — E78.5 HYPERLIPIDEMIA LDL GOAL <130: ICD-10-CM

## 2024-09-06 DIAGNOSIS — M72.2 PLANTAR FASCIITIS: ICD-10-CM

## 2024-09-06 DIAGNOSIS — H90.5 SENSORINEURAL HEARING LOSS (SNHL), UNSPECIFIED LATERALITY: ICD-10-CM

## 2024-09-06 DIAGNOSIS — I71.43 INFRARENAL ABDOMINAL AORTIC ANEURYSM (AAA) WITHOUT RUPTURE (H): ICD-10-CM

## 2024-09-06 DIAGNOSIS — H40.003 GLAUCOMA SUSPECT, BILATERAL: ICD-10-CM

## 2024-09-06 DIAGNOSIS — Z86.0101 HISTORY OF ADENOMATOUS POLYP OF COLON: ICD-10-CM

## 2024-09-06 DIAGNOSIS — K21.9 GASTROESOPHAGEAL REFLUX DISEASE WITHOUT ESOPHAGITIS: ICD-10-CM

## 2024-09-06 DIAGNOSIS — M1A.00X0 IDIOPATHIC CHRONIC GOUT WITHOUT TOPHUS, UNSPECIFIED SITE: ICD-10-CM

## 2024-09-06 LAB
CHOLEST SERPL-MCNC: 176 MG/DL
FASTING STATUS PATIENT QL REPORTED: YES
HDLC SERPL-MCNC: 39 MG/DL
LDLC SERPL CALC-MCNC: 114 MG/DL
NONHDLC SERPL-MCNC: 137 MG/DL
TRIGL SERPL-MCNC: 114 MG/DL
TSH SERPL DL<=0.005 MIU/L-ACNC: 4.1 UIU/ML (ref 0.3–4.2)

## 2024-09-06 PROCEDURE — 99213 OFFICE O/P EST LOW 20 MIN: CPT | Mod: 25 | Performed by: STUDENT IN AN ORGANIZED HEALTH CARE EDUCATION/TRAINING PROGRAM

## 2024-09-06 PROCEDURE — 36415 COLL VENOUS BLD VENIPUNCTURE: CPT | Performed by: STUDENT IN AN ORGANIZED HEALTH CARE EDUCATION/TRAINING PROGRAM

## 2024-09-06 PROCEDURE — 80061 LIPID PANEL: CPT | Performed by: STUDENT IN AN ORGANIZED HEALTH CARE EDUCATION/TRAINING PROGRAM

## 2024-09-06 PROCEDURE — G0439 PPPS, SUBSEQ VISIT: HCPCS | Performed by: STUDENT IN AN ORGANIZED HEALTH CARE EDUCATION/TRAINING PROGRAM

## 2024-09-06 PROCEDURE — 84443 ASSAY THYROID STIM HORMONE: CPT | Performed by: STUDENT IN AN ORGANIZED HEALTH CARE EDUCATION/TRAINING PROGRAM

## 2024-09-06 RX ORDER — LEVOTHYROXINE SODIUM 25 UG/1
25 TABLET ORAL DAILY
Qty: 90 TABLET | Refills: 4 | Status: SHIPPED | OUTPATIENT
Start: 2024-09-06

## 2024-09-06 RX ORDER — ALLOPURINOL 300 MG/1
TABLET ORAL
Qty: 90 TABLET | Refills: 4 | Status: SHIPPED | OUTPATIENT
Start: 2024-09-06

## 2024-09-06 ASSESSMENT — PAIN SCALES - GENERAL: PAINLEVEL: MILD PAIN (2)

## 2024-09-06 NOTE — PATIENT INSTRUCTIONS
Patient Education   Preventive Care Advice   This is general advice given by our system to help you stay healthy. However, your care team may have specific advice just for you. Please talk to your care team about your preventive care needs.  Nutrition  Eat 5 or more servings of fruits and vegetables each day.  Try wheat bread, brown rice and whole grain pasta (instead of white bread, rice, and pasta).  Get enough calcium and vitamin D. Check the label on foods and aim for 100% of the RDA (recommended daily allowance).  Lifestyle  Exercise at least 150 minutes each week  (30 minutes a day, 5 days a week).  Do muscle strengthening activities 2 days a week. These help control your weight and prevent disease.  No smoking.  Wear sunscreen to prevent skin cancer.  Have a dental exam and cleaning every 6 months.  Yearly exams  See your health care team every year to talk about:  Any changes in your health.  Any medicines your care team has prescribed.  Preventive care, family planning, and ways to prevent chronic diseases.  Shots (vaccines)   HPV shots (up to age 26), if you've never had them before.  Hepatitis B shots (up to age 59), if you've never had them before.  COVID-19 shot: Get this shot when it's due.  Flu shot: Get a flu shot every year.  Tetanus shot: Get a tetanus shot every 10 years.  Pneumococcal, hepatitis A, and RSV shots: Ask your care team if you need these based on your risk.  Shingles shot (for age 50 and up)  General health tests  Diabetes screening:  Starting at age 35, Get screened for diabetes at least every 3 years.  If you are younger than age 35, ask your care team if you should be screened for diabetes.  Cholesterol test: At age 39, start having a cholesterol test every 5 years, or more often if advised.  Bone density scan (DEXA): At age 50, ask your care team if you should have this scan for osteoporosis (brittle bones).  Hepatitis C: Get tested at least once in your life.  STIs (sexually  transmitted infections)  Before age 24: Ask your care team if you should be screened for STIs.  After age 24: Get screened for STIs if you're at risk. You are at risk for STIs (including HIV) if:  You are sexually active with more than one person.  You don't use condoms every time.  You or a partner was diagnosed with a sexually transmitted infection.  If you are at risk for HIV, ask about PrEP medicine to prevent HIV.  Get tested for HIV at least once in your life, whether you are at risk for HIV or not.  Cancer screening tests  Cervical cancer screening: If you have a cervix, begin getting regular cervical cancer screening tests starting at age 21.  Breast cancer scan (mammogram): If you've ever had breasts, begin having regular mammograms starting at age 40. This is a scan to check for breast cancer.  Colon cancer screening: It is important to start screening for colon cancer at age 45.  Have a colonoscopy test every 10 years (or more often if you're at risk) Or, ask your provider about stool tests like a FIT test every year or Cologuard test every 3 years.  To learn more about your testing options, visit:   .  For help making a decision, visit:   https://bit.ly/mi99345.  Prostate cancer screening test: If you have a prostate, ask your care team if a prostate cancer screening test (PSA) at age 55 is right for you.  Lung cancer screening: If you are a current or former smoker ages 50 to 80, ask your care team if ongoing lung cancer screenings are right for you.  For informational purposes only. Not to replace the advice of your health care provider. Copyright   2023 [facility] [employer]. All rights reserved. Clinically reviewed by the [facility] Transitions Program. MDSave 706707 - REV 01/24.  Hearing Loss: Care Instructions  Overview     Hearing loss is a sudden or slow decrease in how well you hear. It can range from slight to profound. Permanent hearing loss can occur with aging. It also can  happen when you are exposed long-term to loud noise. Examples include listening to loud music, riding motorcycles, or being around other loud machines.  Hearing loss can affect your work and home life. It can make you feel lonely or depressed. You may feel that you have lost your independence. But hearing aids and other devices can help you hear better and feel connected to others.  Follow-up care is a key part of your treatment and safety. Be sure to make and go to all appointments, and call your doctor if you are having problems. It's also a good idea to know your test results and keep a list of the medicines you take.  How can you care for yourself at home?  Avoid loud noises whenever possible. This helps keep your hearing from getting worse.  Always wear hearing protection around loud noises.  Wear a hearing aid as directed.  A professional can help you pick a hearing aid that will work best for you.  You can also get hearing aids over the counter for mild to moderate hearing loss.  Have hearing tests as your doctor suggests. They can show whether your hearing has changed. Your hearing aid may need to be adjusted.  Use other devices as needed. These may include:  Telephone amplifiers and hearing aids that can connect to a television, stereo, radio, or microphone.  Devices that use lights or vibrations. These alert you to the doorbell, a ringing telephone, or a baby monitor.  Television closed-captioning. This shows the words at the bottom of the screen. Most new TVs can do this.  TTY (text telephone). This lets you type messages back and forth on the telephone instead of talking or listening. These devices are also called TDD. When messages are typed on the keyboard, they are sent over the phone line to a receiving TTY. The message is shown on a monitor.  Use text messaging, social media, and email if it is hard for you to communicate by telephone.  Try to learn a listening technique called speechreading. It is  "not lipreading. You pay attention to people's gestures, expressions, posture, and tone of voice. These clues can help you understand what a person is saying. Face the person you are talking to, and have them face you. Make sure the lighting is good. You need to see the other person's face clearly.  Think about counseling if you need help to adjust to your hearing loss.  When should you call for help?  Watch closely for changes in your health, and be sure to contact your doctor if:    You think your hearing is getting worse.     You have new symptoms, such as dizziness or nausea.   Where can you learn more?  Go to https://www.[employer].net/patiented  Enter R798 in the search box to learn more about \"Hearing Loss: Care Instructions.\"  Current as of: September 27, 2023               Content Version: 14.0    7155-3589 makr.   Care instructions adapted under license by your healthcare professional. If you have questions about a medical condition or this instruction, always ask your healthcare professional. [employer], [employer] disclaims any warranty or liability for your use of this information.         "

## 2024-09-06 NOTE — PROGRESS NOTES
Preventive Care Visit  [facility]  Ruiz Barney MD, Family Medicine  Sep 6, 2024      Assessment & Plan   Problem List Items Addressed This Visit          Nervous and Auditory    Sensorineural hearing loss (SNHL)       Digestive    Gastroesophageal reflux disease without esophagitis       Endocrine    HYPERLIPIDEMIA LDL GOAL <130    Relevant Orders    Lipid panel reflex to direct LDL Fasting    Idiopathic chronic gout without tophus, unspecified site    Hypothyroidism due to acquired atrophy of thyroid    Relevant Medications    levothyroxine (SYNTHROID/LEVOTHROID) 25 MCG tablet    Other Relevant Orders    TSH WITH FREE T4 REFLEX       Circulatory    Infrarenal abdominal aortic aneurysm (AAA) without rupture (H24)       Musculoskeletal and Integumentary    Plantar fasciitis       Immune    Seropositive rheumatoid arthritis (H)       Hematologic    Thrombocytopenia (H24)    Factor 5 Leiden mutation, heterozygous (H24)       Other    History of adenomatous polyp of colon    Personal history of DVT (deep vein thrombosis)    Immunocompromised (H24)    Glaucoma suspect, bilateral     Other Visit Diagnoses       Encounter for Medicare annual wellness exam    -  Primary    Acute gouty arthropathy        Relevant Medications    allopurinol (ZYLOPRIM) 300 MG tablet           Age-appropriate screening and immunization discussed.  Will continue to follow with rheumatology and has done well with injections. Continue to work on stretches for his plantar fasciitis and if things remain bothersome consider follow-up with physical therapy.  He has orthotics. Could do injections if not improving. Blood pressure well controlled. Repeat lipids and thyroid. Gout controlled.       Patient has been advised of split billing requirements and indicates understanding: Yes       Counseling  Appropriate preventive services were addressed with this patient via screening, questionnaire, or discussion as  appropriate for fall prevention, nutrition, physical activity, Tobacco-use cessation, social engagement, weight loss and cognition.  Checklist reviewing preventive services available has been given to the patient.  Reviewed patient's diet, addressing concerns and/or questions.   The patient was provided with written information regarding signs of hearing loss.       Kenna Page is a 74 year old, presenting for the following:  Wellness Visit        9/6/2024     8:05 AM   Additional Questions   Roomed by Etta JARRELL         Health Care Directive  Patient has a Health Care Directive on file  Advance care planning document is on file and is current.    HPI            9/6/2024   General Health   How would you rate your overall physical health? Good   Feel stress (tense, anxious, or unable to sleep) Not at all            9/6/2024   Nutrition   Diet: Regular (no restrictions)            9/6/2024   Exercise   Days per week of moderate/strenous exercise 7 days   Average minutes spent exercising at this level Patient declined            9/6/2024   Social Factors   Frequency of gathering with friends or relatives Twice a week   Worry food won't last until get money to buy more No   Food not last or not have enough money for food? No   Do you have housing? (Housing is defined as stable permanent housing and does not include staying ouside in a car, in a tent, in an abandoned building, in an overnight shelter, or couch-surfing.) Yes   Are you worried about losing your housing? No   Lack of transportation? No   Unable to get utilities (heat,electricity)? No            9/6/2024   Fall Risk   Fallen 2 or more times in the past year? No    No   Trouble with walking or balance? No    No       Multiple values from one day are sorted in reverse-chronological order          9/6/2024   Activities of Daily Living- Home Safety   Needs help with the following daily activites None of the above   Safety concerns in the home None of  the above            2024   Dental   Dentist two times every year? Yes            2024   Hearing Screening   Hearing concerns? (!) I NEED TO ASK PEOPLE TO SPEAK UP OR REPEAT THEMSELVES.    (!) IT'S HARDER TO UNDERSTAND WOMEN'S VOICES THAN MEN'S VOICES.    (!) IT'S HARD TO FOLLOW A CONVERSATION IN A NOISY RESTAURANT OR CROWDED ROOM.    (!) TROUBLE UNDERSTANDING SOFT OR WHISPERED SPEECH.       Multiple values from one day are sorted in reverse-chronological order         2024   Driving Risk Screening   Patient/family members have concerns about driving No            2024   General Alertness/Fatigue Screening   Have you been more tired than usual lately? No            2024   Urinary Incontinence Screening   Bothered by leaking urine in past 6 months No               Today's PHQ-2 Score:       2024     7:51 AM   PHQ-2 (  [employer])   Q1: Little interest or pleasure in doing things 0   Q2: Feeling down, depressed or hopeless 0   PHQ-2 Score 0   Q1: Little interest or pleasure in doing things Not at all   Q2: Feeling down, depressed or hopeless Not at all   PHQ-2 Score 0           2024   Substance Use   Alcohol more than 3/day or more than 7/wk No   Do you have a current opioid prescription? No   How severe/bad is pain from 1 to 10? 1/10   Do you use any other substances recreationally? No        Social History     Tobacco Use    Smoking status: Former     Current packs/day: 0.00     Average packs/day: 0.3 packs/day for 2.0 years (0.5 ttl pk-yrs)     Types: Cigarettes, Pipe     Start date: 1977     Quit date: 1979     Years since quittin.7    Smokeless tobacco: Never   Vaping Use    Vaping status: Never Used   Substance Use Topics    Alcohol use: Yes     Comment: 5 drinks per week / wine    Drug use: No       ASCVD Risk   The 10-year ASCVD risk score (Kenzie PERALTA, et al., 2019) is: 20.9%    Values used to calculate the score:      Age: 74 years      Sex: Male      Is  Non- : No      Diabetic: No      Tobacco smoker: No      Systolic Blood Pressure: 122 mmHg      Is BP treated: Yes      HDL Cholesterol: 55 mg/dL      Total Cholesterol: 194 mg/dL    Fracture Risk Assessment Tool  Link to Frax Calculator  Use the information below to complete the Frax calculator  : 1950  Sex: male  Weight (kg): 92.2 kg (actual weight)  Height (cm): 180.3 cm  Previous Fragility Fracture:  No  History of parent with fractured hip:  No  Current Smoking:  No  Patient has been on glucocorticoids for more than 3 months (5mg/day or more): No  Rheumatoid Arthritis on Problem List:  Yes  Secondary Osteoporosis on Problem List:  No  Consumes 3 or more units of alcohol per day: No  Femoral Neck BMD (g/cm2)            Reviewed and updated as needed this visit by Provider                    Past Medical History:   Diagnosis Date    Alopecia, unspecified     Closed fracture of unspecified part of humerus 1974    Arm fracture / left wrist    Esophageal reflux 2015    Factor 5 Leiden mutation, heterozygous (H24)     Gout 3/28/2011    Herpes zoster without mention of complication     Herpes zoster    Measles without mention of complication     Measles    Personal history of DVT (deep vein thrombosis) 2015    Personal history of venous thrombosis and embolism 2015    Pulmonary embolism (H) 1996    post appendectomy    RA (rheumatoid arthritis) (H) 3/11/2013    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016    Rheumatoid arthritis(714.0)     Beginning symptoms    Secondary hypertension with goal blood pressure less than 140/90 11/15/2016    Thrombocytopenia, unspecified (H24) 2014    Varicella without mention of complication     Chickenpox     Past Surgical History:   Procedure Laterality Date    COLONOSCOPY  11/15/2010    COLONOSCOPY performed by DARIUS MESA at  GI    COLONOSCOPY N/A 2015    Procedure:  COLONOSCOPY;  Surgeon: Bubba Odom MD;  Location: [facility]    COLONOSCOPY N/A 3/19/2021    Procedure: Colonoscopy;  Surgeon: Ludmila Beltran MD;  Location: [facility]    ESOPHAGOSCOPY, GASTROSCOPY, DUODENOSCOPY (EGD), COMBINED N/A 10/24/2018    Procedure: Esophagogastroduodenoscopy Multiple Biopsies by Biopsy;  Surgeon: Matteo Johnson DO;  Location: [facility]    HC REPAIR ING HERNIA,5+Y/O,REDUCIBL  1960    HERNIORRHAPHY UMBILICAL N/A 4/17/2015    Procedure: HERNIORRHAPHY UMBILICAL;  Surgeon: Rayray Avila MD;  Location: [facility]    LAPAROSCOPIC HERNIORRHAPHY INGUINAL BILATERAL Bilateral 4/17/2015    Procedure: LAPAROSCOPIC HERNIORRHAPHY INGUINAL BILATERAL;  Surgeon: Rayray Avila MD;  Location: [facility]    ZZC APPENDECTOMY  1996    ZZHC COLONOSCOPY W BIOPSY  08/10/05     Current providers sharing in care for this patient include:  Patient Care Team:  No Ref-Primary, Physician as PCP - Fabricio Magana MD as Assigned Rheumatology Provider  Camilo Robles MD as Assigned Heart and Vascular Provider  Reina Ocasio MD as MD (Gastroenterology)  Reina Ocasio MD as Assigned Gastroenterology Provider  Kirstin Donald AuD as Audiologist (Audiology)  Ruiz Barney MD as Assigned PCP  Aaron Grande MD as Assigned Surgical Provider    The following health maintenance items are reviewed in Epic and correct as of today:  Health Maintenance   Topic Date Due    LIPID  08/30/2024    INFLUENZA VACCINE (1) 09/01/2024    COVID-19 Vaccine (9 - 2023-24 season) 09/01/2024    TSH W/FREE T4 REFLEX  08/30/2024    MICROALBUMIN  11/08/2024    MEDICARE ANNUAL WELLNESS VISIT  09/06/2025    ANNUAL REVIEW OF HM ORDERS  09/06/2025    FALL RISK ASSESSMENT  09/06/2025    COLORECTAL CANCER SCREENING  03/19/2026    GLUCOSE  10/02/2026    ADVANCE CARE PLANNING  09/06/2029    DTAP/TDAP/TD IMMUNIZATION (3 - Td or Tdap) 06/09/2032    HEPATITIS C SCREENING  Completed    PHQ-2 (once per calendar  "year)  Completed    Pneumococcal Vaccine: 65+ Years  Completed    ZOSTER IMMUNIZATION  Completed    AORTIC ANEURYSM SCREENING (SYSTEM ASSIGNED)  Completed    HPV IMMUNIZATION  Aged Out    MENINGITIS IMMUNIZATION  Aged Out    RSV MONOCLONAL ANTIBODY  Aged Out         Review of Systems  Constitutional, HEENT, cardiovascular, pulmonary, GI, , musculoskeletal, neuro, skin, endocrine and psych systems are negative, except as otherwise noted.     Objective    Exam  /72 (BP Location: Right arm, Patient Position: Chair)   Pulse 56   Temp 97.3  F (36.3  C) (Temporal)   Resp 12   Ht 1.803 m (5' 11\")   Wt 92.2 kg (203 lb 3.2 oz)   SpO2 98%   BMI 28.34 kg/m     Estimated body mass index is 28.34 kg/m  as calculated from the following:    Height as of this encounter: 1.803 m (5' 11\").    Weight as of this encounter: 92.2 kg (203 lb 3.2 oz).    Physical Exam  GENERAL: alert and no distress  EYES: Eyes grossly normal to inspection, PERRL and conjunctivae and sclerae normal  HENT: ear canals and TM's normal, nose and mouth without ulcers or lesions  NECK: no adenopathy, no asymmetry, masses, or scars  RESP: lungs clear to auscultation - no rales, rhonchi or wheezes  CV: regular rate and rhythm, normal S1 S2, no S3 or S4, no murmur, click or rub, no peripheral edema  ABDOMEN: soft, nontender, no hepatosplenomegaly, no masses and bowel sounds normal  MS: no gross musculoskeletal defects noted, no edema, left plantar fascial tenderness  SKIN: no suspicious lesions or rashes  NEURO: Normal strength and tone, mentation intact and speech normal  PSYCH: mentation appears normal, affect normal/bright        9/6/2024   Mini Cog   Clock Draw Score 2 Normal   3 Item Recall 3 objects recalled   Mini Cog Total Score 5                 Signed Electronically by: Ruiz Barney MD    "

## 2024-09-10 ENCOUNTER — OFFICE VISIT (OUTPATIENT)
Dept: AUDIOLOGY | Facility: CLINIC | Age: 74
End: 2024-09-10
Payer: COMMERCIAL

## 2024-09-10 DIAGNOSIS — H90.3 BILATERAL SENSORINEURAL HEARING LOSS: Primary | ICD-10-CM

## 2024-09-10 DIAGNOSIS — H93.13 BILATERAL TINNITUS: ICD-10-CM

## 2024-09-10 PROCEDURE — V5020 CONFORMITY EVALUATION: HCPCS | Mod: GA | Performed by: AUDIOLOGIST

## 2024-09-10 PROCEDURE — V5261 HEARING AID, DIGIT, BIN, BTE: HCPCS | Mod: GA | Performed by: AUDIOLOGIST

## 2024-09-10 PROCEDURE — V5160 DISPENSING FEE BINAURAL: HCPCS | Mod: GA | Performed by: AUDIOLOGIST

## 2024-09-10 PROCEDURE — V5011 HEARING AID FITTING/CHECKING: HCPCS | Mod: GA | Performed by: AUDIOLOGIST

## 2024-09-10 NOTE — PATIENT INSTRUCTIONS

## 2024-09-10 NOTE — PROGRESS NOTES
AUDIOLOGY REPORT    SUBJECTIVE: Camilo Baca, a 74 year old male, was seen in the Audiology Clinic at [facility] today for a binaural ReSound Key 4  hearing aid fitting. His last hearing test was 4/9/2024, it showed normal hearing sloping to low normal 250-2k, precipitously sloping to moderate high frequency sensorineural hearing loss 3k-8k right ear and low normal sloping to moderate to severe sensorineural hearing loss left ear 500-8k, with good word understanding in quiet 100% right and 82% left. The patient was medically evaluated and determined to be cleared for binaural hearing aids by YAMEL Grande M.D. Camilo notes difficulty with communication in a variety of listening situations, but particular difficulty in group situations. He has previously used hearing aids in the past unsuccessfully.    OBJECTIVE:    Prior to fitting, a hearing aid check was performed to ensure device functionality. The hearing aid conformity evaluation was completed.The hearing aids were placed and they provided a good fit. Real-ear-probe-microphone measurements were completed on the [employer] system and were a good match to NAL-NL2 target with soft sounds audible, moderate sounds comfortable, and loud sounds below discomfort. UCLs are verified through maximum power output measures and demonstrate appropriate limiting of loud inputs. Push button is volume, left lower and right louder.        Mr. Baca was oriented to proper hearing aid use, care, cleaning (no water, dry brush), batteries (size rechargeable, insertion/removal, toxicity, low-battery signal), aid insertion/removal, user booklet, warranty information, storage cases, and other hearing aid details. The patient confirmed understanding of hearing aid use and care, and showed proper insertion of hearing aid and batteries while in the office today. Mr. Baca reported good volume and sound quality today.    EAR(S) FIT: Binaural  HEARING AID  MAKE: Right: Resound; Left: Resound    HEARING AID MODEL #: Right: Key 4 - DRWC; Left: Key 4 -ZGJS  HEARING AID STYLE: Right: BTE/RICHARD; Left: BTE/RICHARD  DOME SIZE: Right:  Small Double Dome; Left::  Medium Double Dome   LENGTH: Right:  #2 LP; Left:  #2 LP  SERIAL NUMBERS: Right: [id number]; Left: [id number]  WARRANTY END DATE: Right: 9/26/2027; Left:: 9/26/2027    The patient signed a waiver per their insurance contract.      ASSESSMENT:  Binaural ReSound Key 4 hearing aid fitting completed today. Verification measures were performed. The 45 day trial period was explained to patient, and they expressed understanding. Mr. Baca signed the Hearing Aid Purchase Agreement and was given a copy, as well as details on his hearing aids. Patient was counseled that exact out of pocket amounts cannot be determined for hearing aid claims being sent to insurance. Any insurance coverage information presented to the patient is an estimate only, and is not a guarantee of payment. Patient has been advised to check with their own insurance.    PLAN:   Mr. Baca will return for follow-up in 2-3 weeks for a hearing aid review appointment. Please call this clinic with questions regarding today s appointment.    Ezekiel Amezcua.  [place] Licensed Audiologist #[id number]

## 2024-09-11 ENCOUNTER — TRANSFERRED RECORDS (OUTPATIENT)
Dept: HEALTH INFORMATION MANAGEMENT | Facility: CLINIC | Age: 74
End: 2024-09-11
Payer: COMMERCIAL

## 2024-09-24 ENCOUNTER — APPOINTMENT (OUTPATIENT)
Dept: AUDIOLOGY | Facility: CLINIC | Age: 74
End: 2024-09-24
Payer: COMMERCIAL

## 2024-09-24 DIAGNOSIS — H90.3 BILATERAL SENSORINEURAL HEARING LOSS: Primary | ICD-10-CM

## 2024-09-24 DIAGNOSIS — K74.00 HEPATIC FIBROSIS, UNSPECIFIED: Primary | ICD-10-CM

## 2024-09-24 DIAGNOSIS — H93.13 BILATERAL TINNITUS: ICD-10-CM

## 2024-09-24 PROCEDURE — V5010 ASSESSMENT FOR HEARING AID: HCPCS | Performed by: AUDIOLOGIST

## 2024-10-07 ENCOUNTER — VIRTUAL VISIT (OUTPATIENT)
Dept: GASTROENTEROLOGY | Facility: CLINIC | Age: 74
End: 2024-10-07
Attending: STUDENT IN AN ORGANIZED HEALTH CARE EDUCATION/TRAINING PROGRAM
Payer: COMMERCIAL

## 2024-10-07 VITALS — WEIGHT: 198 LBS | HEIGHT: 70 IN | BODY MASS INDEX: 28.35 KG/M2

## 2024-10-07 DIAGNOSIS — R79.89 ELEVATED LFTS: Primary | ICD-10-CM

## 2024-10-07 DIAGNOSIS — K76.0 HEPATIC STEATOSIS: ICD-10-CM

## 2024-10-07 PROCEDURE — 99214 OFFICE O/P EST MOD 30 MIN: CPT | Mod: 95 | Performed by: STUDENT IN AN ORGANIZED HEALTH CARE EDUCATION/TRAINING PROGRAM

## 2024-10-07 ASSESSMENT — PAIN SCALES - GENERAL: PAINLEVEL: NO PAIN (0)

## 2024-10-07 NOTE — NURSING NOTE
Current patient location: [id number] [place] [address]  [place] [address]    Is the patient currently in the state of [place]? YES    Visit mode:VIDEO    If the visit is dropped, the patient can be reconnected by: VIDEO VISIT: Text to cell phone:   Telephone Information:   Mobile [phone]       Will anyone else be joining the visit? NO  (If patient encounters technical issues they should call [phone]    Are changes needed to the allergy or medication list? No    Are refills needed on medications prescribed by this physician? NO    Rooming Documentation:  Questionnaire(s) not pre-assigned    Reason for visit: Video Visit (Recheck)    Sarah ROCK      10 (severe pain)

## 2024-10-07 NOTE — PROGRESS NOTES
Virtual Visit Details    Type of service:  Video Visit   Video Start Time:  12:39 PM  Video End Time:12:47 PM    Originating Location (pt. Location): Home  Distant Location (provider location):  On-site  Platform used for Video Visit: [facility] Return Patient Visit    Date of Visit: October 7, 2024    Reason for referral: follow up liver disease    Subjective: Mr. Baca is a 74 year old man with a history of F5L with LE DVT and PE, here for hepatic steatosis and elevated LFTs.     He had MRI elastography that showed mild diffuse steatosis, liver stiffness 2.37 kPA - normal    Hepatitis B and C testing was negative in the past.  Is on Rituxan.    Interval Events:  - MRI elastography 12/2023 showing normal kPA at 2.8 but diffuse hepatic steatosis, similar to his previous MRE 2021  - Cut back on his alcohol - down to 1-2 a day. Sometimes will not drink.     ROS: 14 point ROS negative except for positives noted in HPI.    PMHx:  Past Medical History:   Diagnosis Date    Alopecia, unspecified     Closed fracture of unspecified part of humerus 1974    Arm fracture / left wrist    Esophageal reflux 2/6/2015    Factor 5 Leiden mutation, heterozygous (H)     Gout 3/28/2011    Herpes zoster without mention of complication 1965    Herpes zoster    Measles without mention of complication     Measles    Personal history of DVT (deep vein thrombosis) 4/9/2015    Personal history of venous thrombosis and embolism 4/9/2015    Pulmonary embolism (H) 12/1996    post appendectomy    RA (rheumatoid arthritis) (H) 3/11/2013    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    Rheumatoid arthritis(714.0)     Beginning symptoms    Secondary hypertension with goal blood pressure less than 140/90 11/15/2016    Thrombocytopenia, unspecified (H) 1/29/2014    Varicella without mention of complication     Chickenpox       PSHx:  Past Surgical History:   Procedure  Laterality Date    COLONOSCOPY  11/15/2010    COLONOSCOPY performed by DARIUS MESA at  GI    COLONOSCOPY N/A 11/2/2015    Procedure: COLONOSCOPY;  Surgeon: Bubba Odom MD;  Location:  GI    COLONOSCOPY N/A 3/19/2021    Procedure: Colonoscopy;  Surgeon: Ludmila Beltran MD;  Location:  GI    ESOPHAGOSCOPY, GASTROSCOPY, DUODENOSCOPY (EGD), COMBINED N/A 10/24/2018    Procedure: Esophagogastroduodenoscopy Multiple Biopsies by Biopsy;  Surgeon: Matteo Johnson DO;  Location:  GI    HC REPAIR ING HERNIA,5+Y/O,REDUCIBL  1960    HERNIORRHAPHY UMBILICAL N/A 4/17/2015    Procedure: HERNIORRHAPHY UMBILICAL;  Surgeon: Rayray Avila MD;  Location: [facility] OR    LAPAROSCOPIC HERNIORRHAPHY INGUINAL BILATERAL Bilateral 4/17/2015    Procedure: LAPAROSCOPIC HERNIORRHAPHY INGUINAL BILATERAL;  Surgeon: Rayray Avila MD;  Location:  OR    [facility] APPENDECTOMY  1996    [facility] COLONOSCOPY W BIOPSY  08/10/05       FamHx:  Family History   Problem Relation Age of Onset    Arthritis Mother     Cardiovascular Mother     Depression Mother     Gastrointestinal Disease Mother         IBS    Hypertension Mother     Circulatory Mother         Aortal aneurysm    Osteoporosis Mother     Allergies Father         Cortisone    Cardiovascular Father     Circulatory Father     Diabetes Father     Hypertension Father     Lipids Father     Alzheimer Disease Paternal Grandfather     Blood Disease Paternal Grandmother         Clotting problems    Blood Disease Son         Factor 5    Hypertension Maternal Aunt     Cerebrovascular Disease Maternal Grandmother      No family history of liver disease, liver cancer    SocHx:  Social History     Socioeconomic History    Marital status:      Spouse name: Jacki    Number of children: 2    Years of education: 16    Highest education level: Not on file   Occupational History    Occupation:      Employer: [employer]   Tobacco Use    Smoking status:  Former     Current packs/day: 0.00     Average packs/day: 0.3 packs/day for 2.0 years (0.5 ttl pk-yrs)     Types: Cigarettes, Pipe     Start date: 1977     Quit date: 1979     Years since quittin.7    Smokeless tobacco: Never   Vaping Use    Vaping status: Never Used   Substance and Sexual Activity    Alcohol use: Yes     Comment: 5 drinks per week / wine    Drug use: No    Sexual activity: Yes     Partners: Female   Other Topics Concern     Service Yes     Comment: [employer]    Blood Transfusions No    Caffeine Concern No    Occupational Exposure Yes     Comment:     Hobby Hazards Yes     Comment: hunting    Sleep Concern No    Stress Concern No    Weight Concern No    Special Diet No    Back Care No    Exercise Yes     Comment: Walks    Bike Helmet No    Seat Belt Yes    Self-Exams Not Asked    Parent/sibling w/ CABG, MI or angioplasty before 65F 55M? No   Social History Narrative    Not on file     Social Determinants of Health     Financial Resource Strain: Low Risk  (2024)    Financial Resource Strain     Within the past 12 months, have you or your family members you live with been unable to get utilities (heat, electricity) when it was really needed?: No   Food Insecurity: Low Risk  (2024)    Food Insecurity     Within the past 12 months, did you worry that your food would run out before you got money to buy more?: No     Within the past 12 months, did the food you bought just not last and you didn t have money to get more?: No   Transportation Needs: Low Risk  (2024)    Transportation Needs     Within the past 12 months, has lack of transportation kept you from medical appointments, getting your medicines, non-medical meetings or appointments, work, or from getting things that you need?: No   Physical Activity: Unknown (2024)    Exercise Vital Sign     Days of Exercise per Week: 7 days     Minutes of Exercise per Session: Patient declined   Stress: No Stress  Concern Present (9/6/2024)    [employer] - Occupational Stress Questionnaire     Feeling of Stress : Not at all   Social Connections: Unknown (9/6/2024)    Social Connection and Isolation Panel [NHANES]     Frequency of Communication with Friends and Family: Not on file     Frequency of Social Gatherings with Friends and Family: Twice a week     Attends [employer] Services: Not on file     Active Member of Clubs or Organizations: Not on file     Attends Club or Organization Meetings: Not on file     Marital Status: Not on file   Interpersonal Safety: Low Risk  (9/6/2024)    Interpersonal Safety     Do you feel physically and emotionally safe where you currently live?: Yes     Within the past 12 months, have you been hit, slapped, kicked or otherwise physically hurt by someone?: No     Within the past 12 months, have you been humiliated or emotionally abused in other ways by your partner or ex-partner?: No   Housing Stability: Low Risk  (9/6/2024)    Housing Stability     Do you have housing? : Yes     Are you worried about losing your housing?: No       Medications:  Current Outpatient Medications   Medication Sig Dispense Refill    allopurinol (ZYLOPRIM) 300 MG tablet TAKE 1 TABLET BY MOUTH EVERY DAY 90 tablet 4    amLODIPine (NORVASC) 5 MG tablet Take 1 tablet (5 mg) by mouth daily 90 tablet 11    brimonidine (ALPHAGAN) 0.2 % ophthalmic solution       cholestyramine light (PREVALITE) 4 GM powder DISSOLVE 1 LEVEL SCOOP IN LIQUID AND DRINK BY MOUTH TWICE DAILY AS DIRECTED 231 g 12    Cyanocobalamin (B-12) 100 MCG TABS       diclofenac (VOLTAREN) 1 % GEL Apply  2 grams to shoulders three times daily using enclosed dosing card. 100 g 1    diclofenac (VOLTAREN) 1 % topical gel Apply up to 2 grams of 1% gel to hands up to 4 times daily as needed for joint pain (maximum: 8 g per upper extremity per day) 400 g 1    dorzolamide-timolol (COSOPT) 22.3-6.8 MG/ML ophthalmic solution       ketorolac  "(ACULAR) 0.5 % ophthalmic solution       levothyroxine (SYNTHROID/LEVOTHROID) 25 MCG tablet Take 1 tablet (25 mcg) by mouth daily. 90 tablet 4    losartan-hydrochlorothiazide (HYZAAR) 100-12.5 MG tablet TAKE 1 TABLET BY MOUTH DAILY 90 tablet 3    omeprazole (PRILOSEC) 20 MG DR capsule TAKE 1 CAPSULE (20 MG) BY MOUTH DAILY 90 capsule 0    riTUXimab 500 MG/50ML SOLN Inject 1,000 mg into the vein      rosuvastatin (CRESTOR) 10 MG tablet Take 1 tablet (10 mg) by mouth daily. 90 tablet 1    sildenafil (VIAGRA) 100 MG tablet 1/2 tab 1/2 hour prior to activity 3 tablet 4    vitamin C (ASCORBIC ACID) 1000 MG TABS Take 1,000 mg by mouth daily      VITAMIN D PO       cetirizine (ZYRTEC) 10 MG tablet Take 10 mg by mouth (Patient not taking: Reported on 9/6/2024)       Current Facility-Administered Medications   Medication Dose Route Frequency Provider Last Rate Last Admin    cilgavimab 300 mg/tixagevimab 300 mg (EVUSHELD) - FULL DOSE *FOR CLINIC USE ONLY*  6 mL Intramuscular Once Fabricio Gomes MD         No OTCs, herbals    Allergies:  Allergies   Allergen Reactions    Lisinopril Cough    Plaquenil [Hydroxychloroquine] Hives       Objective:  Ht 1.778 m (5' 10\")   Wt 89.8 kg (198 lb)   BMI 28.41 kg/m    Constitutional: pleasant man in NAD  Eyes: non icteric  Respiratory: Normal respiratory excursion   MSK: normal range of motion of visualized extremities  Abd: Non distended  Skin: No jaundice  Psychiatric: normal mood and orientation    Labs:  Last Comprehensive Metabolic Panel:  Sodium   Date Value Ref Range Status   10/02/2023 140 135 - 145 mmol/L Final     Comment:     Reference intervals for this test were updated on 09/26/2023 to more accurately reflect our healthy population. There may be differences in the flagging of prior results with similar values performed with this method. Interpretation of those prior results can be made in the context of the updated reference intervals.    02/16/2021 141 133 - 144 mmol/L " Final     Potassium   Date Value Ref Range Status   10/02/2023 4.1 3.4 - 5.3 mmol/L Final   07/28/2022 3.9 3.4 - 5.3 mmol/L Final   06/25/2021 4.2 3.4 - 5.3 mmol/L Final     Chloride   Date Value Ref Range Status   10/02/2023 103 98 - 107 mmol/L Final   07/28/2022 108 94 - 109 mmol/L Final   02/16/2021 106 94 - 109 mmol/L Final     Carbon Dioxide   Date Value Ref Range Status   02/16/2021 32 20 - 32 mmol/L Final     Carbon Dioxide (CO2)   Date Value Ref Range Status   10/02/2023 27 22 - 29 mmol/L Final   07/28/2022 31 20 - 32 mmol/L Final     Anion Gap   Date Value Ref Range Status   10/02/2023 10 7 - 15 mmol/L Final   07/28/2022 2 (L) 3 - 14 mmol/L Final   02/16/2021 3 3 - 14 mmol/L Final     Glucose   Date Value Ref Range Status   10/02/2023 102 (H) 70 - 99 mg/dL Final   07/28/2022 127 (H) 70 - 99 mg/dL Final   02/16/2021 119 (H) 70 - 99 mg/dL Final     Urea Nitrogen   Date Value Ref Range Status   10/02/2023 17.5 8.0 - 23.0 mg/dL Final   07/28/2022 17 7 - 30 mg/dL Final   02/16/2021 15 7 - 30 mg/dL Final     Creatinine   Date Value Ref Range Status   05/20/2024 0.84 0.67 - 1.17 mg/dL Final   06/25/2021 0.96 0.66 - 1.25 mg/dL Final     GFR Estimate   Date Value Ref Range Status   05/20/2024 >90 >60 mL/min/1.73m2 Final   06/25/2021 80 >60 mL/min/[1.73_m2] Final     Comment:     Non  GFR Calc  Starting 12/18/2018, serum creatinine based estimated GFR (eGFR) will be   calculated using the Chronic Kidney Disease Epidemiology Collaboration   (CKD-EPI) equation.       GFR, ESTIMATED POCT   Date Value Ref Range Status   09/08/2023 >60 >60 mL/min/1.73m2 Final     Calcium   Date Value Ref Range Status   10/02/2023 9.6 8.8 - 10.2 mg/dL Final   02/16/2021 9.4 8.5 - 10.1 mg/dL Final     Bilirubin Total   Date Value Ref Range Status   05/20/2024 1.3 (H) <=1.2 mg/dL Final   06/25/2021 1.0 0.2 - 1.3 mg/dL Final     Alkaline Phosphatase   Date Value Ref Range Status   05/20/2024 58 40 - 150 U/L Final   06/25/2021  63 40 - 150 U/L Final     ALT   Date Value Ref Range Status   05/20/2024 26 0 - 70 U/L Final     Comment:     Reference intervals for this test were updated on 6/12/2023 to more accurately reflect our healthy population. There may be differences in the flagging of prior results with similar values performed with this method. Interpretation of those prior results can be made in the context of the updated reference intervals.     07/08/2021 121 (H) 0 - 70 U/L Final     AST   Date Value Ref Range Status   05/20/2024 23 0 - 45 U/L Final     Comment:     Reference intervals for this test were updated on 6/12/2023 to more accurately reflect our healthy population. There may be differences in the flagging of prior results with similar values performed with this method. Interpretation of those prior results can be made in the context of the updated reference intervals.   07/08/2021 57 (H) 0 - 45 U/L Final       Lab Results   Component Value Date    WBC 4.5 10/02/2023    WBC 4.1 06/25/2021     Lab Results   Component Value Date    RBC 4.44 10/02/2023    RBC 4.29 06/25/2021     Lab Results   Component Value Date    HGB 14.9 10/02/2023    HGB 14.3 06/25/2021     Lab Results   Component Value Date    HCT 43.6 10/02/2023    HCT 41.2 06/25/2021     Lab Results   Component Value Date    MCV 98 10/02/2023    MCV 96 06/25/2021     Lab Results   Component Value Date    MCH 33.6 10/02/2023    MCH 33.3 06/25/2021     Lab Results   Component Value Date    MCHC 34.2 10/02/2023    MCHC 34.7 06/25/2021     Lab Results   Component Value Date    RDW 12.8 10/02/2023    RDW 13.0 06/25/2021     Lab Results   Component Value Date     10/02/2023     06/25/2021       INR   Date Value Ref Range Status   10/02/2023 1.07 0.85 - 1.15 Final     INR Point of Care   Date Value Ref Range Status   04/09/2015 0.9 0.86 - 1.14 Final     Comment:     This test is intended for monitoring Coumadin therapy.  Results are not   accurate   in patients  "with prolonged INR due to factor deficiency.        Previous work-up:   Lab Results   Component Value Date    HEPBANG Nonreactive 11/08/2023    HBCAB Nonreactive 11/08/2023    HCVAB Nonreactive 11/08/2023     07/12/2024    STEVE Positive (A) 08/09/2021    ANAT1 1:160 08/09/2021    SMOOTHMUS 14 08/09/2021    TSH 4.10 09/06/2024    CHOL 176 09/06/2024    HDL 39 (L) 09/06/2024     (H) 09/06/2024    TRIG 114 09/06/2024      No results found for: \"SPECDES\", \"LDRESULTS\"    Imaging: Reviewed in EHR    Independently reviewed labs and imaging.     Assessment/Plan: Mr. Baca is a 74-year-old male with a history rheumatoid arthritis and factor V Leiden deficiency with a history of VTE, who presents for evaluation of hepatic steatosis without fibrosis - likely MASLD with alcohol use.  He had an MRI elastography in 2021 that did not show fibrosis, this was stable on his repeat MRE despite FIB-4 > 2.    Recommend that he cut back on his alcohol - 1 drink at the most 1-2 times a week.     Recommend he get a repeat MRE 12/2025-12/2026 to assess for scarring progression or earlier if there is clinical concern. PCP or  myself can order    No orders of the defined types were placed in this encounter.      RTC based on follow up MRI     Reina Ocasio MD MS  Hepatology/Liver Transplant  [facility]      "

## 2024-10-14 ENCOUNTER — HOSPITAL ENCOUNTER (OUTPATIENT)
Dept: ULTRASOUND IMAGING | Facility: CLINIC | Age: 74
Discharge: HOME | End: 2024-10-14
Attending: INTERNAL MEDICINE | Admitting: INTERNAL MEDICINE
Payer: MEDICARE

## 2024-10-14 DIAGNOSIS — I10 ESSENTIAL HYPERTENSION: ICD-10-CM

## 2024-10-14 DIAGNOSIS — E78.5 DYSLIPIDEMIA: ICD-10-CM

## 2024-10-14 DIAGNOSIS — I71.43 INFRARENAL ABDOMINAL AORTIC ANEURYSM (AAA) WITHOUT RUPTURE (H): ICD-10-CM

## 2024-10-14 PROCEDURE — 76775 US EXAM ABDO BACK WALL LIM: CPT

## 2024-10-19 DIAGNOSIS — R19.5 LOOSE STOOLS: ICD-10-CM

## 2024-10-22 RX ORDER — CHOLESTYRAMINE 4 G/5.5G
POWDER, FOR SUSPENSION ORAL
Qty: 231 G | Refills: 12 | Status: SHIPPED | OUTPATIENT
Start: 2024-10-22

## 2024-11-04 ENCOUNTER — OFFICE VISIT (OUTPATIENT)
Dept: AUDIOLOGY | Facility: CLINIC | Age: 74
End: 2024-11-04
Payer: COMMERCIAL

## 2024-11-04 NOTE — PROGRESS NOTES
AUDIOLOGY REPORT    SUBJECTIVE:  Camilo Baca is a 74 year old male who was seen in the Audiology Clinic at the [facility] on 11/4/2024 for a follow-up after fitting ReSound Key 4  hearing aids 9/10/2024. His last hearing test was 4/9/2024, it showed normal hearing sloping to low normal 250-2k, precipitously sloping to moderate high frequency sensorineural hearing loss 3k-8k right ear and low normal sloping to moderate to severe sensorineural hearing loss left ear 500-8k, with good word understanding in quiet 100% right and 82% left. He reports overall good function and consistent use 6-7 hours per day, does not wear them outside when he is working and active with equipment and tools. He reported high frequency sounds like newspaper (his wife does a lot of crossword puzzles) and kitchen noise (pots and pans) remain too loud.    OBJECTIVE:   Based on patient report, the following changes were made; decreased 2k-8k left devices only, more agressively with higher frequency.    Reviewed 45 day trial period, care, cleaning (no water, dry brush), batteries (size rechargeable) insertion/removal, toxicity, low-battery signal), aid insertion/removal, volume adjustment (if applicable), user booklet, warranty information, storage cases, and other hearing aid details.     No charge visit today (in warranty hearing aid check).    ASSESSMENT:   A follow-up appointment for hearing aid fitting was completed today. Changes to hearing aid was completed as outlined above.     PLAN:  Camilo will return for follow-up in 4 weeks. Please call this clinic with any questions regarding today s appointment.    Ezekiel Amezcua.  [place] Licensed Audiologist #[id number]

## 2024-11-15 ENCOUNTER — TELEPHONE (OUTPATIENT)
Dept: FAMILY MEDICINE | Facility: CLINIC | Age: 74
End: 2024-11-15
Payer: COMMERCIAL

## 2024-11-26 ENCOUNTER — MYC MEDICAL ADVICE (OUTPATIENT)
Dept: FAMILY MEDICINE | Facility: CLINIC | Age: 74
End: 2024-11-26
Payer: COMMERCIAL

## 2024-11-26 DIAGNOSIS — N52.9 ERECTILE DYSFUNCTION, UNSPECIFIED ERECTILE DYSFUNCTION TYPE: ICD-10-CM

## 2024-11-26 NOTE — TELEPHONE ENCOUNTER
Routed message to provider to continue with conversation.     Routed to provider for review and approval/denial of refill of sildenafil to [facility].     Norma Lkue RN on 11/26/2024 at 1:38 PM

## 2024-12-03 ENCOUNTER — OFFICE VISIT (OUTPATIENT)
Dept: AUDIOLOGY | Facility: CLINIC | Age: 74
End: 2024-12-03
Payer: COMMERCIAL

## 2024-12-03 DIAGNOSIS — H93.13 BILATERAL TINNITUS: ICD-10-CM

## 2024-12-03 DIAGNOSIS — H90.3 BILATERAL SENSORINEURAL HEARING LOSS: Primary | ICD-10-CM

## 2024-12-03 PROCEDURE — V5010 ASSESSMENT FOR HEARING AID: HCPCS | Performed by: AUDIOLOGIST

## 2024-12-03 NOTE — PROGRESS NOTES
AUDIOLOGY REPORT     SUBJECTIVE:  Camilo Baca is a 74 year old male who was seen in the Audiology Clinic at the [facility] on 12/3/2024 for a follow-up after fitting ReSound Key 4  hearing aids 9/10/2024. His last hearing test was 4/9/2024, it showed normal hearing sloping to low normal 250-2k, precipitously sloping to moderate high frequency sensorineural hearing loss 3k-8k right ear and low normal sloping to moderate to severe sensorineural hearing loss left ear 500-8k, with good word understanding in quiet 100% right and 82% left.     He reports overall good function and consistent use 6-7 hours per day, does not wear them outside when he is working and active with equipment and tools. Even after programming adjustments 11/4/2024 he reported high frequency sounds like newspaper (his wife does a lot of crossword puzzles) and kitchen noise (pots and pans) are still a little too loud.     OBJECTIVE:   Based on patient report, the following changes were made; decreased 4k 3 increments, decreased 6k increments.     Confirmed push button set to right louder / left softer. Vacuumed  ports.     No charge visit today (in warranty hearing aid check).     ASSESSMENT:   A follow-up appointment for hearing aid fitting was completed today. Changes to hearing aid was completed as outlined above.      PLAN:  Camilo will return for follow-up in 6 weeks to review programming changes and obtain real ear measures. Please call this clinic with any questions regarding today s appointment.     Roseline Amezcua Licensed Audiologist #[id number]

## 2024-12-04 RX ORDER — SILDENAFIL 100 MG/1
TABLET, FILM COATED ORAL
Qty: 20 TABLET | Refills: 4 | Status: SHIPPED | OUTPATIENT
Start: 2024-12-04

## 2025-01-13 ENCOUNTER — MYC MEDICAL ADVICE (OUTPATIENT)
Dept: RHEUMATOLOGY | Facility: CLINIC | Age: 75
End: 2025-01-13
Payer: COMMERCIAL

## 2025-01-13 ENCOUNTER — LAB REQUISITION (OUTPATIENT)
Dept: LAB | Facility: CLINIC | Age: 75
End: 2025-01-13

## 2025-01-13 ENCOUNTER — TRANSFERRED RECORDS (OUTPATIENT)
Dept: HEALTH INFORMATION MANAGEMENT | Facility: CLINIC | Age: 75
End: 2025-01-13
Payer: COMMERCIAL

## 2025-01-13 LAB
CD19 B CELL COMMENT: ABNORMAL
CD19 CELLS # BLD: 15 CELLS/UL (ref 107–698)
CD19 CELLS NFR BLD: 2 % (ref 6–27)

## 2025-01-13 PROCEDURE — 86355 B CELLS TOTAL COUNT: CPT | Performed by: STUDENT IN AN ORGANIZED HEALTH CARE EDUCATION/TRAINING PROGRAM

## 2025-01-14 DIAGNOSIS — I10 ESSENTIAL HYPERTENSION: ICD-10-CM

## 2025-01-14 DIAGNOSIS — E78.5 DYSLIPIDEMIA: ICD-10-CM

## 2025-01-14 NOTE — TELEPHONE ENCOUNTER
"Per plan from Dr. Robles' last note on 10/19/23:     \"RECOMMENDATIONS:   1) Mediterranean style weight loss diet and regular exercise program  2) Add amlodipine 5 mg daily and monitor blood pressure with home cuff, target 130/80 or less  3) Abdominal ultrasound in one year and on annual basis as long as stable. Referral to vascular specialist with symptoms, rapid change in dimension and/or diameter greater than or equal to 5.5 cm.   4) See primary care MD for follow up of blood pressure in 3 months .     Unless his primary care MD prefers otherwise we will see on a prn basis.\"     Will route to Dr. Barney's team to see if he is willing to manage prescription as patient is only to follow-up with cardiology as needed.     Margaret Lozoya RN   [facility] Specialty=  "

## 2025-01-16 ENCOUNTER — OFFICE VISIT (OUTPATIENT)
Dept: AUDIOLOGY | Facility: CLINIC | Age: 75
End: 2025-01-16
Payer: COMMERCIAL

## 2025-01-16 DIAGNOSIS — H90.3 SENSORINEURAL HEARING LOSS, ASYMMETRICAL: Primary | ICD-10-CM

## 2025-01-16 DIAGNOSIS — H93.13 TINNITUS OF BOTH EARS: ICD-10-CM

## 2025-01-16 RX ORDER — AMLODIPINE BESYLATE 5 MG/1
5 TABLET ORAL DAILY
Qty: 90 TABLET | Refills: 11 | Status: SHIPPED | OUTPATIENT
Start: 2025-01-16

## 2025-01-16 NOTE — PROGRESS NOTES
AUDIOLOGY REPORT     SUBJECTIVE:  Camilo Baca is a 74 year old male who was seen in the Audiology Clinic at the [facility] on 1/16/2025 for a follow-up after fitting ReSound Key 4  hearing aids 9/10/2024. His last hearing test was 4/9/2024, it showed normal hearing sloping to low normal 250-2k, precipitously sloping to moderate high frequency sensorineural hearing loss 3k-8k right ear and low normal sloping to moderate to severe sensorineural hearing loss left ear 500-8k, with good word understanding in quiet 100% right and 82% left.      He reports overall good function and consistent use 6-7 hours per day, does not wear them outside when he is working and active with equipment and tools. Even after programming adjustments he reported high frequency sounds like the radio, /s/, and /sh/ are almost distorted.     OBJECTIVE:   Based on patient report, the following changes were made; decreased left device only above 2k, verified change using real ear measures. Good match to target 250-1.5k left, excellent 250-4k right (no change in programming right).         Green user before changes, pink after programming changes left.     No charge visit today (in warranty hearing aid check).     ASSESSMENT:   A follow-up appointment for hearing aid fitting was completed today. Changes to hearing aid was completed as outlined above.      PLAN:  Camilo will return for follow-up in 6 weeks to review programming changes if needed, otherwise in 6 months with audio. Please call this clinic with any questions regarding today s appointment.     Ezekiel Amezcua.  [place] Licensed Audiologist #[id number]

## 2025-01-20 NOTE — TELEPHONE ENCOUNTER
RN left message for patient to return call to clinic.  Provided direct call back number to return call today until 4 pm today and [phone]  if calling back after that time. Also advised he can view [employer] message.    WILFRED RobertsN, RN, PHN 1/20/2025 9:51 AM

## 2025-01-20 NOTE — TELEPHONE ENCOUNTER
Patient returned call. Reviewed message. No questions at this time.    WILFRED RobertsN, RN, PHN 1/20/2025 10:33 AM

## 2025-02-01 DIAGNOSIS — K21.9 GASTROESOPHAGEAL REFLUX DISEASE WITHOUT ESOPHAGITIS: ICD-10-CM

## 2025-02-03 RX ORDER — OMEPRAZOLE 20 MG/1
20 CAPSULE, DELAYED RELEASE ORAL DAILY
Qty: 90 CAPSULE | Refills: 1 | Status: SHIPPED | OUTPATIENT
Start: 2025-02-03

## 2025-02-15 DIAGNOSIS — E78.2 MIXED HYPERLIPIDEMIA: ICD-10-CM

## 2025-02-17 RX ORDER — ROSUVASTATIN CALCIUM 10 MG/1
10 TABLET, COATED ORAL DAILY
Qty: 90 TABLET | Refills: 1 | Status: SHIPPED | OUTPATIENT
Start: 2025-02-17

## 2025-03-25 NOTE — PROGRESS NOTES
AUDIOLOGY REPORT     SUBJECTIVE:  Camilo Baca is a 74 year old male who was seen in the Audiology Clinic at the [facility] on 9/24/2024 for initial follow-up after fitting ReSound Key 4  hearing aids 9/10/2024. His last hearing test was 4/9/2024, it showed normal hearing sloping to low normal 250-2k, precipitously sloping to moderate high frequency sensorineural hearing loss 3k-8k right ear and low normal sloping to moderate to severe sensorineural hearing loss left ear 500-8k, with good word understanding in quiet 100% right and 82% left.     He reports overall good function and consistent use 6-7 hours per day, does not wear them outside when he is working and active with equipment and tools. He reported high frequency sounds like newspaper (his wife does a lot of crossword puzzles) and kitchen noise (pots and pans) remain too loud.     OBJECTIVE:   Based on patient report, the following changes were made; no programming changes were made. Discussed waiting until his next appointment to see what he continues to adjust to with regard to high frequency sounds, as this is also where important consonant information occurs. He was agreeable to waiting.     Reviewed 45 day trial period, care, cleaning (no water, dry brush), batteries (size rechargeable) insertion/removal, toxicity, low-battery signal), aid insertion/removal, volume adjustment (if applicable), user booklet, warranty information, storage cases, and other hearing aid details.      No charge visit today (in warranty hearing aid check).     ASSESSMENT:   A follow-up appointment for hearing aid fitting was completed today. Changes to hearing aid was completed as outlined above.      PLAN:  Camilo will return for follow-up in 4-6 weeks. Please call this clinic with any questions regarding today s appointment.     Ezekiel Amezcua.  [place] Licensed Audiologist #[id number]

## 2025-04-19 DIAGNOSIS — K21.9 GASTROESOPHAGEAL REFLUX DISEASE WITHOUT ESOPHAGITIS: ICD-10-CM

## 2025-04-19 DIAGNOSIS — I15.9 SECONDARY HYPERTENSION WITH GOAL BLOOD PRESSURE LESS THAN 140/90: ICD-10-CM

## 2025-04-21 RX ORDER — LOSARTAN POTASSIUM AND HYDROCHLOROTHIAZIDE 12.5; 1 MG/1; MG/1
1 TABLET ORAL DAILY
Qty: 90 TABLET | Refills: 3 | OUTPATIENT
Start: 2025-04-21

## 2025-04-21 RX ORDER — OMEPRAZOLE 20 MG/1
20 CAPSULE, DELAYED RELEASE ORAL DAILY
Qty: 90 CAPSULE | Refills: 0 | Status: SHIPPED | OUTPATIENT
Start: 2025-04-21

## 2025-05-24 ENCOUNTER — HEALTH MAINTENANCE LETTER (OUTPATIENT)
Age: 75
End: 2025-05-24

## 2025-07-14 ENCOUNTER — LAB REQUISITION (OUTPATIENT)
Dept: LAB | Facility: CLINIC | Age: 75
End: 2025-07-14

## 2025-07-14 ENCOUNTER — TRANSFERRED RECORDS (OUTPATIENT)
Dept: HEALTH INFORMATION MANAGEMENT | Facility: CLINIC | Age: 75
End: 2025-07-14
Payer: COMMERCIAL

## 2025-07-14 PROCEDURE — 86355 B CELLS TOTAL COUNT: CPT | Performed by: STUDENT IN AN ORGANIZED HEALTH CARE EDUCATION/TRAINING PROGRAM

## 2025-07-15 DIAGNOSIS — I15.9 SECONDARY HYPERTENSION WITH GOAL BLOOD PRESSURE LESS THAN 140/90: ICD-10-CM

## 2025-07-15 LAB
CD19 B CELL COMMENT: ABNORMAL
CD19 CELLS # BLD: 8 CELLS/UL (ref 107–698)
CD19 CELLS NFR BLD: 1 % (ref 6–27)

## 2025-07-15 RX ORDER — LOSARTAN POTASSIUM AND HYDROCHLOROTHIAZIDE 12.5; 1 MG/1; MG/1
1 TABLET ORAL DAILY
Qty: 90 TABLET | Refills: 3 | Status: SHIPPED | OUTPATIENT
Start: 2025-07-15

## 2025-07-17 ENCOUNTER — OFFICE VISIT (OUTPATIENT)
Dept: AUDIOLOGY | Facility: CLINIC | Age: 75
End: 2025-07-17
Payer: COMMERCIAL

## 2025-07-17 DIAGNOSIS — H93.13 TINNITUS OF BOTH EARS: ICD-10-CM

## 2025-07-17 DIAGNOSIS — H90.3 SENSORINEURAL HEARING LOSS, ASYMMETRICAL: Primary | ICD-10-CM

## 2025-07-17 NOTE — PROGRESS NOTES
AUDIOLOGY REPORT    BACKGROUND:  Self-referred for known asymmetric sensorneural hearing loss, previous evaluation 4/9/2024 showed normal hearing sensitivity 250-2k Hz sloping to mild to moderate sensorineural hearing loss right ear and normal hearing sensitivity 250-500 Hz sloping to mild to severe rising to moderate sensorineural hearing loss left ear. Patient notes extensive history of firearm noise exposure as an [employer] 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 Consistent use of hearing protection. He notes constant bilateral tinnitus, frequency modulates and volume fluctuates. Denied dizziness, vertigo, and denied any falls in the last year. No history of ear problems or ear surgeries. He currently wears [employer] 4 hearing aids with good benefit, notes he is not hearing his wife as well in the last few months.     RESULTS:  Otoscopy showed visible eardrums and landmarks bilaterally. Tympanograms showed normal eardrum mobility right ear and left ear. Reflexes: Ipsi and contra present bilaterally. Pure tones right ear showed stable normal hearing sensitivity 250-2k Hz sloping to mild to moderate sensorineural hearing loss right ear and normal hearing sensitivity 250-500 Hz sloping to mild to severe rising to moderate sensorineural hearing loss left ear. Good SRT/PTA agreement. Good word understanding in quiet, 100% right and 80% left, 25-word lists. Hearing aid maintenance completed. Increased hearing aid programming 2-3 increments, all frequencies and left ear 2-3 more 1.7-3k.    RECOMMENDATIONS/PLAN:  Return as needed for hearing aid maintenance and programming.  Return in 1-2 years for hearing testing, sooner if there are changes in hearing, tinnitus, or balance.    Please call with questions.          Ezekiel Amezcua.  [place] Licensed Audiologist #[id number]

## 2025-08-25 ENCOUNTER — TELEPHONE (OUTPATIENT)
Dept: RHEUMATOLOGY | Facility: CLINIC | Age: 75
End: 2025-08-25
Payer: COMMERCIAL

## 2025-08-26 DIAGNOSIS — M05.79 RHEUMATOID ARTHRITIS INVOLVING MULTIPLE SITES WITH POSITIVE RHEUMATOID FACTOR (H): Primary | ICD-10-CM

## (undated) RX ORDER — FENTANYL CITRATE 50 UG/ML
INJECTION, SOLUTION INTRAMUSCULAR; INTRAVENOUS
Status: DISPENSED
Start: 2021-03-19

## (undated) RX ORDER — FENTANYL CITRATE 50 UG/ML
INJECTION, SOLUTION INTRAMUSCULAR; INTRAVENOUS
Status: DISPENSED
Start: 2018-10-24